# Patient Record
Sex: FEMALE | Race: BLACK OR AFRICAN AMERICAN | NOT HISPANIC OR LATINO | Employment: OTHER | URBAN - METROPOLITAN AREA
[De-identification: names, ages, dates, MRNs, and addresses within clinical notes are randomized per-mention and may not be internally consistent; named-entity substitution may affect disease eponyms.]

---

## 2017-01-24 ENCOUNTER — HOSPITAL ENCOUNTER (INPATIENT)
Facility: OTHER | Age: 77
LOS: 2 days | Discharge: HOME | DRG: 389 | End: 2017-01-26
Attending: EMERGENCY MEDICINE | Admitting: EMERGENCY MEDICINE
Payer: MEDICARE

## 2017-01-24 DIAGNOSIS — K56.609 SBO (SMALL BOWEL OBSTRUCTION): Primary | ICD-10-CM

## 2017-01-24 PROBLEM — E87.6 HYPOKALEMIA: Status: ACTIVE | Noted: 2017-01-24

## 2017-01-24 PROBLEM — N28.1 RENAL CYST: Status: ACTIVE | Noted: 2017-01-24

## 2017-01-24 PROBLEM — N39.0 URINARY TRACT INFECTION WITHOUT HEMATURIA: Status: ACTIVE | Noted: 2017-01-24

## 2017-01-24 LAB
ALBUMIN SERPL BCP-MCNC: 4 G/DL
ALP SERPL-CCNC: 102 U/L
ALT SERPL W/O P-5'-P-CCNC: 12 U/L
ANION GAP SERPL CALC-SCNC: 13 MMOL/L
AST SERPL-CCNC: 23 U/L
BACTERIA #/AREA URNS HPF: ABNORMAL /HPF
BASOPHILS # BLD AUTO: 0.01 K/UL
BASOPHILS NFR BLD: 0.1 %
BILIRUB SERPL-MCNC: 0.8 MG/DL
BILIRUB UR QL STRIP: ABNORMAL
BUN SERPL-MCNC: 12 MG/DL
CALCIUM SERPL-MCNC: 10.3 MG/DL
CHLORIDE SERPL-SCNC: 100 MMOL/L
CLARITY UR: ABNORMAL
CO2 SERPL-SCNC: 27 MMOL/L
COLOR UR: YELLOW
CREAT SERPL-MCNC: 0.9 MG/DL
DIFFERENTIAL METHOD: ABNORMAL
EOSINOPHIL # BLD AUTO: 0 K/UL
EOSINOPHIL NFR BLD: 0.1 %
ERYTHROCYTE [DISTWIDTH] IN BLOOD BY AUTOMATED COUNT: 15.5 %
EST. GFR  (AFRICAN AMERICAN): >60 ML/MIN/1.73 M^2
EST. GFR  (NON AFRICAN AMERICAN): >60 ML/MIN/1.73 M^2
GLUCOSE SERPL-MCNC: 134 MG/DL
GLUCOSE UR QL STRIP: NEGATIVE
HCT VFR BLD AUTO: 47.3 %
HGB BLD-MCNC: 15 G/DL
HGB UR QL STRIP: ABNORMAL
HYALINE CASTS #/AREA URNS LPF: 3 /LPF
KETONES UR QL STRIP: ABNORMAL
LEUKOCYTE ESTERASE UR QL STRIP: ABNORMAL
LIPASE SERPL-CCNC: 7 U/L
LYMPHOCYTES # BLD AUTO: 1.6 K/UL
LYMPHOCYTES NFR BLD: 15.9 %
MCH RBC QN AUTO: 26.1 PG
MCHC RBC AUTO-ENTMCNC: 31.7 %
MCV RBC AUTO: 82 FL
MICROSCOPIC COMMENT: ABNORMAL
MONOCYTES # BLD AUTO: 0.7 K/UL
MONOCYTES NFR BLD: 6.8 %
NEUTROPHILS # BLD AUTO: 7.6 K/UL
NEUTROPHILS NFR BLD: 76.9 %
NITRITE UR QL STRIP: NEGATIVE
PH UR STRIP: 6 [PH] (ref 5–8)
PLATELET # BLD AUTO: 208 K/UL
PMV BLD AUTO: 10.5 FL
POTASSIUM SERPL-SCNC: 3.4 MMOL/L
PROT SERPL-MCNC: 8.5 G/DL
PROT UR QL STRIP: ABNORMAL
RBC # BLD AUTO: 5.74 M/UL
RBC #/AREA URNS HPF: 4 /HPF (ref 0–4)
SODIUM SERPL-SCNC: 140 MMOL/L
SP GR UR STRIP: >=1.03 (ref 1–1.03)
URN SPEC COLLECT METH UR: ABNORMAL
UROBILINOGEN UR STRIP-ACNC: NEGATIVE EU/DL
WBC # BLD AUTO: 9.88 K/UL
WBC #/AREA URNS HPF: 35 /HPF (ref 0–5)

## 2017-01-24 PROCEDURE — 25000003 PHARM REV CODE 250: Performed by: EMERGENCY MEDICINE

## 2017-01-24 PROCEDURE — 99285 EMERGENCY DEPT VISIT HI MDM: CPT

## 2017-01-24 PROCEDURE — 25000003 PHARM REV CODE 250: Performed by: PHYSICIAN ASSISTANT

## 2017-01-24 PROCEDURE — 11000001 HC ACUTE MED/SURG PRIVATE ROOM

## 2017-01-24 PROCEDURE — 63600175 PHARM REV CODE 636 W HCPCS: Performed by: EMERGENCY MEDICINE

## 2017-01-24 PROCEDURE — 80053 COMPREHEN METABOLIC PANEL: CPT

## 2017-01-24 PROCEDURE — 81000 URINALYSIS NONAUTO W/SCOPE: CPT

## 2017-01-24 PROCEDURE — 85025 COMPLETE CBC W/AUTO DIFF WBC: CPT

## 2017-01-24 PROCEDURE — 96361 HYDRATE IV INFUSION ADD-ON: CPT

## 2017-01-24 PROCEDURE — 63600175 PHARM REV CODE 636 W HCPCS: Performed by: PHYSICIAN ASSISTANT

## 2017-01-24 PROCEDURE — 96365 THER/PROPH/DIAG IV INF INIT: CPT

## 2017-01-24 PROCEDURE — 96375 TX/PRO/DX INJ NEW DRUG ADDON: CPT

## 2017-01-24 PROCEDURE — 87086 URINE CULTURE/COLONY COUNT: CPT

## 2017-01-24 PROCEDURE — 25500020 PHARM REV CODE 255: Performed by: EMERGENCY MEDICINE

## 2017-01-24 PROCEDURE — 83690 ASSAY OF LIPASE: CPT

## 2017-01-24 RX ORDER — AMLODIPINE BESYLATE 2.5 MG/1
2.5 TABLET ORAL DAILY
Status: DISCONTINUED | OUTPATIENT
Start: 2017-01-24 | End: 2017-01-26 | Stop reason: HOSPADM

## 2017-01-24 RX ORDER — DEXTROSE MONOHYDRATE, SODIUM CHLORIDE, AND POTASSIUM CHLORIDE 50; 1.49; 4.5 G/1000ML; G/1000ML; G/1000ML
INJECTION, SOLUTION INTRAVENOUS CONTINUOUS
Status: DISCONTINUED | OUTPATIENT
Start: 2017-01-24 | End: 2017-01-24

## 2017-01-24 RX ORDER — ATENOLOL 25 MG/1
100 TABLET ORAL DAILY
Status: DISCONTINUED | OUTPATIENT
Start: 2017-01-24 | End: 2017-01-26 | Stop reason: HOSPADM

## 2017-01-24 RX ORDER — MORPHINE SULFATE 4 MG/ML
4 INJECTION, SOLUTION INTRAMUSCULAR; INTRAVENOUS
Status: COMPLETED | OUTPATIENT
Start: 2017-01-24 | End: 2017-01-24

## 2017-01-24 RX ORDER — ATENOLOL 25 MG/1
100 TABLET ORAL DAILY
Status: DISCONTINUED | OUTPATIENT
Start: 2017-01-25 | End: 2017-01-24

## 2017-01-24 RX ORDER — CHOLECALCIFEROL (VITAMIN D3) 50 MCG
1 TABLET ORAL DAILY
COMMUNITY
End: 2022-06-24

## 2017-01-24 RX ORDER — HEPARIN SODIUM 5000 [USP'U]/ML
5000 INJECTION, SOLUTION INTRAVENOUS; SUBCUTANEOUS EVERY 8 HOURS
Status: DISCONTINUED | OUTPATIENT
Start: 2017-01-24 | End: 2017-01-26 | Stop reason: HOSPADM

## 2017-01-24 RX ORDER — POLYETHYLENE GLYCOL 3350 17 G/17G
17 POWDER, FOR SOLUTION ORAL DAILY
Status: DISCONTINUED | OUTPATIENT
Start: 2017-01-24 | End: 2017-01-26 | Stop reason: HOSPADM

## 2017-01-24 RX ORDER — AMLODIPINE BESYLATE 2.5 MG/1
2.5 TABLET ORAL DAILY
Status: DISCONTINUED | OUTPATIENT
Start: 2017-01-25 | End: 2017-01-24

## 2017-01-24 RX ORDER — MORPHINE SULFATE 2 MG/ML
2 INJECTION, SOLUTION INTRAMUSCULAR; INTRAVENOUS EVERY 4 HOURS PRN
Status: DISCONTINUED | OUTPATIENT
Start: 2017-01-24 | End: 2017-01-26 | Stop reason: HOSPADM

## 2017-01-24 RX ORDER — ONDANSETRON 2 MG/ML
4 INJECTION INTRAMUSCULAR; INTRAVENOUS
Status: COMPLETED | OUTPATIENT
Start: 2017-01-24 | End: 2017-01-24

## 2017-01-24 RX ORDER — ACETAMINOPHEN 325 MG/1
650 TABLET ORAL EVERY 8 HOURS PRN
Status: DISCONTINUED | OUTPATIENT
Start: 2017-01-24 | End: 2017-01-26 | Stop reason: HOSPADM

## 2017-01-24 RX ORDER — BENAZEPRIL HYDROCHLORIDE 40 MG/1
40 TABLET ORAL DAILY
Status: DISCONTINUED | OUTPATIENT
Start: 2017-01-25 | End: 2017-01-26 | Stop reason: HOSPADM

## 2017-01-24 RX ORDER — DEXTROSE MONOHYDRATE, SODIUM CHLORIDE, AND POTASSIUM CHLORIDE 50; 1.49; 9 G/1000ML; G/1000ML; G/1000ML
INJECTION, SOLUTION INTRAVENOUS CONTINUOUS
Status: DISCONTINUED | OUTPATIENT
Start: 2017-01-24 | End: 2017-01-26 | Stop reason: HOSPADM

## 2017-01-24 RX ORDER — ONDANSETRON 2 MG/ML
4 INJECTION INTRAMUSCULAR; INTRAVENOUS EVERY 8 HOURS PRN
Status: DISCONTINUED | OUTPATIENT
Start: 2017-01-24 | End: 2017-01-26 | Stop reason: HOSPADM

## 2017-01-24 RX ORDER — SODIUM CHLORIDE 9 MG/ML
1000 INJECTION, SOLUTION INTRAVENOUS
Status: COMPLETED | OUTPATIENT
Start: 2017-01-24 | End: 2017-01-24

## 2017-01-24 RX ORDER — POTASSIUM CHLORIDE 7.45 MG/ML
10 INJECTION INTRAVENOUS ONCE
Status: COMPLETED | OUTPATIENT
Start: 2017-01-24 | End: 2017-01-24

## 2017-01-24 RX ADMIN — IOHEXOL 100 ML: 350 INJECTION, SOLUTION INTRAVENOUS at 11:01

## 2017-01-24 RX ADMIN — MORPHINE SULFATE 2 MG: 2 INJECTION, SOLUTION INTRAMUSCULAR; INTRAVENOUS at 04:01

## 2017-01-24 RX ADMIN — POTASSIUM CHLORIDE, DEXTROSE MONOHYDRATE AND SODIUM CHLORIDE: 150; 5; 900 INJECTION, SOLUTION INTRAVENOUS at 03:01

## 2017-01-24 RX ADMIN — CEFTRIAXONE 1 G: 1 INJECTION, SOLUTION INTRAVENOUS at 12:01

## 2017-01-24 RX ADMIN — MORPHINE SULFATE 4 MG: 4 INJECTION, SOLUTION INTRAMUSCULAR; INTRAVENOUS at 10:01

## 2017-01-24 RX ADMIN — HEPARIN SODIUM 5000 UNITS: 5000 INJECTION, SOLUTION INTRAVENOUS; SUBCUTANEOUS at 03:01

## 2017-01-24 RX ADMIN — HEPARIN SODIUM 5000 UNITS: 5000 INJECTION, SOLUTION INTRAVENOUS; SUBCUTANEOUS at 09:01

## 2017-01-24 RX ADMIN — AMLODIPINE BESYLATE 2.5 MG: 2.5 TABLET ORAL at 06:01

## 2017-01-24 RX ADMIN — ONDANSETRON 4 MG: 2 INJECTION, SOLUTION INTRAMUSCULAR; INTRAVENOUS at 10:01

## 2017-01-24 RX ADMIN — ATENOLOL 100 MG: 25 TABLET ORAL at 06:01

## 2017-01-24 RX ADMIN — SODIUM CHLORIDE 1000 ML: 0.9 INJECTION, SOLUTION INTRAVENOUS at 09:01

## 2017-01-24 RX ADMIN — MORPHINE SULFATE 2 MG: 2 INJECTION, SOLUTION INTRAMUSCULAR; INTRAVENOUS at 09:01

## 2017-01-24 RX ADMIN — POTASSIUM CHLORIDE 10 MEQ: 10 INJECTION, SOLUTION INTRAVENOUS at 03:01

## 2017-01-24 RX ADMIN — POLYETHYLENE GLYCOL 3350 17 G: 17 POWDER, FOR SOLUTION ORAL at 06:01

## 2017-01-24 NOTE — MEDICAL/APP STUDENT
[facility] Hospital Medicine  History & Physical    Patient Name: Natali Galeano  MRN: [id number]  Admission Date: 2017  Attending Physician: Thad Damon MD   Primary Care Provider: Ewelina Herzog MD         Patient information was obtained from patient and ER records.     Subjective:     Principal Problem: Small bowel obstruction    Chief Complaint:  Abdominal pain/ vomiting      HPI: Pt is a 75 y/o female with h/o colon CA (completely resected), HTN, HLD, ALYSE and prior SBO who presented to the ED today (17) for progressively worsening abdominal pain x2 days. Her last BM was yesterday. She reports 3 episodes of vomiting yesterday after taking pepto-bismol, which she says always makes her vomit. Pt reports diaphoresis on Monday, nausea, flatulence, and urinary frequency today. Denies blood in stool, diarrhea, vomiting, dysuria, and fever.      Pt. Reports colon ca was completely reseceted and there was no need for chemo/radiation. Surgical history significant for tumor removal, , and hernia repair.      Past Medical History   Diagnosis Date    Colon cancer     Gout, chronic     High cholesterol     Hypertension        Past Surgical History   Procedure Laterality Date     section, classic      Colon surgery      Hernia repair         Review of patient's allergies indicates:  No Known Allergies    No current facility-administered medications on file prior to encounter.      Current Outpatient Prescriptions on File Prior to Encounter   Medication Sig    allopurinol (ZYLOPRIM) 300 MG tablet Take 300 mg by mouth once daily.    amlodipine (NORVASC) 2.5 MG tablet Take 2.5 mg by mouth once daily.    aspirin (ECOTRIN) 81 MG EC tablet Take 81 mg by mouth once daily.    atenolol (TENORMIN) 100 MG tablet Take 100 mg by mouth once daily.    atorvastatin (LIPITOR) 10 MG tablet Take 10 mg by mouth once daily.    benazepril (LOTENSIN) 40 MG tablet Take 40 mg by mouth  once daily.    cetirizine (ZYRTEC) 10 MG tablet Take 10 mg by mouth daily as needed for Allergies.    fluticasone (FLONASE) 50 mcg/actuation nasal spray 1 spray by Each Nare route once daily.    [DISCONTINUED] ergocalciferol (VITAMIN D2) 50,000 unit Cap Take 50,000 Units by mouth every 7 days.    [DISCONTINUED] multivitamin (ONE DAILY MULTIVITAMIN) per tablet Take 1 tablet by mouth once daily.     Family History     None        Social History Main Topics    Smoking status: Former Smoker     Packs/day: 0.10     Quit date: 4/16/1980    Smokeless tobacco: Never Used    Alcohol use No    Drug use: No    Sexual activity: Not on file     Review of Systems   Constitutional: Negative for chills, diaphoresis, fatigue and fever.   HENT: Negative.    Respiratory: Negative for cough, chest tightness and shortness of breath.    Cardiovascular: Negative for chest pain, palpitations and leg swelling.   Gastrointestinal: Positive for abdominal pain, constipation and nausea. Negative for blood in stool and vomiting (vomiting yesterday ).   Genitourinary: Positive for frequency. Negative for difficulty urinating, dysuria, flank pain, hematuria, pelvic pain and urgency.   Musculoskeletal: Negative for arthralgias and myalgias.   Skin: Negative for color change, rash and wound.   Neurological: Negative for syncope, weakness, numbness and headaches.   Psychiatric/Behavioral: Negative.      Objective:     Vital Signs (Most Recent):  Temp: 97.7 °F (36.5 °C) (01/24/17 1423)  Pulse: 67 (01/24/17 1423)  Resp: 16 (01/24/17 1423)  BP: (!) 177/84 (01/24/17 1423)  SpO2: 98 % (01/24/17 1423) Vital Signs (24h Range):  Temp:  [97.7 °F (36.5 °C)] 97.7 °F (36.5 °C)  Pulse:  [62-74] 67  Resp:  [16-18] 16  SpO2:  [98 %-100 %] 98 %  BP: (149-220)/() 177/84     Weight: 131.5 kg (290 lb)  Body mass index is 45.42 kg/(m^2).    Physical Exam   Constitutional: She is oriented to person, place, and time. She appears well-developed. No distress.    Morbidly obese   HENT:   Head: Normocephalic and atraumatic.   Eyes: EOM are normal.   Neck: Normal range of motion. Neck supple.   Cardiovascular: Normal rate and regular rhythm.  Exam reveals no gallop and no friction rub.    Murmur heard.  Pulmonary/Chest: Effort normal and breath sounds normal. No respiratory distress. She has no wheezes. She has no rales.   Abdominal: Soft. She exhibits no distension. There is tenderness (mid epigastric). There is no rebound and no guarding.   Bowel sounds present in RUQ and RLQ   Neurological: She is alert and oriented to person, place, and time.   Skin: Skin is warm and dry. No rash noted. She is not diaphoretic. No erythema.   Psychiatric: She has a normal mood and affect. Her behavior is normal. Judgment and thought content normal.   Vitals reviewed.      Significant Labs:   CBC:   Recent Labs  Lab 01/24/17  0937   WBC 9.88   HGB 15.0   HCT 47.3        CMP:   Recent Labs  Lab 01/24/17  0937      K 3.4*      CO2 27   *   BUN 12   CREATININE 0.9   CALCIUM 10.3   PROT 8.5*   ALBUMIN 4.0   BILITOT 0.8   ALKPHOS 102   AST 23   ALT 12   ANIONGAP 13   EGFRNONAA >60     Lipase:   Recent Labs  Lab 01/24/17  0937   LIPASE 7     Urine Studies:   Recent Labs  Lab 01/24/17  1007   COLORU Yellow   APPEARANCEUA Cloudy*   PHUR 6.0   SPECGRAV >=1.030*   PROTEINUA 2+*   GLUCUA Negative   KETONESU Trace*   BILIRUBINUA 1+*   OCCULTUA 1+*   NITRITE Negative   UROBILINOGEN Negative   LEUKOCYTESUR 1+*   RBCUA 4   WBCUA 35*   BACTERIA Moderate*   HYALINECASTS 3*       Significant Imaging: CT: I have reviewed all pertinent results/findings within the past 24 hours and my personal findings are:     1.  Small bowel obstruction with left lower quadrant transition point, similar in location to prior examination.  2.  Bilateral renal cysts, some indeterminate.  Recommend nonemergent renal ultrasound for further characterization.  3.  Stable left adrenal  nodules.    Assessment/Plan:     Active Diagnoses:    Diagnosis Date Noted POA    Essential hypertension [I10]  Yes    Morbid obesity [E66.01] 06/23/2015 Yes    SBO (small bowel obstruction) [K56.69] 04/16/2015 Yes      Problems Resolved During this Admission:    Diagnosis Date Noted Date Resolved POA     VTE Risk Mitigation         Ordered     heparin (porcine) injection 5,000 Units  Every 8 hours     Route:  Subcutaneous        01/24/17 1415     Medium Risk of VTE  Once      01/24/17 1415     Place FEDE hose  Until discontinued      01/24/17 1415     Place sequential compression device  Until discontinued      01/24/17 1415        1. Small bowel obstruction  - Ct abdomen showed LLQ obstruction with transition point  - consulted surgery   - NPO, IVF, pain control   - no indication for NG tube at this point, consider if symptoms worsen.    2. Cystitis  - possible UTI  - follow culture     3. Hypokalemia   - likely due to emesis   - replaced  - follow with am labs    4. Essential HTN  - elevated, pt has not taken daily medications today  - restart home meds am      Mira Hobson  Department of Hospital Medicine   [facility]

## 2017-01-24 NOTE — ED PROVIDER NOTES
Encounter Date: 2017    SCRIBE #1 NOTE: I, Sharon Lindquist, am scribing for, and in the presence of, Dr. Campbell.       History     Chief Complaint   Patient presents with    Emesis     + N/V x two days. Pt reports unable to tolerated PO intake, +generalized abodminal pain     Review of patient's allergies indicates:  No Known Allergies  HPI Comments: Time seen by provider: 9:25 AM    This is a 76 y.o. female who presents with complaint of diffuse abdominal pain that began two days ago. The pain is worse with palpation and better without touch. She reports associated nausea, vomiting, diaphoresis, myalgias, and urinary frequency. She reports vomiting with any PO intake. Her last bowel movement was yesterday, but she has not had flatulence today.  She has had a hx of colon resection, , and hernia repair.  Denies f/c, diarrhea, dysuria, chest pain/SOB.          The history is provided by the patient and a relative (granddaughter).     Past Medical History   Diagnosis Date    Colon cancer     Gout, chronic     High cholesterol     Hypertension      No past medical history pertinent negatives.  Past Surgical History   Procedure Laterality Date     section, classic      Colon surgery      Hernia repair       History reviewed. No pertinent family history.  Social History   Substance Use Topics    Smoking status: Former Smoker     Packs/day: 0.10     Quit date: 1980    Smokeless tobacco: Never Used    Alcohol use No     Review of Systems   Constitutional: Positive for diaphoresis. Negative for fever.   HENT: Negative for facial swelling.    Respiratory: Negative for shortness of breath.    Cardiovascular: Negative for chest pain.   Gastrointestinal: Positive for abdominal pain, nausea and vomiting.   Endocrine: Negative for polyuria.   Genitourinary: Positive for frequency.   Musculoskeletal: Positive for myalgias.   Skin: Negative for rash.   Neurological: Negative for headaches.        Physical Exam   Initial Vitals   BP Pulse Resp Temp SpO2   01/24/17 0913 01/24/17 0913 01/24/17 0913 01/24/17 0913 01/24/17 0913   220/110 72 18 97.7 °F (36.5 °C) 99 %     Physical Exam    Nursing note and vitals reviewed.  Constitutional: She appears well-developed and well-nourished. She is not diaphoretic. No distress.   HENT:   Head: Normocephalic and atraumatic.   Right Ear: External ear normal.   Left Ear: External ear normal.   Mouth/Throat: Oropharynx is clear and moist.   Dry mucous membranes.    Eyes: EOM are normal. Right eye exhibits no discharge. Left eye exhibits no discharge.   Neck: Normal range of motion.   Cardiovascular: Normal rate, regular rhythm and normal heart sounds. Exam reveals no gallop and no friction rub.    No murmur heard.  Pulmonary/Chest: Breath sounds normal. No respiratory distress. She has no wheezes. She has no rhonchi. She has no rales.   Abdominal: Soft. There is tenderness. There is no rebound and no guarding.   Diffuse abdominal tenderness.    Musculoskeletal: Normal range of motion. She exhibits no edema or tenderness.   Neurological: She is alert and oriented to person, place, and time.   Skin: Skin is warm and dry. No rash noted. No pallor.   Psychiatric: She has a normal mood and affect. Thought content normal.         ED Course   Procedures  Labs Reviewed   URINALYSIS - Abnormal; Notable for the following:        Result Value    Appearance, UA Cloudy (*)     Specific Gravity, UA >=1.030 (*)     Protein, UA 2+ (*)     Ketones, UA Trace (*)     Bilirubin (UA) 1+ (*)     Occult Blood UA 1+ (*)     Leukocytes, UA 1+ (*)     All other components within normal limits   COMPREHENSIVE METABOLIC PANEL - Abnormal; Notable for the following:     Potassium 3.4 (*)     Glucose 134 (*)     Total Protein 8.5 (*)     All other components within normal limits   CBC W/ AUTO DIFFERENTIAL - Abnormal; Notable for the following:     RBC 5.74 (*)     MCH 26.1 (*)     MCHC 31.7 (*)      RDW 15.5 (*)     Gran% 76.9 (*)     Lymph% 15.9 (*)     All other components within normal limits   URINALYSIS MICROSCOPIC - Abnormal; Notable for the following:     WBC, UA 35 (*)     Bacteria, UA Moderate (*)     Hyaline Casts, UA 3 (*)     All other components within normal limits   CULTURE, URINE   LIPASE        Imaging Results         CT Abdomen Pelvis With Contrast (Final result) Result time:  01/24/17 11:25:39    Final result by Cornelio Perez MD (01/24/17 11:25:39)    Impression:      1.  Small bowel obstruction with left lower quadrant transition point, similar in location to prior examination.  2.  Bilateral renal cysts, some indeterminate.  Recommend nonemergent renal ultrasound for further characterization.  3.  Stable left adrenal nodules.      Electronically signed by: CORNELIO PEREZ MD  Date:     01/24/17  Time:    11:25     Narrative:    CT abdomen and pelvis    Technique: Helical CT scan of abdomen and pelvis performed with 100 mL Omnipaque 350 intravenous contrast.    Comparison: 04/16/15    Findings:    Lung bases are clear.  No pericardial or pleural effusion.    Liver, gallbladder, pancreas, and spleen are unremarkable.  There are stable left adrenal nodules measuring 1.9 cm and 1.7 cm.  Several bilateral renal hypodensities are noted.  While some demonstrate characteristics of simple cysts, dominant left upper pole lesion measuring 3.8 cm is indeterminate, diminished in size from prior study.  No hydroureteronephrosis.  No biliary dilatation.    Moderately dilated small bowel seen within the left hemiabdomen with small bowel feces and transition point in the left lower quadrant.  Distal small bowel is collapsed.  Gas and stool seen within the colon.  Appendix is normal caliber.    Note made of postsurgical changes in the anterior abdominal wall.    Uterus and adnexa are unremarkable.    No retroperitoneal lymphadenopathy.  No ascites.  Abdominal aorta is normal caliber.    Regional osseous  structures demonstrate no aggressive appearing lytic or blastic lesions.  Degenerative changes noted in the lumbar spine.                     Medical Decision Making:   History:   Old Medical Records: I decided to obtain old medical records.  Old Records Summarized: other records and records from previous admission(s).  Initial Assessment:   9:25AM:  Pt is a 77 y/o F who presents to ED with N/V, abdominal pain. Pt appears well, nontoxic. She does have diffuse abdominal tenderness. She does have a hx of SBO requiring admission in the past.   Will plan for labs, IVFs, CT A/P, will continue to follow and reassess.    Clinical Tests:   Lab Tests: Ordered and Reviewed  Radiological Study: Ordered and Reviewed    11:45PM:  Pt has a SBO on CT.  Her labs are otherwise stable.  I updated pt regarding results and need for admission.  Pt agreeable to plan and all questions answered.    12:13 PM: Discussed the case with Dr. Cardozo, general surgery on call, who will consult on the case.      12:20 PM: Discussed the case with Libia Bull PA-C with hospitalist on call, who will admit the patient to Dr. Damon.             Scribe Attestation:   Scribe #1: I performed the above scribed service and the documentation accurately describes the services I performed. I attest to the accuracy of the note.    Attending Attestation:           Physician Attestation for Scribe:  Physician Attestation Statement for Scribe #1: I, Dr. Campbell, reviewed documentation, as scribed by Sharon Lindquist in my presence, and it is both accurate and complete.                 ED Course     Clinical Impression:     1. SBO (small bowel obstruction)                  Nava Campbell MD  01/24/17 5405

## 2017-01-24 NOTE — IP AVS SNAPSHOT
[facility] Location ([facility])  [address] [place] [id number]  Phone: [phone]           Patient Discharge Instructions     Our goal is to set you up for success. This packet includes information on your condition, medications, and your home care. It will help you to care for yourself so you don't get sicker and need to go back to the hospital.     Please ask your nurse if you have any questions.        There are many details to remember when preparing to leave the hospital. Here is what you will need to do:    1. Take your medicine. If you are prescribed medications, review your Medication List in the following pages. You may have new medications to  at the pharmacy and others that you'll need to stop taking. Review the instructions for how and when to take your medications. Talk with your doctor or nurses if you are unsure of what to do.     2. Go to your follow-up appointments. Specific follow-up information is listed in the following pages. Your may be contacted by a transition nurse or clinical provider about future appointments. Be sure we have all of the phone numbers to reach you, if needed. Please contact your provider's office if you are unable to make an appointment.     3. Watch for warning signs. Your doctor or nurse will give you detailed warning signs to watch for and when to call for assistance. These instructions may also include educational information about your condition. If you experience any of warning signs to your health, call your doctor.               ** Verify the list of medication(s) below is accurate and up to date. Carry this with you in case of emergency. If your medications have changed, please notify your healthcare provider.             Medication List      CONTINUE taking these medications        Additional Info                      allopurinol 300 MG tablet   Commonly known as:  ZYLOPRIM   Refills:  0   Dose:  300 mg    Instructions:  Take 300 mg by  mouth once daily.     Begin Date    AM    Noon    PM    Bedtime       amlodipine 2.5 MG tablet   Commonly known as:  NORVASC   Refills:  0   Dose:  2.5 mg    Last time this was given:  2.5 mg on 1/26/2017  8:46 AM   Instructions:  Take 2.5 mg by mouth once daily.     Begin Date    AM    Noon    PM    Bedtime       aspirin 81 MG EC tablet   Commonly known as:  ECOTRIN   Refills:  0   Dose:  81 mg    Instructions:  Take 81 mg by mouth once daily.     Begin Date    AM    Noon    PM    Bedtime       atenolol 100 MG tablet   Commonly known as:  TENORMIN   Refills:  0   Dose:  100 mg    Last time this was given:  100 mg on 1/26/2017  8:47 AM   Instructions:  Take 100 mg by mouth once daily.     Begin Date    AM    Noon    PM    Bedtime       atorvastatin 10 MG tablet   Commonly known as:  LIPITOR   Refills:  0   Dose:  10 mg    Instructions:  Take 10 mg by mouth once daily.     Begin Date    AM    Noon    PM    Bedtime       benazepril 40 MG tablet   Commonly known as:  LOTENSIN   Refills:  0   Dose:  40 mg    Last time this was given:  40 mg on 1/26/2017  8:47 AM   Instructions:  Take 40 mg by mouth once daily.     Begin Date    AM    Noon    PM    Bedtime       cetirizine 10 MG tablet   Commonly known as:  ZYRTEC   Refills:  0   Dose:  10 mg    Instructions:  Take 10 mg by mouth daily as needed for Allergies.     Begin Date    AM    Noon    PM    Bedtime       fluticasone 50 mcg/actuation nasal spray   Commonly known as:  FLONASE   Refills:  0   Dose:  1 spray    Instructions:  1 spray by Each Nare route once daily.     Begin Date    AM    Noon    PM    Bedtime       THERA-D 2,000 unit Tab   Refills:  0   Dose:  1 tablet   Generic drug:  cholecalciferol (vitamin D3)    Instructions:  Take 1 tablet by mouth once daily.     Begin Date    AM    Noon    PM    Bedtime                  Please bring to all follow up appointments:    1. A copy of your discharge instructions.  2. All medicines you are currently taking in their  original bottles.  3. Identification and insurance card.    Please arrive 15 minutes ahead of scheduled appointment time.    Please call 24 hours in advance if you must reschedule your appointment and/or time.        Follow-up Information     Follow up with Ewelina Herzog MD. Schedule an appointment as soon as possible for a visit in 5 days.    Specialty:  Internal Medicine    Contact information:    [address] [facility] [facility] [address]  [phone]          Follow up with Che Tan MD. Schedule an appointment as soon as possible for a visit in 5 days.    Specialty:  Urology    Contact information:    [address]  [address]  [facility] [address]  [phone]          Discharge Instructions     Future Orders    Activity as tolerated     Call MD for:  difficulty breathing or increased cough     Call MD for:  increased confusion or weakness     Call MD for:  persistent dizziness, light-headedness, or visual disturbances     Call MD for:  persistent nausea and vomiting or diarrhea     Call MD for:  severe persistent headache     Call MD for:  severe uncontrolled pain     Call MD for:  temperature >100.4     Call MD for:  worsening rash     Diet general     Questions:    Total calories:      Fat restriction, if any:      Protein restriction, if any:      Na restriction, if any:  2gNa    Fluid restriction:      Additional restrictions:          Primary Diagnosis     Your primary diagnosis was:  Small Bowel Obstruction      Admission Information     Date & Time Provider Department [facility]    1/24/2017  9:14 AM Thad Damon MD [facility] [phone]      Care Providers     Provider Role Specialty Primary office phone    Thad Damon MD Attending Provider Hospitalist [phone]    Pedro Cardozo MD Consulting Physician  General Surgery [phone]      Your Vitals Were     BP Pulse Temp Resp Height Weight    157/70 (BP Location: Left arm, Patient Position: Lying, BP Method:  "Automatic) 53 97.8 °F (36.6 °C) (Oral) 18 5' 7" (1.702 m) 131.5 kg (290 lb)    SpO2 BMI             100% 45.42 kg/m2         Recent Lab Values     No lab values to display.      Allergies as of 1/26/2017     No Known Allergies      [facility]     [facility] Care Line - 24/7 Assistance  Unless otherwise directed by your provider, please contact [facility], our nurse care line that is available for 24/7 assistance.     Registered nurses in the [facility] provide clinical advisement, health education, appointment booking, and other advisory services.  Call for this free service at [phone].        Advance Directives     An advance directive is a document which, in the event you are no longer able to make decisions for yourself, tells your healthcare team what kind of treatment you do or do not want to receive, or who you would like to make those decisions for you.  If you do not currently have an advance directive, [facility] encourages you to create one.  For more information call:  [phone]), [phone] ([phone]),  or log on to [email or web address].        Smoking Cessation     If you would like to quit smoking:   You may be eligible for free services if you are a [place] resident and started smoking cigarettes before September 1, 1988.  Call the Smoking Cessation Trust ([facility]) toll free at (161) 573-1496 or (589) 858-1343.   Call 1-146-QUIT-NOW if you do not meet the above criteria.            Language Assistance Services     ATTENTION: Language assistance services are available, free of charge. Please call [phone].      ATENCIÓN: Si habla español, tiene a schilling disposición servicios gratuitos de asistencia lingüística. Llame al [phone].     CHÚ Ý: N?u b?n nói Ti?ng Vi?t, có các d?ch v? h? tr? ngôn ng? mi?n phí dành cho b?n. G?i s? [phone].        [facility] Sign-Up     Activating your [facility] account is as easy as 1-2-3!     1) " Visit [email or web address], select Sign Up Now, enter this activation code and your date of birth, then select Next.  [email or web address]  Expires: 3/12/2017 12:26 PM      2) Create a username and password to use when you visit [facility] in the future and select a security question in case you lose your password and select Next.    3) Enter your e-mail address and click Sign Up!    Additional Information  If you have questions, please e-mail [email or web address] or call [phone] to talk to our [email or web address] staff. Remember, [facility] is NOT to be used for urgent needs. For medical emergencies, dial 911.          [facility] complies with applicable Federal civil rights laws and does not discriminate on the basis of race, color, national origin, age, disability, or sex.

## 2017-01-24 NOTE — CONSULTS
76yoBF admitted with 2 days of N&V and abd pain similar to episodes in the past. Feels better since admit.  S/P colectomy for cancer; hernia repair  PE: In NAD. Obese  Abd flat, min tenderness. No hernias.  Doubt SBO  Observe on IV fluids.

## 2017-01-25 PROBLEM — K59.00 CONSTIPATION: Status: ACTIVE | Noted: 2017-01-25

## 2017-01-25 LAB
ANION GAP SERPL CALC-SCNC: 8 MMOL/L
BACTERIA UR CULT: NORMAL
BACTERIA UR CULT: NORMAL
BASOPHILS # BLD AUTO: 0.02 K/UL
BASOPHILS NFR BLD: 0.3 %
BUN SERPL-MCNC: 14 MG/DL
CALCIUM SERPL-MCNC: 8.9 MG/DL
CHLORIDE SERPL-SCNC: 110 MMOL/L
CO2 SERPL-SCNC: 21 MMOL/L
CREAT SERPL-MCNC: 0.8 MG/DL
DIFFERENTIAL METHOD: ABNORMAL
EOSINOPHIL # BLD AUTO: 0 K/UL
EOSINOPHIL NFR BLD: 0.4 %
ERYTHROCYTE [DISTWIDTH] IN BLOOD BY AUTOMATED COUNT: 15.8 %
EST. GFR  (AFRICAN AMERICAN): >60 ML/MIN/1.73 M^2
EST. GFR  (NON AFRICAN AMERICAN): >60 ML/MIN/1.73 M^2
GLUCOSE SERPL-MCNC: 87 MG/DL
HCT VFR BLD AUTO: 40.2 %
HGB BLD-MCNC: 12.6 G/DL
LYMPHOCYTES # BLD AUTO: 2.6 K/UL
LYMPHOCYTES NFR BLD: 38.4 %
MAGNESIUM SERPL-MCNC: 1.8 MG/DL
MCH RBC QN AUTO: 26 PG
MCHC RBC AUTO-ENTMCNC: 31.3 %
MCV RBC AUTO: 83 FL
MONOCYTES # BLD AUTO: 0.8 K/UL
MONOCYTES NFR BLD: 12.4 %
NEUTROPHILS # BLD AUTO: 3.3 K/UL
NEUTROPHILS NFR BLD: 48.5 %
PHOSPHATE SERPL-MCNC: 3.5 MG/DL
PLATELET # BLD AUTO: 155 K/UL
PMV BLD AUTO: 10.7 FL
POTASSIUM SERPL-SCNC: 4.1 MMOL/L
RBC # BLD AUTO: 4.85 M/UL
SODIUM SERPL-SCNC: 139 MMOL/L
WBC # BLD AUTO: 6.77 K/UL

## 2017-01-25 PROCEDURE — 84100 ASSAY OF PHOSPHORUS: CPT

## 2017-01-25 PROCEDURE — 63600175 PHARM REV CODE 636 W HCPCS: Performed by: EMERGENCY MEDICINE

## 2017-01-25 PROCEDURE — 11000001 HC ACUTE MED/SURG PRIVATE ROOM

## 2017-01-25 PROCEDURE — 25000003 PHARM REV CODE 250: Performed by: SPECIALIST

## 2017-01-25 PROCEDURE — 83735 ASSAY OF MAGNESIUM: CPT

## 2017-01-25 PROCEDURE — 36415 COLL VENOUS BLD VENIPUNCTURE: CPT

## 2017-01-25 PROCEDURE — 25000003 PHARM REV CODE 250: Performed by: PHYSICIAN ASSISTANT

## 2017-01-25 PROCEDURE — 85025 COMPLETE CBC W/AUTO DIFF WBC: CPT

## 2017-01-25 PROCEDURE — 63600175 PHARM REV CODE 636 W HCPCS: Performed by: PHYSICIAN ASSISTANT

## 2017-01-25 PROCEDURE — 99233 SBSQ HOSP IP/OBS HIGH 50: CPT | Mod: ,,, | Performed by: HOSPITALIST

## 2017-01-25 PROCEDURE — 25000003 PHARM REV CODE 250: Performed by: HOSPITALIST

## 2017-01-25 PROCEDURE — 80048 BASIC METABOLIC PNL TOTAL CA: CPT

## 2017-01-25 RX ORDER — BISACODYL 10 MG
10 SUPPOSITORY, RECTAL RECTAL ONCE
Status: COMPLETED | OUTPATIENT
Start: 2017-01-25 | End: 2017-01-25

## 2017-01-25 RX ADMIN — AMLODIPINE BESYLATE 2.5 MG: 2.5 TABLET ORAL at 10:01

## 2017-01-25 RX ADMIN — POLYETHYLENE GLYCOL 3350 17 G: 17 POWDER, FOR SOLUTION ORAL at 10:01

## 2017-01-25 RX ADMIN — BENAZEPRIL HYDROCHLORIDE 40 MG: 40 TABLET, COATED ORAL at 10:01

## 2017-01-25 RX ADMIN — BISACODYL 10 MG: 10 SUPPOSITORY RECTAL at 03:01

## 2017-01-25 RX ADMIN — CEFTRIAXONE 1 G: 1 INJECTION, SOLUTION INTRAVENOUS at 10:01

## 2017-01-25 RX ADMIN — HEPARIN SODIUM 5000 UNITS: 5000 INJECTION, SOLUTION INTRAVENOUS; SUBCUTANEOUS at 09:01

## 2017-01-25 RX ADMIN — POTASSIUM CHLORIDE, DEXTROSE MONOHYDRATE AND SODIUM CHLORIDE: 150; 5; 900 INJECTION, SOLUTION INTRAVENOUS at 01:01

## 2017-01-25 RX ADMIN — HEPARIN SODIUM 5000 UNITS: 5000 INJECTION, SOLUTION INTRAVENOUS; SUBCUTANEOUS at 05:01

## 2017-01-25 RX ADMIN — HEPARIN SODIUM 5000 UNITS: 5000 INJECTION, SOLUTION INTRAVENOUS; SUBCUTANEOUS at 01:01

## 2017-01-25 RX ADMIN — MORPHINE SULFATE 2 MG: 2 INJECTION, SOLUTION INTRAMUSCULAR; INTRAVENOUS at 05:01

## 2017-01-25 NOTE — SUBJECTIVE & OBJECTIVE
Past Medical History   Diagnosis Date    Colon cancer     Gout, chronic     High cholesterol     Hypertension        Past Surgical History   Procedure Laterality Date     section, classic      Colon surgery      Hernia repair         Review of patient's allergies indicates:  No Known Allergies    No current facility-administered medications on file prior to encounter.      Current Outpatient Prescriptions on File Prior to Encounter   Medication Sig    allopurinol (ZYLOPRIM) 300 MG tablet Take 300 mg by mouth once daily.    amlodipine (NORVASC) 2.5 MG tablet Take 2.5 mg by mouth once daily.    aspirin (ECOTRIN) 81 MG EC tablet Take 81 mg by mouth once daily.    atenolol (TENORMIN) 100 MG tablet Take 100 mg by mouth once daily.    atorvastatin (LIPITOR) 10 MG tablet Take 10 mg by mouth once daily.    benazepril (LOTENSIN) 40 MG tablet Take 40 mg by mouth once daily.    cetirizine (ZYRTEC) 10 MG tablet Take 10 mg by mouth daily as needed for Allergies.    fluticasone (FLONASE) 50 mcg/actuation nasal spray 1 spray by Each Nare route once daily.    [DISCONTINUED] ergocalciferol (VITAMIN D2) 50,000 unit Cap Take 50,000 Units by mouth every 7 days.    [DISCONTINUED] multivitamin (ONE DAILY MULTIVITAMIN) per tablet Take 1 tablet by mouth once daily.     Family History     None        Social History Main Topics    Smoking status: Former Smoker     Packs/day: 0.10     Quit date: 1980    Smokeless tobacco: Never Used    Alcohol use No    Drug use: No    Sexual activity: Not on file     Review of Systems   Constitutional: Negative for chills, diaphoresis, fever and unexpected weight change.   HENT: Negative for congestion, mouth sores, sore throat and trouble swallowing.    Eyes: Negative.    Respiratory: Negative for cough, chest tightness, shortness of breath and wheezing.    Cardiovascular: Negative for chest pain, palpitations and leg swelling.   Gastrointestinal: Positive for abdominal  pain, constipation, nausea and vomiting. Negative for abdominal distention and diarrhea.   Endocrine: Negative for polydipsia and polyphagia.   Genitourinary: Positive for frequency. Negative for difficulty urinating, dysuria, flank pain and urgency.   Musculoskeletal: Negative for arthralgias and myalgias.   Neurological: Negative for dizziness, light-headedness and headaches.   Hematological: Does not bruise/bleed easily.   Psychiatric/Behavioral: Negative for agitation.     Objective:     Vital Signs (Most Recent):  Temp: 97.7 °F (36.5 °C) (01/24/17 1423)  Pulse: 67 (01/24/17 1423)  Resp: 16 (01/24/17 1423)  BP: (!) 177/84 (01/24/17 1423)  SpO2: 98 % (01/24/17 1423) Vital Signs (24h Range):  Temp:  [97.7 °F (36.5 °C)] 97.7 °F (36.5 °C)  Pulse:  [62-74] 67  Resp:  [16-18] 16  SpO2:  [98 %-100 %] 98 %  BP: (149-220)/() 177/84     Weight: 131.5 kg (290 lb)  Body mass index is 45.42 kg/(m^2).    Physical Exam   Constitutional: She is oriented to person, place, and time. She appears well-developed and well-nourished.   HENT:   Head: Normocephalic.   Mouth/Throat: No oropharyngeal exudate.   Eyes: Conjunctivae are normal. Right eye exhibits no discharge. Left eye exhibits no discharge. No scleral icterus.   Neck: Normal range of motion. Neck supple. No JVD present.   Cardiovascular: Normal rate, regular rhythm and intact distal pulses.    Murmur heard.  Pulmonary/Chest: Effort normal and breath sounds normal. No stridor. No respiratory distress. She has no wheezes.   Abdominal: Soft. Bowel sounds are normal. She exhibits no distension. There is tenderness (diffuse mild TTP). There is no rebound and no guarding.   Musculoskeletal: Normal range of motion. She exhibits no edema or tenderness.   Neurological: She is alert and oriented to person, place, and time. No cranial nerve deficit.   Skin: Skin is warm and dry. No erythema.   Psychiatric: She has a normal mood and affect.   Nursing note and vitals reviewed.        Significant Labs:   CBC:   Recent Labs  Lab 01/24/17  0937   WBC 9.88   HGB 15.0   HCT 47.3        CMP:   Recent Labs  Lab 01/24/17  0937      K 3.4*      CO2 27   *   BUN 12   CREATININE 0.9   CALCIUM 10.3   PROT 8.5*   ALBUMIN 4.0   BILITOT 0.8   ALKPHOS 102   AST 23   ALT 12   ANIONGAP 13   EGFRNONAA >60     Lipase:   Recent Labs  Lab 01/24/17  0937   LIPASE 7     All pertinent labs within the past 24 hours have been reviewed.    Significant Imaging: I have reviewed all pertinent imaging results/findings within the past 24 hours.   CT A/P 1/24/2017  1.  Small bowel obstruction with left lower quadrant transition point, similar in location to prior examination.  2.  Bilateral renal cysts, some indeterminate.  Recommend nonemergent renal ultrasound for further characterization.  3.  Stable left adrenal nodules.

## 2017-01-25 NOTE — H&P
[facility] Hospital Medicine  History & Physical    Patient Name: Natali Galeano  MRN: [id number]  Admission Date: 2017  Attending Physician: Thad Damon MD   Primary Care Provider: Ewelina Herzog MD         Patient information was obtained from patient, past medical records and ER records.     Subjective:     Principal Problem:SBO (small bowel obstruction)    Chief Complaint:  N/V     HPI: 77 y/o female with Hx of HTN, HLD, Colon cancer s/p resection presents to ED with c/o diffuse abdominal pain that began two days ago, associated with nausea, vomiting, diaphoresis, and urinary frequency. She has been unable to eat. Her last bowel movement was yesterday; reports similar episodes in the past that resolved w/o any surgical intervention. Denies fever, chills, CP, SOB, flank pain, dysuria.      CT A/P in ED showed Small bowel obstruction with left lower quadrant transition point, similar in location to prior examination.     Family history non- contributory.          Past Medical History   Diagnosis Date    Colon cancer     Gout, chronic     High cholesterol     Hypertension        Past Surgical History   Procedure Laterality Date     section, classic      Colon surgery      Hernia repair         Review of patient's allergies indicates:  No Known Allergies    No current facility-administered medications on file prior to encounter.      Current Outpatient Prescriptions on File Prior to Encounter   Medication Sig    allopurinol (ZYLOPRIM) 300 MG tablet Take 300 mg by mouth once daily.    amlodipine (NORVASC) 2.5 MG tablet Take 2.5 mg by mouth once daily.    aspirin (ECOTRIN) 81 MG EC tablet Take 81 mg by mouth once daily.    atenolol (TENORMIN) 100 MG tablet Take 100 mg by mouth once daily.    atorvastatin (LIPITOR) 10 MG tablet Take 10 mg by mouth once daily.    benazepril (LOTENSIN) 40 MG tablet Take 40 mg by mouth once daily.    cetirizine (ZYRTEC) 10 MG tablet Take  10 mg by mouth daily as needed for Allergies.    fluticasone (FLONASE) 50 mcg/actuation nasal spray 1 spray by Each Nare route once daily.    [DISCONTINUED] ergocalciferol (VITAMIN D2) 50,000 unit Cap Take 50,000 Units by mouth every 7 days.    [DISCONTINUED] multivitamin (ONE DAILY MULTIVITAMIN) per tablet Take 1 tablet by mouth once daily.     Family History     None        Social History Main Topics    Smoking status: Former Smoker     Packs/day: 0.10     Quit date: 4/16/1980    Smokeless tobacco: Never Used    Alcohol use No    Drug use: No    Sexual activity: Not on file     Review of Systems   Constitutional: Negative for chills, diaphoresis, fever and unexpected weight change.   HENT: Negative for congestion, mouth sores, sore throat and trouble swallowing.    Eyes: Negative.    Respiratory: Negative for cough, chest tightness, shortness of breath and wheezing.    Cardiovascular: Negative for chest pain, palpitations and leg swelling.   Gastrointestinal: Positive for abdominal pain, constipation, nausea and vomiting. Negative for abdominal distention and diarrhea.   Endocrine: Negative for polydipsia and polyphagia.   Genitourinary: Positive for frequency. Negative for difficulty urinating, dysuria, flank pain and urgency.   Musculoskeletal: Negative for arthralgias and myalgias.   Neurological: Negative for dizziness, light-headedness and headaches.   Hematological: Does not bruise/bleed easily.   Psychiatric/Behavioral: Negative for agitation.     Objective:     Vital Signs (Most Recent):  Temp: 97.7 °F (36.5 °C) (01/24/17 1423)  Pulse: 67 (01/24/17 1423)  Resp: 16 (01/24/17 1423)  BP: (!) 177/84 (01/24/17 1423)  SpO2: 98 % (01/24/17 1423) Vital Signs (24h Range):  Temp:  [97.7 °F (36.5 °C)] 97.7 °F (36.5 °C)  Pulse:  [62-74] 67  Resp:  [16-18] 16  SpO2:  [98 %-100 %] 98 %  BP: (149-220)/() 177/84     Weight: 131.5 kg (290 lb)  Body mass index is 45.42 kg/(m^2).    Physical Exam    Constitutional: She is oriented to person, place, and time. She appears well-developed and well-nourished.   HENT:   Head: Normocephalic.   Mouth/Throat: No oropharyngeal exudate.   Eyes: Conjunctivae are normal. Right eye exhibits no discharge. Left eye exhibits no discharge. No scleral icterus.   Neck: Normal range of motion. Neck supple. No JVD present.   Cardiovascular: Normal rate, regular rhythm and intact distal pulses.    Murmur heard.  Pulmonary/Chest: Effort normal and breath sounds normal. No stridor. No respiratory distress. She has no wheezes.   Abdominal: Soft. Bowel sounds are normal. She exhibits no distension. There is tenderness (diffuse mild TTP). There is no rebound and no guarding.   Musculoskeletal: Normal range of motion. She exhibits no edema or tenderness.   Neurological: She is alert and oriented to person, place, and time. No cranial nerve deficit.   Skin: Skin is warm and dry. No erythema.   Psychiatric: She has a normal mood and affect.   Nursing note and vitals reviewed.       Significant Labs:   CBC:   Recent Labs  Lab 01/24/17  0937   WBC 9.88   HGB 15.0   HCT 47.3        CMP:   Recent Labs  Lab 01/24/17  0937      K 3.4*      CO2 27   *   BUN 12   CREATININE 0.9   CALCIUM 10.3   PROT 8.5*   ALBUMIN 4.0   BILITOT 0.8   ALKPHOS 102   AST 23   ALT 12   ANIONGAP 13   EGFRNONAA >60     Lipase:   Recent Labs  Lab 01/24/17  0937   LIPASE 7     All pertinent labs within the past 24 hours have been reviewed.    Significant Imaging: I have reviewed all pertinent imaging results/findings within the past 24 hours.   CT A/P 1/24/2017  1.  Small bowel obstruction with left lower quadrant transition point, similar in location to prior examination.  2.  Bilateral renal cysts, some indeterminate.  Recommend nonemergent renal ultrasound for further characterization.  3.  Stable left adrenal nodules.    Assessment/Plan:     * SBO (small bowel obstruction)  - abd soft, +  flatus (SBO vs ileus)  - NPO, IVF's, pain management  - surg consulted  - CT showed small bowel obstruction with left lower quadrant transition point, similar in location to prior examination           Hypercalcemia  - improving with IVF's, continue, monitor      Essential hypertension  - elevated on admission, patient had not taken her meds  - restart home meds  - monitor      Hypokalemia  - replace, check Mg++  - monitor      Renal cyst  - renal US      Urinary tract infection without hematuria  - follow culture  - Ceftriaxone for now      VTE Risk Mitigation         Ordered     heparin (porcine) injection 5,000 Units  Every 8 hours     Route:  Subcutaneous        01/24/17 1415     Medium Risk of VTE  Once      01/24/17 1415     Place FEDE hose  Until discontinued      01/24/17 1415     Place sequential compression device  Until discontinued      01/24/17 1415        Libia Bull PA-C  Department of Hospital Medicine   [facility]

## 2017-01-25 NOTE — PLAN OF CARE
Discharge Plan    Pt states she is independent, and will likely have no needs for discharge.  She has Cpap for home use, and no other DME.  CM to follow for any additional plans.     01/25/17 1220   Discharge Assessment   Assessment Type Discharge Planning Assessment   Confirmed/corrected address and phone number on facesheet? Yes   Assessment information obtained from? Patient;Caregiver;Medical Record   Expected Length of Stay (days) 3   Communicated expected length of stay with patient/caregiver yes   Type of Healthcare Directive Received (no HCPOA or POA available)   Prior to hospitilization cognitive status: Alert/Oriented   Prior to hospitalization functional status: Independent   Current cognitive status: Alert/Oriented   Current Functional Status: Independent   Lives With alone   Able to Return to Prior Arrangements yes   Is patient able to care for self after discharge? Yes   Who are your caregiver(s) and their phone number(s)? Izabel Galeano, daughter, [phone]   Patient's perception of discharge disposition home or selfcare   Readmission Within The Last 30 Days no previous admission in last 30 days   Patient currently being followed by outpatient case management? No   Patient currently receives home health services? No   Does the patient currently use HME? No   Patient currently receives private duty nursing? No   Patient currently receives any other outside agency services? No   Equipment Currently Used at Home CPAP   Do you have any problems affording any of your prescribed medications? No   Is the patient taking medications as prescribed? yes   Do you have any financial concerns preventing you from receiving the healthcare you need? No   Does the patient have transportation to healthcare appointments? Yes   Does the patient receive services at the [facility]? No   Are there any open cases? No   Discharge Plan A Home   Discharge Plan B Home   Patient/Family In Agreement With Plan yes

## 2017-01-25 NOTE — ASSESSMENT & PLAN NOTE
- abd soft, + flatus (SBO vs ileus)  - NPO, IVF's, pain management  - surg consulted  - CT showed small bowel obstruction with left lower quadrant transition point, similar in location to prior examination

## 2017-01-25 NOTE — CHAPLAIN
Referral from RN for Adv. Dir. Cultivated relationship of care and support with patient. Explored patient concerns and provided reflective listening. Discussed advanced directives with patient and provided information about same. Pt would like to discuss with daughter tomorrow and requested I return tomorrow evening for further discussion and possible completion of AD

## 2017-01-25 NOTE — PROGRESS NOTES
Progress Note  Hospital Medicine    Admit Date: 1/24/2017   LOS: 1 day     SUBJECTIVE:     Follow-up For:  SBO (small bowel obstruction)    Interval History:     Pt new to me.   Doing better.  Tolerating clears.  +Flatus.  Only a small BM 2 days ago, and had been a few days since going then.        Review of Systems:  Constitutional: No fever or chills  Respiratory: No cough or shorness of breath  Cardiovascular: No chest pain or palpitations  Gastrointestinal: No nausea or vomiting  Neurological: No confusion or weakness    OBJECTIVE:     Vital Signs Range (Last 24H):  Vitals:    01/25/17 1252   BP: 134/70   Pulse: (!) 57   Resp: 17   Temp: 97.9 °F (36.6 °C)       Temp:  [97.6 °F (36.4 °C)-98.4 °F (36.9 °C)]   Pulse:  [51-66]   Resp:  [16-17]   BP: (134-217)/(56-89)   SpO2:  [96 %-100 %]     I & O (Last 24H):    Intake/Output Summary (Last 24 hours) at 01/25/17 1524  Last data filed at 01/25/17 1456   Gross per 24 hour   Intake              770 ml   Output                0 ml   Net              770 ml       Physical Exam:  General appearance: Calm, NAD  Eyes:  Conjunctivae/corneas clear. PERRL.  Lungs: Normal respiratory effort,   clear to auscultation bilaterally   Heart: Regular rate and rhythm, S1, S2 normal,  Abdomen: Soft, non-tender non-distended; bowel sounds normal;   Extremities: No cyanosis or clubbing. No edema.    Skin: Skin color, texture, turgor normal. No rashes or lesions  Neurologic:  No focal numbness or weakness     Laboratory Data:  Reviewed and noted  where applicable- Please see chart for full lab data.    Medications:  Medication list was reviewed and changes noted under Assessment/Plan.    Diagnostic Results:  CT ABD PELVIS WITH 1/24/17:  IMPRESSION:  1.  Small bowel obstruction with left lower quadrant transition point, similar in location to prior examination.  2.  Bilateral renal cysts, some indeterminate.  Recommend nonemergent renal ultrasound for further characterization.  3.  Stable  left adrenal nodules.      RENAL US 1/25/17:  IMPRESSION:  1.  Several renal lesions which are incompletely characterized, with solid mass not excluded.  Recommend renal mass protocol CT for further evaluation given the atypical appearance of the left upper pole lesion.  2.  Elevation of resistive indices in keeping with chronic medical renal disease.      ASSESSMENT/PLAN:     Active Hospital Problems    Diagnosis  POA    *SBO (small bowel obstruction) [K56.69]  Yes    Constipation [K59.00]  Yes    Hypokalemia [E87.6]  Yes    Renal cyst [N28.1]  Not Applicable    Urinary tract infection without hematuria [N39.0]  Yes    Essential hypertension [I10]  Yes    Morbid obesity [E66.01]  Yes    Hypercalcemia [E83.52]  Yes      Resolved Hospital Problems    Diagnosis Date Resolved POA   No resolved problems to display.       * SBO (small bowel obstruction)  - abd soft, + flatus (SBO vs ileus)  - General surgery following  - Started on clear liquids  - CT showed small bowel obstruction with left lower quadrant transition point, similar in location to prior examination.    Constipation  - Bisacodyl supp       Hypercalcemia  - Resolved with IVFs.       Essential hypertension  - Continue home meds and monitor     Hypokalemia  - Monitor and replaced electrolytes PRN.       Renal cyst  - renal US inconclusive, recommending CT renal mass protocol   - Discussed with radiology and would need CT ABD PELVIS W WO.  Consider in AM.      Urinary tract infection without hematuria  - Follow culture  - Ceftriaxone day 2.          VTE Risk Mitigation           Ordered       heparin (porcine) injection 5,000 Units Every 8 hours    Route: Subcutaneous          01/24/17 1415       Medium Risk of VTE Once      01/24/17 1415       Place FEDE hose Until discontinued      01/24/17 1415       Place sequential compression device Until discontinued      01/24/17 1415

## 2017-01-25 NOTE — H&P
[facility] Medicine  History & Physical    Patient Name: Natali Galeano  MRN: [id number]  Admission Date: 2017  Attending Physician: Thad Damon MD   Primary Care Provider: Ewelina Herzog MD         Patient information was obtained from patient, relative(s), past medical records and ER records.     Subjective:     Principal Problem:SBO (small bowel obstruction)    Chief Complaint:  N/V     HPI: 75 y/o female with Hx of HTN, HLD, Colon cancer s/p resection presents to ED with c/o diffuse abdominal pain that began two days ago, associated with nausea, vomiting, diaphoresis, and urinary frequency. She has been unable to eat. Her last bowel movement was yesterday; reports similar episodes in the past that resolved w/o any surgical intervention. Denies fever, chills, CP, SOB, flank pain, dysuria.      CT A/P in ED showed Small bowel obstruction with left lower quadrant transition point, similar in location to prior examination.     Family history non- contributory.          Past Medical History   Diagnosis Date    Colon cancer     Gout, chronic     High cholesterol     Hypertension        Past Surgical History   Procedure Laterality Date     section, classic      Colon surgery      Hernia repair         Review of patient's allergies indicates:  No Known Allergies    No current facility-administered medications on file prior to encounter.      Current Outpatient Prescriptions on File Prior to Encounter   Medication Sig    allopurinol (ZYLOPRIM) 300 MG tablet Take 300 mg by mouth once daily.    amlodipine (NORVASC) 2.5 MG tablet Take 2.5 mg by mouth once daily.    aspirin (ECOTRIN) 81 MG EC tablet Take 81 mg by mouth once daily.    atenolol (TENORMIN) 100 MG tablet Take 100 mg by mouth once daily.    atorvastatin (LIPITOR) 10 MG tablet Take 10 mg by mouth once daily.    benazepril (LOTENSIN) 40 MG tablet Take 40 mg by mouth once daily.    cetirizine (ZYRTEC) 10 MG  tablet Take 10 mg by mouth daily as needed for Allergies.    fluticasone (FLONASE) 50 mcg/actuation nasal spray 1 spray by Each Nare route once daily.    [DISCONTINUED] ergocalciferol (VITAMIN D2) 50,000 unit Cap Take 50,000 Units by mouth every 7 days.    [DISCONTINUED] multivitamin (ONE DAILY MULTIVITAMIN) per tablet Take 1 tablet by mouth once daily.     Family History     None        Social History Main Topics    Smoking status: Former Smoker     Packs/day: 0.10     Quit date: 4/16/1980    Smokeless tobacco: Never Used    Alcohol use No    Drug use: No    Sexual activity: Not on file     Review of Systems   Constitutional: Negative for chills, diaphoresis, fever and unexpected weight change.   HENT: Negative for congestion, mouth sores, sore throat and trouble swallowing.    Eyes: Negative.    Respiratory: Negative for cough, chest tightness, shortness of breath and wheezing.    Cardiovascular: Negative for chest pain, palpitations and leg swelling.   Gastrointestinal: Positive for abdominal pain, constipation, nausea and vomiting. Negative for abdominal distention and diarrhea.   Endocrine: Negative for polydipsia and polyphagia.   Genitourinary: Positive for frequency. Negative for difficulty urinating, dysuria, flank pain and urgency.   Musculoskeletal: Negative for arthralgias and myalgias.   Neurological: Negative for dizziness, light-headedness and headaches.   Hematological: Does not bruise/bleed easily.   Psychiatric/Behavioral: Negative for agitation.     Objective:     Vital Signs (Most Recent):  Temp: 97.7 °F (36.5 °C) (01/24/17 1423)  Pulse: 67 (01/24/17 1423)  Resp: 16 (01/24/17 1423)  BP: (!) 177/84 (01/24/17 1423)  SpO2: 98 % (01/24/17 1423) Vital Signs (24h Range):  Temp:  [97.7 °F (36.5 °C)] 97.7 °F (36.5 °C)  Pulse:  [62-74] 67  Resp:  [16-18] 16  SpO2:  [98 %-100 %] 98 %  BP: (149-220)/() 177/84     Weight: 131.5 kg (290 lb)  Body mass index is 45.42 kg/(m^2).    Physical Exam    Constitutional: She is oriented to person, place, and time. She appears well-developed and well-nourished.   HENT:   Head: Normocephalic.   Mouth/Throat: No oropharyngeal exudate.   Eyes: Conjunctivae are normal. Right eye exhibits no discharge. Left eye exhibits no discharge. No scleral icterus.   Neck: Normal range of motion. Neck supple. No JVD present.   Cardiovascular: Normal rate, regular rhythm and intact distal pulses.    Murmur heard.  Pulmonary/Chest: Effort normal and breath sounds normal. No stridor. No respiratory distress. She has no wheezes.   Abdominal: Soft. Bowel sounds are normal. She exhibits no distension. There is tenderness (diffuse mild TTP). There is no rebound and no guarding.   Musculoskeletal: Normal range of motion. She exhibits no edema or tenderness.   Neurological: She is alert and oriented to person, place, and time. No cranial nerve deficit.   Skin: Skin is warm and dry. No erythema.   Psychiatric: She has a normal mood and affect.   Nursing note and vitals reviewed.       Significant Labs:   CBC:   Recent Labs  Lab 01/24/17  0937   WBC 9.88   HGB 15.0   HCT 47.3        CMP:   Recent Labs  Lab 01/24/17  0937      K 3.4*      CO2 27   *   BUN 12   CREATININE 0.9   CALCIUM 10.3   PROT 8.5*   ALBUMIN 4.0   BILITOT 0.8   ALKPHOS 102   AST 23   ALT 12   ANIONGAP 13   EGFRNONAA >60     Lipase:   Recent Labs  Lab 01/24/17  0937   LIPASE 7     All pertinent labs within the past 24 hours have been reviewed.    Significant Imaging: I have reviewed all pertinent imaging results/findings within the past 24 hours.   CT A/P 1/24/2017  1.  Small bowel obstruction with left lower quadrant transition point, similar in location to prior examination.  2.  Bilateral renal cysts, some indeterminate.  Recommend nonemergent renal ultrasound for further characterization.  3.  Stable left adrenal nodules.    Assessment/Plan:     * SBO (small bowel obstruction)  - abd soft, +  flatus (SBO vs ileus)  - NPO, IVF's, pain management  - surg consulted  - CT showed small bowel obstruction with left lower quadrant transition point, similar in location to prior examination           Hypercalcemia  - improving with IVF's, continue      Essential hypertension  - elevated on admission, patient had not taken her meds  - restart home meds  - monitor      Hypokalemia  - replace, check Mg++  - monitor      Renal cyst  - renal US      VTE Risk Mitigation         Ordered     heparin (porcine) injection 5,000 Units  Every 8 hours     Route:  Subcutaneous        01/24/17 1415     Medium Risk of VTE  Once      01/24/17 1415     Place FEDE hose  Until discontinued      01/24/17 1415     Place sequential compression device  Until discontinued      01/24/17 1415        Libia Bull PA-C  Department of Hospital Medicine   [facility]

## 2017-01-25 NOTE — PLAN OF CARE
Problem: Patient Care Overview  Goal: Plan of Care Review  Outcome: Ongoing (interventions implemented as appropriate)  Plan of care reviewed with patient. VSS.  IV abx given as scheduled.  IVF infusing.  Patient up to BR with assist.  Pain controlled with PRN pain medications.  Bed lowered and locked.  Call bell within reach.  No needs at this time.  Will continue to monitor.

## 2017-01-26 VITALS
HEIGHT: 67 IN | HEART RATE: 53 BPM | TEMPERATURE: 98 F | DIASTOLIC BLOOD PRESSURE: 70 MMHG | OXYGEN SATURATION: 100 % | RESPIRATION RATE: 18 BRPM | BODY MASS INDEX: 45.52 KG/M2 | WEIGHT: 290 LBS | SYSTOLIC BLOOD PRESSURE: 157 MMHG

## 2017-01-26 PROBLEM — E27.8 ADRENAL NODULE: Status: ACTIVE | Noted: 2017-01-26

## 2017-01-26 PROBLEM — K59.00 CONSTIPATION: Status: RESOLVED | Noted: 2017-01-25 | Resolved: 2017-01-26

## 2017-01-26 PROBLEM — N39.0 URINARY TRACT INFECTION WITHOUT HEMATURIA: Status: RESOLVED | Noted: 2017-01-24 | Resolved: 2017-01-26

## 2017-01-26 PROBLEM — E27.9 ADRENAL NODULE: Status: ACTIVE | Noted: 2017-01-26

## 2017-01-26 PROBLEM — E87.6 HYPOKALEMIA: Status: RESOLVED | Noted: 2017-01-24 | Resolved: 2017-01-26

## 2017-01-26 LAB
ALBUMIN SERPL BCP-MCNC: 3 G/DL
ALP SERPL-CCNC: 71 U/L
ALT SERPL W/O P-5'-P-CCNC: 10 U/L
ANION GAP SERPL CALC-SCNC: 8 MMOL/L
AST SERPL-CCNC: 16 U/L
BASOPHILS # BLD AUTO: 0.01 K/UL
BASOPHILS NFR BLD: 0.1 %
BILIRUB SERPL-MCNC: 0.6 MG/DL
BUN SERPL-MCNC: 13 MG/DL
CALCIUM SERPL-MCNC: 8.8 MG/DL
CHLORIDE SERPL-SCNC: 111 MMOL/L
CO2 SERPL-SCNC: 22 MMOL/L
CREAT SERPL-MCNC: 0.8 MG/DL
DIFFERENTIAL METHOD: ABNORMAL
EOSINOPHIL # BLD AUTO: 0.1 K/UL
EOSINOPHIL NFR BLD: 1.5 %
ERYTHROCYTE [DISTWIDTH] IN BLOOD BY AUTOMATED COUNT: 16 %
EST. GFR  (AFRICAN AMERICAN): >60 ML/MIN/1.73 M^2
EST. GFR  (NON AFRICAN AMERICAN): >60 ML/MIN/1.73 M^2
GLUCOSE SERPL-MCNC: 81 MG/DL
HCT VFR BLD AUTO: 39 %
HGB BLD-MCNC: 12 G/DL
LYMPHOCYTES # BLD AUTO: 2.8 K/UL
LYMPHOCYTES NFR BLD: 41.9 %
MAGNESIUM SERPL-MCNC: 1.9 MG/DL
MCH RBC QN AUTO: 26 PG
MCHC RBC AUTO-ENTMCNC: 30.8 %
MCV RBC AUTO: 84 FL
MONOCYTES # BLD AUTO: 0.7 K/UL
MONOCYTES NFR BLD: 9.7 %
NEUTROPHILS # BLD AUTO: 3.1 K/UL
NEUTROPHILS NFR BLD: 46.7 %
PHOSPHATE SERPL-MCNC: 3.5 MG/DL
PLATELET # BLD AUTO: 147 K/UL
PMV BLD AUTO: 10.6 FL
POTASSIUM SERPL-SCNC: 4.2 MMOL/L
PROT SERPL-MCNC: 6.2 G/DL
RBC # BLD AUTO: 4.62 M/UL
SODIUM SERPL-SCNC: 141 MMOL/L
WBC # BLD AUTO: 6.68 K/UL

## 2017-01-26 PROCEDURE — 63600175 PHARM REV CODE 636 W HCPCS: Performed by: PHYSICIAN ASSISTANT

## 2017-01-26 PROCEDURE — 36415 COLL VENOUS BLD VENIPUNCTURE: CPT

## 2017-01-26 PROCEDURE — 80053 COMPREHEN METABOLIC PANEL: CPT

## 2017-01-26 PROCEDURE — 83735 ASSAY OF MAGNESIUM: CPT

## 2017-01-26 PROCEDURE — 84100 ASSAY OF PHOSPHORUS: CPT

## 2017-01-26 PROCEDURE — 63600175 PHARM REV CODE 636 W HCPCS: Performed by: EMERGENCY MEDICINE

## 2017-01-26 PROCEDURE — 99238 HOSP IP/OBS DSCHRG MGMT 30/<: CPT | Mod: ,,, | Performed by: HOSPITALIST

## 2017-01-26 PROCEDURE — 25000003 PHARM REV CODE 250: Performed by: PHYSICIAN ASSISTANT

## 2017-01-26 PROCEDURE — 85025 COMPLETE CBC W/AUTO DIFF WBC: CPT

## 2017-01-26 RX ADMIN — ATENOLOL 100 MG: 25 TABLET ORAL at 08:01

## 2017-01-26 RX ADMIN — BENAZEPRIL HYDROCHLORIDE 40 MG: 40 TABLET, COATED ORAL at 08:01

## 2017-01-26 RX ADMIN — POLYETHYLENE GLYCOL 3350 17 G: 17 POWDER, FOR SOLUTION ORAL at 08:01

## 2017-01-26 RX ADMIN — MORPHINE SULFATE 2 MG: 2 INJECTION, SOLUTION INTRAMUSCULAR; INTRAVENOUS at 05:01

## 2017-01-26 RX ADMIN — HEPARIN SODIUM 5000 UNITS: 5000 INJECTION, SOLUTION INTRAVENOUS; SUBCUTANEOUS at 05:01

## 2017-01-26 RX ADMIN — CEFTRIAXONE 1 G: 1 INJECTION, SOLUTION INTRAVENOUS at 11:01

## 2017-01-26 RX ADMIN — AMLODIPINE BESYLATE 2.5 MG: 2.5 TABLET ORAL at 08:01

## 2017-01-26 NOTE — PLAN OF CARE
Discharge Plan    Pt discharging today, states no needs.     01/26/17 1451   Final Note   Assessment Type Final Discharge Note   Discharge Disposition Home   Discharge planning education complete? Yes   What phone number can be called within the next 1-3 days to see how you are doing after discharge? [phone]   Hospital Follow Up  Appt(s) scheduled? No  (pt prefers to schedule own apt)   Discharge plans and expectations educations in teach back method with documentation complete? Yes   Offered [facility] -- Bedside Delivery? n/a   Discharge/Hospital Encounter Summary to ([facility]) PCP Yes   Referral to Outpatient Case Management complete? n/a   Referral to / orders for Home Health Complete? n/a   30 day supply of medicines given at discharge, if documented non-compliance / non-adherence? n/a   Any social issues identified prior to discharge? No   Did you assess the readiness or willingness of the family or caregiver to support self management of care? Yes

## 2017-01-26 NOTE — PROGRESS NOTES
Discharge instructions reviewed with the pt. All questions answered. Pt verbalized understanding. AVS confirmation completed.

## 2017-01-26 NOTE — DISCHARGE SUMMARY
[facility]  Discharge Summary      Admit Date: 1/24/2017    Discharge Date and Time: 1/26/2017 11:13 AM    Attending Physician: Thad Damon MD     Reason for Admission:  SBO    Procedures Performed: * No surgery found *    Hospital Course:  75 y/o female with Hx of HTN, HLD, Colon cancer s/p resection presents to ED with c/o diffuse abdominal pain that began two days ago, associated with nausea, vomiting, diaphoresis, and urinary frequency. She has been unable to eat. Her last bowel movement was yesterday; reports similar episodes in the past that resolved w/o any surgical intervention. Denies fever, chills, CP, SOB, flank pain, dysuria.      CT A/P in ED showed Small bowel obstruction with left lower quadrant transition point, similar in location to prior examination.   ______________________________________    * SBO (small bowel obstruction)  - abd soft, + flatus (SBO vs ileus)  - General surgery following  - Tolerating full liquids  - Had BM.  - Discussed with Dr. Cardozo and okay to go home.  - CT showed small bowel obstruction with left lower quadrant transition point, similar in location to prior examination.     Constipation  - Bisacodyl supp  - +BMs noted.   - Discussed using laxative at home as needed if has constipation.        Hypercalcemia  - Resolved with IVFs.       Essential hypertension  - Continue home meds and monitor      Hypokalemia  - Monitor and replaced electrolytes PRN.        Renal cyst  - renal US inconclusive, recommending CT renal mass protocol   - Discussed with radiology and would need CT ABD PELVIS W WO.  - Will refer patient to urology for outpatient follow up.   - Discussed with patient.     Left Adrenal Nodules  - Stable on imaging.       Urinary tract infection without hematuria  - Culture multiple organisms.   - Ceftriaxone day 3, treated.       Doing better, tolerating PO, feels ready to go home.  Had BMs and suspect presentation may have been related in  part to constipation.         Consults: general surgery    Significant Diagnostic Studies:   CT ABD PELVIS WITH 1/24/17:  IMPRESSION:  1.  Small bowel obstruction with left lower quadrant transition point, similar in location to prior examination.  2.  Bilateral renal cysts, some indeterminate.  Recommend nonemergent renal ultrasound for further characterization.  3.  Stable left adrenal nodules.        RENAL US 1/25/17:  IMPRESSION:  1.  Several renal lesions which are incompletely characterized, with solid mass not excluded.  Recommend renal mass protocol CT for further evaluation given the atypical appearance of the left upper pole lesion.  2.  Elevation of resistive indices in keeping with chronic medical renal disease.       Final Diagnoses:    Principal Problem: SBO (small bowel obstruction)   Secondary Diagnoses:   Active Hospital Problems    Diagnosis  POA    Adrenal nodule [E27.9]  Yes    Renal cyst [N28.1]  Not Applicable    Essential hypertension [I10]  Yes    Morbid obesity [E66.01]  Yes      Resolved Hospital Problems    Diagnosis Date Resolved POA    *SBO (small bowel obstruction) [K56.69] 01/26/2017 Yes    Constipation [K59.00] 01/26/2017 Yes    Hypokalemia [E87.6] 01/26/2017 Yes    Urinary tract infection without hematuria [N39.0] 01/26/2017 Yes    Hypercalcemia [E83.52] 01/26/2017 Yes       Discharged Condition: good    Disposition: Home or Self Care    Follow Up/Patient Instructions:     Medications:  Reconciled Home Medications:   Current Discharge Medication List      CONTINUE these medications which have NOT CHANGED    Details   allopurinol (ZYLOPRIM) 300 MG tablet Take 300 mg by mouth once daily.      amlodipine (NORVASC) 2.5 MG tablet Take 2.5 mg by mouth once daily.      aspirin (ECOTRIN) 81 MG EC tablet Take 81 mg by mouth once daily.      atenolol (TENORMIN) 100 MG tablet Take 100 mg by mouth once daily.      atorvastatin (LIPITOR) 10 MG tablet Take 10 mg by mouth once daily.       benazepril (LOTENSIN) 40 MG tablet Take 40 mg by mouth once daily.      cetirizine (ZYRTEC) 10 MG tablet Take 10 mg by mouth daily as needed for Allergies.      cholecalciferol, vitamin D3, (THERA-D) 2,000 unit Tab Take 1 tablet by mouth once daily.      fluticasone (FLONASE) 50 mcg/actuation nasal spray 1 spray by Each Nare route once daily.             Discharge Procedure Orders  Diet general   Order Specific Question Answer Comments   Na restriction, if any: 2gNa      Activity as tolerated     Call MD for:  increased confusion or weakness     Call MD for:  persistent dizziness, light-headedness, or visual disturbances     Call MD for:  worsening rash     Call MD for:  severe persistent headache     Call MD for:  difficulty breathing or increased cough     Call MD for:  severe uncontrolled pain     Call MD for:  persistent nausea and vomiting or diarrhea     Call MD for:  temperature >100.4       Follow-up Information     Follow up with Ewelina Herzog MD. Schedule an appointment as soon as possible for a visit in 5 days.    Specialty:  Internal Medicine    Contact information:    [facility] [address] [phone]          Follow up with Che Tan MD. Schedule an appointment as soon as possible for a visit in 5 days.    Specialty:  Urology    Contact information:    [address] [place] [id number] [phone]

## 2017-01-26 NOTE — PLAN OF CARE
Problem: Patient Care Overview  Goal: Plan of Care Review  Outcome: Ongoing (interventions implemented as appropriate)  No significant events overnight. Remains free from fall, injury, and skin breakdown. Ambulates to RR independently. VSS on RA throughout the night. Pain well controlled with IV meds; denies pain. SCDs in place. Plan of care reviewed with patient and all questions answered. Bed low, locked. Call light within reach. Purposeful rounding performed. Resting comfortably in bed, no other complaints at this time.

## 2017-01-26 NOTE — CHAPLAIN
Pt daughter present in visit. Advanced directives discussed. Pt indicated she did not get glasses so was not able to read material but is still interested. I advised I would pass on to day  for follow up and pt expressed appreciation. Provided reflective listening and prayer.

## 2017-01-26 NOTE — PROGRESS NOTES
Progress Note  Hospital Medicine    Admit Date: 1/24/2017   LOS: 2 days     SUBJECTIVE:     Follow-up For:  SBO (small bowel obstruction)    Interval History:    Had BM.   Doing well.  Discussed with Dr. Cardozo.      Review of Systems:  Constitutional: No fever or chills  Respiratory: No cough or shorness of breath  Cardiovascular: No chest pain or palpitations  Gastrointestinal: No nausea or vomiting  Neurological: No confusion or weakness    OBJECTIVE:     Vital Signs Range (Last 24H):  Vitals:    01/26/17 0746   BP: (!) 157/66   Pulse: (!) 55   Resp: 18   Temp: 97.8 °F (36.6 °C)       Temp:  [97.8 °F (36.6 °C)-98.2 °F (36.8 °C)]   Pulse:  [53-63]   Resp:  [17-18]   BP: (134-176)/(59-86)   SpO2:  [97 %-100 %]     I & O (Last 24H):    Intake/Output Summary (Last 24 hours) at 01/26/17 1051  Last data filed at 01/26/17 0519   Gross per 24 hour   Intake          2589.17 ml   Output                0 ml   Net          2589.17 ml       Physical Exam:  General appearance: Calm, NAD  Eyes:  Conjunctivae/corneas clear. PERRL.  Lungs: Normal respiratory effort,   clear to auscultation bilaterally   Heart: Regular rate and rhythm, S1, S2 normal,  Abdomen: Soft, non-tender non-distended; bowel sounds normal;   Extremities: No cyanosis or clubbing. No edema.    Skin: Skin color, texture, turgor normal. No rashes or lesions  Neurologic:  No focal numbness or weakness     Laboratory Data:  Reviewed and noted  where applicable- Please see chart for full lab data.    Medications:  Medication list was reviewed and changes noted under Assessment/Plan.    Diagnostic Results:  CT ABD PELVIS WITH 1/24/17:  IMPRESSION:  1.  Small bowel obstruction with left lower quadrant transition point, similar in location to prior examination.  2.  Bilateral renal cysts, some indeterminate.  Recommend nonemergent renal ultrasound for further characterization.  3.  Stable left adrenal nodules.      RENAL US 1/25/17:  IMPRESSION:  1.  Several renal  lesions which are incompletely characterized, with solid mass not excluded.  Recommend renal mass protocol CT for further evaluation given the atypical appearance of the left upper pole lesion.  2.  Elevation of resistive indices in keeping with chronic medical renal disease.      ASSESSMENT/PLAN:     Active Hospital Problems    Diagnosis  POA    *SBO (small bowel obstruction) [K56.69]  Yes    Constipation [K59.00]  Yes    Hypokalemia [E87.6]  Yes    Renal cyst [N28.1]  Not Applicable    Urinary tract infection without hematuria [N39.0]  Yes    Essential hypertension [I10]  Yes    Morbid obesity [E66.01]  Yes    Hypercalcemia [E83.52]  Yes      Resolved Hospital Problems    Diagnosis Date Resolved POA   No resolved problems to display.       * SBO (small bowel obstruction)  - abd soft, + flatus (SBO vs ileus)  - General surgery following  - Tolerating full liquids  - Had BM.  - Discussed with Dr. Cardozo and okay to go home.  - CT showed small bowel obstruction with left lower quadrant transition point, similar in location to prior examination.    Constipation  - Bisacodyl supp  - +BMs noted.        Hypercalcemia  - Resolved with IVFs.       Essential hypertension  - Continue home meds and monitor     Hypokalemia  - Monitor and replaced electrolytes PRN.       Renal cyst  - renal US inconclusive, recommending CT renal mass protocol   - Discussed with radiology and would need CT ABD PELVIS W WO.  Consider in AM.      Urinary tract infection without hematuria  - Culture multiple organisms.   - Ceftriaxone day 3, treated.          VTE Risk Mitigation           Ordered       heparin (porcine) injection 5,000 Units Every 8 hours    Route: Subcutaneous          01/24/17 1415       Medium Risk of VTE Once      01/24/17 1415       Place FEDE hose Until discontinued      01/24/17 1415       Place sequential compression device Until discontinued      01/24/17 1415

## 2017-04-19 ENCOUNTER — OFFICE VISIT (OUTPATIENT)
Dept: UROLOGY | Facility: CLINIC | Age: 77
End: 2017-04-19
Attending: UROLOGY
Payer: MEDICARE

## 2017-04-19 VITALS
HEIGHT: 67 IN | SYSTOLIC BLOOD PRESSURE: 152 MMHG | HEART RATE: 63 BPM | BODY MASS INDEX: 45.52 KG/M2 | DIASTOLIC BLOOD PRESSURE: 79 MMHG | WEIGHT: 290 LBS

## 2017-04-19 DIAGNOSIS — N28.1 RENAL CYST: Primary | ICD-10-CM

## 2017-04-19 PROCEDURE — 3078F DIAST BP <80 MM HG: CPT | Mod: S$GLB,,, | Performed by: UROLOGY

## 2017-04-19 PROCEDURE — 1160F RVW MEDS BY RX/DR IN RCRD: CPT | Mod: S$GLB,,, | Performed by: UROLOGY

## 2017-04-19 PROCEDURE — 1126F AMNT PAIN NOTED NONE PRSNT: CPT | Mod: S$GLB,,, | Performed by: UROLOGY

## 2017-04-19 PROCEDURE — 99999 PR PBB SHADOW E&M-EST. PATIENT-LVL III: CPT | Mod: PBBFAC,,, | Performed by: UROLOGY

## 2017-04-19 PROCEDURE — 3077F SYST BP >= 140 MM HG: CPT | Mod: S$GLB,,, | Performed by: UROLOGY

## 2017-04-19 PROCEDURE — 1159F MED LIST DOCD IN RCRD: CPT | Mod: S$GLB,,, | Performed by: UROLOGY

## 2017-04-19 PROCEDURE — 99214 OFFICE O/P EST MOD 30 MIN: CPT | Mod: S$GLB,,, | Performed by: UROLOGY

## 2017-04-19 RX ORDER — BENZONATATE 100 MG/1
CAPSULE ORAL
Refills: 0 | COMMUNITY
Start: 2017-03-13 | End: 2017-04-19

## 2017-04-19 NOTE — PROGRESS NOTES
"  Subjective:       Natali Galeano is a 76 y.o. female who is a new patient who was self-referred for evaluation of kidney cyst.      She was admitted to hospital in 1/17 with SBO. At that time a CT scan with contrast only and TOI showed atypical L renal cysts - 3.3cm LP and 1.9cm. TOI states that cysts cannot be classified as simple and may have solid component.     Denies hematuria, UTIs, kidney issues. Denies  malignancy. She has had previous colon surgery and ventral hernia repair.     Cr 0.8 (1/17)    The following portions of the patient's history were reviewed and updated as appropriate: allergies, current medications, past family history, past medical history, past social history, past surgical history and problem list.    Review of Systems  Constitutional: no fever or chills  ENT: no nasal congestion or sore throat  Respiratory: no cough or shortness of breath  Cardiovascular: no chest pain or palpitations  Gastrointestinal: no nausea or vomiting, tolerating diet  Genitourinary: as per HPI  Hematologic/Lymphatic: no easy bruising or lymphadenopathy  Musculoskeletal: no arthralgias or myalgias  Skin: no rashes or lesions  Neurological: no seizures or tremors  Behavioral/Psych: no auditory or visual hallucinations       Objective:    Vitals: BP (!) 152/79 (BP Location: Right arm, Patient Position: Sitting, BP Method: Automatic)  Pulse 63  Ht 5' 7" (1.702 m)  Wt 131.5 kg (290 lb)  BMI 45.42 kg/m2    Physical Exam   General: well developed, well nourished in no acute distress  Head: normocephalic, atraumatic  Neck: supple, trachea midline, no obvious enlargement of thyroid  HEENT: EOMI, mucus membranes moist, sclera anicteric, no hearing impairment  Lungs: symmetric expansion, non-labored breathing  Cardiovascular: regular rate and rhythm, normal pulses  Abdomen: soft, non tender, non distended, no palpable masses, no hepatosplenomegaly, no hernias, no CVA tenderness  Musculoskeletal: no peripheral edema, " normal ROM in bilateral upper and lower extremities  Lymphatics: no cervical or inguinal lymphadenopathy  Skin: no rashes or lesions  Neuro: alert and oriented x 3, no gross deficits  Psych: normal judgment and insight, normal mood/affect and non-anxious  Genitourinary:   patient declined exam      Lab Review   Urine analysis today in clinic shows - no urine      Lab Results   Component Value Date    WBC 6.68 01/26/2017    HGB 12.0 01/26/2017    HCT 39.0 01/26/2017    MCV 84 01/26/2017     (L) 01/26/2017     Lab Results   Component Value Date    CREATININE 0.8 01/26/2017    BUN 13 01/26/2017         Imaging  Images and reports were personally reviewed by me and discussed with patient  CT and TOI reviewed.        Assessment/Plan:      1. Renal cyst    - Indeterminate renal cysts in L kidney   - Obtain CT renal protocol to better evaluate          Follow up in 3 weeks

## 2017-04-19 NOTE — MR AVS SNAPSHOT
[facility]  [address] [place] [address], [facility] [address]  [facility] [address]  Phone: [phone]                  Natali Galeano   2017 2:20 PM   Office Visit    Description:  Female : 1940   Provider:  Che Tan MD   Department:  [facility]           Reason for Visit     Other           Diagnoses this Visit        Comments    Renal cyst    -  Primary            To Do List           Future Appointments        Provider Department Dept Phone    2017 2:20 PM Che Tan MD [facility] [phone]      Goals (5 Years of Data)     None      Follow-Up and Disposition     Return in about 2 weeks (around 5/3/2017) for CT scan follow up.      [facility]     [facility] Nurse Care Line -  Assistance  Unless otherwise directed by your provider, please contact [facility], our nurse care line that is available for  assistance.     Registered nurses in the [facility] On Call Center provide: appointment scheduling, clinical advisement, health education, and other advisory services.  Call: [phone] (toll free)               Medications           STOP taking these medications     benzonatate (TESSALON) 100 MG capsule TK 1 C PO TID           Verify that the below list of medications is an accurate representation of the medications you are currently taking.  If none reported, the list may be blank. If incorrect, please contact your healthcare provider. Carry this list with you in case of emergency.           Current Medications     allopurinol (ZYLOPRIM) 300 MG tablet Take 300 mg by mouth once daily.    amlodipine (NORVASC) 2.5 MG tablet Take 2.5 mg by mouth once daily.    aspirin (ECOTRIN) 81 MG EC tablet Take 81 mg by mouth once daily.    atenolol (TENORMIN) 100 MG tablet Take 100 mg by mouth once daily.    atorvastatin (LIPITOR) 10 MG tablet Take 10 mg by mouth once daily.    benazepril (LOTENSIN) 40 MG tablet Take 40 mg by mouth once daily.     "fluticasone (FLONASE) 50 mcg/actuation nasal spray 1 spray by Each Nare route once daily.    cetirizine (ZYRTEC) 10 MG tablet Take 10 mg by mouth daily as needed for Allergies.    cholecalciferol, vitamin D3, (THERA-D) 2,000 unit Tab Take 1 tablet by mouth once daily.           Clinical Reference Information           Your Vitals Were     BP Pulse Height Weight BMI    152/79 (BP Location: Right arm, Patient Position: Sitting, BP Method: Automatic) 63 5' 7" (1.702 m) 131.5 kg (290 lb) 45.42 kg/m2      Blood Pressure          Most Recent Value    BP  (!)  152/79      Allergies as of 4/19/2017     No Known Allergies      Immunizations Administered on Date of Encounter - 4/19/2017     None      Orders Placed During Today's Visit     Future Labs/Procedures Expected by Expires    CT Abdomen Pelvis W Wo Contrast  4/19/2017 4/19/2018      [facility] Sign-Up     Activating your [facility] account is as easy as 1-2-3!     1) Visit [email or web address], select Sign Up Now, enter this activation code and your date of birth, then select Next.  [email or web address]  Expires: 6/3/2017  1:20 PM      2) Create a username and password to use when you visit [facility] in the future and select a security question in case you lose your password and select Next.    3) Enter your e-mail address and click Sign Up!    Additional Information  If you have questions, please e-mail [email or web address] or call [phone] to talk to our [facility] staff. Remember, [facility] is NOT to be used for urgent needs. For medical emergencies, dial 911.         Language Assistance Services     ATTENTION: Language assistance services are available, free of charge. Please call [phone].      ATENCIÓN: Si habla español, tiene a schilling disposición servicios gratuitos de asistencia lingüística. Llame al [phone].     CHÚ Ý: N?u b?n nói Ti?ng Vi?t, có các d?ch v? h? tr? ngôn ng? mi?n phí dành cho b?n. G?i s? [phone].         [facility] complies with " applicable Federal civil rights laws and does not discriminate on the basis of race, color, national origin, age, disability, or sex.

## 2017-05-04 ENCOUNTER — TELEPHONE (OUTPATIENT)
Dept: UROLOGY | Facility: CLINIC | Age: 77
End: 2017-05-04

## 2017-05-04 DIAGNOSIS — N28.1 RENAL CYST: Primary | ICD-10-CM

## 2017-05-04 NOTE — TELEPHONE ENCOUNTER
----- Message from Carie Amezcua sent at 5/4/2017 11:06 AM CDT -----  Contact: Radiology  X  1st Request  _  2nd Request  _  3rd Request    Who: Natali Galeano (mrn# [id number])    Why:  Blood draw is needed prior to CT scan for this patient / BUN and Creatinine  THANKS!

## 2017-05-08 ENCOUNTER — HOSPITAL ENCOUNTER (OUTPATIENT)
Dept: RADIOLOGY | Facility: OTHER | Age: 77
Discharge: HOME | End: 2017-05-08
Attending: UROLOGY
Payer: MEDICARE

## 2017-05-08 DIAGNOSIS — N28.1 RENAL CYST: ICD-10-CM

## 2017-05-08 PROCEDURE — 25500020 PHARM REV CODE 255: Performed by: UROLOGY

## 2017-05-08 PROCEDURE — 74170 CT ABD WO CNTRST FLWD CNTRST: CPT | Mod: 26,,, | Performed by: RADIOLOGY

## 2017-05-08 PROCEDURE — 74170 CT ABD WO CNTRST FLWD CNTRST: CPT | Mod: TC

## 2017-05-08 RX ADMIN — IOHEXOL 25 ML: 350 INJECTION, SOLUTION INTRAVENOUS at 12:05

## 2017-05-08 RX ADMIN — IOHEXOL 100 ML: 350 INJECTION, SOLUTION INTRAVENOUS at 11:05

## 2017-05-10 ENCOUNTER — TELEPHONE (OUTPATIENT)
Dept: UROLOGY | Facility: CLINIC | Age: 77
End: 2017-05-10

## 2017-05-10 ENCOUNTER — HOSPITAL ENCOUNTER (OUTPATIENT)
Dept: RADIOLOGY | Facility: OTHER | Age: 77
Discharge: HOME | End: 2017-05-10
Attending: UROLOGY
Payer: MEDICARE

## 2017-05-10 ENCOUNTER — OFFICE VISIT (OUTPATIENT)
Dept: UROLOGY | Facility: CLINIC | Age: 77
End: 2017-05-10
Attending: UROLOGY
Payer: MEDICARE

## 2017-05-10 VITALS
BODY MASS INDEX: 45.88 KG/M2 | SYSTOLIC BLOOD PRESSURE: 169 MMHG | HEIGHT: 67 IN | WEIGHT: 292.31 LBS | HEART RATE: 62 BPM | DIASTOLIC BLOOD PRESSURE: 81 MMHG

## 2017-05-10 DIAGNOSIS — N28.89 RENAL MASS: Primary | ICD-10-CM

## 2017-05-10 DIAGNOSIS — N28.1 COMPLEX RENAL CYST: ICD-10-CM

## 2017-05-10 DIAGNOSIS — N28.89 RENAL MASS: ICD-10-CM

## 2017-05-10 LAB
BILIRUB SERPL-MCNC: ABNORMAL MG/DL
BLOOD URINE, POC: ABNORMAL
COLOR, POC UA: YELLOW
GLUCOSE UR QL STRIP: ABNORMAL
KETONES UR QL STRIP: ABNORMAL
LEUKOCYTE ESTERASE URINE, POC: ABNORMAL
NITRITE, POC UA: ABNORMAL
PH, POC UA: 5
PROTEIN, POC: ABNORMAL
SPECIFIC GRAVITY, POC UA: 1.02
UROBILINOGEN, POC UA: ABNORMAL

## 2017-05-10 PROCEDURE — 81002 URINALYSIS NONAUTO W/O SCOPE: CPT | Mod: S$GLB,,, | Performed by: UROLOGY

## 2017-05-10 PROCEDURE — 1160F RVW MEDS BY RX/DR IN RCRD: CPT | Mod: S$GLB,,, | Performed by: UROLOGY

## 2017-05-10 PROCEDURE — 71020 XR CHEST PA AND LATERAL: CPT | Mod: TC

## 2017-05-10 PROCEDURE — 3077F SYST BP >= 140 MM HG: CPT | Mod: S$GLB,,, | Performed by: UROLOGY

## 2017-05-10 PROCEDURE — 71020 XR CHEST PA AND LATERAL: CPT | Mod: 26,,, | Performed by: RADIOLOGY

## 2017-05-10 PROCEDURE — 3079F DIAST BP 80-89 MM HG: CPT | Mod: S$GLB,,, | Performed by: UROLOGY

## 2017-05-10 PROCEDURE — 99999 PR PBB SHADOW E&M-EST. PATIENT-LVL IV: CPT | Mod: PBBFAC,,, | Performed by: UROLOGY

## 2017-05-10 PROCEDURE — 99214 OFFICE O/P EST MOD 30 MIN: CPT | Mod: 25,S$GLB,, | Performed by: UROLOGY

## 2017-05-10 PROCEDURE — 1159F MED LIST DOCD IN RCRD: CPT | Mod: S$GLB,,, | Performed by: UROLOGY

## 2017-05-10 PROCEDURE — 1126F AMNT PAIN NOTED NONE PRSNT: CPT | Mod: S$GLB,,, | Performed by: UROLOGY

## 2017-05-10 RX ORDER — ACETAMINOPHEN 10 MG/ML
1000 INJECTION, SOLUTION INTRAVENOUS EVERY 8 HOURS
Status: CANCELLED | OUTPATIENT
Start: 2017-05-10 | End: 2017-05-11

## 2017-05-10 RX ORDER — PROMETHAZINE HYDROCHLORIDE AND DEXTROMETHORPHAN HYDROBROMIDE 6.25; 15 MG/5ML; MG/5ML
SYRUP ORAL
Refills: 1 | COMMUNITY
Start: 2017-04-27 | End: 2017-05-26 | Stop reason: CLARIF

## 2017-05-10 NOTE — TELEPHONE ENCOUNTER
----- Message from Che Tan MD sent at 5/10/2017  3:31 PM CDT -----  Please schedule appt with Dr Coker in next 1-2 weeks to further discuss surgery and sign consents. Surgery tentatively set for 6/21/17.

## 2017-05-10 NOTE — H&P
"  Subjective:       Natali Galeano is a 76 y.o. female who is an established patient who was self-referred for evaluation of kidney cyst.      She was admitted to hospital in 1/17 with SBO. At that time a CT scan with contrast only and TOI showed atypical L renal cysts - 3.3cm LP and 1.9cm. TOI states that cysts cannot be classified as simple and may have solid component.     Denies hematuria, UTIs, kidney issues. Denies  malignancy. She has had previous colon surgery (2002 for colon ca) and ventral hernia repair (2004 in epigastric area).     Cr 0.8 (1/17)    CT urogram was done that showed Bosniak III cysts in L UP and Bosniak 2F cyst in L MP.       The following portions of the patient's history were reviewed and updated as appropriate: allergies, current medications, past family history, past medical history, past social history, past surgical history and problem list.    Review of Systems  Constitutional: no fever or chills  ENT: no nasal congestion or sore throat  Respiratory: no cough or shortness of breath  Cardiovascular: no chest pain or palpitations  Gastrointestinal: no nausea or vomiting, tolerating diet  Genitourinary: as per HPI  Hematologic/Lymphatic: no easy bruising or lymphadenopathy  Musculoskeletal: no arthralgias or myalgias  Skin: no rashes or lesions  Neurological: no seizures or tremors  Behavioral/Psych: no auditory or visual hallucinations       Objective:    Vitals: BP (!) 169/81 (BP Location: Left arm, Patient Position: Sitting, BP Method: Automatic)  Pulse 62  Ht 5' 7" (1.702 m)  Wt 132.6 kg (292 lb 5.3 oz)  BMI 45.79 kg/m2    Physical Exam   General: well developed, well nourished in no acute distress  Head: normocephalic, atraumatic  Neck: supple, trachea midline, no obvious enlargement of thyroid  HEENT: EOMI, mucus membranes moist, sclera anicteric, no hearing impairment  Lungs: symmetric expansion, non-labored breathing  Cardiovascular: regular rate and rhythm, normal " pulses  Abdomen: soft, non tender, non distended, no palpable masses, no hepatosplenomegaly, no hernias, no CVA tenderness  Musculoskeletal: no peripheral edema, normal ROM in bilateral upper and lower extremities  Lymphatics: no cervical or inguinal lymphadenopathy  Skin: no rashes or lesions  Neuro: alert and oriented x 3, no gross deficits  Psych: normal judgment and insight, normal mood/affect and non-anxious  Genitourinary:   patient declined exam      Lab Review   Urine analysis today in clinic shows - no urine      Lab Results   Component Value Date    WBC 6.68 01/26/2017    HGB 12.0 01/26/2017    HCT 39.0 01/26/2017    MCV 84 01/26/2017     (L) 01/26/2017     Lab Results   Component Value Date    CREATININE 0.8 05/08/2017    BUN 11 05/08/2017         Imaging  Images and reports were personally reviewed by me and discussed with patient  CT and TOI reviewed.        Assessment/Plan:      1. Complex renal cyst    - CT - 3.2cm Bosniak III cyst in L UP, also Bosniak IIF in L MP   - Discussed need for surgical excision of lesion(s)   - Due to PSH (colon ca s/p resection, ventral hernia repair) and body habitus, surgery will be technically challenging. Laparoscopic/robotic may not be feasible 2/2 adhesions.   - Discussed this difficult case with Dr Coker, who agrees that attempt at robotic/laparascopic partial nephrectomy should be attempted (to help with post-op recovery) with possible conversion to open if kidney cannot be accessed 2/2 adhesions. Will ask Dr Zamarripa to assist with Prem.    - Pt request surgery to be after 6/18/17. Will plan for OR on 6/21/17.   - CXR today for staging     2. Renal mass   - Per above      Follow up with Dr Coker in 1-2 weeks to discuss/sign consents

## 2017-05-10 NOTE — TELEPHONE ENCOUNTER
----- Message from Treva Yovani sent at 5/10/2017  3:39 PM CDT -----  Contact: pt  _  1st Request  _  2nd Request  _  3rd Request        Who: pt    Why:  Pt returned the nurse's phone call. Please call the pt    What Number to Call Back:[phone]    When to Expect a call back: (Before the end of the day)   -- if the call is after 12:00, the call back will be tomorrow.

## 2017-05-10 NOTE — MR AVS SNAPSHOT
[facility]  [address] [place] [address], [address]  [facility] [address]  Phone: [phone]                  Natali Galeano   5/10/2017 2:00 PM   Office Visit    Description:  Female : 1940   Provider:  Che Tan MD   Department:  [facility]           Reason for Visit     Follow-up           Diagnoses this Visit        Comments    Renal mass    -  Primary            To Do List           Goals (5 Years of Data)     None      [facility]     [facility] On Call Nurse Care Line -  Assistance  Unless otherwise directed by your provider, please contact [facility], our nurse care line that is available for  assistance.     Registered nurses in the [facility] On Call Center provide: appointment scheduling, clinical advisement, health education, and other advisory services.  Call: [phone] (toll free)               Medications                Verify that the below list of medications is an accurate representation of the medications you are currently taking.  If none reported, the list may be blank. If incorrect, please contact your healthcare provider. Carry this list with you in case of emergency.           Current Medications     allopurinol (ZYLOPRIM) 300 MG tablet Take 300 mg by mouth once daily.    amlodipine (NORVASC) 2.5 MG tablet Take 2.5 mg by mouth once daily.    aspirin (ECOTRIN) 81 MG EC tablet Take 81 mg by mouth once daily.    atenolol (TENORMIN) 100 MG tablet Take 100 mg by mouth once daily.    atorvastatin (LIPITOR) 10 MG tablet Take 10 mg by mouth once daily.    benazepril (LOTENSIN) 40 MG tablet Take 40 mg by mouth once daily.    cetirizine (ZYRTEC) 10 MG tablet Take 10 mg by mouth daily as needed for Allergies.    cholecalciferol, vitamin D3, (THERA-D) 2,000 unit Tab Take 1 tablet by mouth once daily.    fluticasone (FLONASE) 50 mcg/actuation nasal spray 1 spray by Each Nare route once daily.    promethazine-dextromethorphan (PROMETHAZINE-DM) 6.25-15 mg/5  "mL Syrp TK 5 ML PO Q 6 H PRN.           Clinical Reference Information           Your Vitals Were     BP Pulse Height Weight BMI    169/81 (BP Location: Left arm, Patient Position: Sitting, BP Method: Automatic) 62 5' 7" (1.702 m) 132.6 kg (292 lb 5.3 oz) 45.79 kg/m2      Blood Pressure          Most Recent Value    BP  (!)  169/81      Allergies as of 5/10/2017     No Known Allergies      Immunizations Administered on Date of Encounter - 5/10/2017     None      Orders Placed During Today's Visit      Normal Orders This Visit    POCT URINE DIPSTICK WITHOUT MICROSCOPE     Future Labs/Procedures Expected by Expires    X-Ray Chest PA And Lateral  5/10/2017 5/10/2018      Language Assistance Services     ATTENTION: Language assistance services are available, free of charge. Please call [phone].      ATENCIÓN: Si syd damon, tiene a schilling disposición servicios gratuitos de asistencia lingüística. Llame al [phone].     CHÚ Ý: N?u b?n nói Ti?ng Vi?t, có các d?ch v? h? tr? ngôn ng? mi?n phí dành cho b?n. G?i s? [phone].         [facility] complies with applicable Federal civil rights laws and does not discriminate on the basis of race, color, national origin, age, disability, or sex.        "

## 2017-05-10 NOTE — TELEPHONE ENCOUNTER
----- Message from Gabriela Castro LPN sent at 5/10/2017  3:52 PM CDT -----  Contact: pt      ----- Message -----     From: Treva Pina     Sent: 5/10/2017   3:39 PM       To: Dominick Bolton Staff    _  1st Request  _  2nd Request  _  3rd Request        Who: pt    Why:  Pt returned the nurse's phone call. Please call the pt    What Number to Call Back:[phone]    When to Expect a call back: (Before the end of the day)   -- if the call is after 12:00, the call back will be tomorrow.

## 2017-05-11 ENCOUNTER — TELEPHONE (OUTPATIENT)
Dept: UROLOGY | Facility: CLINIC | Age: 77
End: 2017-05-11

## 2017-05-11 NOTE — TELEPHONE ENCOUNTER
Sylwia, can pt be scheduled for pre-op same day as John Paul appt? If so, can you help pt arrange? OR request already placed.     Thanks!

## 2017-05-26 ENCOUNTER — ANESTHESIA EVENT (OUTPATIENT)
Dept: SURGERY | Facility: OTHER | Age: 77
DRG: 660 | End: 2017-05-26
Payer: MEDICARE

## 2017-05-26 ENCOUNTER — HOSPITAL ENCOUNTER (OUTPATIENT)
Dept: PREADMISSION TESTING | Facility: OTHER | Age: 77
Discharge: HOME | End: 2017-05-26
Attending: UROLOGY
Payer: MEDICARE

## 2017-05-26 ENCOUNTER — RESEARCH ENCOUNTER (OUTPATIENT)
Dept: UROLOGY | Facility: CLINIC | Age: 77
End: 2017-05-26

## 2017-05-26 ENCOUNTER — OFFICE VISIT (OUTPATIENT)
Dept: UROLOGY | Facility: CLINIC | Age: 77
End: 2017-05-26
Attending: UROLOGY
Payer: MEDICARE

## 2017-05-26 VITALS
HEIGHT: 67 IN | BODY MASS INDEX: 45.52 KG/M2 | RESPIRATION RATE: 16 BRPM | DIASTOLIC BLOOD PRESSURE: 72 MMHG | HEART RATE: 52 BPM | TEMPERATURE: 98 F | OXYGEN SATURATION: 99 % | SYSTOLIC BLOOD PRESSURE: 168 MMHG | WEIGHT: 290 LBS

## 2017-05-26 VITALS
WEIGHT: 292 LBS | HEART RATE: 58 BPM | HEIGHT: 67 IN | SYSTOLIC BLOOD PRESSURE: 174 MMHG | BODY MASS INDEX: 45.83 KG/M2 | DIASTOLIC BLOOD PRESSURE: 77 MMHG

## 2017-05-26 DIAGNOSIS — Z98.890 HISTORY OF VENTRAL HERNIA REPAIR: ICD-10-CM

## 2017-05-26 DIAGNOSIS — Z90.49 HISTORY OF PARTIAL COLECTOMY: ICD-10-CM

## 2017-05-26 DIAGNOSIS — Z87.19 HISTORY OF VENTRAL HERNIA REPAIR: ICD-10-CM

## 2017-05-26 DIAGNOSIS — N28.1 COMPLEX RENAL CYST: Primary | ICD-10-CM

## 2017-05-26 DIAGNOSIS — I10 HTN (HYPERTENSION): ICD-10-CM

## 2017-05-26 LAB
ALBUMIN SERPL BCP-MCNC: 3.9 G/DL
ALP SERPL-CCNC: 91 U/L
ALT SERPL W/O P-5'-P-CCNC: 8 U/L
ANION GAP SERPL CALC-SCNC: 11 MMOL/L
AST SERPL-CCNC: 18 U/L
BASOPHILS # BLD AUTO: 0.02 K/UL
BASOPHILS NFR BLD: 0.2 %
BILIRUB SERPL-MCNC: 0.8 MG/DL
BUN SERPL-MCNC: 13 MG/DL
CALCIUM SERPL-MCNC: 10.4 MG/DL
CHLORIDE SERPL-SCNC: 104 MMOL/L
CO2 SERPL-SCNC: 28 MMOL/L
CREAT SERPL-MCNC: 0.8 MG/DL
DIFFERENTIAL METHOD: ABNORMAL
EOSINOPHIL # BLD AUTO: 0.2 K/UL
EOSINOPHIL NFR BLD: 1.9 %
ERYTHROCYTE [DISTWIDTH] IN BLOOD BY AUTOMATED COUNT: 15.3 %
EST. GFR  (AFRICAN AMERICAN): >60 ML/MIN/1.73 M^2
EST. GFR  (NON AFRICAN AMERICAN): >60 ML/MIN/1.73 M^2
GLUCOSE SERPL-MCNC: 78 MG/DL
HCT VFR BLD AUTO: 44 %
HGB BLD-MCNC: 13.6 G/DL
LYMPHOCYTES # BLD AUTO: 2.5 K/UL
LYMPHOCYTES NFR BLD: 31.6 %
MCH RBC QN AUTO: 26.1 PG
MCHC RBC AUTO-ENTMCNC: 30.9 %
MCV RBC AUTO: 85 FL
MONOCYTES # BLD AUTO: 0.7 K/UL
MONOCYTES NFR BLD: 8.4 %
NEUTROPHILS # BLD AUTO: 4.6 K/UL
NEUTROPHILS NFR BLD: 57.8 %
PLATELET # BLD AUTO: 218 K/UL
PMV BLD AUTO: 11.2 FL
POTASSIUM SERPL-SCNC: 5.2 MMOL/L
PROT SERPL-MCNC: 8 G/DL
RBC # BLD AUTO: 5.21 M/UL
SODIUM SERPL-SCNC: 143 MMOL/L
WBC # BLD AUTO: 8.01 K/UL

## 2017-05-26 PROCEDURE — 36415 COLL VENOUS BLD VENIPUNCTURE: CPT

## 2017-05-26 PROCEDURE — 93005 ELECTROCARDIOGRAM TRACING: CPT

## 2017-05-26 PROCEDURE — 85025 COMPLETE CBC W/AUTO DIFF WBC: CPT

## 2017-05-26 PROCEDURE — 1159F MED LIST DOCD IN RCRD: CPT | Mod: S$GLB,,, | Performed by: UROLOGY

## 2017-05-26 PROCEDURE — 80053 COMPREHEN METABOLIC PANEL: CPT

## 2017-05-26 PROCEDURE — 99215 OFFICE O/P EST HI 40 MIN: CPT | Mod: S$GLB,,, | Performed by: UROLOGY

## 2017-05-26 PROCEDURE — 93010 ELECTROCARDIOGRAM REPORT: CPT | Mod: ,,, | Performed by: INTERNAL MEDICINE

## 2017-05-26 PROCEDURE — 1125F AMNT PAIN NOTED PAIN PRSNT: CPT | Mod: S$GLB,,, | Performed by: UROLOGY

## 2017-05-26 PROCEDURE — 99999 PR PBB SHADOW E&M-EST. PATIENT-LVL III: CPT | Mod: PBBFAC,,, | Performed by: UROLOGY

## 2017-05-26 RX ORDER — SODIUM CHLORIDE, SODIUM LACTATE, POTASSIUM CHLORIDE, CALCIUM CHLORIDE 600; 310; 30; 20 MG/100ML; MG/100ML; MG/100ML; MG/100ML
INJECTION, SOLUTION INTRAVENOUS CONTINUOUS
Status: CANCELLED | OUTPATIENT
Start: 2017-05-26

## 2017-05-26 NOTE — PROGRESS NOTES
"Subjective:      Natali Galeano is a 76 y.o. female who returns today regarding her     Preoperative appointment for left robotic versus open partial nephrectomy ×2 for 2 complex Bosniak III cysts    The patient is morbidly obese and had a previous right colectomy at [facility] in 2002 by Dr. Holly.  This was followed by a ventral hernia repair in 2004 also at [facility] by Dr. Holly.  These records are not available.    The following portions of the patient's history were reviewed and updated as appropriate: allergies, current medications, past family history, past medical history, past social history, past surgical history and problem list.    Review of Systems  Pertinent items are noted in HPI.  A comprehensive multipoint review of systems was negative except as otherwise stated in the HPI.     Objective:   Vitals: BP (!) 174/77 (BP Location: Left arm, Patient Position: Sitting, BP Method: Automatic)   Pulse (!) 58   Ht 5' 7" (1.702 m)   Wt 132.5 kg (292 lb)   BMI 45.73 kg/m²     Physical Exam   General: alert and oriented, no acute distress; very pleasant and conversant  Respiratory: Symmetric expansion, non-labored breathing  Cardiovascular: no peripheral edema  Abdomen: soft, non distended; significant panniculus.  There is well-healed full upper midline incision  Skin: normal coloration and turgor, no rashes, no suspicious skin lesions noted  Neuro: no gross deficits  Psych: normal judgment and insight, normal mood/affect and non-anxious    Physical Exam    Lab Review   Urinalysis demonstrates positive leukocytes 5-10 red blood cells nitrite negative    Lab Results   Component Value Date    WBC 6.68 01/26/2017    HGB 12.0 01/26/2017    HCT 39.0 01/26/2017    MCV 84 01/26/2017     (L) 01/26/2017     Lab Results   Component Value Date    CREATININE 0.8 05/08/2017    BUN 11 05/08/2017       Imaging   CT scan shows 2 complex cyst in the left kidney with nodularity in the wall the larger " cyst is in the upper pole and the smaller cyst is lateral.  There is also a third simple cyst.  There is no evidence of metastatic disease in the abdomen or pelvis    Assessment and Plan:   Complex renal cyst  -     POCT URINE DIPSTICK WITHOUT MICROSCOPE  -     Urine culture  We discussed the natural history of small renal masses, the risk of malignancy and disease progression, and the small risk of mortality.  We discussed the risks and benefits of watchful waiting, biopsy, partial and total nephrectomy.  We discussed the benefits of renal preservation when possible.  We discussed percutaneous, laparoscopic and robotic approaches.  We discussed the management of positive surgical margins.  I answered all questions.  They would like to proceed with partial nephrectomy if possible via a minimally invasive approach if possible    We discussed the increased risk of complications due to obesity and her extensive prior abdominal surgery.  I will have her visit with Dr. John Zamarripa general surgeon preoperatively to get his thoughts on the case and to possibly assist us with lysis of adhesions.  I clearly the previous surgery will increase the risk of a conversion to open surgery and/or requiring total nephrectomy    Preop clearance with Dr Herzog, PCP    History of ventral hernia repair  -     Ambulatory consult to General Surgery    History of partial colectomy  -     Ambulatory consult to General Surgery

## 2017-05-26 NOTE — ANESTHESIA PREPROCEDURE EVALUATION
05/26/2017  Natali Galeano is a 76 y.o., female.    Anesthesia Evaluation    I have reviewed the Patient Summary Reports.    I have reviewed the Nursing Notes.   I have reviewed the Medications.     Review of Systems  Anesthesia Hx:  No problems with previous Anesthesia  Denies Family Hx of Anesthesia complications.   Denies Personal Hx of Anesthesia complications.   Social:  Non-Smoker    Hematology/Oncology:  Hematology Normal   Oncology Normal     Cardiovascular:   Hypertension, well controlled    Pulmonary:   Sleep Apnea, CPAP    Renal/:   Complex renal cyst   Hepatic/GI:  Hepatic/GI Normal    Musculoskeletal:  Musculoskeletal Normal    Neurological:  Neurology Normal    Endocrine:   Thyroid mass excision       Physical Exam  General:  Morbid Obesity    Airway/Jaw/Neck:  Airway Findings: Mouth Opening: Normal Tongue: Normal  General Airway Assessment: Adult  Mallampati: II  TM Distance: Normal, at least 6 cm         Dental:  Dental Findings:Carious molars              Anesthesia Plan  Type of Anesthesia, risks & benefits discussed:  Anesthesia Type:  general  Patient's Preference:   Intra-op Monitoring Plan: arterial line  Intra-op Monitoring Plan Comments:   Post Op Pain Control Plan:   Post Op Pain Control Plan Comments:   Induction:   IV  Beta Blocker:         Informed Consent: Patient understands risks and agrees with Anesthesia plan.  Questions answered. Anesthesia consent signed with patient.  ASA Score: 3     Day of Surgery Review of History & Physical:    H&P update referred to the surgeon.     Anesthesia Plan Notes: Possible a line/central line        Ready For Surgery From Anesthesia Perspective.

## 2017-05-27 LAB — BACTERIA UR CULT: NORMAL

## 2017-05-29 NOTE — PROGRESS NOTES
Pt was approached by Dr. Coker in clinic regarding participation in [employer] program (IRB#[id number].C). Pt was agreeable.   The ICF was reviewed with pt. The discussion included:   - participation is voluntary;   - pt can change her mind about participating at any time;   - if she changes her mind about participation, she can call us at contact info in ICF and we will discard samples remaining;   - samples that have been used prior to her notification will still be included in research;   - specimens collected will include blood, urine and tissue;   - blood and urine will be collected pre-operatively if possible;   - tissue will be collected from Pathology after routine tests have been conducted;   - Dr. Coker will perform procedure per usual protocol - participation in [employer] program will not change amount of tissue removed;   - specimens will be stored with unique code that can only be linked to pt by [employer] staff;   - all medical information released to researchers will be stripped of identifiers;   - samples will not be released to outside researchers unless approved by internal committee;   - there is a small risk of loss of confidentiality, but we make every effort to ensure privacy;   - no other physical risks outside of those involved in standard of care procedure.     Pt did not have any questions. Pt willingly and independently signed ICF for [employer]. A copy of signed ICF was given to pt with instructions to call with any questions that may arise or if she should change her mind regarding participation in [employer] program.  Pt was consented by Dr. Coker.

## 2017-06-12 NOTE — PLAN OF CARE
06/12/17 1655   ED Admissions Case Approval   ED Admissions Case Approval (!) CM Approved  (OP )

## 2017-06-19 ENCOUNTER — HOSPITAL ENCOUNTER (OUTPATIENT)
Dept: PREADMISSION TESTING | Facility: OTHER | Age: 77
Discharge: HOME | DRG: 660 | End: 2017-06-19
Attending: UROLOGY
Payer: MEDICARE

## 2017-06-19 DIAGNOSIS — N28.1 COMPLEX RENAL CYST: ICD-10-CM

## 2017-06-19 LAB
ABO + RH BLD: NORMAL
BLD GP AB SCN CELLS X3 SERPL QL: NORMAL

## 2017-06-20 ENCOUNTER — TELEPHONE (OUTPATIENT)
Dept: UROLOGY | Facility: CLINIC | Age: 77
End: 2017-06-20

## 2017-06-20 PROCEDURE — 86920 COMPATIBILITY TEST SPIN: CPT

## 2017-06-21 ENCOUNTER — SURGERY (OUTPATIENT)
Age: 77
End: 2017-06-21

## 2017-06-21 ENCOUNTER — HOSPITAL ENCOUNTER (INPATIENT)
Facility: OTHER | Age: 77
LOS: 2 days | Discharge: HOME | DRG: 660 | End: 2017-06-23
Attending: UROLOGY | Admitting: UROLOGY
Payer: MEDICARE

## 2017-06-21 ENCOUNTER — ANESTHESIA (OUTPATIENT)
Dept: SURGERY | Facility: OTHER | Age: 77
DRG: 660 | End: 2017-06-21
Payer: MEDICARE

## 2017-06-21 DIAGNOSIS — N28.1 COMPLEX RENAL CYST: ICD-10-CM

## 2017-06-21 DIAGNOSIS — N28.89 RENAL MASS: ICD-10-CM

## 2017-06-21 PROBLEM — E78.5 HYPERLIPIDEMIA: Status: ACTIVE | Noted: 2017-06-21

## 2017-06-21 PROBLEM — G89.18 POSTOPERATIVE PAIN: Status: ACTIVE | Noted: 2017-06-21

## 2017-06-21 LAB
ANION GAP SERPL CALC-SCNC: 9 MMOL/L
BASOPHILS # BLD AUTO: 0.01 K/UL
BASOPHILS NFR BLD: 0.1 %
BUN SERPL-MCNC: 10 MG/DL
CALCIUM SERPL-MCNC: 9.1 MG/DL
CHLORIDE SERPL-SCNC: 105 MMOL/L
CO2 SERPL-SCNC: 25 MMOL/L
CREAT SERPL-MCNC: 0.8 MG/DL
DIFFERENTIAL METHOD: ABNORMAL
EOSINOPHIL # BLD AUTO: 0 K/UL
EOSINOPHIL NFR BLD: 0.1 %
ERYTHROCYTE [DISTWIDTH] IN BLOOD BY AUTOMATED COUNT: 16.1 %
EST. GFR  (AFRICAN AMERICAN): >60 ML/MIN/1.73 M^2
EST. GFR  (NON AFRICAN AMERICAN): >60 ML/MIN/1.73 M^2
GLUCOSE SERPL-MCNC: 146 MG/DL
HCT VFR BLD AUTO: 38.6 %
HGB BLD-MCNC: 12 G/DL
LYMPHOCYTES # BLD AUTO: 1.2 K/UL
LYMPHOCYTES NFR BLD: 12.2 %
MCH RBC QN AUTO: 25.8 PG
MCHC RBC AUTO-ENTMCNC: 31.1 %
MCV RBC AUTO: 83 FL
MONOCYTES # BLD AUTO: 0.3 K/UL
MONOCYTES NFR BLD: 3.1 %
NEUTROPHILS # BLD AUTO: 8 K/UL
NEUTROPHILS NFR BLD: 84.4 %
PLATELET # BLD AUTO: 147 K/UL
PMV BLD AUTO: 10.5 FL
POTASSIUM SERPL-SCNC: 4 MMOL/L
RBC # BLD AUTO: 4.65 M/UL
SODIUM SERPL-SCNC: 139 MMOL/L
WBC # BLD AUTO: 9.46 K/UL

## 2017-06-21 PROCEDURE — 94761 N-INVAS EAR/PLS OXIMETRY MLT: CPT

## 2017-06-21 PROCEDURE — 63600175 PHARM REV CODE 636 W HCPCS: Performed by: NURSE PRACTITIONER

## 2017-06-21 PROCEDURE — 20000000 HC ICU ROOM

## 2017-06-21 PROCEDURE — 63600175 PHARM REV CODE 636 W HCPCS: Performed by: UROLOGY

## 2017-06-21 PROCEDURE — 27000221 HC OXYGEN, UP TO 24 HOURS

## 2017-06-21 PROCEDURE — 25000003 PHARM REV CODE 250: Performed by: ANESTHESIOLOGY

## 2017-06-21 PROCEDURE — 80048 BASIC METABOLIC PNL TOTAL CA: CPT

## 2017-06-21 PROCEDURE — 88307 TISSUE EXAM BY PATHOLOGIST: CPT | Mod: 26,,, | Performed by: PATHOLOGY

## 2017-06-21 PROCEDURE — 88305 TISSUE EXAM BY PATHOLOGIST: CPT | Performed by: PATHOLOGY

## 2017-06-21 PROCEDURE — 88305 TISSUE EXAM BY PATHOLOGIST: CPT | Mod: 26,,, | Performed by: PATHOLOGY

## 2017-06-21 PROCEDURE — 99900035 HC TECH TIME PER 15 MIN (STAT)

## 2017-06-21 PROCEDURE — 36000713 HC OR TIME LEV V EA ADD 15 MIN: Performed by: UROLOGY

## 2017-06-21 PROCEDURE — 0TB10ZZ EXCISION OF LEFT KIDNEY, OPEN APPROACH: ICD-10-PCS | Performed by: UROLOGY

## 2017-06-21 PROCEDURE — 37000008 HC ANESTHESIA 1ST 15 MINUTES: Performed by: UROLOGY

## 2017-06-21 PROCEDURE — 25000003 PHARM REV CODE 250: Performed by: NURSE ANESTHETIST, CERTIFIED REGISTERED

## 2017-06-21 PROCEDURE — 25000003 PHARM REV CODE 250: Performed by: UROLOGY

## 2017-06-21 PROCEDURE — 36000712 HC OR TIME LEV V 1ST 15 MIN: Performed by: UROLOGY

## 2017-06-21 PROCEDURE — 27201423 OPTIME MED/SURG SUP & DEVICES STERILE SUPPLY: Performed by: UROLOGY

## 2017-06-21 PROCEDURE — 50240 NEPHRECTOMY PARTIAL: CPT | Mod: 80,LT,, | Performed by: UROLOGY

## 2017-06-21 PROCEDURE — 63600175 PHARM REV CODE 636 W HCPCS: Performed by: NURSE ANESTHETIST, CERTIFIED REGISTERED

## 2017-06-21 PROCEDURE — 63600175 PHARM REV CODE 636 W HCPCS: Performed by: ANESTHESIOLOGY

## 2017-06-21 PROCEDURE — 36415 COLL VENOUS BLD VENIPUNCTURE: CPT

## 2017-06-21 PROCEDURE — 50240 NEPHRECTOMY PARTIAL: CPT | Mod: LT,,, | Performed by: UROLOGY

## 2017-06-21 PROCEDURE — 37000009 HC ANESTHESIA EA ADD 15 MINS: Performed by: UROLOGY

## 2017-06-21 PROCEDURE — 85025 COMPLETE CBC W/AUTO DIFF WBC: CPT

## 2017-06-21 RX ORDER — ALLOPURINOL 300 MG/1
300 TABLET ORAL DAILY
Status: DISCONTINUED | OUTPATIENT
Start: 2017-06-21 | End: 2017-06-23 | Stop reason: HOSPADM

## 2017-06-21 RX ORDER — ONDANSETRON HYDROCHLORIDE 2 MG/ML
INJECTION, SOLUTION INTRAMUSCULAR; INTRAVENOUS
Status: DISCONTINUED | OUTPATIENT
Start: 2017-06-21 | End: 2017-06-21

## 2017-06-21 RX ORDER — VECURONIUM BROMIDE FOR INJECTION 1 MG/ML
INJECTION, POWDER, LYOPHILIZED, FOR SOLUTION INTRAVENOUS
Status: DISCONTINUED | OUTPATIENT
Start: 2017-06-21 | End: 2017-06-21

## 2017-06-21 RX ORDER — HYDROCODONE BITARTRATE AND ACETAMINOPHEN 500; 5 MG/1; MG/1
TABLET ORAL
Status: DISCONTINUED | OUTPATIENT
Start: 2017-06-21 | End: 2017-06-21

## 2017-06-21 RX ORDER — PROPOFOL 10 MG/ML
VIAL (ML) INTRAVENOUS
Status: DISCONTINUED | OUTPATIENT
Start: 2017-06-21 | End: 2017-06-21

## 2017-06-21 RX ORDER — BENAZEPRIL HYDROCHLORIDE 40 MG/1
40 TABLET ORAL DAILY
Status: DISCONTINUED | OUTPATIENT
Start: 2017-06-21 | End: 2017-06-23 | Stop reason: HOSPADM

## 2017-06-21 RX ORDER — SODIUM CHLORIDE 0.9 % (FLUSH) 0.9 %
3 SYRINGE (ML) INJECTION
Status: DISCONTINUED | OUTPATIENT
Start: 2017-06-21 | End: 2017-06-21

## 2017-06-21 RX ORDER — FENTANYL CITRATE 50 UG/ML
INJECTION, SOLUTION INTRAMUSCULAR; INTRAVENOUS
Status: DISCONTINUED | OUTPATIENT
Start: 2017-06-21 | End: 2017-06-21

## 2017-06-21 RX ORDER — ONDANSETRON 2 MG/ML
4 INJECTION INTRAMUSCULAR; INTRAVENOUS EVERY 8 HOURS PRN
Status: DISCONTINUED | OUTPATIENT
Start: 2017-06-21 | End: 2017-06-23 | Stop reason: HOSPADM

## 2017-06-21 RX ORDER — ACETAMINOPHEN 10 MG/ML
INJECTION, SOLUTION INTRAVENOUS
Status: DISCONTINUED | OUTPATIENT
Start: 2017-06-21 | End: 2017-06-21

## 2017-06-21 RX ORDER — FENTANYL CITRATE 50 UG/ML
25 INJECTION, SOLUTION INTRAMUSCULAR; INTRAVENOUS EVERY 5 MIN PRN
Status: COMPLETED | OUTPATIENT
Start: 2017-06-21 | End: 2017-06-21

## 2017-06-21 RX ORDER — AMLODIPINE BESYLATE 2.5 MG/1
2.5 TABLET ORAL DAILY
Status: DISCONTINUED | OUTPATIENT
Start: 2017-06-21 | End: 2017-06-23 | Stop reason: HOSPADM

## 2017-06-21 RX ORDER — HYDROMORPHONE HYDROCHLORIDE 2 MG/ML
0.4 INJECTION, SOLUTION INTRAMUSCULAR; INTRAVENOUS; SUBCUTANEOUS EVERY 5 MIN PRN
Status: DISCONTINUED | OUTPATIENT
Start: 2017-06-21 | End: 2017-06-21

## 2017-06-21 RX ORDER — CEFAZOLIN SODIUM 2 G/50ML
2 SOLUTION INTRAVENOUS
Status: COMPLETED | OUTPATIENT
Start: 2017-06-21 | End: 2017-06-21

## 2017-06-21 RX ORDER — SODIUM CHLORIDE 0.9 % (FLUSH) 0.9 %
3 SYRINGE (ML) INJECTION EVERY 8 HOURS
Status: DISCONTINUED | OUTPATIENT
Start: 2017-06-21 | End: 2017-06-22

## 2017-06-21 RX ORDER — ATORVASTATIN CALCIUM 10 MG/1
10 TABLET, FILM COATED ORAL DAILY
Status: DISCONTINUED | OUTPATIENT
Start: 2017-06-21 | End: 2017-06-23 | Stop reason: HOSPADM

## 2017-06-21 RX ORDER — ONDANSETRON 2 MG/ML
4 INJECTION INTRAMUSCULAR; INTRAVENOUS ONCE AS NEEDED
Status: ACTIVE | OUTPATIENT
Start: 2017-06-21 | End: 2017-06-21

## 2017-06-21 RX ORDER — HYDRALAZINE HYDROCHLORIDE 20 MG/ML
10 INJECTION INTRAMUSCULAR; INTRAVENOUS EVERY 6 HOURS PRN
Status: DISCONTINUED | OUTPATIENT
Start: 2017-06-21 | End: 2017-06-23 | Stop reason: HOSPADM

## 2017-06-21 RX ORDER — HYDRALAZINE HYDROCHLORIDE 20 MG/ML
10 INJECTION INTRAMUSCULAR; INTRAVENOUS EVERY 6 HOURS PRN
Status: DISCONTINUED | OUTPATIENT
Start: 2017-06-21 | End: 2017-06-21

## 2017-06-21 RX ORDER — MIDAZOLAM HYDROCHLORIDE 1 MG/ML
INJECTION INTRAMUSCULAR; INTRAVENOUS
Status: DISCONTINUED | OUTPATIENT
Start: 2017-06-21 | End: 2017-06-21

## 2017-06-21 RX ORDER — ATENOLOL 25 MG/1
100 TABLET ORAL DAILY
Status: DISCONTINUED | OUTPATIENT
Start: 2017-06-21 | End: 2017-06-23 | Stop reason: HOSPADM

## 2017-06-21 RX ORDER — ACETAMINOPHEN 10 MG/ML
1000 INJECTION, SOLUTION INTRAVENOUS EVERY 8 HOURS
Status: COMPLETED | OUTPATIENT
Start: 2017-06-21 | End: 2017-06-22

## 2017-06-21 RX ORDER — MEPERIDINE HYDROCHLORIDE 50 MG/ML
12.5 INJECTION INTRAMUSCULAR; INTRAVENOUS; SUBCUTANEOUS ONCE AS NEEDED
Status: ACTIVE | OUTPATIENT
Start: 2017-06-21 | End: 2017-06-21

## 2017-06-21 RX ORDER — NEOSTIGMINE METHYLSULFATE 1 MG/ML
INJECTION, SOLUTION INTRAVENOUS
Status: DISCONTINUED | OUTPATIENT
Start: 2017-06-21 | End: 2017-06-21

## 2017-06-21 RX ORDER — GLYCOPYRROLATE 0.2 MG/ML
INJECTION INTRAMUSCULAR; INTRAVENOUS
Status: DISCONTINUED | OUTPATIENT
Start: 2017-06-21 | End: 2017-06-21

## 2017-06-21 RX ORDER — ROCURONIUM BROMIDE 10 MG/ML
INJECTION, SOLUTION INTRAVENOUS
Status: DISCONTINUED | OUTPATIENT
Start: 2017-06-21 | End: 2017-06-21

## 2017-06-21 RX ORDER — LIDOCAINE HCL/PF 100 MG/5ML
SYRINGE (ML) INTRAVENOUS
Status: DISCONTINUED | OUTPATIENT
Start: 2017-06-21 | End: 2017-06-21

## 2017-06-21 RX ORDER — SODIUM CHLORIDE, SODIUM LACTATE, POTASSIUM CHLORIDE, CALCIUM CHLORIDE 600; 310; 30; 20 MG/100ML; MG/100ML; MG/100ML; MG/100ML
INJECTION, SOLUTION INTRAVENOUS CONTINUOUS
Status: DISCONTINUED | OUTPATIENT
Start: 2017-06-21 | End: 2017-06-21

## 2017-06-21 RX ORDER — BISACODYL 10 MG
10 SUPPOSITORY, RECTAL RECTAL 2 TIMES DAILY
Status: COMPLETED | OUTPATIENT
Start: 2017-06-21 | End: 2017-06-21

## 2017-06-21 RX ORDER — ACETAMINOPHEN 10 MG/ML
1000 INJECTION, SOLUTION INTRAVENOUS EVERY 8 HOURS
Status: DISCONTINUED | OUTPATIENT
Start: 2017-06-21 | End: 2017-06-21 | Stop reason: HOSPADM

## 2017-06-21 RX ORDER — FENTANYL CITRATE 50 UG/ML
50 INJECTION, SOLUTION INTRAMUSCULAR; INTRAVENOUS
Status: DISCONTINUED | OUTPATIENT
Start: 2017-06-21 | End: 2017-06-22

## 2017-06-21 RX ORDER — DIPHENHYDRAMINE HYDROCHLORIDE 50 MG/ML
12.5 INJECTION INTRAMUSCULAR; INTRAVENOUS EVERY 4 HOURS PRN
Status: DISCONTINUED | OUTPATIENT
Start: 2017-06-21 | End: 2017-06-23 | Stop reason: HOSPADM

## 2017-06-21 RX ORDER — OXYCODONE HYDROCHLORIDE 5 MG/1
5 TABLET ORAL
Status: DISCONTINUED | OUTPATIENT
Start: 2017-06-21 | End: 2017-06-22

## 2017-06-21 RX ORDER — DEXAMETHASONE SODIUM PHOSPHATE 4 MG/ML
INJECTION, SOLUTION INTRA-ARTICULAR; INTRALESIONAL; INTRAMUSCULAR; INTRAVENOUS; SOFT TISSUE
Status: DISCONTINUED | OUTPATIENT
Start: 2017-06-21 | End: 2017-06-21

## 2017-06-21 RX ORDER — SODIUM CHLORIDE 450 MG/100ML
INJECTION, SOLUTION INTRAVENOUS CONTINUOUS
Status: DISCONTINUED | OUTPATIENT
Start: 2017-06-21 | End: 2017-06-23

## 2017-06-21 RX ORDER — CEFAZOLIN SODIUM 2 G/50ML
2 SOLUTION INTRAVENOUS
Status: COMPLETED | OUTPATIENT
Start: 2017-06-21 | End: 2017-06-22

## 2017-06-21 RX ORDER — DOCUSATE SODIUM 100 MG/1
100 CAPSULE, LIQUID FILLED ORAL 2 TIMES DAILY
Status: DISCONTINUED | OUTPATIENT
Start: 2017-06-21 | End: 2017-06-23 | Stop reason: HOSPADM

## 2017-06-21 RX ORDER — MANNITOL 250 MG/ML
INJECTION, SOLUTION INTRAVENOUS
Status: DISCONTINUED | OUTPATIENT
Start: 2017-06-21 | End: 2017-06-21

## 2017-06-21 RX ADMIN — FENTANYL CITRATE 50 MCG: 50 INJECTION INTRAMUSCULAR; INTRAVENOUS at 08:06

## 2017-06-21 RX ADMIN — ATENOLOL 100 MG: 25 TABLET ORAL at 01:06

## 2017-06-21 RX ADMIN — FENTANYL CITRATE 25 MCG: 50 INJECTION INTRAMUSCULAR; INTRAVENOUS at 12:06

## 2017-06-21 RX ADMIN — HYDRALAZINE HYDROCHLORIDE 10 MG: 20 INJECTION INTRAMUSCULAR; INTRAVENOUS at 04:06

## 2017-06-21 RX ADMIN — EPHEDRINE SULFATE 10 MG: 50 INJECTION INTRAMUSCULAR; INTRAVENOUS; SUBCUTANEOUS at 09:06

## 2017-06-21 RX ADMIN — ROCURONIUM BROMIDE 50 MG: 10 INJECTION, SOLUTION INTRAVENOUS at 07:06

## 2017-06-21 RX ADMIN — SODIUM CHLORIDE, SODIUM LACTATE, POTASSIUM CHLORIDE, AND CALCIUM CHLORIDE: 600; 310; 30; 20 INJECTION, SOLUTION INTRAVENOUS at 06:06

## 2017-06-21 RX ADMIN — MANNITOL 12.5 G: 12.5 INJECTION, SOLUTION INTRAVENOUS at 10:06

## 2017-06-21 RX ADMIN — MANNITOL 12.5 G: 12.5 INJECTION, SOLUTION INTRAVENOUS at 11:06

## 2017-06-21 RX ADMIN — EPHEDRINE SULFATE 10 MG: 50 INJECTION INTRAMUSCULAR; INTRAVENOUS; SUBCUTANEOUS at 08:06

## 2017-06-21 RX ADMIN — GLYCOPYRROLATE 0.2 MG: 0.2 INJECTION, SOLUTION INTRAMUSCULAR; INTRAVENOUS at 07:06

## 2017-06-21 RX ADMIN — FENTANYL CITRATE 25 MCG: 50 INJECTION INTRAMUSCULAR; INTRAVENOUS at 01:06

## 2017-06-21 RX ADMIN — VECURONIUM BROMIDE FOR INJECTION 3 MG: 1 INJECTION, POWDER, LYOPHILIZED, FOR SOLUTION INTRAVENOUS at 08:06

## 2017-06-21 RX ADMIN — SODIUM CHLORIDE, SODIUM LACTATE, POTASSIUM CHLORIDE, AND CALCIUM CHLORIDE: 600; 310; 30; 20 INJECTION, SOLUTION INTRAVENOUS at 11:06

## 2017-06-21 RX ADMIN — FENTANYL CITRATE 50 MCG: 50 INJECTION INTRAMUSCULAR; INTRAVENOUS at 02:06

## 2017-06-21 RX ADMIN — GLYCOPYRROLATE 0.8 MG: 0.2 INJECTION, SOLUTION INTRAMUSCULAR; INTRAVENOUS at 11:06

## 2017-06-21 RX ADMIN — MIDAZOLAM HYDROCHLORIDE 2 MG: 1 INJECTION, SOLUTION INTRAMUSCULAR; INTRAVENOUS at 06:06

## 2017-06-21 RX ADMIN — CEFAZOLIN SODIUM 2 G: 2 SOLUTION INTRAVENOUS at 11:06

## 2017-06-21 RX ADMIN — DEXAMETHASONE SODIUM PHOSPHATE 4 MG: 4 INJECTION, SOLUTION INTRAMUSCULAR; INTRAVENOUS at 07:06

## 2017-06-21 RX ADMIN — EPHEDRINE SULFATE 10 MG: 50 INJECTION INTRAMUSCULAR; INTRAVENOUS; SUBCUTANEOUS at 10:06

## 2017-06-21 RX ADMIN — SODIUM CHLORIDE, SODIUM LACTATE, POTASSIUM CHLORIDE, AND CALCIUM CHLORIDE: 600; 310; 30; 20 INJECTION, SOLUTION INTRAVENOUS at 07:06

## 2017-06-21 RX ADMIN — BISACODYL 10 MG: 10 SUPPOSITORY RECTAL at 08:06

## 2017-06-21 RX ADMIN — EPHEDRINE SULFATE 5 MG: 50 INJECTION INTRAMUSCULAR; INTRAVENOUS; SUBCUTANEOUS at 11:06

## 2017-06-21 RX ADMIN — EPHEDRINE SULFATE 5 MG: 50 INJECTION INTRAMUSCULAR; INTRAVENOUS; SUBCUTANEOUS at 10:06

## 2017-06-21 RX ADMIN — AMLODIPINE BESYLATE 2.5 MG: 2.5 TABLET ORAL at 01:06

## 2017-06-21 RX ADMIN — ACETAMINOPHEN 1000 MG: 10 INJECTION, SOLUTION INTRAVENOUS at 02:06

## 2017-06-21 RX ADMIN — SODIUM CHLORIDE, SODIUM LACTATE, POTASSIUM CHLORIDE, AND CALCIUM CHLORIDE: 600; 310; 30; 20 INJECTION, SOLUTION INTRAVENOUS at 10:06

## 2017-06-21 RX ADMIN — SODIUM CHLORIDE 75 ML: 0.45 INJECTION, SOLUTION INTRAVENOUS at 12:06

## 2017-06-21 RX ADMIN — BENAZEPRIL HYDROCHLORIDE 40 MG: 10 TABLET, FILM COATED ORAL at 01:06

## 2017-06-21 RX ADMIN — ATORVASTATIN CALCIUM 10 MG: 10 TABLET, FILM COATED ORAL at 01:06

## 2017-06-21 RX ADMIN — BUPIVACAINE 20 ML: 13.3 INJECTION, SUSPENSION, LIPOSOMAL INFILTRATION at 11:06

## 2017-06-21 RX ADMIN — CEFAZOLIN SODIUM 2 G: 2 SOLUTION INTRAVENOUS at 07:06

## 2017-06-21 RX ADMIN — NEOSTIGMINE METHYLSULFATE 5 MG: 1 INJECTION INTRAVENOUS at 11:06

## 2017-06-21 RX ADMIN — DOCUSATE SODIUM 100 MG: 100 CAPSULE, LIQUID FILLED ORAL at 01:06

## 2017-06-21 RX ADMIN — VECURONIUM BROMIDE FOR INJECTION 3 MG: 1 INJECTION, POWDER, LYOPHILIZED, FOR SOLUTION INTRAVENOUS at 09:06

## 2017-06-21 RX ADMIN — CEFAZOLIN SODIUM 2 G: 2 SOLUTION INTRAVENOUS at 05:06

## 2017-06-21 RX ADMIN — CARBOXYMETHYLCELLULOSE SODIUM 2 DROP: 2.5 SOLUTION/ DROPS OPHTHALMIC at 07:06

## 2017-06-21 RX ADMIN — ACETAMINOPHEN 1000 MG: 10 INJECTION, SOLUTION INTRAVENOUS at 10:06

## 2017-06-21 RX ADMIN — ALLOPURINOL 300 MG: 300 TABLET ORAL at 01:06

## 2017-06-21 RX ADMIN — FENTANYL CITRATE 100 MCG: 50 INJECTION, SOLUTION INTRAMUSCULAR; INTRAVENOUS at 07:06

## 2017-06-21 RX ADMIN — VECURONIUM BROMIDE FOR INJECTION 4 MG: 1 INJECTION, POWDER, LYOPHILIZED, FOR SOLUTION INTRAVENOUS at 10:06

## 2017-06-21 RX ADMIN — DOCUSATE SODIUM 100 MG: 100 CAPSULE, LIQUID FILLED ORAL at 08:06

## 2017-06-21 RX ADMIN — LIDOCAINE HYDROCHLORIDE 100 MG: 20 INJECTION, SOLUTION INTRAVENOUS at 07:06

## 2017-06-21 RX ADMIN — PROPOFOL 200 MG: 10 INJECTION, EMULSION INTRAVENOUS at 07:06

## 2017-06-21 RX ADMIN — BISACODYL 10 MG: 10 SUPPOSITORY RECTAL at 01:06

## 2017-06-21 RX ADMIN — SODIUM CHLORIDE, PRESERVATIVE FREE 3 ML: 5 INJECTION INTRAVENOUS at 01:06

## 2017-06-21 RX ADMIN — ONDANSETRON 4 MG: 2 INJECTION, SOLUTION INTRAMUSCULAR; INTRAVENOUS at 07:06

## 2017-06-21 RX ADMIN — ACETAMINOPHEN 1000 MG: 10 INJECTION, SOLUTION INTRAVENOUS at 07:06

## 2017-06-21 NOTE — ASSESSMENT & PLAN NOTE
- Monitor closely in ICU   - Home meds restarted   - Hydralazine PRN   - Appreciate hospitalist input

## 2017-06-21 NOTE — SUBJECTIVE & OBJECTIVE
Interval History: Pain moderately well controlled. Denies n/v. Warming blankets currently applied due to hypothermia. Also c/o tongue irritation with abrasion/lesion at tip.    Review of Systems   Constitutional: Negative for appetite change, chills and fever.   HENT: Positive for sore throat. Negative for congestion and trouble swallowing.    Eyes: Negative for pain and itching.   Respiratory: Negative for cough and shortness of breath.    Cardiovascular: Negative for chest pain, palpitations and leg swelling.   Gastrointestinal: Positive for abdominal pain. Negative for abdominal distention, constipation, diarrhea, nausea and vomiting.   Genitourinary: Negative for difficulty urinating, dysuria, flank pain, hematuria, nocturia and urgency.   Musculoskeletal: Negative for back pain, neck pain and neck stiffness.   Skin: Negative for rash and wound.   Neurological: Negative for dizziness and seizures.   Hematological: Negative for adenopathy. Does not bruise/bleed easily.   Psychiatric/Behavioral: Negative for confusion. The patient is not nervous/anxious.    All other systems reviewed and are negative.    Objective:     Temp:  [96.1 °F (35.6 °C)-98 °F (36.7 °C)] 96.1 °F (35.6 °C)  Pulse:  [56-62] 58  Resp:  [9-21] 11  SpO2:  [92 %-100 %] 95 %  BP: (139-169)/(59-80) 151/67     Body mass index is 45.2 kg/m².      Date 06/21/17 0700 - 06/22/17 0659   Shift 6683-7063 8704-3578 2552-8271 24 Hour Total   I  N  T  A  K  E   I.V.  (mL/kg) 3000  (22.9)   3000  (22.9)    Shift Total  (mL/kg) 3000  (22.9)   3000  (22.9)   O  U  T  P  U  T   Urine  (mL/kg/hr) 1700  (1.6) 625  2325    Blood 200   200    Shift Total  (mL/kg) 1900  (14.5) 625  (4.8)  2525  (19.3)   Weight (kg) 130.9 130.9 130.9 130.9          Drains     Drain                 Urethral Catheter 06/21/17 0715 Double-lumen 20 Fr. less than 1 day                Physical Exam   Vitals reviewed.  Constitutional: She is oriented to person, place, and time. She appears  well-developed and well-nourished. No distress.   HENT:   Head: Normocephalic and atraumatic.   Neck: Normal range of motion.   Cardiovascular: Normal rate and regular rhythm.    Pulmonary/Chest: Effort normal and breath sounds normal. No respiratory distress.   Abdominal: Soft. She exhibits no distension and no mass. There is no tenderness. There is no rebound and no guarding.   Bloody dressing at lower port site, no leakage.  Appropriately TTP.   Genitourinary:   Genitourinary Comments: Garay - clear yellow urine   Musculoskeletal: Normal range of motion.   Neurological: She is alert and oriented to person, place, and time.   Skin: Skin is warm and dry. No rash noted. She is not diaphoretic. No erythema.     Psychiatric: She has a normal mood and affect. Her behavior is normal.       Significant Labs:    BMP:    Recent Labs  Lab 06/21/17  1323      K 4.0      CO2 25   BUN 10   CREATININE 0.8   CALCIUM 9.1       CBC:     Recent Labs  Lab 06/21/17  1323   WBC 9.46   HGB 12.0   HCT 38.6   *

## 2017-06-21 NOTE — PROGRESS NOTES
[facility]  Urology  Progress Note    Patient Name: Natali Galeano  MRN: [id number]  Admission Date: 6/21/2017  Hospital Length of Stay: 0 days  Code Status: Prior   Attending Provider: Che Tan MD   Primary Care Physician: Ewelina Herzog MD    Subjective:     HPI:   Natali Galeano is a 76 y.o. female who is an established patient who was self-referred for evaluation of kidney cyst.       She was admitted to hospital in 1/17 with SBO. At that time a CT scan with contrast only and TOI showed atypical L renal cysts - 3.3cm LP and 1.9cm. TOI states that cysts cannot be classified as simple and may have solid component.      Denies hematuria, UTIs, kidney issues. Denies  malignancy. She has had previous colon surgery (2002 for colon ca) and ventral hernia repair (2004 in epigastric area).      Cr 0.8 (1/17)     CT urogram was done that showed Bosniak III cysts in L UP and Bosniak 2F cyst in L MP.     She is s/p RAL converted to open L partial nephrectomy on 6/21/17.     Interval History: Pain moderately well controlled. Denies n/v. Warming blankets currently applied due to hypothermia. Also c/o tongue irritation with abrasion/lesion at tip.    Review of Systems   Constitutional: Negative for appetite change, chills and fever.   HENT: Positive for sore throat. Negative for congestion and trouble swallowing.    Eyes: Negative for pain and itching.   Respiratory: Negative for cough and shortness of breath.    Cardiovascular: Negative for chest pain, palpitations and leg swelling.   Gastrointestinal: Positive for abdominal pain. Negative for abdominal distention, constipation, diarrhea, nausea and vomiting.   Genitourinary: Negative for difficulty urinating, dysuria, flank pain, hematuria, nocturia and urgency.   Musculoskeletal: Negative for back pain, neck pain and neck stiffness.   Skin: Negative for rash and wound.   Neurological: Negative for dizziness and seizures.   Hematological:  Negative for adenopathy. Does not bruise/bleed easily.   Psychiatric/Behavioral: Negative for confusion. The patient is not nervous/anxious.    All other systems reviewed and are negative.    Objective:     Temp:  [96.1 °F (35.6 °C)-98 °F (36.7 °C)] 96.1 °F (35.6 °C)  Pulse:  [56-62] 58  Resp:  [9-21] 11  SpO2:  [92 %-100 %] 95 %  BP: (139-169)/(59-80) 151/67     Body mass index is 45.2 kg/m².      Date 06/21/17 0700 - 06/22/17 0659   Shift 2868-4834 8797-1195 9305-4024 24 Hour Total   I  N  T  A  K  E   I.V.  (mL/kg) 3000  (22.9)   3000  (22.9)    Shift Total  (mL/kg) 3000  (22.9)   3000  (22.9)   O  U  T  P  U  T   Urine  (mL/kg/hr) 1700  (1.6) 625  2325    Blood 200   200    Shift Total  (mL/kg) 1900  (14.5) 625  (4.8)  2525  (19.3)   Weight (kg) 130.9 130.9 130.9 130.9          Drains     Drain                 Urethral Catheter 06/21/17 0715 Double-lumen 20 Fr. less than 1 day                Physical Exam   Vitals reviewed.  Constitutional: She is oriented to person, place, and time. She appears well-developed and well-nourished. No distress.   HENT:   Head: Normocephalic and atraumatic.   Neck: Normal range of motion.   Cardiovascular: Normal rate and regular rhythm.    Pulmonary/Chest: Effort normal and breath sounds normal. No respiratory distress.   Abdominal: Soft. She exhibits no distension and no mass. There is no tenderness. There is no rebound and no guarding.   Bloody dressing at lower port site, no leakage.  Appropriately TTP.   Genitourinary:   Genitourinary Comments: Garay - clear yellow urine   Musculoskeletal: Normal range of motion.   Neurological: She is alert and oriented to person, place, and time.   Skin: Skin is warm and dry. No rash noted. She is not diaphoretic. No erythema.     Psychiatric: She has a normal mood and affect. Her behavior is normal.       Significant Labs:    BMP:    Recent Labs  Lab 06/21/17  1323      K 4.0      CO2 25   BUN 10   CREATININE 0.8   CALCIUM 9.1        CBC:     Recent Labs  Lab 06/21/17  1323   WBC 9.46   HGB 12.0   HCT 38.6   *           Assessment/Plan:     Hyperlipidemia     - Home meds   - Appreciate hospitalist input        Postoperative pain     - Continued pain control. Adjust PRN.        Renal cyst     - s/p open R PNx 6/21/17   - Cysts decortication performed        HTN (hypertension)     - Monitor closely in ICU   - Home meds restarted   - Hydralazine PRN   - Appreciate hospitalist input        * Renal mass     - s/p open R PNx 6/21/17   - Monitor post-op labs - H&H and Cr currently stable   - Regular diet, start slowly   - OOB/Ambulate tomorrow   - Pain control            VTE Risk Mitigation     None          Che Tan MD  Urology  [facility], [place], operated by [facility]

## 2017-06-21 NOTE — ANESTHESIA POSTPROCEDURE EVALUATION
"Anesthesia Post Evaluation    Patient: Natali LARSEN Minor    Procedure(s) Performed: Procedure(s) (LRB):  ROBOTIC ASSISTED LAPAROSCOPIC NEPHRECTOMY PARTIAL, possible open partial nephrectomy. (Left)  NEPHRECTOMY-PARTIAL (Left)    Final Anesthesia Type: general  Patient location during evaluation: ICU  Patient participation: Yes- Able to Participate  Level of consciousness: awake and alert and awake  Post-procedure vital signs: reviewed and stable  Pain management: adequate  Airway patency: patent  PONV status at discharge: No PONV  Anesthetic complications: no      Cardiovascular status: blood pressure returned to baseline, hemodynamically stable and stable  Respiratory status: unassisted, spontaneous ventilation and nasal cannula  Hydration status: euvolemic  Follow-up not needed.        Visit Vitals  BP (!) 169/80 (BP Location: Right arm, Patient Position: Lying, BP Method: Automatic)   Pulse 60   Temp 36.7 °C (98 °F) (Oral)   Resp 16   Ht 5' 7" (1.702 m)   Wt 132.6 kg (292 lb 5.3 oz)   SpO2 98%   BMI 45.79 kg/m²       Pain/Carola Score: Pain Assessment Performed: Yes (6/21/2017  5:30 AM)  Presence of Pain: complains of pain/discomfort (6/21/2017  5:30 AM)      "

## 2017-06-21 NOTE — ASSESSMENT & PLAN NOTE
- s/p open R PNx 6/21/17   - Monitor post-op labs - H&H and Cr currently stable   - Regular diet, start slowly   - OOB/Ambulate tomorrow   - Pain control

## 2017-06-21 NOTE — PLAN OF CARE
06/21/17 1049   ED Admissions Case Approval   ED Admissions Case Approval (!) CM Approved  (IP )

## 2017-06-21 NOTE — PLAN OF CARE
Problem: Patient Care Overview  Goal: Plan of Care Review  Outcome: Ongoing (interventions implemented as appropriate)  Received patient from surgery on NC 2L sat's 100% will monitor.

## 2017-06-21 NOTE — SUBJECTIVE & OBJECTIVE
Past Medical History:   Diagnosis Date    Colon cancer     Gout, chronic     Heart murmur     High cholesterol     Hypercholesteremia     Hypertension     Thyroid disease        Past Surgical History:   Procedure Laterality Date    BTL       SECTION, CLASSIC      COLON SURGERY      goiter       HERNIA REPAIR         Review of patient's allergies indicates:  No Known Allergies    No current facility-administered medications on file prior to encounter.      Current Outpatient Prescriptions on File Prior to Encounter   Medication Sig    allopurinol (ZYLOPRIM) 300 MG tablet Take 300 mg by mouth once daily.    amlodipine (NORVASC) 2.5 MG tablet Take 2.5 mg by mouth once daily.    aspirin (ECOTRIN) 81 MG EC tablet Take 81 mg by mouth once daily.    atenolol (TENORMIN) 100 MG tablet Take 100 mg by mouth once daily.    atorvastatin (LIPITOR) 10 MG tablet Take 10 mg by mouth once daily.    benazepril (LOTENSIN) 40 MG tablet Take 40 mg by mouth once daily.    cetirizine (ZYRTEC) 10 MG tablet Take 10 mg by mouth daily as needed for Allergies.    cholecalciferol, vitamin D3, (THERA-D) 2,000 unit Tab Take 1 tablet by mouth once daily.    fluticasone (FLONASE) 50 mcg/actuation nasal spray 1 spray by Each Nare route once daily.     Family History     Problem Relation (Age of Onset)    Diabetes Mother    Heart disease Father        Social History Main Topics    Smoking status: Former Smoker     Packs/day: 0.10     Quit date: 1980    Smokeless tobacco: Never Used    Alcohol use No    Drug use: No    Sexual activity: Not on file     Review of Systems   Constitutional: Negative for activity change, appetite change and fever.   HENT: Negative for congestion, ear pain, rhinorrhea and sinus pressure.    Eyes: Negative for pain and discharge.   Respiratory: Negative for cough, chest tightness, shortness of breath and wheezing.    Cardiovascular: Negative for chest pain and leg swelling.    Gastrointestinal: Positive for abdominal pain (Left abdomen). Negative for abdominal distention, diarrhea, nausea and vomiting.   Endocrine: Negative for cold intolerance and heat intolerance.   Genitourinary: Positive for flank pain. Negative for difficulty urinating, frequency, hematuria and urgency.   Musculoskeletal: Negative for arthralgias, joint swelling and myalgias.   Allergic/Immunologic: Negative for environmental allergies and food allergies.   Neurological: Negative for dizziness, weakness, light-headedness and headaches.   Hematological: Does not bruise/bleed easily.   Psychiatric/Behavioral: Negative for agitation, behavioral problems and decreased concentration.     Objective:     Vital Signs (Most Recent):  Temp: 97.5 °F (36.4 °C) (06/21/17 1230)  Pulse: (!) 56 (06/21/17 1330)  Resp: 14 (06/21/17 1330)  BP: (!) 155/68 (06/21/17 1330)  SpO2: 97 % (06/21/17 1330) Vital Signs (24h Range):  Temp:  [97.5 °F (36.4 °C)-98 °F (36.7 °C)] 97.5 °F (36.4 °C)  Pulse:  [56-60] 56  Resp:  [14-21] 14  SpO2:  [97 %-100 %] 97 %  BP: (139-169)/(59-80) 155/68     Weight: 132.6 kg (292 lb 5.3 oz)  Body mass index is 45.79 kg/m².    Physical Exam   Constitutional: She is oriented to person, place, and time. She appears well-developed and well-nourished.   HENT:   Head: Normocephalic.   Eyes: Conjunctivae are normal. Right eye exhibits no discharge. Left eye exhibits no discharge.   Neck: Normal range of motion. Neck supple.   Cardiovascular: Normal rate, regular rhythm, normal heart sounds and intact distal pulses.    Pulmonary/Chest: Effort normal and breath sounds normal. No respiratory distress.   Abdominal: Soft. She exhibits distension. Bowel sounds are decreased. There is tenderness in the left upper quadrant.   Musculoskeletal: Normal range of motion.   Neurological: She is alert and oriented to person, place, and time.   Skin: Skin is warm and dry.   Psychiatric: She has a normal mood and affect. Her behavior  is normal. Thought content normal.       Significant Labs:   BMP:   Recent Labs  Lab 06/21/17  1323   *      K 4.0      CO2 25   BUN 10   CREATININE 0.8   CALCIUM 9.1     CBC:   Recent Labs  Lab 06/21/17  1323   WBC 9.46   HGB 12.0   HCT 38.6   *       Significant Imaging: I have reviewed all pertinent imaging results/findings within the past 24 hours.

## 2017-06-21 NOTE — PLAN OF CARE
Problem: Patient Care Overview  Goal: Plan of Care Review  Outcome: Ongoing (interventions implemented as appropriate)  Pt arrived from OR at approximately 1220 post partial nephrectomy.  Pt drowsy but AAOX4, slightly hypothermic, bradycardic, hypertensive, with complaint of incisional pain to left abdomen in vicinity of surgical incisions.  Pt administered PRN pain medication with moderate result - reported to NP.  PRN medication order changed to higher dose for better pain control.  Cardiac medications administered as ordered, one dose PRN hydralazine administered for SBP > 160 with good results.  Pt with good clear yellow urine output to gaytan.  Pt passed bedside nursing swallow test.  Pt and family up to date with plan of care.  Warming blanket applied following axilary temperature reading of 96.1 - pt asymptomatic and without complaint of being cold, skin warm to touch.

## 2017-06-21 NOTE — CONSULTS
[facility] Hospital Medicine  Consult Note    Patient Name: Natali Galeano  MRN: [id number]  Admission Date: 2017  Hospital Length of Stay: 0 days  Attending Physician: Che Tan MD   Primary Care Provider: Ewelina Herzog MD           Patient information was obtained from patient and past medical records.     Consults  Subjective:     Principal Problem: <principal problem not specified>    Chief Complaint: No chief complaint on file.       HPI: Patient with a known history of HTN, HLD, and colon cancer post resection was admitted for a planned robotic assisted laparoscopic left partial nephrectomy.   The procedure was converted to an open procedure due to adhesions.      Past Medical History:   Diagnosis Date    Colon cancer     Gout, chronic     Heart murmur     High cholesterol     Hypercholesteremia     Hypertension     Thyroid disease        Past Surgical History:   Procedure Laterality Date    BTL       SECTION, CLASSIC      COLON SURGERY      goiter       HERNIA REPAIR         Review of patient's allergies indicates:  No Known Allergies    No current facility-administered medications on file prior to encounter.      Current Outpatient Prescriptions on File Prior to Encounter   Medication Sig    allopurinol (ZYLOPRIM) 300 MG tablet Take 300 mg by mouth once daily.    amlodipine (NORVASC) 2.5 MG tablet Take 2.5 mg by mouth once daily.    aspirin (ECOTRIN) 81 MG EC tablet Take 81 mg by mouth once daily.    atenolol (TENORMIN) 100 MG tablet Take 100 mg by mouth once daily.    atorvastatin (LIPITOR) 10 MG tablet Take 10 mg by mouth once daily.    benazepril (LOTENSIN) 40 MG tablet Take 40 mg by mouth once daily.    cetirizine (ZYRTEC) 10 MG tablet Take 10 mg by mouth daily as needed for Allergies.    cholecalciferol, vitamin D3, (THERA-D) 2,000 unit Tab Take 1 tablet by mouth once daily.    fluticasone (FLONASE) 50 mcg/actuation nasal spray 1 spray  by Each Nare route once daily.     Family History     Problem Relation (Age of Onset)    Diabetes Mother    Heart disease Father        Social History Main Topics    Smoking status: Former Smoker     Packs/day: 0.10     Quit date: 4/16/1980    Smokeless tobacco: Never Used    Alcohol use No    Drug use: No    Sexual activity: Not on file     Review of Systems   Constitutional: Negative for activity change, appetite change and fever.   HENT: Negative for congestion, ear pain, rhinorrhea and sinus pressure.    Eyes: Negative for pain and discharge.   Respiratory: Negative for cough, chest tightness, shortness of breath and wheezing.    Cardiovascular: Negative for chest pain and leg swelling.   Gastrointestinal: Positive for abdominal pain (Left abdomen). Negative for abdominal distention, diarrhea, nausea and vomiting.   Endocrine: Negative for cold intolerance and heat intolerance.   Genitourinary: Positive for flank pain. Negative for difficulty urinating, frequency, hematuria and urgency.   Musculoskeletal: Negative for arthralgias, joint swelling and myalgias.   Allergic/Immunologic: Negative for environmental allergies and food allergies.   Neurological: Negative for dizziness, weakness, light-headedness and headaches.   Hematological: Does not bruise/bleed easily.   Psychiatric/Behavioral: Negative for agitation, behavioral problems and decreased concentration.     Objective:     Vital Signs (Most Recent):  Temp: 97.5 °F (36.4 °C) (06/21/17 1230)  Pulse: (!) 56 (06/21/17 1330)  Resp: 14 (06/21/17 1330)  BP: (!) 155/68 (06/21/17 1330)  SpO2: 97 % (06/21/17 1330) Vital Signs (24h Range):  Temp:  [97.5 °F (36.4 °C)-98 °F (36.7 °C)] 97.5 °F (36.4 °C)  Pulse:  [56-60] 56  Resp:  [14-21] 14  SpO2:  [97 %-100 %] 97 %  BP: (139-169)/(59-80) 155/68     Weight: 132.6 kg (292 lb 5.3 oz)  Body mass index is 45.79 kg/m².    Physical Exam   Constitutional: She is oriented to person, place, and time. She appears  well-developed and well-nourished.   HENT:   Head: Normocephalic.   Eyes: Conjunctivae are normal. Right eye exhibits no discharge. Left eye exhibits no discharge.   Neck: Normal range of motion. Neck supple.   Cardiovascular: Normal rate, regular rhythm, normal heart sounds and intact distal pulses.    Pulmonary/Chest: Effort normal and breath sounds normal. No respiratory distress.   Abdominal: Soft. She exhibits distension. Bowel sounds are decreased. There is tenderness in the left upper quadrant.   Musculoskeletal: Normal range of motion.   Neurological: She is alert and oriented to person, place, and time.   Skin: Skin is warm and dry.   Psychiatric: She has a normal mood and affect. Her behavior is normal. Thought content normal.       Significant Labs:   BMP:   Recent Labs  Lab 06/21/17  1323   *      K 4.0      CO2 25   BUN 10   CREATININE 0.8   CALCIUM 9.1     CBC:   Recent Labs  Lab 06/21/17  1323   WBC 9.46   HGB 12.0   HCT 38.6   *       Significant Imaging: I have reviewed all pertinent imaging results/findings within the past 24 hours.    Assessment/Plan:     Renal mass    Left open partial nephrectomy          HTN (hypertension)    SBP- 150's  Changed hydralazine PRN parameter to >150 for tighter control  Continue all home BP meds          Postoperative pain    -Increased the fentanyl dose to 50 mcg Q2  -            VTE Risk Mitigation     None        Thank you for your consult. I will follow-up with patient. Please contact us if you have any additional questions.    Gibson Howard NP  Department of Hospital Medicine   [facility]

## 2017-06-21 NOTE — OP NOTE
DATE OF PROCEDURE:  06/21/2017    SURGEON:  Enrique Coker M.D.    ASSISTANT:  Che Tan M.D., John Zamarripa Jr., M.D., and Marcelina Sauceda.    PROCEDURES:  Left partial nephrectomy.    PREOPERATIVE DIAGNOSES:  1.  A 3 cm left upper pole complex renal mass.  2.  Bosniak 2F left renal cyst.  3.  Morbid obesity.  4.  Previous right colectomy and ventral hernia repair.    POSTOPERATIVE DIAGNOSES:  1.  A 3 cm left upper pole complex renal mass.  2.  Bosniak 2F left renal cyst.  3.  Morbid obesity.  4.  Previous right colectomy and ventral hernia repair.    ANESTHESIA:  General.    COMPLICATIONS:  None.    HISTORY:  Ms. Galeano is a 76-year-old woman.  She has an enhancing cystic and   solid heterogeneous mass in the upper pole of the left kidney, quite concerning   for malignancy.  There are also simple cysts in the left kidney and a minimally   complex lesion in the left anterior mid portion of the kidney.  There is no   evidence of metastatic disease.  All of the options have been discussed in   detail with the patient and she wished to proceed with surgery.  She desires a   minimally invasive procedure if possible.  The patient has had extensive   previous abdominal surgery including a right hemicolectomy in 2004, followed by   a ventral midline hernia repair with mesh.  I had her see Dr. oJhn Zamarripa   preoperatively.  Dr. Zamarripa offered to assist with lysis of adhesions   and access to the abdomen.  We explained that a minimally invasive procedure may   not be possible and she may require open surgery.  We also discussed the   possibility of requiring total nephrectomy.  She expressed understanding and   wished to proceed.  Preoperative creatinine is normal.  Informed consent was   Obtained.  All risks were discussed with the patient.    PROCEDURE IN DETAIL:  The patient was brought to the Operating Room and   undergoes general endotracheal anesthesia.  TEDs and SCDs had been applied and   an oral gastric tube was  placed and a Garay tube was placed in the bladder.  The   abdomen and flank were prepped and draped in a sterile fashion.  The patient   was secured to the bed and axillary roll was placed.  She was placed in the   right lateral decubitus position with the left side up.  The site was verified   with reviewing the radiographic images.  A time-out was performed.  Primary   access was obtained in the left upper quadrant with the Veress needle technique.    The Veress needle was irrigated and aspirated, and the drop test was not as   brisk as typical.  At this point, I stopped and contacted Dr. Zamarripa.  He came to   the room and was able to insufflate the abdomen through a separate   incision just below the left costal margin.  We then placed a misty balloon trocar in the left upper quadrant with an open technique.  The abdomen was inspected with the laparoscope.  There was a small amount of air   in the retroperitoneal tissues, but there was no evidence of hematoma and no   evidence of intraabdominal injury.  There were adhesions throughout the   abdomen including in the left upper quadrant and in the midline and in the left   lower quadrant.  Two 8-mm trocars were placed under direct vision.  With Dr. Zamarripa's assistance, laparoscopic lysis of adhesions were performed so that we   were able to free the adhesions in the left lower quadrant and freed enough of   the adhesions near the midline to allow placement of the 2 assistant trocars   under direct vision.  A 5-mm AirSeal trocar was placed lateral to the rectus   muscle in the left upper quadrant and a 12-mm dilating disposable trocar was   placed under direct vision in the left lower quadrant.  The remaining 8 mm   trocars were placed under direct vision.  The Misty trocar at the primary access site was exchanged   for an 8 mm metallic trocar.  The robot was docked.  Additional adhesions in   the left upper quadrant were taken down sharply without energy.  These  were   primarily omental adhesions to the anterior abdominal wall.  The descending   colon was mobilized.  It apparently had been mobilized with her previous   procedure because it was not attached to the spleen.  The Gerota's fascia was exposed.  There appeared to be hematoma deep to Gerota's fascia adjacent to the renal vessels.  The   hematoma itself was not pulsatile, was not expanding.  It was, however, adjacent   to the renal vessels.  The upper pole mass was dissected and ultrasound was   performed.  The area of concern for hematoma was again inspected.  I decided to   make an open incision in case we encountered any vascular bleeding.  I contacted   to Dr. Zamarripa and he offered to be available for any vascular consultation was   necessary.  The lateral trocar incisions were extended to the tip of the 11th   rib.  After converting to an open procedure, the trocars were removed and the site inspected. A self-retaining retractor was placed.  Care was taken to preserve the   intercostal nerves.  The Gerota's fascia was entered and the kidney was   mobilized.  There was no hematoma within Gerota's fascia.  There was no evidence   of vascular or other injury in the retroperitoneum.  The kidney was completely   mobilized and ultrasonography was performed.  The upper pole mass was- as   anticipated -a complex cystic and solid mass.  The remaining lesions were all   cystic and had no solid components; these cysts were all unroofed and cauterized   at the base.  Mannitol was administered.  The ureter was identified and   preserved and a Vesseloop was passed around it.  The renal hilum was clamped   with a vascular DeBakey clamp.  The renal capsule was fulgurated and the upper pole mass   was bluntly dissected from the upper pole of the kidney.  In 2 locations, there   was a small entry into the capsule of the mass.  This was recognized.  In these   areas, dissection was carried more widely and the margin of resection  was   fulgurated.  I am confident that the mass was completely resected.  The mass was   inspected and was grossly intact.  The margins were grossly negative.  The   center of the defect had no entry into the collecting system.  There was some   venous bleeding, which was controlled with a running 3-0 Vicryl utilizing   Lapra-Tys placed outside of the cortex in addition to a running Stratafix suture   in a similar fashion.  Surgiflo was applied and the defect was closed with a   running horizontal Stratafix suture.  Lapratys and Wesk clips were utilized.  The vascular clamp was removed.  Clamp   time was approximately 12 minutes.  There was a small amount of oozing from the   resection base, which was controlled with direct pressure.  Pressure was   removed.  The patient had a normal systemic blood pressure.  The kidney was pink   and viable.  There was no bleeding from the resection site.  All counts were   correct.  The perrnephric adipose tissues was returned to the kidney.  The fascia and skin incisions were closed in the usual fashion by Dr. Tan and nurse Crefasi.  Sterile adhesive dressings were applied.  The   patient was awakening from anesthesia at the time of this dictation.      ASHLEY/  dd: 06/21/2017 11:56:23 (CDT)  td: 06/21/2017 14:00:12 (CDT)  Doc ID   #[id number]  Job ID #[id number]    CC:       [id number] dictated

## 2017-06-21 NOTE — ASSESSMENT & PLAN NOTE
SBP- 150's  Changed hydralazine PRN parameter to >150 for tighter control  Continue all home BP meds

## 2017-06-21 NOTE — H&P
"  Subjective:       Natali Galeano is a 76 y.o. female who is an established patient who was self-referred for evaluation of kidney cyst.      She was admitted to hospital in 1/17 with SBO. At that time a CT scan with contrast only and TOI showed atypical L renal cysts - 3.3cm LP and 1.9cm. TOI states that cysts cannot be classified as simple and may have solid component.     Denies hematuria, UTIs, kidney issues. Denies  malignancy. She has had previous colon surgery (2002 for colon ca) and ventral hernia repair (2004 in epigastric area).     Cr 0.8 (1/17)    CT urogram was done that showed Bosniak III cysts in L UP and Bosniak 2F cyst in L MP.       The following portions of the patient's history were reviewed and updated as appropriate: allergies, current medications, past family history, past medical history, past social history, past surgical history and problem list.    Review of Systems  Constitutional: no fever or chills  ENT: no nasal congestion or sore throat  Respiratory: no cough or shortness of breath  Cardiovascular: no chest pain or palpitations  Gastrointestinal: no nausea or vomiting, tolerating diet  Genitourinary: as per HPI  Hematologic/Lymphatic: no easy bruising or lymphadenopathy  Musculoskeletal: no arthralgias or myalgias  Skin: no rashes or lesions  Neurological: no seizures or tremors  Behavioral/Psych: no auditory or visual hallucinations       Objective:    Vitals: BP (!) 169/81 (BP Location: Left arm, Patient Position: Sitting, BP Method: Automatic)  Pulse 62  Ht 5' 7" (1.702 m)  Wt 132.6 kg (292 lb 5.3 oz)  BMI 45.79 kg/m2    Physical Exam   General: well developed, well nourished in no acute distress  Head: normocephalic, atraumatic  Neck: supple, trachea midline, no obvious enlargement of thyroid  HEENT: EOMI, mucus membranes moist, sclera anicteric, no hearing impairment  Lungs: symmetric expansion, non-labored breathing  Cardiovascular: regular rate and rhythm, normal " pulses  Abdomen: soft, non tender, non distended, no palpable masses, no hepatosplenomegaly, no hernias, no CVA tenderness  Musculoskeletal: no peripheral edema, normal ROM in bilateral upper and lower extremities  Lymphatics: no cervical or inguinal lymphadenopathy  Skin: no rashes or lesions  Neuro: alert and oriented x 3, no gross deficits  Psych: normal judgment and insight, normal mood/affect and non-anxious  Genitourinary:   patient declined exam      Lab Review   Urine analysis today in clinic shows - no urine      Lab Results   Component Value Date    WBC 6.68 01/26/2017    HGB 12.0 01/26/2017    HCT 39.0 01/26/2017    MCV 84 01/26/2017     (L) 01/26/2017     Lab Results   Component Value Date    CREATININE 0.8 05/08/2017    BUN 11 05/08/2017         Imaging  Images and reports were personally reviewed by me and discussed with patient  CT and TOI reviewed.        Assessment/Plan:      1. Complex renal cyst    - CT - 3.2cm Bosniak III cyst in L UP, also Bosniak IIF in L MP   - Discussed need for surgical excision of lesion(s)   - Due to PSH (colon ca s/p resection, ventral hernia repair) and body habitus, surgery will be technically challenging. Laparoscopic/robotic may not be feasible 2/2 adhesions.   - Discussed this difficult case with Dr Coker, who agrees that attempt at robotic/laparascopic partial nephrectomy should be attempted (to help with post-op recovery) with possible conversion to open if kidney cannot be accessed 2/2 adhesions. Will ask Dr Zamarripa to assist with Prem.    - Pt request surgery to be after 6/18/17. Will plan for OR on 6/21/17.   - CXR today for staging     2. Renal mass   - Per above

## 2017-06-21 NOTE — BRIEF OP NOTE
[facility]  Brief Operative Note    SUMMARY     Surgery Date: 6/21/2017     Surgeon(s) and Role:     * Che Tan MD - Primary    Assisting Surgeon: Dr Enrique Coker (Urology) and Dr John Zamarripa (Surgery)      Pre-op Diagnosis:  Complex renal cyst [N28.1]    Post-op Diagnosis:  Post-Op Diagnosis Codes:     * Complex renal cyst [N28.1]    Procedure(s) (LRB):  ROBOTIC ASSISTED LAPAROSCOPIC NEPHRECTOMY PARTIAL, CONVERSION TO OPEN PARTIAL NEPHRECTOMY  (Left), LYSIS OF ADHESIONS     Anesthesia: General    Description of Procedure: Dense adhesions related to prior colectomy and ventral hernia repair. Able to gain access to abdomen and allow robotic port placement. Difficult dissection robotically with concern for hematoma at renal hilum. Procedure converted to open, no hematoma appreciated.    Description of the findings of the procedure: Excision of L UP tumor with renorrhaphy. No injury to collecting system noted. Cyst decortication of three separate cysts. Intraoperative US used to evaluate 2F cyst - no solid components therefore decorticate performed.     Estimated Blood Loss: 200cc         Specimens:   Specimen (12h ago through future)    Start     Ordered    06/21/17 1151  Specimen to Pathology - Surgery  Once     Comments:  1. FAT OVER CYST2. LEFT RENAL TUMOR      06/21/17 1150

## 2017-06-22 PROBLEM — N28.89 RENAL MASS: Status: RESOLVED | Noted: 2017-06-21 | Resolved: 2017-06-22

## 2017-06-22 PROBLEM — N28.1 COMPLEX RENAL CYST: Status: ACTIVE | Noted: 2017-06-22

## 2017-06-22 PROBLEM — N28.89 RENAL MASS: Status: ACTIVE | Noted: 2017-06-22

## 2017-06-22 LAB
ANION GAP SERPL CALC-SCNC: 13 MMOL/L
BASOPHILS # BLD AUTO: 0.01 K/UL
BASOPHILS NFR BLD: 0.1 %
BUN SERPL-MCNC: 10 MG/DL
CALCIUM SERPL-MCNC: 8.7 MG/DL
CHLORIDE SERPL-SCNC: 105 MMOL/L
CO2 SERPL-SCNC: 16 MMOL/L
CREAT SERPL-MCNC: 0.8 MG/DL
DIFFERENTIAL METHOD: ABNORMAL
EOSINOPHIL # BLD AUTO: 0 K/UL
EOSINOPHIL NFR BLD: 0.4 %
ERYTHROCYTE [DISTWIDTH] IN BLOOD BY AUTOMATED COUNT: 16.1 %
EST. GFR  (AFRICAN AMERICAN): >60 ML/MIN/1.73 M^2
EST. GFR  (NON AFRICAN AMERICAN): >60 ML/MIN/1.73 M^2
GLUCOSE SERPL-MCNC: 90 MG/DL
HCT VFR BLD AUTO: 37.7 %
HGB BLD-MCNC: 11.8 G/DL
LYMPHOCYTES # BLD AUTO: 1.3 K/UL
LYMPHOCYTES NFR BLD: 13.1 %
MCH RBC QN AUTO: 25.9 PG
MCHC RBC AUTO-ENTMCNC: 31.3 %
MCV RBC AUTO: 83 FL
MONOCYTES # BLD AUTO: 0.8 K/UL
MONOCYTES NFR BLD: 8.5 %
NEUTROPHILS # BLD AUTO: 7.5 K/UL
NEUTROPHILS NFR BLD: 77.6 %
PLATELET # BLD AUTO: 144 K/UL
PMV BLD AUTO: 10.4 FL
POTASSIUM SERPL-SCNC: 4 MMOL/L
RBC # BLD AUTO: 4.56 M/UL
SODIUM SERPL-SCNC: 134 MMOL/L
WBC # BLD AUTO: 9.68 K/UL

## 2017-06-22 PROCEDURE — 25000003 PHARM REV CODE 250: Performed by: UROLOGY

## 2017-06-22 PROCEDURE — 27000221 HC OXYGEN, UP TO 24 HOURS

## 2017-06-22 PROCEDURE — 36415 COLL VENOUS BLD VENIPUNCTURE: CPT

## 2017-06-22 PROCEDURE — 97162 PT EVAL MOD COMPLEX 30 MIN: CPT

## 2017-06-22 PROCEDURE — 97530 THERAPEUTIC ACTIVITIES: CPT

## 2017-06-22 PROCEDURE — 63600175 PHARM REV CODE 636 W HCPCS: Performed by: UROLOGY

## 2017-06-22 PROCEDURE — 97116 GAIT TRAINING THERAPY: CPT

## 2017-06-22 PROCEDURE — 80048 BASIC METABOLIC PNL TOTAL CA: CPT

## 2017-06-22 PROCEDURE — 11000001 HC ACUTE MED/SURG PRIVATE ROOM

## 2017-06-22 PROCEDURE — 94761 N-INVAS EAR/PLS OXIMETRY MLT: CPT

## 2017-06-22 PROCEDURE — 63600175 PHARM REV CODE 636 W HCPCS: Performed by: NURSE PRACTITIONER

## 2017-06-22 PROCEDURE — 85025 COMPLETE CBC W/AUTO DIFF WBC: CPT

## 2017-06-22 PROCEDURE — 25000003 PHARM REV CODE 250: Performed by: ANESTHESIOLOGY

## 2017-06-22 RX ORDER — KETOROLAC TROMETHAMINE 30 MG/ML
15 INJECTION, SOLUTION INTRAMUSCULAR; INTRAVENOUS EVERY 6 HOURS
Status: DISCONTINUED | OUTPATIENT
Start: 2017-06-22 | End: 2017-06-23 | Stop reason: HOSPADM

## 2017-06-22 RX ORDER — OXYCODONE AND ACETAMINOPHEN 10; 325 MG/1; MG/1
1 TABLET ORAL EVERY 4 HOURS PRN
Status: DISCONTINUED | OUTPATIENT
Start: 2017-06-22 | End: 2017-06-23 | Stop reason: HOSPADM

## 2017-06-22 RX ORDER — OXYCODONE AND ACETAMINOPHEN 5; 325 MG/1; MG/1
1 TABLET ORAL EVERY 4 HOURS PRN
Status: DISCONTINUED | OUTPATIENT
Start: 2017-06-22 | End: 2017-06-23 | Stop reason: HOSPADM

## 2017-06-22 RX ADMIN — KETOROLAC TROMETHAMINE 15 MG: 30 INJECTION, SOLUTION INTRAMUSCULAR at 11:06

## 2017-06-22 RX ADMIN — HYDRALAZINE HYDROCHLORIDE 10 MG: 20 INJECTION INTRAMUSCULAR; INTRAVENOUS at 11:06

## 2017-06-22 RX ADMIN — DOCUSATE SODIUM 100 MG: 100 CAPSULE, LIQUID FILLED ORAL at 10:06

## 2017-06-22 RX ADMIN — OXYCODONE HYDROCHLORIDE AND ACETAMINOPHEN 1 TABLET: 5; 325 TABLET ORAL at 11:06

## 2017-06-22 RX ADMIN — CEFAZOLIN SODIUM 2 G: 2 SOLUTION INTRAVENOUS at 06:06

## 2017-06-22 RX ADMIN — ACETAMINOPHEN 1000 MG: 10 INJECTION, SOLUTION INTRAVENOUS at 06:06

## 2017-06-22 RX ADMIN — BENAZEPRIL HYDROCHLORIDE 40 MG: 10 TABLET, FILM COATED ORAL at 08:06

## 2017-06-22 RX ADMIN — KETOROLAC TROMETHAMINE 15 MG: 30 INJECTION, SOLUTION INTRAMUSCULAR at 05:06

## 2017-06-22 RX ADMIN — ATORVASTATIN CALCIUM 10 MG: 10 TABLET, FILM COATED ORAL at 08:06

## 2017-06-22 RX ADMIN — SODIUM CHLORIDE, PRESERVATIVE FREE 3 ML: 5 INJECTION INTRAVENOUS at 12:06

## 2017-06-22 RX ADMIN — ALLOPURINOL 300 MG: 300 TABLET ORAL at 08:06

## 2017-06-22 RX ADMIN — DOCUSATE SODIUM 100 MG: 100 CAPSULE, LIQUID FILLED ORAL at 08:06

## 2017-06-22 RX ADMIN — AMLODIPINE BESYLATE 2.5 MG: 2.5 TABLET ORAL at 08:06

## 2017-06-22 RX ADMIN — SODIUM CHLORIDE: 0.45 INJECTION, SOLUTION INTRAVENOUS at 12:06

## 2017-06-22 RX ADMIN — CEFAZOLIN SODIUM 2 G: 2 SOLUTION INTRAVENOUS at 12:06

## 2017-06-22 RX ADMIN — ATENOLOL 100 MG: 25 TABLET ORAL at 08:06

## 2017-06-22 NOTE — PLAN OF CARE
06/22/17 1351   Discharge Assessment   Assessment Type Discharge Planning Assessment   Confirmed/corrected address and phone number on facesheet? No   Assessment information obtained from? Caregiver;Medical Record   Prior to hospitilization cognitive status: Alert/Oriented   Prior to hospitalization functional status: Assistive Equipment   Current cognitive status: Coma/Sedated/Intubated   Current Functional Status: Assistive Equipment   Lives With alone   Able to Return to Prior Arrangements unable to determine at this time (comments)   Is patient able to care for self after discharge? Unable to determine at this time (comments)   Patient currently being followed by outpatient case management? Unable to determine (comments)   Equipment Currently Used at Home CPAP   Do you have any problems affording any of your prescribed medications? TBD   On Dialysis? No   Discharge Plan A Home with family   Patient/Family In Agreement With Plan unable to assess

## 2017-06-22 NOTE — PT/OT/SLP EVAL
Physical Therapy  Evaluation and Treatment     Natali Galeano   MRN: [id number]   Admitting Diagnosis: Renal mass    PT Received On: 17  PT Start Time: 1137     PT Stop Time: 1208    PT Total Time (min): 31 min       Billable Minutes:  Evaluation 8, Gait [id number] and Therapeutic Activity 13    Diagnosis: Renal mass    Past Medical History:   Diagnosis Date    Colon cancer     Gout, chronic     Heart murmur     High cholesterol     Hypercholesteremia     Hypertension     Thyroid disease       Past Surgical History:   Procedure Laterality Date    BTL       SECTION, CLASSIC      COLON SURGERY      goiter       HERNIA REPAIR       Referring physician: Dominick  Date referred to PT: 2017    General Precautions: Standard, fall  Orthopedic Precautions: N/A     Do you have any cultural, spiritual, Latter-day conflicts, given your current situation?: None specified    Patient History:  Living Environment Comment: Pt reports she lives alone in a 1st floor apartment with 0 JAIME. She has a handicap accessible apartment with a  walk in shower and grab bars. She reports being (I) PTA with all mobility and ADLs. She is active at baseline by grocery shopping, driving, and going to the gym. Her daughter is available upon discharge for assist as needed.  Equipment Currently Used at Home: none  DME owned (not currently used): none    Previous Level of Function:  Ambulation Skills: independent  Transfer Skills: independent  ADL Skills: independent  Work/Leisure Activity: independent    Subjective:  Communicated with RN prior to session.    Chief Complaint: Dizziness with mobility   Patient goals: To return to independent level of functioning     Pain/Comfort  Pain Rating 1: 1/10  Location - Side 1: Left  Location - Orientation 1: upper  Location 1: flank  Pain Addressed 1: Pre-medicate for activity, Reposition, Distraction  Pain Rating Post-Intervention 1: 0/10    Objective:   Patient found with: peripheral  "IV and oxygen x 2 L via NC    BP:  Supine prior to activity: 148/65  Seated after gait: 99/49  Seated after ~ 3 min rest and ankle pumps 117/58  SpO2:   97% supine with O2 donned  95% after gait on RA    Cognitive Exam:  Oriented to: Person, Place, Time and Situation    Follows Commands/attention: Follows one step commands  Communication: clear/fluent  Safety awareness/insight to disability: intact    Physical Exam:  Postural examination/scapula alignment: Rounded shoulder and Head forward    Skin integrity: Visible skin intact  Edema: None noted     Sensation:   Intact    Lower Extremity Range of Motion:  Right Lower Extremity: WFL  Left Lower Extremity: WFL    Lower Extremity Strength:  Right Lower Extremity: WFL  Left Lower Extremity: WFL    Gross motor coordination: WFL    Functional Mobility:  Bed Mobility:  Supine to Sit: Minimum Assistance (with HOB elevated pt required increased time needed to perform and verbal cues for safety and technique)    Transfers:  Sit <> Stand Assistance: Contact Guard Assistance (x 1 from EOB; pt required verbal cues for hand placement and safety)  Sit <> Stand Assistive Device: Rolling Walker    Gait:   Gait Distance: x 100'; pt required verbal cues for walker management and safety, pt endorsing "fogginess" and dizziness during ambulation  Assistance 1: Contact Guard Assistance  Gait Assistive Device: Rolling walker  Gait Pattern: reciprocal  Gait Deviation(s): decreased elena, decreased velocity of limb motion, decreased step length    Balance:   Static Sit: GOOD: Takes MODERATE challenges from all directions  Dynamic Sit: GOOD: Maintains balance through MODERATE excursions of active trunk movement  Static Stand: FAIR: Maintains without assist but unable to take challenges  Dynamic stand: FAIR: Needs CONTACT GUARD during gait    AM-PAC 6 CLICK MOBILITY  How much help from another person does this patient currently need?   1 = Unable, Total/Dependent Assistance  2 = A lot, " "Maximum/Moderate Assistance  3 = A little, Minimum/Contact Guard/Supervision  4 = None, Modified [place]/Independent    Turning over in bed (including adjusting bedclothes, sheets and blankets)?: 3  Sitting down on and standing up from a chair with arms (e.g., wheelchair, bedside commode, etc.): 3  Moving from lying on back to sitting on the side of the bed?: 3  Moving to and from a bed to a chair (including a wheelchair)?: 3  Need to walk in hospital room?: 3  Climbing 3-5 steps with a railing?: 3  Total Score: 18     AM-PAC Raw Score CMS G-Code Modifier Level of Impairment Assistance   6 % Total / Unable   7 - 9 CM 80 - 100% Maximal Assist   10 - 14 CL 60 - 80% Moderate Assist   15 - 19 CK 40 - 60% Moderate Assist   20 - 22 CJ 20 - 40% Minimal Assist   23 CI 1-20% SBA / CGA   24 CH 0% Independent/ Mod I     Patient left up in chair with all lines intact, call button in reach, RN notified, daughter present and chair reclined.    Assessment:   Natali Galeano is a 76 y.o. female with a medical diagnosis of Renal mass and presents with s/p partial open nephrectomy 6/21/17. Per Dr. Coker, active nursing orders placed for ambulation 4 x daily. Pt educated on protocol and agreeable to therapy and ambulation frequency. Per RN, pt received pain medication as well as blood pressure medication prior to initiation of PT eval. Pt became orthostatic during gait with symptoms of dizziness and "fogginess" with blood pressure reporting as above in red. Pt, pt daughter, and RN notified of recommendation for calling for assistance with ambulating each trial, use of RW, and chair follow for safety. Pt, RN, and pt daughter agreeable to plan. Pt would benefit from continued skilled PT to maximize independence and safety with functional mobility.    Rehab identified problem list/impairments: Rehab identified problem list/impairments: weakness, impaired endurance, pain, impaired functional mobilty, impaired cardiopulmonary " response to activity, impaired self care skills, gait instability    Rehab potential is good.    Activity tolerance: Good    Discharge recommendations: Discharge Facility/Level Of Care Needs: home with home health, home health PT (pending progress)     Barriers to discharge: Barriers to Discharge: None    Equipment recommendations: Equipment Needed After Discharge: walker, rolling (pending progress)     GOALS:    Physical Therapy Goals        Problem: Physical Therapy Goal    Goal Priority Disciplines Outcome Goal Variances Interventions   Physical Therapy Goal     PT/OT, PT Ongoing (interventions implemented as appropriate)     Description:  Goals to be met by: 7/15/17    Patient will increase functional independence with mobility by performin. Supine to sit with modified independence.   2. Sit to supine with modified independence.   3. Sit<>stand transfer with modified independence using rolling walker.   4. Sit<>stand transfer with independence without AD  5. Gait x 150 feet with modified independence using rolling walker.   6. Gait x 150 feet with independence without AD                  PLAN:    Patient to be seen 6 x/week to address the above listed problems via gait training, therapeutic activities, therapeutic exercises, neuromuscular re-education  Plan of Care expires: 17  Plan of Care reviewed with: patient, daughter    Diane Naylor, PT  2017

## 2017-06-22 NOTE — HOSPITAL COURSE
Patient stable overnight. Complaints of soreness this morning. No issues overnight.  BP more stable overnight requiring no PRN hydralazine.

## 2017-06-22 NOTE — PLAN OF CARE
Problem: Physical Therapy Goal  Goal: Physical Therapy Goal  Goals to be met by: 7/15/17    Patient will increase functional independence with mobility by performin. Supine to sit with modified independence.   2. Sit to supine with modified independence.   3. Sit<>stand transfer with modified independence using rolling walker.   4. Sit<>stand transfer with independence without AD  5. Gait x 150 feet with modified independence using rolling walker.   6. Gait x 150 feet with independence without AD  Outcome: Ongoing (interventions implemented as appropriate)  PT evaluation completed. Please see progress note for details, POC, and recommendations.

## 2017-06-22 NOTE — PROGRESS NOTES
gu pod1    Pain controlled  Has not go out of bed yet  No flatus but eager to eat breakfast  Af vss  Looks well  Pleasant and conversant  Ab soft  Dressings intact  hct stabel  Cr unchanged    Sp left partial nephrectomy    Ambulate  Dc gaytan  Add toradol  minimzie narcotics  Hep lock IVf when zane pop well  Will ask hosp med to assist with management  Tight control of BP; keep SPB less than 170 to minimize risk of bleeding  Ok to transfer to floor

## 2017-06-22 NOTE — PLAN OF CARE
Problem: Patient Care Overview  Goal: Plan of Care Review  Outcome: Ongoing (interventions implemented as appropriate)  Patient without complaints at this time. IVF infusing per order. Patient ambulated in hallway x1 and in room x1 this shift. Patient free from falls. Bed in lowest position, call light within reach. Will continue to monitor.

## 2017-06-22 NOTE — PLAN OF CARE
Problem: Patient Care Overview  Goal: Plan of Care Review  Outcome: Ongoing (interventions implemented as appropriate)  Patient in no apparent distress. Sat's  98 % on room air . Will continue to monitor.

## 2017-06-22 NOTE — PROGRESS NOTES
[facility] Hospital Medicine  Progress Note    Patient Name: Natali Galeano  MRN: [id number]  Patient Class: IP- Inpatient   Admission Date: 6/21/2017  Length of Stay: 1 days  Attending Physician: Che Tan MD  Primary Care Provider: Ewelina Herzog MD        Subjective:     Principal Problem:Renal mass    HPI:  Patient with a known history of HTN, HLD, and colon cancer post resection was admitted for a planned robotic assisted laparoscopic left partial nephrectomy.   The procedure was converted to an open procedure due to adhesions.      Hospital Course:  Patient stable overnight. Complaints of soreness this morning. No issues overnight.    Interval History: Stable overnight. Soreness to abdomen.    Review of Systems   Constitutional: Negative for activity change, appetite change and fever.   HENT: Negative for congestion, ear pain, rhinorrhea and sinus pressure.    Eyes: Negative for pain and discharge.   Respiratory: Negative for cough, chest tightness, shortness of breath and wheezing.    Cardiovascular: Negative for chest pain and leg swelling.   Gastrointestinal: Positive for abdominal pain (Left abdomen). Negative for abdominal distention, diarrhea, nausea and vomiting.   Endocrine: Negative for cold intolerance and heat intolerance.   Genitourinary: Positive for flank pain. Negative for difficulty urinating, frequency, hematuria and urgency.   Musculoskeletal: Negative for arthralgias, joint swelling and myalgias.   Allergic/Immunologic: Negative for environmental allergies and food allergies.   Neurological: Negative for dizziness, weakness, light-headedness and headaches.   Hematological: Does not bruise/bleed easily.   Psychiatric/Behavioral: Negative for agitation, behavioral problems and decreased concentration.     Objective:     Vital Signs (Most Recent):  Temp: 98 °F (36.7 °C) (06/22/17 0701)  Pulse: 70 (06/22/17 0701)  Resp: 18 (06/22/17 0701)  BP: 139/65 (06/22/17  0701)  SpO2: 99 % (06/22/17 0701) Vital Signs (24h Range):  Temp:  [96.1 °F (35.6 °C)-98 °F (36.7 °C)] 98 °F (36.7 °C)  Pulse:  [56-72] 70  Resp:  [9-21] 18  SpO2:  [92 %-100 %] 99 %  BP: (116-184)/() 139/65     Weight: 130.9 kg (288 lb 9.3 oz)  Body mass index is 45.2 kg/m².    Intake/Output Summary (Last 24 hours) at 06/22/17 0950  Last data filed at 06/22/17 0600   Gross per 24 hour   Intake          3253.75 ml   Output             2800 ml   Net           453.75 ml      Physical Exam   Constitutional: She is oriented to person, place, and time. She appears well-developed and well-nourished.   HENT:   Head: Normocephalic.   Eyes: Conjunctivae are normal. Right eye exhibits no discharge. Left eye exhibits no discharge.   Neck: Normal range of motion. Neck supple.   Cardiovascular: Normal rate, regular rhythm, normal heart sounds and intact distal pulses.    Pulmonary/Chest: Effort normal and breath sounds normal. No respiratory distress.   Abdominal: Soft. She exhibits distension. Bowel sounds are decreased. There is tenderness in the left upper quadrant.   Musculoskeletal: Normal range of motion.   Neurological: She is alert and oriented to person, place, and time.   Skin: Skin is warm and dry.   Psychiatric: She has a normal mood and affect. Her behavior is normal. Thought content normal.       Significant Labs:   BMP:   Recent Labs  Lab 06/22/17  0502   GLU 90   *   K 4.0      CO2 16*   BUN 10   CREATININE 0.8   CALCIUM 8.7     CBC:   Recent Labs  Lab 06/21/17  1323 06/22/17  0502   WBC 9.46 9.68   HGB 12.0 11.8*   HCT 38.6 37.7   * 144*       Significant Imaging: I have reviewed all pertinent imaging results/findings within the past 24 hours.    Assessment/Plan:      HTN (hypertension)    SBP- 150's  Changed hydralazine PRN parameter to >150 for tighter control  Continue all home BP meds          Postoperative pain    Percocet for postoperative pain            Hyperlipidemia    Continue  home meds  Will monitor          Renal cyst              Essential hypertension                VTE Risk Mitigation         Ordered     High Risk of VTE  Once      06/21/17 1744     Place FEDE hose  Until discontinued      06/21/17 1744     Place sequential compression device  Until discontinued      06/21/17 1744     Reason for no Mechanical VTE Prophylaxis  Once      06/21/17 1744          Gibson Howard NP  Department of Hospital Medicine   [facility]

## 2017-06-22 NOTE — PROGRESS NOTES
Patient arrived to unit in stable condition. VSS. No signs of distress. Bed in low position, call light within reach. Will continue to monitor.

## 2017-06-22 NOTE — PLAN OF CARE
Problem: Patient Care Overview  Goal: Plan of Care Review  Outcome: Ongoing (interventions implemented as appropriate)  Patient remains on NC 2 L sat's 99% no distress noted.

## 2017-06-22 NOTE — SUBJECTIVE & OBJECTIVE
Interval History: Stable overnight. Soreness to abdomen.    Review of Systems   Constitutional: Negative for activity change, appetite change and fever.   HENT: Negative for congestion, ear pain, rhinorrhea and sinus pressure.    Eyes: Negative for pain and discharge.   Respiratory: Negative for cough, chest tightness, shortness of breath and wheezing.    Cardiovascular: Negative for chest pain and leg swelling.   Gastrointestinal: Positive for abdominal pain (Left abdomen). Negative for abdominal distention, diarrhea, nausea and vomiting.   Endocrine: Negative for cold intolerance and heat intolerance.   Genitourinary: Positive for flank pain. Negative for difficulty urinating, frequency, hematuria and urgency.   Musculoskeletal: Negative for arthralgias, joint swelling and myalgias.   Allergic/Immunologic: Negative for environmental allergies and food allergies.   Neurological: Negative for dizziness, weakness, light-headedness and headaches.   Hematological: Does not bruise/bleed easily.   Psychiatric/Behavioral: Negative for agitation, behavioral problems and decreased concentration.     Objective:     Vital Signs (Most Recent):  Temp: 98 °F (36.7 °C) (06/22/17 0701)  Pulse: 70 (06/22/17 0701)  Resp: 18 (06/22/17 0701)  BP: 139/65 (06/22/17 0701)  SpO2: 99 % (06/22/17 0701) Vital Signs (24h Range):  Temp:  [96.1 °F (35.6 °C)-98 °F (36.7 °C)] 98 °F (36.7 °C)  Pulse:  [56-72] 70  Resp:  [9-21] 18  SpO2:  [92 %-100 %] 99 %  BP: (116-184)/() 139/65     Weight: 130.9 kg (288 lb 9.3 oz)  Body mass index is 45.2 kg/m².    Intake/Output Summary (Last 24 hours) at 06/22/17 0950  Last data filed at 06/22/17 0600   Gross per 24 hour   Intake          3253.75 ml   Output             2800 ml   Net           453.75 ml      Physical Exam   Constitutional: She is oriented to person, place, and time. She appears well-developed and well-nourished.   HENT:   Head: Normocephalic.   Eyes: Conjunctivae are normal. Right eye  exhibits no discharge. Left eye exhibits no discharge.   Neck: Normal range of motion. Neck supple.   Cardiovascular: Normal rate, regular rhythm, normal heart sounds and intact distal pulses.    Pulmonary/Chest: Effort normal and breath sounds normal. No respiratory distress.   Abdominal: Soft. She exhibits distension. Bowel sounds are decreased. There is tenderness in the left upper quadrant.   Musculoskeletal: Normal range of motion.   Neurological: She is alert and oriented to person, place, and time.   Skin: Skin is warm and dry.   Psychiatric: She has a normal mood and affect. Her behavior is normal. Thought content normal.       Significant Labs:   BMP:   Recent Labs  Lab 06/22/17  0502   GLU 90   *   K 4.0      CO2 16*   BUN 10   CREATININE 0.8   CALCIUM 8.7     CBC:   Recent Labs  Lab 06/21/17  1323 06/22/17  0502   WBC 9.46 9.68   HGB 12.0 11.8*   HCT 38.6 37.7   * 144*       Significant Imaging: I have reviewed all pertinent imaging results/findings within the past 24 hours.

## 2017-06-23 VITALS
SYSTOLIC BLOOD PRESSURE: 137 MMHG | HEART RATE: 69 BPM | TEMPERATURE: 98 F | OXYGEN SATURATION: 95 % | DIASTOLIC BLOOD PRESSURE: 88 MMHG | BODY MASS INDEX: 45.29 KG/M2 | RESPIRATION RATE: 18 BRPM | WEIGHT: 288.56 LBS | HEIGHT: 67 IN

## 2017-06-23 PROBLEM — N28.89 RENAL MASS: Status: RESOLVED | Noted: 2017-06-22 | Resolved: 2017-06-23

## 2017-06-23 PROBLEM — N28.1 COMPLEX RENAL CYST: Status: RESOLVED | Noted: 2017-06-22 | Resolved: 2017-06-23

## 2017-06-23 PROCEDURE — 25000003 PHARM REV CODE 250: Performed by: UROLOGY

## 2017-06-23 PROCEDURE — 97530 THERAPEUTIC ACTIVITIES: CPT

## 2017-06-23 PROCEDURE — 99233 SBSQ HOSP IP/OBS HIGH 50: CPT | Mod: ,,, | Performed by: NURSE PRACTITIONER

## 2017-06-23 PROCEDURE — 97165 OT EVAL LOW COMPLEX 30 MIN: CPT

## 2017-06-23 PROCEDURE — 63600175 PHARM REV CODE 636 W HCPCS: Performed by: UROLOGY

## 2017-06-23 PROCEDURE — 94761 N-INVAS EAR/PLS OXIMETRY MLT: CPT

## 2017-06-23 RX ORDER — DOCUSATE SODIUM 100 MG/1
100 CAPSULE, LIQUID FILLED ORAL 2 TIMES DAILY
Refills: 0 | Status: ON HOLD | COMMUNITY
Start: 2017-06-23 | End: 2019-02-27 | Stop reason: HOSPADM

## 2017-06-23 RX ORDER — OXYCODONE AND ACETAMINOPHEN 5; 325 MG/1; MG/1
1 TABLET ORAL EVERY 4 HOURS PRN
Qty: 30 TABLET | Refills: 0 | Status: SHIPPED | OUTPATIENT
Start: 2017-06-23 | End: 2017-07-11

## 2017-06-23 RX ADMIN — DOCUSATE SODIUM 100 MG: 100 CAPSULE, LIQUID FILLED ORAL at 09:06

## 2017-06-23 RX ADMIN — ATENOLOL 100 MG: 25 TABLET ORAL at 09:06

## 2017-06-23 RX ADMIN — ALLOPURINOL 300 MG: 300 TABLET ORAL at 09:06

## 2017-06-23 RX ADMIN — AMLODIPINE BESYLATE 2.5 MG: 2.5 TABLET ORAL at 09:06

## 2017-06-23 RX ADMIN — KETOROLAC TROMETHAMINE 15 MG: 30 INJECTION, SOLUTION INTRAMUSCULAR at 01:06

## 2017-06-23 RX ADMIN — KETOROLAC TROMETHAMINE 15 MG: 30 INJECTION, SOLUTION INTRAMUSCULAR at 05:06

## 2017-06-23 RX ADMIN — ATORVASTATIN CALCIUM 10 MG: 10 TABLET, FILM COATED ORAL at 09:06

## 2017-06-23 RX ADMIN — BENAZEPRIL HYDROCHLORIDE 40 MG: 10 TABLET, FILM COATED ORAL at 09:06

## 2017-06-23 NOTE — PLAN OF CARE
Problem: Patient Care Overview  Goal: Plan of Care Review  No change in patient resp. Care. Will continue to monitor

## 2017-06-23 NOTE — PT/OT/SLP EVAL
Occupational Therapy  Evaluation/Discharge    Natali Galeano   MRN: [id number]   Admitting Diagnosis: Renal mass    OT Date of Treatment: 17   OT Start Time: 1316  OT Stop Time: 1335  OT Total Time (min): 19 min    Billable Minutes:  Evaluation 19    Diagnosis: Renal mass     Past Medical History:   Diagnosis Date    Colon cancer     Gout, chronic     Heart murmur     High cholesterol     Hypercholesteremia     Hypertension     Thyroid disease       Past Surgical History:   Procedure Laterality Date    BTL       SECTION, CLASSIC      COLON SURGERY      goiter       HERNIA REPAIR         Referring physician: NATHALIE Tan  Date referred to OT: 17    General Precautions: Standard,    Orthopedic Precautions: N/A  Braces: N/A    Do you have any cultural, spiritual, Restoration conflicts, given your current situation?: None     Patient History:  Living Environment  Lives With: alone  Living Arrangements: apartment (1st floor apartment)  Home Accessibility:  (tub shower, no steps at entry to house)  Home Layout: Able to live on 1st floor  Transportation Available: family or friend will provide  Living Environment Comment: The patient's daughter is available to assist with patient care at discharge.  Equipment Currently Used at Home: none    Prior level of function:   Bed Mobility/Transfers: independent  Grooming: independent  Bathing: independent  Upper Body Dressing: independent  Lower Body Dressing: independent  Toileting: independent  Home Management Skills: independent  Driving License: Yes  Mode of Transportation: Car  Leisure and Hobbies: socializing with friends  IADL Comments: ambulatory without AD, Independent ADLs and IADLs     Dominant hand: left    Subjective:  The patient was found seated in the chair, agreeable to the OT evaluation.    Chief Complaint: None  Patient/Family stated goals: None    Pain/Comfort  Pain Rating 1: 0/10  Pain Rating Post-Intervention 1:  0/10    Objective:  Patient found with: peripheral IV    Cognitive Exam:  Oriented to: Person, Place, Time and Situation  Follows Commands/attention: Follows multistep  commands  Communication: clear/fluent  Memory:  No Deficits noted  Safety awareness/insight to disability: intact  Coping skills/emotional control: Appropriate for the situation    Visual/perceptual:  Impaired  acuity and glasses    Physical Exam:  Postural examination/scapula alignment: No postural abnormalities identified  Skin integrity: Visible skin intact in UE  Edema: NOne    Sensation:   Intact for light touch both hands    Upper Extremity Range of Motion:  Right Upper Extremity: WFL  Left Upper Extremity: WFL    Upper Extremity Strength:  Right Upper Extremity: WNL  Left Upper Extremity: WNL   Strength: WNL both hands    Fine motor coordination:   Intact for light touch both hands    Gross motor coordination: WFL    Functional Mobility:  Bed Mobility:  Scooting/Bridging: Independent  Supine to Sit: Independent  Sit to Supine: Independent    Transfers:   Independent sit><stand without AD  Independent toilet transfer without AD  Independent chair transfer without AD    Functional Ambulation: ambulated chair>bathroom>sink>chair  Independent without AD.  There was no LOB or performance difficulty with self-care eu    Activities of Daily Living:     UE Dressing Level of Assistance: Independent (doff/don shopital gown standing at chair)  LE Dressing Level of Assistance: Independent (doff/don socks seated at chair)  Grooming Position: Standing at sink  Grooming Level of Assistance: Independent (wash hands and brush teeth)  Toileting Where Assessed: Toilet  Toileting Level of Assistance: Independent (pt noted blood with self-cleaning for sonya-care (her nurse was notified))    Balance:   Static Sit: NORMAL: No deviations seen in posture held statically  Dynamic Sit: NORMAL: No deviations seen in posture held dynamically  Static Stand: NORMAL: No  "deviations seen in posture held statically  Dynamic stand: NORMAL: No deviations seen in posture held dynamically    Therapeutic Activities and Exercises:  Home recommendations for improving endurance with home activity    AM-PAC 6 CLICK ADL  How much help from another person does this patient currently need?  1 = Unable, Total/Dependent Assistance  2 = A lot, Maximum/Moderate Assistance  3 = A little, Minimum/Contact Guard/Supervision  4 = None, Modified [place]/Independent    Putting on and taking off regular lower body clothing? : 4  Bathing (including washing, rinsing, drying)?: 4  Toileting, which includes using toilet, bedpan, or urinal? : 4  Putting on and taking off regular upper body clothing?: 4  Taking care of personal grooming such as brushing teeth?: 4  Eating meals?: 4  Total Score: 24    AM-PAC Raw Score CMS "G-Code Modifier Level of Impairment Assistance   6 % Total / Unable   7 - 9 CM 80 - 100% Maximal Assist   10-14 CL 60 - 80% Moderate Assist   15 - 19 CK 40 - 60% Moderate Assist   20 - 22 CJ 20 - 40% Minimal Assist   23 CI 1-20% SBA / CGA   24 CH 0% Independent/ Mod I       Patient left up in chair with all lines intact, call button in reach and nurse notified    Assessment:  Natali Galeano is a 76 y.o. female with a medical diagnosis of Renal mass and presents with the patient functioning WFL for mobility and basic self-care.  She was tired at the end of the assessment with recommendations for acing activity for fatigue discussed.  D/C OT: no skilled OT treatment needs identified..    Rehab identified problem list/impairments: Rehab identified problem list/impairments: impaired endurance    Rehab potential is N/A    Activity tolerance: Good    Discharge recommendations: Discharge Facility/Level Of Care Needs: home     Barriers to discharge: Barriers to Discharge: None    Equipment recommendations: none     GOALS:    Occupational Therapy Goals     Not on file          Multidisciplinary " Problems (Resolved)        Problem: Occupational Therapy Goal    Goal Priority Disciplines Outcome Interventions   Occupational Therapy Goal   (Resolved)     OT, PT/OT Outcome(s) achieved    Description:  No goals                    PLAN:  No OT treatment scheduled.    Plan of Care reviewed with: patient         Estrellita ISABELLA Henley  06/23/2017

## 2017-06-23 NOTE — PT/OT/SLP PROGRESS
Physical Therapy  Treatment    Natali Galeano   MRN: [id number]   Admitting Diagnosis: Renal mass    PT Received On: 06/23/17  PT Start Time: 1342     PT Stop Time: 1352    PT Total Time (min): 10 min       Billable Minutes:  Therapeutic Activity 10    Treatment Type: Treatment  PT/PTA: PT           General Precautions: Standard, fall  Orthopedic Precautions: N/A     Do you have any cultural, spiritual, Tenriism conflicts, given your current situation?: None specified    Subjective:  Communicated with RN prior to session.    Pain/Comfort  Pain Rating 1: 0/10  Pain Rating Post-Intervention 1: 0/10    Objective:   Patient found with: peripheral IV     BP: seated: 117/59    Functional Mobility:  Transfers:  Sit <> Stand Assistance: Stand By Assistance  Sit <> Stand Assistive Device: No Assistive Device    Gait:   Gait Distance: x 200'  Assistance 1: Stand by Assistance  Gait Assistive Device: No device  Gait Pattern: reciprocal  Gait Deviation(s): decreased elena    Balance:   Static Sit: NORMAL: No deviations seen in posture held statically  Dynamic Sit: NORMAL: No deviations seen in posture held dynamically  Static Stand: GOOD: Takes MODERATE challenges from all directions  Dynamic stand: GOOD: Needs SUPERVISION only during gait and able to self right with moderate      AM-PAC 6 CLICK MOBILITY  How much help from another person does this patient currently need?   1 = Unable, Total/Dependent Assistance  2 = A lot, Maximum/Moderate Assistance  3 = A little, Minimum/Contact Guard/Supervision  4 = None, Modified [place]/Independent    Turning over in bed (including adjusting bedclothes, sheets and blankets)?: 3  Sitting down on and standing up from a chair with arms (e.g., wheelchair, bedside commode, etc.): 3  Moving from lying on back to sitting on the side of the bed?: 3  Moving to and from a bed to a chair (including a wheelchair)?: 3  Need to walk in hospital room?: 3  Climbing 3-5 steps with a railing?:  3  Total Score: 18    AM-PAC Raw Score CMS G-Code Modifier Level of Impairment Assistance   6 % Total / Unable   7 - 9 CM 80 - 100% Maximal Assist   10 - 14 CL 60 - 80% Moderate Assist   15 - 19 CK 40 - 60% Moderate Assist   20 - 22 CJ 20 - 40% Minimal Assist   23 CI 1-20% SBA / CGA   24 CH 0% Independent/ Mod I     Patient left up in chair with all lines intact, call button in reach, RN notified and RN present.    Assessment:  Pt tolerated treatment session well and denies symptoms of dizziness or lightheadedness throughout session. Pt educated to perform ambulation 4 x daily upon discharge as well. Pt would benefit from continued skilled PT to maximize independence and safety with functional mobility.    Rehab identified problem list/impairments: Rehab identified problem list/impairments: impaired endurance    Rehab potential is good.    Activity tolerance: Good    Discharge recommendations: Discharge Facility/Level Of Care Needs: home     Barriers to discharge: Barriers to Discharge: None    Equipment recommendations: Equipment Needed After Discharge: none     GOALS:    Physical Therapy Goals        Problem: Physical Therapy Goal    Goal Priority Disciplines Outcome Goal Variances Interventions   Physical Therapy Goal     PT/OT, PT Ongoing (interventions implemented as appropriate)     Description:  Goals to be met by: 7/15/17    Patient will increase functional independence with mobility by performin. Supine to sit with modified independence.   2. Sit to supine with modified independence.   3. Sit<>stand transfer with modified independence using rolling walker.   4. Sit<>stand transfer with independence without AD  5. Gait x 150 feet with modified independence using rolling walker.   6. Gait x 150 feet with independence without AD                  PLAN:    Patient to be seen 6 x/week  to address the above listed problems via gait training, therapeutic activities, therapeutic exercises,  neuromuscular re-education  Plan of Care expires: 07/22/17  Plan of Care reviewed with: patient    Diane M Dayday, PT  06/23/2017

## 2017-06-23 NOTE — PROGRESS NOTES
gu pod2    Feels great  zane reg diet  +BM    Af vss  Looks well  Eating breakfast  Full exam not performed    Hep lock ivf  Ambulate  If pt continues to do well. Dc home this afternoon

## 2017-06-23 NOTE — PROGRESS NOTES
[facility] Hospital Medicine  Progress Note    Patient Name: Natali Galeano  MRN: [id number]  Patient Class: IP- Inpatient   Admission Date: 6/21/2017  Length of Stay: 2 days  Attending Physician: Che Tan MD  Primary Care Provider: Ewelina Herzog MD        Subjective:     Principal Problem:Renal mass    HPI:  Patient with a known history of HTN, HLD, and colon cancer post resection was admitted for a planned robotic assisted laparoscopic left partial nephrectomy.   The procedure was converted to an open procedure due to adhesions.      Hospital Course:  Patient stable overnight. Complaints of soreness this morning. No issues overnight.  BP more stable overnight requiring no PRN hydralazine.     Interval History: BP stable overnight. Requiring no PRN BP support    Review of Systems   Constitutional: Negative for activity change, appetite change and fever.   HENT: Negative for congestion, ear pain, rhinorrhea and sinus pressure.    Eyes: Negative for pain and discharge.   Respiratory: Negative for cough, chest tightness, shortness of breath and wheezing.    Cardiovascular: Negative for chest pain and leg swelling.   Gastrointestinal: Positive for abdominal pain (Left abdomen incisional pain with movement). Negative for abdominal distention, diarrhea, nausea and vomiting.   Endocrine: Negative for cold intolerance and heat intolerance.   Genitourinary: Negative for difficulty urinating, flank pain, frequency, hematuria and urgency.   Musculoskeletal: Negative for arthralgias, joint swelling and myalgias.   Allergic/Immunologic: Negative for environmental allergies and food allergies.   Neurological: Negative for dizziness, weakness, light-headedness and headaches.   Hematological: Does not bruise/bleed easily.   Psychiatric/Behavioral: Negative for agitation, behavioral problems and decreased concentration.     Objective:     Vital Signs (Most Recent):  Temp: 97.8 °F (36.6 °C)  (06/23/17 0400)  Pulse: 71 (06/23/17 0758)  Resp: 18 (06/23/17 0758)  BP: 135/60 (06/23/17 0400)  SpO2: 95 % (06/23/17 0758) Vital Signs (24h Range):  Temp:  [97.6 °F (36.4 °C)-98.7 °F (37.1 °C)] 97.8 °F (36.6 °C)  Pulse:  [55-72] 71  Resp:  [17-25] 18  SpO2:  [94 %-100 %] 95 %  BP: (111-182)/(56-77) 135/60     Weight: 130.9 kg (288 lb 9.3 oz)  Body mass index is 45.2 kg/m².    Intake/Output Summary (Last 24 hours) at 06/23/17 0818  Last data filed at 06/23/17 0600   Gross per 24 hour   Intake             2540 ml   Output              850 ml   Net             1690 ml      Physical Exam   Constitutional: She is oriented to person, place, and time. She appears well-developed and well-nourished.   HENT:   Head: Normocephalic.   Eyes: Conjunctivae are normal. Right eye exhibits no discharge. Left eye exhibits no discharge.   Neck: Normal range of motion. Neck supple.   Cardiovascular: Normal rate, regular rhythm, normal heart sounds and intact distal pulses.    Pulmonary/Chest: Effort normal and breath sounds normal. No respiratory distress.   Abdominal: Soft. She exhibits no distension. Bowel sounds are decreased. There is no tenderness.   Musculoskeletal: Normal range of motion.   Neurological: She is alert and oriented to person, place, and time.   Skin: Skin is warm and dry.   Psychiatric: She has a normal mood and affect. Her behavior is normal. Thought content normal.       Significant Labs:   BMP:   Recent Labs  Lab 06/22/17  0502   GLU 90   *   K 4.0      CO2 16*   BUN 10   CREATININE 0.8   CALCIUM 8.7     CBC:   Recent Labs  Lab 06/21/17  1323 06/22/17  0502   WBC 9.46 9.68   HGB 12.0 11.8*   HCT 38.6 37.7   * 144*       Significant Imaging: I have reviewed all pertinent imaging results/findings within the past 24 hours.    Assessment/Plan:      HTN (hypertension)    SBP- 150's  Changed hydralazine PRN parameter to >150 for tighter control  Continue all home BP meds          Postoperative  pain    Toradol/Percocet for postoperative pain            Hyperlipidemia    Continue home meds  Will monitor          Renal cyst              Essential hypertension              Morbid obesity due to excess calories    Discussed post discharge diet            VTE Risk Mitigation         Ordered     High Risk of VTE  Once      06/21/17 1744     Place FEDE hose  Until discontinued      06/21/17 1744     Place sequential compression device  Until discontinued      06/21/17 1744     Reason for no Mechanical VTE Prophylaxis  Once      06/21/17 1744          Gibson Howard NP  Department of Hospital Medicine   [facility]

## 2017-06-23 NOTE — SUBJECTIVE & OBJECTIVE
Interval History: BP stable overnight. Requiring no PRN BP support    Review of Systems   Constitutional: Negative for activity change, appetite change and fever.   HENT: Negative for congestion, ear pain, rhinorrhea and sinus pressure.    Eyes: Negative for pain and discharge.   Respiratory: Negative for cough, chest tightness, shortness of breath and wheezing.    Cardiovascular: Negative for chest pain and leg swelling.   Gastrointestinal: Positive for abdominal pain (Left abdomen incisional pain with movement). Negative for abdominal distention, diarrhea, nausea and vomiting.   Endocrine: Negative for cold intolerance and heat intolerance.   Genitourinary: Negative for difficulty urinating, flank pain, frequency, hematuria and urgency.   Musculoskeletal: Negative for arthralgias, joint swelling and myalgias.   Allergic/Immunologic: Negative for environmental allergies and food allergies.   Neurological: Negative for dizziness, weakness, light-headedness and headaches.   Hematological: Does not bruise/bleed easily.   Psychiatric/Behavioral: Negative for agitation, behavioral problems and decreased concentration.     Objective:     Vital Signs (Most Recent):  Temp: 97.8 °F (36.6 °C) (06/23/17 0400)  Pulse: 71 (06/23/17 0758)  Resp: 18 (06/23/17 0758)  BP: 135/60 (06/23/17 0400)  SpO2: 95 % (06/23/17 0758) Vital Signs (24h Range):  Temp:  [97.6 °F (36.4 °C)-98.7 °F (37.1 °C)] 97.8 °F (36.6 °C)  Pulse:  [55-72] 71  Resp:  [17-25] 18  SpO2:  [94 %-100 %] 95 %  BP: (111-182)/(56-77) 135/60     Weight: 130.9 kg (288 lb 9.3 oz)  Body mass index is 45.2 kg/m².    Intake/Output Summary (Last 24 hours) at 06/23/17 0818  Last data filed at 06/23/17 0600   Gross per 24 hour   Intake             2540 ml   Output              850 ml   Net             1690 ml      Physical Exam   Constitutional: She is oriented to person, place, and time. She appears well-developed and well-nourished.   HENT:   Head: Normocephalic.   Eyes:  Conjunctivae are normal. Right eye exhibits no discharge. Left eye exhibits no discharge.   Neck: Normal range of motion. Neck supple.   Cardiovascular: Normal rate, regular rhythm, normal heart sounds and intact distal pulses.    Pulmonary/Chest: Effort normal and breath sounds normal. No respiratory distress.   Abdominal: Soft. She exhibits no distension. Bowel sounds are decreased. There is no tenderness.   Musculoskeletal: Normal range of motion.   Neurological: She is alert and oriented to person, place, and time.   Skin: Skin is warm and dry.   Psychiatric: She has a normal mood and affect. Her behavior is normal. Thought content normal.       Significant Labs:   BMP:   Recent Labs  Lab 06/22/17  0502   GLU 90   *   K 4.0      CO2 16*   BUN 10   CREATININE 0.8   CALCIUM 8.7     CBC:   Recent Labs  Lab 06/21/17  1323 06/22/17  0502   WBC 9.46 9.68   HGB 12.0 11.8*   HCT 38.6 37.7   * 144*       Significant Imaging: I have reviewed all pertinent imaging results/findings within the past 24 hours.

## 2017-06-23 NOTE — PLAN OF CARE
Problem: Physical Therapy Goal  Goal: Physical Therapy Goal  Goals to be met by: 7/15/17    Patient will increase functional independence with mobility by performin. Supine to sit with modified independence.   2. Sit to supine with modified independence.   3. Sit<>stand transfer with modified independence using rolling walker.   4. Sit<>stand transfer with independence without AD  5. Gait x 150 feet with modified independence using rolling walker.   6. Gait x 150 feet with independence without AD   Outcome: Ongoing (interventions implemented as appropriate)  Pt tolerated treatment session well. Adequate for discharge home without DME or therapy needs once MD deems appropriate. Please see progress note for details, POC, and recommendations.

## 2017-06-23 NOTE — PLAN OF CARE
Problem: Occupational Therapy Goal  Goal: Occupational Therapy Goal  No goals  Outcome: Outcome(s) achieved Date Met: 06/23/17  OT evaluation completed with no skilled OT treatment needs identified.  D/C OT.  There are no discharge recommendations of DME needs. At present.  ISABELLA Cronin 6/23/2017

## 2017-06-23 NOTE — DISCHARGE SUMMARY
[facility]  Urology  Discharge Summary      Patient Name: Natali Galeano  MRN: [id number]  Admission Date: 6/21/2017  Hospital Length of Stay: 2 days  Discharge Date and Time:  06/23/2017 5:44 PM  Attending Physician: Che Tan MD   Discharging Provider: Enrique Moya MD  Primary Care Physician: Ewelina Herzog MD    HPI:    Natali Galeano is a 76 y.o. female who is an established patient who was self-referred for evaluation of kidney cyst.       She was admitted to hospital in 1/17 with SBO. At that time a CT scan with contrast only and TOI showed atypical L renal cysts - 3.3cm LP and 1.9cm. TOI states that cysts cannot be classified as simple and may have solid component.      Denies hematuria, UTIs, kidney issues. Denies  malignancy. She has had previous colon surgery (2002 for colon ca) and ventral hernia repair (2004 in epigastric area).      Cr 0.8 (1/17)     CT urogram was done that showed Bosniak III cysts in L UP and Bosniak 2F cyst in L MP.     She is s/p RAL converted to open L partial nephrectomy on 6/21/17.     Procedure(s) (LRB):  ROBOTIC ASSISTED LAPAROSCOPIC NEPHRECTOMY PARTIAL, possible open partial nephrectomy. (Left)  NEPHRECTOMY-PARTIAL (Left)     Indwelling Lines/Drains at time of discharge:   Lines/Drains/Airways          No matching active lines, drains, or airways          Hospital Course (synopsis of major diagnoses, care, treatment, and services provided during the course of the hospital stay):   pod2  zane reg diet  +BM and flatus  Pain well controlled  Af vss  Ab benign  Wounds clean, dry , intact  Eager to go home    Consults:   Consults         Status Ordering Provider     Inpatient consult to Anesthesiology-[facility]  Once     Provider:  (Not yet assigned)    Acknowledged ENRIQUE MOYA     Inpatient consult to Hospitalist  Once     Provider:  (Not yet assigned)    Acknowledged ENRIQUE MOYA          Significant Diagnostic Studies: hct and cr  stable    Pending Diagnostic Studies:     None          Final Active Diagnoses:    Diagnosis Date Noted POA    Postoperative pain [G89.18] 06/21/2017 No    Hyperlipidemia [E78.5] 06/21/2017 Yes    HTN (hypertension) [I10] 06/23/2015 Yes    Morbid obesity due to excess calories [E66.01] 06/23/2015 Yes      Problems Resolved During this Admission:    Diagnosis Date Noted Date Resolved POA    PRINCIPAL PROBLEM:  Renal mass [N28.89] 06/21/2017 06/22/2017 Yes    Complex renal cyst [N28.1] 06/22/2017 06/23/2017 Not Applicable    Renal mass [N28.89] 06/22/2017 06/23/2017 Yes         Discharged Condition: good    Disposition:     Follow Up:  Follow-up Information     Ewelina Herzog MD.    Specialty:  Internal Medicine  Contact information:  [facility] [facility] [address] [phone]             Enrique Coker MD In 2 weeks.    Specialty:  Urology  Contact information:  [address] [place] [address] [phone]                 Patient Instructions:   No discharge procedures on file.  Medications:  Reconciled Home Medications:   Current Discharge Medication List      START taking these medications    Details   docusate sodium (COLACE) 100 MG capsule Take 1 capsule (100 mg total) by mouth 2 (two) times daily.  Refills: 0      oxycodone-acetaminophen (PERCOCET) 5-325 mg per tablet Take 1 tablet by mouth every 4 (four) hours as needed.  Qty: 30 tablet, Refills: 0         CONTINUE these medications which have NOT CHANGED    Details   allopurinol (ZYLOPRIM) 300 MG tablet Take 300 mg by mouth once daily.      amlodipine (NORVASC) 2.5 MG tablet Take 2.5 mg by mouth once daily.      aspirin (ECOTRIN) 81 MG EC tablet Take 81 mg by mouth once daily.      atenolol (TENORMIN) 100 MG tablet Take 100 mg by mouth once daily.      atorvastatin (LIPITOR) 10 MG tablet Take 10 mg by mouth once daily.      benazepril (LOTENSIN) 40 MG tablet Take 40 mg by mouth once daily.      cetirizine (ZYRTEC) 10  MG tablet Take 10 mg by mouth daily as needed for Allergies.      cholecalciferol, vitamin D3, (THERA-D) 2,000 unit Tab Take 1 tablet by mouth once daily.      fluticasone (FLONASE) 50 mcg/actuation nasal spray 1 spray by Each Nare route once daily.             Time spent on the discharge of patient: 45 minutes    Enrique Coker MD  Urology  [facility]

## 2017-06-23 NOTE — PLAN OF CARE
Problem: Fall Risk (Adult)  Goal: Identify Related Risk Factors and Signs and Symptoms  Related risk factors and signs and symptoms are identified upon initiation of Human Response Clinical Practice Guideline (CPG)   Outcome: Ongoing (interventions implemented as appropriate)  Pt ambulates with walker,calls for assistance.

## 2017-06-24 LAB
BLD PROD TYP BPU: NORMAL
BLD PROD TYP BPU: NORMAL
BLOOD UNIT EXPIRATION DATE: NORMAL
BLOOD UNIT EXPIRATION DATE: NORMAL
BLOOD UNIT TYPE CODE: 5100
BLOOD UNIT TYPE CODE: 5100
BLOOD UNIT TYPE: NORMAL
BLOOD UNIT TYPE: NORMAL
CODING SYSTEM: NORMAL
CODING SYSTEM: NORMAL
DISPENSE STATUS: NORMAL
DISPENSE STATUS: NORMAL
TRANS ERYTHROCYTES VOL PATIENT: NORMAL ML
TRANS ERYTHROCYTES VOL PATIENT: NORMAL ML

## 2017-06-24 NOTE — PROGRESS NOTES
Physical Therapy Discharge Summary    Natali Galeano  MRN: [id number]   Renal mass   Patient Discharged from acute Physical Therapy on 17.  Please refer to prior PT noted date on 17 for functional status.     Assessment:   Patient has not met goals.  GOALS:    Physical Therapy Goals        Problem: Physical Therapy Goal    Goal Priority Disciplines Outcome Goal Variances Interventions   Physical Therapy Goal     PT/OT, PT Ongoing (interventions implemented as appropriate)     Description:  Goals to be met by: 7/15/17    Patient will increase functional independence with mobility by performin. Supine to sit with modified independence.   2. Sit to supine with modified independence.   3. Sit<>stand transfer with modified independence using rolling walker.   4. Sit<>stand transfer with independence without AD  5. Gait x 150 feet with modified independence using rolling walker.   6. Gait x 150 feet with independence without AD                  Reasons for Discontinuation of Therapy Services  Transfer to alternate level of care.      Plan:  Patient Discharged to: Home no PT services needed.

## 2017-06-26 ENCOUNTER — PATIENT OUTREACH (OUTPATIENT)
Dept: ADMINISTRATIVE | Facility: CLINIC | Age: 77
End: 2017-06-26

## 2017-06-26 NOTE — PATIENT INSTRUCTIONS
Discharge Instructions for Nephrectomy  You had a procedure to remove a kidney. This is called a nephrectomy. This procedure was done because one of your kidneys was not working properly or because there was a tumor. You can live a normal, healthy life with one kidney.  Home care  · Shower as necessary.  · Dont swim or use a bathtub or hot tub until after your follow-up appointment.  · Keep your incision clean and dry. Wash your incision gently with mild soap and warm water and pat it dry.  · Eat a healthy, well-balanced diet.  · Drink 6 to 8 glasses of water a day unless your doctor tells you to limit your fluids.  · Avoid constipation.  ¨ Use laxatives, stool softeners, or enemas as directed by your doctor.  ¨ Eat more high-fiber foods.  · Dont drive until you are off your pain medication and pain-free. This may take 2 to 4 weeks.  · Dont worry if you feel more tired than usual. Fatigue and weakness are common for a few weeks after this surgery.  · Limit your activity to short walks. Gradually increase your pace and distance as you feel able.  · Avoid strenuous activities, such as mowing the lawn, vacuuming, or playing sports.  · Dont lift anything heavier than 10 pounds for 4 weeks.  · Listen to your body. If an activity causes pain, stop.  · If you were asked to stop any medicines before the surgery, be sure to ask the doctor when you may restart taking them. This is especially important in the case of blood thinners (anticoagulants or antiplatelet agents).  Follow-up care  Make a follow-up appointment as directed by our staff.  When to seek medical care  Call your healthcare provider right away if you have any of the following:  · Fever of 100.4°F (38°C) or higher, or as directed by your healthcare provider  · Redness, swelling, warmth, or pain at your incision site  · Drainage or pus from your incision  · Nausea or vomiting  · Increased pain  · Noticeable decrease in urine output  · Blood in your urine    Date Last Reviewed: 1/6/2016  © 4078-1414 [employer]. [address] [place] [address], [place] [address]. All rights reserved. This information is not intended as a substitute for professional medical care. Always follow your healthcare professional's instructions.

## 2017-06-27 ENCOUNTER — TELEPHONE (OUTPATIENT)
Dept: UROLOGY | Facility: HOSPITAL | Age: 77
End: 2017-06-27

## 2017-06-28 ENCOUNTER — TELEPHONE (OUTPATIENT)
Dept: UROLOGY | Facility: CLINIC | Age: 77
End: 2017-06-28

## 2017-06-28 NOTE — TELEPHONE ENCOUNTER
Spoke with patient, She states she has an area towards bottom of stomach that looks like may 2 staples have fallen out.  She noticed some blood on her gown and it seems to bleed when she stand up or sits down.  No fever nausea, vomiting, or puss looking discharge.  Patient advised per Dr Tan to apply gauze and small amount of pressure to area when bleeding.  If site gets larger or continues to bleed patient will call office

## 2017-06-28 NOTE — TELEPHONE ENCOUNTER
----- Message from Jim Morgan sent at 6/28/2017  1:13 PM CDT -----  Contact: Pt   Pt would like a call back from nurse    Pt states her procedure site started bleeding and she would like to consult ASAP    Pt can be reached at [phone]

## 2017-06-30 ENCOUNTER — TELEPHONE (OUTPATIENT)
Dept: UROLOGY | Facility: CLINIC | Age: 77
End: 2017-06-30

## 2017-07-11 ENCOUNTER — OFFICE VISIT (OUTPATIENT)
Dept: UROLOGY | Facility: CLINIC | Age: 77
End: 2017-07-11
Attending: UROLOGY
Payer: MEDICARE

## 2017-07-11 VITALS
WEIGHT: 288 LBS | HEIGHT: 67 IN | DIASTOLIC BLOOD PRESSURE: 76 MMHG | BODY MASS INDEX: 45.2 KG/M2 | SYSTOLIC BLOOD PRESSURE: 144 MMHG

## 2017-07-11 DIAGNOSIS — G89.29 CHRONIC LEFT SHOULDER PAIN: ICD-10-CM

## 2017-07-11 DIAGNOSIS — N28.1 COMPLEX RENAL CYST: Primary | ICD-10-CM

## 2017-07-11 DIAGNOSIS — M25.512 CHRONIC LEFT SHOULDER PAIN: ICD-10-CM

## 2017-07-11 LAB
BILIRUB SERPL-MCNC: NORMAL MG/DL
BLOOD URINE, POC: NORMAL
COLOR, POC UA: YELLOW
GLUCOSE UR QL STRIP: NORMAL
KETONES UR QL STRIP: NORMAL
LEUKOCYTE ESTERASE URINE, POC: NORMAL
NITRITE, POC UA: NORMAL
PH, POC UA: 7
PROTEIN, POC: NORMAL
SPECIFIC GRAVITY, POC UA: 1
UROBILINOGEN, POC UA: NORMAL

## 2017-07-11 PROCEDURE — 99999 PR PBB SHADOW E&M-EST. PATIENT-LVL III: CPT | Mod: PBBFAC,,, | Performed by: UROLOGY

## 2017-07-11 PROCEDURE — 99024 POSTOP FOLLOW-UP VISIT: CPT | Mod: S$GLB,,, | Performed by: UROLOGY

## 2017-07-11 PROCEDURE — 81002 URINALYSIS NONAUTO W/O SCOPE: CPT | Mod: S$GLB,,, | Performed by: UROLOGY

## 2017-07-11 RX ORDER — CELECOXIB 200 MG/1
CAPSULE ORAL
Qty: 180 CAPSULE | Refills: 0 | Status: SHIPPED | OUTPATIENT
Start: 2017-07-11 | End: 2017-09-22

## 2017-07-11 RX ORDER — CELECOXIB 200 MG/1
200 CAPSULE ORAL 2 TIMES DAILY
Qty: 28 CAPSULE | Refills: 0 | Status: SHIPPED | OUTPATIENT
Start: 2017-07-11 | End: 2017-07-11 | Stop reason: SDUPTHER

## 2017-07-11 NOTE — PROGRESS NOTES
"Subjective:      Natali Galeano is a 76 y.o. female who returns today regarding her     2 weeks status post left partial nephrectomy      She has some left shoulder pain which was present before her surgery but this is worsened since then.  It hurts when she raises her elbow above the level of her shoulder.  However she has no numbness or tingling or weakness in the hand or forearm    She is eating well.  No nausea or vomiting.  Minimal incisional discomfort.  Overall doing quite well.  No hematuria.    The following portions of the patient's history were reviewed and updated as appropriate: allergies, current medications, past family history, past medical history, past social history, past surgical history and problem list.    Review of Systems  Pertinent items are noted in HPI.  A comprehensive multipoint review of systems was negative except as otherwise stated in the HPI.     Objective:   Vitals: BP (!) 144/76 (BP Location: Left arm, Patient Position: Sitting, BP Method: Automatic)   Ht 5' 7" (1.702 m)   Wt 130.6 kg (288 lb)   BMI 45.11 kg/m²     Physical Exam   General: alert and oriented, no acute distress  Respiratory: Symmetric expansion, non-labored breathing  Cardiovascular: no peripheral edema  Abdomen: soft, non distended  Skin: normal coloration and turgor, no rashes, no suspicious skin lesions noted  Neuro: no gross deficits  Psych: normal judgment and insight, normal mood/affect and non-anxious   Both hands are neurovascularly intact.  She does have some pain with abducting her shoulder.  Staples are removed from the trocar sites these are clean dry and intact and Steri-Strips are placed.  The flank incision is in a large skin fold.  It is somewhat moist and malodorous but there is no cellulitis and no pus.    Physical Exam    Lab Review   Urinalysis demonstrates     BENIGN FIBROTIC CYST WITH CHRONIC INFLAMMATION AND DYSTROPHIC CALCIFICATIONS;  SURROUNDING RENAL TISSUE WITH " GLOMERULOSCLEROSIS.  [facility]  Diagnosed by: Brandon Rasheed M.D.  (Electronically Signed: 2017-06-27 08:06:07)    Lab Results   Component Value Date    WBC 9.68 06/22/2017    HGB 11.8 (L) 06/22/2017    HCT 37.7 06/22/2017    MCV 83 06/22/2017     (L) 06/22/2017     Lab Results   Component Value Date    CREATININE 0.8 06/22/2017    BUN 10 06/22/2017       Imaging  -    Assessment and Plan:   Complex renal cyst  -     US Kidney Only; Future; Expected date: 07/15/2017  -     Basic metabolic panel; Future; Expected date: 07/11/2017    Chronic left shoulder pain    Other orders  -     celecoxib (CELEBREX) 200 MG capsule; Take 1 capsule (200 mg total) by mouth 2 (two) times daily.  Dispense: 28 capsule; Refill: 0      We will leave the staples in the flank incision for now.  She will wash 3 times a day with antibacterial soap and keep gauze in the skin fold.  She will return to see me Friday for a wound check.  If she is doing well we will remove every other staple.  We will monitor her shoulder pain.  If it is not improving we will refer her to orthopedics

## 2017-07-14 ENCOUNTER — HOSPITAL ENCOUNTER (OUTPATIENT)
Dept: RADIOLOGY | Facility: OTHER | Age: 77
Discharge: HOME | End: 2017-07-14
Attending: UROLOGY
Payer: MEDICARE

## 2017-07-14 ENCOUNTER — OFFICE VISIT (OUTPATIENT)
Dept: UROLOGY | Facility: CLINIC | Age: 77
End: 2017-07-14
Attending: UROLOGY
Payer: MEDICARE

## 2017-07-14 VITALS
HEIGHT: 67 IN | HEART RATE: 59 BPM | BODY MASS INDEX: 45.2 KG/M2 | WEIGHT: 288 LBS | SYSTOLIC BLOOD PRESSURE: 187 MMHG | DIASTOLIC BLOOD PRESSURE: 86 MMHG

## 2017-07-14 DIAGNOSIS — N28.1 COMPLEX RENAL CYST: Primary | ICD-10-CM

## 2017-07-14 DIAGNOSIS — N28.1 COMPLEX RENAL CYST: ICD-10-CM

## 2017-07-14 PROCEDURE — 99024 POSTOP FOLLOW-UP VISIT: CPT | Mod: S$GLB,,, | Performed by: UROLOGY

## 2017-07-14 PROCEDURE — 76770 US EXAM ABDO BACK WALL COMP: CPT | Mod: 26,,, | Performed by: RADIOLOGY

## 2017-07-14 PROCEDURE — 99999 PR PBB SHADOW E&M-EST. PATIENT-LVL III: CPT | Mod: PBBFAC,,, | Performed by: UROLOGY

## 2017-07-14 NOTE — PROGRESS NOTES
Wound healing well  Still moist in skin fold  Removed every other staple  rtc tues to remove remainder of staples  Cont bathing with antibacterial soap and keeping area free of sweat etc.

## 2017-07-18 ENCOUNTER — OFFICE VISIT (OUTPATIENT)
Dept: UROLOGY | Facility: CLINIC | Age: 77
End: 2017-07-18
Attending: UROLOGY
Payer: MEDICARE

## 2017-07-18 VITALS
BODY MASS INDEX: 45.19 KG/M2 | HEART RATE: 52 BPM | HEIGHT: 67 IN | WEIGHT: 287.94 LBS | SYSTOLIC BLOOD PRESSURE: 144 MMHG | DIASTOLIC BLOOD PRESSURE: 73 MMHG

## 2017-07-18 DIAGNOSIS — N28.1 COMPLEX RENAL CYST: Primary | ICD-10-CM

## 2017-07-18 LAB
BILIRUB SERPL-MCNC: NORMAL MG/DL
BLOOD URINE, POC: NORMAL
COLOR, POC UA: NORMAL
GLUCOSE UR QL STRIP: NORMAL
KETONES UR QL STRIP: NORMAL
LEUKOCYTE ESTERASE URINE, POC: NORMAL
NITRITE, POC UA: NORMAL
PH, POC UA: NORMAL
PROTEIN, POC: NORMAL
SPECIFIC GRAVITY, POC UA: NORMAL
UROBILINOGEN, POC UA: NORMAL

## 2017-07-18 PROCEDURE — 99999 PR PBB SHADOW E&M-EST. PATIENT-LVL III: CPT | Mod: PBBFAC,,, | Performed by: UROLOGY

## 2017-07-18 PROCEDURE — 81002 URINALYSIS NONAUTO W/O SCOPE: CPT | Mod: S$GLB,,, | Performed by: UROLOGY

## 2017-07-18 PROCEDURE — 99024 POSTOP FOLLOW-UP VISIT: CPT | Mod: S$GLB,,, | Performed by: UROLOGY

## 2017-07-18 NOTE — PROGRESS NOTES
Feels great here for staple removal.  Flank incision is healing very well.  There is a small trocar site in the left lower quadrant where the skin edges open but there is no evidence of infection in this area this is very superficial and is already healing well.  She had blood-tinged urine a few days ago but this is completely resolved.  No pain no further further hematuria    The stool softener.  No heavy lifting or no strenuous activity for 6 weeks.  Return to clinic 3 months or sooner if any issues no testing necessary at this time

## 2017-08-10 ENCOUNTER — TELEPHONE (OUTPATIENT)
Dept: UROLOGY | Facility: CLINIC | Age: 77
End: 2017-08-10

## 2017-08-10 NOTE — TELEPHONE ENCOUNTER
----- Message from Alice Rivera sent at 8/10/2017  3:28 PM CDT -----  Contact: KIEL MAO [[id number]]  x_  1st Request  _  2nd Request  _  3rd Request        Who: KIEL MAO [[id number]]    Why: Patient states she had surgery in June and is still spotting blood. Please call     What Number to Call Back: [phone]    When to Expect a call back: (With in 24 hours)

## 2017-08-18 ENCOUNTER — OFFICE VISIT (OUTPATIENT)
Dept: OBSTETRICS AND GYNECOLOGY | Facility: CLINIC | Age: 77
End: 2017-08-18
Payer: MEDICARE

## 2017-08-18 ENCOUNTER — HOSPITAL ENCOUNTER (OUTPATIENT)
Dept: RADIOLOGY | Facility: OTHER | Age: 77
Discharge: HOME | End: 2017-08-18
Attending: ORTHOPAEDIC SURGERY
Payer: MEDICARE

## 2017-08-18 VITALS
HEIGHT: 67 IN | WEIGHT: 283.81 LBS | SYSTOLIC BLOOD PRESSURE: 132 MMHG | DIASTOLIC BLOOD PRESSURE: 84 MMHG | BODY MASS INDEX: 44.54 KG/M2

## 2017-08-18 DIAGNOSIS — M25.512 LEFT SHOULDER PAIN, UNSPECIFIED CHRONICITY: ICD-10-CM

## 2017-08-18 DIAGNOSIS — N95.0 POST-MENOPAUSAL BLEEDING: ICD-10-CM

## 2017-08-18 DIAGNOSIS — R31.9 BLOOD IN URINE: Primary | ICD-10-CM

## 2017-08-18 DIAGNOSIS — M25.512 LEFT SHOULDER PAIN, UNSPECIFIED CHRONICITY: Primary | ICD-10-CM

## 2017-08-18 LAB
BILIRUB UR QL STRIP: NEGATIVE
CANDIDA RRNA VAG QL PROBE: NEGATIVE
CLARITY UR REFRACT.AUTO: ABNORMAL
COLOR UR AUTO: YELLOW
G VAGINALIS RRNA GENITAL QL PROBE: POSITIVE
GLUCOSE UR QL STRIP: NEGATIVE
HGB UR QL STRIP: ABNORMAL
KETONES UR QL STRIP: NEGATIVE
LEUKOCYTE ESTERASE UR QL STRIP: ABNORMAL
MICROSCOPIC COMMENT: ABNORMAL
NITRITE UR QL STRIP: NEGATIVE
PH UR STRIP: 7 [PH] (ref 5–8)
PROT UR QL STRIP: NEGATIVE
RBC #/AREA URNS AUTO: 3 /HPF (ref 0–4)
SP GR UR STRIP: 1.01 (ref 1–1.03)
SQUAMOUS #/AREA URNS AUTO: 2 /HPF
T VAGINALIS RRNA GENITAL QL PROBE: POSITIVE
URN SPEC COLLECT METH UR: ABNORMAL
UROBILINOGEN UR STRIP-ACNC: NEGATIVE EU/DL
WBC #/AREA URNS AUTO: 29 /HPF (ref 0–5)

## 2017-08-18 PROCEDURE — 3075F SYST BP GE 130 - 139MM HG: CPT | Mod: S$GLB,,, | Performed by: OBSTETRICS & GYNECOLOGY

## 2017-08-18 PROCEDURE — 99999 PR PBB SHADOW E&M-EST. PATIENT-LVL III: CPT | Mod: PBBFAC,,, | Performed by: OBSTETRICS & GYNECOLOGY

## 2017-08-18 PROCEDURE — 88141 CYTOPATH C/V INTERPRET: CPT | Mod: ,,, | Performed by: PATHOLOGY

## 2017-08-18 PROCEDURE — 88175 CYTOPATH C/V AUTO FLUID REDO: CPT | Performed by: PATHOLOGY

## 2017-08-18 PROCEDURE — 99203 OFFICE O/P NEW LOW 30 MIN: CPT | Mod: S$GLB,,, | Performed by: OBSTETRICS & GYNECOLOGY

## 2017-08-18 PROCEDURE — 3079F DIAST BP 80-89 MM HG: CPT | Mod: S$GLB,,, | Performed by: OBSTETRICS & GYNECOLOGY

## 2017-08-18 PROCEDURE — 1126F AMNT PAIN NOTED NONE PRSNT: CPT | Mod: S$GLB,,, | Performed by: OBSTETRICS & GYNECOLOGY

## 2017-08-18 PROCEDURE — 3008F BODY MASS INDEX DOCD: CPT | Mod: S$GLB,,, | Performed by: OBSTETRICS & GYNECOLOGY

## 2017-08-18 PROCEDURE — 1159F MED LIST DOCD IN RCRD: CPT | Mod: S$GLB,,, | Performed by: OBSTETRICS & GYNECOLOGY

## 2017-08-18 PROCEDURE — 73030 X-RAY EXAM OF SHOULDER: CPT | Mod: TC,LT

## 2017-08-18 PROCEDURE — 73030 X-RAY EXAM OF SHOULDER: CPT | Mod: 26,LT,, | Performed by: RADIOLOGY

## 2017-08-18 NOTE — PROGRESS NOTES
Chief Complaint: Post Menopausal Bleeding     HPI:     Natali Galeano is a 76 y.o. complaining of postmenopausal bleeding. Menopause occurred several years ago.  The patient had an episode of bleeding which began about 3 weeks after kidney surgery in 2017. She had been started on Celebrex at that time and she thought this might be what was causing the symptoms; however, it has persisted. She has not had prior episodes. She has not noticed vaginal odor. She The patient is not on HRT. Has not been sexually active for 30 years.    Past Medical History:   Diagnosis Date    Colon cancer     Gout, chronic     Heart murmur     High cholesterol     Hypercholesteremia     Hypertension     Thyroid disease      Past Surgical History:   Procedure Laterality Date    BTL       SECTION, CLASSIC      COLON SURGERY      goiter       HERNIA REPAIR       Family History   Problem Relation Age of Onset    Heart disease Father     Diabetes Mother        Current Outpatient Prescriptions:     amlodipine (NORVASC) 2.5 MG tablet, Take 2.5 mg by mouth once daily., Disp: , Rfl:     aspirin (ECOTRIN) 81 MG EC tablet, Take 81 mg by mouth once daily., Disp: , Rfl:     atenolol (TENORMIN) 100 MG tablet, Take 100 mg by mouth once daily., Disp: , Rfl:     atorvastatin (LIPITOR) 10 MG tablet, Take 10 mg by mouth once daily., Disp: , Rfl:     benazepril (LOTENSIN) 40 MG tablet, Take 40 mg by mouth once daily., Disp: , Rfl:     cetirizine (ZYRTEC) 10 MG tablet, Take 10 mg by mouth daily as needed for Allergies., Disp: , Rfl:     docusate sodium (COLACE) 100 MG capsule, Take 1 capsule (100 mg total) by mouth 2 (two) times daily., Disp: , Rfl: 0    fluticasone (FLONASE) 50 mcg/actuation nasal spray, 1 spray by Each Nare route once daily., Disp: , Rfl:     allopurinol (ZYLOPRIM) 300 MG tablet, Take 300 mg by mouth once daily., Disp: , Rfl:     celecoxib (CELEBREX) 200 MG capsule, TAKE 1 CAPSULE(200 MG) BY MOUTH TWICE  "DAILY, Disp: 180 capsule, Rfl: 0    cholecalciferol, vitamin D3, (THERA-D) 2,000 unit Tab, Take 1 tablet by mouth once daily., Disp: , Rfl:     metronidazole (FLAGYL) 500 MG tablet, Take 4 tablets (2,000 mg total) by mouth once. Do not drink alcohol for 48 hours after completing this medication., Disp: 4 tablet, Rfl: 0      ROS:     GENERAL: Denies weight gain or weight loss. Feeling well overall.  SKIN: Denies rash or lesions.   ABDOMEN: Denies abdominal pain, constipation, diarrhea, nausea, vomiting, change in appetite or rectal bleeding.   URINARY: Denies frequency, dysuria, hematuria.  GYN: See HPI.    Physical Exam:      PHYSICAL EXAM:   Vitals:    17 1016   BP: 132/84   Weight: 128.8 kg (283 lb 13.5 oz)   Height: 5' 7" (1.702 m)   PainSc: 0-No pain     Body mass index is 44.46 kg/m².    General: No acute distress, alert and engaged  Abdomen: Soft, non-tender, non-distended, suprapubic tenderness absent  Pelvis: External genitalia and urethra within normal limits. Vagina without lesions, with moderate yellow-green discharge, without erythema, without ulcers.  Cervix without cervical motion tenderness, friable. Uterus non-tender. Unable to assess size secondary to body habitus.  Adnexa:  Non-tender. Unable to assess size secondary to body habitus.       Assessment/Plan:     Blood in urine  -     Urine culture  -     Urinalysis    Post-menopausal bleeding  -     Vaginosis Screen by DNA Probe  -     US Pelvis Comp with Transvag NON-OB (xpd; Future; Expected date: 2017  -     Liquid-based pap smear, screening    Other orders  -     Urinalysis Microscopic        Will call patient with vaginal swab results.    Counselin. Potential causes of post-menopausal bleeding, including, but not limited to uterine hyperplasia, neoplsia, other cancers, exogenous hormone use, and tissue atrophy were explained to the patient.  2. The rationale for evaluation of the endometrial stripe was discussed, and Ms. " Froylan has agreed to undergo pelvic ultrasound.  3. The patient was informed that if the endometrial stripe thickness is >4mm endometrial sampling through biopsy or curettage is recommended.  4. Ms. Galeano voices understanding and agrees with the current treatment plan.      Use of the [facility] Patient Portal discussed and encouraged during today's visit.

## 2017-08-20 LAB — BACTERIA UR CULT: NO GROWTH

## 2017-08-21 ENCOUNTER — TELEPHONE (OUTPATIENT)
Dept: OBSTETRICS AND GYNECOLOGY | Facility: CLINIC | Age: 77
End: 2017-08-21

## 2017-08-21 RX ORDER — METRONIDAZOLE 500 MG/1
2000 TABLET ORAL ONCE
Qty: 4 TABLET | Refills: 0 | Status: SHIPPED | OUTPATIENT
Start: 2017-08-21 | End: 2017-08-21

## 2017-08-21 NOTE — TELEPHONE ENCOUNTER
Called patient and discussed results. She is surprised about the trichomonas infection as she has not had intercourse since 1985. We discussed treatment course and the need to avoid alcohol while taking flagyl. All questions answered and patient agrees to treatment plan.   She was advised that I still recommend U/S to evaluate EMS, and she reports that she will be keeping that appointment.

## 2017-09-01 ENCOUNTER — OFFICE VISIT (OUTPATIENT)
Dept: OBSTETRICS AND GYNECOLOGY | Facility: CLINIC | Age: 77
End: 2017-09-01
Payer: MEDICARE

## 2017-09-01 VITALS
DIASTOLIC BLOOD PRESSURE: 84 MMHG | BODY MASS INDEX: 44.7 KG/M2 | SYSTOLIC BLOOD PRESSURE: 136 MMHG | WEIGHT: 284.81 LBS | HEIGHT: 67 IN

## 2017-09-01 DIAGNOSIS — N95.0 POST-MENOPAUSAL BLEEDING: Primary | ICD-10-CM

## 2017-09-01 DIAGNOSIS — R87.619 ATYPICAL ENDOCERVICAL CELLS ON PAP SMEAR: ICD-10-CM

## 2017-09-01 PROCEDURE — 99999 PR PBB SHADOW E&M-EST. PATIENT-LVL III: CPT | Mod: PBBFAC,,, | Performed by: OBSTETRICS & GYNECOLOGY

## 2017-09-01 PROCEDURE — 3079F DIAST BP 80-89 MM HG: CPT | Mod: S$GLB,,, | Performed by: OBSTETRICS & GYNECOLOGY

## 2017-09-01 PROCEDURE — 3075F SYST BP GE 130 - 139MM HG: CPT | Mod: S$GLB,,, | Performed by: OBSTETRICS & GYNECOLOGY

## 2017-09-01 PROCEDURE — 3008F BODY MASS INDEX DOCD: CPT | Mod: S$GLB,,, | Performed by: OBSTETRICS & GYNECOLOGY

## 2017-09-01 PROCEDURE — 1126F AMNT PAIN NOTED NONE PRSNT: CPT | Mod: S$GLB,,, | Performed by: OBSTETRICS & GYNECOLOGY

## 2017-09-01 PROCEDURE — 99212 OFFICE O/P EST SF 10 MIN: CPT | Mod: S$GLB,,, | Performed by: OBSTETRICS & GYNECOLOGY

## 2017-09-01 PROCEDURE — 1159F MED LIST DOCD IN RCRD: CPT | Mod: S$GLB,,, | Performed by: OBSTETRICS & GYNECOLOGY

## 2017-09-01 NOTE — PROGRESS NOTES
"  Chief Complaint: Follow up of PMB     HPI:      Natali Galeano is a 76 y.o. who presents for follow up of PMB which has been going on for several months. Patient was diagnosed with BV and trichomonas on swabs performed at last visit and treated with Flagyl. Pap smear also shows atypical cells favoring neoplasm. Today patient reports bleeding has decreased and she has only noticed it once since completing antibiotics.      ROS:     GENERAL: Denies recent fevers or chills. Feeling well overall.     Physical Exam:      /84   Ht 5' 7" (1.702 m)   Wt 129.2 kg (284 lb 13.4 oz)   BMI 44.61 kg/m²   Body mass index is 44.61 kg/m².    APPEARANCE: Well nourished, well developed, in no acute distress.  PSYCH: Appropriate mood and affect, judgement intact.    Results     Office Visit on 08/18/2017   Component Date Value Ref Range Status    Urine Culture, Routine 08/20/2017 No growth   Final    Specimen UA 08/18/2017 Urine, Clean Catch   Final    Color, UA 08/18/2017 Yellow  Yellow, Straw, Yuliana Final    Appearance, UA 08/18/2017 Hazy* Clear Final    pH, UA 08/18/2017 7.0  5.0 - 8.0 Final    Specific Gravity, UA 08/18/2017 1.015  1.005 - 1.030 Final    Protein, UA 08/18/2017 Negative  Negative Final    Comment: Recommend a 24 hour urine protein or a urine   protein/creatinine ratio if globulin induced proteinuria is  clinically suspected.      Glucose, UA 08/18/2017 Negative  Negative Final    Ketones, UA 08/18/2017 Negative  Negative Final    Bilirubin (UA) 08/18/2017 Negative  Negative Final    Occult Blood UA 08/18/2017 2+* Negative Final    Nitrite, UA 08/18/2017 Negative  Negative Final    Urobilinogen, UA 08/18/2017 Negative  <2.0 EU/dL Final    Leukocytes, UA 08/18/2017 3+* Negative Final    Trichomonas vaginalis 08/18/2017 Positive* Negative Final    Gardnerella vaginalis 08/18/2017 Positive* Negative Final    Candida sp 08/18/2017 Negative  Negative Final    RBC, UA 08/18/2017 3  0 - 4 /hpf " Final    WBC, UA 08/18/2017 29* 0 - 5 /hpf Final    Squam Epithel, UA 08/18/2017 2  /hpf Final    Microscopic Comment 08/18/2017 SEE COMMENT   Final    Comment: Other formed elements not mentioned in the report are not   present in the microscopic examination.        8/18  Urine Culture, Routine  No growth     Pap (8/18/17):   Atypical endocervical cells, favor a neoplastic process.  Comment: This case is also reviewed by a second pathologist, who concurs.  Marked inflammation.  Fresh blood present.    Pelvic U/S performed today:   There is a 9 mm endometrial stripe, which is abnormal for postmenopausal patient. Endometrial sampling and continue followup as indicated.    Neither ovary is visualized on today's exam.    Assessment/Plan:     Post-menopausal bleeding  - EMB risks, benefits, alternatives discussed today.     Atypical endocervical cells on Pap smear  - Colpo risks, benefits, alternatives discussed today.    Patient to RTC in 1 week for colpo and EMB.

## 2017-09-21 ENCOUNTER — TELEPHONE (OUTPATIENT)
Dept: OBSTETRICS AND GYNECOLOGY | Facility: CLINIC | Age: 77
End: 2017-09-21

## 2017-09-21 ENCOUNTER — OFFICE VISIT (OUTPATIENT)
Dept: ORTHOPEDICS | Facility: CLINIC | Age: 77
End: 2017-09-21
Payer: MEDICARE

## 2017-09-21 VITALS
SYSTOLIC BLOOD PRESSURE: 181 MMHG | BODY MASS INDEX: 44.7 KG/M2 | HEART RATE: 54 BPM | DIASTOLIC BLOOD PRESSURE: 83 MMHG | WEIGHT: 284.81 LBS | HEIGHT: 67 IN

## 2017-09-21 DIAGNOSIS — M25.512 LEFT SHOULDER PAIN, UNSPECIFIED CHRONICITY: ICD-10-CM

## 2017-09-21 PROCEDURE — 20610 DRAIN/INJ JOINT/BURSA W/O US: CPT | Mod: LT,S$GLB,, | Performed by: ORTHOPAEDIC SURGERY

## 2017-09-21 PROCEDURE — 1159F MED LIST DOCD IN RCRD: CPT | Mod: S$GLB,,, | Performed by: ORTHOPAEDIC SURGERY

## 2017-09-21 PROCEDURE — 3077F SYST BP >= 140 MM HG: CPT | Mod: S$GLB,,, | Performed by: ORTHOPAEDIC SURGERY

## 2017-09-21 PROCEDURE — 99999 PR PBB SHADOW E&M-EST. PATIENT-LVL IV: CPT | Mod: PBBFAC,,, | Performed by: ORTHOPAEDIC SURGERY

## 2017-09-21 PROCEDURE — 3079F DIAST BP 80-89 MM HG: CPT | Mod: S$GLB,,, | Performed by: ORTHOPAEDIC SURGERY

## 2017-09-21 PROCEDURE — 99204 OFFICE O/P NEW MOD 45 MIN: CPT | Mod: 25,S$GLB,, | Performed by: ORTHOPAEDIC SURGERY

## 2017-09-21 PROCEDURE — 1125F AMNT PAIN NOTED PAIN PRSNT: CPT | Mod: S$GLB,,, | Performed by: ORTHOPAEDIC SURGERY

## 2017-09-21 PROCEDURE — 3008F BODY MASS INDEX DOCD: CPT | Mod: S$GLB,,, | Performed by: ORTHOPAEDIC SURGERY

## 2017-09-21 RX ADMIN — TRIAMCINOLONE ACETONIDE 80 MG: 40 INJECTION, SUSPENSION INTRA-ARTICULAR; INTRAMUSCULAR at 11:09

## 2017-09-21 NOTE — PROGRESS NOTES
I have personally taken the history and examined this patient. I agree with the resident's note as stated above. Pt has pain with FF of shoulder. TTP over biceps. Limited Er and IR shoulder.  Plan for injection and PT  If no pain relief will order MRI

## 2017-09-21 NOTE — PROGRESS NOTES
H&P  Orthopaedics    SUBJECTIVE:     History of Present Illness:  Patient is a 76 y.o. female LHD with left shoulder pain for 3 mos after a 4hr surgery to remove a kidney cyst.  Her pain is constant, ranging from 7-10/10.  It is worse with activity, reaching overhead, or laying directly on it.  Pain does wake her up at night.  She is unable to fasten a bra with her left hand or comb her hair, pain is interfering with ADLs.  She has been taking Aleve with some relief.  She has noticed drastically decreased motion of her left shoulder.  Denies numbness/paresthesias or trauma/injury.    Scheduled Meds:  Continuous Infusions:  PRN Meds:    Review of patient's allergies indicates:  No Known Allergies    Past Medical History:   Diagnosis Date    Colon cancer     Gout, chronic     Heart murmur     High cholesterol     Hypercholesteremia     Hypertension     Thyroid disease      Past Surgical History:   Procedure Laterality Date    BTL       SECTION, CLASSIC      COLON SURGERY      goiter       HERNIA REPAIR       Family History   Problem Relation Age of Onset    Heart disease Father     Diabetes Mother      Social History   Substance Use Topics    Smoking status: Former Smoker     Packs/day: 0.10     Quit date: 1980    Smokeless tobacco: Never Used    Alcohol use No        Review of Systems:  Patient denies constitutional symptoms, cardiac symptoms, respiratory symptoms, GI symptoms.  The remainder of the musculoskeletal ROS is included in the HPI.      OBJECTIVE:     Vital Signs (Most Recent)  Pulse: (!) 54 (17 1030)  BP: (!) 181/83 (17 1030)    Physical Exam:  Gen:  No acute distress  CV:  Peripherally well-perfused.  Pulses 2+ bilaterally.  Lungs:  Normal respiratory effort.  Abdomen:  Soft, non-tender, non-distended  Head/Neck:  Normocephalic.  Atraumatic. No TTP, AROM and PROM intact without pain  Neuro:  CN intact without deficit, SILT throughout B/L Upper & Lower  Extremities      MSK:  LUE:  - no gross deformity  - ttp to biceps groove and anterolateral shoulder  - AROM: ff 90, abd 90, ER 5, IR glutes  - PROM: ff 120, abd 150, ER 30, IR glutes  - positive Wanda Stone Hawkins  - NVI    Laboratory:  No results found for this or any previous visit (from the past 72 hour(s)).    Diagnostic Results:  xrays with slight superior migration of humerus relative to glenoid, mild OA    ASSESSMENT/PLAN:     A/P: Natali Galeano is a 76 y.o. with left RC tendonitis.            Plan:  - will give cortisone injection today  - PT  - RTC 6 weeks.  If no improvement will consider MRI      Elkin Seth M.D. PGY3  Orthopedic Surgery Resident

## 2017-09-21 NOTE — PROCEDURES
Large Joint Aspiration/Injection  Date/Time: 9/21/2017 11:04 AM  Performed by: MAGDALENA LACY  Authorized by: MAGDALENA LACY     Consent Done?:  Yes (Verbal)  Indications:  Pain  Timeout: Prior to procedure the correct patient, procedure, and site was verified      Location:  Shoulder  Site:  L subacromial bursa  Ultrasonic Guidance for needle placement: No  Needle size:  22 G  Medications:  80 mg triamcinolone acetonide 40 mg/mL

## 2017-09-22 ENCOUNTER — PROCEDURE VISIT (OUTPATIENT)
Dept: OBSTETRICS AND GYNECOLOGY | Facility: CLINIC | Age: 77
End: 2017-09-22
Payer: MEDICARE

## 2017-09-22 VITALS — HEIGHT: 67 IN | BODY MASS INDEX: 45.15 KG/M2 | WEIGHT: 287.69 LBS

## 2017-09-22 DIAGNOSIS — R87.619 ATYPICAL ENDOCERVICAL CELLS ON PAP SMEAR: ICD-10-CM

## 2017-09-22 DIAGNOSIS — N95.0 POST-MENOPAUSAL BLEEDING: Primary | ICD-10-CM

## 2017-09-22 PROCEDURE — 99213 OFFICE O/P EST LOW 20 MIN: CPT | Mod: S$GLB,,, | Performed by: OBSTETRICS & GYNECOLOGY

## 2017-09-22 RX ORDER — TRIAMCINOLONE ACETONIDE 40 MG/ML
80 INJECTION, SUSPENSION INTRA-ARTICULAR; INTRAMUSCULAR
Status: DISCONTINUED | OUTPATIENT
Start: 2017-09-21 | End: 2017-09-22 | Stop reason: HOSPADM

## 2017-09-22 NOTE — PROCEDURES
"Procedures       Chief Complaint: F/u PMB and atypical cells on pap smear     HPI:      Natali Galeano is a 76 y.o. female who presents for follow up of atypical endocervical cells and post menopausal bleeding. Ms. Galeano was scheduled for colpo and EMB today; however, on today's exam, there is a polyp-like structure protruding from the endocervix with precludes completion of planned procedures. She is not currently sexually active.Patient does not have regular monthly menses. No LMP recorded. Patient is postmenopausal.    Past Medical History:   Diagnosis Date    Colon cancer     Gout, chronic     Heart murmur     High cholesterol     Hypercholesteremia     Hypertension     Thyroid disease      Past Surgical History:   Procedure Laterality Date    BTL       SECTION, CLASSIC      COLON SURGERY      goiter       HERNIA REPAIR       Social History     Social History    Marital status: Single     Social History Main Topics    Smoking status: Former Smoker     Packs/day: 0.10     Quit date: 1980    Smokeless tobacco: Never Used    Alcohol use No    Drug use: No    Sexual activity: Not on file     ROS:     GENERAL: Denies fevers or chills. Feeling well overall.   ABDOMEN: Denies abdominal pain, constipation, diarrhea, nausea, vomiting, change in appetite.   URINARY: Denies frequency, dysuria, hematuria.  GYNECOLOGIC: See HPI.  NEUROLOGIC: Denies syncope or weakness.     Physical Exam:      PHYSICAL EXAM:  Ht 5' 7" (1.702 m)   Wt 130.5 kg (287 lb 11.2 oz)   BMI 45.06 kg/m²   Body mass index is 45.06 kg/m².     APPEARANCE: Well nourished, well developed, in no acute distress.  HEENT: NC, AT  CV: Normal cap refill.   RESP: Normal work of breathing.  ABDOMEN: Soft.  No tenderness or masses.    PELVIC: Normal external genitalia without lesions.  Normal hair distribution.  Adequate perineal body, normal urethral meatus.  Vagina moist and smooth without lesions or discharge.  Cervix pink with " round, regular, friable structure about 1 cm in diameter protruding from the center of the os.  No significant cystocele or rectocele.    EXTREMITIES: No edema.   DERM: No rashes, lesions.  NEURO: Grossly intact.   PSYCH: Appropriate mood and affect.     Results:      Pap (8/18): Atypical endocervical cells, favor a neoplastic process.    U/S (9/1):  The uterus is slightly enlarged measuring 9.6 x 2.7 x 5.0 cm and the endometrial stripe is uniform in thickness measuring 9 mm.    Neither ovary is visualized on today's exam\  There is no free pelvic fluid.    Assessment/ Plan:     1. Post-menopausal bleeding     2. Atypical endocervical cells on Pap smear         1. To OR for Hysteroscopy D&C   2. Consent's signed  3. Letter sent to PCP for pre-operative optimization    Counseling     With the patient I had a full discussion of the risks, benefits, and alternatives of all procedures to be performed, including but not limited to injury to other organs, failure to resolve problem, recurrence of disease state, and infection. We discussed the risks, benefits, and alternatives to blood transfusion as well.   Ms. Galeano was counseled that because of her underlying obesity and h/o cancer certain risks of the procedure such as difficulties with anesthesia and risk of pathology showing malignancy may be increased. All of 's questions were answered, and she voiced understanding. She agrees with the plan of care; therefore, we will proceed with the surgery as scheduled.

## 2017-09-22 NOTE — LETTER
September 22, 2017    Natali Galeano  [address]  [facility] [address]         [facility]  [address]  [facility] [address]  Phone: [phone]  Fax: [phone] September 22, 2017     Patient: Natali Galeano   YOB: 1940   Date of Visit: 9/22/2017       To Whom It May Concern:    It is my medical opinion that Natali Galeano will need to undergo a hysteroscopy and endometrial polypectomy within the next month. Please let me know any changes to her current medications or other forms of optimization you would recommend prior to undergoing this minor procedure which will likely be performed under general anesthesia. The procedure duration should be no longer than 45 minutes.    If you have any questions or concerns, please don't hesitate to call.    Sincerely,      Linda Shelley MD

## 2017-09-25 PROBLEM — G89.18 POSTOPERATIVE PAIN: Status: RESOLVED | Noted: 2017-06-21 | Resolved: 2017-09-25

## 2017-10-02 ENCOUNTER — CLINICAL SUPPORT (OUTPATIENT)
Dept: REHABILITATION | Facility: HOSPITAL | Age: 77
End: 2017-10-02
Attending: ORTHOPAEDIC SURGERY
Payer: MEDICARE

## 2017-10-02 DIAGNOSIS — G89.29 CHRONIC LEFT SHOULDER PAIN: ICD-10-CM

## 2017-10-02 DIAGNOSIS — M25.512 CHRONIC LEFT SHOULDER PAIN: ICD-10-CM

## 2017-10-02 PROCEDURE — G8985 CARRY GOAL STATUS: HCPCS | Mod: CJ,PO

## 2017-10-02 PROCEDURE — 97110 THERAPEUTIC EXERCISES: CPT | Mod: PO

## 2017-10-02 PROCEDURE — G8984 CARRY CURRENT STATUS: HCPCS | Mod: CK,PO

## 2017-10-02 PROCEDURE — 97161 PT EVAL LOW COMPLEX 20 MIN: CPT | Mod: PO

## 2017-10-02 NOTE — PATIENT INSTRUCTIONS
Flexibility: Upper Trapezius Stretch    Sit up tall and place left hand under thigh and the right on top of your head.  Gently draw your head towards the right. Do not over-stretch.  Hold 30 seconds.   Repeat 1 times left side per set. Do 1 sets per session. Do 2-3 sessions per day.        Scapular Retraction (Standing)    With arms at sides or hands clasped in front of you, squeeze shoulder blades together. Do not shrug and do not hold your breath. Hold 5 seconds.  Repeat 10 times per session. Do 1 sessions per day.         ROM: Pendulum (Circular)    Let left arm hang and shift body weight to move arm in a [place] clockwise, then counterclockwise, up/down, or left/right.  Do 1 minutes per session. Do 1 sessions per day.    Copyright © [employer]. All rights reserved.

## 2017-10-02 NOTE — PROGRESS NOTES
SEE EVALUATION IN PLAN OF CARE TAB      OUTPATIENT PHYSICAL THERAPY  PHYSICAL THERAPY EVALUATION    Name: Natali Galeano  Clinic Number: [id number]    Evaluation Date: 10/02/2017  Visit #: 1 / 12  Authorization period Expiration: 12/02/2018  Plan of Care Expiration: 12/2/2017  Precautions: standard    Time In: 10:08 AM  Time Out: 10:55 AM  Total 1:1 Treatment Time: 45 min    Discussed Plan of Care with patient: Yes    Natali received 8 minutes of therapeutic exercise including:   HEP: scap retraction (in mirror with PT assist), L UT stretch, pendulum  (NEXT VISIT: isometric shoulder ext into table, ER/IR, rolling supine-->side)      Natali received 5 minutes of manual therapy including:   L AP and inferior G-H glides grade 2-3  Long-axis distraction, low grade      Written Home Exercises Provided: See Patient Instructions.  Exercises were reviewed and Natali was able to demonstrate them prior to the end of the session. Pt received a written copy of exercises to perform at home. Natali demonstrated good  understanding of the education provided.       Kirti Costa, PT

## 2017-10-02 NOTE — PLAN OF CARE
OUTPATIENT PHYSICAL THERAPY  PHYSICAL THERAPY EVALUATION    Name: Natali Galeano  Clinic Number: [id number]    Diagnosis:   Encounter Diagnosis   Name Primary?    Chronic left shoulder pain      Physician: Preethi Castellanos, *  Treatment Orders: PT Eval and Treat  Past Medical History:   Diagnosis Date    Colon cancer     Gout, chronic     Heart murmur     High cholesterol     Hypercholesteremia     Hypertension     Thyroid disease      Current Outpatient Prescriptions   Medication Sig    allopurinol (ZYLOPRIM) 300 MG tablet Take 300 mg by mouth once daily.    amlodipine (NORVASC) 2.5 MG tablet Take 2.5 mg by mouth once daily.    aspirin (ECOTRIN) 81 MG EC tablet Take 81 mg by mouth once daily.    atenolol (TENORMIN) 100 MG tablet Take 100 mg by mouth once daily.    atorvastatin (LIPITOR) 10 MG tablet Take 10 mg by mouth once daily.    benazepril (LOTENSIN) 40 MG tablet Take 40 mg by mouth once daily.    cetirizine (ZYRTEC) 10 MG tablet Take 10 mg by mouth daily as needed for Allergies.    cholecalciferol, vitamin D3, (THERA-D) 2,000 unit Tab Take 1 tablet by mouth once daily.    docusate sodium (COLACE) 100 MG capsule Take 1 capsule (100 mg total) by mouth 2 (two) times daily.    fluticasone (FLONASE) 50 mcg/actuation nasal spray 1 spray by Each Nare route once daily.     No current facility-administered medications for this visit.      Review of patient's allergies indicates:  No Known Allergies    History   Prior Therapy: none  Social History: lives alone.  Previous functional status: not limited, was going to gym for bicycle or treadmill. Slept on L side.  Current functional status: has not returned to gym since surgery but has no more activity restrictions. Has to sleep on back due to shoulder pain and post-op limitations.  Work: none    Subjective   History of Present Illness: L shoulder pain on and off since January then had cystectomy on kidney in June and and has had constant L shoulder  pain. Had steroid injection September 22 and it's improving. Pt is LHD and having difficulty with ADL's. PMHx of L wist pain and had injection which has resolved it.  DOI: January 2017  Imaging, x-ray: No fracture.  No lytic or blastic lesion. Joints: No evidence for glenohumeral dislocation.  Mild acromioclavicular arthrosis noted. Soft tissues: Unremarkable.  Pain: current 0/10, worst 6/10, best 0/10, Tight, intermittent (was constant prior to injection)  Radicular symptoms: sometimes to L elbow  Aggravating factors: lifting, making a bed, donning a bra, combing hair, R and L side lying  Easing factors: none  Pts goals: The patients goal is to return to PLOF without pain or risk of re-injury.    Objective   Mental status: alert, oriented x3  Posture/ Alignment: Fair, Protracted Scapula     Selective Functional Movement Assessment (SFMA):  FN: functional, non-painful          FP: functional, painful          DP: dysfunctional, painful          DN: dysfunctional, non-painful  SCMD = Stabilization/Motor Control Dysfunction          FEDE = Tissue Extensibility Dysfunction          JMD = Joint Mobility Dysfunction    Active Cervical Flexion: FN  Active Cervical Extension: FN  Cervical Rotation:   Right: DP   Left: DN  Upper extremity pattern 1 (MRE):   Right: FN   Left: DP  Upper extremity pattern 2 (LRF):   Right: FN   Left: DP  Multi-Segmental Flexion (MSF): DP  Multi-Segmental Extension (MSE): DP  Multi-Segmental Rotation:    Right: DN   Left: DN    FUNCTION:   - Scap Retraction: unable to perform, compensates with shrugging and thoracic ext  - Scaption: scap dyskinesia  - Bed Mobility: has difficulty with supine-->sit    Special Tests:  - Neers: right positive  - Fischer-Wei: right negative  - Lift-off: right positive  - ER Lag: right negative  - Drop Arm: right negative  - Full/Empty Can: right positive  - Scapular Assistance Test: right negative (no change with/without assist)  - Scapular Retraction Test:  "right negative (retraction increases pain)    Palpation:  TTP L UT and pec tightness    Joint Play:  Anteriorly displaced humeral head, with posterior and inferior hypomobility    Pt/family was provided educational information, including: role of PT, goals for PT, scheduling - pt verbalized understanding. Discussed insurance plan with pt.     Assessment   Natali is a 76 y.o. 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 Demonstrates impairments including limitations as described in the problem list. Pt prognosis is Fair. Positive prognostic factors include motivation to return to PLOF. Negative prognostic factors include obesity, SX on dominant side, and chronic pain. Pt will benefit from skilled outpatient physical therapy to address the above stated deficits, provide pt/family education, and to maximize pt's level of independence.     History  Co-morbidities and personal factors that may impact the plan of care Examination  Body Structures and Functions, activity limitations and participation restrictions that may impact the plan of care    Clinical Presentation   Co-morbidities:   high BMI        Personal Factors:   attitudes  "afraid to move" Body Regions:   neck  upper extremities    Body Systems:   gross symmetry  strength  transfers        Participation Restrictions:   Combing and washing hair, sleeping,    Activity limitations:   Mobility  lifting and carrying objects    Self care  washing oneself (bathing, drying, washing hands)  caring for body parts (brushing teeth, shaving, grooming)  dressing  looking after one's health    Domestic Life  doing house work (cleaning house, washing dishes, laundry)    Community and Social Life  community life         stable and uncomplicated                      low   moderate  moderate Decision Making/ Complexity " Score:  low     Pt's spiritual, cultural and educational needs considered and pt agreeable to plan of care and goals as stated below:     Anticipated Barriers for physical therapy: chronic pain    PT reviewed FOTO scores for Natali Galeano on 10/2/2017.   FOTO scores were entered into [employer] - see media section.    CMS Impairment/Limitation/Restriction for FOTO Shoulder Survey    Status  Limit   G-Code CMS Severity Modifier  Intake   48%  52%  Current Status CK - At least 40 percent but less than 60 percent  Predicted  64%  36%  Goal Status+ CJ - At least 20 percent but less than 40 percent  CATEGORY: Carry    Short Term GOALS:  In 4 weeks, pt. will:  - comb hair with LUE for 10-15 seconds without pain or compensatory movement patterns  - roll supine-->side lying R or L without pain to improve bed mobility with less exertion  - return to gym to use treadmill or bicycle 15-20 min >3 days a week without increase in pain    Long Term GOALS:  In 8 weeks, pt. will:  - be independent with HEP and SX management   - comb hair with LUE for >60 seconds without pain or compensatory movement patterns  - roll supine-->side lying R or L without pain in usual time to improve bed mobility with no exertion  - return to gym 4 days a week without pain, including cardio and resistance training to decrease re-injury    Plan   Outpatient physical therapy 1- 2 times weekly to include: pt ed, HEP, therapeutic exercises, therapeutic activities, neuromuscular re-education/ balance exercises, manual therapy, and modalities prn. Cont PT for 2 months. Pt may be seen by PTA as part of the rehabilitation team.     I certify the need for these services furnished under this plan of treatment and while under my care.    Kirti Costa, PT

## 2017-10-03 ENCOUNTER — TELEPHONE (OUTPATIENT)
Dept: OBSTETRICS AND GYNECOLOGY | Facility: CLINIC | Age: 77
End: 2017-10-03

## 2017-10-03 NOTE — TELEPHONE ENCOUNTER
----- Message from Linda Shelley MD sent at 10/3/2017  9:30 AM CDT -----  Rubén,   I asked Ms. Hurst to get us a letter clearing her for surgery from her primary care doctor. Can you give her a call and ask her if she's been able to do this yet?   Thank you!

## 2017-10-04 ENCOUNTER — TELEPHONE (OUTPATIENT)
Dept: OBSTETRICS AND GYNECOLOGY | Facility: CLINIC | Age: 77
End: 2017-10-04

## 2017-10-04 NOTE — TELEPHONE ENCOUNTER
Naya from 's office called about the clearance letter. The pt called and informed them that we would be sending a letter for  to sign. Naya said they did not receive the letter but wanted to inform us that the pt has not been seen since July and might have to come in before  would sign clearance for the procedure. She can be reached at [phone] ext.[phone]

## 2017-10-13 ENCOUNTER — CLINICAL SUPPORT (OUTPATIENT)
Dept: REHABILITATION | Facility: HOSPITAL | Age: 77
End: 2017-10-13
Attending: ORTHOPAEDIC SURGERY
Payer: MEDICARE

## 2017-10-13 DIAGNOSIS — G89.29 CHRONIC LEFT SHOULDER PAIN: ICD-10-CM

## 2017-10-13 DIAGNOSIS — M25.512 CHRONIC LEFT SHOULDER PAIN: ICD-10-CM

## 2017-10-13 PROCEDURE — 97140 MANUAL THERAPY 1/> REGIONS: CPT | Mod: PO

## 2017-10-13 PROCEDURE — 97110 THERAPEUTIC EXERCISES: CPT | Mod: PO

## 2017-10-13 NOTE — PROGRESS NOTES
Physical Therapy Daily Note   Name: Natali Galeano  Clinic Number: [id number]    Evaluation Date: 10/02/2017  Visit #: 2 / 12  Authorization period Expiration: 12/02/2018  Plan of Care Expiration: 12/2/2017  Precautions: standard    Time In: 10:00 AM  Time Out: 10:40 AM  Total 1:1 Treatment Time: 30 min    Diagnosis:   Encounter Diagnosis   Name Primary?    Chronic left shoulder pain      Physician: Preethi Castellanos, *  Treatment Orders: PT Eval and Treat    Subjective   Pt reports: Pt. reports 100% compliance with HEP since last visit.    Pain Scale:  0/10      Objective     Discussed Plan of Care with patient: Yes    Natali received 25 minutes of therapeutic exercise including:   Pulleys x 4 minutes  Pendulum x 1 min  scap retraction (in mirror with PT assist) x 10  L UT stretch 2 x 30 sec  isometric shoulder ext into table 10 x 5 sec, cue to trunk lean  ER/IR with yellow T-band x 10 each, each UE  rolling supine-->side with BUE lift pattern 3 x 5 to each side      Natali received 10 minutes of manual therapy including:   L AP and inferior G-H glides grade 2-3  L Long-axis distraction, low grade    Written Home Exercises Provided: Patient educated to continue with previously issued HEP.  Exercises were reviewed and Natali was able to demonstrate them prior to the end of the session. Pt received a written copy of exercises to perform at home. Natali demonstrated fair  understanding of the education provided.     Assessment   Tolerated today's visit well without discomfort or increased Sx. Motor control impairments with parascapular AROM but improving with cueing.  Natali is progressing well towards her goals. Will benefit from con't skilled care.    Anticipated barriers to physical therapy: none  Pt's spiritual, cultural and educational needs considered and pt agreeable to plan of care and goals.    Plan   Continue with established Plan of Care towards PT  goals.      Kirti Costa, PT

## 2017-10-16 ENCOUNTER — CLINICAL SUPPORT (OUTPATIENT)
Dept: REHABILITATION | Facility: HOSPITAL | Age: 77
End: 2017-10-16
Attending: ORTHOPAEDIC SURGERY
Payer: MEDICARE

## 2017-10-16 DIAGNOSIS — M25.512 CHRONIC LEFT SHOULDER PAIN: ICD-10-CM

## 2017-10-16 DIAGNOSIS — G89.29 CHRONIC LEFT SHOULDER PAIN: ICD-10-CM

## 2017-10-16 PROCEDURE — 97110 THERAPEUTIC EXERCISES: CPT | Mod: PO

## 2017-10-16 PROCEDURE — 97140 MANUAL THERAPY 1/> REGIONS: CPT | Mod: PO

## 2017-10-16 NOTE — PROGRESS NOTES
Physical Therapy Daily Note   Name: Natali Galeano  Clinic Number: [id number]    Evaluation Date: 10/02/2017  Visit #: 3 / 12  Authorization period Expiration: 12/02/2018  Plan of Care Expiration: 12/2/2017  Precautions: standard    Time In: 10:00 AM  Time Out: 10:40 AM  Total 1:1 Treatment Time: 40 min    Diagnosis:   Encounter Diagnosis   Name Primary?    Chronic left shoulder pain      Physician: Preethi Castellanos, *  Treatment Orders: PT Eval and Treat    Subjective   Pt reports: Pt. reports 100% compliance with HEP since last visit. Shoulder is cracking today.    Pain Scale:  0/10      Objective     Discussed Plan of Care with patient: Yes    Natali received 30 minutes of therapeutic exercise including:   Pulleys x 4 minutes  Pendulum x 1 min  scap retraction (in mirror with PT assist) x 10  L UT stretch 2 x 30 sec  isometric shoulder ext into table 10 x 5 sec, cue to trunk lean  ER/IR with yellow T-band x 10 each, each UE  BUE shoulder ext with yellow T-band x 10  BUE row with orange T-band x 10  Serratus BUE wall walks, cue ER as tolerated, x 5  Wall push-up on orange ball, cue elbow flex/ext x 8  Wall ball alphabet with UE  PROM flexion, ABD, ER      Natali received 10 minutes of manual therapy including:   L AP and inferior G-H glides grade 2-3  L Long-axis distraction, low grade    Written Home Exercises Provided: See Patient Instructions from 10/16/2017.  Exercises were reviewed and Natali was able to demonstrate them prior to the end of the session. Pt received a written copy of exercises to perform at home. Natali demonstrated good  understanding of the education provided.     Assessment   Tolerated today's visit well without discomfort or increased Sx. Did have pain with PROM ER but did not persist. Difficulty but no pain with new therex indicating weakness and impaired motor control.  Natali is progressing well towards her goals. Will benefit from con't  skilled care.    Anticipated barriers to physical therapy: none  Pt's spiritual, cultural and educational needs considered and pt agreeable to plan of care and goals.    Plan   Continue with established Plan of Care towards PT goals.      Kirti Costa, PT

## 2017-10-16 NOTE — PATIENT INSTRUCTIONS
Gymball on Wall: Alphabet    Stand arms length from wall with left hand on a tennis ball on the wall. Lean into ball and move ball in to write out the alphabet.  Write A-Z one time through. Do 1 sets per session.    https://plyo.[employer].us/180     Copyright © [employer]. All rights reserved.

## 2017-10-18 ENCOUNTER — TELEPHONE (OUTPATIENT)
Dept: OBSTETRICS AND GYNECOLOGY | Facility: CLINIC | Age: 77
End: 2017-10-18

## 2017-10-18 DIAGNOSIS — R89.6 ABNORMAL CYTOLOGY: Primary | ICD-10-CM

## 2017-10-18 DIAGNOSIS — N84.0 ENDOMETRIAL POLYP: ICD-10-CM

## 2017-10-18 NOTE — TELEPHONE ENCOUNTER
Spoke to pt letting her know I spoke to Dr. Ken who said that the pt should stop taking her baby aspirin. Pt voiced understanding.

## 2017-10-18 NOTE — TELEPHONE ENCOUNTER
Spoke to pt letting her know that we did receive her clearance letter and that we did send a message for our surgery scheduler to give her a call. Pt voiced understanding.

## 2017-10-20 ENCOUNTER — CLINICAL SUPPORT (OUTPATIENT)
Dept: REHABILITATION | Facility: HOSPITAL | Age: 77
End: 2017-10-20
Attending: ORTHOPAEDIC SURGERY
Payer: MEDICARE

## 2017-10-20 DIAGNOSIS — M25.512 CHRONIC LEFT SHOULDER PAIN: ICD-10-CM

## 2017-10-20 DIAGNOSIS — G89.29 CHRONIC LEFT SHOULDER PAIN: ICD-10-CM

## 2017-10-20 PROCEDURE — 97110 THERAPEUTIC EXERCISES: CPT | Mod: PO

## 2017-10-20 PROCEDURE — 97140 MANUAL THERAPY 1/> REGIONS: CPT | Mod: PO

## 2017-10-20 NOTE — PROGRESS NOTES
Physical Therapy Daily Note   Name: Natali Galeano  Clinic Number: [id number]    Evaluation Date: 10/02/2017  Visit #: 4 / 12  Authorization period Expiration: 12/02/2018  Plan of Care Expiration: 12/2/2017  Precautions: standard    Time In: 11:00 AM  Time Out: 11:45 AM  Total 1:1 Treatment Time: 45 min    Diagnosis:   Encounter Diagnosis   Name Primary?    Chronic left shoulder pain      Physician: Preethi Castellanos, *  Treatment Orders: PT Eval and Treat    Subjective   Pt reports: Pt. reports 100% compliance with HEP since last visit. Having more pain and joint cracking today.    Pain Scale:  5/10      Objective     Discussed Plan of Care with patient: Yes    Natali received 35 minutes of therapeutic exercise including:   Pulleys x 4 minutes  Standing wand AAROM flex and ABD x 10 each  Pendulum x 1 min  scap retraction (in mirror with PT assist) x 10  L UT stretch 2 x 30 sec  isometric shoulder ext into table 10 x 5 sec, cue to trunk lean  ER/IR with yellow T-band x 10 each, each UE  BUE shoulder ext with yellow T-band x 10  BUE row with orange T-band x 10  BUE seated latt pull down x 10, green T-band  Serratus BUE wall walks on forearms, cue ER as tolerated, x 5  Wall push-up on orange ball, cue elbow flex/ext x 10  Wall ball alphabet with UE on racquet ball 1 x  PROM flexion, ABD, ER  (NEXT VISIT: [place])      Natali received 10 minutes of manual therapy including:   L AP and inferior G-H glides grade 2-3  L Long-axis distraction, low grade    Written Home Exercises Provided: See Patient Instructions from 10/20/2017.  Exercises were reviewed and Natali was able to demonstrate them prior to the end of the session. Pt received a written copy of exercises to perform at home. Natali demonstrated good  understanding of the education provided.     Assessment   Tolerated today's visit well without discomfort or increased Sx but with fatigue by end of visit. Ongoing  impaired body awareness during wall and T-band exercises, requiring supervision to perform correctly.  Natali is progressing well towards her goals. Will benefit from con't skilled care.    Anticipated barriers to physical therapy: none  Pt's spiritual, cultural and educational needs considered and pt agreeable to plan of care and goals.    Plan   Continue with established Plan of Care towards PT goals. Hold PT until cleared after procedure 10/24/17. Re-eval next visit.      Kirti Costa, PT

## 2017-10-23 ENCOUNTER — ANESTHESIA EVENT (OUTPATIENT)
Dept: SURGERY | Facility: OTHER | Age: 77
End: 2017-10-23
Payer: MEDICARE

## 2017-10-23 ENCOUNTER — HOSPITAL ENCOUNTER (OUTPATIENT)
Dept: PREADMISSION TESTING | Facility: OTHER | Age: 77
Discharge: HOME | End: 2017-10-23
Attending: OBSTETRICS & GYNECOLOGY
Payer: MEDICARE

## 2017-10-23 VITALS
BODY MASS INDEX: 44.26 KG/M2 | RESPIRATION RATE: 16 BRPM | SYSTOLIC BLOOD PRESSURE: 130 MMHG | OXYGEN SATURATION: 98 % | DIASTOLIC BLOOD PRESSURE: 78 MMHG | WEIGHT: 282 LBS | HEIGHT: 67 IN | TEMPERATURE: 98 F | HEART RATE: 49 BPM

## 2017-10-23 RX ORDER — SODIUM CHLORIDE, SODIUM LACTATE, POTASSIUM CHLORIDE, CALCIUM CHLORIDE 600; 310; 30; 20 MG/100ML; MG/100ML; MG/100ML; MG/100ML
INJECTION, SOLUTION INTRAVENOUS CONTINUOUS
Status: CANCELLED | OUTPATIENT
Start: 2017-10-23

## 2017-10-23 NOTE — ANESTHESIA PREPROCEDURE EVALUATION
10/23/2017  Natali Galeano is a 76 y.o., female.    Pre-op Assessment    I have reviewed the Patient Summary Reports.     I have reviewed the Nursing Notes.   I have reviewed the Medications.     Review of Systems  Anesthesia Hx:  No problems with previous Anesthesia  History of prior surgery of interest to airway management or planning: Previous anesthesia: General Robotic partial nephrectomy recently at [facility] with general anesthesia.  Procedure performed at an [facility]. Denies Family Hx of Anesthesia complications.   Denies Personal Hx of Anesthesia complications.   Social:  Non-Smoker    Hematology/Oncology:  Hematology Normal   Oncology Normal     Cardiovascular:   Hypertension, well controlled    Pulmonary:   Sleep Apnea, CPAP    Renal/:   Complex renal cyst,had robotic partial nephrectomy [facility]   Hepatic/GI:  Hepatic/GI Normal    Musculoskeletal:  Musculoskeletal Normal    Neurological:  Neurology Normal    Endocrine:   Thyroid mass excision       Physical Exam  General:  Morbid Obesity    Airway/Jaw/Neck:  Airway Findings: Mouth Opening: Normal Tongue: Normal  General Airway Assessment: Adult  Mallampati: II  TM Distance: Normal, at least 6 cm         Dental:  Dental Findings:Carious molars              Anesthesia Plan  Type of Anesthesia, risks & benefits discussed:  Anesthesia Type:  general  Patient's Preference:   Intra-op Monitoring Plan: arterial line  Intra-op Monitoring Plan Comments:   Post Op Pain Control Plan:   Post Op Pain Control Plan Comments:   Induction:   IV  Beta Blocker:         Informed Consent: Patient understands risks and agrees with Anesthesia plan.  Questions answered. Anesthesia consent signed with patient.  ASA Score: 3     Day of Surgery Review of History & Physical:    H&P update referred to the surgeon.     Anesthesia Plan Notes: Had robotic hysterectomy in  May of 2017. No change in health since.        Ready For Surgery From Anesthesia Perspective.

## 2017-10-23 NOTE — DISCHARGE INSTRUCTIONS
PRE-ADMIT TESTING -  [phone]    [address]  [place] [facility]          Your surgery has been scheduled at [facility]. We are pleased to have the opportunity to serve you. For Further Information please call [phone].    On the day of surgery please report to the Information Desk on the 1st floor.    · CONTACT YOUR PHYSICIAN'S OFFICE THE DAY PRIOR TO YOUR SURGERY TO OBTAIN YOUR ARRIVAL TIME.     · The evening before surgery do not eat anything after 9 p.m. ( this includes hard candy, chewing gum and mints).  You may only have GATORADE, POWERADE AND WATER  from 9 p.m. until you leave your home.   DO NOT DRINK ANY LIQUIDS ON THE WAY TO THE HOSPITAL.      SPECIAL MEDICATION INSTRUCTIONS: TAKE medications checked off by the Anesthesiologist on your Medication List.    Angiogram Patients: Take medications as instructed by your physician, including aspirin.     Surgery Patients:    If you take ASPIRIN - Your PHYSICIAN/SURGEON will need to inform you IF/OR when you need to stop taking aspirin prior to your surgery.     Do Not take any medications containing IBUPROFEN.  Do Not Wear any make-up or dark nail polish   (especially eye make-up) to surgery. If you come to surgery with makeup on you will be required to remove the makeup or nail polish.    Do not shave your surgical area at least 5 days prior to your surgery. The surgical prep will be performed at the hospital according to Infection Control regulations.    Leave all valuables at home.   Do Not wear any jewelry or watches, including any metal in body piercings.  Contact Lens must be removed before surgery. Either do not wear the contact lens or bring a case and solution for storage.  Please bring a container for eyeglasses or dentures as required.  Bring any paperwork your physician has provided, such as consent forms,  history and physicals, doctor's orders, etc.   Bring comfortable clothes that are loose fitting to wear upon  discharge. Take into consideration the type of surgery being performed.  Maintain your diet as advised per your physician the day prior to surgery.      Adequate rest the night before surgery is advised.   Park in the Parking lot behind the hospital or in the [facility] across the street from the parking lot. Parking is complimentary.  If you will be discharged the same day as your procedure, please arrange for a responsible adult to drive you home or to accompany you if traveling by taxi.   YOU WILL NOT BE PERMITTED TO DRIVE OR TO LEAVE THE HOSPITAL ALONE AFTER SURGERY.   It is strongly recommended that you arrange for someone to remain with you for the first 24 hrs following your surgery.       Thank you for your cooperation.  The Staff of [facility].        Bathing Instructions                                                                 Please shower the evening before and morning of your procedure with    ANTIBACTERIAL SOAP. ( DIAL, etc )  Concentrate on the surgical area   for at least 3 minutes and rinse completely. Dry off as usual.   Do not use any deodorant, powder, body lotions, perfume, after shave or    cologne.

## 2017-10-23 NOTE — H&P
"[facility]  History & Physical  Gynecology    SUBJECTIVE:     Chief Complaint/Reason for Admission: PMB, ASC-H pap    History of Present Illness:  Patient is a 76 y.o. AAF who presents for hysteroscopic polypectomy and colpo. Patient has an endocervical mass present on exam which may be the cause of both AUB and abnormal pap.    Saw patient on  and planned for expedited surgery secondary to concerns for possible neoplasia; however, patient had difficulties getting in with PCP for pre-procedure optimization.    Review of patient's allergies indicates:  No Known Allergies    Past Medical History:   Diagnosis Date    Colon cancer     Gout, chronic     Heart murmur     High cholesterol     Hypercholesteremia     Hypertension     Sleep apnea     Thyroid disease      Past Surgical History:   Procedure Laterality Date    BTL       SECTION, CLASSIC      COLON SURGERY      goiter       HERNIA REPAIR      KIDNEY SURGERY      cyst removal     Family History   Problem Relation Age of Onset    Heart disease Father     Diabetes Mother      Social History   Substance Use Topics    Smoking status: Former Smoker     Packs/day: 0.10     Quit date: 1980    Smokeless tobacco: Never Used    Alcohol use No       Review of Systems:  Complete ROS performed at last visit negative.     OBJECTIVE:     Vital Signs (Most Recent):    Ht 5' 7" (1.702 m)   Wt 127.9 kg (282 lb)   BMI 44.17 kg/m²     From 17:     APPEARANCE: Well nourished, well developed, in no acute distress.  HEENT: NC, AT  CV: Normal cap refill.   RESP: Normal work of breathing.  ABDOMEN: Soft.  No tenderness or masses.    PELVIC: Normal external genitalia without lesions.  Normal hair distribution.  Adequate perineal body, normal urethral meatus.  Vagina moist and smooth without lesions or discharge.  Cervix pink with round, regular, friable structure about 1 cm in diameter protruding from the center of the os.  No " significant cystocele or rectocele.    EXTREMITIES: No edema.   DERM: No rashes, lesions.  NEURO: Grossly intact.   PSYCH: Appropriate mood and affect.        Results:      Pap (8/18): Atypical endocervical cells, favor a neoplastic process.     U/S (9/1):  The uterus is slightly enlarged measuring 9.6 x 2.7 x 5.0 cm and the endometrial stripe is uniform in thickness measuring 9 mm.    Neither ovary is visualized on today's exam\  There is no free pelvic fluid.      Assessment/ Plan:      1. Post-menopausal bleeding      2. Atypical endocervical cells on Pap smear            1. To OR for Hysteroscopy D&C   2. Consent's signed on 9/22.  3. Letter sent to PCP for pre-operative optimization     Counseling      With the patient I had a full discussion of the risks, benefits, and alternatives of all procedures to be performed, including but not limited to injury to other organs, failure to resolve problem, recurrence of disease state, and infection. We discussed the risks, benefits, and alternatives to blood transfusion as well.   Ms. Galeano was counseled that because of her underlying obesity and h/o cancer certain risks of the procedure such as difficulties with anesthesia and risk of pathology showing malignancy may be increased. All of 's questions were answered, and she voiced understanding. She agrees with the plan of care; therefore, we will proceed with the surgery as scheduled.

## 2017-10-24 ENCOUNTER — ANESTHESIA (OUTPATIENT)
Dept: SURGERY | Facility: OTHER | Age: 77
End: 2017-10-24
Payer: MEDICARE

## 2017-10-24 ENCOUNTER — SURGERY (OUTPATIENT)
Age: 77
End: 2017-10-24

## 2017-10-24 ENCOUNTER — HOSPITAL ENCOUNTER (OUTPATIENT)
Facility: OTHER | Age: 77
Discharge: HOME | End: 2017-10-24
Attending: OBSTETRICS & GYNECOLOGY | Admitting: OBSTETRICS & GYNECOLOGY
Payer: MEDICARE

## 2017-10-24 VITALS
BODY MASS INDEX: 44.26 KG/M2 | WEIGHT: 282 LBS | HEART RATE: 60 BPM | OXYGEN SATURATION: 98 % | HEIGHT: 67 IN | RESPIRATION RATE: 16 BRPM | TEMPERATURE: 98 F | SYSTOLIC BLOOD PRESSURE: 136 MMHG | DIASTOLIC BLOOD PRESSURE: 68 MMHG

## 2017-10-24 DIAGNOSIS — N95.0 PMB (POSTMENOPAUSAL BLEEDING): ICD-10-CM

## 2017-10-24 DIAGNOSIS — N95.0 POST-MENOPAUSAL BLEEDING: Primary | ICD-10-CM

## 2017-10-24 LAB
ABO + RH BLD: NORMAL
BLD GP AB SCN CELLS X3 SERPL QL: NORMAL
POCT GLUCOSE: 83 MG/DL (ref 70–110)

## 2017-10-24 PROCEDURE — 58558 HYSTEROSCOPY BIOPSY: CPT | Mod: ,,, | Performed by: OBSTETRICS & GYNECOLOGY

## 2017-10-24 PROCEDURE — 37000008 HC ANESTHESIA 1ST 15 MINUTES: Performed by: OBSTETRICS & GYNECOLOGY

## 2017-10-24 PROCEDURE — 25000003 PHARM REV CODE 250: Performed by: OBSTETRICS & GYNECOLOGY

## 2017-10-24 PROCEDURE — 88305 TISSUE EXAM BY PATHOLOGIST: CPT | Performed by: PATHOLOGY

## 2017-10-24 PROCEDURE — 86900 BLOOD TYPING SEROLOGIC ABO: CPT

## 2017-10-24 PROCEDURE — 36000707: Performed by: OBSTETRICS & GYNECOLOGY

## 2017-10-24 PROCEDURE — 36000706: Performed by: OBSTETRICS & GYNECOLOGY

## 2017-10-24 PROCEDURE — C1782 MORCELLATOR: HCPCS | Performed by: OBSTETRICS & GYNECOLOGY

## 2017-10-24 PROCEDURE — 88305 TISSUE EXAM BY PATHOLOGIST: CPT | Mod: 26,,, | Performed by: PATHOLOGY

## 2017-10-24 PROCEDURE — 71000016 HC POSTOP RECOV ADDL HR: Performed by: OBSTETRICS & GYNECOLOGY

## 2017-10-24 PROCEDURE — 63600175 PHARM REV CODE 636 W HCPCS: Performed by: NURSE ANESTHETIST, CERTIFIED REGISTERED

## 2017-10-24 PROCEDURE — 37000009 HC ANESTHESIA EA ADD 15 MINS: Performed by: OBSTETRICS & GYNECOLOGY

## 2017-10-24 PROCEDURE — 25000003 PHARM REV CODE 250: Performed by: NURSE ANESTHETIST, CERTIFIED REGISTERED

## 2017-10-24 PROCEDURE — 86850 RBC ANTIBODY SCREEN: CPT

## 2017-10-24 PROCEDURE — 27201423 OPTIME MED/SURG SUP & DEVICES STERILE SUPPLY: Performed by: OBSTETRICS & GYNECOLOGY

## 2017-10-24 PROCEDURE — 71000033 HC RECOVERY, INTIAL HOUR: Performed by: OBSTETRICS & GYNECOLOGY

## 2017-10-24 PROCEDURE — 71000015 HC POSTOP RECOV 1ST HR: Performed by: OBSTETRICS & GYNECOLOGY

## 2017-10-24 RX ORDER — SODIUM CHLORIDE, SODIUM LACTATE, POTASSIUM CHLORIDE, CALCIUM CHLORIDE 600; 310; 30; 20 MG/100ML; MG/100ML; MG/100ML; MG/100ML
INJECTION, SOLUTION INTRAVENOUS CONTINUOUS
Status: DISCONTINUED | OUTPATIENT
Start: 2017-10-24 | End: 2017-10-24 | Stop reason: HOSPADM

## 2017-10-24 RX ORDER — ONDANSETRON 2 MG/ML
4 INJECTION INTRAMUSCULAR; INTRAVENOUS DAILY PRN
Status: DISCONTINUED | OUTPATIENT
Start: 2017-10-24 | End: 2017-10-24 | Stop reason: HOSPADM

## 2017-10-24 RX ORDER — DIPHENHYDRAMINE HYDROCHLORIDE 50 MG/ML
25 INJECTION INTRAMUSCULAR; INTRAVENOUS EVERY 4 HOURS PRN
Status: DISCONTINUED | OUTPATIENT
Start: 2017-10-24 | End: 2017-10-24 | Stop reason: HOSPADM

## 2017-10-24 RX ORDER — FENTANYL CITRATE 50 UG/ML
INJECTION, SOLUTION INTRAMUSCULAR; INTRAVENOUS
Status: DISCONTINUED | OUTPATIENT
Start: 2017-10-24 | End: 2017-10-24

## 2017-10-24 RX ORDER — KETOROLAC TROMETHAMINE 30 MG/ML
INJECTION, SOLUTION INTRAMUSCULAR; INTRAVENOUS
Status: DISCONTINUED | OUTPATIENT
Start: 2017-10-24 | End: 2017-10-24

## 2017-10-24 RX ORDER — MIDAZOLAM HYDROCHLORIDE 1 MG/ML
INJECTION INTRAMUSCULAR; INTRAVENOUS
Status: DISCONTINUED | OUTPATIENT
Start: 2017-10-24 | End: 2017-10-24

## 2017-10-24 RX ORDER — OXYCODONE AND ACETAMINOPHEN 5; 325 MG/1; MG/1
1 TABLET ORAL EVERY 4 HOURS PRN
Qty: 14 TABLET | Refills: 0 | Status: SHIPPED | OUTPATIENT
Start: 2017-10-24 | End: 2017-11-10

## 2017-10-24 RX ORDER — FENTANYL CITRATE 50 UG/ML
25 INJECTION, SOLUTION INTRAMUSCULAR; INTRAVENOUS EVERY 5 MIN PRN
Status: DISCONTINUED | OUTPATIENT
Start: 2017-10-24 | End: 2017-10-24 | Stop reason: HOSPADM

## 2017-10-24 RX ORDER — OXYCODONE HYDROCHLORIDE 5 MG/1
5 TABLET ORAL
Status: DISCONTINUED | OUTPATIENT
Start: 2017-10-24 | End: 2017-10-24 | Stop reason: HOSPADM

## 2017-10-24 RX ORDER — SUCCINYLCHOLINE CHLORIDE 20 MG/ML
INJECTION INTRAMUSCULAR; INTRAVENOUS
Status: DISCONTINUED | OUTPATIENT
Start: 2017-10-24 | End: 2017-10-24

## 2017-10-24 RX ORDER — HYDROCODONE BITARTRATE AND ACETAMINOPHEN 5; 325 MG/1; MG/1
1 TABLET ORAL EVERY 4 HOURS PRN
Status: DISCONTINUED | OUTPATIENT
Start: 2017-10-24 | End: 2017-10-24 | Stop reason: HOSPADM

## 2017-10-24 RX ORDER — MEPERIDINE HYDROCHLORIDE 50 MG/ML
12.5 INJECTION INTRAMUSCULAR; INTRAVENOUS; SUBCUTANEOUS ONCE AS NEEDED
Status: DISCONTINUED | OUTPATIENT
Start: 2017-10-24 | End: 2017-10-24 | Stop reason: HOSPADM

## 2017-10-24 RX ORDER — DIPHENHYDRAMINE HCL 25 MG
25 CAPSULE ORAL EVERY 4 HOURS PRN
Status: DISCONTINUED | OUTPATIENT
Start: 2017-10-24 | End: 2017-10-24 | Stop reason: HOSPADM

## 2017-10-24 RX ORDER — PROPOFOL 10 MG/ML
VIAL (ML) INTRAVENOUS
Status: DISCONTINUED | OUTPATIENT
Start: 2017-10-24 | End: 2017-10-24

## 2017-10-24 RX ORDER — SODIUM CHLORIDE 0.9 % (FLUSH) 0.9 %
3 SYRINGE (ML) INJECTION
Status: DISCONTINUED | OUTPATIENT
Start: 2017-10-24 | End: 2017-10-24 | Stop reason: HOSPADM

## 2017-10-24 RX ORDER — IBUPROFEN 800 MG/1
800 TABLET ORAL EVERY 8 HOURS PRN
Qty: 30 TABLET | Refills: 0 | Status: ON HOLD | OUTPATIENT
Start: 2017-10-24 | End: 2019-02-25 | Stop reason: CLARIF

## 2017-10-24 RX ORDER — ONDANSETRON 8 MG/1
8 TABLET, ORALLY DISINTEGRATING ORAL EVERY 8 HOURS PRN
Status: DISCONTINUED | OUTPATIENT
Start: 2017-10-24 | End: 2017-10-24 | Stop reason: HOSPADM

## 2017-10-24 RX ORDER — HYDROMORPHONE HYDROCHLORIDE 2 MG/ML
0.4 INJECTION, SOLUTION INTRAMUSCULAR; INTRAVENOUS; SUBCUTANEOUS EVERY 5 MIN PRN
Status: DISCONTINUED | OUTPATIENT
Start: 2017-10-24 | End: 2017-10-24 | Stop reason: HOSPADM

## 2017-10-24 RX ORDER — ONDANSETRON 2 MG/ML
INJECTION INTRAMUSCULAR; INTRAVENOUS
Status: DISCONTINUED | OUTPATIENT
Start: 2017-10-24 | End: 2017-10-24

## 2017-10-24 RX ORDER — ROCURONIUM BROMIDE 10 MG/ML
INJECTION, SOLUTION INTRAVENOUS
Status: DISCONTINUED | OUTPATIENT
Start: 2017-10-24 | End: 2017-10-24

## 2017-10-24 RX ORDER — LIDOCAINE HCL/PF 100 MG/5ML
SYRINGE (ML) INTRAVENOUS
Status: DISCONTINUED | OUTPATIENT
Start: 2017-10-24 | End: 2017-10-24

## 2017-10-24 RX ORDER — OXYCODONE AND ACETAMINOPHEN 10; 325 MG/1; MG/1
1 TABLET ORAL EVERY 4 HOURS PRN
Status: DISCONTINUED | OUTPATIENT
Start: 2017-10-24 | End: 2017-10-24 | Stop reason: HOSPADM

## 2017-10-24 RX ORDER — ACETAMINOPHEN 10 MG/ML
INJECTION, SOLUTION INTRAVENOUS
Status: DISCONTINUED | OUTPATIENT
Start: 2017-10-24 | End: 2017-10-24

## 2017-10-24 RX ORDER — DEXAMETHASONE SODIUM PHOSPHATE 4 MG/ML
INJECTION, SOLUTION INTRA-ARTICULAR; INTRALESIONAL; INTRAMUSCULAR; INTRAVENOUS; SOFT TISSUE
Status: DISCONTINUED | OUTPATIENT
Start: 2017-10-24 | End: 2017-10-24

## 2017-10-24 RX ORDER — ACETIC ACID 3 %
LIQUID (ML) MISCELLANEOUS
Status: DISCONTINUED | OUTPATIENT
Start: 2017-10-24 | End: 2017-10-24 | Stop reason: HOSPADM

## 2017-10-24 RX ORDER — GLYCOPYRROLATE 0.2 MG/ML
INJECTION INTRAMUSCULAR; INTRAVENOUS
Status: DISCONTINUED | OUTPATIENT
Start: 2017-10-24 | End: 2017-10-24

## 2017-10-24 RX ADMIN — SODIUM CHLORIDE, SODIUM LACTATE, POTASSIUM CHLORIDE, AND CALCIUM CHLORIDE: 600; 310; 30; 20 INJECTION, SOLUTION INTRAVENOUS at 01:10

## 2017-10-24 RX ADMIN — IODINE SOLUTION STRONG 5% (LUGOL'S) 1 ML: 5 SOLUTION at 02:10

## 2017-10-24 RX ADMIN — GLYCOPYRROLATE 0.2 MG: 0.2 INJECTION, SOLUTION INTRAMUSCULAR; INTRAVENOUS at 02:10

## 2017-10-24 RX ADMIN — CARBOXYMETHYLCELLULOSE SODIUM 2 DROP: 2.5 SOLUTION/ DROPS OPHTHALMIC at 02:10

## 2017-10-24 RX ADMIN — PROPOFOL 150 MG: 10 INJECTION, EMULSION INTRAVENOUS at 02:10

## 2017-10-24 RX ADMIN — KETOROLAC TROMETHAMINE 30 MG: 30 INJECTION, SOLUTION INTRAMUSCULAR; INTRAVENOUS at 02:10

## 2017-10-24 RX ADMIN — ROCURONIUM BROMIDE 5 MG: 10 INJECTION, SOLUTION INTRAVENOUS at 02:10

## 2017-10-24 RX ADMIN — ONDANSETRON 4 MG: 2 INJECTION INTRAMUSCULAR; INTRAVENOUS at 02:10

## 2017-10-24 RX ADMIN — MIDAZOLAM HYDROCHLORIDE 2 MG: 1 INJECTION, SOLUTION INTRAMUSCULAR; INTRAVENOUS at 01:10

## 2017-10-24 RX ADMIN — FENTANYL CITRATE 50 MCG: 50 INJECTION, SOLUTION INTRAMUSCULAR; INTRAVENOUS at 03:10

## 2017-10-24 RX ADMIN — Medication 2 ML: at 02:10

## 2017-10-24 RX ADMIN — SUCCINYLCHOLINE CHLORIDE 120 MG: 20 INJECTION, SOLUTION INTRAMUSCULAR; INTRAVENOUS at 02:10

## 2017-10-24 RX ADMIN — LIDOCAINE HYDROCHLORIDE 100 MG: 20 INJECTION, SOLUTION INTRAVENOUS at 02:10

## 2017-10-24 RX ADMIN — ACETAMINOPHEN 1000 MG: 10 INJECTION, SOLUTION INTRAVENOUS at 02:10

## 2017-10-24 RX ADMIN — DEXAMETHASONE SODIUM PHOSPHATE 4 MG: 4 INJECTION, SOLUTION INTRAMUSCULAR; INTRAVENOUS at 02:10

## 2017-10-24 RX ADMIN — FENTANYL CITRATE 50 MCG: 50 INJECTION, SOLUTION INTRAMUSCULAR; INTRAVENOUS at 02:10

## 2017-10-24 NOTE — ANESTHESIA POSTPROCEDURE EVALUATION
"Anesthesia Post Evaluation    Patient: Natali LARSEN Minor    Procedure(s) Performed: Procedure(s) (LRB):  POLYPECTOMY-HYSTEROSCOPIC  (N/A)  COLPOSCOPY (N/A)    Final Anesthesia Type: general  Patient location during evaluation: PACU  Patient participation: Yes- Able to Participate  Level of consciousness: awake and alert  Post-procedure vital signs: reviewed and stable  Pain management: adequate  Airway patency: patent  PONV status at discharge: No PONV  Anesthetic complications: no      Cardiovascular status: blood pressure returned to baseline  Respiratory status: unassisted, spontaneous ventilation and room air  Hydration status: euvolemic  Follow-up not needed.        Visit Vitals  BP (!) 155/65   Pulse 62   Temp 36.5 °C (97.7 °F)   Resp 16   Ht 5' 7" (1.702 m)   Wt 127.9 kg (282 lb)   SpO2 99%   Breastfeeding? No   BMI 44.17 kg/m²       Pain/Carola Score: Pain Assessment Performed: Yes (10/24/2017  3:40 PM)  Presence of Pain: denies (10/24/2017  3:53 PM)  Carola Score: 10 (10/24/2017  3:53 PM)      "

## 2017-10-24 NOTE — OP NOTE
[facility]  Operative Note    SUMMARY     Date of Procedure: 10/24/2017     Procedure: Procedure(s) (LRB):  POLYPECTOMY-HYSTEROSCOPIC  (N/A)  COLPOSCOPY (N/A)       Surgeon: Linda Shelley M.D.    Assisting Surgeon: None    Pre-Operative Diagnosis: Endometrial polyp [N84.0]  Abnormal cytology [R89.6]    Post-Operative Diagnosis: Post-Op Diagnosis Codes:     * Endometrial polyp [N84.0]     * Abnormal cytology [R89.6]    Anesthesia: Choice    Technical Procedures Used: Hysteroscopic Polypectomy, Colposcopy    Description of the Findings of the Procedure: Patient was taken to the operating room where general anesthesia was performed and found to be adequate. She was prepped and draped in the normal sterile fashion in the dorsal lithotomy position in nasra stirrups. Bladder was drained with straight catheter. [place]ing speculum was placed and cervix visualized. Polyp protruding from cervical os. External portion twisted off with ring forceps. Acetic acid applied to cervix and then lugols applied. Decreased lugols uptake at 12 o'clock and 6 o'clock, with an area on the anterior lip of the cervix, non-adjacent to os as well. Three areas of concern biopsied. [place] then removed. Right angles then placed in anterior and posterior vagina. The anterior lip of the cervix was grasped with the single tooth tenaculum. The uterus was then sounded and measured 9 cm in length. The dilators were used to dilate the cervix enough to allow introduction of the hysteroscope. The hysteroscope was introduced and a dominant polyp arising from the anterior uterine wall was visualized. Endometrium appeared abnormally proliferative for a post-menopausal female.. TruClear Incisor was then used to remove all polypoid tissue from the uterine cavity. The uterine cavity was noted to be free of polyps or other anomalies. Sharp curettage was then performed. All instruments were then removed from the vagina. The patient  tolerated procedure well. Sponge lap and needle counts were correct x 2.    Complications: No    Estimated Blood Loss (EBL): * No values recorded between 10/24/2017  2:25 PM and 10/24/2017  3:08 PM *           Specimens:   Specimen (12h ago through future)    Start     Ordered    10/24/17 1448  Specimen to Pathology - Surgery  Once     Comments:  1-Cervical biopsy 6 o'clock  2-Cervical biopsy 12 o'clock  3-Cervical biopsy anterior lip 4-Endocervical polyp  5-Endometrial curettings      10/24/17 1504                  Condition: Good    Disposition: PACU - hemodynamically stable.    Attestation: There was no qualified resident available for this procedure.

## 2017-10-24 NOTE — DISCHARGE SUMMARY
[facility]  Discharge Summary  OBGYN    Admission date: 10/24/2017  Discharge date: 10/24/2017    Admit Dx:      Patient Active Problem List   Diagnosis    HTN (hypertension)    Morbid obesity due to excess calories    Volume depletion    Essential hypertension    Renal cyst    Adrenal nodule    Hyperlipidemia    Chronic left shoulder pain    PMB (postmenopausal bleeding)     Discharge Dx:    Patient Active Problem List   Diagnosis    HTN (hypertension)    Morbid obesity due to excess calories    Volume depletion    Essential hypertension    Renal cyst    Adrenal nodule    Hyperlipidemia    Chronic left shoulder pain    PMB (postmenopausal bleeding)       Attending Physician: Linda Shelley   Discharge Provider: Linda Shelley    Procedures Performed: Procedure(s) (LRB):  POLYPECTOMY-HYSTEROSCOPIC  (N/A)  COLPOSCOPY (N/A)    Hospital Course: Patient was admitted on 10/24/2017 to undergo hysteroscopic polypectomy and colposcopy secondary to PMB, abnormal pap smear, and endocervical polyp.  Procedure was uncomplicated, for full details see operative report. Barring any unforseen incidents, patient will be discharged home when she is able to ambulate, void, and tolerate PO intake.    Consults: none    Discharged Condition: stable    Disposition: Home    Follow Up:   Follow-up Information     Linda Shelley MD In 2 weeks.    Specialty:  Obstetrics and Gynecology  Why:  Post Operative Follow Up  Contact information:  [address] [place], [place] [id number] [phone]                   Medications:  Current Discharge Medication List      START taking these medications    Details   ibuprofen (ADVIL,MOTRIN) 800 MG tablet Take 1 tablet (800 mg total) by mouth every 8 (eight) hours as needed for Pain.  Qty: 30 tablet, Refills: 0      oxyCODONE-acetaminophen (PERCOCET) 5-325 mg per tablet Take 1 tablet by mouth every 4 (four) hours as needed for  Pain.  Qty: 14 tablet, Refills: 0         CONTINUE these medications which have NOT CHANGED    Details   allopurinol (ZYLOPRIM) 300 MG tablet Take 300 mg by mouth once daily.      amlodipine (NORVASC) 2.5 MG tablet Take 2.5 mg by mouth once daily.      atenolol (TENORMIN) 100 MG tablet Take 100 mg by mouth once daily.      atorvastatin (LIPITOR) 10 MG tablet Take 10 mg by mouth once daily.      benazepril (LOTENSIN) 40 MG tablet Take 40 mg by mouth once daily.      cetirizine (ZYRTEC) 10 MG tablet Take 10 mg by mouth daily as needed for Allergies.      cholecalciferol, vitamin D3, (THERA-D) 2,000 unit Tab Take 1 tablet by mouth once daily.      docusate sodium (COLACE) 100 MG capsule Take 1 capsule (100 mg total) by mouth 2 (two) times daily.  Refills: 0      fluticasone (FLONASE) 50 mcg/actuation nasal spray 1 spray by Each Nare route once daily.      aspirin (ECOTRIN) 81 MG EC tablet Take 81 mg by mouth once daily.               Discharge Procedure Orders  Diet general     Other restrictions (specify):   Scheduling Instructions: Pelvic rest, nothing in the vagina for 6 weeks     Call MD for:  temperature >100.4     Call MD for:  persistent nausea and vomiting     Call MD for:  severe uncontrolled pain     Call MD for:   Scheduling Instructions: Vaginal bleeding >2pads/hour for >2 hours

## 2017-10-24 NOTE — DISCHARGE INSTRUCTIONS
Hysteroscopy  Hysteroscopy is a procedure that is done to see inside your uterus. It can help find the cause of problems in the uterus. This helps your health care provider decide on the best treatment. In some cases, it can be used to perform treatment. Hysteroscopy may be done in your health care provider's office or in the hospital.    What are the risks and complications of hysteroscopy?  Problems with the procedure are rare. But all procedures have risks. Risks of hysteroscopy include:  · Infection  · Bleeding  · Tearing of the uterine wall  · Damage to internal organs  · Scarring of the uterus  · Fluid overload  · Problems with anesthesia (the medication that prevents pain during the procedure)    What happens during a hysteroscopy?  · Youll lie on an exam table with your feet in stirrups.  · You may be given general anesthesia or medicines to help you relax or sleep. In some cases, an IV line will be put into a vein in your arm or hand. This line is then used to give fluids and medicines.  · A tool called a speculum is inserted into the vagina to hold it open. A tool called a dilator may be used to widen the cervix.  · Numbing medicine may be applied to the cervix.  · The hysteroscope (a long, thin lighted tube) is inserted through the vagina and into the uterus. It is used to see inside the uterus. Images of the uterus are viewed on a monitor.  · A gas or fluid may be injected into the uterus to expand it.  · Other tools may be put through the hysteroscope. These are used to take tissue samples, remove growths, or place implants for the purpose of sterilization.    What happens after hysteroscopy?  · You may have cramps and bleeding for 24 hours after the procedure. This is normal. Use pads instead of tampons.  · Do not douche or use tampons until your health care provider says its OK.  · Do not use any vaginal medicines until you are told its OK.  · Ask your health care provider when its OK to have  sex again.    When should I call my health care provider?  Call your health care provider if you have:    · Heavy bleeding (more than 1 pad an hour for 2 or more hours)  · A fever over 100.4°F (38.0°C)  · Increasing abdominal pain or tenderness  · Foul-smelling discharge     Follow-up care  Schedule a follow-up visit with your health care provider. Based on the results of your test, you may need more treatment. Be sure to follow instructions and keep your appointments.        Discharge Instructions: After Your Surgery  Youve just had surgery. During surgery, you were given medicine called anesthesia to keep you relaxed and free of pain. After surgery, you may have some pain or nausea. This is common. Here are some tips for feeling better and getting well after surgery.     Stay on schedule with your medicine.     Going home  Your healthcare provider will show you how to take care of yourself when you go home. He or she will also answer your questions. Have an adult family member or friend drive you home. For the first 24 hours after your surgery:    · Do not drive or use heavy equipment.  · Do not make important decisions or sign legal papers.  · Do not drink alcohol.  · Have someone stay with you, if needed. He or she can watch for problems and help keep you safe.    Be sure to go to all follow-up visits with your healthcare provider. And rest after your surgery for as long as your healthcare provider tells you to.    Coping with pain  If you have pain after surgery, pain medicine will help you feel better. Take it as told, before pain becomes severe. Also, ask your healthcare provider or pharmacist about other ways to control pain. This might be with heat, ice, or relaxation. And follow any other instructions your surgeon or nurse gives you.    Tips for taking pain medicine  To get the best relief possible, remember these points:    · Pain medicines can upset your stomach. Taking them with a little food may  help.  · Most pain relievers taken by mouth need at least 20 to 30 minutes to start to work.  · Taking medicine on a schedule can help you remember to take it. Try to time your medicine so that you can take it before starting an activity. This might be before you get dressed, go for a walk, or sit down for dinner.  · Constipation is a common side effect of pain medicines. Call your healthcare provider before taking any medicines such as laxatives or stool softeners to help ease constipation. Also ask if you should skip any foods. Drinking lots of fluids and eating foods such as fruits and vegetables that are high in fiber can also help. Remember, do not take laxatives unless your surgeon has prescribed them.  · Drinking alcohol and taking pain medicine can cause dizziness and slow your breathing. It can even be deadly. Do not drink alcohol while taking pain medicine.  · Pain medicine can make you react more slowly to things. Do not drive or run machinery while taking pain medicine.    Your healthcare provider may tell you to take acetaminophen to help ease your pain. Ask him or her how much you are supposed to take each day. Acetaminophen or other pain relievers may interact with your prescription medicines or other over-the-counter (OTC) medicines. Some prescription medicines have acetaminophen and other ingredients. Using both prescription and OTC acetaminophen for pain can cause you to overdose. Read the labels on your OTC medicines with care. This will help you to clearly know the list of ingredients, how much to take, and any warnings. It may also help you not take too much acetaminophen. If you have questions or do not understand the information, ask your pharmacist or healthcare provider to explain it to you before you take the OTC medicine.    Managing nausea  Some people have an upset stomach after surgery. This is often because of anesthesia, pain, or pain medicine, or the stress of surgery. These tips  will help you handle nausea and eat healthy foods as you get better. If you were on a special food plan before surgery, ask your healthcare provider if you should follow it while you get better. These tips may help:    · Do not push yourself to eat. Your body will tell you when to eat and how much.  · Start off with clear liquids and soup. They are easier to digest.  · Next try semi-solid foods, such as mashed potatoes, applesauce, and gelatin, as you feel ready.  · Slowly move to solid foods. Dont eat fatty, rich, or spicy foods at first.  · Do not force yourself to have 3 large meals a day. Instead eat smaller amounts more often.  · Take pain medicines with a small amount of solid food, such as crackers or toast, to avoid nausea.     Call your surgeon if  · You still have pain an hour after taking medicine. The medicine may not be strong enough.  · You feel too sleepy, dizzy, or groggy. The medicine may be too strong.  · You have side effects like nausea, vomiting, or skin changes, such as rash, itching, or hives.       If you have obstructive sleep apnea  You were given anesthesia medicine during surgery to keep you comfortable and free of pain. After surgery, you may have more apnea spells because of this medicine and other medicines you were given. The spells may last longer than usual.   At home:    · Keep using the continuous positive airway pressure (CPAP) device when you sleep. Unless your healthcare provider tells you not to, use it when you sleep, day or night. CPAP is a common device used to treat obstructive sleep apnea.  · Talk with your provider before taking any pain medicine, muscle relaxants, or sedatives. Your provider will tell you about the possible dangers of taking these medicines.    © 5758-7222 [employer]. [address] [place] [address], [place] [address]. All rights reserved. This information is not intended as a substitute for professional medical care. Always follow your  healthcare professional's instructions.    PLEASE FOLLOW ANY OTHER INSTRUCTIONS PROVIDED TO YOU BY DR. ALVA!

## 2017-10-24 NOTE — PLAN OF CARE
Natali Galeano has met all discharge criteria from Phase II. Vital Signs are stable, ambulating  without difficulty. Discharge instructions given, patient verbalized understanding. Discharged from facility via wheelchair in stable condition.

## 2017-10-24 NOTE — OR NURSING
Reviewed 's hysteroscopy discharge instructions, with verbalized understanding per patient and family.

## 2017-10-24 NOTE — TRANSFER OF CARE
"Anesthesia Transfer of Care Note    Patient: Natali LARSEN Minor    Procedure(s) Performed: Procedure(s) (LRB):  POLYPECTOMY-HYSTEROSCOPIC  (N/A)  COLPOSCOPY (N/A)    Patient location: PACU    Anesthesia Type: general    Transport from OR: Transported from OR on room air with adequate spontaneous ventilation    Post pain: adequate analgesia    Post assessment: no apparent anesthetic complications    Post vital signs: stable    Level of consciousness: responds to stimulation    Nausea/Vomiting: no nausea/vomiting    Complications: none    Transfer of care protocol was followed      Last vitals:   Visit Vitals  BP (!) 111/55   Pulse 65   Temp 36.5 °C (97.7 °F)   Resp 16   Ht 5' 7" (1.702 m)   Wt 127.9 kg (282 lb)   SpO2 98%   Breastfeeding? No   BMI 44.17 kg/m²     "

## 2017-10-24 NOTE — PLAN OF CARE
Problem: Patient Care Overview  Goal: Individualization & Mutuality  Patient prefers to have Tanasha present for discharge. [phone]

## 2017-11-02 DIAGNOSIS — N28.89 RENAL MASS: Primary | ICD-10-CM

## 2017-11-03 ENCOUNTER — CLINICAL SUPPORT (OUTPATIENT)
Dept: REHABILITATION | Facility: HOSPITAL | Age: 77
End: 2017-11-03
Attending: ORTHOPAEDIC SURGERY
Payer: MEDICARE

## 2017-11-03 DIAGNOSIS — G89.29 CHRONIC LEFT SHOULDER PAIN: ICD-10-CM

## 2017-11-03 DIAGNOSIS — M25.512 CHRONIC LEFT SHOULDER PAIN: ICD-10-CM

## 2017-11-03 PROCEDURE — 97140 MANUAL THERAPY 1/> REGIONS: CPT | Mod: PO

## 2017-11-03 PROCEDURE — G8985 CARRY GOAL STATUS: HCPCS | Mod: CJ,PO

## 2017-11-03 PROCEDURE — 97110 THERAPEUTIC EXERCISES: CPT | Mod: PO

## 2017-11-03 PROCEDURE — G8984 CARRY CURRENT STATUS: HCPCS | Mod: CK,PO

## 2017-11-03 NOTE — PLAN OF CARE
OUTPATIENT PHYSICAL THERAPY  PHYSICAL THERAPY RE-EVALUATION    Name: Natali Galeano  Clinic Number: [id number]    Diagnosis:   Encounter Diagnosis   Name Primary?   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 Had steroid injection September 22 and it's improving. Pt is LHD and having difficulty with ADL's. PMHx of L wist pain and had injection which has resolved it. Since SOC, improved with provoking postures. Pt. reports 100% compliance with HEP since last visit.  DOI: January 2017  Imaging, x-ray: No fracture. No lytic or blastic lesion. Joints: No evidence for glenohumeral dislocation. Mild acromioclavicular arthrosis noted. Soft tissues: Unremarkable.  Pain: current 0/10, worst 5/10, best 0/10, Tight, intermittent (was constant prior to injection)  Radicular symptoms: sometimes to L elbow  Aggravating factors: lifting, making a bed (improving), donning a bra (improving), combing hair, L side lying  Easing factors: none  Pts goals: The patients goal is to return to PLOF without pain or risk of re-injury.    Objective   Mental status: alert, oriented x3  Posture/ Alignment: Fair, Protracted Scapula     Selective Functional Movement Assessment (SFMA):  FN: functional, non-painful          FP: functional, painful          DP: dysfunctional, painful          DN: dysfunctional, non-painful  SCMD = Stabilization/Motor Control Dysfunction          FEDE = Tissue Extensibility Dysfunction          JMD = Joint Mobility Dysfunction    Active Cervical Flexion: FN  Active Cervical Extension: FN  Cervical Rotation:   Right: FN   Left: DN  Upper extremity pattern 1 (MRE):   Right: FN   Left: DP  Upper extremity pattern 2 (LRF):   Right: FN   Left: DP  Multi-Segmental Flexion (MSF): DP  Multi-Segmental  Extension (MSE): DP  Multi-Segmental Rotation:    Right: DN   Left: DN    FUNCTION:   - Scap Retraction: unable to perform, compensates with shrugging and thoracic ext  - Scaption: scap dyskinesia  - Bed Mobility: has difficulty with supine-->sit    Special Tests:  - Neers: right positive  - Fischer-Wei: right negative  - Lift-off: right negative  - ER Lag: right negative  - Drop Arm: right negative  - Full/Empty Can: right negative (increased pain with full can)  - Scapular Assistance Test: right positive  - Scapular Retraction Test: right negative    GIRTH:   Right Left   10 cm superior to joint line: 42.5 cm 43.8 cm   At elbow joint line: 27.5 cm 27.6 cm     Palpation:  TTP L UT and pec tightness    Joint Play:  Anteriorly displaced humeral head, with posterior and inferior hypomobility    Pt/family was provided educational information, including: role of PT, goals for PT, scheduling - pt verbalized understanding. Discussed insurance plan with pt.     Assessment   Natali is a 76 y.o. 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 Demonstrates impairments including limitations as described in the problem list. Since SOC, has improved with ADL's and function although cont to have limited A/PROM, joint hypomobility, and impaired parascapular motor control affecting shoulder pain. Natali has met some STG and is progressing well toward LTG. Pt will continue to benefit from skilled PT intervention. Medical Necessity is demonstrated by:  Pain limits function of effected part for some activities. Pt prognosis is Fair. Positive prognostic factors include motivation to return to PLOF. Negative prognostic factors include obesity, SX on dominant side, and chronic pain. Pt will benefit from skilled outpatient physical therapy to address the above stated deficits,  provide pt/family education, and to maximize pt's level of independence.     Pt's spiritual, cultural and educational needs considered and pt agreeable to plan of care and goals as stated below:     Anticipated Barriers for physical therapy: chronic pain    PT reviewed FOTO scores for Natali Galeano on 11/3/2017.   FOTO scores were entered into [employer] - see media section.    CMS Impairment/Limitation/Restriction for FOTO Shoulder Survey    Status  Limit   G-Code CMS Severity Modifier  Intake   48%  52%  Current Status CK - At least 40 percent but less than 60 percent  Predicted  64%  36%  Goal Status+ CJ - At least 20 percent but less than 40 percent  11/3/2017  55%  45%  Current Status CK - At least 40 percent but less than 60 percent  CATEGORY: Carry    Short Term GOALS:  In 4 weeks, pt. will:  - comb hair with LUE for 10-15 seconds without pain or compensatory movement patterns - NOT MET  - roll supine-->side lying R or L without pain to improve bed mobility with less exertion - MET 11/3/17  - return to gym to use treadmill or bicycle 15-20 min >3 days a week without increase in pain - NOT MET    Long Term GOALS:  In 8 weeks, pt. will:  - be independent with HEP and SX management   - comb hair with LUE for >60 seconds without pain or compensatory movement patterns  - roll supine-->side lying R or L without pain in usual time to improve bed mobility with no exertion  - return to gym 4 days a week without pain, including cardio and resistance training to decrease re-injury    Plan   Outpatient physical therapy 1- 2 times weekly to include: pt ed, HEP, therapeutic exercises, therapeutic activities, neuromuscular re-education/ balance exercises, manual therapy, and modalities prn. Cont PT for 1-2 more months. Pt may be seen by PTA as part of the rehabilitation team.     I certify the need for these services furnished under this plan of treatment and while under my care.    Kirti Costa, PT

## 2017-11-03 NOTE — PATIENT INSTRUCTIONS
Scapular Retraction (Standing)    With arms at sides, squeeze shoulder blades together. Do not shrug and do not hold your breath. Hold 5 seconds.  Repeat 10 times per session. Do 1-2 sessions per day.         ROM: Flexion - Wand    In standing, hold wand with left hand palm up and in front of you, push wand forward then overhead, leading with other hand closer to waist level, until stretch is felt. Hold 5 seconds.  Repeat 10 times left side per set. Do 1 sets per session. Do 1 sessions per day.        ROM: Abduction - Wand    In standing, hold wand with left hand palm up, push wand directly out to side, leading with other hand palm down, until stretch is felt. Hold 5 seconds.  Repeat 10 times left side per set. Do 1 sets per session. Do 1 sessions per day.        Isometric Shoulder Extension     front of counter with arms at sides, thumbs forward. Squeeze shoulder blades and move both arms back. Hold 5 seconds.  Repeat 10 times per session. Do 1 sessions per day.        Wall Push-ups    Facing wall with both hands on wall at shoulder height and shoulder-width apart. Keeping body straight, lean forward allowing elbows to bend then push out again.  Repeat 10 times per set. Do 1 sets per session. Do 1 sessions per day.    Copyright © [employer]. All rights reserved.

## 2017-11-03 NOTE — PROGRESS NOTES
Physical Therapy Daily Note   Name: Natali Galeano  Clinic Number: [id number]    Evaluation Date: 10/02/2017  Visit #: 5 / 12  Authorization period Expiration: 12/02/2018  Plan of Care Expiration: 12/2/2017  Precautions: standard    Time In: 10:00 AM  Time Out: 10:45 AM  Total 1:1 Treatment Time: 45 min    Diagnosis:   Encounter Diagnosis   Name Primary?    Chronic left shoulder pain      Physician: Preethi Castellanos, *  Treatment Orders: PT Eval and Treat    Subjective   Pt reports: SEE RE-EVAL    Pain Scale:  5/10      Objective     Discussed Plan of Care with patient: Yes    Natali received 35 minutes of therapeutic exercise including:   Pulleys x 4 minutes  Standing wand AAROM flex and ABD x 10 each  Pendulum x 1 min  scap retraction (in mirror with PT assist) x 10  L UT stretch 2 x 30 sec  isometric shoulder ext into table 10 x 5 sec, cue to trunk lean  ER/IR with yellow T-band x 10 each, each UE  BUE shoulder ext with yellow T-band x 10  BUE row with orange T-band x 10  BUE seated latt pull down x 10, green T-band  Serratus BUE wall walks on forearms, cue ER as tolerated, x 5  Wall push-up on orange ball, cue elbow flex/ext x 10  Wall ball alphabet with UE with racquet ball 1 x  PROM flexion, ABD, ER  (NEXT VISIT: [place])      Natali received 10 minutes of manual therapy including:   L AP and inferior G-H glides grade 3  L Long-axis distraction, low grade    Written Home Exercises Provided: See Patient Instructions from 11/3/2017.  Exercises were reviewed and Natali was able to demonstrate them prior to the end of the session. Pt received a written copy of exercises to perform at home. Natali demonstrated good  understanding of the education provided.     Assessment   SEE RE-EVAL  Natali is progressing well towards her goals. Will benefit from con't skilled care.    Anticipated barriers to physical therapy: none  Pt's spiritual, cultural and educational needs  considered and pt agreeable to plan of care and goals.    Plan   SEE RE-EVAL    Kirti Costa, PT

## 2017-11-06 ENCOUNTER — TELEPHONE (OUTPATIENT)
Dept: UROLOGY | Facility: CLINIC | Age: 77
End: 2017-11-06

## 2017-11-06 NOTE — TELEPHONE ENCOUNTER
----- Message from Enrique Coker MD sent at 11/2/2017  3:45 PM CDT -----  Bmp   Renal us    I ordered these    Thanks  s  c  ----- Message -----  From: Sylwia Thomas RN  Sent: 11/2/2017   3:16 PM  To: Enrique Coker MD    Pt is coming in in a few weeks for follow up, do you want any testing?

## 2017-11-07 ENCOUNTER — OFFICE VISIT (OUTPATIENT)
Dept: OBSTETRICS AND GYNECOLOGY | Facility: CLINIC | Age: 77
End: 2017-11-07
Payer: MEDICARE

## 2017-11-07 ENCOUNTER — HOSPITAL ENCOUNTER (OUTPATIENT)
Dept: RADIOLOGY | Facility: OTHER | Age: 77
Discharge: HOME | End: 2017-11-07
Attending: UROLOGY
Payer: MEDICARE

## 2017-11-07 VITALS
DIASTOLIC BLOOD PRESSURE: 84 MMHG | WEIGHT: 278.75 LBS | HEIGHT: 67 IN | BODY MASS INDEX: 43.75 KG/M2 | SYSTOLIC BLOOD PRESSURE: 130 MMHG

## 2017-11-07 DIAGNOSIS — Z98.890 POST-OPERATIVE STATE: Primary | ICD-10-CM

## 2017-11-07 DIAGNOSIS — N28.89 RENAL MASS: ICD-10-CM

## 2017-11-07 PROCEDURE — 76770 US EXAM ABDO BACK WALL COMP: CPT | Mod: 26,,, | Performed by: RADIOLOGY

## 2017-11-07 PROCEDURE — 99024 POSTOP FOLLOW-UP VISIT: CPT | Mod: S$GLB,,, | Performed by: OBSTETRICS & GYNECOLOGY

## 2017-11-07 PROCEDURE — 99999 PR PBB SHADOW E&M-EST. PATIENT-LVL II: CPT | Mod: PBBFAC,,, | Performed by: OBSTETRICS & GYNECOLOGY

## 2017-11-07 PROCEDURE — 76770 US EXAM ABDO BACK WALL COMP: CPT | Mod: TC

## 2017-11-07 NOTE — PROGRESS NOTES
Subjective:       Natali Galeano is a 76 y.o. who presents to the clinic 2 weeks status post operative hysteroscopy and colposcopy for PMB and abnormal cervical cytology favors neoplasia. Eating a regular diet without difficulty. Bowel movements are normal. The patient is not having any pain. Not having any vaginal bleeding but is having a small amount of thin, watery yellow discharge.    The patient's allergies, and past medical and surgical histories were reviewed.    Review of Systems  Pertinent items are noted in HPI.      Objective:      There were no vitals taken for this visit.  General:  alert, cooperative and no distress   Abdomen: soft, non-tender      Pathology:  1. CERVIX: EXOCERVIX SQUAMOUS MUCOSA SHOWING NO SIGNIFICANT HISTOLOGICAL  ABNORMALITY; NO EVIDENCE OF DYSPLASIA; TRANSFORMATION ZONE AND ENDOCERVIX NOT  REPRESENTED.  2. CERVIX: EXOCERVIX SQUAMOUS MUCOSA WITH CHRONIC CERVICITIS; NO EVIDENCE OF DYSPLASIA;  TRANSFORMATION ZONE AND ENDOCERVIX NOT REPRESENTED.  3. CERVIX: EXOCERVIX SQUAMOUS MUCOSA SHOWING NO SIGNIFICANT HISTOLOGICAL  ABNORMALITY; NO EVIDENCE OF DYSPLASIA; TRANSFORMATION ZONE AND ENDOCERVIX NOT  REPRESENTED.  4. ENDOCERVIX: INFLAMED ENDOCERVICAL POLYP WITH CYSTIC ENDOCERVICAL GLANDS.  5. ENDOMETRIUM: BENIGN INACTIVE ENDOMETRIUM WITH CYSTIC ATROPHY; FRAGMENTS OF  ENDOCERVICAL POLYP.       Assessment:      Doing well postoperatively.  Operative findings again reviewed. Pathology report discussed.      Plan:      1. Continue any current medications.  2. Wound care discussed.  3. Activity restrictions: none  4. Anticipated return to work: not applicable.  5. Follow up:6 months repeat pap smear

## 2017-11-10 ENCOUNTER — OFFICE VISIT (OUTPATIENT)
Dept: UROLOGY | Facility: CLINIC | Age: 77
End: 2017-11-10
Attending: UROLOGY
Payer: MEDICARE

## 2017-11-10 VITALS
HEIGHT: 67 IN | DIASTOLIC BLOOD PRESSURE: 81 MMHG | WEIGHT: 278 LBS | BODY MASS INDEX: 43.63 KG/M2 | SYSTOLIC BLOOD PRESSURE: 196 MMHG | HEART RATE: 56 BPM

## 2017-11-10 DIAGNOSIS — N28.1 COMPLEX RENAL CYST: Primary | ICD-10-CM

## 2017-11-10 LAB
BILIRUB SERPL-MCNC: ABNORMAL MG/DL
BLOOD URINE, POC: ABNORMAL
COLOR, POC UA: ABNORMAL
GLUCOSE UR QL STRIP: ABNORMAL
KETONES UR QL STRIP: ABNORMAL
LEUKOCYTE ESTERASE URINE, POC: ABNORMAL
NITRITE, POC UA: ABNORMAL
PH, POC UA: 6
PROTEIN, POC: ABNORMAL
SPECIFIC GRAVITY, POC UA: 1
UROBILINOGEN, POC UA: ABNORMAL

## 2017-11-10 PROCEDURE — 99213 OFFICE O/P EST LOW 20 MIN: CPT | Mod: 25,S$GLB,, | Performed by: UROLOGY

## 2017-11-10 PROCEDURE — 81002 URINALYSIS NONAUTO W/O SCOPE: CPT | Mod: S$GLB,,, | Performed by: UROLOGY

## 2017-11-10 NOTE — PROGRESS NOTES
"Subjective:      Natali Galeano is a 76 y.o. female who returns today regarding her     She is recovered from a partial nephrectomy well.  No complaints..    The following portions of the patient's history were reviewed and updated as appropriate: allergies, current medications, past family history, past medical history, past social history, past surgical history and problem list.    Review of Systems  Pertinent items are noted in HPI.  A comprehensive multipoint review of systems was negative except as otherwise stated in the HPI.     Objective:   Vitals: BP (!) 196/81 (BP Location: Left arm, Patient Position: Sitting, BP Method: X-Large (Automatic))   Pulse (!) 56   Ht 5' 7" (1.702 m)   Wt 126.1 kg (278 lb)   BMI 43.54 kg/m²     Physical Exam   General: alert and oriented, no acute distress  Respiratory: Symmetric expansion, non-labored breathing  Cardiovascular: no peripheral edema  Abdomen: soft, non distended  Skin: normal coloration and turgor, no rashes, no suspicious skin lesions noted  Neuro: no gross deficits  Psych: normal judgment and insight, normal mood/affect and non-anxious  Sitting clean dry and intact  Physical Exam    Lab Review   Urinalysis demonstrates microscopic urinalysis negative      Lab Results   Component Value Date    WBC 9.68 06/22/2017    HGB 11.8 (L) 06/22/2017    HCT 37.7 06/22/2017    MCV 83 06/22/2017     (L) 06/22/2017     Lab Results   Component Value Date    CREATININE 0.9 11/07/2017    BUN 13 11/07/2017       Imaging    Renal ultrasound shows no evidence of cyst recurrence.  There is medical parenchymal disease no hydronephrosis    Assessment and Plan:   Complex renal cyst; resolved sp partial nephrectomy  -     POCT URINE DIPSTICK WITHOUT MICROSCOPE  -     US Kidney Only; Future; Expected date: 11/11/2018    See PCP re HTN management and and renal parenchymal disease  rtc 1 yr with us or sooner if any  issues      "

## 2017-11-14 ENCOUNTER — OFFICE VISIT (OUTPATIENT)
Dept: ORTHOPEDICS | Facility: CLINIC | Age: 77
End: 2017-11-14
Payer: MEDICARE

## 2017-11-14 VITALS
RESPIRATION RATE: 16 BRPM | SYSTOLIC BLOOD PRESSURE: 168 MMHG | DIASTOLIC BLOOD PRESSURE: 81 MMHG | WEIGHT: 278 LBS | HEIGHT: 67 IN | BODY MASS INDEX: 43.63 KG/M2 | HEART RATE: 47 BPM

## 2017-11-14 PROCEDURE — 99213 OFFICE O/P EST LOW 20 MIN: CPT | Mod: S$GLB,,, | Performed by: ORTHOPAEDIC SURGERY

## 2017-11-14 PROCEDURE — 99999 PR PBB SHADOW E&M-EST. PATIENT-LVL III: CPT | Mod: PBBFAC,,, | Performed by: ORTHOPAEDIC SURGERY

## 2017-11-15 ENCOUNTER — CLINICAL SUPPORT (OUTPATIENT)
Dept: REHABILITATION | Facility: HOSPITAL | Age: 77
End: 2017-11-15
Attending: ORTHOPAEDIC SURGERY
Payer: MEDICARE

## 2017-11-15 DIAGNOSIS — M25.512 CHRONIC LEFT SHOULDER PAIN: ICD-10-CM

## 2017-11-15 DIAGNOSIS — G89.29 CHRONIC LEFT SHOULDER PAIN: ICD-10-CM

## 2017-11-15 PROCEDURE — 97140 MANUAL THERAPY 1/> REGIONS: CPT | Mod: PO

## 2017-11-15 PROCEDURE — 97110 THERAPEUTIC EXERCISES: CPT | Mod: PO

## 2017-11-15 NOTE — PROGRESS NOTES
Physical Therapy Daily Note   Name: Naatli Galeano  Clinic Number: [id number]    Evaluation Date: 10/02/2017  Visit #: 6 / 12  Authorization period Expiration: 12/02/2018  Plan of Care Expiration: 12/2/2017  Precautions: standard    Time In: 11:00 AM  Time Out: 11:45 AM  Total 1:1 Treatment Time: 30 min    Diagnosis:   Encounter Diagnosis   Name Primary?    Chronic left shoulder pain      Physician: Preethi Castellanos, *  Treatment Orders: PT Eval and Treat    Subjective   Pt reports: Return from MD and progressing well Pt; instructed to cont. If SX do not improve, return to MD in 3 months for cortisone injection.    Pain Scale:  5/10      Objective     Discussed Plan of Care with patient: Yes    Natali received 35 minutes of therapeutic exercise including:   Pulleys x 4 minutes  Standing wand AAROM flex and ABD x 10 each  Pendulum x 1 min  scap retraction (in mirror with PT assist) x 10  L UT stretch 2 x 30 sec  isometric shoulder ext into table 10 x 5 sec, cue to trunk lean  ER/IR with yellow T-band x 10 each, each UE  BUE shoulder ext with yellow T-band x 10  BUE row with orange T-band x 10  BUE seated latt pull down x 10, green T-band  Serratus BUE wall walks on forearms, cue ER as tolerated, x 5  Wall push-up on orange ball, cue elbow flex/ext x 10  Wall ball alphabet with UE with racquet ball 1 x  PROM flexion, ABD, ER  (NEXT VISIT: Pallof)      Natali received 10 minutes of manual therapy including:   L AP and inferior G-H glides grade 3  L Long-axis distraction, low grade    Written Home Exercises Provided: See Patient Instructions from 11/15/2017.  Exercises were reviewed and Natali was able to demonstrate them prior to the end of the session. Pt received a written copy of exercises to perform at home. Natali demonstrated good  understanding of the education provided.     Assessment   Tolerated today's visit well with improving shoulder joint play and PROM  although cont to have shoulder and parascapular weakness and impaired motor control that will benefit from ongoing care.  Natali is progressing well towards her goals. Will benefit from con't skilled care.    Anticipated barriers to physical therapy: none  Pt's spiritual, cultural and educational needs considered and pt agreeable to plan of care and goals.    Plan   Cont per original POC.    Kirti Costa, PT

## 2017-11-21 NOTE — PROGRESS NOTES
CHIEF COMPLAINT:  Six week followup left shoulder injection, 09/21/2017    HISTORY OF PRESENT ILLNESS:  Ms. Galeano is an established patient.  She is here   presenting with left shoulder pain.  She does state that the PT has   significantly helped with motion.  Pain is better, but not completely gone.    Mostly pain in the morning or with moving it, but otherwise, she is doing much   better.  She is happy with her result.    PHYSICAL EXAMINATION:  She can forward flex to 165 degrees, external rotation 45   degrees, internally rotate to T10.  She is tender to palpation over the biceps   tendon.    PLAN:  She was given a new physical therapy orders, this is what she would like   to continue with.  It is too early to do another injection.  She agrees with   this.  She will continue therapy.  She is not interested in surgical treatment.      LES/BRYAN  dd: 11/20/2017 12:59:12 (CST)  td: 11/21/2017 03:21:48 (CST)  Doc ID   #[id number]  Job ID #[id number]    CC:

## 2017-12-20 ENCOUNTER — CLINICAL SUPPORT (OUTPATIENT)
Dept: REHABILITATION | Facility: HOSPITAL | Age: 77
End: 2017-12-20
Attending: ORTHOPAEDIC SURGERY
Payer: MEDICARE

## 2017-12-20 DIAGNOSIS — G89.29 CHRONIC LEFT SHOULDER PAIN: ICD-10-CM

## 2017-12-20 DIAGNOSIS — M25.512 CHRONIC LEFT SHOULDER PAIN: ICD-10-CM

## 2017-12-20 PROCEDURE — G8985 CARRY GOAL STATUS: HCPCS | Mod: CJ,PO

## 2017-12-20 PROCEDURE — G8984 CARRY CURRENT STATUS: HCPCS | Mod: CK,PO

## 2017-12-20 PROCEDURE — 97140 MANUAL THERAPY 1/> REGIONS: CPT | Mod: PO

## 2017-12-20 PROCEDURE — 97110 THERAPEUTIC EXERCISES: CPT | Mod: PO

## 2017-12-20 NOTE — PATIENT INSTRUCTIONS
"Flexibility: Upper Trapezius Stretch    Sit up tall and place left hand under thigh and the right on top of your head.  Gently draw your head towards the right. Do not over-stretch.  Hold 30 seconds.   Repeat 1 times left side per set. Do 1 sets per session. Do 2-3 sessions per day.        ROM: Pendulum (Circular)    Let left arm hang and shift body weight to move arm in a [place] clockwise, then counterclockwise, up/down, or left/right.  Do 1 minutes per session. Do 1 sessions per day.        Scapular Retraction (Standing)    With arms at sides, squeeze shoulder blades together. Do not shrug and do not hold your breath. Hold 5 seconds.  Repeat 10 times per session. Do 1-2 sessions per day.         ROM: Flexion - Wand    In standing, hold wand with left hand palm up and in front of you, push wand forward then overhead, leading with other hand closer to waist level, until stretch is felt. Hold 5 seconds.  Repeat 10 times left side per set. Do 1 sets per session. Do 1 sessions per day.        ROM: Abduction - Wand    In standing, hold wand with left hand palm up, push wand directly out to side, leading with other hand palm down, until stretch is felt. Hold 5 seconds.  Repeat 10 times left side per set. Do 1 sets per session. Do 1 sessions per day.        Isometric Shoulder Extension     front of counter with arms at sides, thumbs forward. Squeeze shoulder blades and move both arms back. Hold 5 seconds.  Repeat 10 times per session. Do 1 sessions per day.        SHOULDER: Stabilization, Traps    Lie on your stomach with one arm off the edge. Raise the arm up like a "Y" with your thumb up, keeping elbows straight. Lower again then raise arm straight out to side like a "t" with your thumb up, keeping elbow straight. Lower again then raise your arm diagonally back with your thumb up. Lower and repeat.  Repeat 5 times each side per set. Do 1 sets per session. Do 1 sessions per day.    Copyright © [employer]. All rights " reserved.

## 2017-12-20 NOTE — PLAN OF CARE
OUTPATIENT PHYSICAL THERAPY  PHYSICAL THERAPY RE-EVALUATION    Name: Natali Galeano  Clinic Number: [id number]    Diagnosis:   Encounter Diagnosis   Name Primary?   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 Had steroid injection September 22 and it's improving. Pt is LHD and having difficulty with ADL's. PMHx of L wist pain and had injection which has resolved it. Since last re-eval, has had family issues and been unable to come to PT regularly. Has not performed HEP daily and notes no change in SX since last visit.  DOI: January 2017  Imaging, x-ray: No fracture. No lytic or blastic lesion. Joints: No evidence for glenohumeral dislocation. Mild acromioclavicular arthrosis noted. Soft tissues: Unremarkable.  Pain: current 0/10, worst 5/10, best 0/10, Tight, intermittent  Radicular symptoms: sometimes to L elbow  Aggravating factors: lifting, making a bed (improving), donning a bra (improving), combing hair, L side lying  Easing factors: none  Pts goals: The patients goal is to return to PLOF without pain or risk of re-injury.    Objective   Mental status: alert, oriented x3  Posture/ Alignment: Fair, Protracted Scapula     Selective Functional Movement Assessment (SFMA):  FN: functional, non-painful          FP: functional, painful          DP: dysfunctional, painful          DN: dysfunctional, non-painful  SCMD = Stabilization/Motor Control Dysfunction          FEDE = Tissue Extensibility Dysfunction          JMD = Joint Mobility Dysfunction    Active Cervical Flexion: FN  Active Cervical Extension: DN  Cervical Rotation:   Right: DP   Left: DN  Upper extremity pattern 1 (MRE):   Right: FN   Left: DP  Upper extremity pattern 2 (LRF):   Right: FN   Left: DP  Multi-Segmental Flexion (MSF):  DN  Multi-Segmental Extension (MSE): DP  Multi-Segmental Rotation:    Right: DN   Left: DN    FUNCTION:   - Scap Retraction: unable to perform, compensates with shoulder adduction and thoracic ext  - Scaption: min scap dyskinesia  - Bed Mobility: has difficulty with supine-->sit    Special Tests:  - Neers: left positive  - Fischer-Wei: left negative  - Lift-off: left negative  - ER Lag: left negative  - Drop Arm: left negative  - Full/Empty Can: left negative (increased pain with full can)  - Scapular Assistance Test: left positive  - Scapular Retraction Test: left positive   - Crank Test: left negative    Strength:   Right Left Comment   Lower Trap: 3-/5 3+/5    Middle Trap: 3/5 3/5    Upper Trap: 4-/5 3+/5      GIRTH:   Right Left   10 cm superior to elbow joint line: 39.0 cm 42.0 cm     Palpation:  TTP L UT and pec tightness    Joint Play:  Anteriorly displaced humeral head, with posterior and inferior hypomobility    Pt/family was provided educational information, including: role of PT, goals for PT, scheduling - pt verbalized understanding. Discussed insurance plan with pt.     Assessment   Natali is a 77 y.o. 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 Demonstrates impairments including limitations as described in the problem list. Since SOC, has improved with ADL's and function although cont to have limited A/PROM, joint hypomobility, and impaired parascapular motor control affecting shoulder pain. Natali has met some STG and is progressing poorly toward LTG due to recent absence from PT and non-compliance with HEP. Pt will continue to benefit from skilled PT intervention with regular PT attendance and good compliance with HEP. Medical Necessity is demonstrated by:  Pain limits function of effected part for some activities. Pt prognosis is Fair.  Positive prognostic factors include motivation to return to PLOF. Negative prognostic factors include obesity, SX on dominant side, and chronic pain. Pt will benefit from skilled outpatient physical therapy to address the above stated deficits, provide pt/family education, and to maximize pt's level of independence.     Pt's spiritual, cultural and educational needs considered and pt agreeable to plan of care and goals as stated below:     Anticipated Barriers for physical therapy: chronic pain    PT reviewed FOTO scores for Natali Galeano on 12/20/2017.   FOTO scores were entered into [employer] - see media section.    CMS Impairment/Limitation/Restriction for FOTO Shoulder Survey    Status  Limit   G-Code CMS Severity Modifier  Intake   48%  52%  Current Status CK - At least 40 percent but less than 60 percent  Predicted  64%  36%  Goal Status+ CJ - At least 20 percent but less than 40 percent  11/3/2017  55%  45%  Current Status CK - At least 40 percent but less than 60 percent  12/20/2017  50%  50%  Current Status CK - At least 40 percent but less than 60 percent  CATEGORY: Carry    Short Term GOALS:  In 4 weeks, pt. will:  - comb hair with LUE for 10-15 seconds without pain or compensatory movement patterns - NOT MET  - roll supine-->side lying R or L without pain to improve bed mobility with less exertion - MET 11/3/17  - return to gym to use treadmill or bicycle 15-20 min >3 days a week without increase in pain - NOT MET    Long Term GOALS:  In 8 weeks, pt. will:  - be independent with HEP and SX management - NOT MET  - comb hair with LUE for >60 seconds without pain or compensatory movement patterns - NOT MET  - roll supine-->side lying R or L without pain in usual time to improve bed mobility with no exertion - NOT MET  - return to gym 4 days a week without pain, including cardio and resistance training to decrease re-injury NOT MET    Plan   Outpatient physical therapy 1- 2 times weekly to include: pt ed, HEP,  therapeutic exercises, therapeutic activities, neuromuscular re-education/ balance exercises, manual therapy, and modalities prn. Cont PT for 1-2 more months. Pt may be seen by PTA as part of the rehabilitation team.     I certify the need for these services furnished under this plan of treatment and while under my care.    Kirti Costa, PT

## 2017-12-20 NOTE — PROGRESS NOTES
Physical Therapy Daily Note   Name: Natali Galeano  Clinic Number: [id number]    Evaluation Date: 10/02/2017  Visit #: 7 / 12  Authorization period Expiration: 12/02/2018  Plan of Care Expiration: 2/20/2018  Precautions: standard    Time In: 11:00 AM  Time Out: 11:50 AM  Total 1:1 Treatment Time: 50 min    Diagnosis:   Encounter Diagnosis   Name Primary?    Chronic left shoulder pain      Physician: Preethi Castellanos, *  Treatment Orders: PT Eval and Treat    Subjective   Pt reports: SEE RE-EVAL     Pain Scale:  5/10      Objective     Discussed Plan of Care with patient: Yes    Natali received 35 minutes of therapeutic exercise including:   Pulleys x 4 minutes  Standing wand AAROM flex and ABD x 10 each  Pendulum x 1 min  scap retraction (in mirror with PT assist) x 10  Prone Y/T/I's x 5 each LE  L UT stretch 2 x 30 sec  isometric shoulder ext into table 10 x 5 sec, cue to trunk lean  PROM flexion, ABD, ER    DID NOT PERFORM: ER/IR with yellow T-band x 10 each, each UE  BUE shoulder ext with yellow T-band x 10  BUE row with orange T-band x 10  BUE seated latt pull down x 10, green T-band  Serratus BUE wall walks on forearms, cue ER as tolerated, x 5  Wall push-up on orange ball, cue elbow flex/ext x 10  Wall ball alphabet with UE with racquet ball 1 x  (NEXT VISIT: Pallof)      Natali received 10 minutes of manual therapy including:   L AP and inferior G-H glides grade 3  L Long-axis distraction, low grade    Written Home Exercises Provided: See Patient Instructions from 12/20/2017.  Exercises were reviewed and Natali was able to demonstrate them prior to the end of the session. Pt received a written copy of exercises to perform at home. Natali demonstrated good  understanding of the education provided.     Assessment   SEE RE-EVAL  Natali is progressing well towards her goals. Will benefit from con't skilled care.    Anticipated barriers to physical therapy:  none  Pt's spiritual, cultural and educational needs considered and pt agreeable to plan of care and goals.    Plan   SEE RE-EVAL    Kirti Costa, PT

## 2018-01-03 ENCOUNTER — TELEPHONE (OUTPATIENT)
Dept: REHABILITATION | Facility: HOSPITAL | Age: 78
End: 2018-01-03

## 2018-01-05 ENCOUNTER — CLINICAL SUPPORT (OUTPATIENT)
Dept: REHABILITATION | Facility: HOSPITAL | Age: 78
End: 2018-01-05
Attending: ORTHOPAEDIC SURGERY
Payer: MEDICARE

## 2018-01-05 DIAGNOSIS — M25.512 CHRONIC LEFT SHOULDER PAIN: ICD-10-CM

## 2018-01-05 DIAGNOSIS — G89.29 CHRONIC LEFT SHOULDER PAIN: ICD-10-CM

## 2018-01-05 PROCEDURE — 97140 MANUAL THERAPY 1/> REGIONS: CPT | Mod: PO

## 2018-01-05 PROCEDURE — 97110 THERAPEUTIC EXERCISES: CPT | Mod: PO

## 2018-01-05 NOTE — PROGRESS NOTES
Physical Therapy Daily Note   Name: Natali Galeano  Clinic Number: [id number]    Evaluation Date: 10/02/2017  Visit #: 8 / 12  Authorization period Expiration: 12/02/2018  Plan of Care Expiration: 2/20/2018  Precautions: standard    Time In: 9:55 AM  Time Out: 10:48 AM  Total 1:1 Treatment Time: 53 min    Diagnosis:   Encounter Diagnosis   Name Primary?    Chronic left shoulder pain      Physician: Preethi Castellanos, *  Treatment Orders: PT Eval and Treat    Subjective   Pt reports: Shoulder feeling better today.    Pain Scale:  5/10      Objective     Discussed Plan of Care with patient: Yes    Natali received 45 minutes of therapeutic exercise including:   Pulleys x 4 minutes  Standing wand AAROM flex and ABD x 10 each  Pendulum x 1 min  scap retraction (in mirror with PT assist) x 10  Prone Y/T/I's x 5 each LE  L UT stretch 2 x 30 sec  isometric shoulder ext into table 10 x 5 sec, cue to trunk lean  PROM flexion, ABD, ER (guarding with ER)  ER/IR with yellow T-band x 10 each, each UE  BUE shoulder ext with yellow T-band x 10  BUE row with orange T-band x 10  BUE seated latt pull down x 10, green T-band  Pallof press x 10 each way, orange T-band  Serratus BUE wall walks on forearms, cue ER as tolerated, x 5  Wall push-up on orange ball, cue elbow flex/ext x 10  Wall ball alphabet with UE with racquet ball 1 x      Natali received 8 minutes of manual therapy including:   L AP and inferior G-H glides grade 3  L Long-axis distraction, low grade    Written Home Exercises Provided: See Patient Instructions from 1/5/2018.  Exercises were reviewed and Natali was able to demonstrate them prior to the end of the session. Pt received a written copy of exercises to perform at home. Natali demonstrated good  understanding of the education provided.     Assessment   Cont to have guarding and limited PROM with ER at neutral and in ABD. Limited ER AROM is affecting her ability to  perform wall slides and T-band PRE's.  Natali is progressing well towards her goals. Will benefit from con't skilled care.    Anticipated barriers to physical therapy: none  Pt's spiritual, cultural and educational needs considered and pt agreeable to plan of care and goals.    Plan   Cont per original POC.    Kirti Costa, PT

## 2018-01-10 ENCOUNTER — CLINICAL SUPPORT (OUTPATIENT)
Dept: REHABILITATION | Facility: HOSPITAL | Age: 78
End: 2018-01-10
Attending: ORTHOPAEDIC SURGERY
Payer: MEDICARE

## 2018-01-10 DIAGNOSIS — M25.512 CHRONIC LEFT SHOULDER PAIN: ICD-10-CM

## 2018-01-10 DIAGNOSIS — G89.29 CHRONIC LEFT SHOULDER PAIN: ICD-10-CM

## 2018-01-10 PROCEDURE — 97110 THERAPEUTIC EXERCISES: CPT | Mod: PO

## 2018-01-10 NOTE — PROGRESS NOTES
Physical Therapy Daily Note   Name: Natali Galeano  Clinic Number: [id number]    Evaluation Date: 10/02/2017  Visit #: 9 / 12  Authorization period Expiration: 12/02/2018  Plan of Care Expiration: 2/20/2018  Precautions: standard    Time In: 10:50 AM  Time Out: 11:40 AM  Total 1:1 Treatment Time: 50 min    Diagnosis:   Encounter Diagnosis   Name Primary?    Chronic left shoulder pain      Physician: Preethi Castellanos, *  Treatment Orders: PT Eval and Treat    Subjective   Pt reports: Shoulder feeling better today.    Pain Scale:  5/10      Objective     Discussed Plan of Care with patient: Yes    Natali received 42 minutes of therapeutic exercise including:   Pulleys x 4 minutes  Standing wand AAROM flex and ABD x 10 each  Pendulum x 1 min  scap retraction (in mirror with PT assist) x 10  L UT stretch 2 x 30 sec  isometric shoulder ext into table 10 x 5 sec, cue to trunk lean  Prone Y/T/I's x 5 each LE  PROM flexion, ABD, ER (guarding with ER)  ER/IR with yellow T-band x 10 each, each UE  BUE shoulder ext with yellow T-band x 10  BUE row with orange T-band 2 x 10  BUE seated latt pull down x 10, green T-band  Pallof press x 10 each way, orange T-band  Serratus BUE wall walks on forearms, cue ER as tolerated, x 5  Wall push-up on orange ball, cue elbow flex/ext x 10  Wall ball alphabet with UE with racquet ball 1 x      Natali received 8 minutes of manual therapy including:   L AP and inferior G-H glides grade 3  L Long-axis distraction, low grade    Written Home Exercises Provided: See Patient Instructions from 1/10/2018.  Exercises were reviewed and Natali was able to demonstrate them prior to the end of the session. Pt received a written copy of exercises to perform at home. Natali demonstrated good  understanding of the education provided.     Assessment   Cont to have guarding and limited PROM with ER at neutral and in ABD but improved with manual anterior  blocking of humeral head. Limited ER AROM and impaired endurance are affecting her ability to perform wall slides and T-band PRE's. Pt was adequately fatigued with current treatment so therex was unable to be progressed.  Natali is progressing well towards her goals. Will benefit from con't skilled care.    Anticipated barriers to physical therapy: none  Pt's spiritual, cultural and educational needs considered and pt agreeable to plan of care and goals.    Plan   Cont per original POC.    Kirti Costa, PT     PT/PTA met face to face to discuss patient's treatment plan and progress towards established goals.  Treatment will be continued as described in initial report/eval and progress notes.  Patient will be seen by physical therapist every sixth visit and minimally once per month.    Additional information: Treating PT will transition to Treva Mccoy.

## 2018-01-22 ENCOUNTER — CLINICAL SUPPORT (OUTPATIENT)
Dept: REHABILITATION | Facility: HOSPITAL | Age: 78
End: 2018-01-22
Attending: ORTHOPAEDIC SURGERY
Payer: MEDICARE

## 2018-01-22 DIAGNOSIS — M25.512 CHRONIC LEFT SHOULDER PAIN: Primary | ICD-10-CM

## 2018-01-22 DIAGNOSIS — G89.29 CHRONIC LEFT SHOULDER PAIN: Primary | ICD-10-CM

## 2018-01-22 PROCEDURE — 97110 THERAPEUTIC EXERCISES: CPT | Mod: PO

## 2018-01-22 NOTE — PROGRESS NOTES
"                                                    Physical Therapy Daily Note   Name: Natali Galeano  Clinic Number: [id number]    Evaluation Date: 10/02/2017  Visit #: 10 / 12  Authorization period Expiration: 12/02/2018  Plan of Care Expiration: 2/20/2018  Precautions: standard    Time In: 08:00 AM  Time Out: 09:00 AM  Total 1:1 Treatment Time: 50 min    Diagnosis:   Encounter Diagnosis   Name Primary?    Chronic left shoulder pain Yes     Physician: Preethi Castellanos, *  Treatment Orders: PT Eval and Treat    Subjective   Pt reports: "It seems to be getting better"    Pain Scale:  3/10      Objective     Discussed Plan of Care with patient: Yes    Natali received 42 minutes of therapeutic exercise including:     Pulleys x 4 minutes  Standing wand AAROM flex and ABD x 10 each  Pendulum x 1 min  scap retraction (in mirror with PT assist) x 10  L UT stretch 2 x 30 sec  isometric shoulder ext into table 10 x 5 sec, cue to trunk lean  Prone Y/T/I's x 5 each LE  PROM flexion, ABD, ER (guarding wi th ER)  ER/IR with yellow T-band x 10 each, each UE  BUE shoulder ext with yellow T-band x 10  BUE row with orange T-band 2 x 10  BUE seated latt pull down x 10, green T-band  Pallof press x 10 each way, orange T-band  Serratus BUE wall walks on forearms, cue ER as tolerated, x 5  Wall push-up on orange ball, cue elbow flex/ext x 10  Wall ball alphabet with UE with racquet ball 1 x      Natali received 8 minutes of manual therapy including:   L AP and inferior G-H glides grade 3  L Long-axis distraction, low grade    Written Home Exercises Provided: See Patient Instructions from 1/22/2018.  Exercises were reviewed and Natali was able to demonstrate them prior to the end of the session. Pt received a written copy of exercises to perform at home. Natali demonstrated good  understanding of the education provided.     Assessment   Patient tolerated therapy session fairly well. Patient with max fatigue post treatment session. Cues " with therex to prevent upper trap compensation.    Natali is progressing well towards her goals. Will benefit from con't skilled care.    Anticipated barriers to physical therapy: none  Pt's spiritual, cultural and educational needs considered and pt agreeable to plan of care and goals.    Plan   Cont per original POC.    Marylou Lamb, PTA

## 2018-01-24 ENCOUNTER — CLINICAL SUPPORT (OUTPATIENT)
Dept: REHABILITATION | Facility: HOSPITAL | Age: 78
End: 2018-01-24
Attending: ORTHOPAEDIC SURGERY
Payer: MEDICARE

## 2018-01-24 DIAGNOSIS — G89.29 CHRONIC LEFT SHOULDER PAIN: Primary | ICD-10-CM

## 2018-01-24 DIAGNOSIS — M25.512 CHRONIC LEFT SHOULDER PAIN: Primary | ICD-10-CM

## 2018-01-24 PROCEDURE — 97110 THERAPEUTIC EXERCISES: CPT | Mod: PO

## 2018-01-24 NOTE — PROGRESS NOTES
"                                                    Physical Therapy Daily Note   Name: Natali Galeano  Clinic Number: [id number]    Evaluation Date: 10/02/2017  Visit #: 11 / 12  Authorization period Expiration: 12/02/2018  Plan of Care Expiration: 2/20/2018  Precautions: standard    Time In: 09:00 AM  Time Out: 10:00 AM  Total 1:1 Treatment Time: 60 min    Diagnosis:   Encounter Diagnosis   Name Primary?    Chronic left shoulder pain Yes     Physician: Preethi Castellanos, *  Treatment Orders: PT Eval and Treat    Subjective   Pt reports: "It seems to be getting better"    Pain Scale:  3/10      Objective     Discussed Plan of Care with patient: Yes    Natali received 42 minutes of therapeutic exercise including:     Pulleys x 4 minutes  Standing wand AAROM flex and ABD x 10 each  Pendulum x 1 min  scap retraction (in mirror with PT assist) x 10  L UT stretch 2 x 30 sec  isometric shoulder ext into table 10 x 5 sec, cue to trunk lean  Prone Y/T/I's x 5 each LE  PROM flexion, ABD, ER (guarding wi th ER)  ER/IR with yellow T-band x 10 each, each UE  BUE shoulder ext with yellow T-band x 10  BUE row with orange T-band 2 x 10  BUE seated latt pull down x 10, green T-band  Pallof press x 10 each way, orange T-band  Serratus BUE wall walks on forearms, cue ER as tolerated, x 5  Wall push-up on orange ball, cue elbow flex/ext x 10  Wall ball alphabet with UE with racquet ball 1 x      Natali received 8 minutes of manual therapy including:   L AP and inferior G-H glides grade 3  L Long-axis distraction, low grade    Written Home Exercises Provided: See Patient Instructions from 1/24/2018.  Exercises were reviewed and Natali was able to demonstrate them prior to the end of the session. Pt received a written copy of exercises to perform at home. Natali demonstrated good  understanding of the education provided.     Assessment   Patient tolerated therapy session fairly well. Patient with max fatigue post treatment session. Cues " with therex to prevent upper trap compensation.    Natali is progressing well towards her goals. Will benefit from con't skilled care.    Anticipated barriers to physical therapy: none  Pt's spiritual, cultural and educational needs considered and pt agreeable to plan of care and goals.    Plan   Cont per original POC.    Marylou Lamb, PTA

## 2018-02-14 ENCOUNTER — DOCUMENTATION ONLY (OUTPATIENT)
Dept: REHABILITATION | Facility: HOSPITAL | Age: 78
End: 2018-02-14

## 2018-02-14 PROBLEM — M25.512 CHRONIC LEFT SHOULDER PAIN: Status: RESOLVED | Noted: 2017-10-02 | Resolved: 2018-02-14

## 2018-02-14 PROBLEM — G89.29 CHRONIC LEFT SHOULDER PAIN: Status: RESOLVED | Noted: 2017-10-02 | Resolved: 2018-02-14

## 2018-02-14 NOTE — PROGRESS NOTES
Patient was seen for 11 outpatient PT visits from 10/2/2017 to 1/24/2018. Pt missed/no showed 5 scheduled sessions. Treatment included: evaluation, HEP, pt education, manual therapy, ther ex.   Pt. did not return for follow up sessions. PT unable to fully assess goal achievement, and patient is discharged from outpatient PT Services.      Short Term GOALS:  In 4 weeks, pt. will:  - comb hair with LUE for 10-15 seconds without pain or compensatory movement patterns - NOT MET  - roll supine-->side lying R or L without pain to improve bed mobility with less exertion - MET 11/3/17  - return to gym to use treadmill or bicycle 15-20 min >3 days a week without increase in pain - NOT MET     Long Term GOALS:  In 8 weeks, pt. will:  - be independent with HEP and SX management - NOT MET  - comb hair with LUE for >60 seconds without pain or compensatory movement patterns - NOT MET  - roll supine-->side lying R or L without pain in usual time to improve bed mobility with no exertion - NOT MET  - return to gym 4 days a week without pain, including cardio and resistance training to decrease re-injury NOT MET

## 2018-05-08 ENCOUNTER — OFFICE VISIT (OUTPATIENT)
Dept: OBSTETRICS AND GYNECOLOGY | Facility: CLINIC | Age: 78
End: 2018-05-08
Payer: MEDICARE

## 2018-05-08 VITALS
WEIGHT: 281.44 LBS | SYSTOLIC BLOOD PRESSURE: 124 MMHG | DIASTOLIC BLOOD PRESSURE: 82 MMHG | HEIGHT: 67 IN | BODY MASS INDEX: 44.17 KG/M2

## 2018-05-08 DIAGNOSIS — Z87.42 H/O ABNORMAL CERVICAL PAPANICOLAOU SMEAR: Primary | ICD-10-CM

## 2018-05-08 DIAGNOSIS — N89.8 VAGINAL DISCHARGE: ICD-10-CM

## 2018-05-08 LAB
CANDIDA RRNA VAG QL PROBE: NEGATIVE
G VAGINALIS RRNA GENITAL QL PROBE: NEGATIVE
T VAGINALIS RRNA GENITAL QL PROBE: NEGATIVE

## 2018-05-08 PROCEDURE — 88175 CYTOPATH C/V AUTO FLUID REDO: CPT

## 2018-05-08 PROCEDURE — 87624 HPV HI-RISK TYP POOLED RSLT: CPT

## 2018-05-08 PROCEDURE — 87510 GARDNER VAG DNA DIR PROBE: CPT

## 2018-05-08 PROCEDURE — 99213 OFFICE O/P EST LOW 20 MIN: CPT | Mod: S$GLB,,, | Performed by: OBSTETRICS & GYNECOLOGY

## 2018-05-08 PROCEDURE — 3079F DIAST BP 80-89 MM HG: CPT | Mod: CPTII,S$GLB,, | Performed by: OBSTETRICS & GYNECOLOGY

## 2018-05-08 PROCEDURE — 87480 CANDIDA DNA DIR PROBE: CPT

## 2018-05-08 PROCEDURE — 3074F SYST BP LT 130 MM HG: CPT | Mod: CPTII,S$GLB,, | Performed by: OBSTETRICS & GYNECOLOGY

## 2018-05-08 PROCEDURE — 88305 TISSUE EXAM BY PATHOLOGIST: CPT | Mod: 26,,, | Performed by: PATHOLOGY

## 2018-05-08 PROCEDURE — 99999 PR PBB SHADOW E&M-EST. PATIENT-LVL III: CPT | Mod: PBBFAC,,, | Performed by: OBSTETRICS & GYNECOLOGY

## 2018-05-08 PROCEDURE — 88305 TISSUE EXAM BY PATHOLOGIST: CPT | Performed by: PATHOLOGY

## 2018-05-08 NOTE — PROGRESS NOTES
Patient, Natali Galeano (MRN #[id number]), presented with a recorded BMI of 44.08 kg/m^2 consistent with the definition of morbid obesity (ICD-10 E66.01). The patient's morbid obesity was monitored. This addendum to the medical record is made on 05/08/2018.

## 2018-05-11 ENCOUNTER — TELEPHONE (OUTPATIENT)
Dept: OBSTETRICS AND GYNECOLOGY | Facility: CLINIC | Age: 78
End: 2018-05-11

## 2018-05-11 LAB
HPV16 AG SPEC QL: NEGATIVE
HPV16+18+H RISK 12 DNA CVX-IMP: NEGATIVE
HPV18 DNA SPEC QL NAA+PROBE: NEGATIVE

## 2018-05-11 NOTE — TELEPHONE ENCOUNTER
----- Message from Linda Shelley MD sent at 5/11/2018  1:13 PM CDT -----  Please call patient and let her know that her pap smear result was normal as was her biopsy. Let her know that we recommend a repeat annual exam in August and then she will be back to routine screening.

## 2018-05-11 NOTE — TELEPHONE ENCOUNTER
Patient notified of normal pap and biopsy results per Dr. Shelley, verbalized understanding. Will call back to schedule annual examination for August 2018.

## 2018-06-26 NOTE — PLAN OF CARE
Problem: Patient Care Overview  Goal: Plan of Care Review  Outcome: Ongoing (interventions implemented as appropriate)  Patient free of falls or injury since arrival to floor.  Positions self and ambulates independently.  Pain well controlled with prn medication.  Remains NPO as ordered.  IVF maintained.  Bed low and locked, call bell in reach, side rails x 2.  Pt resting comfortably in bed, no other complaints at this time.  Will continue to monitor.       oral

## 2018-07-25 NOTE — PROGRESS NOTES
"Subjective:       Natali Galeano is a 77 y.o. AAF who comes in today for a repeat pap smear. Her most recent Pap smear was on 8/24/2017 and showed ASCUS favor neoplasia. Patient does not have a history of abnormal pap smears. She has had a colposcopy before in the OR on 10/24 which showed no dysplasia, though transformation zone was not seen in any specimen. Benign endocervical polyp was present and benign endometrium noted. No LMP recorded. Patient is postmenopausal. She has not had any further bleeding or discharge since her surgery.    Social History     Social History    Marital status: Single     Spouse name: N/A    Number of children: N/A    Years of education: N/A     Occupational History    Not on file.     Social History Main Topics    Smoking status: Former Smoker     Packs/day: 0.10     Quit date: 4/16/1980    Smokeless tobacco: Never Used    Alcohol use No    Drug use: No    Sexual activity: Not on file     Other Topics Concern    Not on file     Social History Narrative    No narrative on file       Review of Systems    GENERAL: Denies weight gain or weight loss. Feeling well overall.   SKIN: Denies rash or lesions.   ABDOMEN: Denies abdominal pain, constipation, diarrhea, nausea, vomiting, change in appetite or rectal bleeding.   URINARY: Denies frequency, dysuria, hematuria.  GYN: See HPI.      Objective:     Vitals:    05/08/18 0845   BP: 124/82   Weight: 127.7 kg (281 lb 6.7 oz)   Height: 5' 7" (1.702 m)   PainSc: 0-No pain     Body mass index is 44.08 kg/m².    General: No acute distress, alert and engaged  Abdomen: Soft, non-tender, non-distended, suprapubic tenderness absent  Pelvis: External genitalia and urethra within normal limits. Vagina without lesions, with frothy discharge, without erythema, without ulcers.  Cervix non-friable.     Assessment:     H/O abnormal cervical Papanicolaou smear  -     Liquid-based pap smear, screening  -     HPV High Risk Genotypes, PCR  -     Tissue " Specimen To Pathology, Obstetrics/Gynecology    Vaginal discharge  -     Vaginosis Screen by DNA Probe             Handoff

## 2018-08-24 ENCOUNTER — HOSPITAL ENCOUNTER (OUTPATIENT)
Dept: RADIOLOGY | Facility: OTHER | Age: 78
Discharge: HOME | End: 2018-08-24
Attending: OBSTETRICS & GYNECOLOGY
Payer: MEDICARE

## 2018-08-24 ENCOUNTER — OFFICE VISIT (OUTPATIENT)
Dept: OBSTETRICS AND GYNECOLOGY | Facility: CLINIC | Age: 78
End: 2018-08-24
Payer: MEDICARE

## 2018-08-24 VITALS
SYSTOLIC BLOOD PRESSURE: 124 MMHG | BODY MASS INDEX: 45.24 KG/M2 | HEIGHT: 67 IN | DIASTOLIC BLOOD PRESSURE: 80 MMHG | WEIGHT: 288.25 LBS

## 2018-08-24 DIAGNOSIS — Z13.820 ENCOUNTER FOR SCREENING FOR OSTEOPOROSIS: ICD-10-CM

## 2018-08-24 DIAGNOSIS — Z78.0 ASYMPTOMATIC MENOPAUSAL STATE: ICD-10-CM

## 2018-08-24 DIAGNOSIS — Z12.11 COLON CANCER SCREENING: ICD-10-CM

## 2018-08-24 DIAGNOSIS — Z12.4 ENCOUNTER FOR SCREENING FOR CERVICAL CANCER: ICD-10-CM

## 2018-08-24 DIAGNOSIS — Z12.39 BREAST CANCER SCREENING: ICD-10-CM

## 2018-08-24 DIAGNOSIS — Z87.42 H/O ABNORMAL CERVICAL PAPANICOLAOU SMEAR: Primary | ICD-10-CM

## 2018-08-24 PROCEDURE — G0101 CA SCREEN;PELVIC/BREAST EXAM: HCPCS | Mod: S$GLB,,, | Performed by: OBSTETRICS & GYNECOLOGY

## 2018-08-24 PROCEDURE — 99999 PR PBB SHADOW E&M-EST. PATIENT-LVL IV: CPT | Mod: PBBFAC,,, | Performed by: OBSTETRICS & GYNECOLOGY

## 2018-08-24 PROCEDURE — 88175 CYTOPATH C/V AUTO FLUID REDO: CPT

## 2018-08-24 PROCEDURE — 77080 DXA BONE DENSITY AXIAL: CPT | Mod: 26,,, | Performed by: INTERNAL MEDICINE

## 2018-08-24 PROCEDURE — 87624 HPV HI-RISK TYP POOLED RSLT: CPT

## 2018-08-24 PROCEDURE — 77080 DXA BONE DENSITY AXIAL: CPT | Mod: TC

## 2018-08-24 RX ORDER — ALLOPURINOL 300 MG/1
1 TABLET ORAL
COMMUNITY
Start: 2018-05-10 | End: 2018-11-10

## 2018-08-24 RX ORDER — ATENOLOL 100 MG/1
TABLET ORAL
COMMUNITY
Start: 2018-07-23 | End: 2020-01-10 | Stop reason: ALTCHOICE

## 2018-08-24 NOTE — PROGRESS NOTES
"Chief Complaint: Well Woman Exam     HPI:      Natali Galeano is a 77 y.o. AAF who presents today for well woman exam.  LMP: No LMP recorded. Patient is postmenopausal.  No issues, problems, or complaints. Specifically, patient denies abnormal vaginal bleeding, discharge, pelvic pain, urinary problems, or changes in appetite. Ms. Galeano is not currently sexually active.     Previous Pap:  NILM, HPV negative (5/11/2018), Atypical cells favoring neoplasia (8/2017) (D&C and colpo with negative path)  Previous Mammogram: Normal per patient in fall 2017, has them done at [facility]  Most Recent Dexa: Has never had one  Colonoscopy: Normal per patient, due for next one in 2019.    Ms. Galeano confirms that she wears her seatbelt when riding in the car and does not text while driving.     OB History     No data available        ROS:     GENERAL: Denies unintentional weight gain or weight loss. Feeling well overall.   SKIN: Denies rash or lesions.   HEENT: Denies headaches, or vision changes.   CARDIOVASCULAR: Denies palpitations or chest pain.   RESPIRATORY: Denies shortness of breath or dyspnea on exertion.  BREASTS: Denies pain, lumps, or nipple discharge.   ABDOMEN: Denies abdominal pain, constipation, diarrhea, nausea, vomiting, change in appetite.  URINARY: Denies frequency, dysuria, hematuria.  NEUROLOGIC: Denies syncope or weakness.   PSYCHIATRIC: Denies depression, anxiety or mood swings.    Physical Exam:      PHYSICAL EXAM:  /80   Ht 5' 7" (1.702 m)   Wt 130.7 kg (288 lb 4 oz)   BMI 45.15 kg/m²   Body mass index is 45.15 kg/m².     APPEARANCE: Well nourished, well developed, in no acute distress.  PSYCH: Appropriate mood and affect.  SKIN: No acne or hirsutism  NECK: Neck symmetric without masses or thyromegaly  NODES: No inguinal, axillary, or supraclavicular lymph node enlargement  ABDOMEN: Soft.  No tenderness or masses.    CARDIOVASCULAR: No edema of peripheral extremities  BREASTS: Symmetrical, no skin changes " or visible lesions.  No palpable masses or nipple discharge bilaterally.  PELVIC: Normal external genitalia without lesions.  Normal hair distribution.  Adequate perineal body, normal urethral meatus.  Vagina moist and smooth without lesions or discharge.  Cervix pink, without lesions, discharge or tenderness.  No significant cystocele or rectocele.  Bimanual exam limited due to body habitus, but without any palpable masses or tenderness.        Assessment/Plan:     H/O abnormal cervical Papanicolaou smear  -     HPV High Risk Genotypes, PCR  -     Liquid-based pap smear, screening    Encounter for screening for cervical cancer     Breast cancer screening  -     Mammo Digital Screening Bilat with Tomosynthesis CAD; Future; Expected date: 08/24/2018    Encounter for screening for osteoporosis  -     DXA Bone Density Spine And Hip; Future; Expected date: 08/24/2018    Colon cancer screening  -     Ambulatory consult to Gastroenterology    Asymptomatic menopausal state   -     DXA Bone Density Spine And Hip; Future; Expected date: 08/24/2018      Counseling:     Patient was counseled today on current ASCCP pap guidelines, the recommendation for yearly pelvic exams, healthy diet and exercise routines, breast self awareness and annual mammograms.She is to see her PCP for other health maintenance.     Use of the [facility] Patient Portal discussed and encouraged during today's visit.

## 2018-08-30 LAB
HPV HR 12 DNA CVX QL NAA+PROBE: NEGATIVE
HPV16 AG SPEC QL: NEGATIVE
HPV18 DNA SPEC QL NAA+PROBE: NEGATIVE

## 2018-09-11 ENCOUNTER — TELEPHONE (OUTPATIENT)
Dept: OBSTETRICS AND GYNECOLOGY | Facility: CLINIC | Age: 78
End: 2018-09-11

## 2018-09-11 NOTE — TELEPHONE ENCOUNTER
----- Message from Linda Shelley MD sent at 8/31/2018  9:53 AM CDT -----  Please call patient and let her know that her pap smear result was normal. Let her know that we recommend a repeat annual exam in 1 year.

## 2018-11-06 ENCOUNTER — HOSPITAL ENCOUNTER (OUTPATIENT)
Dept: RADIOLOGY | Facility: OTHER | Age: 78
Discharge: HOME | End: 2018-11-06
Attending: UROLOGY
Payer: MEDICARE

## 2018-11-06 ENCOUNTER — OFFICE VISIT (OUTPATIENT)
Dept: UROLOGY | Facility: CLINIC | Age: 78
End: 2018-11-06
Payer: MEDICARE

## 2018-11-06 VITALS
SYSTOLIC BLOOD PRESSURE: 158 MMHG | DIASTOLIC BLOOD PRESSURE: 75 MMHG | HEART RATE: 64 BPM | BODY MASS INDEX: 44.93 KG/M2 | WEIGHT: 286.25 LBS | HEIGHT: 67 IN

## 2018-11-06 DIAGNOSIS — N28.1 COMPLEX RENAL CYST: Primary | ICD-10-CM

## 2018-11-06 DIAGNOSIS — N28.1 COMPLEX RENAL CYST: ICD-10-CM

## 2018-11-06 LAB
BILIRUB SERPL-MCNC: NORMAL MG/DL
BLOOD URINE, POC: NORMAL
COLOR, POC UA: NORMAL
GLUCOSE UR QL STRIP: NORMAL
KETONES UR QL STRIP: NORMAL
LEUKOCYTE ESTERASE URINE, POC: NORMAL
NITRITE, POC UA: NORMAL
PH, POC UA: 5
PROTEIN, POC: NORMAL
SPECIFIC GRAVITY, POC UA: 1.01
UROBILINOGEN, POC UA: NORMAL

## 2018-11-06 PROCEDURE — 1101F PT FALLS ASSESS-DOCD LE1/YR: CPT | Mod: CPTII,S$GLB,, | Performed by: UROLOGY

## 2018-11-06 PROCEDURE — 76770 US EXAM ABDO BACK WALL COMP: CPT | Mod: TC

## 2018-11-06 PROCEDURE — 3077F SYST BP >= 140 MM HG: CPT | Mod: CPTII,S$GLB,, | Performed by: UROLOGY

## 2018-11-06 PROCEDURE — 3078F DIAST BP <80 MM HG: CPT | Mod: CPTII,S$GLB,, | Performed by: UROLOGY

## 2018-11-06 PROCEDURE — 76770 US EXAM ABDO BACK WALL COMP: CPT | Mod: 26,,, | Performed by: RADIOLOGY

## 2018-11-06 PROCEDURE — 81002 URINALYSIS NONAUTO W/O SCOPE: CPT | Mod: S$GLB,,, | Performed by: UROLOGY

## 2018-11-06 PROCEDURE — 99213 OFFICE O/P EST LOW 20 MIN: CPT | Mod: 25,S$GLB,, | Performed by: UROLOGY

## 2018-11-06 NOTE — PROGRESS NOTES
"Subjective:      Natali Galeano is a 77 y.o. female who returns today regarding her     One year status post partial nephrectomy for complex renal cyst.    No  complaints    The following portions of the patient's history were reviewed and updated as appropriate: allergies, current medications, past family history, past medical history, past social history, past surgical history and problem list.    Review of Systems  Pertinent items are noted in HPI.  A comprehensive multipoint review of systems was negative except as otherwise stated in the HPI.     Objective:   Vitals: BP (!) 158/75 (BP Location: Right arm, Patient Position: Sitting, BP Method: Large (Automatic))   Pulse 64   Ht 5' 7" (1.702 m)   Wt 129.9 kg (286 lb 4.3 oz)   BMI 44.84 kg/m²     Physical Exam   General: alert and oriented, no acute distress  Respiratory: Symmetric expansion, non-labored breathing  Cardiovascular: normal to inspection  Abdomen: non distended   Skin: normal coloration and turgor, no rashes, no suspicious skin lesions noted  Neuro: no gross deficits  Psych: normal judgment and insight, normal mood/affect and non-anxious    Physical Exam    Lab Review   Urinalysis demonstrates negative for all components  Lab Results   Component Value Date    WBC 9.68 06/22/2017    HGB 11.8 (L) 06/22/2017    HCT 37.7 06/22/2017    MCV 83 06/22/2017     (L) 06/22/2017     Lab Results   Component Value Date    CREATININE 0.9 11/07/2017    BUN 13 11/07/2017     SPECIMEN  1) Fat over cyst  2) Left renal tumor  FINAL PATHOLOGIC DIAGNOSIS  1. ADIPOSE TISSUE: NO EVIDENCE OF MALIGNANCY.  2. KIDNEY: BENIGN FIBROTIC CYST WITH CHRONIC INFLAMMATION AND DYSTROPHIC CALCIFICATIONS;  SURROUNDING RENAL TISSUE WITH GLOMERULOSCLEROSIS.  [facility] [place]  Diagnosed by: Brandon Rasheed M.D.  (Electronically Signed: 2017-06-27 08:06:07)  Imaging  FINDINGS:  Right kidney: The right kidney measures 10.2 cm. No cortical thinning. No loss of corticomedullary distinction. "  Decreased perfusion.  Resistive index measures 0.86.  No mass. No renal stone. No hydronephrosis.    Left kidney: Partial left upper pole nephrectomy.  No mass is visualized.  The left kidney measures 8.8 cm. No cortical thinning. No loss of corticomedullary distinction.  Decreased perfusion.  Resistive index measures 0.80. No renal stone. No hydronephrosis.      Impression       Findings consistent with medical renal disease.      Electronically signed by: Lg Cook MD  Date: 11/06/2018  Time: 09:59       Assessment and Plan:   Complex renal cyst resolved status post partial nephrectomy    Other orders  -     POCT URINE DIPSTICK WITHOUT MICROSCOPE    Follow-up with primary care physician for hypertension and chronic kidney disease.  Return to Urology Clinic only if necessary

## 2018-12-07 NOTE — DISCHARGE INSTRUCTIONS
PRE-ADMIT TESTING -  [phone]    [address]  [facility]        OUTPATIENT SURGERY UNIT - [phone]    Your surgery has been scheduled at [facility]. We are pleased to have the opportunity to serve you. For Further Information please call [phone].    On the day of surgery please report to the Information Desk on the 1st floor.    · CONTACT YOUR PHYSICIAN'S OFFICE THE DAY PRIOR TO YOUR SURGERY TO OBTAIN YOUR ARRIVAL TIME.     · The evening before surgery do not eat anything after 9 p.m. ( this includes hard candy, chewing gum and mints).  You may only have GATORADE, POWERADE AND WATER  from 9 p.m. until you leave your home.   DO NOT DRINK ANY LIQUIDS ON THE WAY TO THE HOSPITAL.      SPECIAL MEDICATION INSTRUCTIONS: TAKE medications checked off by the Anesthesiologist on your Medication List.    Angiogram Patients: Take medications as instructed by your physician, including aspirin.     Surgery Patients:    If you take ASPIRIN - Your PHYSICIAN/SURGEON will need to inform you IF/OR when you need to stop taking aspirin prior to your surgery.     Do Not take any medications containing IBUPROFEN.  Do Not Wear any make-up or dark nail polish   (especially eye make-up) to surgery. If you come to surgery with makeup on you will be required to remove the makeup or nail polish.    Do not shave your surgical area at least 5 days prior to your surgery. The surgical prep will be performed at the hospital according to Infection Control regulations.    Leave all valuables at home.   Do Not wear any jewelry or watches, including any metal in body piercings.  Contact Lens must be removed before surgery. Either do not wear the contact lens or bring a case and solution for storage.  Please bring a container for eyeglasses or dentures as required.  Bring any paperwork your physician has provided, such as consent forms,  history and physicals, doctor's orders, etc.   Bring comfortable clothes  Telephone Encounter by Britt Quijano [employer] at 01/13/17 09:26 AM     Author:  Britt Quijano [employer] Service:  (none) Author Type:  Certified Medical Assistant     Filed:  01/13/17 09:26 AM Encounter Date:  1/11/2017 Status:  Signed     :   Britt Quijano [employer] (Certified Medical Assistant)            Medication filled per message below.[RW1.1M]            Revision History        User Key Date/Time User Provider Type Action    > RW1.1 01/13/17 09:26 AM Britt Quijano [employer] Certified Medical Assistant Sign    M - Manual that are loose fitting to wear upon discharge. Take into consideration the type of surgery being performed.  Maintain your diet as advised per your physician the day prior to surgery.      Adequate rest the night before surgery is advised.   Park in the Parking lot behind the hospital or in the [facility] across the street from the parking lot. Parking is complimentary.  If you will be discharged the same day as your procedure, please arrange for a responsible adult to drive you home or to accompany you if traveling by taxi.   YOU WILL NOT BE PERMITTED TO DRIVE OR TO LEAVE THE HOSPITAL ALONE AFTER SURGERY.   It is strongly recommended that you arrange for someone to remain with you for the first 24 hrs following your surgery.       Thank you for your cooperation.  The Staff of [facility].        Bathing Instructions                                                                 Please shower the evening before and morning of your procedure with    ANTIBACTERIAL SOAP. ( DIAL, etc )  Concentrate on the surgical area   for at least 3 minutes and rinse completely. Dry off as usual.   Do not use any deodorant, powder, body lotions, perfume, after shave or    cologne.

## 2019-02-23 ENCOUNTER — HOSPITAL ENCOUNTER (INPATIENT)
Facility: OTHER | Age: 79
LOS: 4 days | Discharge: HOME | DRG: 389 | End: 2019-02-27
Attending: EMERGENCY MEDICINE | Admitting: HOSPITALIST
Payer: MEDICARE

## 2019-02-23 DIAGNOSIS — R10.9 ABDOMINAL PAIN: ICD-10-CM

## 2019-02-23 DIAGNOSIS — E66.01 MORBID OBESITY DUE TO EXCESS CALORIES: ICD-10-CM

## 2019-02-23 DIAGNOSIS — K56.600 PARTIAL SMALL BOWEL OBSTRUCTION: Primary | ICD-10-CM

## 2019-02-23 DIAGNOSIS — K56.609 SBO (SMALL BOWEL OBSTRUCTION): ICD-10-CM

## 2019-02-23 PROBLEM — M25.561 RIGHT KNEE PAIN: Status: ACTIVE | Noted: 2019-02-23

## 2019-02-23 PROBLEM — G47.33 OSA (OBSTRUCTIVE SLEEP APNEA): Status: ACTIVE | Noted: 2019-02-23

## 2019-02-23 PROBLEM — E86.0 DEHYDRATION: Status: ACTIVE | Noted: 2019-02-23

## 2019-02-23 LAB
ALBUMIN SERPL BCP-MCNC: 4 G/DL
ALP SERPL-CCNC: 103 U/L
ALT SERPL W/O P-5'-P-CCNC: 11 U/L
ANION GAP SERPL CALC-SCNC: 14 MMOL/L
AST SERPL-CCNC: 22 U/L
BACTERIA #/AREA URNS HPF: ABNORMAL /HPF
BASOPHILS # BLD AUTO: 0.01 K/UL
BASOPHILS NFR BLD: 0.1 %
BILIRUB SERPL-MCNC: 0.8 MG/DL
BILIRUB UR QL STRIP: NEGATIVE
BUN SERPL-MCNC: 10 MG/DL
CALCIUM SERPL-MCNC: 10.5 MG/DL
CHLORIDE SERPL-SCNC: 99 MMOL/L
CLARITY UR: CLEAR
CO2 SERPL-SCNC: 21 MMOL/L
COLOR UR: YELLOW
CREAT SERPL-MCNC: 0.8 MG/DL
DIFFERENTIAL METHOD: ABNORMAL
EOSINOPHIL # BLD AUTO: 0 K/UL
EOSINOPHIL NFR BLD: 0.1 %
ERYTHROCYTE [DISTWIDTH] IN BLOOD BY AUTOMATED COUNT: 14.9 %
EST. GFR  (AFRICAN AMERICAN): >60 ML/MIN/1.73 M^2
EST. GFR  (NON AFRICAN AMERICAN): >60 ML/MIN/1.73 M^2
ESTIMATED AVG GLUCOSE: 123 MG/DL
GLUCOSE SERPL-MCNC: 121 MG/DL
GLUCOSE UR QL STRIP: NEGATIVE
HBA1C MFR BLD HPLC: 5.9 %
HCT VFR BLD AUTO: 45.3 %
HGB BLD-MCNC: 14.4 G/DL
HGB UR QL STRIP: ABNORMAL
HYALINE CASTS #/AREA URNS LPF: 0 /LPF
KETONES UR QL STRIP: NEGATIVE
LACTATE SERPL-SCNC: 1 MMOL/L
LEUKOCYTE ESTERASE UR QL STRIP: NEGATIVE
LIPASE SERPL-CCNC: 14 U/L
LYMPHOCYTES # BLD AUTO: 1.2 K/UL
LYMPHOCYTES NFR BLD: 12.3 %
MCH RBC QN AUTO: 25.8 PG
MCHC RBC AUTO-ENTMCNC: 31.8 G/DL
MCV RBC AUTO: 81 FL
MICROSCOPIC COMMENT: ABNORMAL
MONOCYTES # BLD AUTO: 0.4 K/UL
MONOCYTES NFR BLD: 4.5 %
NEUTROPHILS # BLD AUTO: 7.8 K/UL
NEUTROPHILS NFR BLD: 82.7 %
NITRITE UR QL STRIP: NEGATIVE
PH UR STRIP: 6 [PH] (ref 5–8)
PLATELET # BLD AUTO: 207 K/UL
PMV BLD AUTO: 10.9 FL
POTASSIUM SERPL-SCNC: 4.2 MMOL/L
PROT SERPL-MCNC: 8.6 G/DL
PROT UR QL STRIP: ABNORMAL
RBC # BLD AUTO: 5.59 M/UL
RBC #/AREA URNS HPF: 4 /HPF (ref 0–4)
SODIUM SERPL-SCNC: 134 MMOL/L
SP GR UR STRIP: 1.02 (ref 1–1.03)
SQUAMOUS #/AREA URNS HPF: 5 /HPF
URATE SERPL-MCNC: 4.5 MG/DL
URN SPEC COLLECT METH UR: ABNORMAL
UROBILINOGEN UR STRIP-ACNC: NEGATIVE EU/DL
WBC # BLD AUTO: 9.37 K/UL
WBC #/AREA URNS HPF: 3 /HPF (ref 0–5)

## 2019-02-23 PROCEDURE — 94660 CPAP INITIATION&MGMT: CPT

## 2019-02-23 PROCEDURE — 11000001 HC ACUTE MED/SURG PRIVATE ROOM

## 2019-02-23 PROCEDURE — 25500020 PHARM REV CODE 255: Performed by: EMERGENCY MEDICINE

## 2019-02-23 PROCEDURE — 99223 PR INITIAL HOSPITAL CARE,LEVL III: ICD-10-PCS | Mod: AI,,, | Performed by: HOSPITALIST

## 2019-02-23 PROCEDURE — 99223 1ST HOSP IP/OBS HIGH 75: CPT | Mod: AI,,, | Performed by: HOSPITALIST

## 2019-02-23 PROCEDURE — 96374 THER/PROPH/DIAG INJ IV PUSH: CPT

## 2019-02-23 PROCEDURE — 85025 COMPLETE CBC W/AUTO DIFF WBC: CPT

## 2019-02-23 PROCEDURE — 25000003 PHARM REV CODE 250: Performed by: HOSPITALIST

## 2019-02-23 PROCEDURE — 63600175 PHARM REV CODE 636 W HCPCS: Performed by: EMERGENCY MEDICINE

## 2019-02-23 PROCEDURE — 93010 ELECTROCARDIOGRAM REPORT: CPT | Mod: ,,, | Performed by: INTERNAL MEDICINE

## 2019-02-23 PROCEDURE — 83690 ASSAY OF LIPASE: CPT

## 2019-02-23 PROCEDURE — 80053 COMPREHEN METABOLIC PANEL: CPT

## 2019-02-23 PROCEDURE — 83605 ASSAY OF LACTIC ACID: CPT

## 2019-02-23 PROCEDURE — 81000 URINALYSIS NONAUTO W/SCOPE: CPT

## 2019-02-23 PROCEDURE — 99900035 HC TECH TIME PER 15 MIN (STAT)

## 2019-02-23 PROCEDURE — 83036 HEMOGLOBIN GLYCOSYLATED A1C: CPT

## 2019-02-23 PROCEDURE — 84550 ASSAY OF BLOOD/URIC ACID: CPT

## 2019-02-23 PROCEDURE — 63600175 PHARM REV CODE 636 W HCPCS: Performed by: HOSPITALIST

## 2019-02-23 PROCEDURE — 99285 EMERGENCY DEPT VISIT HI MDM: CPT | Mod: 25

## 2019-02-23 PROCEDURE — 93010 EKG 12-LEAD: ICD-10-PCS | Mod: ,,, | Performed by: INTERNAL MEDICINE

## 2019-02-23 PROCEDURE — 27000190 HC CPAP FULL FACE MASK W/VALVE

## 2019-02-23 PROCEDURE — 93005 ELECTROCARDIOGRAM TRACING: CPT

## 2019-02-23 RX ORDER — SODIUM CHLORIDE 9 MG/ML
INJECTION, SOLUTION INTRAVENOUS CONTINUOUS
Status: DISCONTINUED | OUTPATIENT
Start: 2019-02-23 | End: 2019-02-25

## 2019-02-23 RX ORDER — MORPHINE SULFATE 2 MG/ML
2 INJECTION, SOLUTION INTRAMUSCULAR; INTRAVENOUS EVERY 4 HOURS PRN
Status: DISCONTINUED | OUTPATIENT
Start: 2019-02-23 | End: 2019-02-27 | Stop reason: HOSPADM

## 2019-02-23 RX ORDER — DOCUSATE SODIUM 100 MG/1
100 CAPSULE, LIQUID FILLED ORAL 2 TIMES DAILY
Status: DISCONTINUED | OUTPATIENT
Start: 2019-02-23 | End: 2019-02-26

## 2019-02-23 RX ORDER — ONDANSETRON 2 MG/ML
4 INJECTION INTRAMUSCULAR; INTRAVENOUS EVERY 8 HOURS PRN
Status: DISCONTINUED | OUTPATIENT
Start: 2019-02-23 | End: 2019-02-23

## 2019-02-23 RX ORDER — ONDANSETRON 2 MG/ML
4 INJECTION INTRAMUSCULAR; INTRAVENOUS
Status: COMPLETED | OUTPATIENT
Start: 2019-02-23 | End: 2019-02-23

## 2019-02-23 RX ORDER — ENOXAPARIN SODIUM 100 MG/ML
40 INJECTION SUBCUTANEOUS EVERY 24 HOURS
Status: DISCONTINUED | OUTPATIENT
Start: 2019-02-23 | End: 2019-02-26

## 2019-02-23 RX ORDER — ONDANSETRON 2 MG/ML
4 INJECTION INTRAMUSCULAR; INTRAVENOUS EVERY 8 HOURS PRN
Status: DISCONTINUED | OUTPATIENT
Start: 2019-02-23 | End: 2019-02-27 | Stop reason: HOSPADM

## 2019-02-23 RX ORDER — SODIUM CHLORIDE 9 MG/ML
INJECTION, SOLUTION INTRAVENOUS CONTINUOUS
Status: DISCONTINUED | OUTPATIENT
Start: 2019-02-23 | End: 2019-02-23

## 2019-02-23 RX ORDER — MORPHINE SULFATE 2 MG/ML
2 INJECTION, SOLUTION INTRAMUSCULAR; INTRAVENOUS EVERY 4 HOURS PRN
Status: DISCONTINUED | OUTPATIENT
Start: 2019-02-23 | End: 2019-02-23

## 2019-02-23 RX ORDER — IBUPROFEN 200 MG
16 TABLET ORAL
Status: DISCONTINUED | OUTPATIENT
Start: 2019-02-23 | End: 2019-02-27 | Stop reason: HOSPADM

## 2019-02-23 RX ORDER — HYDRALAZINE HYDROCHLORIDE 20 MG/ML
15 INJECTION INTRAMUSCULAR; INTRAVENOUS EVERY 4 HOURS PRN
Status: DISCONTINUED | OUTPATIENT
Start: 2019-02-23 | End: 2019-02-27 | Stop reason: HOSPADM

## 2019-02-23 RX ORDER — MORPHINE SULFATE 4 MG/ML
4 INJECTION, SOLUTION INTRAMUSCULAR; INTRAVENOUS EVERY 4 HOURS PRN
Status: DISCONTINUED | OUTPATIENT
Start: 2019-02-23 | End: 2019-02-27 | Stop reason: HOSPADM

## 2019-02-23 RX ORDER — GLUCAGON 1 MG
1 KIT INJECTION
Status: DISCONTINUED | OUTPATIENT
Start: 2019-02-23 | End: 2019-02-27 | Stop reason: HOSPADM

## 2019-02-23 RX ORDER — SODIUM CHLORIDE 0.9 % (FLUSH) 0.9 %
5 SYRINGE (ML) INJECTION
Status: DISCONTINUED | OUTPATIENT
Start: 2019-02-23 | End: 2019-02-23

## 2019-02-23 RX ORDER — IBUPROFEN 200 MG
24 TABLET ORAL
Status: DISCONTINUED | OUTPATIENT
Start: 2019-02-23 | End: 2019-02-27 | Stop reason: HOSPADM

## 2019-02-23 RX ORDER — ACETAMINOPHEN 325 MG/1
650 TABLET ORAL EVERY 4 HOURS PRN
Status: DISCONTINUED | OUTPATIENT
Start: 2019-02-23 | End: 2019-02-27 | Stop reason: HOSPADM

## 2019-02-23 RX ORDER — SODIUM CHLORIDE 0.9 % (FLUSH) 0.9 %
5 SYRINGE (ML) INJECTION
Status: DISCONTINUED | OUTPATIENT
Start: 2019-02-23 | End: 2019-02-27 | Stop reason: HOSPADM

## 2019-02-23 RX ADMIN — MORPHINE SULFATE 4 MG: 4 INJECTION INTRAVENOUS at 09:02

## 2019-02-23 RX ADMIN — ENOXAPARIN SODIUM 40 MG: 100 INJECTION SUBCUTANEOUS at 06:02

## 2019-02-23 RX ADMIN — IOHEXOL 100 ML: 350 INJECTION, SOLUTION INTRAVENOUS at 03:02

## 2019-02-23 RX ADMIN — SODIUM CHLORIDE: 0.9 INJECTION, SOLUTION INTRAVENOUS at 06:02

## 2019-02-23 RX ADMIN — MORPHINE SULFATE 2 MG: 2 INJECTION, SOLUTION INTRAMUSCULAR; INTRAVENOUS at 06:02

## 2019-02-23 RX ADMIN — ONDANSETRON 4 MG: 2 INJECTION INTRAMUSCULAR; INTRAVENOUS at 03:02

## 2019-02-23 NOTE — ED TRIAGE NOTES
Pt presents to ER w/ reports of + generalized abdominal pains x two days. Denies N/V/D. No fever or chills.

## 2019-02-23 NOTE — HPI
Mrs. Galeano is a 78 year old woman with a history of colon cancer status post resection of mass in 2002, abdominal hernia repair in 2004, two prior episodes of small bowel obstruction who came in for evaluation of abdominal pain.  She states this started abruptly at 6:30 PM on 2/22, yesterday evening.  The pain was described as sharp, non-radiating and located all around her umbilicus.  She states the pain was constant until coming to the ER this evening and receiving pain medications.  The pain was associated with nausea and was followed by emesis of consumed food once last night and several times today.  She notes her last bowel movement was yesterday afternoon and was solid and normal for her.  She has not passed flatus since onset of pain but has been belching.  She states this feels exactly like her prior two episodes of bowel obstruction.  She denies fever, chills, diarrhea, headache, chest pain, light headedness or leg swelling.  She endorses minor nasal congestion as well as several days of mild right knee and foot pain.

## 2019-02-23 NOTE — SUBJECTIVE & OBJECTIVE
Past Medical History:   Diagnosis Date    Colon cancer     Gout, chronic     Heart murmur     High cholesterol     Hypercholesteremia     Hypertension     Sleep apnea     Thyroid disease        Past Surgical History:   Procedure Laterality Date    BTL       SECTION, CLASSIC      COLON SURGERY      COLPOSCOPY N/A 10/24/2017    [facility]formed by [facility]    goiter       HERNIA REPAIR      KIDNEY SURGERY      cyst removal    NEPHRECTOMY-PARTIAL Left 2017    Performed by Che Tan MD at [facility] OR    POLYPECTOMY-HYSTEROSCOPIC  N/A 10/24/2017    Per[facility] by [facility] at [facility] OR    ROBOTIC ASSISTED LAPAROSCOPIC NEPHRECTOMY PARTIAL, possible open partial nephrectomy. Left 2017    Performed by Che Tan MD at [facility] OR       Review of patient's allergies indicates:  No Known Allergies    No current facility-administered medications on file prior to encounter.      Current Outpatient Medications on File Prior to Encounter   Medication Sig    amlodipine (NORVASC) 2.5 MG tablet Take 2.5 mg by mouth once daily.    aspirin (ECOTRIN) 81 MG EC tablet Take 81 mg by mouth once daily.    atenolol (TENORMIN) 100 MG tablet TAKE 1 TABLET BY ORAL ROUTE EVERY DAY    atorvastatin (LIPITOR) 10 MG tablet Take 10 mg by mouth once daily.    benazepril (LOTENSIN) 40 MG tablet Take 40 mg by mouth once daily.    cetirizine (ZYRTEC) 10 MG tablet Take 10 mg by mouth daily as needed for Allergies.    cholecalciferol, vitamin D3, (THERA-D) 2,000 unit Tab Take 1 tablet by mouth once daily.    fluticasone (FLONASE) 50 mcg/actuation nasal spray 1 spray by Each Nare route once daily.    FLUZONE QUAD 6285-2140 60 mcg (15 mcg x 4)/0.5 mL Susp     vitamin E 100 UNIT capsule Take 100 Units by mouth once daily.    docusate sodium (COLACE) 100 MG capsule Take 1 capsule (100 mg total) by mouth 2 (two) times daily.    ibuprofen (ADVIL,MOTRIN) 800 MG  tablet Take 1 tablet (800 mg total) by mouth every 8 (eight) hours as needed for Pain.     Family History     Problem Relation (Age of Onset)    Diabetes Mother    Heart disease Father        Tobacco Use    Smoking status: Former Smoker     Packs/day: 0.10     Last attempt to quit: 1980     Years since quittin.8    Smokeless tobacco: Never Used   Substance and Sexual Activity    Alcohol use: No    Drug use: No    Sexual activity: No     Review of Systems   Constitutional: Negative for activity change, chills, diaphoresis and fatigue.   HENT: Negative for drooling and hearing loss.    Eyes: Negative for discharge.   Respiratory: Negative for apnea, chest tightness, shortness of breath and wheezing.    Cardiovascular: Negative for palpitations and leg swelling.   Gastrointestinal: Positive for abdominal pain, nausea and vomiting. Negative for abdominal distention, constipation and diarrhea.   Musculoskeletal: Positive for arthralgias. Negative for gait problem.   Skin: Negative for rash.   Neurological: Negative for seizures, light-headedness and numbness.   Hematological: Negative for adenopathy.   Psychiatric/Behavioral: Negative for agitation and behavioral problems.     Objective:     Vital Signs (Most Recent):  Temp: 98 °F (36.7 °C) (19 1347)  Pulse: 62 (19 1643)  Resp: (!) 27 (19 1643)  BP: 122/60 (19 1643)  SpO2: 97 % (19 1643) Vital Signs (24h Range):  Temp:  [98 °F (36.7 °C)] 98 °F (36.7 °C)  Pulse:  [58-62] 62  Resp:  [14-27] 27  SpO2:  [97 %-99 %] 97 %  BP: (122-204)/(60-86) 122/60     Weight: 128.4 kg (283 lb)  Body mass index is 44.32 kg/m².    Physical Exam   Constitutional: She is oriented to person, place, and time. She appears well-developed and well-nourished.   Obese in no acute distress   HENT:   Head: Normocephalic and atraumatic.   Dry mucus membranes   Eyes: EOM are normal. Pupils are equal, round, and reactive to light.   Neck: Normal range of  motion. Neck supple.   Cardiovascular: Normal rate and regular rhythm.   Murmur heard.  Pulmonary/Chest: Effort normal and breath sounds normal. No respiratory distress.   Abdominal: Soft. She exhibits no distension. There is tenderness. There is no rebound and no guarding.   Obese, soft, non-distended, mild diffuse sonya-umbilical ttp w/o rebound or guarding, I cannot hear bowel sounds, noted to have well healed midline surgical scar and a palpable golf ball sized nodule in RLQ that is non-tender.   Musculoskeletal: Normal range of motion. She exhibits no edema.   Neurological: She is oriented to person, place, and time. No cranial nerve deficit. Coordination normal.   Skin: Skin is warm and dry.   Psychiatric: She has a normal mood and affect. Her behavior is normal.   Vitals reviewed.        CRANIAL NERVES     CN III, IV, VI   Pupils are equal, round, and reactive to light.  Extraocular motions are normal.        Significant Labs: All pertinent labs within the past 24 hours have been reviewed.    Significant Imaging: I have reviewed and interpreted all pertinent imaging results/findings within the past 24 hours.

## 2019-02-23 NOTE — ED NOTES
Patient moved to ED room 10 via triage nurse, patient assisted onto stretcher and changed into a gown. Patient placed on cardiac monitor, continuous pulse oximetry and automatic blood pressure cuff. Bed placed in low locked position, side rails up x 2, call light is within reach of patient or family, orientation to room and explanation of wait provided to family and patient, alarms set and turned on for monitor and pulse ox, awaiting MD evaluation and orders, will continue to monitor.

## 2019-02-23 NOTE — ASSESSMENT & PLAN NOTE
-Will hold home atenolol and benazepril until she can take PO  -Will provide PRN hydralazine for SBP > 170

## 2019-02-23 NOTE — ASSESSMENT & PLAN NOTE
-Mrs. Galeano will be admitted to inpatient status  -She had severe pain that has improved with IV narcotics as well as nausea and vomiting with inability to tolerate oral intake.  She also has no bowel sounds.  -Very concerning for advancing partial small bowel obstruction  -Will place NGT for bowel decompression  -Will keep npo and perform serial abdominal exams  -Will provide morphine IV for analgesia  -Will provide zofran IV for nausea  -Will consult Dr. Cardozo for surgical evaluation in case her condition can not be treated conservatively.

## 2019-02-23 NOTE — H&P
[facility] Hospital Medicine  History & Physical    Patient Name: Natali Galeano  MRN: [id number]  Admission Date: 2019  Attending Physician: John Almaraz MD  Primary Care Provider: Ewelina Herzog MD         Patient information was obtained from patient, relative(s), past medical records and ER records.     Subjective:     Principal Problem:Partial small bowel obstruction    Chief Complaint:   Chief Complaint   Patient presents with    Abdominal Pain     lower abd pain and vomiting starting last night. Denies being around sick contacts.         HPI: Mrs. Galeano is a 78 year old woman with a history of colon cancer status post resection of mass in , abdominal hernia repair in , two prior episodes of small bowel obstruction who came in for evaluation of abdominal pain.  She states this started abruptly at 6:30 PM on , yesterday evening.  The pain was described as sharp, non-radiating and located all around her umbilicus.  She states the pain was constant until coming to the ER this evening and receiving pain medications.  The pain was associated with nausea and was followed by emesis of consumed food once last night and several times today.  She notes her last bowel movement was yesterday afternoon and was solid and normal for her.  She has not passed flatus since onset of pain but has been belching.  She states this feels exactly like her prior two episodes of bowel obstruction.  She denies fever, chills, diarrhea, headache, chest pain, light headedness or leg swelling.  She endorses minor nasal congestion as well as several days of mild right knee and foot pain.    Past Medical History:   Diagnosis Date    Colon cancer     Gout, chronic     Heart murmur     High cholesterol     Hypercholesteremia     Hypertension     Sleep apnea     Thyroid disease        Past Surgical History:   Procedure Laterality Date    BTL       SECTION, CLASSIC      COLON SURGERY       COLPOSCOPY N/A 10/24/2017    [facility]    goiter       HERNIA REPAIR      KIDNEY SURGERY      cyst removal    NEPHRECTOMY-PARTIAL Left 6/21/2017    Performed by Che [facility] at [facility]    POLYPECTOMY-HYSTEROSCOPIC  N/A 10/24/2017    Performed by [facility] at [facility] [place]    ROBOTIC ASSISTED LAPAROSCOPIC NEPHRECTOMY PARTIAL, possible open partial nephrectomy. Left 6/21/2017    Performed by Che Tan [facility] at [facility] [place]       Review of patient's allergies indicates:  No Known Allergies    No current facility-administered medications on file prior to encounter.      Current Outpatient Medications on File Prior to Encounter   Medication Sig    amlodipine (NORVASC) 2.5 MG tablet Take 2.5 mg by mouth once daily.    aspirin (ECOTRIN) 81 MG EC tablet Take 81 mg by mouth once daily.    atenolol (TENORMIN) 100 MG tablet TAKE 1 TABLET BY ORAL ROUTE EVERY DAY    atorvastatin (LIPITOR) 10 MG tablet Take 10 mg by mouth once daily.    benazepril (LOTENSIN) 40 MG tablet Take 40 mg by mouth once daily.    cetirizine (ZYRTEC) 10 MG tablet Take 10 mg by mouth daily as needed for Allergies.    cholecalciferol, vitamin D3, (THERA-D) 2,000 unit Tab Take 1 tablet by mouth once daily.    fluticasone (FLONASE) 50 mcg/actuation nasal spray 1 spray by Each Nare route once daily.    FLUZONE QUAD 0475-2622 60 mcg (15 mcg x 4)/0.5 mL Susp     vitamin E 100 UNIT capsule Take 100 Units by mouth once daily.    docusate sodium (COLACE) 100 MG capsule Take 1 capsule (100 mg total) by mouth 2 (two) times daily.    ibuprofen (ADVIL,MOTRIN) 800 MG tablet Take 1 tablet (800 mg total) by mouth every 8 (eight) hours as needed for Pain.     Family History     Problem Relation (Age of Onset)    Diabetes Mother    Heart disease Father        Tobacco Use    Smoking status: Former Smoker     Packs/day: 0.10     Last attempt to quit: 4/16/1980     Years since quitting:  38.8    Smokeless tobacco: Never Used   Substance and Sexual Activity    Alcohol use: No    Drug use: No    Sexual activity: No     Review of Systems   Constitutional: Negative for activity change, chills, diaphoresis and fatigue.   HENT: Negative for drooling and hearing loss.    Eyes: Negative for discharge.   Respiratory: Negative for apnea, chest tightness, shortness of breath and wheezing.    Cardiovascular: Negative for palpitations and leg swelling.   Gastrointestinal: Positive for abdominal pain, nausea and vomiting. Negative for abdominal distention, constipation and diarrhea.   Musculoskeletal: Positive for arthralgias. Negative for gait problem.   Skin: Negative for rash.   Neurological: Negative for seizures, light-headedness and numbness.   Hematological: Negative for adenopathy.   Psychiatric/Behavioral: Negative for agitation and behavioral problems.     Objective:     Vital Signs (Most Recent):  Temp: 98 °F (36.7 °C) (02/23/19 1347)  Pulse: 62 (02/23/19 1643)  Resp: (!) 27 (02/23/19 1643)  BP: 122/60 (02/23/19 1643)  SpO2: 97 % (02/23/19 1643) Vital Signs (24h Range):  Temp:  [98 °F (36.7 °C)] 98 °F (36.7 °C)  Pulse:  [58-62] 62  Resp:  [14-27] 27  SpO2:  [97 %-99 %] 97 %  BP: (122-204)/(60-86) 122/60     Weight: 128.4 kg (283 lb)  Body mass index is 44.32 kg/m².    Physical Exam   Constitutional: She is oriented to person, place, and time. She appears well-developed and well-nourished.   Obese in no acute distress   HENT:   Head: Normocephalic and atraumatic.   Dry mucus membranes   Eyes: EOM are normal. Pupils are equal, round, and reactive to light.   Neck: Normal range of motion. Neck supple.   Cardiovascular: Normal rate and regular rhythm.   Murmur heard.  Pulmonary/Chest: Effort normal and breath sounds normal. No respiratory distress.   Abdominal: Soft. She exhibits no distension. There is tenderness. There is no rebound and no guarding.   Obese, soft, non-distended, mild diffuse  sonya-umbilical ttp w/o rebound or guarding, I cannot hear bowel sounds, noted to have well healed midline surgical scar and a palpable golf ball sized nodule in RLQ that is non-tender.   Musculoskeletal: Normal range of motion. She exhibits no edema.   Neurological: She is oriented to person, place, and time. No cranial nerve deficit. Coordination normal.   Skin: Skin is warm and dry.   Psychiatric: She has a normal mood and affect. Her behavior is normal.   Vitals reviewed.        CRANIAL NERVES     CN III, IV, VI   Pupils are equal, round, and reactive to light.  Extraocular motions are normal.        Significant Labs: All pertinent labs within the past 24 hours have been reviewed.    Significant Imaging: I have reviewed and interpreted all pertinent imaging results/findings within the past 24 hours.    Assessment/Plan:     * Partial small bowel obstruction    -Mrs. Galeano will be admitted to inpatient status  -She had severe pain that has improved with IV narcotics as well as nausea and vomiting with inability to tolerate oral intake.  She also has no bowel sounds.  -Very concerning for advancing partial small bowel obstruction  -Will place NGT for bowel decompression  -Will keep npo and perform serial abdominal exams  -Will provide morphine IV for analgesia  -Will provide zofran IV for nausea  -Will consult Dr. Cardozo for surgical evaluation in case her condition can not be treated conservatively.       Right knee pain    -No recent trauma and no swelling noted  -has history of gout so will check uric acid.       Dehydration    -Provide IV fluids  -Monitor electrolytes closely.       ALYSE (obstructive sleep apnea)    -Will order nocturnal cpap with pressure setting of 12 as she uses at home.       Hyperlipidemia    -Hold home statin until taking PO       Morbid obesity due to excess calories    -Noted       HTN (hypertension)    -Will hold home atenolol and benazepril until she can take PO  -Will provide PRN  hydralazine for SBP > 170         VTE Risk Mitigation (From admission, onward)    None             John Almaraz MD  Department of Hospital Medicine   [facility]

## 2019-02-23 NOTE — ED NOTES
Patient identifiers verified and correct for Natali Galeano.    LOC: The patient is awake, alert and aware of environment with an appropriate affect, the patient is oriented x 3 and speaking appropriately.  APPEARANCE: Patient resting comfortably and in no acute distress, patient is clean and well groomed, patient's clothing is properly fastened.  SKIN: The skin is warm and dry, color consistent with ethnicity, patient has normal skin turgor and moist mucus membranes, skin intact, no breakdown or bruising noted.  MUSCULOSKELETAL: Patient moving all extremities spontaneously, no obvious swelling or deformities noted.  RESPIRATORY: Airway is open and patent, respirations are spontaneous, patient has a normal effort and rate, no accessory muscle use noted, bilateral breath sounds clear in all lobes.  CARDIAC: Patient has a normal rate and regular rhythm, no periphreal edema noted, capillary refill < 3 seconds. Denies active chest pains, SOB, dizziness, vision changes or weakness reported.   ABDOMEN: Soft, + tender to palpation, no distention noted. +generalized abdominal pains reported. +normoactive bowel sounds in all quads upon auscultation.   NEUROLOGIC: PERRL, 3 mm bilaterally, eyes open spontaneously, behavior appropriate to situation, follows commands, facial expression symmetrical, bilateral hand grasp equal and even, purposeful motor response noted, normal sensation in all extremities when touched with a finger.  FALL RISK BAND APPLIED TO PATIENT.   FAMILY REMAINS AT BEDSIDE.

## 2019-02-23 NOTE — ED PROVIDER NOTES
Encounter Date: 2019    SCRIBE #1 NOTE: IGill, doni scribing for, and in the presence of, Dr. Cota.       History     Chief Complaint   Patient presents with    Abdominal Pain     lower abd pain and vomiting starting last night. Denies being around sick contacts.      Time seen by provider: 2:33 PM    This is a 78 y.o. female who presents with complaint of abdominal pain since yesterday evening. Pt took tylenol without relief. There is associated N/V. She states that she is belching. She reports last BM was yesterday and that it was normal. She has hx of two bowel obstructions, most recently two years ago. Pain was similar at that time but pt states that she was not belching then. Pt also reports excision of mass from the colon in  and hernia repair surgery. She denies any fever, chills, constipation, diarrhea, blood in stool, urinary sx, lightheadedness, weakness, and back pain.      The history is provided by the patient.     Review of patient's allergies indicates:  No Known Allergies  Past Medical History:   Diagnosis Date    Colon cancer     Gout, chronic     Heart murmur     High cholesterol     Hypercholesteremia     Hypertension     Sleep apnea     Thyroid disease      Past Surgical History:   Procedure Laterality Date    BTL       SECTION, CLASSIC      COLON SURGERY      COLPOSCOPY N/A 10/24/2017    Performed [facility]    goiter       HERNIA REPAIR      KIDNEY SURGERY      cyst removal    NEPHRECTOMY-PARTIAL Left 2017    Performed [facility] at [facility]    POLYPECTOMY-HYSTEROSCOPIC  N/A 10/24/2017    Performed by [facility] at [facility]    ROBOTIC ASSISTED LAPAROSCOPIC NEPHRECTOMY PARTIAL, possible open partial nephrectomy. Left 2017    Performed by [facility] at [facility]     Family History   Problem Relation Age of Onset    Heart disease Father     Diabetes Mother       Social History     Tobacco Use    Smoking status: Former Smoker     Packs/day: 0.10     Last attempt to quit: 1980     Years since quittin.8    Smokeless tobacco: Never Used   Substance Use Topics    Alcohol use: No    Drug use: No     Review of Systems   Constitutional: Negative for chills and fever.   HENT: Negative for congestion, rhinorrhea and sore throat.    Respiratory: Negative for cough and shortness of breath.    Cardiovascular: Negative for chest pain.   Gastrointestinal: Positive for abdominal pain, nausea and vomiting. Negative for blood in stool, constipation and diarrhea.   Endocrine: Negative for polyuria.   Genitourinary: Negative for decreased urine volume, difficulty urinating, dysuria, frequency, hematuria and urgency.   Musculoskeletal: Negative for back pain.   Skin: Negative for rash.   Allergic/Immunologic: Negative for immunocompromised state.   Neurological: Negative for dizziness, weakness, light-headedness and headaches.   Hematological: Does not bruise/bleed easily.   Psychiatric/Behavioral: Negative for confusion.       Physical Exam     Initial Vitals [19 1347]   BP Pulse Resp Temp SpO2   (!) 204/86 (!) 59 16 98 °F (36.7 °C) 99 %      MAP       --         Physical Exam    Nursing note and vitals reviewed.  Constitutional: She appears well-developed and well-nourished. She is cooperative.  Non-toxic appearance. No distress.   Morbidly obese.    HENT:   Head: Normocephalic and atraumatic.   Mouth/Throat: Oropharynx is clear and moist.   Eyes: Conjunctivae and EOM are normal. Pupils are equal, round, and reactive to light.   Neck: Normal range of motion and full passive range of motion without pain. Neck supple. No thyromegaly present.   Cardiovascular: Normal rate, regular rhythm, normal heart sounds and normal pulses.   Pulmonary/Chest: Effort normal and breath sounds normal. No respiratory distress.   Pendulous breasts.    Abdominal: Soft. Normal appearance. She  exhibits no distension. There is no tenderness. There is no rebound and no guarding.   Obese abdomen. Well healed ventral abdominal surgical scar. Large irreducible mass R aspect of abdomen. Increased bowel sounds. No RLQ tenderness.    Musculoskeletal: Normal range of motion.   Neurological: She is alert and oriented to person, place, and time. She has normal strength. No cranial nerve deficit or sensory deficit.   Skin: Skin is warm, dry and intact. No rash noted.   Psychiatric: She has a normal mood and affect. Her speech is normal and behavior is normal. Judgment and thought content normal.         ED Course   Procedures  Labs Reviewed   URINALYSIS, REFLEX TO URINE CULTURE - Abnormal; Notable for the following components:       Result Value    Protein, UA 2+ (*)     Occult Blood UA 1+ (*)     All other components within normal limits    Narrative:     Preferred Collection Type->Urine, Clean Catch   CBC W/ AUTO DIFFERENTIAL - Abnormal; Notable for the following components:    RBC 5.59 (*)     MCV 81 (*)     MCH 25.8 (*)     MCHC 31.8 (*)     RDW 14.9 (*)     Gran # (ANC) 7.8 (*)     Gran% 82.7 (*)     Lymph% 12.3 (*)     All other components within normal limits   COMPREHENSIVE METABOLIC PANEL - Abnormal; Notable for the following components:    Sodium 134 (*)     CO2 21 (*)     Glucose 121 (*)     Total Protein 8.6 (*)     All other components within normal limits   URINALYSIS MICROSCOPIC - Abnormal; Notable for the following components:    Bacteria, UA Many (*)     All other components within normal limits    Narrative:     Preferred Collection Type->Urine, Clean Catch   LIPASE   URIC ACID   LACTIC ACID, PLASMA     EKG Readings: (Independently Interpreted)   Sinus bradycardia at a rate of 57. LAD. No STEMI. Unchanged from 2017.        Imaging Results          CT Abdomen Pelvis With Contrast (Final result)  Result time 02/23/19 16:23:13    Final result by Miguel Angel Sanches MD (02/23/19 16:23:13)                  Impression:      Surgical clips at the expected location of the appendix.  Tethering of small bowel loops to the anterior abdomen as can be seen with adhesions.  Small bowel feces sign is observed with mildly dilated loops of bowel in the lower abdomen suggesting early developing partial small-bowel obstruction.  No free air, bowel wall thickening, ascites or pneumatosis intestinalis.      Electronically signed by: Miguel Angel Sanches MD  Date:    02/23/2019  Time:    16:23             Narrative:    EXAMINATION:  CT ABDOMEN PELVIS WITH CONTRAST    CLINICAL HISTORY:  RLQ pain, appendicitis suspected;Bowel obstruction, high-grade;    TECHNIQUE:  Low dose axial images, sagittal and coronal reformations were obtained from the lung bases to the pubic symphysis following the IV administration of 100 mL of Omnipaque 350 and the oral administration of 30 mL of Omnipaque 350.    COMPARISON:  CT of the abdomen from 05/08/2017.    FINDINGS:  Detail obscured by habitus.  Imaging through the inferior thorax demonstrates no significant abnormality.  Review of bone windows demonstrates degenerative changes of the visualized spine.  Osseous structures are intact.    Streak artifact is present from the patient's arms.  Hepatic steatosis is present.  No radiopaque gallstones.  The spleen and pancreas and adrenals are within normal limits.  Tiny bilateral renal hypo densities are seen which are too small to accurately characterize but statistically likely relate to cysts.  A previously described cyst in the upper aspect of the left kidney is no longer visualized.    Aortic atherosclerosis is noted.  IVC is normal.  Stomach is normal.  Terminal ileum appears normal.  Appendix is not identified in there are sutures at the expected location of the appendix.  Sigmoid diverticulosis is present without evidence to suggest diverticulitis.  There is laxity of the anterior abdominal wall with bulging of adjacent bowel loops.  Air and stool is  present within lower central small bowel loops.  In addition, some of these loops are slightly dilated measuring up to 3.2 cm.  No clear transition point is identified.  No wall thickening, pneumatosis, or portal venous gas.  Urinary bladder is unremarkable.  Uterus is present.  No pelvic masses are seen.    No ascites or pathologically enlarged lymph nodes are seen.                                   Medical Decision Making:   Initial Assessment:   Urgent evaluation of 79 yo F with htn, hld,  partial nephrectomy for complex renal cyst, colectomy for colon ca in past and prior SBO in 2017 here with abdominal pain, vomiting and concern for possible recurrent SBO.  Last bowel movement was yesterday, now increased belching. Here pt appears comfortable, intact bowel sounds w firm swelling to R mid abdomen. Ddx SBO, ileus, malignancy, hernia, constipation.  Independently Interpreted Test(s):   I have ordered and independently interpreted EKG Reading(s) - see prior notes  Clinical Tests:   Lab Tests: Ordered and Reviewed  Radiological Study: Ordered  Medical Tests: Ordered and Reviewed  ED Management:  No leukocytosis, normal lipase, imaging with adhesions, and small bowel feces sign with mildly dilated loops of bowel in lower abdomen suggesting developing partial small-bowel obstruction.  4:29 PM - Paged Dr. Almaraz.  4:32 PM - Case discussed with Dr. Almaraz  4:38 PM - Case discussed with .   4:41 PM - Spoke again with Dr. Almaraz. Will admit to his service for SBO.            Scribe Attestation:   Scribe #1: I performed the above scribed service and the documentation accurately describes the services I performed. I attest to the accuracy of the note.    Attending Attestation:           Physician Attestation for Scribe:  Physician Attestation Statement for Scribe #1: I, Dr. Cota, reviewed documentation, as scribed by Gill Sosa in my presence, and it is both accurate and complete.                     Clinical Impression:     1. SBO (small bowel obstruction)    2. Abdominal pain    3. Morbid obesity due to excess calories            Disposition:   Disposition: Admitted  Condition: Valeria Cota MD  02/23/19 8203

## 2019-02-24 LAB
ANION GAP SERPL CALC-SCNC: 13 MMOL/L
BASOPHILS # BLD AUTO: 0 K/UL
BASOPHILS NFR BLD: 0 %
BUN SERPL-MCNC: 12 MG/DL
CALCIUM SERPL-MCNC: 10.3 MG/DL
CHLORIDE SERPL-SCNC: 102 MMOL/L
CO2 SERPL-SCNC: 23 MMOL/L
CREAT SERPL-MCNC: 0.9 MG/DL
DIFFERENTIAL METHOD: ABNORMAL
EOSINOPHIL # BLD AUTO: 0 K/UL
EOSINOPHIL NFR BLD: 0 %
ERYTHROCYTE [DISTWIDTH] IN BLOOD BY AUTOMATED COUNT: 15.1 %
EST. GFR  (AFRICAN AMERICAN): >60 ML/MIN/1.73 M^2
EST. GFR  (NON AFRICAN AMERICAN): >60 ML/MIN/1.73 M^2
GLUCOSE SERPL-MCNC: 130 MG/DL
HCT VFR BLD AUTO: 44.8 %
HGB BLD-MCNC: 14.1 G/DL
LYMPHOCYTES # BLD AUTO: 1.2 K/UL
LYMPHOCYTES NFR BLD: 16.3 %
MAGNESIUM SERPL-MCNC: 2 MG/DL
MCH RBC QN AUTO: 25.7 PG
MCHC RBC AUTO-ENTMCNC: 31.5 G/DL
MCV RBC AUTO: 82 FL
MONOCYTES # BLD AUTO: 0.7 K/UL
MONOCYTES NFR BLD: 9.1 %
NEUTROPHILS # BLD AUTO: 5.3 K/UL
NEUTROPHILS NFR BLD: 74.5 %
PLATELET # BLD AUTO: 187 K/UL
PMV BLD AUTO: 11.2 FL
POTASSIUM SERPL-SCNC: 4.6 MMOL/L
RBC # BLD AUTO: 5.49 M/UL
SODIUM SERPL-SCNC: 138 MMOL/L
WBC # BLD AUTO: 7.16 K/UL

## 2019-02-24 PROCEDURE — 80048 BASIC METABOLIC PNL TOTAL CA: CPT

## 2019-02-24 PROCEDURE — 63600175 PHARM REV CODE 636 W HCPCS: Performed by: EMERGENCY MEDICINE

## 2019-02-24 PROCEDURE — 25000003 PHARM REV CODE 250: Performed by: HOSPITALIST

## 2019-02-24 PROCEDURE — 99233 PR SUBSEQUENT HOSPITAL CARE,LEVL III: ICD-10-PCS | Mod: ,,, | Performed by: HOSPITALIST

## 2019-02-24 PROCEDURE — 83735 ASSAY OF MAGNESIUM: CPT

## 2019-02-24 PROCEDURE — 36415 COLL VENOUS BLD VENIPUNCTURE: CPT

## 2019-02-24 PROCEDURE — 99900035 HC TECH TIME PER 15 MIN (STAT)

## 2019-02-24 PROCEDURE — 94761 N-INVAS EAR/PLS OXIMETRY MLT: CPT

## 2019-02-24 PROCEDURE — 99233 SBSQ HOSP IP/OBS HIGH 50: CPT | Mod: ,,, | Performed by: HOSPITALIST

## 2019-02-24 PROCEDURE — 85025 COMPLETE CBC W/AUTO DIFF WBC: CPT

## 2019-02-24 PROCEDURE — 63600175 PHARM REV CODE 636 W HCPCS: Performed by: HOSPITALIST

## 2019-02-24 PROCEDURE — 11000001 HC ACUTE MED/SURG PRIVATE ROOM

## 2019-02-24 RX ADMIN — ENOXAPARIN SODIUM 40 MG: 100 INJECTION SUBCUTANEOUS at 04:02

## 2019-02-24 RX ADMIN — MORPHINE SULFATE 2 MG: 2 INJECTION, SOLUTION INTRAMUSCULAR; INTRAVENOUS at 03:02

## 2019-02-24 RX ADMIN — SODIUM CHLORIDE: 0.9 INJECTION, SOLUTION INTRAVENOUS at 11:02

## 2019-02-24 RX ADMIN — MORPHINE SULFATE 4 MG: 4 INJECTION INTRAVENOUS at 05:02

## 2019-02-24 RX ADMIN — HYDRALAZINE HYDROCHLORIDE 15 MG: 20 INJECTION INTRAMUSCULAR; INTRAVENOUS at 01:02

## 2019-02-24 RX ADMIN — MORPHINE SULFATE 2 MG: 2 INJECTION, SOLUTION INTRAMUSCULAR; INTRAVENOUS at 09:02

## 2019-02-24 RX ADMIN — ONDANSETRON 4 MG: 2 INJECTION INTRAMUSCULAR; INTRAVENOUS at 09:02

## 2019-02-24 RX ADMIN — SODIUM CHLORIDE: 0.9 INJECTION, SOLUTION INTRAVENOUS at 09:02

## 2019-02-24 NOTE — PROGRESS NOTES
[facility] Hospital Medicine  Progress Note    Patient Name: Natali Galeano  MRN: [id number]  Patient Class: IP- Inpatient   Admission Date: 2/23/2019  Length of Stay: 1 days  Attending Physician: John Almaraz MD  Primary Care Provider: Ewelina Herzog MD        Subjective:     Principal Problem:Partial small bowel obstruction    HPI:  Mrs. Galeano is a 78 year old woman with a history of colon cancer status post resection of mass in 2002, abdominal hernia repair in 2004, two prior episodes of small bowel obstruction who came in for evaluation of abdominal pain.  She states this started abruptly at 6:30 PM on 2/22, yesterday evening.  The pain was described as sharp, non-radiating and located all around her umbilicus.  She states the pain was constant until coming to the ER this evening and receiving pain medications.  The pain was associated with nausea and was followed by emesis of consumed food once last night and several times today.  She notes her last bowel movement was yesterday afternoon and was solid and normal for her.  She has not passed flatus since onset of pain but has been belching.  She states this feels exactly like her prior two episodes of bowel obstruction.  She denies fever, chills, diarrhea, headache, chest pain, light headedness or leg swelling.  She endorses minor nasal congestion as well as several days of mild right knee and foot pain.    Hospital Course:  No notes on file    Interval History: NGT placed, no acute events overnight.  Pain much improved but still no bowel sounds and no flatus.    Review of Systems   Constitutional: Negative for activity change, chills, diaphoresis and fatigue.   HENT: Negative for congestion, drooling and hearing loss.    Eyes: Negative for discharge.   Respiratory: Negative for apnea, chest tightness, shortness of breath and wheezing.    Cardiovascular: Negative for palpitations and leg swelling.   Gastrointestinal: Positive for  abdominal pain, nausea and vomiting. Negative for abdominal distention, constipation and diarrhea.   Musculoskeletal: Negative for arthralgias and gait problem.   Skin: Negative for rash.   Neurological: Negative for seizures, light-headedness and numbness.   Hematological: Negative for adenopathy.   Psychiatric/Behavioral: Negative for agitation and behavioral problems.     Objective:     Vital Signs (Most Recent):  Temp: 97.6 °F (36.4 °C) (02/24/19 0709)  Pulse: 73 (02/24/19 0709)  Resp: 18 (02/24/19 0709)  BP: (!) 140/65 (02/24/19 0709)  SpO2: 98 % (02/24/19 0709) Vital Signs (24h Range):  Temp:  [97.6 °F (36.4 °C)-98 °F (36.7 °C)] 97.6 °F (36.4 °C)  Pulse:  [58-75] 73  Resp:  [14-27] 18  SpO2:  [95 %-99 %] 98 %  BP: (122-204)/(60-86) 140/65     Weight: 128.4 kg (283 lb)  Body mass index is 44.32 kg/m².    Intake/Output Summary (Last 24 hours) at 2/24/2019 1030  Last data filed at 2/24/2019 0600  Gross per 24 hour   Intake --   Output 300 ml   Net -300 ml      Physical Exam   Constitutional: She is oriented to person, place, and time. She appears well-developed and well-nourished.   HENT:   Head: Normocephalic and atraumatic.   ngt in place, obese, not in distress   Eyes: EOM are normal. Pupils are equal, round, and reactive to light.   Neck: Normal range of motion. Neck supple.   Cardiovascular: Normal rate, regular rhythm and normal heart sounds.   Pulmonary/Chest: Effort normal and breath sounds normal. No respiratory distress.   Abdominal: Soft. Bowel sounds are normal. She exhibits no distension. There is tenderness.   Obese, soft, non-distended, mild sonya-umbilical ttp, no bowel sounds   Musculoskeletal: Normal range of motion. She exhibits no edema.   Neurological: She is oriented to person, place, and time. No cranial nerve deficit. Coordination normal.   Skin: Skin is warm and dry.   Psychiatric: She has a normal mood and affect. Her behavior is normal.   Vitals reviewed.      Significant Labs: All  pertinent labs within the past 24 hours have been reviewed.    Significant Imaging: I have reviewed and interpreted all pertinent imaging results/findings within the past 24 hours.    Assessment/Plan:      * Partial small bowel obstruction    -Mrs. Galeano was be admitted to inpatient status  -She had severe pain that has improved with IV narcotics as well as nausea and vomiting with inability to tolerate oral intake.  She also has no bowel sounds.  -Very concerning for advancing partial small bowel obstruction  -NGT placed for bowel decompression and pain is much improved.  However still not passing flatus and she has no bowel sounds which is concerning.  -Will clamp tube x 4 hours.  Continue serial abdominal exams.  If does ok will leave tube clamped and advance to clears.  If doesn't tolerate will resume bowel decompression.  If does tolerate will remove NGT in AM.  -Continue morphine IV for analgesia and zofran IV for nausea  -Dr. Cardozo on board and input appreciated.     Right knee pain    -No recent trauma and no swelling noted  -has history of gout but uric acid is normal     Dehydration    -Provide IV fluids  -Monitor electrolytes closely.       ALYSE (obstructive sleep apnea)    -Will order nocturnal cpap with pressure setting of 12 as she uses at home.       Hyperlipidemia    -Hold home statin until taking PO       Morbid obesity due to excess calories    -Noted       HTN (hypertension)    -Will hold home atenolol and benazepril until she can take PO  -Will provide PRN hydralazine for SBP > 170         VTE Risk Mitigation (From admission, onward)        Ordered     enoxaparin injection 40 mg  Daily      02/23/19 1812     Place FEDE hose  Until discontinued      02/23/19 1812     IP VTE HIGH RISK PATIENT  Once      02/23/19 1812              John Almaraz MD  Department of Hospital Medicine   [facility]

## 2019-02-24 NOTE — PLAN OF CARE
Problem: Adult Inpatient Plan of Care  Goal: Plan of Care Review  Outcome: Ongoing (interventions implemented as appropriate)  BP and pain controlled with prn med. NG tube in place, to low intermittent wall suction. Pt repositions self frequently and independently. Pt remained free of falls. Bed locked in lowest position, call light and personal items within reach. Will continue to monitor.

## 2019-02-24 NOTE — ASSESSMENT & PLAN NOTE
-Mrs. Galeano was be admitted to inpatient status  -She had severe pain that has improved with IV narcotics as well as nausea and vomiting with inability to tolerate oral intake.  She also has no bowel sounds.  -Very concerning for advancing partial small bowel obstruction  -NGT placed for bowel decompression and pain is much improved.  However still not passing flatus and she has no bowel sounds which is concerning.  -Will clamp tube x 4 hours.  Continue serial abdominal exams.  If does ok will leave tube clamped and advance to clears.  If doesn't tolerate will resume bowel decompression.  If does tolerate will remove NGT in AM.  -Continue morphine IV for analgesia and zofran IV for nausea  -Dr. Cardozo on board and input appreciated.

## 2019-02-24 NOTE — SUBJECTIVE & OBJECTIVE
Interval History: NGT placed, no acute events overnight.  Pain much improved but still no bowel sounds and no flatus.    Review of Systems   Constitutional: Negative for activity change, chills, diaphoresis and fatigue.   HENT: Negative for congestion, drooling and hearing loss.    Eyes: Negative for discharge.   Respiratory: Negative for apnea, chest tightness, shortness of breath and wheezing.    Cardiovascular: Negative for palpitations and leg swelling.   Gastrointestinal: Positive for abdominal pain, nausea and vomiting. Negative for abdominal distention, constipation and diarrhea.   Musculoskeletal: Negative for arthralgias and gait problem.   Skin: Negative for rash.   Neurological: Negative for seizures, light-headedness and numbness.   Hematological: Negative for adenopathy.   Psychiatric/Behavioral: Negative for agitation and behavioral problems.     Objective:     Vital Signs (Most Recent):  Temp: 97.6 °F (36.4 °C) (02/24/19 0709)  Pulse: 73 (02/24/19 0709)  Resp: 18 (02/24/19 0709)  BP: (!) 140/65 (02/24/19 0709)  SpO2: 98 % (02/24/19 0709) Vital Signs (24h Range):  Temp:  [97.6 °F (36.4 °C)-98 °F (36.7 °C)] 97.6 °F (36.4 °C)  Pulse:  [58-75] 73  Resp:  [14-27] 18  SpO2:  [95 %-99 %] 98 %  BP: (122-204)/(60-86) 140/65     Weight: 128.4 kg (283 lb)  Body mass index is 44.32 kg/m².    Intake/Output Summary (Last 24 hours) at 2/24/2019 1030  Last data filed at 2/24/2019 0600  Gross per 24 hour   Intake --   Output 300 ml   Net -300 ml      Physical Exam   Constitutional: She is oriented to person, place, and time. She appears well-developed and well-nourished.   HENT:   Head: Normocephalic and atraumatic.   ngt in place, obese, not in distress   Eyes: EOM are normal. Pupils are equal, round, and reactive to light.   Neck: Normal range of motion. Neck supple.   Cardiovascular: Normal rate, regular rhythm and normal heart sounds.   Pulmonary/Chest: Effort normal and breath sounds normal. No respiratory  distress.   Abdominal: Soft. Bowel sounds are normal. She exhibits no distension. There is tenderness.   Obese, soft, non-distended, mild sonya-umbilical ttp, no bowel sounds   Musculoskeletal: Normal range of motion. She exhibits no edema.   Neurological: She is oriented to person, place, and time. No cranial nerve deficit. Coordination normal.   Skin: Skin is warm and dry.   Psychiatric: She has a normal mood and affect. Her behavior is normal.   Vitals reviewed.      Significant Labs: All pertinent labs within the past 24 hours have been reviewed.    Significant Imaging: I have reviewed and interpreted all pertinent imaging results/findings within the past 24 hours.

## 2019-02-24 NOTE — PLAN OF CARE
PCP: Ewelina Herzog     Pharmacy: [facility] [id number] [facility] [address] AT [facility]    Transportation: Needs assistance with transportation home.    DME: denies    Coumadin: denies    Dialysis: N/A    CM to follow up for transportation need.     02/23/19 1807   Discharge Assessment   Assessment Type Discharge Planning Assessment   Confirmed/corrected address and phone number on facesheet? Yes   Assessment information obtained from? Patient   Expected Length of Stay (days) 2   Communicated expected length of stay with patient/caregiver yes   Prior to hospitilization cognitive status: Alert/Oriented   Prior to hospitalization functional status: Independent   Current cognitive status: Alert/Oriented   Current Functional Status: Independent   Lives With alone   Able to Return to Prior Arrangements yes   Is patient able to care for self after discharge? Yes   Patient's perception of discharge disposition home or selfcare;admitted as an inpatient   Readmission Within the Last 30 Days no previous admission in last 30 days   Patient currently being followed by outpatient case management? No   Patient currently receives any other outside agency services? No   Equipment Currently Used at Home none   Do you have any problems affording any of your prescribed medications? No   Is the patient taking medications as prescribed? yes   Does the patient have transportation home? No   Does the patient receive services at the [facility]? No   Discharge Plan A Home   Discharge Plan B Home   DME Needed Upon Discharge  none   Patient/Family in Agreement with Plan yes

## 2019-02-24 NOTE — NURSING
NG tube clamped per Dr. Cardozo. Ordered to remain claimed for 4 hours and then hook back to high suction. If pt puts out less than 100 cc then ordered to remove NG tube. Will continue to monitor.

## 2019-02-24 NOTE — NURSING
New orders given per hospitals, Dr. Almaraz. Ordered to keep NG tube intact to make sure pt is tolerating clear liquid diet. If pt is tolerating diet, ordered to removed NG tube in the morning, 2/25.

## 2019-02-24 NOTE — PLAN OF CARE
Problem: Obstructive Sleep Apnea Risk or Actual  Goal: Unobstructed Breathing During Sleep  Outcome: Ongoing (interventions implemented as appropriate)  Familiar with CPAP from home use, toelrating well at this time.

## 2019-02-24 NOTE — NURSING
Pt  Connect back to high continuous suction, 50 cc output of brownish drainage. Clamped NG tube per  Markers orders and ordered clear liquid diet tray. Will continue to monitor.

## 2019-02-25 LAB
ANION GAP SERPL CALC-SCNC: 10 MMOL/L
BASOPHILS # BLD AUTO: 0.01 K/UL
BASOPHILS NFR BLD: 0.2 %
BUN SERPL-MCNC: 11 MG/DL
CALCIUM SERPL-MCNC: 10 MG/DL
CHLORIDE SERPL-SCNC: 104 MMOL/L
CO2 SERPL-SCNC: 26 MMOL/L
CREAT SERPL-MCNC: 0.8 MG/DL
DIFFERENTIAL METHOD: ABNORMAL
EOSINOPHIL # BLD AUTO: 0 K/UL
EOSINOPHIL NFR BLD: 0.2 %
ERYTHROCYTE [DISTWIDTH] IN BLOOD BY AUTOMATED COUNT: 15.4 %
EST. GFR  (AFRICAN AMERICAN): >60 ML/MIN/1.73 M^2
EST. GFR  (NON AFRICAN AMERICAN): >60 ML/MIN/1.73 M^2
GLUCOSE SERPL-MCNC: 113 MG/DL
HCT VFR BLD AUTO: 45.4 %
HGB BLD-MCNC: 14 G/DL
LYMPHOCYTES # BLD AUTO: 1.8 K/UL
LYMPHOCYTES NFR BLD: 38.2 %
MAGNESIUM SERPL-MCNC: 2 MG/DL
MCH RBC QN AUTO: 25.4 PG
MCHC RBC AUTO-ENTMCNC: 30.8 G/DL
MCV RBC AUTO: 82 FL
MONOCYTES # BLD AUTO: 0.7 K/UL
MONOCYTES NFR BLD: 15.4 %
NEUTROPHILS # BLD AUTO: 2.2 K/UL
NEUTROPHILS NFR BLD: 46 %
PLATELET # BLD AUTO: 189 K/UL
PMV BLD AUTO: 10.9 FL
POTASSIUM SERPL-SCNC: 4.3 MMOL/L
RBC # BLD AUTO: 5.51 M/UL
SODIUM SERPL-SCNC: 140 MMOL/L
WBC # BLD AUTO: 4.74 K/UL

## 2019-02-25 PROCEDURE — 94660 CPAP INITIATION&MGMT: CPT

## 2019-02-25 PROCEDURE — 99233 PR SUBSEQUENT HOSPITAL CARE,LEVL III: ICD-10-PCS | Mod: ,,, | Performed by: HOSPITALIST

## 2019-02-25 PROCEDURE — 63600175 PHARM REV CODE 636 W HCPCS: Performed by: HOSPITALIST

## 2019-02-25 PROCEDURE — 11000001 HC ACUTE MED/SURG PRIVATE ROOM

## 2019-02-25 PROCEDURE — 80048 BASIC METABOLIC PNL TOTAL CA: CPT

## 2019-02-25 PROCEDURE — 94761 N-INVAS EAR/PLS OXIMETRY MLT: CPT

## 2019-02-25 PROCEDURE — 99233 SBSQ HOSP IP/OBS HIGH 50: CPT | Mod: ,,, | Performed by: HOSPITALIST

## 2019-02-25 PROCEDURE — 85025 COMPLETE CBC W/AUTO DIFF WBC: CPT

## 2019-02-25 PROCEDURE — 25000003 PHARM REV CODE 250: Performed by: HOSPITALIST

## 2019-02-25 PROCEDURE — 83735 ASSAY OF MAGNESIUM: CPT

## 2019-02-25 PROCEDURE — 36415 COLL VENOUS BLD VENIPUNCTURE: CPT

## 2019-02-25 PROCEDURE — 99900035 HC TECH TIME PER 15 MIN (STAT)

## 2019-02-25 RX ORDER — ATENOLOL 25 MG/1
100 TABLET ORAL DAILY
Status: DISCONTINUED | OUTPATIENT
Start: 2019-02-25 | End: 2019-02-27 | Stop reason: HOSPADM

## 2019-02-25 RX ORDER — LANOLIN ALCOHOL/MO/W.PET/CERES
500 CREAM (GRAM) TOPICAL DAILY
COMMUNITY
End: 2022-01-06

## 2019-02-25 RX ORDER — ALLOPURINOL 300 MG/1
300 TABLET ORAL DAILY
COMMUNITY
End: 2023-07-25 | Stop reason: SDUPTHER

## 2019-02-25 RX ADMIN — DOCUSATE SODIUM 100 MG: 100 CAPSULE, LIQUID FILLED ORAL at 09:02

## 2019-02-25 RX ADMIN — DOCUSATE SODIUM 100 MG: 100 CAPSULE, LIQUID FILLED ORAL at 08:02

## 2019-02-25 RX ADMIN — ATENOLOL 100 MG: 25 TABLET ORAL at 12:02

## 2019-02-25 RX ADMIN — ENOXAPARIN SODIUM 40 MG: 100 INJECTION SUBCUTANEOUS at 05:02

## 2019-02-25 NOTE — ASSESSMENT & PLAN NOTE
-Resume home atenolol today  -Continue holding home norvasc and benazepril and possibly resume tomorrow.  -Will provide PRN hydralazine for SBP > 170

## 2019-02-25 NOTE — PLAN OF CARE
Problem: Fall Injury Risk  Goal: Absence of Fall and Fall-Related Injury  Outcome: Ongoing (interventions implemented as appropriate)  Pt remained free of falls. Bed locked in lowest position, call light and personal items within reach.     Problem: Adult Inpatient Plan of Care  Goal: Plan of Care Review  Outcome: Ongoing (interventions implemented as appropriate)  Vital signs stable, on room air. Complaints of pain and nausea treated with prn med. Pt ambulates unassisted and repositions frequently. Will continue to monitor.     Problem: Obstructive Sleep Apnea Risk or Actual  Goal: Unobstructed Breathing During Sleep  Outcome: Ongoing (interventions implemented as appropriate)  Pt refused CPAP due to NG tube.

## 2019-02-25 NOTE — CONSULTS
DATE OF CONSULTATION:  02/24/2019.    HISTORY OF PRESENT ILLNESS:  This 78-year-old black female presented to the   Emergency Room with complaints of abdominal pain, nausea, or vomiting.  She has   a history of bowel obstructions in the past, most recently two years ago.  CAT   scan revealed a possible partial small-bowel obstruction.  She is admitted for   treatment.    PAST SURGICAL HISTORY:  Significant for colon surgery for colon cancer, hernia   surgery, partial nephrectomy.    REVIEW OF SYSTEMS:  She denies chest pain, shortness of breath, cough, or sputum   production.    PHYSICAL EXAMINATION:  GENERAL:  Morbidly obese female in no acute distress.  HEAD, EARS, EYES, NOSE AND THROAT:  Within normal limits.  NECK:  Supple.  CHEST:  Clear.  HEART:  Rate and rhythm are regular.  ABDOMEN:  Obese.  Healed midline scar is present.  There is a palpable mass in   the upper midline.  There is no tenderness or guarding.  EXTREMITIES:  No edema.    IMPRESSION:  Possible small bowel obstruction.    PLAN:  Observation.  No surgical indication at this time.      EBS/HN  dd: 02/24/2019 11:21:37 (CST)  td: 02/24/2019 21:50:24 (CST)  Doc ID   #[id number]  Job ID #[id number]    CC:

## 2019-02-25 NOTE — PLAN OF CARE
Problem: Obstructive Sleep Apnea Risk or Actual  Goal: Unobstructed Breathing During Sleep  Outcome: Ongoing (interventions implemented as appropriate)  NG tube is interfering with mask fit and patient will not be wearing CPAP tonight.

## 2019-02-25 NOTE — PLAN OF CARE
Problem: Adult Inpatient Plan of Care  Goal: Plan of Care Review  Outcome: Ongoing (interventions implemented as appropriate)  Pt remained free of falls and injuries. IV flushed and infusing. Ambulates without assistance. NG tube clamped and intact. VSS on room air. Managed pain with PRN medications. Advanced to clear liquid diet, tolerating well. Bed low and locked, call light within reach, side rails up X2. Will continue to monitor.

## 2019-02-25 NOTE — PROGRESS NOTES
[facility] Hospital Medicine  Progress Note    Patient Name: Natali Galeano  MRN: [id number]  Patient Class: IP- Inpatient   Admission Date: 2/23/2019  Length of Stay: 2 days  Attending Physician: John Almaraz MD  Primary Care Provider: Ewelina Herzog MD        Subjective:     Principal Problem:Partial small bowel obstruction    HPI:  Mrs. Galeano is a 78 year old woman with a history of colon cancer status post resection of mass in 2002, abdominal hernia repair in 2004, two prior episodes of small bowel obstruction who came in for evaluation of abdominal pain.  She states this started abruptly at 6:30 PM on 2/22, yesterday evening.  The pain was described as sharp, non-radiating and located all around her umbilicus.  She states the pain was constant until coming to the ER this evening and receiving pain medications.  The pain was associated with nausea and was followed by emesis of consumed food once last night and several times today.  She notes her last bowel movement was yesterday afternoon and was solid and normal for her.  She has not passed flatus since onset of pain but has been belching.  She states this feels exactly like her prior two episodes of bowel obstruction.  She denies fever, chills, diarrhea, headache, chest pain, light headedness or leg swelling.  She endorses minor nasal congestion as well as several days of mild right knee and foot pain.    Hospital Course:  No notes on file    Interval History: No acute events overnight.  Passing flatus this morning.  Seems to have tolerated clear liquids, but did have pain yesterday evening.  Was able to remove NGT today and will advance to full liquids.      Review of Systems   Constitutional: Negative for activity change, chills, diaphoresis and fatigue.   HENT: Negative for congestion, drooling and hearing loss.    Eyes: Negative for discharge.   Respiratory: Negative for apnea, chest tightness, shortness of breath and wheezing.     Cardiovascular: Negative for palpitations and leg swelling.   Gastrointestinal: Positive for abdominal pain, nausea and vomiting. Negative for abdominal distention, constipation and diarrhea.   Musculoskeletal: Negative for arthralgias and gait problem.   Skin: Negative for rash.   Neurological: Negative for seizures, light-headedness and numbness.   Hematological: Negative for adenopathy.   Psychiatric/Behavioral: Negative for agitation and behavioral problems.     Objective:     Vital Signs (Most Recent):  Temp: 98.8 °F (37.1 °C) (02/25/19 1210)  Pulse: 72 (02/25/19 1210)  Resp: 18 (02/25/19 1210)  BP: (!) 147/68 (02/25/19 1210)  SpO2: 95 % (02/25/19 1210) Vital Signs (24h Range):  Temp:  [97.1 °F (36.2 °C)-98.8 °F (37.1 °C)] 98.8 °F (37.1 °C)  Pulse:  [58-84] 72  Resp:  [16-20] 18  SpO2:  [95 %-98 %] 95 %  BP: (137-173)/(60-72) 147/68     Weight: 128.4 kg (283 lb)  Body mass index is 44.32 kg/m².    Intake/Output Summary (Last 24 hours) at 2/25/2019 1213  Last data filed at 2/24/2019 1600  Gross per 24 hour   Intake 120 ml   Output 50 ml   Net 70 ml      Physical Exam   Constitutional: She is oriented to person, place, and time. She appears well-developed and well-nourished.   HENT:   Head: Normocephalic and atraumatic.   obese, not in distress   Eyes: EOM are normal. Pupils are equal, round, and reactive to light.   Neck: Normal range of motion. Neck supple.   Cardiovascular: Normal rate, regular rhythm and normal heart sounds.   Pulmonary/Chest: Effort normal and breath sounds normal. No respiratory distress.   Abdominal: Soft. Bowel sounds are normal. She exhibits no distension. There is tenderness.   Obese, soft, non-distended, mild sonya-umbilical ttp, minimal bowel sounds   Musculoskeletal: Normal range of motion. She exhibits no edema.   Neurological: She is oriented to person, place, and time. No cranial nerve deficit. Coordination normal.   Skin: Skin is warm and dry.   Psychiatric: She has a normal  mood and affect. Her behavior is normal.   Vitals reviewed.      Significant Labs: All pertinent labs within the past 24 hours have been reviewed.    Significant Imaging: I have reviewed and interpreted all pertinent imaging results/findings within the past 24 hours.    Assessment/Plan:      * Partial small bowel obstruction    -Mrs. Galeano was be admitted to inpatient status  -She had severe pain that has improved with IV narcotics as well as nausea and vomiting with inability to tolerate oral intake.  She also had no bowel sounds at the time of admission which was very concerning for advancing partial small bowel obstruction  -NGT placed for bowel decompression and pain improved.  NGT clamped overnight and she has tolerated clear liquids so have removed NGT today.  -She is passing flatus, but still with almost no bowel sounds so I believe still at high risk for decompensation.  -Will advance to full liquids today.  If tolerates, then hopefully able to discharge home tomorrow.  -Continue serial abdominal exams.    -Continue morphine IV for analgesia and zofran IV for nausea  -Dr. Cardozo on board and input appreciated.     Right knee pain    -No recent trauma and no swelling noted  -has history of gout but uric acid is normal     Dehydration    -Provide IV fluids  -Monitor electrolytes closely.       ALYSE (obstructive sleep apnea)    -Will order nocturnal cpap with pressure setting of 12 as she uses at home.       Hyperlipidemia    -Hold home statin until taking PO       Morbid obesity due to excess calories    -Noted       HTN (hypertension)    -Resume home atenolol today  -Continue holding home norvasc and benazepril and possibly resume tomorrow.  -Will provide PRN hydralazine for SBP > 170         VTE Risk Mitigation (From admission, onward)        Ordered     enoxaparin injection 40 mg  Daily      02/23/19 1812     Place FEDE hose  Until discontinued      02/23/19 1812     IP VTE HIGH RISK PATIENT  Once       02/23/19 1812              John Almaraz MD  Department of Hospital Medicine   [facility]

## 2019-02-25 NOTE — PLAN OF CARE
Problem: Obstructive Sleep Apnea Risk or Actual  Goal: Unobstructed Breathing During Sleep  Tolerates CPAP well during hours of sleep.

## 2019-02-25 NOTE — SUBJECTIVE & OBJECTIVE
Interval History: No acute events overnight.  Passing flatus this morning.  Seems to have tolerated clear liquids, but did have pain yesterday evening.  Was able to remove NGT today and will advance to full liquids.      Review of Systems   Constitutional: Negative for activity change, chills, diaphoresis and fatigue.   HENT: Negative for congestion, drooling and hearing loss.    Eyes: Negative for discharge.   Respiratory: Negative for apnea, chest tightness, shortness of breath and wheezing.    Cardiovascular: Negative for palpitations and leg swelling.   Gastrointestinal: Positive for abdominal pain, nausea and vomiting. Negative for abdominal distention, constipation and diarrhea.   Musculoskeletal: Negative for arthralgias and gait problem.   Skin: Negative for rash.   Neurological: Negative for seizures, light-headedness and numbness.   Hematological: Negative for adenopathy.   Psychiatric/Behavioral: Negative for agitation and behavioral problems.     Objective:     Vital Signs (Most Recent):  Temp: 98.8 °F (37.1 °C) (02/25/19 1210)  Pulse: 72 (02/25/19 1210)  Resp: 18 (02/25/19 1210)  BP: (!) 147/68 (02/25/19 1210)  SpO2: 95 % (02/25/19 1210) Vital Signs (24h Range):  Temp:  [97.1 °F (36.2 °C)-98.8 °F (37.1 °C)] 98.8 °F (37.1 °C)  Pulse:  [58-84] 72  Resp:  [16-20] 18  SpO2:  [95 %-98 %] 95 %  BP: (137-173)/(60-72) 147/68     Weight: 128.4 kg (283 lb)  Body mass index is 44.32 kg/m².    Intake/Output Summary (Last 24 hours) at 2/25/2019 1213  Last data filed at 2/24/2019 1600  Gross per 24 hour   Intake 120 ml   Output 50 ml   Net 70 ml      Physical Exam   Constitutional: She is oriented to person, place, and time. She appears well-developed and well-nourished.   HENT:   Head: Normocephalic and atraumatic.   obese, not in distress   Eyes: EOM are normal. Pupils are equal, round, and reactive to light.   Neck: Normal range of motion. Neck supple.   Cardiovascular: Normal rate, regular rhythm and normal heart  sounds.   Pulmonary/Chest: Effort normal and breath sounds normal. No respiratory distress.   Abdominal: Soft. Bowel sounds are normal. She exhibits no distension. There is tenderness.   Obese, soft, non-distended, mild sonya-umbilical ttp, minimal bowel sounds   Musculoskeletal: Normal range of motion. She exhibits no edema.   Neurological: She is oriented to person, place, and time. No cranial nerve deficit. Coordination normal.   Skin: Skin is warm and dry.   Psychiatric: She has a normal mood and affect. Her behavior is normal.   Vitals reviewed.      Significant Labs: All pertinent labs within the past 24 hours have been reviewed.    Significant Imaging: I have reviewed and interpreted all pertinent imaging results/findings within the past 24 hours.

## 2019-02-26 PROBLEM — E86.0 DEHYDRATION: Status: RESOLVED | Noted: 2019-02-23 | Resolved: 2019-02-26

## 2019-02-26 LAB
ANION GAP SERPL CALC-SCNC: 9 MMOL/L
BASOPHILS # BLD AUTO: 0.01 K/UL
BASOPHILS NFR BLD: 0.1 %
BUN SERPL-MCNC: 19 MG/DL
CALCIUM SERPL-MCNC: 9.2 MG/DL
CHLORIDE SERPL-SCNC: 105 MMOL/L
CO2 SERPL-SCNC: 25 MMOL/L
CREAT SERPL-MCNC: 1.1 MG/DL
DIFFERENTIAL METHOD: ABNORMAL
EOSINOPHIL # BLD AUTO: 0.1 K/UL
EOSINOPHIL NFR BLD: 1.9 %
ERYTHROCYTE [DISTWIDTH] IN BLOOD BY AUTOMATED COUNT: 15.5 %
EST. GFR  (AFRICAN AMERICAN): 56 ML/MIN/1.73 M^2
EST. GFR  (NON AFRICAN AMERICAN): 49 ML/MIN/1.73 M^2
GLUCOSE SERPL-MCNC: 87 MG/DL
HCT VFR BLD AUTO: 38.7 %
HGB BLD-MCNC: 11.9 G/DL
LYMPHOCYTES # BLD AUTO: 3.3 K/UL
LYMPHOCYTES NFR BLD: 50.1 %
MAGNESIUM SERPL-MCNC: 1.9 MG/DL
MCH RBC QN AUTO: 25.4 PG
MCHC RBC AUTO-ENTMCNC: 30.7 G/DL
MCV RBC AUTO: 83 FL
MONOCYTES # BLD AUTO: 0.9 K/UL
MONOCYTES NFR BLD: 12.7 %
NEUTROPHILS # BLD AUTO: 2.3 K/UL
NEUTROPHILS NFR BLD: 35.1 %
PLATELET # BLD AUTO: 168 K/UL
PMV BLD AUTO: 11 FL
POTASSIUM SERPL-SCNC: 4 MMOL/L
RBC # BLD AUTO: 4.69 M/UL
SODIUM SERPL-SCNC: 139 MMOL/L
WBC # BLD AUTO: 6.67 K/UL

## 2019-02-26 PROCEDURE — 25000003 PHARM REV CODE 250: Performed by: HOSPITALIST

## 2019-02-26 PROCEDURE — 11000001 HC ACUTE MED/SURG PRIVATE ROOM

## 2019-02-26 PROCEDURE — 25000003 PHARM REV CODE 250: Performed by: SPECIALIST

## 2019-02-26 PROCEDURE — 94761 N-INVAS EAR/PLS OXIMETRY MLT: CPT

## 2019-02-26 PROCEDURE — 99232 SBSQ HOSP IP/OBS MODERATE 35: CPT | Mod: ,,, | Performed by: HOSPITALIST

## 2019-02-26 PROCEDURE — 83735 ASSAY OF MAGNESIUM: CPT

## 2019-02-26 PROCEDURE — 80048 BASIC METABOLIC PNL TOTAL CA: CPT

## 2019-02-26 PROCEDURE — 99232 PR SUBSEQUENT HOSPITAL CARE,LEVL II: ICD-10-PCS | Mod: ,,, | Performed by: HOSPITALIST

## 2019-02-26 PROCEDURE — 85025 COMPLETE CBC W/AUTO DIFF WBC: CPT

## 2019-02-26 PROCEDURE — 63600175 PHARM REV CODE 636 W HCPCS: Performed by: HOSPITALIST

## 2019-02-26 PROCEDURE — 36415 COLL VENOUS BLD VENIPUNCTURE: CPT

## 2019-02-26 RX ORDER — BENAZEPRIL HYDROCHLORIDE 10 MG/1
20 TABLET ORAL DAILY
Status: DISCONTINUED | OUTPATIENT
Start: 2019-02-26 | End: 2019-02-27 | Stop reason: HOSPADM

## 2019-02-26 RX ORDER — ENOXAPARIN SODIUM 100 MG/ML
40 INJECTION SUBCUTANEOUS EVERY 12 HOURS
Status: DISCONTINUED | OUTPATIENT
Start: 2019-02-27 | End: 2019-02-27 | Stop reason: HOSPADM

## 2019-02-26 RX ORDER — AMOXICILLIN 250 MG
1 CAPSULE ORAL DAILY
Status: DISCONTINUED | OUTPATIENT
Start: 2019-02-26 | End: 2019-02-27 | Stop reason: HOSPADM

## 2019-02-26 RX ORDER — ADHESIVE BANDAGE
30 BANDAGE TOPICAL DAILY PRN
Status: COMPLETED | OUTPATIENT
Start: 2019-02-26 | End: 2019-02-26

## 2019-02-26 RX ADMIN — MAGNESIUM HYDROXIDE 2400 MG: 400 SUSPENSION ORAL at 04:02

## 2019-02-26 RX ADMIN — STANDARDIZED SENNA CONCENTRATE AND DOCUSATE SODIUM 1 TABLET: 8.6; 5 TABLET, FILM COATED ORAL at 09:02

## 2019-02-26 RX ADMIN — BENAZEPRIL HYDROCHLORIDE 20 MG: 10 TABLET, FILM COATED ORAL at 07:02

## 2019-02-26 RX ADMIN — DOCUSATE SODIUM 100 MG: 100 CAPSULE, LIQUID FILLED ORAL at 09:02

## 2019-02-26 RX ADMIN — ATENOLOL 100 MG: 25 TABLET ORAL at 09:02

## 2019-02-26 RX ADMIN — ENOXAPARIN SODIUM 40 MG: 100 INJECTION SUBCUTANEOUS at 04:02

## 2019-02-26 NOTE — PLAN OF CARE
Problem: Adult Inpatient Plan of Care  Goal: Plan of Care Review  Outcome: Ongoing (interventions implemented as appropriate)  Pt in no distress on room air, pt refused CPAP, will continue to monitor.

## 2019-02-26 NOTE — HOSPITAL COURSE
Ms. Galeano is a 78-year old woman with a history of colon cancer status post resection of mass in 2002, abdominal hernia repair in 2004, two prior episodes of small bowel obstruction who came in for evaluation of abdominal pain.  She was diagnosed with a partial small bowel obstruction and admitted to inpatient status.  General surgery consulted to assist with management of her partial bowel obstruction.  Nasogastric tube was placed for bowel decompression.  Patient was treated with intravenous fluids to treat hypovolemia.  Subsequently she improved with less pain and she started to pass flatus.  Bowel sounds however remain extremely muted at first.  An oral diet however was slowly reintroduced and subsequently advanced to solid food.  Patient also initially difficulty having a bowel movement.  Patient start a bowel regimen and subsequently given a Dulcolax suppository which resulted in a bowel movement.  Patient not tolerating oral intake without nausea or vomiting or any abdominal pain. Patient stable to be discharged home and advised to follow up in clinic.

## 2019-02-26 NOTE — PLAN OF CARE
Problem: Fall Injury Risk  Goal: Absence of Fall and Fall-Related Injury  Outcome: Ongoing (interventions implemented as appropriate)  Pt remained free of falls. Bed locked in lowest position, call light and personal items within reach.     Problem: Adult Inpatient Plan of Care  Goal: Plan of Care Review  Outcome: Ongoing (interventions implemented as appropriate)  Vital signs stable, on room air. No complaints of pain or nausea. Tolerated full liquid diet. Will continue to monitor.

## 2019-02-27 VITALS
HEART RATE: 56 BPM | OXYGEN SATURATION: 96 % | RESPIRATION RATE: 16 BRPM | DIASTOLIC BLOOD PRESSURE: 54 MMHG | WEIGHT: 283 LBS | BODY MASS INDEX: 44.32 KG/M2 | SYSTOLIC BLOOD PRESSURE: 119 MMHG | TEMPERATURE: 97 F

## 2019-02-27 LAB
ANION GAP SERPL CALC-SCNC: 9 MMOL/L
ANISOCYTOSIS BLD QL SMEAR: SLIGHT
BASOPHILS NFR BLD: 3 %
BUN SERPL-MCNC: 18 MG/DL
CALCIUM SERPL-MCNC: 9.6 MG/DL
CHLORIDE SERPL-SCNC: 104 MMOL/L
CO2 SERPL-SCNC: 27 MMOL/L
CREAT SERPL-MCNC: 0.9 MG/DL
DIFFERENTIAL METHOD: ABNORMAL
EOSINOPHIL NFR BLD: 2 %
ERYTHROCYTE [DISTWIDTH] IN BLOOD BY AUTOMATED COUNT: 15.5 %
EST. GFR  (AFRICAN AMERICAN): >60 ML/MIN/1.73 M^2
EST. GFR  (NON AFRICAN AMERICAN): >60 ML/MIN/1.73 M^2
GLUCOSE SERPL-MCNC: 95 MG/DL
HCT VFR BLD AUTO: 39.4 %
HGB BLD-MCNC: 12.2 G/DL
HYPOCHROMIA BLD QL SMEAR: ABNORMAL
LYMPHOCYTES NFR BLD: 47 %
MAGNESIUM SERPL-MCNC: 2.1 MG/DL
MCH RBC QN AUTO: 25.6 PG
MCHC RBC AUTO-ENTMCNC: 31 G/DL
MCV RBC AUTO: 83 FL
MONOCYTES NFR BLD: 7 %
NEUTROPHILS NFR BLD: 41 %
OVALOCYTES BLD QL SMEAR: ABNORMAL
PLATELET # BLD AUTO: 184 K/UL
PLATELET BLD QL SMEAR: ABNORMAL
PMV BLD AUTO: 11.2 FL
POIKILOCYTOSIS BLD QL SMEAR: SLIGHT
POLYCHROMASIA BLD QL SMEAR: ABNORMAL
POTASSIUM SERPL-SCNC: 4 MMOL/L
RBC # BLD AUTO: 4.76 M/UL
SODIUM SERPL-SCNC: 140 MMOL/L
WBC # BLD AUTO: 5.88 K/UL

## 2019-02-27 PROCEDURE — 25000003 PHARM REV CODE 250: Performed by: NURSE PRACTITIONER

## 2019-02-27 PROCEDURE — 83735 ASSAY OF MAGNESIUM: CPT

## 2019-02-27 PROCEDURE — 25000003 PHARM REV CODE 250: Performed by: HOSPITALIST

## 2019-02-27 PROCEDURE — 94761 N-INVAS EAR/PLS OXIMETRY MLT: CPT

## 2019-02-27 PROCEDURE — 99239 PR HOSPITAL DISCHARGE DAY,>30 MIN: ICD-10-PCS | Mod: ,,, | Performed by: HOSPITALIST

## 2019-02-27 PROCEDURE — 85007 BL SMEAR W/DIFF WBC COUNT: CPT

## 2019-02-27 PROCEDURE — 85027 COMPLETE CBC AUTOMATED: CPT

## 2019-02-27 PROCEDURE — 63600175 PHARM REV CODE 636 W HCPCS: Performed by: HOSPITALIST

## 2019-02-27 PROCEDURE — 99239 HOSP IP/OBS DSCHRG MGMT >30: CPT | Mod: ,,, | Performed by: HOSPITALIST

## 2019-02-27 PROCEDURE — 36415 COLL VENOUS BLD VENIPUNCTURE: CPT

## 2019-02-27 PROCEDURE — 80048 BASIC METABOLIC PNL TOTAL CA: CPT

## 2019-02-27 RX ORDER — ADHESIVE BANDAGE
30 BANDAGE TOPICAL DAILY PRN
Status: COMPLETED | OUTPATIENT
Start: 2019-02-27 | End: 2019-02-27

## 2019-02-27 RX ORDER — AMOXICILLIN 250 MG
1 CAPSULE ORAL DAILY
COMMUNITY
Start: 2019-02-28 | End: 2022-02-18

## 2019-02-27 RX ORDER — BISACODYL 10 MG
10 SUPPOSITORY, RECTAL RECTAL ONCE
Status: COMPLETED | OUTPATIENT
Start: 2019-02-27 | End: 2019-02-27

## 2019-02-27 RX ADMIN — BISACODYL 10 MG: 10 SUPPOSITORY RECTAL at 10:02

## 2019-02-27 RX ADMIN — ENOXAPARIN SODIUM 40 MG: 100 INJECTION SUBCUTANEOUS at 08:02

## 2019-02-27 RX ADMIN — STANDARDIZED SENNA CONCENTRATE AND DOCUSATE SODIUM 1 TABLET: 8.6; 5 TABLET, FILM COATED ORAL at 08:02

## 2019-02-27 RX ADMIN — MAGNESIUM HYDROXIDE 2400 MG: 400 SUSPENSION ORAL at 05:02

## 2019-02-27 RX ADMIN — ATENOLOL 100 MG: 25 TABLET ORAL at 08:02

## 2019-02-27 RX ADMIN — BENAZEPRIL HYDROCHLORIDE 20 MG: 10 TABLET, FILM COATED ORAL at 08:02

## 2019-02-27 NOTE — PROGRESS NOTES
[facility] Hospital Medicine  Progress Note    Patient Name: Natali Galeano  MRN: [id number]  Patient Class: IP- Inpatient   Admission Date: 2/23/2019  Length of Stay: 3 days  Attending Physician: Michael Ball MD  Primary Care Provider: Ewelina Herzog MD        Subjective:     Principal Problem:Partial small bowel obstruction    HPI:  Mrs. Galeano is a 78 year old woman with a history of colon cancer status post resection of mass in 2002, abdominal hernia repair in 2004, two prior episodes of small bowel obstruction who came in for evaluation of abdominal pain.  She states this started abruptly at 6:30 PM on 2/22, yesterday evening.  The pain was described as sharp, non-radiating and located all around her umbilicus.  She states the pain was constant until coming to the ER this evening and receiving pain medications.  The pain was associated with nausea and was followed by emesis of consumed food once last night and several times today.  She notes her last bowel movement was yesterday afternoon and was solid and normal for her.  She has not passed flatus since onset of pain but has been belching.  She states this feels exactly like her prior two episodes of bowel obstruction.  She denies fever, chills, diarrhea, headache, chest pain, light headedness or leg swelling.  She endorses minor nasal congestion as well as several days of mild right knee and foot pain.    Hospital Course:  Mrs. Galeano is a 78 year old woman with a history of colon cancer status post resection of mass in 2002, abdominal hernia repair in 2004, two prior episodes of small bowel obstruction who came in for evaluation of abdominal pain.  She was diagnosed with a partial small bowel obstruction and admitted to inpatient status.  NGT was placed and she had bowel decompression.  Subsequently she had improved with less pain and has been passing some flatus.  Bowel sounds however remain extremely muted.  She tolerated a clear  liquid diet and NGT was removed this morning.  We are cautiously advancing her diet, and presently she is on full liquids.  Her pain has been treated with IV morphine and nausea with iv zofran.  She was seen in consultation by Dr. Cardozo, but so far has not required any surgical intervention.  She was dehydrated on admit and was given IV fluids which we stopped today.  She has stable sleep apnea and cpap at her home settings was continued.  Home blood pressure medication restart as needed to treat hypertension.  Diet further advanced as per surgery.    Interval History:  No bowel movement but passing gas and tolerating low residual diet.    Review of Systems   Constitutional: Negative for chills and fever.   Respiratory: Negative for shortness of breath and wheezing.    Cardiovascular: Negative for chest pain.   Gastrointestinal: Negative for abdominal distention, abdominal pain, constipation, diarrhea, nausea and vomiting.   Genitourinary: Negative for dysuria and frequency.   Musculoskeletal: Negative for arthralgias and myalgias.   Neurological: Negative for light-headedness.   Psychiatric/Behavioral: Negative for agitation and confusion.     Objective:     Vital Signs (Most Recent):  Temp: 98.1 °F (36.7 °C) (02/26/19 1921)  Pulse: 62 (02/26/19 1921)  Resp: 18 (02/26/19 1921)  BP: (!) 148/65 (02/26/19 1921)  SpO2: 100 % (02/26/19 1921) Vital Signs (24h Range):  Temp:  [97.6 °F (36.4 °C)-99.4 °F (37.4 °C)] 98.1 °F (36.7 °C)  Pulse:  [57-64] 62  Resp:  [16-18] 18  SpO2:  [96 %-100 %] 100 %  BP: (135-149)/(61-70) 148/65     Weight: 128.4 kg (283 lb)  Body mass index is 44.32 kg/m².  No intake or output data in the 24 hours ending 02/26/19 2212   Physical Exam   Constitutional: She is oriented to person, place, and time. She appears well-developed and well-nourished. No distress.   HENT:   Head: Atraumatic.   Eyes: Conjunctivae are normal.   Neck: Neck supple.   Cardiovascular: Normal rate, regular rhythm and  normal heart sounds.   No murmur heard.  Pulmonary/Chest: Effort normal and breath sounds normal. She has no wheezes.   Abdominal: Soft. Bowel sounds are normal. She exhibits no distension. There is no tenderness.   Obese.   Musculoskeletal: Normal range of motion. She exhibits no edema or deformity.   Neurological: She is alert and oriented to person, place, and time.       Significant Labs: All pertinent labs within the past 24 hours have been reviewed.    Significant Imaging: I have reviewed all pertinent imaging results/findings within the past 24 hours.    Assessment/Plan:      * Partial small bowel obstruction    Clinically improving.  Advance diet further as per General surgery.  Patient given milk of magnesia.  Will also give her senna and docusate.     Right knee pain    -No recent trauma and no swelling noted  -has history of gout but uric acid is normal     ALYSE (obstructive sleep apnea)    -Will order nocturnal cpap with pressure setting of 12 as she uses at home.       Hyperlipidemia    -Hold home statin until taking PO       Essential hypertension    In addition to atenolol will restart amlodipine.       Morbid obesity due to excess calories    -Noted         VTE Risk Mitigation (From admission, onward)        Ordered     enoxaparin injection 40 mg  Every 12 hours      02/26/19 1803     Place FEDE hose  Until discontinued      02/23/19 1812     IP VTE HIGH RISK PATIENT  Once      02/23/19 1812              Michael Ball MD  Department of Hospital Medicine   [facility]

## 2019-02-27 NOTE — PROGRESS NOTES
Still no BM.  No N&V.  Wants to go home.  T 99.4  VSS  Abd soft.  WBC 5.9  Hct 39.4  Possible DC later today.

## 2019-02-27 NOTE — PLAN OF CARE
Problem: Fall Injury Risk  Goal: Absence of Fall and Fall-Related Injury  Outcome: Ongoing (interventions implemented as appropriate)  Pt remained free of falls. Bed locked in lowest position, call light and personal items within reach.     Problem: Adult Inpatient Plan of Care  Goal: Plan of Care Review  Outcome: Ongoing (interventions implemented as appropriate)  Vital signs stable, on room air. No complaints of pain or nausea. No bm. Pt repositions frequently and independently. Will continue to monitor.

## 2019-02-27 NOTE — ASSESSMENT & PLAN NOTE
Clinically improving.  Advance diet further as per General surgery.  Patient given milk of magnesia.  Will also give her senna and docusate.

## 2019-02-27 NOTE — PLAN OF CARE
LMSW noted that the patient may need transportion home. LMSW spoke to the patient and she stated that her granddaughter will transport her home today.     No additional CM needs discharge.        02/27/19 1429   Final Note   Assessment Type Final Discharge Note   Anticipated Discharge Disposition Home   What phone number can be called within the next 1-3 days to see how you are doing after discharge? [phone]   Right Care Referral Info   Post Acute Recommendation No Care

## 2019-02-27 NOTE — SUBJECTIVE & OBJECTIVE
Interval History:  No bowel movement but passing gas and tolerating low residual diet.    Review of Systems   Constitutional: Negative for chills and fever.   Respiratory: Negative for shortness of breath and wheezing.    Cardiovascular: Negative for chest pain.   Gastrointestinal: Negative for abdominal distention, abdominal pain, constipation, diarrhea, nausea and vomiting.   Genitourinary: Negative for dysuria and frequency.   Musculoskeletal: Negative for arthralgias and myalgias.   Neurological: Negative for light-headedness.   Psychiatric/Behavioral: Negative for agitation and confusion.     Objective:     Vital Signs (Most Recent):  Temp: 98.1 °F (36.7 °C) (02/26/19 1921)  Pulse: 62 (02/26/19 1921)  Resp: 18 (02/26/19 1921)  BP: (!) 148/65 (02/26/19 1921)  SpO2: 100 % (02/26/19 1921) Vital Signs (24h Range):  Temp:  [97.6 °F (36.4 °C)-99.4 °F (37.4 °C)] 98.1 °F (36.7 °C)  Pulse:  [57-64] 62  Resp:  [16-18] 18  SpO2:  [96 %-100 %] 100 %  BP: (135-149)/(61-70) 148/65     Weight: 128.4 kg (283 lb)  Body mass index is 44.32 kg/m².  No intake or output data in the 24 hours ending 02/26/19 2212   Physical Exam   Constitutional: She is oriented to person, place, and time. She appears well-developed and well-nourished. No distress.   HENT:   Head: Atraumatic.   Eyes: Conjunctivae are normal.   Neck: Neck supple.   Cardiovascular: Normal rate, regular rhythm and normal heart sounds.   No murmur heard.  Pulmonary/Chest: Effort normal and breath sounds normal. She has no wheezes.   Abdominal: Soft. Bowel sounds are normal. She exhibits no distension. There is no tenderness.   Obese.   Musculoskeletal: Normal range of motion. She exhibits no edema or deformity.   Neurological: She is alert and oriented to person, place, and time.       Significant Labs: All pertinent labs within the past 24 hours have been reviewed.    Significant Imaging: I have reviewed all pertinent imaging results/findings within the past 24 hours.

## 2019-02-28 NOTE — DISCHARGE SUMMARY
[facility] Hospital Medicine  Discharge Summary      Patient Name: Natali Galeano  MRN: [id number]  Admission Date: 2/23/2019  Hospital Length of Stay: 4 days  Discharge Date:  02/27/2019  Attending Physician: Michael Ball MD  Discharging Provider: Michael Ball MD  Primary Care Provider: Ewelina Herzog MD      HPI:   Mrs. Galeano is a 78 year old woman with a history of colon cancer status post resection of mass in 2002, abdominal hernia repair in 2004, two prior episodes of small bowel obstruction who came in for evaluation of abdominal pain.  She states this started abruptly at 6:30 PM on 2/22, yesterday evening.  The pain was described as sharp, non-radiating and located all around her umbilicus.  She states the pain was constant until coming to the ER this evening and receiving pain medications.  The pain was associated with nausea and was followed by emesis of consumed food once last night and several times today.  She notes her last bowel movement was yesterday afternoon and was solid and normal for her.  She has not passed flatus since onset of pain but has been belching.  She states this feels exactly like her prior two episodes of bowel obstruction.  She denies fever, chills, diarrhea, headache, chest pain, light headedness or leg swelling.  She endorses minor nasal congestion as well as several days of mild right knee and foot pain.    Hospital Course:   Ms. Galeano is a 78-year old woman with a history of colon cancer status post resection of mass in 2002, abdominal hernia repair in 2004, two prior episodes of small bowel obstruction who came in for evaluation of abdominal pain.  She was diagnosed with a partial small bowel obstruction and admitted to inpatient status.  General surgery consulted to assist with management of her partial bowel obstruction.  Nasogastric tube was placed for bowel decompression.  Patient was treated with intravenous fluids to treat hypovolemia.   Subsequently she improved with less pain and she started to pass flatus.  Bowel sounds however remain extremely muted at first.  An oral diet however was slowly reintroduced and subsequently advanced to solid food.  Patient also initially difficulty having a bowel movement.  Patient start a bowel regimen and subsequently given a Dulcolax suppository which resulted in a bowel movement.  Patient not tolerating oral intake without nausea or vomiting or any abdominal pain. Patient stable to be discharged home and advised to follow up in clinic.     Consults:   Consults (From admission, onward)        Status Ordering Provider     Inpatient consult to General Surgery  Once     Provider:  Pedro Cardozo MD    Completed PAYAL SHIRLEY          Final Active Diagnoses:    Diagnosis Date Noted POA    PRINCIPAL PROBLEM:  Partial small bowel obstruction [K56.600] 02/23/2019 Yes    ALYSE (obstructive sleep apnea) [G47.33] 02/23/2019 Yes    Right knee pain [M25.561] 02/23/2019 Yes    Hyperlipidemia [E78.5] 06/21/2017 Yes    Essential hypertension [I10]  Yes    Morbid obesity due to excess calories [E66.01] 06/23/2015 Yes      Problems Resolved During this Admission:    Diagnosis Date Noted Date Resolved POA    Dehydration [E86.0] 02/23/2019 02/26/2019 Yes       Discharged Condition: Stable    Disposition: Home or Self Care    Follow Up:  Follow-up Information     Schedule an appointment as soon as possible for a visit with Ewelina Herzog MD.    Specialty:  Internal Medicine  Why:  Management of morbidibities  Contact information:  [address] [place] [id number]  [phone]                 Patient Instructions:      Diet Adult Regular     Notify your health care provider if you experience any of the following:  temperature >100.4     Notify your health care provider if you experience any of the following:  persistent nausea and vomiting or diarrhea     Notify your health care provider if you  experience any of the following:  severe uncontrolled pain     Notify your health care provider if you experience any of the following:  difficulty breathing or increased cough     Notify your health care provider if you experience any of the following:  severe persistent headache     Notify your health care provider if you experience any of the following:  worsening rash     Notify your health care provider if you experience any of the following:  persistent dizziness, light-headedness, or visual disturbances     Notify your health care provider if you experience any of the following:  increased confusion or weakness     Activity as tolerated        Medications:  Reconciled Home Medications:      Medication List      START taking these medications    senna-docusate 8.6-50 mg 8.6-50 mg per tablet  Commonly known as:  PERICOLACE  Take 1 tablet by mouth once daily.        CONTINUE taking these medications    allopurinol 300 MG tablet  Commonly known as:  ZYLOPRIM  Take 300 mg by mouth once daily.     amLODIPine 2.5 MG tablet  Commonly known as:  NORVASC  Take 2.5 mg by mouth once daily.     aspirin 81 MG EC tablet  Commonly known as:  ECOTRIN  Take 81 mg by mouth once daily.     atenolol 100 MG tablet  Commonly known as:  TENORMIN  TAKE 1 TABLET BY ORAL ROUTE EVERY DAY     atorvastatin 10 MG tablet  Commonly known as:  LIPITOR  Take 10 mg by mouth once daily.     benazepril 40 MG tablet  Commonly known as:  LOTENSIN  Take 40 mg by mouth once daily.     fluticasone 50 mcg/actuation nasal spray  Commonly known as:  FLONASE  1 spray by Each Nare route once daily.     niacin 500 mg Tbsr  Take 500 mg by mouth once daily.     THERA-D 2,000 unit Tab  Generic drug:  cholecalciferol (vitamin D3)  Take 1 tablet by mouth once daily.        STOP taking these medications    cetirizine 10 MG tablet  Commonly known as:  ZYRTEC     docusate sodium 100 MG capsule  Commonly known as:  COLACE     FLUZONE QUAD [id number] 60 mcg (15 mcg x  4)/0.5 mL vaccine  Generic drug:  influenza            Indwelling Lines/Drains at time of discharge:   Lines/Drains/Airways          None          Time spent on the discharge of patient: 35 minutes  Patient was seen and examined on the date of discharge and determined to be suitable for discharge.         Michael Ball MD  Department of Hospital Medicine  [facility]

## 2019-02-28 NOTE — PHYSICIAN QUERY
PT Name: Natali Galeano  MR #: [id number]     Physician Query Form - Bowel Obstruction Clarification     CDS/: Alyssa Chandler               Contact information: [email or web address]  This form is a permanent document in the medical record.     Query Date: February 28, 2019    By submitting this query, we are merely seeking further clarification of documentation to reflect the severity of illness of your patient. Please utilize your independent clinical judgment when addressing the question(s) below.    The Medical record reflects the following:     Indicators   Supporting Clinical Findings Location in Medical Record   x Bowel obstruction, intestinal obstruction, LBO or SBO documented Diagnosed with a partial small bowel obstruction      Discharge Summary   x Radiology findings Tethering of small bowel loops to the anterior abdomen as can be seen with adhesions.  Small bowel feces sign is observed with mildly dilated loops of bowel in the lower abdomen suggesting early developing partial small-bowel obstruction.   CT Abdomen and Pelvis 02/23    Treatment/Medication      Procedure/Surgery     x Other History of colon cancer status post resection of mass in 2002, abdominal hernia repair in 2004, two prior episodes of small bowel obstruction    Well healed midline surgical scar and a palpable golf ball sized nodule in RLQ that is non-tender.      Discharge Summary        H&P     Provider, please further specify the bowel obstruction diagnosis:  [   ]   Partial or incomplete intestinal obstruction, due to adhesions   [   ]   Partial or incomplete intestinal obstruction, due to other (please specify): ____________   [  X ]   Partial or incomplete intestinal obstruction, unknown or unspecified etiology, but possibly due to adhesions.   [   ]   Other intestinal condition (please specify): _____________________   [   ]   Clinically Undetermined     Please document in your progress notes daily for the  duration of treatment until resolved, and include in your discharge summary.

## 2019-08-29 ENCOUNTER — HOSPITAL ENCOUNTER (OUTPATIENT)
Dept: RADIOLOGY | Facility: OTHER | Age: 79
Discharge: HOME | End: 2019-08-29
Attending: INTERNAL MEDICINE
Payer: MEDICARE

## 2019-08-29 DIAGNOSIS — M17.0 PRIMARY OSTEOARTHRITIS OF BOTH KNEES: ICD-10-CM

## 2019-08-29 DIAGNOSIS — M17.0 PRIMARY OSTEOARTHRITIS OF BOTH KNEES: Primary | ICD-10-CM

## 2019-08-29 PROCEDURE — 73562 XR KNEE 3 VIEW BILATERAL: ICD-10-PCS | Mod: 26,50,, | Performed by: RADIOLOGY

## 2019-08-29 PROCEDURE — 73562 X-RAY EXAM OF KNEE 3: CPT | Mod: 26,50,, | Performed by: RADIOLOGY

## 2019-08-29 PROCEDURE — 73562 X-RAY EXAM OF KNEE 3: CPT | Mod: 50,TC,FY

## 2019-11-17 ENCOUNTER — OFFICE VISIT (OUTPATIENT)
Dept: URGENT CARE | Facility: CLINIC | Age: 79
End: 2019-11-17
Payer: MEDICARE

## 2019-11-17 VITALS
HEART RATE: 83 BPM | RESPIRATION RATE: 16 BRPM | OXYGEN SATURATION: 99 % | TEMPERATURE: 98 F | BODY MASS INDEX: 44.42 KG/M2 | DIASTOLIC BLOOD PRESSURE: 84 MMHG | SYSTOLIC BLOOD PRESSURE: 162 MMHG | WEIGHT: 283 LBS | HEIGHT: 67 IN

## 2019-11-17 DIAGNOSIS — B96.89 ACUTE BACTERIAL SINUSITIS: Primary | ICD-10-CM

## 2019-11-17 DIAGNOSIS — J01.90 ACUTE BACTERIAL SINUSITIS: Primary | ICD-10-CM

## 2019-11-17 PROCEDURE — 3079F PR MOST RECENT DIASTOLIC BLOOD PRESSURE 80-89 MM HG: ICD-10-PCS | Mod: CPTII,S$GLB,, | Performed by: NURSE PRACTITIONER

## 2019-11-17 PROCEDURE — 3077F SYST BP >= 140 MM HG: CPT | Mod: CPTII,S$GLB,, | Performed by: NURSE PRACTITIONER

## 2019-11-17 PROCEDURE — 99203 PR OFFICE/OUTPT VISIT, NEW, LEVL III, 30-44 MIN: ICD-10-PCS | Mod: S$GLB,,, | Performed by: NURSE PRACTITIONER

## 2019-11-17 PROCEDURE — 99203 OFFICE O/P NEW LOW 30 MIN: CPT | Mod: S$GLB,,, | Performed by: NURSE PRACTITIONER

## 2019-11-17 PROCEDURE — 3079F DIAST BP 80-89 MM HG: CPT | Mod: CPTII,S$GLB,, | Performed by: NURSE PRACTITIONER

## 2019-11-17 PROCEDURE — 3077F PR MOST RECENT SYSTOLIC BLOOD PRESSURE >= 140 MM HG: ICD-10-PCS | Mod: CPTII,S$GLB,, | Performed by: NURSE PRACTITIONER

## 2019-11-17 RX ORDER — AMOXICILLIN AND CLAVULANATE POTASSIUM 875; 125 MG/1; MG/1
1 TABLET, FILM COATED ORAL 2 TIMES DAILY
Qty: 20 TABLET | Refills: 0 | Status: SHIPPED | OUTPATIENT
Start: 2019-11-17 | End: 2019-11-27

## 2019-11-17 RX ORDER — HYDROCHLOROTHIAZIDE 25 MG/1
12.5 TABLET ORAL
COMMUNITY
Start: 2019-11-17 | End: 2023-08-17 | Stop reason: SDUPTHER

## 2019-11-17 NOTE — PROGRESS NOTES
"Subjective:       Patient ID: Natali Galeano is a 78 y.o. female.    Vitals:  height is 5' 7" (1.702 m) and weight is 128.4 kg (283 lb). Her temperature is 97.5 °F (36.4 °C). Her blood pressure is 162/84 (abnormal) and her pulse is 83. Her respiration is 16 and oxygen saturation is 99%.     Chief Complaint: Cough    Pt states sinus pressure and congestion with cough x 1 week.    Cough   This is a new problem. The current episode started in the past 7 days. The problem has been unchanged. The cough is productive of sputum. Associated symptoms include ear congestion, headaches, hemoptysis, nasal congestion and postnasal drip. Pertinent negatives include no chest pain, chills, fever, myalgias, rash, sore throat or shortness of breath. Treatments tried: zyrtec. The treatment provided mild relief.       Constitution: Negative for chills, fatigue and fever.   HENT: Positive for congestion, postnasal drip, sinus pain and sinus pressure. Negative for sore throat.    Neck: Negative for painful lymph nodes.   Cardiovascular: Negative for chest pain and leg swelling.   Eyes: Negative for double vision and blurred vision.   Respiratory: Positive for cough and bloody sputum. Negative for shortness of breath.    Gastrointestinal: Negative for nausea, vomiting and diarrhea.   Genitourinary: Negative for dysuria, frequency, urgency and history of kidney stones.   Musculoskeletal: Negative for joint pain, joint swelling, muscle cramps and muscle ache.   Skin: Negative for color change, pale, rash and bruising.   Allergic/Immunologic: Negative for seasonal allergies.   Neurological: Positive for headaches. Negative for dizziness, history of vertigo, light-headedness and passing out.   Hematologic/Lymphatic: Negative for swollen lymph nodes.   Psychiatric/Behavioral: Negative for nervous/anxious, sleep disturbance and depression. The patient is not nervous/anxious.        Objective:      Physical Exam   Constitutional: She is oriented " to person, place, and time. She appears well-developed and well-nourished. She is cooperative.  Non-toxic appearance. She does not have a sickly appearance. She does not appear ill. No distress.   HENT:   Head: Normocephalic and atraumatic.   Right Ear: Hearing, tympanic membrane, external ear and ear canal normal.   Left Ear: Hearing, tympanic membrane, external ear and ear canal normal.   Nose: Mucosal edema, rhinorrhea, purulent discharge and sinus tenderness present. No nasal deformity. No epistaxis. Right sinus exhibits no maxillary sinus tenderness and no frontal sinus tenderness. Left sinus exhibits no maxillary sinus tenderness and no frontal sinus tenderness.   Mouth/Throat: Uvula is midline and mucous membranes are normal. No trismus in the jaw. Normal dentition. No uvula swelling. Posterior oropharyngeal erythema and cobblestoning present. No oropharyngeal exudate or posterior oropharyngeal edema.   Eyes: Conjunctivae and lids are normal. No scleral icterus.   Neck: Trachea normal, full passive range of motion without pain and phonation normal. Neck supple. No neck rigidity. No edema and no erythema present.   Cardiovascular: Normal rate, regular rhythm, normal heart sounds, intact distal pulses and normal pulses.   Pulmonary/Chest: Effort normal and breath sounds normal. No respiratory distress. She has no decreased breath sounds. She has no wheezes. She has no rhonchi.   Abdominal: Normal appearance.   Musculoskeletal: Normal range of motion. She exhibits no edema or deformity.   Lymphadenopathy:     She has no cervical adenopathy.   Neurological: She is alert and oriented to person, place, and time. She exhibits normal muscle tone. Coordination normal.   Skin: Skin is warm, dry, intact, not diaphoretic and not pale.   Psychiatric: She has a normal mood and affect. Her speech is normal and behavior is normal. Judgment and thought content normal. Cognition and memory are normal.   Nursing note and vitals  reviewed.        Assessment:       1. Acute bacterial sinusitis        Plan:         Acute bacterial sinusitis  -     amoxicillin-clavulanate 875-125mg (AUGMENTIN) 875-125 mg per tablet; Take 1 tablet by mouth 2 (two) times daily. for 10 days  Dispense: 20 tablet; Refill: 0      Patient Instructions   Please drink plenty of fluids.  Please get plenty of rest.  Please return here or go to the Emergency Department for any concerns or worsening of condition.  If you were prescribed antibiotics, please take them to completion.  If you do not have Hypertension or any history of palpitations, it is ok to take over the counter Sudafed or Mucinex D or Allegra-D or Claritin-D or Zyrtec-D.  If you do take one of the above, it is ok to combine that with plain over the counter Mucinex or Allegra or Claritin or Zyrtec.  If for example you are taking Zyrtec -D, you can combine that with Mucinex, but not Mucinex-D.  If you are taking Mucinex-D, you can combine that with plain Allegra or Claritin or Zyrtec.   If you do have Hypertension or palpitations, it is safe to take Coricidin HBP for relief of sinus symptoms.  We recommend you take over the counter Flonase (Fluticasone) or another nasally inhaled steroid unless you are already taking one.  Nasal irrigation with a saline spray or Netti Pot like device per their directions is also recommended.  If not allergic, please take over the counter Tylenol (Acetaminophen) and/or Motrin (Ibuprofen) as directed for control of pain and/or fever.  Please follow up with your primary care doctor or specialist as needed.    If you  smoke, please stop smoking.  Sinusitis (Antibiotic Treatment)    The sinuses are air-filled spaces within the bones of the face. They connect to the inside of the nose. Sinusitis is an inflammation of the tissue lining the sinus cavity. Sinus inflammation can occur during a cold. It can also be due to allergies to pollens and other particles in the air. Sinusitis  can cause symptoms of sinus congestion and fullness. A sinus infection causes fever, headache and facial pain. There is often green or yellow drainage from the nose or into the back of the throat (post-nasal drip). You have been given antibiotics to treat this condition.  Home care:  · Take the full course of antibiotics as instructed. Do not stop taking them, even if you feel better.  · Drink plenty of water, hot tea, and other liquids. This may help thin mucus. It also may promote sinus drainage.  · Heat may help soothe painful areas of the face. Use a towel soaked in hot water. Or,  the shower and direct the hot spray onto your face. Using a vaporizer along with a menthol rub at night may also help.   · An expectorant containing guaifenesin may help thin the mucus and promote drainage from the sinuses.  · Over-the-counter decongestants may be used unless a similar medicine was prescribed. Nasal sprays work the fastest. Use one that contains phenylephrine or oxymetazoline. First blow the nose gently. Then use the spray. Do not use these medicines more often than directed on the label or symptoms may get worse. You may also use tablets containing pseudoephedrine. Avoid products that combine ingredients, because side effects may be increased. Read labels. You can also ask the pharmacist for help. (NOTE: Persons with high blood pressure should not use decongestants. They can raise blood pressure.)  · Over-the-counter antihistamines may help if allergies contributed to your sinusitis.    · Do not use nasal rinses or irrigation during an acute sinus infection, unless told to by your health care provider. Rinsing may spread the infection to other sinuses.  · Use acetaminophen or ibuprofen to control pain, unless another pain medicine was prescribed. (If you have chronic liver or kidney disease or ever had a stomach ulcer, talk with your doctor before using these medicines. Aspirin should never be used in anyone  under 18 years of age who is ill with a fever. It may cause severe liver damage.)  · Don't smoke. This can worsen symptoms.  Follow-up care  Follow up with your healthcare provider or our staff if you are not improving within the next week.  When to seek medical advice  Call your healthcare provider if any of these occur:  · Facial pain or headache becoming more severe  · Stiff neck  · Unusual drowsiness or confusion  · Swelling of the forehead or eyelids  · Vision problems, including blurred or double vision  · Fever of 100.4ºF (38ºC) or higher, or as directed by your healthcare provider  · Seizure  · Breathing problems  · Symptoms not resolving within 10 days  Date Last Reviewed: 4/13/2015  © 3391-5928 [employer]. [address] [place] [address], [place] [address]. All rights reserved. This information is not intended as a substitute for professional medical care. Always follow your healthcare professional's instructions.

## 2019-11-17 NOTE — PATIENT INSTRUCTIONS
Please drink plenty of fluids.  Please get plenty of rest.  Please return here or go to the Emergency Department for any concerns or worsening of condition.  If you were prescribed antibiotics, please take them to completion.  If you do not have Hypertension or any history of palpitations, it is ok to take over the counter Sudafed or Mucinex D or Allegra-D or Claritin-D or Zyrtec-D.  If you do take one of the above, it is ok to combine that with plain over the counter Mucinex or Allegra or Claritin or Zyrtec.  If for example you are taking Zyrtec -D, you can combine that with Mucinex, but not Mucinex-D.  If you are taking Mucinex-D, you can combine that with plain Allegra or Claritin or Zyrtec.   If you do have Hypertension or palpitations, it is safe to take Coricidin HBP for relief of sinus symptoms.  We recommend you take over the counter Flonase (Fluticasone) or another nasally inhaled steroid unless you are already taking one.  Nasal irrigation with a saline spray or Netti Pot like device per their directions is also recommended.  If not allergic, please take over the counter Tylenol (Acetaminophen) and/or Motrin (Ibuprofen) as directed for control of pain and/or fever.  Please follow up with your primary care doctor or specialist as needed.    If you  smoke, please stop smoking.  Sinusitis (Antibiotic Treatment)    The sinuses are air-filled spaces within the bones of the face. They connect to the inside of the nose. Sinusitis is an inflammation of the tissue lining the sinus cavity. Sinus inflammation can occur during a cold. It can also be due to allergies to pollens and other particles in the air. Sinusitis can cause symptoms of sinus congestion and fullness. A sinus infection causes fever, headache and facial pain. There is often green or yellow drainage from the nose or into the back of the throat (post-nasal drip). You have been given antibiotics to treat this condition.  Home care:  · Take the full  course of antibiotics as instructed. Do not stop taking them, even if you feel better.  · Drink plenty of water, hot tea, and other liquids. This may help thin mucus. It also may promote sinus drainage.  · Heat may help soothe painful areas of the face. Use a towel soaked in hot water. Or,  the shower and direct the hot spray onto your face. Using a vaporizer along with a menthol rub at night may also help.   · An expectorant containing guaifenesin may help thin the mucus and promote drainage from the sinuses.  · Over-the-counter decongestants may be used unless a similar medicine was prescribed. Nasal sprays work the fastest. Use one that contains phenylephrine or oxymetazoline. First blow the nose gently. Then use the spray. Do not use these medicines more often than directed on the label or symptoms may get worse. You may also use tablets containing pseudoephedrine. Avoid products that combine ingredients, because side effects may be increased. Read labels. You can also ask the pharmacist for help. (NOTE: Persons with high blood pressure should not use decongestants. They can raise blood pressure.)  · Over-the-counter antihistamines may help if allergies contributed to your sinusitis.    · Do not use nasal rinses or irrigation during an acute sinus infection, unless told to by your health care provider. Rinsing may spread the infection to other sinuses.  · Use acetaminophen or ibuprofen to control pain, unless another pain medicine was prescribed. (If you have chronic liver or kidney disease or ever had a stomach ulcer, talk with your doctor before using these medicines. Aspirin should never be used in anyone under 18 years of age who is ill with a fever. It may cause severe liver damage.)  · Don't smoke. This can worsen symptoms.  Follow-up care  Follow up with your healthcare provider or our staff if you are not improving within the next week.  When to seek medical advice  Call your healthcare provider  if any of these occur:  · Facial pain or headache becoming more severe  · Stiff neck  · Unusual drowsiness or confusion  · Swelling of the forehead or eyelids  · Vision problems, including blurred or double vision  · Fever of 100.4ºF (38ºC) or higher, or as directed by your healthcare provider  · Seizure  · Breathing problems  · Symptoms not resolving within 10 days  Date Last Reviewed: 4/13/2015  © 0260-4269 [employer]. [address] [place] [address], [place] [id number]. All rights reserved. This information is not intended as a substitute for professional medical care. Always follow your healthcare professional's instructions.

## 2020-01-10 ENCOUNTER — HOSPITAL ENCOUNTER (INPATIENT)
Facility: OTHER | Age: 80
LOS: 3 days | Discharge: HOME | DRG: 390 | End: 2020-01-13
Attending: EMERGENCY MEDICINE | Admitting: EMERGENCY MEDICINE
Payer: MEDICARE

## 2020-01-10 DIAGNOSIS — E66.01 MORBID OBESITY DUE TO EXCESS CALORIES: ICD-10-CM

## 2020-01-10 DIAGNOSIS — I10 ESSENTIAL HYPERTENSION: ICD-10-CM

## 2020-01-10 DIAGNOSIS — K56.600 PARTIAL INTESTINAL OBSTRUCTION, UNSPECIFIED CAUSE: Primary | ICD-10-CM

## 2020-01-10 DIAGNOSIS — K56.600 PARTIAL SMALL BOWEL OBSTRUCTION: ICD-10-CM

## 2020-01-10 DIAGNOSIS — E87.6 HYPOKALEMIA: ICD-10-CM

## 2020-01-10 DIAGNOSIS — E78.5 HYPERLIPIDEMIA, UNSPECIFIED HYPERLIPIDEMIA TYPE: ICD-10-CM

## 2020-01-10 DIAGNOSIS — G47.33 OSA (OBSTRUCTIVE SLEEP APNEA): ICD-10-CM

## 2020-01-10 PROBLEM — E07.9 THYROID DISEASE: Status: ACTIVE | Noted: 2020-01-10

## 2020-01-10 LAB
ALBUMIN SERPL BCP-MCNC: 3.7 G/DL (ref 3.5–5.2)
ALP SERPL-CCNC: 106 U/L (ref 55–135)
ALT SERPL W/O P-5'-P-CCNC: 12 U/L (ref 10–44)
ANION GAP SERPL CALC-SCNC: 10 MMOL/L (ref 8–16)
AST SERPL-CCNC: 19 U/L (ref 10–40)
BASOPHILS # BLD AUTO: 0.03 K/UL (ref 0–0.2)
BASOPHILS NFR BLD: 0.3 % (ref 0–1.9)
BILIRUB SERPL-MCNC: 0.6 MG/DL (ref 0.1–1)
BILIRUB UR QL STRIP: NEGATIVE
BUN SERPL-MCNC: 11 MG/DL (ref 8–23)
CALCIUM SERPL-MCNC: 10.1 MG/DL (ref 8.7–10.5)
CHLORIDE SERPL-SCNC: 102 MMOL/L (ref 95–110)
CLARITY UR: CLEAR
CO2 SERPL-SCNC: 26 MMOL/L (ref 23–29)
COLOR UR: YELLOW
CREAT SERPL-MCNC: 0.8 MG/DL (ref 0.5–1.4)
DIFFERENTIAL METHOD: ABNORMAL
EOSINOPHIL # BLD AUTO: 0 K/UL (ref 0–0.5)
EOSINOPHIL NFR BLD: 0.1 % (ref 0–8)
ERYTHROCYTE [DISTWIDTH] IN BLOOD BY AUTOMATED COUNT: 14.8 % (ref 11.5–14.5)
EST. GFR  (AFRICAN AMERICAN): >60 ML/MIN/1.73 M^2
EST. GFR  (NON AFRICAN AMERICAN): >60 ML/MIN/1.73 M^2
GLUCOSE SERPL-MCNC: 118 MG/DL (ref 70–110)
GLUCOSE UR QL STRIP: NEGATIVE
HCT VFR BLD AUTO: 43.8 % (ref 37–48.5)
HGB BLD-MCNC: 13.5 G/DL (ref 12–16)
HGB UR QL STRIP: ABNORMAL
IMM GRANULOCYTES # BLD AUTO: 0.03 K/UL (ref 0–0.04)
IMM GRANULOCYTES NFR BLD AUTO: 0.3 % (ref 0–0.5)
KETONES UR QL STRIP: NEGATIVE
LEUKOCYTE ESTERASE UR QL STRIP: NEGATIVE
LIPASE SERPL-CCNC: 43 U/L (ref 4–60)
LYMPHOCYTES # BLD AUTO: 1.7 K/UL (ref 1–4.8)
LYMPHOCYTES NFR BLD: 15.8 % (ref 18–48)
MCH RBC QN AUTO: 25.7 PG (ref 27–31)
MCHC RBC AUTO-ENTMCNC: 30.8 G/DL (ref 32–36)
MCV RBC AUTO: 83 FL (ref 82–98)
MONOCYTES # BLD AUTO: 0.6 K/UL (ref 0.3–1)
MONOCYTES NFR BLD: 5.6 % (ref 4–15)
NEUTROPHILS # BLD AUTO: 8.2 K/UL (ref 1.8–7.7)
NEUTROPHILS NFR BLD: 77.9 % (ref 38–73)
NITRITE UR QL STRIP: NEGATIVE
NRBC BLD-RTO: 0 /100 WBC
PH UR STRIP: 7 [PH] (ref 5–8)
PLATELET # BLD AUTO: 220 K/UL (ref 150–350)
PMV BLD AUTO: 10.5 FL (ref 9.2–12.9)
POTASSIUM SERPL-SCNC: 3.8 MMOL/L (ref 3.5–5.1)
PROT SERPL-MCNC: 7.6 G/DL (ref 6–8.4)
PROT UR QL STRIP: NEGATIVE
RBC # BLD AUTO: 5.25 M/UL (ref 4–5.4)
SODIUM SERPL-SCNC: 138 MMOL/L (ref 136–145)
SP GR UR STRIP: 1.02 (ref 1–1.03)
URN SPEC COLLECT METH UR: ABNORMAL
UROBILINOGEN UR STRIP-ACNC: 1 EU/DL
WBC # BLD AUTO: 10.5 K/UL (ref 3.9–12.7)

## 2020-01-10 PROCEDURE — 80053 COMPREHEN METABOLIC PANEL: CPT

## 2020-01-10 PROCEDURE — 96374 THER/PROPH/DIAG INJ IV PUSH: CPT

## 2020-01-10 PROCEDURE — 25000003 PHARM REV CODE 250: Performed by: EMERGENCY MEDICINE

## 2020-01-10 PROCEDURE — 96375 TX/PRO/DX INJ NEW DRUG ADDON: CPT

## 2020-01-10 PROCEDURE — 83690 ASSAY OF LIPASE: CPT

## 2020-01-10 PROCEDURE — 96361 HYDRATE IV INFUSION ADD-ON: CPT

## 2020-01-10 PROCEDURE — 99285 EMERGENCY DEPT VISIT HI MDM: CPT | Mod: 25

## 2020-01-10 PROCEDURE — 25000003 PHARM REV CODE 250: Performed by: PHYSICIAN ASSISTANT

## 2020-01-10 PROCEDURE — 63600175 PHARM REV CODE 636 W HCPCS: Performed by: PHYSICIAN ASSISTANT

## 2020-01-10 PROCEDURE — 99223 1ST HOSP IP/OBS HIGH 75: CPT | Mod: ,,, | Performed by: PHYSICIAN ASSISTANT

## 2020-01-10 PROCEDURE — 81003 URINALYSIS AUTO W/O SCOPE: CPT

## 2020-01-10 PROCEDURE — 94761 N-INVAS EAR/PLS OXIMETRY MLT: CPT

## 2020-01-10 PROCEDURE — 99900035 HC TECH TIME PER 15 MIN (STAT)

## 2020-01-10 PROCEDURE — 85025 COMPLETE CBC W/AUTO DIFF WBC: CPT

## 2020-01-10 PROCEDURE — 11000001 HC ACUTE MED/SURG PRIVATE ROOM

## 2020-01-10 PROCEDURE — 25500020 PHARM REV CODE 255: Performed by: EMERGENCY MEDICINE

## 2020-01-10 PROCEDURE — 99223 PR INITIAL HOSPITAL CARE,LEVL III: ICD-10-PCS | Mod: ,,, | Performed by: PHYSICIAN ASSISTANT

## 2020-01-10 RX ORDER — HYDROCHLOROTHIAZIDE 12.5 MG/1
12.5 TABLET ORAL
Status: COMPLETED | OUTPATIENT
Start: 2020-01-10 | End: 2020-01-10

## 2020-01-10 RX ORDER — AMLODIPINE BESYLATE 2.5 MG/1
2.5 TABLET ORAL DAILY
Status: DISCONTINUED | OUTPATIENT
Start: 2020-01-11 | End: 2020-01-12

## 2020-01-10 RX ORDER — ATORVASTATIN CALCIUM 10 MG/1
10 TABLET, FILM COATED ORAL DAILY
Status: DISCONTINUED | OUTPATIENT
Start: 2020-01-11 | End: 2020-01-13 | Stop reason: HOSPADM

## 2020-01-10 RX ORDER — KETOROLAC TROMETHAMINE 30 MG/ML
10 INJECTION, SOLUTION INTRAMUSCULAR; INTRAVENOUS
Status: COMPLETED | OUTPATIENT
Start: 2020-01-10 | End: 2020-01-10

## 2020-01-10 RX ORDER — HYDROMORPHONE HYDROCHLORIDE 1 MG/ML
0.5 INJECTION, SOLUTION INTRAMUSCULAR; INTRAVENOUS; SUBCUTANEOUS EVERY 6 HOURS PRN
Status: DISCONTINUED | OUTPATIENT
Start: 2020-01-10 | End: 2020-01-13 | Stop reason: HOSPADM

## 2020-01-10 RX ORDER — ACETAMINOPHEN 325 MG/1
650 TABLET ORAL EVERY 4 HOURS PRN
Status: DISCONTINUED | OUTPATIENT
Start: 2020-01-10 | End: 2020-01-13 | Stop reason: HOSPADM

## 2020-01-10 RX ORDER — ONDANSETRON 2 MG/ML
4 INJECTION INTRAMUSCULAR; INTRAVENOUS
Status: COMPLETED | OUTPATIENT
Start: 2020-01-10 | End: 2020-01-10

## 2020-01-10 RX ORDER — DOCUSATE SODIUM 100 MG/1
100 CAPSULE, LIQUID FILLED ORAL DAILY
COMMUNITY

## 2020-01-10 RX ORDER — SODIUM CHLORIDE 9 MG/ML
INJECTION, SOLUTION INTRAVENOUS CONTINUOUS
Status: DISCONTINUED | OUTPATIENT
Start: 2020-01-10 | End: 2020-01-13 | Stop reason: HOSPADM

## 2020-01-10 RX ORDER — ASPIRIN 81 MG/1
81 TABLET ORAL DAILY
Status: DISCONTINUED | OUTPATIENT
Start: 2020-01-11 | End: 2020-01-13 | Stop reason: HOSPADM

## 2020-01-10 RX ORDER — ONDANSETRON 2 MG/ML
4 INJECTION INTRAMUSCULAR; INTRAVENOUS EVERY 8 HOURS PRN
Status: DISCONTINUED | OUTPATIENT
Start: 2020-01-10 | End: 2020-01-13 | Stop reason: HOSPADM

## 2020-01-10 RX ORDER — AMLODIPINE BESYLATE 2.5 MG/1
2.5 TABLET ORAL
Status: COMPLETED | OUTPATIENT
Start: 2020-01-10 | End: 2020-01-10

## 2020-01-10 RX ORDER — DICYCLOMINE HYDROCHLORIDE 10 MG/1
20 CAPSULE ORAL
Status: DISCONTINUED | OUTPATIENT
Start: 2020-01-10 | End: 2020-01-10

## 2020-01-10 RX ORDER — SODIUM CHLORIDE 0.9 % (FLUSH) 0.9 %
10 SYRINGE (ML) INJECTION
Status: DISCONTINUED | OUTPATIENT
Start: 2020-01-10 | End: 2020-01-13 | Stop reason: HOSPADM

## 2020-01-10 RX ORDER — BENAZEPRIL HYDROCHLORIDE 40 MG/1
40 TABLET ORAL DAILY
Status: DISCONTINUED | OUTPATIENT
Start: 2020-01-11 | End: 2020-01-13 | Stop reason: HOSPADM

## 2020-01-10 RX ORDER — HYDRALAZINE HYDROCHLORIDE 20 MG/ML
10 INJECTION INTRAMUSCULAR; INTRAVENOUS EVERY 8 HOURS PRN
Status: DISCONTINUED | OUTPATIENT
Start: 2020-01-10 | End: 2020-01-13 | Stop reason: HOSPADM

## 2020-01-10 RX ORDER — BENAZEPRIL HYDROCHLORIDE 10 MG/1
40 TABLET ORAL DAILY
Status: DISCONTINUED | OUTPATIENT
Start: 2020-01-11 | End: 2020-01-10

## 2020-01-10 RX ORDER — ENOXAPARIN SODIUM 100 MG/ML
40 INJECTION SUBCUTANEOUS EVERY 24 HOURS
Status: DISCONTINUED | OUTPATIENT
Start: 2020-01-10 | End: 2020-01-13 | Stop reason: HOSPADM

## 2020-01-10 RX ORDER — BENAZEPRIL HYDROCHLORIDE 10 MG/1
40 TABLET ORAL
Status: COMPLETED | OUTPATIENT
Start: 2020-01-10 | End: 2020-01-10

## 2020-01-10 RX ADMIN — IOHEXOL 100 ML: 350 INJECTION, SOLUTION INTRAVENOUS at 04:01

## 2020-01-10 RX ADMIN — HYDROCHLOROTHIAZIDE 12.5 MG: 12.5 TABLET ORAL at 03:01

## 2020-01-10 RX ADMIN — SODIUM CHLORIDE 1000 ML: 0.9 INJECTION, SOLUTION INTRAVENOUS at 03:01

## 2020-01-10 RX ADMIN — BENAZEPRIL HYDROCHLORIDE 40 MG: 10 TABLET ORAL at 04:01

## 2020-01-10 RX ADMIN — SODIUM CHLORIDE: 0.9 INJECTION, SOLUTION INTRAVENOUS at 09:01

## 2020-01-10 RX ADMIN — AMLODIPINE BESYLATE 2.5 MG: 2.5 TABLET ORAL at 04:01

## 2020-01-10 RX ADMIN — KETOROLAC TROMETHAMINE 10 MG: 30 INJECTION, SOLUTION INTRAMUSCULAR; INTRAVENOUS at 03:01

## 2020-01-10 RX ADMIN — ONDANSETRON 4 MG: 2 INJECTION INTRAMUSCULAR; INTRAVENOUS at 03:01

## 2020-01-10 RX ADMIN — KETOROLAC TROMETHAMINE 10 MG: 30 INJECTION, SOLUTION INTRAMUSCULAR; INTRAVENOUS at 06:01

## 2020-01-10 RX ADMIN — ENOXAPARIN SODIUM 40 MG: 100 INJECTION SUBCUTANEOUS at 09:01

## 2020-01-10 NOTE — ED PROVIDER NOTES
Encounter Date: 1/10/2020       History     Chief Complaint   Patient presents with    Abdominal Pain     pt with allover abdominal pain x one day. pt with c/o vomitied x one today.      Patient is a 79-year-old female with a past medical history of hypertension, presenting to the emergency department for evaluation of abdominal pain.  The patient states her symptoms started last eating dinner.  She states that at 1st she thought it was gas pain. She admits she ate dinner but denies any changes in her symptoms. She states that she had a soft bowel movement but denies any diarrhea.  She admits that today she had 1 episode of nausea and vomiting while on her way to the emergency room.  She states her pain is in her lower abdomen.  She denies any fever chills. She denies any known sick contacts.  No prior episode.  No dysuria or hematuria.  Patient states she has history of colon cancer in .  She did have a colon resection. This is the extent of the patient's complaints at this time.       The history is provided by the patient.     Review of patient's allergies indicates:  No Known Allergies  Past Medical History:   Diagnosis Date    Colon cancer     Gout, chronic     Heart murmur     High cholesterol     Hypercholesteremia     Hypertension     Sleep apnea     Thyroid disease      Past Surgical History:   Procedure Laterality Date    BTL       SECTION, CLASSIC      COLON SURGERY      goiter       HERNIA REPAIR      KIDNEY SURGERY      cyst removal     Family History   Problem Relation Age of Onset    Heart disease Father     Diabetes Mother      Social History     Tobacco Use    Smoking status: Former Smoker     Packs/day: 0.10     Last attempt to quit: 1980     Years since quittin.7    Smokeless tobacco: Never Used   Substance Use Topics    Alcohol use: No    Drug use: No     Review of Systems   Constitutional: Negative for activity change, appetite change, chills, fatigue  and fever.   HENT: Negative for congestion, rhinorrhea and sore throat.    Eyes: Negative for photophobia and visual disturbance.   Respiratory: Negative for cough, shortness of breath and wheezing.    Cardiovascular: Negative for chest pain.   Gastrointestinal: Positive for abdominal pain, nausea and vomiting. Negative for diarrhea.   Genitourinary: Negative for dysuria, hematuria and urgency.   Musculoskeletal: Negative for back pain, myalgias and neck pain.   Skin: Negative for color change and wound.   Neurological: Negative for weakness and headaches.   Psychiatric/Behavioral: Negative for agitation and confusion.       Physical Exam     Initial Vitals [01/10/20 1424]   BP Pulse Resp Temp SpO2   (!) 217/101 93 18 97.8 °F (36.6 °C) 97 %      MAP       --         Physical Exam    Nursing note and vitals reviewed.  Constitutional: She appears well-developed and well-nourished. She is not diaphoretic. She is Obese . She is cooperative.  Non-toxic appearance. She does not have a sickly appearance. No distress.   Well-appearing,  female accompanied by her niece in the emergency room.  Speaking clearly full sentences.  No acute distress.   HENT:   Head: Normocephalic and atraumatic.   Right Ear: External ear normal.   Left Ear: External ear normal.   Nose: Nose normal.   Mouth/Throat: Oropharynx is clear and moist.   Eyes: Conjunctivae and EOM are normal.   Neck: Normal range of motion. Neck supple.   Cardiovascular: Normal rate, regular rhythm and normal heart sounds.   Pulmonary/Chest: Breath sounds normal. No respiratory distress. She has no wheezes.   Abdominal: Soft. Bowel sounds are normal. She exhibits no distension. There is generalized tenderness. There is no rebound and no guarding.   Musculoskeletal: Normal range of motion.   Neurological: She is alert and oriented to person, place, and time. GCS eye subscore is 4. GCS verbal subscore is 5. GCS motor subscore is 6.   Skin: Skin is warm.    Psychiatric: She has a normal mood and affect. Her behavior is normal. Judgment and thought content normal.         ED Course   Procedures  Labs Reviewed   CBC W/ AUTO DIFFERENTIAL - Abnormal; Notable for the following components:       Result Value    Mean Corpuscular Hemoglobin 25.7 (*)     Mean Corpuscular Hemoglobin Conc 30.8 (*)     RDW 14.8 (*)     Gran # (ANC) 8.2 (*)     Gran% 77.9 (*)     Lymph% 15.8 (*)     All other components within normal limits   COMPREHENSIVE METABOLIC PANEL - Abnormal; Notable for the following components:    Glucose 118 (*)     All other components within normal limits   URINALYSIS, REFLEX TO URINE CULTURE - Abnormal; Notable for the following components:    Occult Blood UA Trace (*)     All other components within normal limits    Narrative:     Preferred Collection Type->Urine, Clean Catch   LIPASE          Imaging Results          CT Abdomen Pelvis With Contrast (Final result)  Result time 01/10/20 17:23:27    Final result by Grecia Johnosn MD (01/10/20 17:23:27)                 Impression:      Mildly dilated fluid-filled small bowel loops in the pelvis with a change in caliber at the level of the ventral abdominal mesh.  Decompressed large bowel.  Trace pelvic fluid.  Findings are concerning for a mild partial small bowel obstruction.  Question adhesions.    Probable tiny right renal cysts.    Minimal increase in size of a left adrenal indeterminate mass lesion.  If warranted, consider nonemergent MRI of the adrenal glands with in phase and out of phase sequencing.      Electronically signed by: Grecia Johnson  Date:    01/10/2020  Time:    17:23             Narrative:    EXAMINATION:  CT OF ABDOMEN PELVIS WITH    CLINICAL HISTORY:  All over abdominal pain x1 day.  Patient complaining of vomiting x1 time today.    TECHNIQUE:  5 mm enhanced axial images were obtained from the lung bases through the greater trochanters.  One hundred mL of Omnipaque 350 was  injected.    COMPARISON:  02/23/2019    FINDINGS:  There is fatty infiltration of the liver.    The spleen, pancreas, kidneys, and right adrenal gland are unremarkable. The gallbladder contains no calcified gallstones.    There are too small to characterize low attenuation lesions arising from the right kidney.  There is mild cortical scarring at the left upper pole kidney.    There is a indeterminate left adrenal lesion measuring 2.1 x 1.6 cm, which measures slightly greater than on the previous examination.  (53 Hounsfield units).    Ventral abdominal mesh is present.  There are multiple small bowel loops, which are closely adherent or abutting the ventral abdominal mesh.  There are several mildly dilated fluid filled loops of small bowel in the pelvis.  There is a changing caliber within the pelvis.  The appendix is surgically absent.  There is trace free pelvic fluid.    There is no definite evidence for abdominal adenopathy or ascites.  Moderate vascular calcifications are present.    Colonic diverticulosis is present.  The large bowel is decompressed.  There are no pelvic masses or adenopathy.    There is mild right basilar atelectasis.                                 Medical Decision Making:   Initial Assessment:     Urgent evaluation of a 79-year-old female presenting to the emergency department for evaluation of abdominal pain.  Patient is afebrile, nontoxic appearing, hemodynamically stable. Physical exam reveals generalized tenderness to palpation the abdomen with no rebound or guarding. Will plan for labs, CT scan, analgesics and reassess.    Clinical Tests:   Lab Tests: Ordered and Reviewed  Radiological Study: Ordered and Reviewed  ED Management:    UA is unremarkable. CBC shows no leukocytosis, normal H&H.  CMP and lipase are within normal limits.  CT scan shows evidence of up partial small-bowel obstruction.  There is also concern for possible left adrenal indeterminate mass. Given SBO, will plan to  admit the patient.    Spoke with case management, patient meets inpatient criteria.    Spoke with Libia Bull PA-C, who admit the patient to Dr. Hinton. The treatment plan was discussed with the patient who demonstrated understanding and comfort with plan. The patient's history, physical exam, and plan of care was discussed with and agreed upon with my supervising physician.       This note was created using [employer] Fluency Direct. There may be typographical errors secondary to dictation.                                    Clinical Impression:     1. Partial intestinal obstruction, unspecified cause           Disposition:   Disposition: Admitted  Condition: Stable                     Rowena Hatch PA-C  01/10/20 1278

## 2020-01-10 NOTE — ED NOTES
"ROUNDING:  Lying on the stretcher with HOB at semi-fowlers. AAOx4. States abdominal pain 8/10 "it's a little better." Denies any other discomfort at this time. Skin is warm and dry. Resp:18 even and unlabored. Comfort and BR needs addressed. Plan of care updated. NADN. Denies n/v, dizziness, lightheadedness or any other discomfort at this time. Bed locked in low position, side rails up x2 and call light within reach. Will continue to monitor.  "

## 2020-01-10 NOTE — ED TRIAGE NOTES
Pt c/o generalized abdominal pain which started yesterday evening. Pt also c/o nausea with 1 episode of vomiting en route to the ED. Denies diarrhea, dysuria & urinary pressure. Pt also c/o frontal H/A which started this morning, denies acute vision changes. Pt denies CP & SOB. Pt has PMH of bowel obstruction. Pt reports 3 BMs Wednesday with small irregular BM yesterday.

## 2020-01-10 NOTE — ED NOTES
Patient Identifiers for Natali Galeano checked and correct  LOC: The patient is awake, alert and aware of environment with an appropriate affect, the patient is oriented x 3 and speaking appropriate.  APPEARANCE: Obese. Elderly. Patient resting comfortably and in no acute distress. The patient is clean and well groomed. The patient's clothing is properly fastened.  SKIN: The skin is warm and dry. The patient has delayed skin turgor and moist mucus membranes. No rashes or lesions upon observation. Skin Intact , no breakdown noted.  Musculoskeletal :  Normal range of motion noted. Moves all extremities well  RESPIRATORY: Airway is open and patent, respirations are spontaneous, patient has a normal effort and rate. ABDOMEN: Soft and tender to palpation in the mid upper abdomen, no distention observed. Bowels Sounds are WNL all quads.  PULSES: 2+ radial pulses, symmetrical.  Will continue to monitor

## 2020-01-11 LAB
ANION GAP SERPL CALC-SCNC: 11 MMOL/L (ref 8–16)
BASOPHILS # BLD AUTO: 0.03 K/UL (ref 0–0.2)
BASOPHILS NFR BLD: 0.3 % (ref 0–1.9)
BUN SERPL-MCNC: 11 MG/DL (ref 8–23)
CALCIUM SERPL-MCNC: 9 MG/DL (ref 8.7–10.5)
CHLORIDE SERPL-SCNC: 108 MMOL/L (ref 95–110)
CO2 SERPL-SCNC: 24 MMOL/L (ref 23–29)
CREAT SERPL-MCNC: 0.8 MG/DL (ref 0.5–1.4)
DIFFERENTIAL METHOD: ABNORMAL
EOSINOPHIL # BLD AUTO: 0.1 K/UL (ref 0–0.5)
EOSINOPHIL NFR BLD: 0.9 % (ref 0–8)
ERYTHROCYTE [DISTWIDTH] IN BLOOD BY AUTOMATED COUNT: 15.1 % (ref 11.5–14.5)
EST. GFR  (AFRICAN AMERICAN): >60 ML/MIN/1.73 M^2
EST. GFR  (NON AFRICAN AMERICAN): >60 ML/MIN/1.73 M^2
GLUCOSE SERPL-MCNC: 91 MG/DL (ref 70–110)
HCT VFR BLD AUTO: 37.9 % (ref 37–48.5)
HGB BLD-MCNC: 11.5 G/DL (ref 12–16)
IMM GRANULOCYTES # BLD AUTO: 0.01 K/UL (ref 0–0.04)
IMM GRANULOCYTES NFR BLD AUTO: 0.1 % (ref 0–0.5)
LYMPHOCYTES # BLD AUTO: 3.2 K/UL (ref 1–4.8)
LYMPHOCYTES NFR BLD: 34.8 % (ref 18–48)
MAGNESIUM SERPL-MCNC: 2 MG/DL (ref 1.6–2.6)
MCH RBC QN AUTO: 25.4 PG (ref 27–31)
MCHC RBC AUTO-ENTMCNC: 30.3 G/DL (ref 32–36)
MCV RBC AUTO: 84 FL (ref 82–98)
MONOCYTES # BLD AUTO: 0.7 K/UL (ref 0.3–1)
MONOCYTES NFR BLD: 7.3 % (ref 4–15)
NEUTROPHILS # BLD AUTO: 5.1 K/UL (ref 1.8–7.7)
NEUTROPHILS NFR BLD: 56.6 % (ref 38–73)
NRBC BLD-RTO: 0 /100 WBC
PHOSPHATE SERPL-MCNC: 3.4 MG/DL (ref 2.7–4.5)
PLATELET # BLD AUTO: 206 K/UL (ref 150–350)
PMV BLD AUTO: 10.8 FL (ref 9.2–12.9)
POTASSIUM SERPL-SCNC: 3.3 MMOL/L (ref 3.5–5.1)
RBC # BLD AUTO: 4.52 M/UL (ref 4–5.4)
SODIUM SERPL-SCNC: 143 MMOL/L (ref 136–145)
WBC # BLD AUTO: 9.04 K/UL (ref 3.9–12.7)

## 2020-01-11 PROCEDURE — 25000003 PHARM REV CODE 250: Performed by: PHYSICIAN ASSISTANT

## 2020-01-11 PROCEDURE — 36415 COLL VENOUS BLD VENIPUNCTURE: CPT

## 2020-01-11 PROCEDURE — 83735 ASSAY OF MAGNESIUM: CPT

## 2020-01-11 PROCEDURE — 99233 PR SUBSEQUENT HOSPITAL CARE,LEVL III: ICD-10-PCS | Mod: ,,, | Performed by: INTERNAL MEDICINE

## 2020-01-11 PROCEDURE — 94761 N-INVAS EAR/PLS OXIMETRY MLT: CPT

## 2020-01-11 PROCEDURE — 63600175 PHARM REV CODE 636 W HCPCS: Performed by: PHYSICIAN ASSISTANT

## 2020-01-11 PROCEDURE — 85025 COMPLETE CBC W/AUTO DIFF WBC: CPT

## 2020-01-11 PROCEDURE — 11000001 HC ACUTE MED/SURG PRIVATE ROOM

## 2020-01-11 PROCEDURE — 80048 BASIC METABOLIC PNL TOTAL CA: CPT

## 2020-01-11 PROCEDURE — 99233 SBSQ HOSP IP/OBS HIGH 50: CPT | Mod: ,,, | Performed by: INTERNAL MEDICINE

## 2020-01-11 PROCEDURE — 84100 ASSAY OF PHOSPHORUS: CPT

## 2020-01-11 RX ADMIN — ATORVASTATIN CALCIUM 10 MG: 10 TABLET, FILM COATED ORAL at 08:01

## 2020-01-11 RX ADMIN — AMLODIPINE BESYLATE 2.5 MG: 2.5 TABLET ORAL at 08:01

## 2020-01-11 RX ADMIN — SODIUM CHLORIDE: 0.9 INJECTION, SOLUTION INTRAVENOUS at 07:01

## 2020-01-11 RX ADMIN — ENOXAPARIN SODIUM 40 MG: 100 INJECTION SUBCUTANEOUS at 05:01

## 2020-01-11 RX ADMIN — BENAZEPRIL HYDROCHLORIDE 40 MG: 40 TABLET, COATED ORAL at 08:01

## 2020-01-11 RX ADMIN — ASPIRIN 81 MG: 81 TABLET ORAL at 08:01

## 2020-01-11 NOTE — HOSPITAL COURSE
Has Flatus the morning of 1/11/2020 and again 1/12/2020, but no BM.  Exam was soft and non tender.  Good bowel signs.  Will start clear liquid diet and advance as tolerated.  Ready for discharge home  
no

## 2020-01-11 NOTE — ED NOTES
NPO:   NPO status maintained and reinforced. Pt verbalized  understanding. Will continue to monitor.

## 2020-01-11 NOTE — PLAN OF CARE
Initial Discharge Planning Assessment:  Patient admitted on 1/10/2020    Chart reviewed, Care plan discussed with treatment team,  attending Dr. Hinton    PCP updated in Epic: Dr. Torres  Pharmacy, updated in Epic: [facility]    DME at home: CPAP at night    Current dispo: home   Transportation: Family to drive home    Power of  or Living Will: none    Case management  to follow.  No anticipated DC needs from CM perspective.     01/11/20 1401   Discharge Assessment   Assessment Type Discharge Planning Assessment   Confirmed/corrected address and phone number on facesheet? Yes   Assessment information obtained from? Patient   Communicated expected length of stay with patient/caregiver yes   Prior to hospitilization cognitive status: Alert/Oriented   Prior to hospitalization functional status: Independent   Current cognitive status: Alert/Oriented   Current Functional Status: Independent   Lives With alone   Is patient able to care for self after discharge? Yes   Who are your caregiver(s) and their phone number(s)? Daughter: Anabell Galeano [phone]   Patient's perception of discharge disposition home or selfcare   Readmission Within the Last 30 Days no previous admission in last 30 days   Patient currently being followed by outpatient case management? No   Patient currently receives any other outside agency services? No   Equipment Currently Used at Home none   Do you have any problems affording any of your prescribed medications? No   Is the patient taking medications as prescribed? yes   Does the patient have transportation home? Yes   Transportation Anticipated family or friend will provide   Does the patient receive services at the [facility]? No   Discharge Plan A Home   DME Needed Upon Discharge  none   Patient/Family in Agreement with Plan yes

## 2020-01-11 NOTE — PROGRESS NOTES
[facility] Hospital Medicine  Progress Note    Patient Name: Natali Galeano  MRN: [id number]  Patient Class: IP- Inpatient   Admission Date: 1/10/2020  Length of Stay: 1 days  Attending Physician: Zachary Hinton MD  Primary Care Provider: Ewelina Herzog MD        Subjective:     Principal Problem:Partial intestinal obstruction        HPI:  78 y/o female with a PMHx HTN, colon cancer status post resection of mass in 2002, abdominal hernia repair in 2004, three prior episodes of small bowel obstruction presented to ED with c/o abdominal pain associated with N/V that started yesterday afternoon. Reports one episode of vomiting yesterday and one on her way to ED. Reports she ate dinner after the pain started and had a small bowel movement, but pain remained constant. Describes the pain as sharp across her lower abdomen. She states this feels exactly like her prior episodes of bowel obstruction.  She denies fever, chills, diarrhea, headache, chest pain, light headedness or leg swelling. ED evaluation afebrile, labs unremarkable, CT A/P showed Mildly dilated fluid-filled small bowel loops in the pelvis with a change in caliber at the level of the ventral abdominal mesh. Decompressed large bowel. Admitted.       Overview/Hospital Course:  Patient this morning is feeling a little better.  Has mild upper abdominal pain.  Has Flatus this morning, but no BM.  Exam was soft and non tender.  Still NPO.    Interval History: Patient improved overnight.  Has Flatus this morning.  Mild abdominal pain.    Review of Systems   Constitutional: Positive for appetite change. Negative for chills and fever.   HENT: Negative for congestion and sore throat.    Eyes: Negative.    Respiratory: Negative for cough and shortness of breath.    Cardiovascular: Negative for chest pain and palpitations.   Gastrointestinal: Positive for abdominal pain. Negative for abdominal distention, constipation, diarrhea, nausea and  vomiting.   Endocrine: Negative.    Genitourinary: Negative for difficulty urinating and frequency.   Musculoskeletal: Negative for back pain and myalgias.   Skin: Negative.    Neurological: Negative for dizziness, weakness and headaches.   Psychiatric/Behavioral: Negative for confusion and decreased concentration.     Objective:     Vital Signs (Most Recent):  Temp: 98.2 °F (36.8 °C) (01/11/20 0844)  Pulse: 62 (01/11/20 0844)  Resp: 16 (01/11/20 0844)  BP: 125/60 (01/11/20 0844)  SpO2: 97 % (01/11/20 0844) Vital Signs (24h Range):  Temp:  [97.6 °F (36.4 °C)-98.5 °F (36.9 °C)] 98.2 °F (36.8 °C)  Pulse:  [62-93] 62  Resp:  [16-22] 16  SpO2:  [96 %-100 %] 97 %  BP: (112-217)/() 125/60     Weight: 128.8 kg (283 lb 15.6 oz)  Body mass index is 44.48 kg/m².    Intake/Output Summary (Last 24 hours) at 1/11/2020 1110  Last data filed at 1/11/2020 0650  Gross per 24 hour   Intake 986.5 ml   Output 300 ml   Net 686.5 ml      Physical Exam   Constitutional: She is oriented to person, place, and time. She appears well-developed and well-nourished. No distress.   HENT:   Head: Normocephalic and atraumatic.   Mouth/Throat: Oropharynx is clear and moist.   Eyes: Pupils are equal, round, and reactive to light. Conjunctivae are normal. No scleral icterus.   Neck: Normal range of motion. Neck supple.   Cardiovascular: Normal rate, regular rhythm, normal heart sounds and intact distal pulses.   Pulmonary/Chest: Effort normal and breath sounds normal. No respiratory distress. She has no wheezes. She has no rales.   Abdominal: There is no tenderness. There is no rebound and no guarding.   Obese  Soft, non-distended.  + BS   Musculoskeletal: Normal range of motion. She exhibits no edema.   Neurological: She is alert and oriented to person, place, and time.   Skin: Skin is warm and dry. Capillary refill takes less than 2 seconds. She is not diaphoretic.   Psychiatric: She has a normal mood and affect.   Nursing note and vitals  reviewed.      Significant Labs: All pertinent labs within the past 24 hours have been reviewed.    Significant Imaging: I have reviewed all pertinent imaging results/findings within the past 24 hours.      Assessment/Plan:      * Partial intestinal obstruction  - recurrent SBO  - pain now improved with analgesics and antiemetics IV for nausea/ vomiting  - CT A/P Mildly dilated fluid-filled small bowel loops in the pelvis with a change in caliber at the level of the ventral abdominal mesh.  Decompressed large bowel.  Trace pelvic fluid.  Findings are concerning for a mild partial small bowel obstruction.  - keep npo and perform serial abdominal exams  - IVF's  - consider gen surg consult if no improvement    - Has Flatus on 1/11/2020 with good BS this morning.  No BMs yet.  Continue NPO and IVFs and consider starting po diet this afternoon if continues to improve.      ALYSE (obstructive sleep apnea)  - CPAP with sleep      Hyperlipidemia  -Continue Atorvastatin      Hypokalemia  Potassium 3.3  Given KCl.  Follow.      Essential hypertension  - elevated on admission  - she is now off atenolol  - hold HCTZ for now and continue amlodipine 2.5 mg and lisinopril 40 mg daily  - PRN hydralazine  - monitor - improved on 1/11/2020        VTE Risk Mitigation (From admission, onward)         Ordered     enoxaparin injection 40 mg  Daily      01/10/20 1951     Place sequential compression device  Until discontinued      01/10/20 1951     IP VTE HIGH RISK PATIENT  Once      01/10/20 1951                      Zachary Hinton MD  Department of Hospital Medicine   [facility]

## 2020-01-11 NOTE — H&P
[facility] Hospital Medicine  History & Physical    Patient Name: Natali Galeano  MRN: [id number]  Admission Date: 1/10/2020  Attending Physician: Zachary Hinton MD   Primary Care Provider: Ewelina Herzog MD         Patient information was obtained from patient, past medical records and ER records.     Subjective:     Principal Problem:Partial intestinal obstruction    Chief Complaint:   Chief Complaint   Patient presents with    Abdominal Pain     pt with allover abdominal pain x one day. pt with c/o vomitied x one today.         HPI: 78 y/o female with a PMHx HTN, colon cancer status post resection of mass in , abdominal hernia repair in , three prior episodes of small bowel obstruction presented to ED with c/o abdominal pain associated with N/V that started yesterday afternoon. Reports one episode of vomiting yesterday and one on her way to ED. Reports she ate dinner after the pain started and had a small bowel movement, but pain remained constant. Describes the pain as sharp across her lower abdomen. She states this feels exactly like her prior episodes of bowel obstruction.  She denies fever, chills, diarrhea, headache, chest pain, light headedness or leg swelling. ED evaluation afebrile, labs unremarkable, CT A/P showed Mildly dilated fluid-filled small bowel loops in the pelvis with a change in caliber at the level of the ventral abdominal mesh. Decompressed large bowel. Admitted.       Past Medical History:   Diagnosis Date    Colon cancer     Gout, chronic     Heart murmur     High cholesterol     Hypercholesteremia     Hypertension     Sleep apnea     Thyroid disease        Past Surgical History:   Procedure Laterality Date    BTL       SECTION, CLASSIC      COLON SURGERY      goiter       HERNIA REPAIR      KIDNEY SURGERY      cyst removal       Review of patient's allergies indicates:  No Known Allergies    No current facility-administered  medications on file prior to encounter.      Current Outpatient Medications on File Prior to Encounter   Medication Sig    docusate sodium (COLACE) 100 MG capsule Take 100 mg by mouth once daily.    allopurinol (ZYLOPRIM) 300 MG tablet Take 300 mg by mouth once daily.    amlodipine (NORVASC) 2.5 MG tablet Take 2.5 mg by mouth once daily.    aspirin (ECOTRIN) 81 MG EC tablet Take 81 mg by mouth once daily.    atorvastatin (LIPITOR) 10 MG tablet Take 10 mg by mouth every evening.     benazepril (LOTENSIN) 40 MG tablet Take 40 mg by mouth once daily.    cholecalciferol, vitamin D3, (THERA-D) 2,000 unit Tab Take 1 tablet by mouth once daily.    fluticasone (FLONASE) 50 mcg/actuation nasal spray 1 spray by Each Nare route once daily.    hydroCHLOROthiazide (HYDRODIURIL) 25 MG tablet Take 12.5 mg by mouth. Take 1/2 of the 25 mg tablet daily    niacin 500 mg TbSR Take 500 mg by mouth once daily.    senna-docusate 8.6-50 mg (PERICOLACE) 8.6-50 mg per tablet Take 1 tablet by mouth once daily. (Patient not taking: Reported on 2019)    [DISCONTINUED] atenolol (TENORMIN) 100 MG tablet TAKE 1 TABLET BY ORAL ROUTE EVERY DAY     Family History     Problem Relation (Age of Onset)    Diabetes Mother    Heart disease Father        Tobacco Use    Smoking status: Former Smoker     Packs/day: 0.10     Last attempt to quit: 1980     Years since quittin.7    Smokeless tobacco: Never Used   Substance and Sexual Activity    Alcohol use: No    Drug use: No    Sexual activity: Never     Review of Systems   Constitutional: Positive for appetite change. Negative for chills and fever.   HENT: Negative for congestion and sore throat.    Eyes: Negative.    Respiratory: Negative for cough and shortness of breath.    Cardiovascular: Negative for chest pain and palpitations.   Gastrointestinal: Positive for abdominal pain, nausea and vomiting. Negative for abdominal distention, constipation and diarrhea.    Genitourinary: Negative for difficulty urinating and frequency.   Musculoskeletal: Negative for back pain and myalgias.   Skin: Negative.    Neurological: Negative for dizziness, weakness and headaches.   Psychiatric/Behavioral: Negative for confusion and decreased concentration.     Objective:     Vital Signs (Most Recent):  Temp: 98 °F (36.7 °C) (01/10/20 1955)  Pulse: 76 (01/10/20 1955)  Resp: (!) 22 (01/10/20 1955)  BP: (!) 179/79 (01/10/20 1955)  SpO2: 99 % (01/10/20 1955) Vital Signs (24h Range):  Temp:  [97.8 °F (36.6 °C)-98.5 °F (36.9 °C)] 98 °F (36.7 °C)  Pulse:  [74-93] 76  Resp:  [18-22] 22  SpO2:  [97 %-100 %] 99 %  BP: (125-217)/() 179/79     Weight: 128.8 kg (283 lb 15.6 oz)  Body mass index is 44.48 kg/m².    Physical Exam   Constitutional: She is oriented to person, place, and time. She appears well-developed and well-nourished. No distress.   HENT:   Head: Normocephalic and atraumatic.   Mouth/Throat: Oropharynx is clear and moist.   Eyes: Pupils are equal, round, and reactive to light. Conjunctivae are normal. No scleral icterus.   Neck: Normal range of motion. Neck supple.   Cardiovascular: Normal rate, regular rhythm, normal heart sounds and intact distal pulses.   Pulmonary/Chest: Effort normal and breath sounds normal. No respiratory distress. She has no wheezes. She has no rales.   Abdominal: There is tenderness (mild TTP lower quadrant). There is no rebound and no guarding.   Obese  Soft, non-distended, diminished bowel sounds   Musculoskeletal: Normal range of motion. She exhibits no edema.   Neurological: She is alert and oriented to person, place, and time.   Skin: Skin is warm and dry. Capillary refill takes less than 2 seconds. She is not diaphoretic.   Psychiatric: She has a normal mood and affect.   Nursing note and vitals reviewed.        CRANIAL NERVES     CN III, IV, VI   Pupils are equal, round, and reactive to light.       Significant Labs:   CBC:   Recent Labs   Lab  01/10/20  1516   WBC 10.50   HGB 13.5   HCT 43.8        CMP:   Recent Labs   Lab 01/10/20  1516      K 3.8      CO2 26   *   BUN 11   CREATININE 0.8   CALCIUM 10.1   PROT 7.6   ALBUMIN 3.7   BILITOT 0.6   ALKPHOS 106   AST 19   ALT 12   ANIONGAP 10   EGFRNONAA >60     All pertinent labs within the past 24 hours have been reviewed.    Significant Imaging: I have reviewed all pertinent imaging results/findings within the past 24 hours.   Imaging Results          CT Abdomen Pelvis With Contrast (Final result)  Result time 01/10/20 17:23:27    Final result by Grecia Johnson MD (01/10/20 17:23:27)                 Impression:      Mildly dilated fluid-filled small bowel loops in the pelvis with a change in caliber at the level of the ventral abdominal mesh.  Decompressed large bowel.  Trace pelvic fluid.  Findings are concerning for a mild partial small bowel obstruction.  Question adhesions.    Probable tiny right renal cysts.    Minimal increase in size of a left adrenal indeterminate mass lesion.  If warranted, consider nonemergent MRI of the adrenal glands with in phase and out of phase sequencing.      Electronically signed by: Grecia Johnson  Date:    01/10/2020  Time:    17:23             Narrative:    EXAMINATION:  CT OF ABDOMEN PELVIS WITH    CLINICAL HISTORY:  All over abdominal pain x1 day.  Patient complaining of vomiting x1 time today.    TECHNIQUE:  5 mm enhanced axial images were obtained from the lung bases through the greater trochanters.  One hundred mL of Omnipaque 350 was injected.    COMPARISON:  02/23/2019    FINDINGS:  There is fatty infiltration of the liver.    The spleen, pancreas, kidneys, and right adrenal gland are unremarkable. The gallbladder contains no calcified gallstones.    There are too small to characterize low attenuation lesions arising from the right kidney.  There is mild cortical scarring at the left upper pole kidney.    There is a indeterminate  left adrenal lesion measuring 2.1 x 1.6 cm, which measures slightly greater than on the previous examination.  (53 Hounsfield units).    Ventral abdominal mesh is present.  There are multiple small bowel loops, which are closely adherent or abutting the ventral abdominal mesh.  There are several mildly dilated fluid filled loops of small bowel in the pelvis.  There is a changing caliber within the pelvis.  The appendix is surgically absent.  There is trace free pelvic fluid.    There is no definite evidence for abdominal adenopathy or ascites.  Moderate vascular calcifications are present.    Colonic diverticulosis is present.  The large bowel is decompressed.  There are no pelvic masses or adenopathy.    There is mild right basilar atelectasis.                                  Assessment/Plan:     * Partial intestinal obstruction  - recurrent SBO  - pain now improved with analgesics   - CT A/P Mildly dilated fluid-filled small bowel loops in the pelvis with a change in caliber at the level of the ventral abdominal mesh.  Decompressed large bowel.  Trace pelvic fluid.  Findings are concerning for a mild partial small bowel obstruction.  - keep npo and perform serial abdominal exams  - IVF's  - provide IV pain medications for analgesia  - antiemetics IV for nausea/ vomiting  - consider gen surg consult if no improvement      ALYSE (obstructive sleep apnea)  - CPAP with sleep      Essential hypertension  - elevated on admission  - she is now off atenolol  - hold HCTZ, cont amlodipine 2.5 mg and lisinopril 40 mg daily  - PRN hydralazine  - monitor      VTE Risk Mitigation (From admission, onward)         Ordered     enoxaparin injection 40 mg  Daily      01/10/20 1951     Place sequential compression device  Until discontinued      01/10/20 1951     IP VTE HIGH RISK PATIENT  Once      01/10/20 1951                   Libia Bull PA-C  Department of Hospital Medicine   [facility]

## 2020-01-11 NOTE — SUBJECTIVE & OBJECTIVE
Past Medical History:   Diagnosis Date    Colon cancer     Gout, chronic     Heart murmur     High cholesterol     Hypercholesteremia     Hypertension     Sleep apnea     Thyroid disease        Past Surgical History:   Procedure Laterality Date    BTL       SECTION, CLASSIC      COLON SURGERY      goiter       HERNIA REPAIR      KIDNEY SURGERY      cyst removal       Review of patient's allergies indicates:  No Known Allergies    No current facility-administered medications on file prior to encounter.      Current Outpatient Medications on File Prior to Encounter   Medication Sig    docusate sodium (COLACE) 100 MG capsule Take 100 mg by mouth once daily.    allopurinol (ZYLOPRIM) 300 MG tablet Take 300 mg by mouth once daily.    amlodipine (NORVASC) 2.5 MG tablet Take 2.5 mg by mouth once daily.    aspirin (ECOTRIN) 81 MG EC tablet Take 81 mg by mouth once daily.    atorvastatin (LIPITOR) 10 MG tablet Take 10 mg by mouth every evening.     benazepril (LOTENSIN) 40 MG tablet Take 40 mg by mouth once daily.    cholecalciferol, vitamin D3, (THERA-D) 2,000 unit Tab Take 1 tablet by mouth once daily.    fluticasone (FLONASE) 50 mcg/actuation nasal spray 1 spray by Each Nare route once daily.    hydroCHLOROthiazide (HYDRODIURIL) 25 MG tablet Take 12.5 mg by mouth. Take 1/2 of the 25 mg tablet daily    niacin 500 mg TbSR Take 500 mg by mouth once daily.    senna-docusate 8.6-50 mg (PERICOLACE) 8.6-50 mg per tablet Take 1 tablet by mouth once daily. (Patient not taking: Reported on 2019)    [DISCONTINUED] atenolol (TENORMIN) 100 MG tablet TAKE 1 TABLET BY ORAL ROUTE EVERY DAY     Family History     Problem Relation (Age of Onset)    Diabetes Mother    Heart disease Father        Tobacco Use    Smoking status: Former Smoker     Packs/day: 0.10     Last attempt to quit: 1980     Years since quittin.7    Smokeless tobacco: Never Used   Substance and Sexual Activity     Alcohol use: No    Drug use: No    Sexual activity: Never     Review of Systems   Constitutional: Positive for appetite change. Negative for chills and fever.   HENT: Negative for congestion and sore throat.    Eyes: Negative.    Respiratory: Negative for cough and shortness of breath.    Cardiovascular: Negative for chest pain and palpitations.   Gastrointestinal: Positive for abdominal pain, nausea and vomiting. Negative for abdominal distention, constipation and diarrhea.   Genitourinary: Negative for difficulty urinating and frequency.   Musculoskeletal: Negative for back pain and myalgias.   Skin: Negative.    Neurological: Negative for dizziness, weakness and headaches.   Psychiatric/Behavioral: Negative for confusion and decreased concentration.     Objective:     Vital Signs (Most Recent):  Temp: 98 °F (36.7 °C) (01/10/20 1955)  Pulse: 76 (01/10/20 1955)  Resp: (!) 22 (01/10/20 1955)  BP: (!) 179/79 (01/10/20 1955)  SpO2: 99 % (01/10/20 1955) Vital Signs (24h Range):  Temp:  [97.8 °F (36.6 °C)-98.5 °F (36.9 °C)] 98 °F (36.7 °C)  Pulse:  [74-93] 76  Resp:  [18-22] 22  SpO2:  [97 %-100 %] 99 %  BP: (125-217)/() 179/79     Weight: 128.8 kg (283 lb 15.6 oz)  Body mass index is 44.48 kg/m².    Physical Exam   Constitutional: She is oriented to person, place, and time. She appears well-developed and well-nourished. No distress.   HENT:   Head: Normocephalic and atraumatic.   Mouth/Throat: Oropharynx is clear and moist.   Eyes: Pupils are equal, round, and reactive to light. Conjunctivae are normal. No scleral icterus.   Neck: Normal range of motion. Neck supple.   Cardiovascular: Normal rate, regular rhythm, normal heart sounds and intact distal pulses.   Pulmonary/Chest: Effort normal and breath sounds normal. No respiratory distress. She has no wheezes. She has no rales.   Abdominal: There is tenderness (mild TTP lower quadrant). There is no rebound and no guarding.   Obese  Soft, non-distended,  diminished bowel sounds   Musculoskeletal: Normal range of motion. She exhibits no edema.   Neurological: She is alert and oriented to person, place, and time.   Skin: Skin is warm and dry. Capillary refill takes less than 2 seconds. She is not diaphoretic.   Psychiatric: She has a normal mood and affect.   Nursing note and vitals reviewed.        CRANIAL NERVES     CN III, IV, VI   Pupils are equal, round, and reactive to light.       Significant Labs:   CBC:   Recent Labs   Lab 01/10/20  1516   WBC 10.50   HGB 13.5   HCT 43.8        CMP:   Recent Labs   Lab 01/10/20  1516      K 3.8      CO2 26   *   BUN 11   CREATININE 0.8   CALCIUM 10.1   PROT 7.6   ALBUMIN 3.7   BILITOT 0.6   ALKPHOS 106   AST 19   ALT 12   ANIONGAP 10   EGFRNONAA >60     All pertinent labs within the past 24 hours have been reviewed.    Significant Imaging: I have reviewed all pertinent imaging results/findings within the past 24 hours.   Imaging Results          CT Abdomen Pelvis With Contrast (Final result)  Result time 01/10/20 17:23:27    Final result by Grecia Johnson MD (01/10/20 17:23:27)                 Impression:      Mildly dilated fluid-filled small bowel loops in the pelvis with a change in caliber at the level of the ventral abdominal mesh.  Decompressed large bowel.  Trace pelvic fluid.  Findings are concerning for a mild partial small bowel obstruction.  Question adhesions.    Probable tiny right renal cysts.    Minimal increase in size of a left adrenal indeterminate mass lesion.  If warranted, consider nonemergent MRI of the adrenal glands with in phase and out of phase sequencing.      Electronically signed by: Grecia Johnson  Date:    01/10/2020  Time:    17:23             Narrative:    EXAMINATION:  CT OF ABDOMEN PELVIS WITH    CLINICAL HISTORY:  All over abdominal pain x1 day.  Patient complaining of vomiting x1 time today.    TECHNIQUE:  5 mm enhanced axial images were obtained from the  lung bases through the greater trochanters.  One hundred mL of Omnipaque 350 was injected.    COMPARISON:  02/23/2019    FINDINGS:  There is fatty infiltration of the liver.    The spleen, pancreas, kidneys, and right adrenal gland are unremarkable. The gallbladder contains no calcified gallstones.    There are too small to characterize low attenuation lesions arising from the right kidney.  There is mild cortical scarring at the left upper pole kidney.    There is a indeterminate left adrenal lesion measuring 2.1 x 1.6 cm, which measures slightly greater than on the previous examination.  (53 Hounsfield units).    Ventral abdominal mesh is present.  There are multiple small bowel loops, which are closely adherent or abutting the ventral abdominal mesh.  There are several mildly dilated fluid filled loops of small bowel in the pelvis.  There is a changing caliber within the pelvis.  The appendix is surgically absent.  There is trace free pelvic fluid.    There is no definite evidence for abdominal adenopathy or ascites.  Moderate vascular calcifications are present.    Colonic diverticulosis is present.  The large bowel is decompressed.  There are no pelvic masses or adenopathy.    There is mild right basilar atelectasis.

## 2020-01-11 NOTE — ED NOTES
" AAOx4. States abdominal pain 4/10 "it's better." Denies n/v, dizziness, lightheadedness or any other discomfort at this time. Skin is warm and dry.  Resp:18 even and unlabored. NPO maintained and reinforced. Valuables and belongings with patient. Plan of care updated. Report called to receiving nurse on unit by NORRIS Timmons. Pt transported via w/c to room: 320 by ER tech. Pt accompanied by family member.      "

## 2020-01-11 NOTE — SUBJECTIVE & OBJECTIVE
Interval History: Patient improved overnight.  Has Flatus this morning.  Mild abdominal pain.    Review of Systems   Constitutional: Positive for appetite change. Negative for chills and fever.   HENT: Negative for congestion and sore throat.    Eyes: Negative.    Respiratory: Negative for cough and shortness of breath.    Cardiovascular: Negative for chest pain and palpitations.   Gastrointestinal: Positive for abdominal pain. Negative for abdominal distention, constipation, diarrhea, nausea and vomiting.   Endocrine: Negative.    Genitourinary: Negative for difficulty urinating and frequency.   Musculoskeletal: Negative for back pain and myalgias.   Skin: Negative.    Neurological: Negative for dizziness, weakness and headaches.   Psychiatric/Behavioral: Negative for confusion and decreased concentration.     Objective:     Vital Signs (Most Recent):  Temp: 98.2 °F (36.8 °C) (01/11/20 0844)  Pulse: 62 (01/11/20 0844)  Resp: 16 (01/11/20 0844)  BP: 125/60 (01/11/20 0844)  SpO2: 97 % (01/11/20 0844) Vital Signs (24h Range):  Temp:  [97.6 °F (36.4 °C)-98.5 °F (36.9 °C)] 98.2 °F (36.8 °C)  Pulse:  [62-93] 62  Resp:  [16-22] 16  SpO2:  [96 %-100 %] 97 %  BP: (112-217)/() 125/60     Weight: 128.8 kg (283 lb 15.6 oz)  Body mass index is 44.48 kg/m².    Intake/Output Summary (Last 24 hours) at 1/11/2020 1110  Last data filed at 1/11/2020 0650  Gross per 24 hour   Intake 986.5 ml   Output 300 ml   Net 686.5 ml      Physical Exam   Constitutional: She is oriented to person, place, and time. She appears well-developed and well-nourished. No distress.   HENT:   Head: Normocephalic and atraumatic.   Mouth/Throat: Oropharynx is clear and moist.   Eyes: Pupils are equal, round, and reactive to light. Conjunctivae are normal. No scleral icterus.   Neck: Normal range of motion. Neck supple.   Cardiovascular: Normal rate, regular rhythm, normal heart sounds and intact distal pulses.   Pulmonary/Chest: Effort normal and breath  sounds normal. No respiratory distress. She has no wheezes. She has no rales.   Abdominal: There is no tenderness. There is no rebound and no guarding.   Obese  Soft, non-distended.  + BS   Musculoskeletal: Normal range of motion. She exhibits no edema.   Neurological: She is alert and oriented to person, place, and time.   Skin: Skin is warm and dry. Capillary refill takes less than 2 seconds. She is not diaphoretic.   Psychiatric: She has a normal mood and affect.   Nursing note and vitals reviewed.      Significant Labs: All pertinent labs within the past 24 hours have been reviewed.    Significant Imaging: I have reviewed all pertinent imaging results/findings within the past 24 hours.

## 2020-01-11 NOTE — ASSESSMENT & PLAN NOTE
- recurrent SBO  - pain now improved with analgesics and antiemetics IV for nausea/ vomiting  - CT A/P Mildly dilated fluid-filled small bowel loops in the pelvis with a change in caliber at the level of the ventral abdominal mesh.  Decompressed large bowel.  Trace pelvic fluid.  Findings are concerning for a mild partial small bowel obstruction.  - keep npo and perform serial abdominal exams  - IVF's  - consider gen surg consult if no improvement    - Has Flatus on 1/11/2020 with good BS this morning.  No BMs yet.  Continue NPO and IVFs and consider starting po diet this afternoon if continues to improve.

## 2020-01-11 NOTE — ED NOTES
Rounding on the patient has been done. she has been updated on the plan of care and her current status. Pain was assessed and is currently a 5/10. Comfort positioning and restroom needs were addressed. Necessary items were placed with in her reach and she was advised when a reassessment would take place. The call bell remains at the bedside for any additional patient needs. The patient is resting comfortably on the stretcher, respirations are even and unlabored, skin warm and dry. Family at BS. Fall precautions maintained and reinforced. Will continue to monitor.

## 2020-01-11 NOTE — ASSESSMENT & PLAN NOTE
- elevated on admission  - she is now off atenolol  - hold HCTZ, cont amlodipine 2.5 mg and lisinopril 40 mg daily  - PRN hydralazine  - monitor

## 2020-01-11 NOTE — HPI
80 y/o female with a PMHx HTN, colon cancer status post resection of mass in 2002, abdominal hernia repair in 2004, three prior episodes of small bowel obstruction presented to ED with c/o abdominal pain associated with N/V that started yesterday afternoon. Reports one episode of vomiting yesterday and one on her way to ED. Reports she ate dinner after the pain started and had a small bowel movement, but pain remained constant. Describes the pain as sharp across her lower abdomen. She states this feels exactly like her prior episodes of bowel obstruction.  She denies fever, chills, diarrhea, headache, chest pain, light headedness or leg swelling. ED evaluation afebrile, labs unremarkable, CT A/P showed Mildly dilated fluid-filled small bowel loops in the pelvis with a change in caliber at the level of the ventral abdominal mesh. Decompressed large bowel. Admitted.

## 2020-01-11 NOTE — ASSESSMENT & PLAN NOTE
- elevated on admission  - she is now off atenolol  - hold HCTZ for now and continue amlodipine 2.5 mg and lisinopril 40 mg daily  - PRN hydralazine  - monitor - improved on 1/11/2020

## 2020-01-11 NOTE — ASSESSMENT & PLAN NOTE
- recurrent SBO  - pain now improved with analgesics   - CT A/P Mildly dilated fluid-filled small bowel loops in the pelvis with a change in caliber at the level of the ventral abdominal mesh.  Decompressed large bowel.  Trace pelvic fluid.  Findings are concerning for a mild partial small bowel obstruction.  - keep npo and perform serial abdominal exams  - IVF's  - provide IV pain medications for analgesia  - antiemetics IV for nausea/ vomiting  - consider gen surg consult if no improvement

## 2020-01-12 PROBLEM — D64.9 NORMOCYTIC ANEMIA: Status: ACTIVE | Noted: 2020-01-12

## 2020-01-12 PROBLEM — K56.600 PARTIAL INTESTINAL OBSTRUCTION: Status: RESOLVED | Noted: 2020-01-10 | Resolved: 2020-01-12

## 2020-01-12 LAB
ANION GAP SERPL CALC-SCNC: 9 MMOL/L (ref 8–16)
BASOPHILS # BLD AUTO: 0.04 K/UL (ref 0–0.2)
BASOPHILS NFR BLD: 0.6 % (ref 0–1.9)
BUN SERPL-MCNC: 10 MG/DL (ref 8–23)
CALCIUM SERPL-MCNC: 8.9 MG/DL (ref 8.7–10.5)
CHLORIDE SERPL-SCNC: 109 MMOL/L (ref 95–110)
CO2 SERPL-SCNC: 24 MMOL/L (ref 23–29)
CREAT SERPL-MCNC: 0.7 MG/DL (ref 0.5–1.4)
DIFFERENTIAL METHOD: ABNORMAL
EOSINOPHIL # BLD AUTO: 0.1 K/UL (ref 0–0.5)
EOSINOPHIL NFR BLD: 1.7 % (ref 0–8)
ERYTHROCYTE [DISTWIDTH] IN BLOOD BY AUTOMATED COUNT: 14.8 % (ref 11.5–14.5)
EST. GFR  (AFRICAN AMERICAN): >60 ML/MIN/1.73 M^2
EST. GFR  (NON AFRICAN AMERICAN): >60 ML/MIN/1.73 M^2
FERRITIN SERPL-MCNC: 91 NG/ML (ref 20–300)
FOLATE SERPL-MCNC: 12 NG/ML (ref 4–24)
GLUCOSE SERPL-MCNC: 78 MG/DL (ref 70–110)
HCT VFR BLD AUTO: 37 % (ref 37–48.5)
HGB BLD-MCNC: 10.9 G/DL (ref 12–16)
IMM GRANULOCYTES # BLD AUTO: 0.01 K/UL (ref 0–0.04)
IMM GRANULOCYTES NFR BLD AUTO: 0.2 % (ref 0–0.5)
IRON SERPL-MCNC: 49 UG/DL (ref 30–160)
LYMPHOCYTES # BLD AUTO: 2.2 K/UL (ref 1–4.8)
LYMPHOCYTES NFR BLD: 33.6 % (ref 18–48)
MCH RBC QN AUTO: 25.1 PG (ref 27–31)
MCHC RBC AUTO-ENTMCNC: 29.5 G/DL (ref 32–36)
MCV RBC AUTO: 85 FL (ref 82–98)
MONOCYTES # BLD AUTO: 0.5 K/UL (ref 0.3–1)
MONOCYTES NFR BLD: 8.2 % (ref 4–15)
NEUTROPHILS # BLD AUTO: 3.6 K/UL (ref 1.8–7.7)
NEUTROPHILS NFR BLD: 55.7 % (ref 38–73)
NRBC BLD-RTO: 0 /100 WBC
PLATELET # BLD AUTO: 200 K/UL (ref 150–350)
PMV BLD AUTO: 11.1 FL (ref 9.2–12.9)
POTASSIUM SERPL-SCNC: 3.2 MMOL/L (ref 3.5–5.1)
RBC # BLD AUTO: 4.35 M/UL (ref 4–5.4)
SATURATED IRON: 15 % (ref 20–50)
SODIUM SERPL-SCNC: 142 MMOL/L (ref 136–145)
TOTAL IRON BINDING CAPACITY: 318 UG/DL (ref 250–450)
TRANSFERRIN SERPL-MCNC: 215 MG/DL (ref 200–375)
VIT B12 SERPL-MCNC: 540 PG/ML (ref 210–950)
WBC # BLD AUTO: 6.43 K/UL (ref 3.9–12.7)

## 2020-01-12 PROCEDURE — 25000003 PHARM REV CODE 250: Performed by: PHYSICIAN ASSISTANT

## 2020-01-12 PROCEDURE — 83540 ASSAY OF IRON: CPT

## 2020-01-12 PROCEDURE — 94761 N-INVAS EAR/PLS OXIMETRY MLT: CPT

## 2020-01-12 PROCEDURE — 36415 COLL VENOUS BLD VENIPUNCTURE: CPT

## 2020-01-12 PROCEDURE — 82746 ASSAY OF FOLIC ACID SERUM: CPT

## 2020-01-12 PROCEDURE — 82728 ASSAY OF FERRITIN: CPT

## 2020-01-12 PROCEDURE — 25000003 PHARM REV CODE 250: Performed by: INTERNAL MEDICINE

## 2020-01-12 PROCEDURE — 85025 COMPLETE CBC W/AUTO DIFF WBC: CPT

## 2020-01-12 PROCEDURE — 82607 VITAMIN B-12: CPT

## 2020-01-12 PROCEDURE — 99900035 HC TECH TIME PER 15 MIN (STAT)

## 2020-01-12 PROCEDURE — 99233 SBSQ HOSP IP/OBS HIGH 50: CPT | Mod: ,,, | Performed by: INTERNAL MEDICINE

## 2020-01-12 PROCEDURE — 80048 BASIC METABOLIC PNL TOTAL CA: CPT

## 2020-01-12 PROCEDURE — 63600175 PHARM REV CODE 636 W HCPCS: Performed by: PHYSICIAN ASSISTANT

## 2020-01-12 PROCEDURE — 99233 PR SUBSEQUENT HOSPITAL CARE,LEVL III: ICD-10-PCS | Mod: ,,, | Performed by: INTERNAL MEDICINE

## 2020-01-12 PROCEDURE — 11000001 HC ACUTE MED/SURG PRIVATE ROOM

## 2020-01-12 RX ORDER — DOCUSATE SODIUM 100 MG/1
100 CAPSULE, LIQUID FILLED ORAL 2 TIMES DAILY
Status: DISCONTINUED | OUTPATIENT
Start: 2020-01-12 | End: 2020-01-13 | Stop reason: HOSPADM

## 2020-01-12 RX ORDER — POTASSIUM CHLORIDE 20 MEQ/1
20 TABLET, EXTENDED RELEASE ORAL 2 TIMES DAILY
Status: DISCONTINUED | OUTPATIENT
Start: 2020-01-12 | End: 2020-01-12

## 2020-01-12 RX ORDER — AMLODIPINE BESYLATE 5 MG/1
5 TABLET ORAL DAILY
Status: DISCONTINUED | OUTPATIENT
Start: 2020-01-13 | End: 2020-01-13 | Stop reason: HOSPADM

## 2020-01-12 RX ORDER — AMLODIPINE BESYLATE 2.5 MG/1
2.5 TABLET ORAL ONCE
Status: COMPLETED | OUTPATIENT
Start: 2020-01-12 | End: 2020-01-12

## 2020-01-12 RX ORDER — POTASSIUM CHLORIDE 20 MEQ/15ML
20 SOLUTION ORAL 2 TIMES DAILY
Status: DISCONTINUED | OUTPATIENT
Start: 2020-01-12 | End: 2020-01-13 | Stop reason: HOSPADM

## 2020-01-12 RX ADMIN — ATORVASTATIN CALCIUM 10 MG: 10 TABLET, FILM COATED ORAL at 08:01

## 2020-01-12 RX ADMIN — AMLODIPINE BESYLATE 2.5 MG: 2.5 TABLET ORAL at 08:01

## 2020-01-12 RX ADMIN — SODIUM CHLORIDE: 0.9 INJECTION, SOLUTION INTRAVENOUS at 03:01

## 2020-01-12 RX ADMIN — AMLODIPINE BESYLATE 2.5 MG: 2.5 TABLET ORAL at 09:01

## 2020-01-12 RX ADMIN — BENAZEPRIL HYDROCHLORIDE 40 MG: 40 TABLET, COATED ORAL at 08:01

## 2020-01-12 RX ADMIN — ENOXAPARIN SODIUM 40 MG: 100 INJECTION SUBCUTANEOUS at 05:01

## 2020-01-12 RX ADMIN — SODIUM CHLORIDE: 0.9 INJECTION, SOLUTION INTRAVENOUS at 11:01

## 2020-01-12 RX ADMIN — DOCUSATE SODIUM 100 MG: 100 CAPSULE, LIQUID FILLED ORAL at 08:01

## 2020-01-12 RX ADMIN — POTASSIUM CHLORIDE 20 MEQ: 40 SOLUTION ORAL at 08:01

## 2020-01-12 RX ADMIN — HYDRALAZINE HYDROCHLORIDE 10 MG: 20 INJECTION INTRAMUSCULAR; INTRAVENOUS at 09:01

## 2020-01-12 RX ADMIN — ACETAMINOPHEN 650 MG: 325 TABLET ORAL at 03:01

## 2020-01-12 RX ADMIN — ASPIRIN 81 MG: 81 TABLET ORAL at 08:01

## 2020-01-12 RX ADMIN — POTASSIUM CHLORIDE 20 MEQ: 40 SOLUTION ORAL at 09:01

## 2020-01-12 NOTE — PROGRESS NOTES
[facility] Hospital Medicine  Progress Note    Patient Name: Natali Galeano  MRN: [id number]  Patient Class: IP- Inpatient   Admission Date: 1/10/2020  Length of Stay: 2 days  Attending Physician: Zachary Hinton MD  Primary Care Provider: Ewelina Herzog MD        Subjective:     Principal Problem:Partial intestinal obstruction        HPI:  80 y/o female with a PMHx HTN, colon cancer status post resection of mass in 2002, abdominal hernia repair in 2004, three prior episodes of small bowel obstruction presented to ED with c/o abdominal pain associated with N/V that started yesterday afternoon. Reports one episode of vomiting yesterday and one on her way to ED. Reports she ate dinner after the pain started and had a small bowel movement, but pain remained constant. Describes the pain as sharp across her lower abdomen. She states this feels exactly like her prior episodes of bowel obstruction.  She denies fever, chills, diarrhea, headache, chest pain, light headedness or leg swelling. ED evaluation afebrile, labs unremarkable, CT A/P showed Mildly dilated fluid-filled small bowel loops in the pelvis with a change in caliber at the level of the ventral abdominal mesh. Decompressed large bowel. Admitted.       Overview/Hospital Course:  Has Flatus the morning of 1/11/2020 and again 1/12/2020, but no BM.  Exam was soft and non tender.  Good bowel signs.  Will start clear liquid diet and advance as tolerated.    Interval History: Patient feeling well this morning.  No abdominal pain.  Still with Flatus.  No BM.  Has appetite.  No N/V.    Review of Systems   Constitutional: Negative for appetite change, chills and fever.   HENT: Negative for congestion and sore throat.    Eyes: Negative.    Respiratory: Negative for cough and shortness of breath.    Cardiovascular: Negative for chest pain and palpitations.   Gastrointestinal: Negative for abdominal distention, abdominal pain, constipation, diarrhea,  nausea and vomiting.   Endocrine: Negative.    Genitourinary: Negative for difficulty urinating and frequency.   Musculoskeletal: Negative for back pain and myalgias.   Skin: Negative.    Neurological: Negative for dizziness, weakness and headaches.   Psychiatric/Behavioral: Negative for confusion and decreased concentration.     Objective:     Vital Signs (Most Recent):  Temp: 98.4 °F (36.9 °C) (01/12/20 0508)  Pulse: 66 (01/12/20 0633)  Resp: 16 (01/11/20 1919)  BP: (!) 146/65 (01/12/20 0633)  SpO2: 99 % (01/12/20 0508) Vital Signs (24h Range):  Temp:  [97.9 °F (36.6 °C)-98.4 °F (36.9 °C)] 98.4 °F (36.9 °C)  Pulse:  [66-82] 66  Resp:  [16-18] 16  SpO2:  [96 %-99 %] 99 %  BP: (146-188)/(65-76) 146/65     Weight: 128.8 kg (283 lb 15.6 oz)  Body mass index is 44.48 kg/m².    Intake/Output Summary (Last 24 hours) at 1/12/2020 0855  Last data filed at 1/12/2020 0636  Gross per 24 hour   Intake 2299 ml   Output --   Net 2299 ml      Physical Exam   Constitutional: She is oriented to person, place, and time. She appears well-developed and well-nourished. No distress.   HENT:   Head: Normocephalic and atraumatic.   Mouth/Throat: Oropharynx is clear and moist.   Eyes: Pupils are equal, round, and reactive to light. Conjunctivae are normal. No scleral icterus.   Neck: Normal range of motion. Neck supple.   Cardiovascular: Normal rate, regular rhythm, normal heart sounds and intact distal pulses.   Pulmonary/Chest: Effort normal and breath sounds normal. No respiratory distress. She has no wheezes. She has no rales.   Abdominal: There is no tenderness. There is no rebound and no guarding.   Obese  Soft, non-distended.  + BS   Musculoskeletal: Normal range of motion. She exhibits no edema.   Neurological: She is alert and oriented to person, place, and time.   Skin: Skin is warm and dry. Capillary refill takes less than 2 seconds. She is not diaphoretic.   Psychiatric: She has a normal mood and affect.   Nursing note and  vitals reviewed.      Significant Labs: All pertinent labs within the past 24 hours have been reviewed.    Significant Imaging: I have reviewed all pertinent imaging results/findings within the past 24 hours.      Assessment/Plan:      * Partial intestinal obstruction  - recurrent SBO  - pain now improved with analgesics and antiemetics IV for nausea/ vomiting  - CT A/P Mildly dilated fluid-filled small bowel loops in the pelvis with a change in caliber at the level of the ventral abdominal mesh.  Decompressed large bowel.  Trace pelvic fluid.  Findings are concerning for a mild partial small bowel obstruction.  - IVF's  - consider gen surg consult if no improvement    -Has Flatus the morning of 1/11/2020 and again 1/12/2020, but no BM.  Exam was soft and non tender.  Good bowel signs.  Will start clear liquid diet and advance as tolerated.      Normocytic anemia  Hgb on admission was 13.5 and dropped to 10.9 on 1/12/2020.  Most likely due to IV fluids.  Hgb 14.4 in 2/2019.  Will check Fe studies, B12/Folate, and FOBT,    ALYSE (obstructive sleep apnea)  - CPAP with sleep      Hyperlipidemia  -Continue Atorvastatin      Hypokalemia  Potassium 3.3 and 3.2.  Magnesium normal.  Given KCl.  Follow.      Essential hypertension  - elevated on admission  - she is now off atenolol  - hold HCTZ for now and continue amlodipine 2.5 mg and lisinopril 40 mg daily  - PRN hydralazine  - has been a little high.  Will increase Amlodipine to 5mg daily.        VTE Risk Mitigation (From admission, onward)         Ordered     enoxaparin injection 40 mg  Daily      01/10/20 1951     Place sequential compression device  Until discontinued      01/10/20 1951     IP VTE HIGH RISK PATIENT  Once      01/10/20 1951                      Zachary Hinton MD  Department of Hospital Medicine   [facility]

## 2020-01-12 NOTE — ASSESSMENT & PLAN NOTE
Hgb on admission was 13.5 and dropped to 10.9 on 1/12/2020.  Most likely due to IV fluids.  Hgb 14.4 in 2/2019.  Will check Fe studies, B12/Folate, and FOBT,

## 2020-01-12 NOTE — ASSESSMENT & PLAN NOTE
- elevated on admission  - she is now off atenolol  - hold HCTZ for now and continue Amlodipine and Lisinopril  - PRN hydralazine  - has been a little high.  Will increase Amlodipine to 5mg daily.  D/c home on home Benazepril and HTCZ

## 2020-01-12 NOTE — DISCHARGE SUMMARY
[facility] Hospital Medicine  Discharge Summary      Patient Name: Natali Galeano  MRN: [id number]  Admission Date: 1/10/2020  Hospital Length of Stay: 3 days  Discharge Date and Time:  1/13/2020  Attending Physician: Zachary Hinton MD   Discharging Provider: Zachary Hinton MD  Primary Care Provider: Ewelina Herzog MD      HPI:   80 y/o female with a PMHx HTN, colon cancer status post resection of mass in 2002, abdominal hernia repair in 2004, three prior episodes of small bowel obstruction presented to ED with c/o abdominal pain associated with N/V that started yesterday afternoon. Reports one episode of vomiting yesterday and one on her way to ED. Reports she ate dinner after the pain started and had a small bowel movement, but pain remained constant. Describes the pain as sharp across her lower abdomen. She states this feels exactly like her prior episodes of bowel obstruction.  She denies fever, chills, diarrhea, headache, chest pain, light headedness or leg swelling. ED evaluation afebrile, labs unremarkable, CT A/P showed Mildly dilated fluid-filled small bowel loops in the pelvis with a change in caliber at the level of the ventral abdominal mesh. Decompressed large bowel. Admitted.     Hospital Course:   Has Flatus the morning of 1/11/2020 and again 1/12/2020, but no BM.  Exam was soft and non tender.  Good bowel signs.  Will start clear liquid diet and advance as tolerated.  Ready for discharge home     Consults:     Normocytic anemia  Hgb on admission was 13.5 and dropped to 10.9 on 1/12/2020.  Most likely due to IV fluids.  Hgb 14.4 in 2/2019.  Will check Fe studies, B12/Folate, and FOBT,    Hypokalemia  Potassium 3.3 and 3.2.  Magnesium normal.  Given KCl.  Follow.      Essential hypertension  - elevated on admission  - she is now off atenolol  - hold HCTZ for now and continue Amlodipine and Lisinopril  - PRN hydralazine  - has been a little high.  Will increase Amlodipine to  5mg daily.  D/c home on home Benazepril and HTCZ      Final Active Diagnoses:    Diagnosis Date Noted POA    Normocytic anemia [D64.9] 01/12/2020 No    ALYSE (obstructive sleep apnea) [G47.33] 02/23/2019 Yes    Hyperlipidemia [E78.5] 06/21/2017 Yes    Hypokalemia [E87.6] 01/24/2017 Yes    Essential hypertension [I10]  Yes      Problems Resolved During this Admission:    Diagnosis Date Noted Date Resolved POA    PRINCIPAL PROBLEM:  Partial intestinal obstruction [K56.600] 01/10/2020 01/12/2020 Yes       Discharged Condition: good    Disposition: Stable    Follow Up:  Follow-up Information     Ewelina Herzog MD In 1 week.    Specialty:  Internal Medicine  Contact information:  [address] [place], [place] [id number] [phone]                 Patient Instructions: Please have your PCP refer you back to your Surgeon to evaluate your recurrent partial small bowel obstruction.  Please complete your potassium pills and repeat your potassium level in 1 week.  You may need additional potassium if it is still low.    Significant Diagnostic Studies: Labs:   CMP   Recent Labs   Lab 01/12/20  0514 01/13/20  0426    142   K 3.2* 3.3*    111*   CO2 24 23   GLU 78 96   BUN 10 9   CREATININE 0.7 0.7   CALCIUM 8.9 9.2   ANIONGAP 9 8   ESTGFRAFRICA >60 >60   EGFRNONAA >60 >60       Pending Diagnostic Studies:     None         Medications:  Reconciled Home Medications:      Medication List      START taking these medications    potassium chloride SA 20 MEQ tablet  Commonly known as:  K-DUR,KLOR-CON  Take 1 tablet (20 mEq total) by mouth once daily. for 3 days        CHANGE how you take these medications    amLODIPine 5 MG tablet  Commonly known as:  NORVASC  Take 1 tablet (5 mg total) by mouth once daily.  What changed:    · medication strength  · how much to take        CONTINUE taking these medications    allopurinol 300 MG tablet  Commonly known as:  ZYLOPRIM  Take 300 mg by mouth once daily.     aspirin  81 MG EC tablet  Commonly known as:  ECOTRIN  Take 81 mg by mouth once daily.     atorvastatin 10 MG tablet  Commonly known as:  LIPITOR  Take 10 mg by mouth every evening.     benazepril 40 MG tablet  Commonly known as:  LOTENSIN  Take 40 mg by mouth once daily.     docusate sodium 100 MG capsule  Commonly known as:  COLACE  Take 100 mg by mouth once daily.     fluticasone propionate 50 mcg/actuation nasal spray  Commonly known as:  FLONASE  1 spray by Each Nare route once daily.     hydroCHLOROthiazide 25 MG tablet  Commonly known as:  HYDRODIURIL  Take 12.5 mg by mouth. Take 1/2 of the 25 mg tablet daily     niacin 500 mg Tbsr  Take 500 mg by mouth once daily.     senna-docusate 8.6-50 mg 8.6-50 mg per tablet  Commonly known as:  PERICOLACE  Take 1 tablet by mouth once daily.     Thera-D 2,000 unit Tab  Generic drug:  cholecalciferol (vitamin D3)  Take 1 tablet by mouth once daily.          Time spent on the discharge of patient: 35 minutes  Patient was seen and examined on the date of discharge and determined to be suitable for discharge.         Zachary Hinton MD  Department of Hospital Medicine  [facility]

## 2020-01-12 NOTE — ASSESSMENT & PLAN NOTE
- recurrent SBO  - pain now improved with analgesics and antiemetics IV for nausea/ vomiting  - CT A/P Mildly dilated fluid-filled small bowel loops in the pelvis with a change in caliber at the level of the ventral abdominal mesh.  Decompressed large bowel.  Trace pelvic fluid.  Findings are concerning for a mild partial small bowel obstruction.  - IVF's  - consider gen surg consult if no improvement    -Has Flatus the morning of 1/11/2020 and again 1/12/2020, but no BM.  Exam was soft and non tender.  Good bowel signs.  Will start clear liquid diet and advance as tolerated.

## 2020-01-12 NOTE — SUBJECTIVE & OBJECTIVE
Interval History: Patient feeling well this morning.  No abdominal pain.  Still with Flatus.  No BM.  Has appetite.  No N/V.    Review of Systems   Constitutional: Negative for appetite change, chills and fever.   HENT: Negative for congestion and sore throat.    Eyes: Negative.    Respiratory: Negative for cough and shortness of breath.    Cardiovascular: Negative for chest pain and palpitations.   Gastrointestinal: Negative for abdominal distention, abdominal pain, constipation, diarrhea, nausea and vomiting.   Endocrine: Negative.    Genitourinary: Negative for difficulty urinating and frequency.   Musculoskeletal: Negative for back pain and myalgias.   Skin: Negative.    Neurological: Negative for dizziness, weakness and headaches.   Psychiatric/Behavioral: Negative for confusion and decreased concentration.     Objective:     Vital Signs (Most Recent):  Temp: 98.4 °F (36.9 °C) (01/12/20 0508)  Pulse: 66 (01/12/20 0633)  Resp: 16 (01/11/20 1919)  BP: (!) 146/65 (01/12/20 0633)  SpO2: 99 % (01/12/20 0508) Vital Signs (24h Range):  Temp:  [97.9 °F (36.6 °C)-98.4 °F (36.9 °C)] 98.4 °F (36.9 °C)  Pulse:  [66-82] 66  Resp:  [16-18] 16  SpO2:  [96 %-99 %] 99 %  BP: (146-188)/(65-76) 146/65     Weight: 128.8 kg (283 lb 15.6 oz)  Body mass index is 44.48 kg/m².    Intake/Output Summary (Last 24 hours) at 1/12/2020 0855  Last data filed at 1/12/2020 0636  Gross per 24 hour   Intake 2299 ml   Output --   Net 2299 ml      Physical Exam   Constitutional: She is oriented to person, place, and time. She appears well-developed and well-nourished. No distress.   HENT:   Head: Normocephalic and atraumatic.   Mouth/Throat: Oropharynx is clear and moist.   Eyes: Pupils are equal, round, and reactive to light. Conjunctivae are normal. No scleral icterus.   Neck: Normal range of motion. Neck supple.   Cardiovascular: Normal rate, regular rhythm, normal heart sounds and intact distal pulses.   Pulmonary/Chest: Effort normal and  breath sounds normal. No respiratory distress. She has no wheezes. She has no rales.   Abdominal: There is no tenderness. There is no rebound and no guarding.   Obese  Soft, non-distended.  + BS   Musculoskeletal: Normal range of motion. She exhibits no edema.   Neurological: She is alert and oriented to person, place, and time.   Skin: Skin is warm and dry. Capillary refill takes less than 2 seconds. She is not diaphoretic.   Psychiatric: She has a normal mood and affect.   Nursing note and vitals reviewed.      Significant Labs: All pertinent labs within the past 24 hours have been reviewed.    Significant Imaging: I have reviewed all pertinent imaging results/findings within the past 24 hours.

## 2020-01-12 NOTE — ASSESSMENT & PLAN NOTE
- elevated on admission  - she is now off atenolol  - hold HCTZ for now and continue amlodipine 2.5 mg and lisinopril 40 mg daily  - PRN hydralazine  - has been a little high.  Will increase Amlodipine to 5mg daily.

## 2020-01-12 NOTE — PLAN OF CARE
Problem: Adult Inpatient Plan of Care  Goal: Plan of Care Review  Outcome: Ongoing, Progressing  Flowsheets (Taken 1/12/2020 0627)  Plan of Care Reviewed With: patient   Denies N/V abdominal pain. PRN Tylenol given once for headache with full relief. Vitals stable. Will continue to monitor.       Problem: Fall Injury Risk  Goal: Absence of Fall and Fall-Related Injury  Outcome: Ongoing, Progressing  Intervention: Promote Injury-Free Environment  Flowsheets (Taken 1/12/2020 0627)  Environmental Safety Modification: assistive device/personal items within reach; room organization consistent; lighting adjusted

## 2020-01-13 VITALS
SYSTOLIC BLOOD PRESSURE: 180 MMHG | OXYGEN SATURATION: 97 % | RESPIRATION RATE: 18 BRPM | HEIGHT: 67 IN | TEMPERATURE: 99 F | WEIGHT: 284 LBS | DIASTOLIC BLOOD PRESSURE: 76 MMHG | BODY MASS INDEX: 44.57 KG/M2 | HEART RATE: 71 BPM

## 2020-01-13 LAB
ANION GAP SERPL CALC-SCNC: 8 MMOL/L (ref 8–16)
BASOPHILS # BLD AUTO: 0.03 K/UL (ref 0–0.2)
BASOPHILS NFR BLD: 0.4 % (ref 0–1.9)
BUN SERPL-MCNC: 9 MG/DL (ref 8–23)
CALCIUM SERPL-MCNC: 9.2 MG/DL (ref 8.7–10.5)
CHLORIDE SERPL-SCNC: 111 MMOL/L (ref 95–110)
CO2 SERPL-SCNC: 23 MMOL/L (ref 23–29)
CREAT SERPL-MCNC: 0.7 MG/DL (ref 0.5–1.4)
DIFFERENTIAL METHOD: ABNORMAL
EOSINOPHIL # BLD AUTO: 0.1 K/UL (ref 0–0.5)
EOSINOPHIL NFR BLD: 1.6 % (ref 0–8)
ERYTHROCYTE [DISTWIDTH] IN BLOOD BY AUTOMATED COUNT: 14.9 % (ref 11.5–14.5)
EST. GFR  (AFRICAN AMERICAN): >60 ML/MIN/1.73 M^2
EST. GFR  (NON AFRICAN AMERICAN): >60 ML/MIN/1.73 M^2
GLUCOSE SERPL-MCNC: 96 MG/DL (ref 70–110)
HCT VFR BLD AUTO: 36.3 % (ref 37–48.5)
HGB BLD-MCNC: 11.1 G/DL (ref 12–16)
IMM GRANULOCYTES # BLD AUTO: 0.02 K/UL (ref 0–0.04)
IMM GRANULOCYTES NFR BLD AUTO: 0.3 % (ref 0–0.5)
LYMPHOCYTES # BLD AUTO: 2.3 K/UL (ref 1–4.8)
LYMPHOCYTES NFR BLD: 33.4 % (ref 18–48)
MCH RBC QN AUTO: 25.7 PG (ref 27–31)
MCHC RBC AUTO-ENTMCNC: 30.6 G/DL (ref 32–36)
MCV RBC AUTO: 84 FL (ref 82–98)
MONOCYTES # BLD AUTO: 0.6 K/UL (ref 0.3–1)
MONOCYTES NFR BLD: 9 % (ref 4–15)
NEUTROPHILS # BLD AUTO: 3.8 K/UL (ref 1.8–7.7)
NEUTROPHILS NFR BLD: 55.3 % (ref 38–73)
NRBC BLD-RTO: 0 /100 WBC
PLATELET # BLD AUTO: 197 K/UL (ref 150–350)
PMV BLD AUTO: 10.8 FL (ref 9.2–12.9)
POTASSIUM SERPL-SCNC: 3.3 MMOL/L (ref 3.5–5.1)
RBC # BLD AUTO: 4.32 M/UL (ref 4–5.4)
SODIUM SERPL-SCNC: 142 MMOL/L (ref 136–145)
WBC # BLD AUTO: 6.86 K/UL (ref 3.9–12.7)

## 2020-01-13 PROCEDURE — 99239 HOSP IP/OBS DSCHRG MGMT >30: CPT | Mod: ,,, | Performed by: INTERNAL MEDICINE

## 2020-01-13 PROCEDURE — 36415 COLL VENOUS BLD VENIPUNCTURE: CPT

## 2020-01-13 PROCEDURE — 25000003 PHARM REV CODE 250: Performed by: PHYSICIAN ASSISTANT

## 2020-01-13 PROCEDURE — 94761 N-INVAS EAR/PLS OXIMETRY MLT: CPT

## 2020-01-13 PROCEDURE — 80048 BASIC METABOLIC PNL TOTAL CA: CPT

## 2020-01-13 PROCEDURE — 25000003 PHARM REV CODE 250: Performed by: INTERNAL MEDICINE

## 2020-01-13 PROCEDURE — 85025 COMPLETE CBC W/AUTO DIFF WBC: CPT

## 2020-01-13 PROCEDURE — 99239 PR HOSPITAL DISCHARGE DAY,>30 MIN: ICD-10-PCS | Mod: ,,, | Performed by: INTERNAL MEDICINE

## 2020-01-13 RX ORDER — POLYETHYLENE GLYCOL 3350 17 G/17G
17 POWDER, FOR SOLUTION ORAL DAILY
Status: DISCONTINUED | OUTPATIENT
Start: 2020-01-13 | End: 2020-01-13 | Stop reason: HOSPADM

## 2020-01-13 RX ORDER — POTASSIUM CHLORIDE 20 MEQ/1
20 TABLET, EXTENDED RELEASE ORAL DAILY
Qty: 3 TABLET | Refills: 0 | Status: SHIPPED | OUTPATIENT
Start: 2020-01-13 | End: 2020-01-16

## 2020-01-13 RX ORDER — AMLODIPINE BESYLATE 5 MG/1
5 TABLET ORAL DAILY
Qty: 30 TABLET | Refills: 11 | Status: SHIPPED | OUTPATIENT
Start: 2020-01-13 | End: 2022-02-18

## 2020-01-13 RX ORDER — POTASSIUM CHLORIDE 20 MEQ/1
TABLET, EXTENDED RELEASE ORAL
Qty: 90 TABLET | OUTPATIENT
Start: 2020-01-13

## 2020-01-13 RX ADMIN — DOCUSATE SODIUM 100 MG: 100 CAPSULE, LIQUID FILLED ORAL at 08:01

## 2020-01-13 RX ADMIN — POLYETHYLENE GLYCOL 3350 17 G: 17 POWDER, FOR SOLUTION ORAL at 09:01

## 2020-01-13 RX ADMIN — ACETAMINOPHEN 650 MG: 325 TABLET ORAL at 05:01

## 2020-01-13 RX ADMIN — AMLODIPINE BESYLATE 5 MG: 5 TABLET ORAL at 08:01

## 2020-01-13 RX ADMIN — ASPIRIN 81 MG: 81 TABLET ORAL at 08:01

## 2020-01-13 RX ADMIN — POTASSIUM CHLORIDE 20 MEQ: 40 SOLUTION ORAL at 08:01

## 2020-01-13 RX ADMIN — ATORVASTATIN CALCIUM 10 MG: 10 TABLET, FILM COATED ORAL at 08:01

## 2020-01-13 RX ADMIN — BENAZEPRIL HYDROCHLORIDE 40 MG: 40 TABLET, COATED ORAL at 08:01

## 2020-01-13 NOTE — DISCHARGE INSTRUCTIONS
PLEASE REVIEW DIET INFORMATIONS AND SAMPLES OF LOW FIBER DIET PROVIDED.         Thank you for choosing [facility] as your Health Care Provider. [facility] strives to provide the best healthcare available to you. In the next few days you may receive a Survey, either by mail or email,  asking you to rate our care that was provided to you during your stay.  Please return the survey to us, as your feedback is important. We aim to meet your expectations of safe, quality health care.    From your [facility] Care Team.    Please have your PCP refer you back to your Surgeon to evaluate your recurrent partial small bowel obstruction.  Please complete your potassium pills and repeat your potassium level in 1 week.  You may need additional potassium if it is still low.

## 2020-01-13 NOTE — PLAN OF CARE
POC reviewed with pt who verbalized understanding. AAOx4. IVF infusing throughout shift. Elevated BP treated with PRN meds per MAR, VSS otherwise. Remains free of falls and injury. No complaints of pain or nausea. Tele monitored throughout shift running SR. Up independently to bathroom; no BM this shift. No acute events. No distress noted. Resting between care. Will continue to monitor.

## 2020-01-13 NOTE — NURSING
Pt given dc instructions - verbs understanding of material.  IV removed.  Belongings taken by patient.  Transported via wc off unit.

## 2020-01-13 NOTE — PLAN OF CARE
Family to transport pt home.    No DC needs from CM perspective.       01/13/20 1038   Final Note   Assessment Type Final Discharge Note   Anticipated Discharge Disposition Home   What phone number can be called within the next 1-3 days to see how you are doing after discharge? [phone]   Hospital Follow Up  Appt(s) scheduled? Yes   Discharge plans and expectations educations in teach back method with documentation complete? Yes   Right Care Referral Info   Post Acute Recommendation No Care

## 2020-01-13 NOTE — NURSING
DISCUSSED ALL DISCHARGE INFORMATION, MEDICATION, INSTRUCTIONS WITH PATIENT AND DAUGHTER ALEXA. VERBALIZED UNDERSTANDING, IS GOING HOME WITH DAUGHTER. PROVIDED INFORMATION ON LOW FIBER DIET FROM THE [facility], PT. STATES SHE IS READY FOR D/C. IV AND TELEMETRY HAD ALREADY BEEN REMOVED. HAS 24 HR [facility] ON CALL NUMBER AND IS CURRENT REGULAR [employer] USER. VERBALIZED GREAT STATE AND COMMENTED HER NURSE VIOLETA WAS VERY ATTENTIVE, CHECKED OFTEN TAUGHT HER AND SHE FELT VERY SAFE.

## 2020-01-15 ENCOUNTER — PATIENT OUTREACH (OUTPATIENT)
Dept: ADMINISTRATIVE | Facility: CLINIC | Age: 80
End: 2020-01-15

## 2020-01-15 NOTE — PATIENT INSTRUCTIONS
Understanding Intestinal Obstruction    The small and large intestines are long tubes in the belly that play a big part in digesting food. An intestinal obstruction is when the small or large intestine is blocked at one or more spots. This prevents air, food, and fluid from passing normally through the digestive system.  What causes intestinal obstruction?  Intestinal obstruction can be caused by:  · Scar tissue inside the belly. Scar tissue can form after surgery. It can press on the intestine.  · A hole or split in the belly wall (hernia). If you have a hernia, part of your intestine may slip through the belly wall and get pinched. Then food and fluid cant flow through as usual.  · Tumors. These can grow inside or outside the intestines. Tumors can block the intestines from the inside. Or they can press on them from the outside.  · Twisting of the intestine (volvulus). The intestine can sometimes twist around on itself. This can cause a blockage.  Symptoms of intestinal obstruction  Symptoms of intestinal obstruction can vary. It depends on where the blockage is and if the intestine is partly or fully blocked. Symptoms may include:  · Belly pain or cramping. This may be constant or come and go.  · A feeling of fullness or bloating  · Upset stomach (nausea) or vomiting  · Diarrhea  · Constipation  · Loss of fluid (dehydration)  · Inability to pass gas or stool  · Fever or sweating  · Discomfort and bloating after meals  Treatment for intestinal obstruction  Treatment for intestinal obstruction will vary. It depends on where the blockage is and if the intestine is partly or fully blocked. In most cases of small intestinal obstruction, the blockage will resolve itself after a few days. Treatments may include:  · Removing the contents of the digestive tract above the blockage. This is done using a tube inserted through your nose.  · Giving fluids through an IV (intravenous) line. This replaces fluids lost with  vomiting or diarrhea.  · Surgery. For a serious intestinal obstruction, you often will need surgery to remove the blockage and fix any damage.  Possible complications of intestinal obstruction  Intestinal obstructions can have serious complications. These include:  · Blood flow to the intestine may be blocked. This can cause intestinal tissue to die.  · Leakage of intestine contents into the belly   · Irritation or infection in the belly cavity  · Death  When to call your healthcare provider  Call your healthcare provider right away if you have any of these:  · A new lump or bulge in the skin on your belly or groin  · Vomiting that wont stop  · Inability to pass gas or stool  · Swollen belly or increasing belly pain  · Weakness, dizziness, or fainting  · Abnormal drowsiness or confusion  · Reduced urine output or extreme thirst  · Fever of 100.4°F (38°C) or higher, or as directed  · Pain that gets worse  · Symptoms that dont get better with treatment, or symptoms that get worse  · New symptoms   Date Last Reviewed: 5/1/2016  © 6419-6180 [employer]. [address] [place] [address], [place] [address]. All rights reserved. This information is not intended as a substitute for professional medical care. Always follow your healthcare professional's instructions.

## 2020-01-15 NOTE — PROGRESS NOTES
Please forward this important TCC information to your provider in order to maximize the post discharge care delivery of this patient.    C3 nurse spoke with Natali LARSEN Froylan  for a TCC post hospital discharge follow up call. The patient does not have a scheduled HOSFU appointment with Ewelina Herzog MD  within 7-14 days post hospital discharge date 01/13/2020. C3 nurse was unable to schedule HOSFU appointment in [facility].  Please contact pcp  and schedule follow up appointment using HOSFU visit type on or before 01/27/2020.    Respectfully,  Jadyn Holly LPN    [facility]    [email or web address]       Please do not reply to this message, as this inbox is not routinely monitored.

## 2020-01-16 NOTE — PHYSICIAN QUERY
PT Name: Natali Galeano  MR #: [id number]     Physician Query Form - Etiology of Condition Clarification      CDS/: Miriam Bull RN, CDS               Contact information: [email or web address]                                                                                                                        [phone]  This form is a permanent document in the medical record.     Query Date: January 16, 2020    By submitting this query, we are merely seeking further clarification of documentation.  Please utilize your independent clinical judgment when addressing the question(s) below.     The medical record contains the following:    Findings Supporting Clinical Information Location in Medical Record   Partial intestinal obstruction 80 y/o female with a PMHx HTN, colon cancer status post resection of mass in 2002, abdominal hernia repair in 2004, three prior episodes of small bowel obstruction presented to ED with c/o abdominal pain associated with N/V that started yesterday afternoon. Reports one episode of vomiting yesterday and one on her way to ED    CT A/P showed Mildly dilated fluid-filled small bowel loops in the pelvis with a change in caliber at the level of the ventral abdominal mesh.  Decompressed large bowel    Assessment/Plan:  Partial intestinal obstruction  recurrent SBO    keep npo and perform serial abdominal exams   H&P 1/10                H&P 1/10          H&P 1/10        H&P 1/10     Please document your best medical opinion regarding the etiology of __Partial intestinal obstruction__ for which treatment is/was directed.     Provider use only     [   ] Partial intestinal obstruction due to adhesions      [   ] Partial intestinal obstruction due to hernia mesh, please further specify             [   ] Obstruction due to mesh is a complication             [   ] Obstruction due to mesh is not a complication             [   ] Other, please specify _________            [   ] Clinically  undetermined complication status      [   ] Other etiology, please specify ___________       [  ] Clinically Undetermined     Please document in your progress notes daily for the duration of treatment, until resolved, and include in your discharge summary.

## 2020-01-20 NOTE — PHYSICIAN QUERY
PT Name: Natali Galeano  MR #: [id number]     Physician Query Form - Etiology of Condition Clarification      CDS/: Miriam Bull RN, CDS               Contact information: [email or web address]                                                                                                                        [phone]  This form is a permanent document in the medical record.     Query Date: January 20, 2020    By submitting this query, we are merely seeking further clarification of documentation.  Please utilize your independent clinical judgment when addressing the question(s) below.     The medical record contains the following:    Findings Supporting Clinical Information Location in Medical Record   Partial intestinal obstruction 80 y/o female with a PMHx HTN, colon cancer status post resection of mass in 2002, abdominal hernia repair in 2004, three prior episodes of small bowel obstruction presented to ED with c/o abdominal pain associated with N/V that started yesterday afternoon. Reports one episode of vomiting yesterday and one on her way to ED    CT A/P showed Mildly dilated fluid-filled small bowel loops in the pelvis with a change in caliber at the level of the ventral abdominal mesh.  Decompressed large bowel    Assessment/Plan:  Partial intestinal obstruction  recurrent SBO    keep npo and perform serial abdominal exams   H&P 1/10                H&P 1/10          H&P 1/10        H&P 1/10     Please document your best medical opinion regarding the etiology of __Partial intestinal obstruction__ for which treatment is/was directed.     Provider use only     [   ] Partial intestinal obstruction due to adhesions      [   ] Partial intestinal obstruction due to hernia mesh, please further specify             [   ] Obstruction due to mesh is a complication             [   ] Obstruction due to mesh is not a complication             [   ] Other, please specify _________            [   x] Clinically  undetermined complication status      [   ] Other etiology, please specify ___________       [   ]  Clinically Undetermined     Please document in your progress notes daily for the duration of treatment, until resolved, and include in your discharge summary.

## 2020-01-28 ENCOUNTER — OFFICE VISIT (OUTPATIENT)
Dept: OBSTETRICS AND GYNECOLOGY | Facility: CLINIC | Age: 80
End: 2020-01-28
Payer: MEDICARE

## 2020-01-28 VITALS
BODY MASS INDEX: 43.97 KG/M2 | WEIGHT: 280.13 LBS | SYSTOLIC BLOOD PRESSURE: 150 MMHG | HEIGHT: 67 IN | DIASTOLIC BLOOD PRESSURE: 70 MMHG

## 2020-01-28 DIAGNOSIS — Z01.419 ENCOUNTER FOR ANNUAL ROUTINE GYNECOLOGICAL EXAMINATION: Primary | ICD-10-CM

## 2020-01-28 PROCEDURE — 3077F SYST BP >= 140 MM HG: CPT | Mod: CPTII,S$GLB,, | Performed by: OBSTETRICS & GYNECOLOGY

## 2020-01-28 PROCEDURE — 99999 PR PBB SHADOW E&M-EST. PATIENT-LVL III: CPT | Mod: PBBFAC,,, | Performed by: OBSTETRICS & GYNECOLOGY

## 2020-01-28 PROCEDURE — 3078F DIAST BP <80 MM HG: CPT | Mod: CPTII,S$GLB,, | Performed by: OBSTETRICS & GYNECOLOGY

## 2020-01-28 PROCEDURE — 3077F PR MOST RECENT SYSTOLIC BLOOD PRESSURE >= 140 MM HG: ICD-10-PCS | Mod: CPTII,S$GLB,, | Performed by: OBSTETRICS & GYNECOLOGY

## 2020-01-28 PROCEDURE — 3078F PR MOST RECENT DIASTOLIC BLOOD PRESSURE < 80 MM HG: ICD-10-PCS | Mod: CPTII,S$GLB,, | Performed by: OBSTETRICS & GYNECOLOGY

## 2020-01-28 PROCEDURE — 99999 PR PBB SHADOW E&M-EST. PATIENT-LVL III: ICD-10-PCS | Mod: PBBFAC,,, | Performed by: OBSTETRICS & GYNECOLOGY

## 2020-01-28 PROCEDURE — G0101 PR CA SCREEN;PELVIC/BREAST EXAM: ICD-10-PCS | Mod: S$GLB,,, | Performed by: OBSTETRICS & GYNECOLOGY

## 2020-01-28 PROCEDURE — G0101 CA SCREEN;PELVIC/BREAST EXAM: HCPCS | Mod: S$GLB,,, | Performed by: OBSTETRICS & GYNECOLOGY

## 2020-01-28 NOTE — PROGRESS NOTES
"Chief Complaint: Well Woman Exam     HPI:      Natali Galeano is a 79 y.o.  who presents today for well woman exam.  LMP: No LMP recorded. Patient is postmenopausal.  No issues, problems, or complaints. Specifically, patient denies abnormal vaginal bleeding, discharge, pelvic pain, urinary problems, or changes in appetite. Ms. Galeano is not currently sexually active.     Previous Pap:  NILM, HPV neg (2018)  Previous Mammogram: Normal per patient in 2019  (@ DIS) - ordered by PCP  Most Recent Dexa: WNL (2018)  Colonoscopy: 2019 - advised to return in 3 years.    OB History    None         ROS:     GENERAL: Denies unintentional weight gain or weight loss. Feeling well overall.   SKIN: Denies rash or lesions.   HEENT: Denies headaches, or vision changes.   CARDIOVASCULAR: Denies palpitations or chest pain.   RESPIRATORY: Denies shortness of breath or dyspnea on exertion.  BREASTS: Denies pain, lumps, or nipple discharge.   ABDOMEN: Denies abdominal pain, constipation, diarrhea, nausea, vomiting, change in appetite.  URINARY: Denies frequency, dysuria, hematuria.  NEUROLOGIC: Denies syncope or weakness.   PSYCHIATRIC: Denies depression, anxiety or mood swings.    Physical Exam:      PHYSICAL EXAM:  BP (!) 150/70   Ht 5' 7" (1.702 m)   Wt 127 kg (280 lb 1.5 oz)   BMI 43.87 kg/m²   Body mass index is 43.87 kg/m².     APPEARANCE: Well nourished, well developed, in no acute distress.  PSYCH: Appropriate mood and affect.  SKIN: No acne or hirsutism  NECK: Neck symmetric without masses or thyromegaly  NODES: No inguinal, axillary, or supraclavicular lymph node enlargement  ABDOMEN: Soft.  No tenderness or masses.    CARDIOVASCULAR: No edema of peripheral extremities  BREASTS: Symmetrical, no skin changes or visible lesions.  No palpable masses or nipple discharge bilaterally.  PELVIC: Normal external genitalia without lesions.  Normal hair distribution.  Adequate perineal body, normal urethral meatus.  Vagina " moist and well rugated without lesions or discharge.  Cervix pink, without lesions, discharge or tenderness.  Grade 1 cystocele and rectocele.  Bimanual exam limited by body habitus.     Assessment/Plan:     Encounter for annual routine gynecological examination        Counseling:     Patient was counseled today on current ASCCP pap guidelines, the recommendation for yearly pelvic exams, healthy diet and exercise routines, breast self awareness and annual mammograms.She is to see her PCP for other health maintenance.     Use of the [facility] Patient Portal discussed and encouraged during today's visit.

## 2020-10-27 ENCOUNTER — HOSPITAL ENCOUNTER (INPATIENT)
Facility: OTHER | Age: 80
LOS: 9 days | Discharge: HOME | DRG: 389 | End: 2020-11-05
Attending: EMERGENCY MEDICINE | Admitting: EMERGENCY MEDICINE
Payer: MEDICARE

## 2020-10-27 DIAGNOSIS — K56.600 PARTIAL INTESTINAL OBSTRUCTION, UNSPECIFIED CAUSE: Primary | ICD-10-CM

## 2020-10-27 DIAGNOSIS — I10 ESSENTIAL HYPERTENSION: Chronic | ICD-10-CM

## 2020-10-27 DIAGNOSIS — R10.9 ABDOMINAL PAIN: ICD-10-CM

## 2020-10-27 PROBLEM — D64.9 NORMOCYTIC ANEMIA: Chronic | Status: ACTIVE | Noted: 2020-01-12

## 2020-10-27 PROBLEM — M10.9 GOUT: Chronic | Status: ACTIVE | Noted: 2020-10-27

## 2020-10-27 PROBLEM — E87.6 HYPOKALEMIA: Status: RESOLVED | Noted: 2017-01-24 | Resolved: 2020-10-27

## 2020-10-27 PROBLEM — N28.1 RENAL CYST: Status: RESOLVED | Noted: 2017-01-24 | Resolved: 2020-10-27

## 2020-10-27 PROBLEM — G47.33 OSA (OBSTRUCTIVE SLEEP APNEA): Chronic | Status: ACTIVE | Noted: 2019-02-23

## 2020-10-27 PROBLEM — E78.5 HYPERLIPIDEMIA: Chronic | Status: ACTIVE | Noted: 2017-06-21

## 2020-10-27 LAB
ALBUMIN SERPL BCP-MCNC: 3.9 G/DL (ref 3.5–5.2)
ALP SERPL-CCNC: 99 U/L (ref 55–135)
ALT SERPL W/O P-5'-P-CCNC: 12 U/L (ref 10–44)
ANION GAP SERPL CALC-SCNC: 14 MMOL/L (ref 8–16)
AST SERPL-CCNC: 21 U/L (ref 10–40)
BACTERIA #/AREA URNS HPF: ABNORMAL /HPF
BASOPHILS # BLD AUTO: 0.01 K/UL (ref 0–0.2)
BASOPHILS NFR BLD: 0.1 % (ref 0–1.9)
BILIRUB SERPL-MCNC: 0.4 MG/DL (ref 0.1–1)
BILIRUB UR QL STRIP: NEGATIVE
BUN SERPL-MCNC: 12 MG/DL (ref 8–23)
CALCIUM SERPL-MCNC: 10.3 MG/DL (ref 8.7–10.5)
CHLORIDE SERPL-SCNC: 99 MMOL/L (ref 95–110)
CLARITY UR: CLEAR
CO2 SERPL-SCNC: 26 MMOL/L (ref 23–29)
COLOR UR: YELLOW
CREAT SERPL-MCNC: 0.8 MG/DL (ref 0.5–1.4)
CTP QC/QA: YES
DIFFERENTIAL METHOD: ABNORMAL
EOSINOPHIL # BLD AUTO: 0 K/UL (ref 0–0.5)
EOSINOPHIL NFR BLD: 0 % (ref 0–8)
ERYTHROCYTE [DISTWIDTH] IN BLOOD BY AUTOMATED COUNT: 15.3 % (ref 11.5–14.5)
EST. GFR  (AFRICAN AMERICAN): >60 ML/MIN/1.73 M^2
EST. GFR  (NON AFRICAN AMERICAN): >60 ML/MIN/1.73 M^2
GLUCOSE SERPL-MCNC: 142 MG/DL (ref 70–110)
GLUCOSE UR QL STRIP: NEGATIVE
HCT VFR BLD AUTO: 46.6 % (ref 37–48.5)
HGB BLD-MCNC: 14.7 G/DL (ref 12–16)
HGB UR QL STRIP: ABNORMAL
HYALINE CASTS #/AREA URNS LPF: 4 /LPF
IMM GRANULOCYTES # BLD AUTO: 0.06 K/UL (ref 0–0.04)
IMM GRANULOCYTES NFR BLD AUTO: 0.5 % (ref 0–0.5)
KETONES UR QL STRIP: NEGATIVE
LEUKOCYTE ESTERASE UR QL STRIP: NEGATIVE
LIPASE SERPL-CCNC: 31 U/L (ref 4–60)
LYMPHOCYTES # BLD AUTO: 1.1 K/UL (ref 1–4.8)
LYMPHOCYTES NFR BLD: 9.7 % (ref 18–48)
MCH RBC QN AUTO: 26.3 PG (ref 27–31)
MCHC RBC AUTO-ENTMCNC: 31.5 G/DL (ref 32–36)
MCV RBC AUTO: 83 FL (ref 82–98)
MICROSCOPIC COMMENT: ABNORMAL
MONOCYTES # BLD AUTO: 0.4 K/UL (ref 0.3–1)
MONOCYTES NFR BLD: 3.9 % (ref 4–15)
NEUTROPHILS # BLD AUTO: 9.8 K/UL (ref 1.8–7.7)
NEUTROPHILS NFR BLD: 85.8 % (ref 38–73)
NITRITE UR QL STRIP: NEGATIVE
NRBC BLD-RTO: 0 /100 WBC
PH UR STRIP: 6 [PH] (ref 5–8)
PLATELET # BLD AUTO: 227 K/UL (ref 150–350)
PMV BLD AUTO: 10.9 FL (ref 9.2–12.9)
POTASSIUM SERPL-SCNC: 4.1 MMOL/L (ref 3.5–5.1)
PROT SERPL-MCNC: 8.1 G/DL (ref 6–8.4)
PROT UR QL STRIP: ABNORMAL
RBC # BLD AUTO: 5.6 M/UL (ref 4–5.4)
RBC #/AREA URNS HPF: 1 /HPF (ref 0–4)
SARS-COV-2 RDRP RESP QL NAA+PROBE: NEGATIVE
SODIUM SERPL-SCNC: 139 MMOL/L (ref 136–145)
SP GR UR STRIP: 1.02 (ref 1–1.03)
SQUAMOUS #/AREA URNS HPF: 2 /HPF
URN SPEC COLLECT METH UR: ABNORMAL
UROBILINOGEN UR STRIP-ACNC: NEGATIVE EU/DL
WBC # BLD AUTO: 11.39 K/UL (ref 3.9–12.7)
WBC #/AREA URNS HPF: 1 /HPF (ref 0–5)

## 2020-10-27 PROCEDURE — 85025 COMPLETE CBC W/AUTO DIFF WBC: CPT

## 2020-10-27 PROCEDURE — 25500020 PHARM REV CODE 255: Performed by: EMERGENCY MEDICINE

## 2020-10-27 PROCEDURE — 99285 EMERGENCY DEPT VISIT HI MDM: CPT | Mod: 25

## 2020-10-27 PROCEDURE — 25000003 PHARM REV CODE 250: Performed by: EMERGENCY MEDICINE

## 2020-10-27 PROCEDURE — 11000001 HC ACUTE MED/SURG PRIVATE ROOM

## 2020-10-27 PROCEDURE — 93005 ELECTROCARDIOGRAM TRACING: CPT

## 2020-10-27 PROCEDURE — 63600175 PHARM REV CODE 636 W HCPCS: Performed by: EMERGENCY MEDICINE

## 2020-10-27 PROCEDURE — 80053 COMPREHEN METABOLIC PANEL: CPT

## 2020-10-27 PROCEDURE — 96361 HYDRATE IV INFUSION ADD-ON: CPT | Mod: 59

## 2020-10-27 PROCEDURE — U0002 COVID-19 LAB TEST NON-CDC: HCPCS | Performed by: EMERGENCY MEDICINE

## 2020-10-27 PROCEDURE — 93010 ELECTROCARDIOGRAM REPORT: CPT | Mod: ,,, | Performed by: INTERNAL MEDICINE

## 2020-10-27 PROCEDURE — 63600175 PHARM REV CODE 636 W HCPCS: Performed by: INTERNAL MEDICINE

## 2020-10-27 PROCEDURE — 96374 THER/PROPH/DIAG INJ IV PUSH: CPT

## 2020-10-27 PROCEDURE — 27000190 HC CPAP FULL FACE MASK W/VALVE

## 2020-10-27 PROCEDURE — 81000 URINALYSIS NONAUTO W/SCOPE: CPT

## 2020-10-27 PROCEDURE — 83690 ASSAY OF LIPASE: CPT

## 2020-10-27 PROCEDURE — 99900035 HC TECH TIME PER 15 MIN (STAT)

## 2020-10-27 PROCEDURE — 25000003 PHARM REV CODE 250: Performed by: INTERNAL MEDICINE

## 2020-10-27 PROCEDURE — 94660 CPAP INITIATION&MGMT: CPT

## 2020-10-27 PROCEDURE — 99223 1ST HOSP IP/OBS HIGH 75: CPT | Mod: ,,, | Performed by: INTERNAL MEDICINE

## 2020-10-27 PROCEDURE — 93010 EKG 12-LEAD: ICD-10-PCS | Mod: ,,, | Performed by: INTERNAL MEDICINE

## 2020-10-27 PROCEDURE — 99223 PR INITIAL HOSPITAL CARE,LEVL III: ICD-10-PCS | Mod: ,,, | Performed by: INTERNAL MEDICINE

## 2020-10-27 RX ORDER — SODIUM CHLORIDE 0.9 % (FLUSH) 0.9 %
10 SYRINGE (ML) INJECTION
Status: CANCELLED | OUTPATIENT
Start: 2020-10-27

## 2020-10-27 RX ORDER — SODIUM CHLORIDE 0.9 % (FLUSH) 0.9 %
5 SYRINGE (ML) INJECTION
Status: DISCONTINUED | OUTPATIENT
Start: 2020-10-27 | End: 2020-11-05 | Stop reason: HOSPADM

## 2020-10-27 RX ORDER — FLUTICASONE PROPIONATE 50 MCG
1 SPRAY, SUSPENSION (ML) NASAL DAILY
Status: DISCONTINUED | OUTPATIENT
Start: 2020-10-28 | End: 2020-11-05 | Stop reason: HOSPADM

## 2020-10-27 RX ORDER — ATORVASTATIN CALCIUM 10 MG/1
10 TABLET, FILM COATED ORAL NIGHTLY
Status: DISCONTINUED | OUTPATIENT
Start: 2020-10-27 | End: 2020-11-05 | Stop reason: HOSPADM

## 2020-10-27 RX ORDER — AMLODIPINE BESYLATE 5 MG/1
5 TABLET ORAL DAILY
Status: DISCONTINUED | OUTPATIENT
Start: 2020-10-27 | End: 2020-10-29

## 2020-10-27 RX ORDER — ALLOPURINOL 300 MG/1
300 TABLET ORAL DAILY
Status: DISCONTINUED | OUTPATIENT
Start: 2020-10-28 | End: 2020-11-05 | Stop reason: HOSPADM

## 2020-10-27 RX ORDER — IBUPROFEN 100 MG/5ML
1000 SUSPENSION, ORAL (FINAL DOSE FORM) ORAL DAILY
COMMUNITY

## 2020-10-27 RX ORDER — ONDANSETRON 2 MG/ML
4 INJECTION INTRAMUSCULAR; INTRAVENOUS
Status: COMPLETED | OUTPATIENT
Start: 2020-10-27 | End: 2020-10-27

## 2020-10-27 RX ORDER — SODIUM CHLORIDE, SODIUM LACTATE, POTASSIUM CHLORIDE, CALCIUM CHLORIDE 600; 310; 30; 20 MG/100ML; MG/100ML; MG/100ML; MG/100ML
INJECTION, SOLUTION INTRAVENOUS CONTINUOUS
Status: DISCONTINUED | OUTPATIENT
Start: 2020-10-27 | End: 2020-10-30

## 2020-10-27 RX ORDER — CETIRIZINE HYDROCHLORIDE 10 MG/1
10 TABLET ORAL DAILY
Status: DISCONTINUED | OUTPATIENT
Start: 2020-10-28 | End: 2020-11-05 | Stop reason: HOSPADM

## 2020-10-27 RX ORDER — DEXTROSE MONOHYDRATE, SODIUM CHLORIDE, AND POTASSIUM CHLORIDE 50; 1.49; 4.5 G/1000ML; G/1000ML; G/1000ML
INJECTION, SOLUTION INTRAVENOUS CONTINUOUS
Status: CANCELLED | OUTPATIENT
Start: 2020-10-27

## 2020-10-27 RX ORDER — CETIRIZINE HYDROCHLORIDE 10 MG/1
10 TABLET ORAL DAILY
COMMUNITY
End: 2022-01-06

## 2020-10-27 RX ORDER — BENAZEPRIL HYDROCHLORIDE 40 MG/1
40 TABLET ORAL DAILY
Status: DISCONTINUED | OUTPATIENT
Start: 2020-10-27 | End: 2020-10-29

## 2020-10-27 RX ORDER — KETOROLAC TROMETHAMINE 30 MG/ML
15 INJECTION, SOLUTION INTRAMUSCULAR; INTRAVENOUS ONCE
Status: DISCONTINUED | OUTPATIENT
Start: 2020-10-27 | End: 2020-10-27

## 2020-10-27 RX ORDER — ENOXAPARIN SODIUM 100 MG/ML
40 INJECTION SUBCUTANEOUS EVERY 12 HOURS
Status: DISCONTINUED | OUTPATIENT
Start: 2020-10-27 | End: 2020-11-05 | Stop reason: HOSPADM

## 2020-10-27 RX ORDER — ONDANSETRON 2 MG/ML
4 INJECTION INTRAMUSCULAR; INTRAVENOUS EVERY 8 HOURS PRN
Status: DISCONTINUED | OUTPATIENT
Start: 2020-10-27 | End: 2020-10-29

## 2020-10-27 RX ORDER — ASPIRIN 81 MG/1
81 TABLET ORAL DAILY
Status: DISCONTINUED | OUTPATIENT
Start: 2020-10-28 | End: 2020-11-05 | Stop reason: HOSPADM

## 2020-10-27 RX ORDER — ACETAMINOPHEN 325 MG/1
650 TABLET ORAL ONCE
Status: DISCONTINUED | OUTPATIENT
Start: 2020-10-27 | End: 2020-10-30

## 2020-10-27 RX ORDER — MORPHINE SULFATE 4 MG/ML
3 INJECTION, SOLUTION INTRAMUSCULAR; INTRAVENOUS EVERY 6 HOURS PRN
Status: DISCONTINUED | OUTPATIENT
Start: 2020-10-27 | End: 2020-10-28

## 2020-10-27 RX ORDER — MORPHINE SULFATE 4 MG/ML
4 INJECTION, SOLUTION INTRAMUSCULAR; INTRAVENOUS
Status: COMPLETED | OUTPATIENT
Start: 2020-10-27 | End: 2020-10-27

## 2020-10-27 RX ADMIN — ENOXAPARIN SODIUM 40 MG: 40 INJECTION SUBCUTANEOUS at 09:10

## 2020-10-27 RX ADMIN — ONDANSETRON 4 MG: 2 INJECTION INTRAMUSCULAR; INTRAVENOUS at 05:10

## 2020-10-27 RX ADMIN — MORPHINE SULFATE 3 MG: 4 INJECTION, SOLUTION INTRAMUSCULAR; INTRAVENOUS at 11:10

## 2020-10-27 RX ADMIN — BENAZEPRIL HYDROCHLORIDE 40 MG: 40 TABLET, COATED ORAL at 03:10

## 2020-10-27 RX ADMIN — AMLODIPINE BESYLATE 5 MG: 5 TABLET ORAL at 03:10

## 2020-10-27 RX ADMIN — SODIUM CHLORIDE, SODIUM LACTATE, POTASSIUM CHLORIDE, AND CALCIUM CHLORIDE: 600; 310; 30; 20 INJECTION, SOLUTION INTRAVENOUS at 03:10

## 2020-10-27 RX ADMIN — SODIUM CHLORIDE 1000 ML: 0.9 INJECTION, SOLUTION INTRAVENOUS at 10:10

## 2020-10-27 RX ADMIN — ATORVASTATIN CALCIUM 10 MG: 10 TABLET, FILM COATED ORAL at 09:10

## 2020-10-27 RX ADMIN — IOHEXOL 100 ML: 350 INJECTION, SOLUTION INTRAVENOUS at 11:10

## 2020-10-27 RX ADMIN — MORPHINE SULFATE 4 MG: 4 INJECTION, SOLUTION INTRAMUSCULAR; INTRAVENOUS at 11:10

## 2020-10-27 RX ADMIN — ONDANSETRON 4 MG: 2 INJECTION INTRAMUSCULAR; INTRAVENOUS at 11:10

## 2020-10-27 RX ADMIN — MORPHINE SULFATE 3 MG: 4 INJECTION, SOLUTION INTRAMUSCULAR; INTRAVENOUS at 05:10

## 2020-10-27 NOTE — H&P
[facility] Medicine  History & Physical    Patient Name: Natali Galeano  MRN: [id number]  Admission Date: 10/27/2020  Attending Physician: PIERRE Schmitt MD  Primary Care Provider: Ewelina Herzog MD    Patient information was obtained from patient, past medical records and ER records.     Subjective:     Principal Problem:Partial small bowel obstruction    Chief Complaint:   Chief Complaint   Patient presents with    Abdominal Pain     +Lower abdominal pain since last PM with new onset N/V this AM. PMH of SBO.         HPI: Ms. Galeano is a 79/F with PMH HTN, colon cancer s/p resection , abdominal hernia repair , h/o SBOs (most recent 2020) who presented to ED 10/27 with a one day history of upper abdominal pain. She reported she was in her usual state of health when she had sudden onset of sharp abdominal discomfort the night prior to presentation, associated with nausea and multiple episodes of vomiting. She stated that the pain is 9/10 at its worst and was not relieved with acetaminophen at home. Last BM was 10/26 but scant amount, with more significant BM the previous day. ED workup was notable for CT demonstrating partial SBO with jejunal dilatation in LLQ near area of prior ventral hernia repair comparable to studies during prior SBO admissions, without evidence of fluid collection or perforation. Hospital medicine was contacted for admission.      Past Medical History:   Diagnosis Date    Colon cancer     Gout, chronic     Heart murmur     High cholesterol     Hypercholesteremia     Hypertension     Sleep apnea     Thyroid disease      Past Surgical History:   Procedure Laterality Date    BTL       SECTION, CLASSIC      COLON SURGERY      goiter       HERNIA REPAIR      KIDNEY SURGERY      cyst removal       Review of patient's allergies indicates:  No Known Allergies    No current facility-administered medications on file prior to encounter.       Current Outpatient Medications on File Prior to Encounter   Medication Sig    allopurinol (ZYLOPRIM) 300 MG tablet Take 300 mg by mouth once daily.    amLODIPine (NORVASC) 5 MG tablet Take 1 tablet (5 mg total) by mouth once daily.    ascorbic acid, vitamin C, (VITAMIN C) 1000 MG tablet Take 1,000 mg by mouth once daily.    aspirin (ECOTRIN) 81 MG EC tablet Take 81 mg by mouth once daily.    atorvastatin (LIPITOR) 10 MG tablet Take 10 mg by mouth every evening.     benazepril (LOTENSIN) 40 MG tablet Take 40 mg by mouth once daily.    cetirizine (ZYRTEC) 10 MG tablet Take 10 mg by mouth once daily.    cholecalciferol, vitamin D3, (THERA-D) 2,000 unit Tab Take 1 tablet by mouth once daily.    docusate sodium (COLACE) 100 MG capsule Take 100 mg by mouth once daily.    fluticasone (FLONASE) 50 mcg/actuation nasal spray 1 spray by Each Nare route once daily.    hydroCHLOROthiazide (HYDRODIURIL) 25 MG tablet Take 12.5 mg by mouth. Take 1/2 of the 25 mg tablet daily    niacin 500 mg TbSR Take 500 mg by mouth once daily.    senna-docusate 8.6-50 mg (PERICOLACE) 8.6-50 mg per tablet Take 1 tablet by mouth once daily.     Family History     Problem Relation (Age of Onset)    Diabetes Mother    Heart disease Father        Tobacco Use    Smoking status: Former Smoker     Packs/day: 0.10     Quit date: 1980     Years since quittin.5    Smokeless tobacco: Never Used   Substance and Sexual Activity    Alcohol use: No    Drug use: No    Sexual activity: Never     Review of Systems   Constitutional: Negative for chills and fever.   HENT: Negative for sore throat and trouble swallowing.    Eyes: Negative for pain and visual disturbance.   Respiratory: Negative for cough and shortness of breath.    Cardiovascular: Negative for chest pain and palpitations.   Gastrointestinal: Positive for abdominal pain, nausea and vomiting.   Genitourinary: Negative for difficulty urinating and dysuria.    Musculoskeletal: Negative for arthralgias and myalgias.   Skin: Negative for rash and wound.   Neurological: Negative for weakness and numbness.     Objective:     Vital Signs (Most Recent):  Temp: 97.9 °F (36.6 °C) (10/27/20 0837)  Pulse: 78 (10/27/20 1424)  Resp: 16 (10/27/20 1100)  BP: (!) 150/70 (10/27/20 1424)  SpO2: 99 % (10/27/20 1424) Vital Signs (24h Range):  Temp:  [97.9 °F (36.6 °C)] 97.9 °F (36.6 °C)  Pulse:  [74-87] 78  Resp:  [16-18] 16  SpO2:  [99 %-100 %] 99 %  BP: (146-205)/(62-98) 150/70     Weight: 129.3 kg (285 lb)  Body mass index is 44.64 kg/m².    Physical Exam  Vitals signs and nursing note reviewed.   Constitutional:       General: She is not in acute distress.     Appearance: She is well-developed. She is morbidly obese.   HENT:      Head: Normocephalic and atraumatic.   Eyes:      General:         Right eye: No discharge.         Left eye: No discharge.      Conjunctiva/sclera: Conjunctivae normal.   Cardiovascular:      Rate and Rhythm: Normal rate.      Pulses: Normal pulses.      Heart sounds: Murmur present.   Pulmonary:      Effort: Pulmonary effort is normal. No respiratory distress.   Abdominal:      Palpations: Abdomen is soft.      Tenderness: There is abdominal tenderness (LLQ, epigastric).   Musculoskeletal: Normal range of motion.      Right lower leg: No edema.      Left lower leg: No edema.   Skin:     General: Skin is warm and dry.   Neurological:      Mental Status: She is alert and oriented to person, place, and time.       Significant Labs:   CBC:  Recent Labs   Lab 10/27/20  1014   WBC 11.39   HGB 14.7   HCT 46.6      GRAN 85.8*  9.8*   LYMPH 9.7*  1.1   MONO 3.9*  0.4   EOS 0.0   BASO 0.01      CMP:  Recent Labs   Lab 10/27/20  1014      K 4.1   CL 99   CO2 26   BUN 12   CREATININE 0.8   *   CALCIUM 10.3   ALKPHOS 99   AST 21   ALT 12   BILITOT 0.4   PROT 8.1   ALBUMIN 3.9   ANIONGAP 14      Significant Imaging:   CT Abd/Pelvis W Contrast  10/27:  Findings most consistent with low-grade/partial small bowel obstruction, noting mild dilatation of the jejunum and fecalization of small bowel contents in the left lower quadrant.  This is adjacent to the area of the prior ventral hernia repair with mesh, and similar findings have been seen on prior CT exams.  No drainable fluid collection or evidence of perforation. Remaining incidental findings as in full report.  Left adrenal nodules appear relatively stable across multiple priors.    Assessment/Plan:     * Partial small bowel obstruction  - Partial SBO in setting of h/o multiple prior abdominal surgeries with prior SBOs in addition.  - NPO, start IVFs with LR, advance diet as tolerated.  - General surgery consulted in ED; pursue conservative approach for now.  - Start morphine 3mg IV q6hr PRN, ondansetron 4mg IV q8hr PRN for pain / nausea control   respectively.    Gout  - Continue allopurinol 300mg PO daily.    Normocytic anemia  - Hgb WNL; likely hemoconcentrated on admit.  - No signs of acute losses.  - Monitor.    ALYSE (obstructive sleep apnea)  - Continue CPAP qHS.    Hyperlipidemia  - Continue atorvastatin 10mg PO qHS.    Essential hypertension  - Continue amlodipine 5mg PO daily, benazepril 40mg PO daily; hold HCTZ for time being.    Morbid obesity due to excess calories  - Dietary counseling.    VTE Risk Mitigation (From admission, onward)         Ordered     enoxaparin injection 40 mg  Every 12 hours      10/27/20 1530     IP VTE HIGH RISK PATIENT  Once      10/27/20 1530     Place sequential compression device  Until discontinued      10/27/20 1530              PIERRE Schmitt MD  Department of Hospital Medicine   [facility]

## 2020-10-27 NOTE — ED PROVIDER NOTES
Encounter Date: 10/27/2020    SCRIBE #1 NOTE: I, Christal Sherwood am scribing for, and in the presence of, Nava Campbell MD.       History     Chief Complaint   Patient presents with    Abdominal Pain     +Lower abdominal pain since last PM with new onset N/V this AM. PMH of SBO.      Time seen by provider: 9:02 AM    This is a 79 y.o. female who presents with complaint of upper abdominal pain. Onset began yesterday and has worsened since. Associated symptoms include vomiting and increased urination. Patient denies fever, chills, diarrhea, leg swelling, dysuria, or any other symptoms at this time.  Denies chest pain/SOB.  Patient has abdominal surgical history of colon resection in ,  and hernia repair.     The history is provided by the patient. No  was used.     Review of patient's allergies indicates:  No Known Allergies  Past Medical History:   Diagnosis Date    Colon cancer     Gout, chronic     Heart murmur     High cholesterol     Hypercholesteremia     Hypertension     Sleep apnea     Thyroid disease      Past Surgical History:   Procedure Laterality Date    BTL       SECTION, CLASSIC      COLON SURGERY      goiter       HERNIA REPAIR      KIDNEY SURGERY      cyst removal     Family History   Problem Relation Age of Onset    Heart disease Father     Diabetes Mother      Social History     Tobacco Use    Smoking status: Former Smoker     Packs/day: 0.10     Quit date: 1980     Years since quittin.5    Smokeless tobacco: Never Used   Substance Use Topics    Alcohol use: No    Drug use: No     Review of Systems   Constitutional: Negative for chills and fever.   HENT: Negative for congestion, rhinorrhea and sore throat.    Eyes: Negative for redness.   Respiratory: Negative for cough and shortness of breath.    Cardiovascular: Negative for chest pain and palpitations.   Gastrointestinal: Positive for abdominal pain, nausea and vomiting. Negative  for diarrhea.   Genitourinary: Negative for dysuria and hematuria.   Musculoskeletal: Negative for back pain.   Skin: Negative for rash.   Neurological: Negative for dizziness, weakness, numbness and headaches.       Physical Exam     Initial Vitals [10/27/20 0837]   BP Pulse Resp Temp SpO2   (S) (!) 205/98 87 18 97.9 °F (36.6 °C) 100 %      MAP       --         Physical Exam    Nursing note and vitals reviewed.  Constitutional: She appears well-developed and well-nourished. She is not diaphoretic. No distress.   HENT:   Head: Normocephalic and atraumatic.   Eyes: Conjunctivae and EOM are normal.   Neck: Normal range of motion. Neck supple.   Cardiovascular: Normal rate, regular rhythm and normal heart sounds.   Pulmonary/Chest: Breath sounds normal. No respiratory distress. She has no wheezes. She has no rales.   Abdominal: Soft. Bowel sounds are normal. She exhibits no distension. There is abdominal tenderness.   Diffuse upper abdominal tenderness.   Musculoskeletal: Normal range of motion. No tenderness or edema.   Neurological: She is alert and oriented to person, place, and time. She has normal strength.   Skin: Skin is warm and dry. Capillary refill takes less than 2 seconds.         ED Course   Procedures  Labs Reviewed   CBC W/ AUTO DIFFERENTIAL - Abnormal; Notable for the following components:       Result Value    RBC 5.60 (*)     MCH 26.3 (*)     MCHC 31.5 (*)     RDW 15.3 (*)     Gran # (ANC) 9.8 (*)     Immature Grans (Abs) 0.06 (*)     Gran % 85.8 (*)     Lymph % 9.7 (*)     Mono % 3.9 (*)     All other components within normal limits   COMPREHENSIVE METABOLIC PANEL - Abnormal; Notable for the following components:    Glucose 142 (*)     All other components within normal limits   URINALYSIS, REFLEX TO URINE CULTURE - Abnormal; Notable for the following components:    Protein, UA 1+ (*)     Occult Blood UA Trace (*)     All other components within normal limits    Narrative:     Specimen Source->Urine    URINALYSIS MICROSCOPIC - Abnormal; Notable for the following components:    Bacteria Moderate (*)     Hyaline Casts, UA 4 (*)     All other components within normal limits    Narrative:     Specimen Source->Urine   LIPASE   SARS-COV-2 RDRP GENE     EKG Readings: (Independently Interpreted)   Initial Reading: No STEMI.   Time 1159. Normal sinus rhythm with rate of 80 bpm. Left axis deviation. Normal intervals. No ST or ischemic changes.        Imaging Results          CT Abdomen Pelvis With Contrast (Final result)  Result time 10/27/20 12:05:39    Final result by Nitish Bermeo MD (10/27/20 12:05:39)                 Impression:      Findings most consistent with low-grade/partial small bowel obstruction, noting mild dilatation of the jejunum and fecalization of small bowel contents in the left lower quadrant.  This is adjacent to the area of the prior ventral hernia repair with mesh, and similar findings have been seen on prior CT exams.  No drainable fluid collection or evidence of perforation.    Remaining incidental findings as above.  Left adrenal nodules appear relatively stable across multiple priors.      Electronically signed by: Nitish Bermeo MD  Date:    10/27/2020  Time:    12:05             Narrative:    EXAMINATION:  CT ABDOMEN PELVIS WITH CONTRAST    CLINICAL HISTORY:  Nausea/vomiting;Abdominal pain, acute, nonlocalized;    TECHNIQUE:  Low dose axial images, sagittal and coronal reformations were obtained from the lung bases to the pubic symphysis following the IV administration of 100 mL of Omnipaque 350 .  Oral contrast was not given.    COMPARISON:  Multiple prior abdominal CTs.    FINDINGS:  Limited evaluation of the lung bases show minor atelectasis.  Limited evaluation of the heart is unremarkable.    The liver is normal in size.  No solid mass or biliary ductal dilatation.  The gallbladder is unremarkable.    The spleen, right adrenal gland, and pancreas show no focal abnormality.  Left  adrenal nodules are stable dating back to 2015.    There are bilateral renal cysts.  No solid mass, nephrolithiasis, or hydronephrosis.  Urinary bladder shows no focal wall thickening.  Uterus is grossly unremarkable.    There is mild focal dilatation of the jejunum in the left lower quadrant with fecalization of small bowel contents.  The distal ileum is decompressed.  The colon demonstrates diverticulosis without evidence of acute diverticulitis.  No free gas.  No drainable fluid collection.  There has been prior ventral hernia repair with mesh.  No concerning lymphadenopathy.    Vascular structures show atherosclerosis without aneurysm.  No acute fracture or destructive osseous lesion.                               X-Ray Abdomen Flat And Erect (Final result)  Result time 10/27/20 09:35:49    Final result by Giselle Parr MD (10/27/20 09:35:49)                 Impression:      Nonobstructive bowel gas pattern.  A few air-fluid levels in the left abdomen, many of which appear to reside within the colon.  Findings can be seen with gastroenteritis in the appropriate setting.  Consider repeat imaging should symptoms persist or worsen.      Electronically signed by: Giselle Parr  Date:    10/27/2020  Time:    09:35             Narrative:    EXAMINATION:  XR ABDOMEN FLAT AND ERECT    CLINICAL HISTORY:  Unspecified abdominal pain    TECHNIQUE:  Flat and erect AP views of the abdomen were performed.    COMPARISON:  None    FINDINGS:  Nonspecific, nonobstructive bowel gas pattern.  A few scattered air-fluid levels in the left abdomen, many of which appear to reside in the colon.  No free intraperitoneal air on upright views.  No suspicious calcifications.    Lung bases are clear.                              X-Rays:   Independently Interpreted Readings:   Abdomen: Normal bowel gas pattern. No obstruction.      Medical Decision Making:   History:   Old Medical Records: I decided to obtain old medical records.  Old  Records Summarized: other records and records from clinic visits.  Initial Assessment:   9:02AM:  Pt is a 78 yo/ F who presents to ED with upper abdominal pain. Pt appears uncomfortable, though nontoxic. She does have diffuse upper abdominal tenderness with a history of small-bowel obstructions and a complicated surgical history. Will plan for labs, imaging, will continue to follow and reassess.    Independently Interpreted Test(s):   I have ordered and independently interpreted X-rays - see prior notes.  I have ordered and independently interpreted EKG Reading(s) - see prior notes  Clinical Tests:   Lab Tests: Ordered and Reviewed  Radiological Study: Ordered and Reviewed  Medical Tests: Ordered and Reviewed  Other:   I have discussed this case with another health care provider.    12:43 PM:  Patient's CT is concerning for a partial small-bowel obstruction.  I updated the patient regarding the results.  Will plan to admit for further observation and management.    1:25 PM: I discussed with Dr. Cardozo, who requests admission to hospitalist.    1:29 PM:  I discussed with Dr. Schmitt, who is agreeable to plan for admission and all questions answered.            Scribe Attestation:   Scribe #1: I performed the above scribed service and the documentation accurately describes the services I performed. I attest to the accuracy of the note.    Attending Attestation:           Physician Attestation for Scribe:  Physician Attestation Statement for Scribe #1: I, Nava Campbell MD, reviewed documentation, as scribed by Christal Sherwood in my presence, and it is both accurate and complete.                           Clinical Impression:       ICD-10-CM ICD-9-CM   1. Partial intestinal obstruction, unspecified cause  K56.600 560.9   2. Abdominal pain  R10.9 789.00                          ED Disposition Condition    Admit                             Nava Campbell MD  10/27/20 1985

## 2020-10-27 NOTE — SUBJECTIVE & OBJECTIVE
Past Medical History:   Diagnosis Date    Colon cancer     Gout, chronic     Heart murmur     High cholesterol     Hypercholesteremia     Hypertension     Sleep apnea     Thyroid disease      Past Surgical History:   Procedure Laterality Date    BTL       SECTION, CLASSIC      COLON SURGERY      goiter       HERNIA REPAIR      KIDNEY SURGERY      cyst removal       Review of patient's allergies indicates:  No Known Allergies    No current facility-administered medications on file prior to encounter.      Current Outpatient Medications on File Prior to Encounter   Medication Sig    allopurinol (ZYLOPRIM) 300 MG tablet Take 300 mg by mouth once daily.    amLODIPine (NORVASC) 5 MG tablet Take 1 tablet (5 mg total) by mouth once daily.    ascorbic acid, vitamin C, (VITAMIN C) 1000 MG tablet Take 1,000 mg by mouth once daily.    aspirin (ECOTRIN) 81 MG EC tablet Take 81 mg by mouth once daily.    atorvastatin (LIPITOR) 10 MG tablet Take 10 mg by mouth every evening.     benazepril (LOTENSIN) 40 MG tablet Take 40 mg by mouth once daily.    cetirizine (ZYRTEC) 10 MG tablet Take 10 mg by mouth once daily.    cholecalciferol, vitamin D3, (THERA-D) 2,000 unit Tab Take 1 tablet by mouth once daily.    docusate sodium (COLACE) 100 MG capsule Take 100 mg by mouth once daily.    fluticasone (FLONASE) 50 mcg/actuation nasal spray 1 spray by Each Nare route once daily.    hydroCHLOROthiazide (HYDRODIURIL) 25 MG tablet Take 12.5 mg by mouth. Take 1/2 of the 25 mg tablet daily    niacin 500 mg TbSR Take 500 mg by mouth once daily.    senna-docusate 8.6-50 mg (PERICOLACE) 8.6-50 mg per tablet Take 1 tablet by mouth once daily.     Family History     Problem Relation (Age of Onset)    Diabetes Mother    Heart disease Father        Tobacco Use    Smoking status: Former Smoker     Packs/day: 0.10     Quit date: 1980     Years since quittin.5    Smokeless tobacco: Never Used   Substance and  Sexual Activity    Alcohol use: No    Drug use: No    Sexual activity: Never     Review of Systems   Constitutional: Negative for chills and fever.   HENT: Negative for sore throat and trouble swallowing.    Eyes: Negative for pain and visual disturbance.   Respiratory: Negative for cough and shortness of breath.    Cardiovascular: Negative for chest pain and palpitations.   Gastrointestinal: Positive for abdominal pain, nausea and vomiting.   Genitourinary: Negative for difficulty urinating and dysuria.   Musculoskeletal: Negative for arthralgias and myalgias.   Skin: Negative for rash and wound.   Neurological: Negative for weakness and numbness.     Objective:     Vital Signs (Most Recent):  Temp: 97.9 °F (36.6 °C) (10/27/20 0837)  Pulse: 78 (10/27/20 1424)  Resp: 16 (10/27/20 1100)  BP: (!) 150/70 (10/27/20 1424)  SpO2: 99 % (10/27/20 1424) Vital Signs (24h Range):  Temp:  [97.9 °F (36.6 °C)] 97.9 °F (36.6 °C)  Pulse:  [74-87] 78  Resp:  [16-18] 16  SpO2:  [99 %-100 %] 99 %  BP: (146-205)/(62-98) 150/70     Weight: 129.3 kg (285 lb)  Body mass index is 44.64 kg/m².    Physical Exam  Vitals signs and nursing note reviewed.   Constitutional:       General: She is not in acute distress.     Appearance: She is well-developed. She is morbidly obese.   HENT:      Head: Normocephalic and atraumatic.   Eyes:      General:         Right eye: No discharge.         Left eye: No discharge.      Conjunctiva/sclera: Conjunctivae normal.   Cardiovascular:      Rate and Rhythm: Normal rate.      Pulses: Normal pulses.      Heart sounds: Murmur present.   Pulmonary:      Effort: Pulmonary effort is normal. No respiratory distress.   Abdominal:      Palpations: Abdomen is soft.      Tenderness: There is abdominal tenderness (LLQ, epigastric).   Musculoskeletal: Normal range of motion.      Right lower leg: No edema.      Left lower leg: No edema.   Skin:     General: Skin is warm and dry.   Neurological:      Mental Status:  She is alert and oriented to person, place, and time.       Significant Labs:   CBC:  Recent Labs   Lab 10/27/20  1014   WBC 11.39   HGB 14.7   HCT 46.6      GRAN 85.8*  9.8*   LYMPH 9.7*  1.1   MONO 3.9*  0.4   EOS 0.0   BASO 0.01      CMP:  Recent Labs   Lab 10/27/20  1014      K 4.1   CL 99   CO2 26   BUN 12   CREATININE 0.8   *   CALCIUM 10.3   ALKPHOS 99   AST 21   ALT 12   BILITOT 0.4   PROT 8.1   ALBUMIN 3.9   ANIONGAP 14      Significant Imaging:   CT Abd/Pelvis W Contrast 10/27:  Findings most consistent with low-grade/partial small bowel obstruction, noting mild dilatation of the jejunum and fecalization of small bowel contents in the left lower quadrant.  This is adjacent to the area of the prior ventral hernia repair with mesh, and similar findings have been seen on prior CT exams.  No drainable fluid collection or evidence of perforation. Remaining incidental findings as in full report.  Left adrenal nodules appear relatively stable across multiple priors.

## 2020-10-27 NOTE — ASSESSMENT & PLAN NOTE
- Partial SBO in setting of h/o multiple prior abdominal surgeries with prior SBOs in addition.  - NPO, start IVFs with LR, advance diet as tolerated.  - General surgery consulted in ED; pursue conservative approach for now.  - Start morphine 3mg IV q6hr PRN, ondansetron 4mg IV q8hr PRN for pain / nausea control   respectively.

## 2020-10-27 NOTE — ED TRIAGE NOTES
Pt AAOx4, presented to the ED with abdominal pain that happened last night. Pt stated she wasn't doing anything and the pain just became really sharp along with N/V. Pt stated she lost count on home many times she has vomited. Pt stated her last real BM was 10/25 and yesterday it was a small amount.  Pt reports pain 9/10 and she admits to taking tylenol with no relief. Pt denies SOB, chest pain, diarrhea and/or fever/chills.

## 2020-10-27 NOTE — HPI
Ms. Galeano is a 79/F with PMH HTN, colon cancer s/p resection 2002, abdominal hernia repair 2004, h/o SBOs (most recent 01/2020) who presented to ED 10/27 with a one day history of upper abdominal pain. She reported she was in her usual state of health when she had sudden onset of sharp abdominal discomfort the night prior to presentation, associated with nausea and multiple episodes of vomiting. She stated that the pain is 9/10 at its worst and was not relieved with acetaminophen at home. Last BM was 10/26 but scant amount, with more significant BM the previous day. ED workup was notable for CT demonstrating partial SBO with jejunal dilatation in LLQ near area of prior ventral hernia repair comparable to studies during prior SBO admissions, without evidence of fluid collection or perforation. Hospital medicine was contacted for admission.

## 2020-10-28 LAB
ANION GAP SERPL CALC-SCNC: 12 MMOL/L (ref 8–16)
BASOPHILS # BLD AUTO: 0.02 K/UL (ref 0–0.2)
BASOPHILS NFR BLD: 0.2 % (ref 0–1.9)
BUN SERPL-MCNC: 10 MG/DL (ref 8–23)
CALCIUM SERPL-MCNC: 9.3 MG/DL (ref 8.7–10.5)
CHLORIDE SERPL-SCNC: 106 MMOL/L (ref 95–110)
CO2 SERPL-SCNC: 18 MMOL/L (ref 23–29)
CREAT SERPL-MCNC: 0.7 MG/DL (ref 0.5–1.4)
DIFFERENTIAL METHOD: ABNORMAL
EOSINOPHIL # BLD AUTO: 0 K/UL (ref 0–0.5)
EOSINOPHIL NFR BLD: 0.2 % (ref 0–8)
ERYTHROCYTE [DISTWIDTH] IN BLOOD BY AUTOMATED COUNT: 15.6 % (ref 11.5–14.5)
EST. GFR  (AFRICAN AMERICAN): >60 ML/MIN/1.73 M^2
EST. GFR  (NON AFRICAN AMERICAN): >60 ML/MIN/1.73 M^2
GLUCOSE SERPL-MCNC: 108 MG/DL (ref 70–110)
HCT VFR BLD AUTO: 41 % (ref 37–48.5)
HGB BLD-MCNC: 12.7 G/DL (ref 12–16)
IMM GRANULOCYTES # BLD AUTO: 0.04 K/UL (ref 0–0.04)
IMM GRANULOCYTES NFR BLD AUTO: 0.4 % (ref 0–0.5)
LYMPHOCYTES # BLD AUTO: 1.9 K/UL (ref 1–4.8)
LYMPHOCYTES NFR BLD: 19.9 % (ref 18–48)
MAGNESIUM SERPL-MCNC: 1.5 MG/DL (ref 1.6–2.6)
MCH RBC QN AUTO: 25.6 PG (ref 27–31)
MCHC RBC AUTO-ENTMCNC: 31 G/DL (ref 32–36)
MCV RBC AUTO: 83 FL (ref 82–98)
MONOCYTES # BLD AUTO: 0.8 K/UL (ref 0.3–1)
MONOCYTES NFR BLD: 7.9 % (ref 4–15)
NEUTROPHILS # BLD AUTO: 6.8 K/UL (ref 1.8–7.7)
NEUTROPHILS NFR BLD: 71.4 % (ref 38–73)
NRBC BLD-RTO: 0 /100 WBC
PHOSPHATE SERPL-MCNC: 3.4 MG/DL (ref 2.7–4.5)
PLATELET # BLD AUTO: 216 K/UL (ref 150–350)
PMV BLD AUTO: 10.4 FL (ref 9.2–12.9)
POTASSIUM SERPL-SCNC: 4.7 MMOL/L (ref 3.5–5.1)
RBC # BLD AUTO: 4.96 M/UL (ref 4–5.4)
SODIUM SERPL-SCNC: 136 MMOL/L (ref 136–145)
WBC # BLD AUTO: 9.58 K/UL (ref 3.9–12.7)

## 2020-10-28 PROCEDURE — 83735 ASSAY OF MAGNESIUM: CPT

## 2020-10-28 PROCEDURE — 99900035 HC TECH TIME PER 15 MIN (STAT)

## 2020-10-28 PROCEDURE — 11000001 HC ACUTE MED/SURG PRIVATE ROOM

## 2020-10-28 PROCEDURE — 25000003 PHARM REV CODE 250: Performed by: INTERNAL MEDICINE

## 2020-10-28 PROCEDURE — 63600175 PHARM REV CODE 636 W HCPCS: Performed by: NURSE PRACTITIONER

## 2020-10-28 PROCEDURE — 63600175 PHARM REV CODE 636 W HCPCS: Performed by: INTERNAL MEDICINE

## 2020-10-28 PROCEDURE — 94761 N-INVAS EAR/PLS OXIMETRY MLT: CPT

## 2020-10-28 PROCEDURE — 84100 ASSAY OF PHOSPHORUS: CPT

## 2020-10-28 PROCEDURE — 99233 PR SUBSEQUENT HOSPITAL CARE,LEVL III: ICD-10-PCS | Mod: ,,, | Performed by: INTERNAL MEDICINE

## 2020-10-28 PROCEDURE — 25000242 PHARM REV CODE 250 ALT 637 W/ HCPCS: Performed by: INTERNAL MEDICINE

## 2020-10-28 PROCEDURE — 80048 BASIC METABOLIC PNL TOTAL CA: CPT

## 2020-10-28 PROCEDURE — 94660 CPAP INITIATION&MGMT: CPT

## 2020-10-28 PROCEDURE — 36415 COLL VENOUS BLD VENIPUNCTURE: CPT

## 2020-10-28 PROCEDURE — 99233 SBSQ HOSP IP/OBS HIGH 50: CPT | Mod: ,,, | Performed by: INTERNAL MEDICINE

## 2020-10-28 PROCEDURE — 85025 COMPLETE CBC W/AUTO DIFF WBC: CPT

## 2020-10-28 RX ORDER — MORPHINE SULFATE 4 MG/ML
3 INJECTION, SOLUTION INTRAMUSCULAR; INTRAVENOUS EVERY 4 HOURS PRN
Status: DISCONTINUED | OUTPATIENT
Start: 2020-10-28 | End: 2020-11-05 | Stop reason: HOSPADM

## 2020-10-28 RX ORDER — HYDRALAZINE HYDROCHLORIDE 20 MG/ML
10 INJECTION INTRAMUSCULAR; INTRAVENOUS ONCE
Status: COMPLETED | OUTPATIENT
Start: 2020-10-28 | End: 2020-10-28

## 2020-10-28 RX ORDER — MAGNESIUM SULFATE HEPTAHYDRATE 40 MG/ML
2 INJECTION, SOLUTION INTRAVENOUS ONCE
Status: COMPLETED | OUTPATIENT
Start: 2020-10-28 | End: 2020-10-28

## 2020-10-28 RX ADMIN — ONDANSETRON 4 MG: 2 INJECTION INTRAMUSCULAR; INTRAVENOUS at 08:10

## 2020-10-28 RX ADMIN — MORPHINE SULFATE 3 MG: 4 INJECTION, SOLUTION INTRAMUSCULAR; INTRAVENOUS at 05:10

## 2020-10-28 RX ADMIN — ATORVASTATIN CALCIUM 10 MG: 10 TABLET, FILM COATED ORAL at 08:10

## 2020-10-28 RX ADMIN — ENOXAPARIN SODIUM 40 MG: 40 INJECTION SUBCUTANEOUS at 08:10

## 2020-10-28 RX ADMIN — AMLODIPINE BESYLATE 5 MG: 5 TABLET ORAL at 08:10

## 2020-10-28 RX ADMIN — SODIUM CHLORIDE, SODIUM LACTATE, POTASSIUM CHLORIDE, AND CALCIUM CHLORIDE: 600; 310; 30; 20 INJECTION, SOLUTION INTRAVENOUS at 01:10

## 2020-10-28 RX ADMIN — MORPHINE SULFATE 3 MG: 4 INJECTION, SOLUTION INTRAMUSCULAR; INTRAVENOUS at 12:10

## 2020-10-28 RX ADMIN — CETIRIZINE HYDROCHLORIDE 10 MG: 10 TABLET, FILM COATED ORAL at 08:10

## 2020-10-28 RX ADMIN — FLUTICASONE PROPIONATE 50 MCG: 50 SPRAY, METERED NASAL at 08:10

## 2020-10-28 RX ADMIN — BENAZEPRIL HYDROCHLORIDE 40 MG: 40 TABLET, COATED ORAL at 08:10

## 2020-10-28 RX ADMIN — HYDRALAZINE HYDROCHLORIDE 10 MG: 20 INJECTION INTRAMUSCULAR; INTRAVENOUS at 08:10

## 2020-10-28 RX ADMIN — MAGNESIUM SULFATE IN WATER 2 G: 40 INJECTION, SOLUTION INTRAVENOUS at 08:10

## 2020-10-28 RX ADMIN — ASPIRIN 81 MG: 81 TABLET, COATED ORAL at 08:10

## 2020-10-28 RX ADMIN — SODIUM CHLORIDE, SODIUM LACTATE, POTASSIUM CHLORIDE, AND CALCIUM CHLORIDE: 600; 310; 30; 20 INJECTION, SOLUTION INTRAVENOUS at 06:10

## 2020-10-28 RX ADMIN — ALLOPURINOL 300 MG: 300 TABLET ORAL at 08:10

## 2020-10-28 NOTE — SUBJECTIVE & OBJECTIVE
Interval History: No acute events overnight. Feeling slightly improved, but no appetite. No other concerns at this time.    Review of Systems   Constitutional: Negative for chills and fever.   Respiratory: Negative for cough and shortness of breath.    Cardiovascular: Negative for chest pain and palpitations.   Gastrointestinal: Positive for abdominal pain and nausea. Negative for vomiting.     Objective:     Vital Signs (Most Recent):  Temp: 97.7 °F (36.5 °C) (10/28/20 0814)  Pulse: 81 (10/28/20 0814)  Resp: 20 (10/28/20 0814)  BP: (!) 144/71 (10/28/20 0814)  SpO2: 98 % (10/28/20 0814) Vital Signs (24h Range):  Temp:  [97.3 °F (36.3 °C)-98.4 °F (36.9 °C)] 97.7 °F (36.5 °C)  Pulse:  [71-81] 81  Resp:  [16-20] 20  SpO2:  [97 %-100 %] 98 %  BP: (144-167)/(62-78) 144/71     Weight: 129 kg (284 lb 6.3 oz)  Body mass index is 44.53 kg/m².    Intake/Output Summary (Last 24 hours) at 10/28/2020 1046  Last data filed at 10/28/2020 0700  Gross per 24 hour   Intake 2420 ml   Output --   Net 2420 ml      Physical Exam  Vitals signs and nursing note reviewed.   Constitutional:       General: She is not in acute distress.     Appearance: She is well-developed. She is morbidly obese.   HENT:      Head: Normocephalic and atraumatic.   Eyes:      General:         Right eye: No discharge.         Left eye: No discharge.      Conjunctiva/sclera: Conjunctivae normal.   Cardiovascular:      Rate and Rhythm: Normal rate.      Pulses: Normal pulses.      Heart sounds: Murmur present.   Pulmonary:      Effort: Pulmonary effort is normal. No respiratory distress.   Abdominal:      Palpations: Abdomen is soft.      Tenderness: There is abdominal tenderness (epigastric, LUQ).   Musculoskeletal: Normal range of motion.      Right lower leg: No edema.      Left lower leg: No edema.   Skin:     General: Skin is warm and dry.   Neurological:      Mental Status: She is alert and oriented to person, place, and time.         Significant Labs:    CBC:  Recent Labs   Lab 10/27/20  1014 10/28/20  0449   WBC 11.39 9.58   HGB 14.7 12.7   HCT 46.6 41.0    216   GRAN 85.8*  9.8* 71.4  6.8   LYMPH 9.7*  1.1 19.9  1.9   MONO 3.9*  0.4 7.9  0.8   EOS 0.0 0.0   BASO 0.01 0.02      BMP:  Recent Labs   Lab 10/27/20  1014 10/28/20  0434    136   K 4.1 4.7   CL 99 106   CO2 26 18*   BUN 12 10   CREATININE 0.8 0.7   * 108   CALCIUM 10.3 9.3   MG  --  1.5*   PHOS  --  3.4     Significant Imaging:   No new imaging this morning.

## 2020-10-28 NOTE — PLAN OF CARE
Pt remained free of falls and injuries throughout shift. Purposeful hourly rounding performed. AAO x4. IV flushed and infusing LR at 100 mL/hr. Managing pain with PRN medications. Ambulates without assistance. Bath completed per pt. VSS on room air. Bed low and locked, call light within reach, side rails up X2. Will continue to monitor.

## 2020-10-28 NOTE — PLAN OF CARE
SW met with pt at bedside to complete discharge assessment, verified PCP and uses [facility] on [address] and would like bedside delivery.  No POA or LW.  Pt had CPAP at home.  Daughter or granddaughter will provide transportation home.  No needs identified at this time.     10/28/20 1218   Discharge Assessment   Assessment Type Discharge Planning Assessment   Confirmed/corrected address and phone number on facesheet? Yes   Assessment information obtained from? Patient   Communicated expected length of stay with patient/caregiver no   Prior to hospitilization cognitive status: Alert/Oriented   Prior to hospitalization functional status: Independent   Current cognitive status: Alert/Oriented   Current Functional Status: Independent   Lives With alone   Able to Return to Prior Arrangements yes   Is patient able to care for self after discharge? Unable to determine at this time (comments)   Readmission Within the Last 30 Days no previous admission in last 30 days   Patient currently being followed by outpatient case management? No   Patient currently receives any other outside agency services? No   Equipment Currently Used at Home CPAP   Do you have any problems affording any of your prescribed medications? No   Is the patient taking medications as prescribed? yes   Does the patient have transportation home? Yes   Transportation Anticipated family or friend will provide   Does the patient receive services at the [facility]? No   Discharge Plan A Home   DME Needed Upon Discharge  none   Patient/Family in Agreement with Plan yes

## 2020-10-28 NOTE — PROGRESS NOTES
[facility] Hospital Medicine  Progress Note    Patient Name: Natali Galeano  MRN: [id number]  Patient Class: IP- Inpatient   Admission Date: 10/27/2020  Length of Stay: 1 days  Attending Physician: SUN Schmitt MD  Primary Care Provider: Ewelina Herzog MD        Subjective:     Principal Problem:Partial small bowel obstruction        HPI:  Ms. Galeano is a 79/F with PMH HTN, colon cancer s/p resection 2002, abdominal hernia repair 2004, h/o SBOs (most recent 01/2020) who presented to ED 10/27 with a one day history of upper abdominal pain. She reported she was in her usual state of health when she had sudden onset of sharp abdominal discomfort the night prior to presentation, associated with nausea and multiple episodes of vomiting. She stated that the pain is 9/10 at its worst and was not relieved with acetaminophen at home. Last BM was 10/26 but scant amount, with more significant BM the previous day. ED workup was notable for CT demonstrating partial SBO with jejunal dilatation in LLQ near area of prior ventral hernia repair comparable to studies during prior SBO admissions, without evidence of fluid collection or perforation. Hospital medicine was contacted for admission.    Overview/Hospital Course:  No notes on file    Interval History: No acute events overnight. Feeling slightly improved, but no appetite. No other concerns at this time.    Review of Systems   Constitutional: Negative for chills and fever.   Respiratory: Negative for cough and shortness of breath.    Cardiovascular: Negative for chest pain and palpitations.   Gastrointestinal: Positive for abdominal pain and nausea. Negative for vomiting.     Objective:     Vital Signs (Most Recent):  Temp: 97.7 °F (36.5 °C) (10/28/20 0814)  Pulse: 81 (10/28/20 0814)  Resp: 20 (10/28/20 0814)  BP: (!) 144/71 (10/28/20 0814)  SpO2: 98 % (10/28/20 0814) Vital Signs (24h Range):  Temp:  [97.3 °F (36.3 °C)-98.4 °F (36.9 °C)] 97.7 °F (36.5  °C)  Pulse:  [71-81] 81  Resp:  [16-20] 20  SpO2:  [97 %-100 %] 98 %  BP: (144-167)/(62-78) 144/71     Weight: 129 kg (284 lb 6.3 oz)  Body mass index is 44.53 kg/m².    Intake/Output Summary (Last 24 hours) at 10/28/2020 1046  Last data filed at 10/28/2020 0700  Gross per 24 hour   Intake 2420 ml   Output --   Net 2420 ml      Physical Exam  Vitals signs and nursing note reviewed.   Constitutional:       General: She is not in acute distress.     Appearance: She is well-developed. She is morbidly obese.   HENT:      Head: Normocephalic and atraumatic.   Eyes:      General:         Right eye: No discharge.         Left eye: No discharge.      Conjunctiva/sclera: Conjunctivae normal.   Cardiovascular:      Rate and Rhythm: Normal rate.      Pulses: Normal pulses.      Heart sounds: Murmur present.   Pulmonary:      Effort: Pulmonary effort is normal. No respiratory distress.   Abdominal:      Palpations: Abdomen is soft.      Tenderness: There is abdominal tenderness (epigastric, LUQ).   Musculoskeletal: Normal range of motion.      Right lower leg: No edema.      Left lower leg: No edema.   Skin:     General: Skin is warm and dry.   Neurological:      Mental Status: She is alert and oriented to person, place, and time.         Significant Labs:   CBC:  Recent Labs   Lab 10/27/20  1014 10/28/20  0449   WBC 11.39 9.58   HGB 14.7 12.7   HCT 46.6 41.0    216   GRAN 85.8*  9.8* 71.4  6.8   LYMPH 9.7*  1.1 19.9  1.9   MONO 3.9*  0.4 7.9  0.8   EOS 0.0 0.0   BASO 0.01 0.02      BMP:  Recent Labs   Lab 10/27/20  1014 10/28/20  0434    136   K 4.1 4.7   CL 99 106   CO2 26 18*   BUN 12 10   CREATININE 0.8 0.7   * 108   CALCIUM 10.3 9.3   MG  --  1.5*   PHOS  --  3.4     Significant Imaging:   No new imaging this morning.      Assessment/Plan:      * Partial small bowel obstruction  - Partial SBO in setting of h/o multiple prior abdominal surgeries with prior SBOs in addition.  - Continue NPO status,  IVFs with LR, advance diet as tolerated.  - Continuing morphine 3mg IV q6hr PRN, ondansetron 4mg IV q8hr PRN for pain / nausea control respectively.  - General surgery consult.    Gout  - Continue allopurinol 300mg PO daily.    Normocytic anemia  - Hgb WNL; likely hemoconcentrated on admit.  - No signs of acute losses.  - Monitor.    ALYSE (obstructive sleep apnea)  - Continue CPAP qHS.    Hyperlipidemia  - Continue atorvastatin 10mg PO qHS.    Essential hypertension  - Continue amlodipine 5mg PO daily, benazepril 40mg PO daily; hold HCTZ for time being.    Morbid obesity due to excess calories  - Dietary counseling.    VTE Risk Mitigation (From admission, onward)         Ordered     enoxaparin injection 40 mg  Every 12 hours      10/27/20 1530     IP VTE HIGH RISK PATIENT  Once      10/27/20 1530     Place sequential compression device  Until discontinued      10/27/20 1530              Discharge Planning   YOMI:      Code Status: Full Code   Is the patient medically ready for discharge?:     Reason for patient still in hospital (select all that apply): Treatment  Discharge Plan A: Home        PIERRE Schmitt MD  Department of Hospital Medicine   [facility]

## 2020-10-28 NOTE — ASSESSMENT & PLAN NOTE
- Partial SBO in setting of h/o multiple prior abdominal surgeries with prior SBOs in addition.  - Continue NPO status, IVFs with LR, advance diet as tolerated.  - Continuing morphine 3mg IV q6hr PRN, ondansetron 4mg IV q8hr PRN for pain / nausea control respectively.  - General surgery consult.

## 2020-10-29 LAB
ANION GAP SERPL CALC-SCNC: 11 MMOL/L (ref 8–16)
BUN SERPL-MCNC: 8 MG/DL (ref 8–23)
CALCIUM SERPL-MCNC: 9.5 MG/DL (ref 8.7–10.5)
CHLORIDE SERPL-SCNC: 102 MMOL/L (ref 95–110)
CO2 SERPL-SCNC: 25 MMOL/L (ref 23–29)
CREAT SERPL-MCNC: 0.6 MG/DL (ref 0.5–1.4)
EST. GFR  (AFRICAN AMERICAN): >60 ML/MIN/1.73 M^2
EST. GFR  (NON AFRICAN AMERICAN): >60 ML/MIN/1.73 M^2
GLUCOSE SERPL-MCNC: 111 MG/DL (ref 70–110)
MAGNESIUM SERPL-MCNC: 1.8 MG/DL (ref 1.6–2.6)
PHOSPHATE SERPL-MCNC: 2.9 MG/DL (ref 2.7–4.5)
POTASSIUM SERPL-SCNC: 3.5 MMOL/L (ref 3.5–5.1)
SODIUM SERPL-SCNC: 138 MMOL/L (ref 136–145)

## 2020-10-29 PROCEDURE — 11000001 HC ACUTE MED/SURG PRIVATE ROOM

## 2020-10-29 PROCEDURE — 80048 BASIC METABOLIC PNL TOTAL CA: CPT

## 2020-10-29 PROCEDURE — 84100 ASSAY OF PHOSPHORUS: CPT

## 2020-10-29 PROCEDURE — 99900035 HC TECH TIME PER 15 MIN (STAT)

## 2020-10-29 PROCEDURE — 99233 SBSQ HOSP IP/OBS HIGH 50: CPT | Mod: ,,, | Performed by: INTERNAL MEDICINE

## 2020-10-29 PROCEDURE — 94660 CPAP INITIATION&MGMT: CPT

## 2020-10-29 PROCEDURE — 99233 PR SUBSEQUENT HOSPITAL CARE,LEVL III: ICD-10-PCS | Mod: ,,, | Performed by: INTERNAL MEDICINE

## 2020-10-29 PROCEDURE — 83735 ASSAY OF MAGNESIUM: CPT

## 2020-10-29 PROCEDURE — 63600175 PHARM REV CODE 636 W HCPCS: Performed by: INTERNAL MEDICINE

## 2020-10-29 PROCEDURE — 25000003 PHARM REV CODE 250: Performed by: INTERNAL MEDICINE

## 2020-10-29 PROCEDURE — 36415 COLL VENOUS BLD VENIPUNCTURE: CPT

## 2020-10-29 RX ORDER — ONDANSETRON 2 MG/ML
4 INJECTION INTRAMUSCULAR; INTRAVENOUS EVERY 6 HOURS PRN
Status: DISCONTINUED | OUTPATIENT
Start: 2020-10-29 | End: 2020-11-05 | Stop reason: HOSPADM

## 2020-10-29 RX ORDER — AMLODIPINE BESYLATE 5 MG/1
10 TABLET ORAL DAILY
Status: DISCONTINUED | OUTPATIENT
Start: 2020-10-29 | End: 2020-11-05 | Stop reason: HOSPADM

## 2020-10-29 RX ORDER — BENAZEPRIL HYDROCHLORIDE 40 MG/1
40 TABLET ORAL DAILY
Status: DISCONTINUED | OUTPATIENT
Start: 2020-10-29 | End: 2020-11-05 | Stop reason: HOSPADM

## 2020-10-29 RX ORDER — HYDRALAZINE HYDROCHLORIDE 20 MG/ML
10 INJECTION INTRAMUSCULAR; INTRAVENOUS EVERY 6 HOURS PRN
Status: DISCONTINUED | OUTPATIENT
Start: 2020-10-29 | End: 2020-11-05 | Stop reason: HOSPADM

## 2020-10-29 RX ADMIN — PROMETHAZINE HYDROCHLORIDE 12.5 MG: 25 INJECTION INTRAMUSCULAR; INTRAVENOUS at 11:10

## 2020-10-29 RX ADMIN — ASPIRIN 81 MG: 81 TABLET, COATED ORAL at 08:10

## 2020-10-29 RX ADMIN — SODIUM CHLORIDE, SODIUM LACTATE, POTASSIUM CHLORIDE, AND CALCIUM CHLORIDE: 600; 310; 30; 20 INJECTION, SOLUTION INTRAVENOUS at 10:10

## 2020-10-29 RX ADMIN — MORPHINE SULFATE 3 MG: 4 INJECTION, SOLUTION INTRAMUSCULAR; INTRAVENOUS at 12:10

## 2020-10-29 RX ADMIN — ENOXAPARIN SODIUM 40 MG: 40 INJECTION SUBCUTANEOUS at 08:10

## 2020-10-29 RX ADMIN — ALLOPURINOL 300 MG: 300 TABLET ORAL at 08:10

## 2020-10-29 RX ADMIN — MORPHINE SULFATE 3 MG: 4 INJECTION, SOLUTION INTRAMUSCULAR; INTRAVENOUS at 11:10

## 2020-10-29 RX ADMIN — SODIUM CHLORIDE, SODIUM LACTATE, POTASSIUM CHLORIDE, AND CALCIUM CHLORIDE: 600; 310; 30; 20 INJECTION, SOLUTION INTRAVENOUS at 09:10

## 2020-10-29 RX ADMIN — ATORVASTATIN CALCIUM 10 MG: 10 TABLET, FILM COATED ORAL at 09:10

## 2020-10-29 RX ADMIN — CETIRIZINE HYDROCHLORIDE 10 MG: 10 TABLET, FILM COATED ORAL at 08:10

## 2020-10-29 RX ADMIN — AMLODIPINE BESYLATE 10 MG: 5 TABLET ORAL at 08:10

## 2020-10-29 RX ADMIN — ENOXAPARIN SODIUM 40 MG: 40 INJECTION SUBCUTANEOUS at 09:10

## 2020-10-29 RX ADMIN — BENAZEPRIL HYDROCHLORIDE 40 MG: 40 TABLET, COATED ORAL at 08:10

## 2020-10-29 RX ADMIN — FLUTICASONE PROPIONATE 50 MCG: 50 SPRAY, METERED NASAL at 08:10

## 2020-10-29 RX ADMIN — ONDANSETRON 4 MG: 2 INJECTION INTRAMUSCULAR; INTRAVENOUS at 04:10

## 2020-10-29 RX ADMIN — MORPHINE SULFATE 3 MG: 4 INJECTION, SOLUTION INTRAMUSCULAR; INTRAVENOUS at 07:10

## 2020-10-29 NOTE — PROGRESS NOTES
[facility] Hospital Medicine  Progress Note    Patient Name: Natali Galeano  MRN: [id number]  Patient Class: IP- Inpatient   Admission Date: 10/27/2020  Length of Stay: 2 days  Attending Physician: SUN Schmitt MD  Primary Care Provider: Ewelina Herzog MD        Subjective:     Principal Problem:Partial small bowel obstruction    HPI:  Ms. Galeano is a 79/F with PMH HTN, colon cancer s/p resection 2002, abdominal hernia repair 2004, h/o SBOs (most recent 01/2020) who presented to ED 10/27 with a one day history of upper abdominal pain. She reported she was in her usual state of health when she had sudden onset of sharp abdominal discomfort the night prior to presentation, associated with nausea and multiple episodes of vomiting. She stated that the pain is 9/10 at its worst and was not relieved with acetaminophen at home. Last BM was 10/26 but scant amount, with more significant BM the previous day. ED workup was notable for CT demonstrating partial SBO with jejunal dilatation in LLQ near area of prior ventral hernia repair comparable to studies during prior SBO admissions, without evidence of fluid collection or perforation. Hospital medicine was contacted for admission.      Overview/Hospital Course:  No notes on file    Interval History: No acute events overnight. Nausea and abdominal pain persist; denies flatus. Nausea worsened with clear liquids. No other concerns at this time.    Review of Systems   Constitutional: Negative for chills and fever.   Respiratory: Negative for cough and shortness of breath.    Cardiovascular: Negative for chest pain and palpitations.   Gastrointestinal: Positive for abdominal pain and nausea. Negative for vomiting.     Objective:     Vital Signs (Most Recent):  Temp: 98.7 °F (37.1 °C) (10/29/20 1554)  Pulse: 89 (10/29/20 1554)  Resp: 16 (10/29/20 1554)  BP: (!) 156/72 (10/29/20 1554)  SpO2: 96 % (10/29/20 1554) Vital Signs (24h Range):  Temp:  [97.7 °F  (36.5 °C)-98.7 °F (37.1 °C)] 98.7 °F (37.1 °C)  Pulse:  [74-89] 89  Resp:  [12-19] 16  SpO2:  [94 %-98 %] 96 %  BP: (142-194)/(66-89) 156/72     Weight: 129 kg (284 lb 6.3 oz)  Body mass index is 44.53 kg/m².    Intake/Output Summary (Last 24 hours) at 10/29/2020 1638  Last data filed at 10/29/2020 1600  Gross per 24 hour   Intake 1141 ml   Output --   Net 1141 ml      Physical Exam  Vitals signs and nursing note reviewed.   Constitutional:       General: She is not in acute distress.     Appearance: She is well-developed. She is morbidly obese.   HENT:      Head: Normocephalic and atraumatic.   Eyes:      General:         Right eye: No discharge.         Left eye: No discharge.      Conjunctiva/sclera: Conjunctivae normal.   Cardiovascular:      Rate and Rhythm: Normal rate.      Pulses: Normal pulses.      Heart sounds: Murmur present.   Pulmonary:      Effort: Pulmonary effort is normal. No respiratory distress.   Abdominal:      Palpations: Abdomen is soft.      Tenderness: There is abdominal tenderness (epigastric, LUQ).   Musculoskeletal: Normal range of motion.      Right lower leg: No edema.      Left lower leg: No edema.   Skin:     General: Skin is warm and dry.   Neurological:      Mental Status: She is alert and oriented to person, place, and time.         Significant Labs:   CBC:  Recent Labs   Lab 10/27/20  1014 10/28/20  0449   WBC 11.39 9.58   HGB 14.7 12.7   HCT 46.6 41.0    216   GRAN 85.8*  9.8* 71.4  6.8   LYMPH 9.7*  1.1 19.9  1.9   MONO 3.9*  0.4 7.9  0.8   EOS 0.0 0.0   BASO 0.01 0.02      BMP:  Recent Labs   Lab 10/27/20  1014 10/28/20  0434 10/29/20  0450    136 138   K 4.1 4.7 3.5   CL 99 106 102   CO2 26 18* 25   BUN 12 10 8   CREATININE 0.8 0.7 0.6   * 108 111*   CALCIUM 10.3 9.3 9.5   MG  --  1.5* 1.8   PHOS  --  3.4 2.9     Significant Imaging:   No new imaging this morning.      Assessment/Plan:      * Partial small bowel obstruction  - Partial SBO in setting  of h/o multiple prior abdominal surgeries with prior SBOs in addition.  - Continue IVFs with LR; given difficulty with clears, de-escalate back to NPO sips with medications.  - Continuing morphine 3mg IV q6hr PRN, ondansetron 4mg IV q8hr PRN for pain / nausea control respectively.  - General surgery consulted, appreciate assistance.    Gout  - Continue allopurinol 300mg PO daily.    Normocytic anemia  - Hgb WNL; likely hemoconcentrated on admit.  - No signs of acute losses.  - Monitor.    ALYSE (obstructive sleep apnea)  - Continue CPAP qHS.    Hyperlipidemia  - Continue atorvastatin 10mg PO qHS.    Essential hypertension  - Continue amlodipine 5mg PO daily, benazepril 40mg PO daily; hold HCTZ for time being.    Morbid obesity due to excess calories  - Dietary counseling.    VTE Risk Mitigation (From admission, onward)         Ordered     enoxaparin injection 40 mg  Every 12 hours      10/27/20 1530     IP VTE HIGH RISK PATIENT  Once      10/27/20 1530     Place sequential compression device  Until discontinued      10/27/20 1530                Discharge Planning   YOMI:      Code Status: Full Code   Is the patient medically ready for discharge?:     Reason for patient still in hospital (select all that apply): Treatment  Discharge Plan A: Home        D Jeevan Schmitt MD  Department of Hospital Medicine   [facility]

## 2020-10-29 NOTE — SUBJECTIVE & OBJECTIVE
Interval History: No acute events overnight. Nausea and abdominal pain persist; denies flatus. Nausea worsened with clear liquids. No other concerns at this time.    Review of Systems   Constitutional: Negative for chills and fever.   Respiratory: Negative for cough and shortness of breath.    Cardiovascular: Negative for chest pain and palpitations.   Gastrointestinal: Positive for abdominal pain and nausea. Negative for vomiting.     Objective:     Vital Signs (Most Recent):  Temp: 98.7 °F (37.1 °C) (10/29/20 1554)  Pulse: 89 (10/29/20 1554)  Resp: 16 (10/29/20 1554)  BP: (!) 156/72 (10/29/20 1554)  SpO2: 96 % (10/29/20 1554) Vital Signs (24h Range):  Temp:  [97.7 °F (36.5 °C)-98.7 °F (37.1 °C)] 98.7 °F (37.1 °C)  Pulse:  [74-89] 89  Resp:  [12-19] 16  SpO2:  [94 %-98 %] 96 %  BP: (142-194)/(66-89) 156/72     Weight: 129 kg (284 lb 6.3 oz)  Body mass index is 44.53 kg/m².    Intake/Output Summary (Last 24 hours) at 10/29/2020 1638  Last data filed at 10/29/2020 1600  Gross per 24 hour   Intake 1141 ml   Output --   Net 1141 ml      Physical Exam  Vitals signs and nursing note reviewed.   Constitutional:       General: She is not in acute distress.     Appearance: She is well-developed. She is morbidly obese.   HENT:      Head: Normocephalic and atraumatic.   Eyes:      General:         Right eye: No discharge.         Left eye: No discharge.      Conjunctiva/sclera: Conjunctivae normal.   Cardiovascular:      Rate and Rhythm: Normal rate.      Pulses: Normal pulses.      Heart sounds: Murmur present.   Pulmonary:      Effort: Pulmonary effort is normal. No respiratory distress.   Abdominal:      Palpations: Abdomen is soft.      Tenderness: There is abdominal tenderness (epigastric, LUQ).   Musculoskeletal: Normal range of motion.      Right lower leg: No edema.      Left lower leg: No edema.   Skin:     General: Skin is warm and dry.   Neurological:      Mental Status: She is alert and oriented to person, place,  and time.         Significant Labs:   CBC:  Recent Labs   Lab 10/27/20  1014 10/28/20  0449   WBC 11.39 9.58   HGB 14.7 12.7   HCT 46.6 41.0    216   GRAN 85.8*  9.8* 71.4  6.8   LYMPH 9.7*  1.1 19.9  1.9   MONO 3.9*  0.4 7.9  0.8   EOS 0.0 0.0   BASO 0.01 0.02      BMP:  Recent Labs   Lab 10/27/20  1014 10/28/20  0434 10/29/20  0450    136 138   K 4.1 4.7 3.5   CL 99 106 102   CO2 26 18* 25   BUN 12 10 8   CREATININE 0.8 0.7 0.6   * 108 111*   CALCIUM 10.3 9.3 9.5   MG  --  1.5* 1.8   PHOS  --  3.4 2.9     Significant Imaging:   No new imaging this morning.

## 2020-10-29 NOTE — PHYSICIAN QUERY
PT Name: Natali Galeano  MR #: [id number]     Bowel Obstruction Clarification     CDS Libia Edgar, RN, BSN        Contact Information:    [email or web address]         This form is a permanent document in the medical record.     Query Date: October 29, 2020    By submitting this query, we are merely seeking further clarification of documentation to reflect the severity of illness of your patient. Please utilize your independent clinical judgment when addressing the question(s) below.    The medical record reflects the following:     Indicators   Supporting Clinical Findings Location in Medical Record   X   Bowel obstruction, intestinal obstruction, LBO or SBO documented Principal Problem: Partial small bowel obstruction    79/F with PMH HTN, colon cancer s/p resection 2002, abdominal hernia repair 2004, h/o SBOs (most recent 01/2020) who presented to ED 10/27 with a one day history of upper abdominal pain.    X   Radiology findings Impression:     Findings most consistent with low-grade/partial small bowel obstruction, noting mild dilatation of the jejunum and fecalization of small bowel contents in the left lower quadrant.  This is adjacent to the area of the prior ventral hernia repair with mesh, and similar findings have been seen on prior CT exams.  No drainable fluid collection or evidence of perforation. 10/27 CT   X   Treatment/Medication - Partial SBO in setting of h/o multiple prior abdominal surgeries with prior SBOs in addition.  - NPO, start IVFs with LR, advance diet as tolerated.  - General surgery consulted in ED; pursue conservative approach for now.  - Start morphine 3mg IV q6hr PRN, ondansetron 4mg IV q8hr PRN for pain / nausea control   respectively. 10/27 H&P    Procedure/Surgery      Other       Provider, please further specify the bowel obstruction diagnosis:    [   ] Partial or incomplete intestinal obstruction, due to adhesions     [   ] Partial or incomplete intestinal obstruction, due  to other (please specify): ____________     [ X ] Partial or incomplete intestinal obstruction, unknown or unspecified etiology     [   ] Other intestinal condition (please specify): _____________________     [   ]  Clinically Undetermined         Please document in your progress notes daily for the duration of treatment until resolved, and include in your discharge summary.

## 2020-10-29 NOTE — ASSESSMENT & PLAN NOTE
- Partial SBO in setting of h/o multiple prior abdominal surgeries with prior SBOs in addition.  - Continue IVFs with LR; given difficulty with clears, de-escalate back to NPO sips with medications.  - Continuing morphine 3mg IV q6hr PRN, ondansetron 4mg IV q8hr PRN for pain / nausea control respectively.  - General surgery consulted, appreciate assistance.

## 2020-10-29 NOTE — PLAN OF CARE
Pt remained free of falls and injuries throughout shift. AAO x4. Purposeful hourly rounding performed. POC reviewed with pt. IV flushed and infusing LR at 100 mL/hr. Managing pain with PRN medications. Ambulates without assistance. VSS on room air. Diet advanced from NPO to clear liquids per General Surgery. Pt did not eat anything today, nauseated and resting in bed. Bed low and locked, call light within reach, side rails up X2. Will continue to monitor.

## 2020-10-30 LAB
ANION GAP SERPL CALC-SCNC: 10 MMOL/L (ref 8–16)
BUN SERPL-MCNC: 9 MG/DL (ref 8–23)
CALCIUM SERPL-MCNC: 9.6 MG/DL (ref 8.7–10.5)
CHLORIDE SERPL-SCNC: 101 MMOL/L (ref 95–110)
CO2 SERPL-SCNC: 27 MMOL/L (ref 23–29)
CREAT SERPL-MCNC: 0.8 MG/DL (ref 0.5–1.4)
EST. GFR  (AFRICAN AMERICAN): >60 ML/MIN/1.73 M^2
EST. GFR  (NON AFRICAN AMERICAN): >60 ML/MIN/1.73 M^2
GLUCOSE SERPL-MCNC: 103 MG/DL (ref 70–110)
MAGNESIUM SERPL-MCNC: 1.6 MG/DL (ref 1.6–2.6)
PHOSPHATE SERPL-MCNC: 2.8 MG/DL (ref 2.7–4.5)
POTASSIUM SERPL-SCNC: 3.3 MMOL/L (ref 3.5–5.1)
SODIUM SERPL-SCNC: 138 MMOL/L (ref 136–145)

## 2020-10-30 PROCEDURE — 94761 N-INVAS EAR/PLS OXIMETRY MLT: CPT

## 2020-10-30 PROCEDURE — 25000242 PHARM REV CODE 250 ALT 637 W/ HCPCS: Performed by: INTERNAL MEDICINE

## 2020-10-30 PROCEDURE — 83735 ASSAY OF MAGNESIUM: CPT

## 2020-10-30 PROCEDURE — 99233 PR SUBSEQUENT HOSPITAL CARE,LEVL III: ICD-10-PCS | Mod: ,,, | Performed by: INTERNAL MEDICINE

## 2020-10-30 PROCEDURE — 99900035 HC TECH TIME PER 15 MIN (STAT)

## 2020-10-30 PROCEDURE — 25000003 PHARM REV CODE 250: Performed by: INTERNAL MEDICINE

## 2020-10-30 PROCEDURE — 11000001 HC ACUTE MED/SURG PRIVATE ROOM

## 2020-10-30 PROCEDURE — 80048 BASIC METABOLIC PNL TOTAL CA: CPT

## 2020-10-30 PROCEDURE — 63600175 PHARM REV CODE 636 W HCPCS: Performed by: INTERNAL MEDICINE

## 2020-10-30 PROCEDURE — 99233 SBSQ HOSP IP/OBS HIGH 50: CPT | Mod: ,,, | Performed by: INTERNAL MEDICINE

## 2020-10-30 PROCEDURE — 36415 COLL VENOUS BLD VENIPUNCTURE: CPT

## 2020-10-30 PROCEDURE — 84100 ASSAY OF PHOSPHORUS: CPT

## 2020-10-30 RX ORDER — POTASSIUM CHLORIDE 7.45 MG/ML
10 INJECTION INTRAVENOUS
Status: COMPLETED | OUTPATIENT
Start: 2020-10-30 | End: 2020-10-30

## 2020-10-30 RX ADMIN — POTASSIUM CHLORIDE 10 MEQ: 7.46 INJECTION, SOLUTION INTRAVENOUS at 11:10

## 2020-10-30 RX ADMIN — ATORVASTATIN CALCIUM 10 MG: 10 TABLET, FILM COATED ORAL at 08:10

## 2020-10-30 RX ADMIN — CETIRIZINE HYDROCHLORIDE 10 MG: 10 TABLET, FILM COATED ORAL at 09:10

## 2020-10-30 RX ADMIN — POTASSIUM CHLORIDE 10 MEQ: 7.46 INJECTION, SOLUTION INTRAVENOUS at 09:10

## 2020-10-30 RX ADMIN — MORPHINE SULFATE 3 MG: 4 INJECTION, SOLUTION INTRAMUSCULAR; INTRAVENOUS at 02:10

## 2020-10-30 RX ADMIN — SODIUM CHLORIDE, SODIUM LACTATE, POTASSIUM CHLORIDE, AND CALCIUM CHLORIDE: 600; 310; 30; 20 INJECTION, SOLUTION INTRAVENOUS at 06:10

## 2020-10-30 RX ADMIN — ALLOPURINOL 300 MG: 300 TABLET ORAL at 09:10

## 2020-10-30 RX ADMIN — ENOXAPARIN SODIUM 40 MG: 40 INJECTION SUBCUTANEOUS at 09:10

## 2020-10-30 RX ADMIN — ASPIRIN 81 MG: 81 TABLET, COATED ORAL at 09:10

## 2020-10-30 RX ADMIN — BENAZEPRIL HYDROCHLORIDE 40 MG: 40 TABLET, COATED ORAL at 09:10

## 2020-10-30 RX ADMIN — AMLODIPINE BESYLATE 10 MG: 5 TABLET ORAL at 09:10

## 2020-10-30 RX ADMIN — POTASSIUM CHLORIDE 10 MEQ: 7.46 INJECTION, SOLUTION INTRAVENOUS at 10:10

## 2020-10-30 RX ADMIN — FLUTICASONE PROPIONATE 50 MCG: 50 SPRAY, METERED NASAL at 09:10

## 2020-10-30 RX ADMIN — ENOXAPARIN SODIUM 40 MG: 40 INJECTION SUBCUTANEOUS at 08:10

## 2020-10-30 NOTE — PROGRESS NOTES
[facility] Hospital Medicine  Progress Note    Patient Name: Natali Galeano  MRN: [id number]  Patient Class: IP- Inpatient   Admission Date: 10/27/2020  Length of Stay: 3 days  Attending Physician: SUN Schmitt MD  Primary Care Provider: Ewelina Herzog MD        Subjective:     Principal Problem:Partial small bowel obstruction        HPI:  Ms. Galeano is a 79/F with PMH HTN, colon cancer s/p resection 2002, abdominal hernia repair 2004, h/o SBOs (most recent 01/2020) who presented to ED 10/27 with a one day history of upper abdominal pain. She reported she was in her usual state of health when she had sudden onset of sharp abdominal discomfort the night prior to presentation, associated with nausea and multiple episodes of vomiting. She stated that the pain is 9/10 at its worst and was not relieved with acetaminophen at home. Last BM was 10/26 but scant amount, with more significant BM the previous day. ED workup was notable for CT demonstrating partial SBO with jejunal dilatation in LLQ near area of prior ventral hernia repair comparable to studies during prior SBO admissions, without evidence of fluid collection or perforation. Hospital medicine was contacted for admission.      Overview/Hospital Course:  No notes on file    Interval History: No acute events overnight. Less pain and nausea than before. No new concerns at this time.    Review of Systems   Constitutional: Negative for chills and fever.   Respiratory: Negative for cough and shortness of breath.    Cardiovascular: Negative for chest pain and palpitations.   Gastrointestinal: Positive for abdominal pain and nausea. Negative for vomiting.     Objective:     Vital Signs (Most Recent):  Temp: 98.3 °F (36.8 °C) (10/30/20 1619)  Pulse: 89 (10/30/20 1619)  Resp: 16 (10/30/20 1619)  BP: (!) 141/64 (10/30/20 1619)  SpO2: 97 % (10/30/20 1619) Vital Signs (24h Range):  Temp:  [97.8 °F (36.6 °C)-98.5 °F (36.9 °C)] 98.3 °F (36.8 °C)  Pulse:   [74-93] 89  Resp:  [16-18] 16  SpO2:  [94 %-100 %] 97 %  BP: (139-174)/(64-86) 141/64     Weight: 129 kg (284 lb 6.3 oz)  Body mass index is 44.53 kg/m².    Intake/Output Summary (Last 24 hours) at 10/30/2020 1948  Last data filed at 10/30/2020 1800  Gross per 24 hour   Intake 2732 ml   Output 400 ml   Net 2332 ml      Physical Exam  Vitals signs and nursing note reviewed.   Constitutional:       General: She is not in acute distress.     Appearance: She is well-developed. She is morbidly obese.   HENT:      Head: Normocephalic and atraumatic.   Eyes:      General:         Right eye: No discharge.         Left eye: No discharge.      Conjunctiva/sclera: Conjunctivae normal.   Cardiovascular:      Rate and Rhythm: Normal rate.      Pulses: Normal pulses.      Heart sounds: Murmur present.   Pulmonary:      Effort: Pulmonary effort is normal. No respiratory distress.   Abdominal:      General: Bowel sounds are decreased.      Palpations: Abdomen is soft.      Tenderness: There is abdominal tenderness (epigastric, LUQ).   Musculoskeletal: Normal range of motion.      Right lower leg: No edema.      Left lower leg: No edema.   Skin:     General: Skin is warm and dry.   Neurological:      Mental Status: She is alert and oriented to person, place, and time.         Significant Labs:   CBC:  Recent Labs   Lab 10/28/20  0449   WBC 9.58   HGB 12.7   HCT 41.0      GRAN 71.4  6.8   LYMPH 19.9  1.9   MONO 7.9  0.8   EOS 0.0   BASO 0.02      BMP:  Recent Labs   Lab 10/28/20  0434 10/29/20  0450 10/30/20  0532    138 138   K 4.7 3.5 3.3*    102 101   CO2 18* 25 27   BUN 10 8 9   CREATININE 0.7 0.6 0.8    111* 103   CALCIUM 9.3 9.5 9.6   MG 1.5* 1.8 1.6   PHOS 3.4 2.9 2.8     Significant Imaging:   No new imaging this morning.      Assessment/Plan:      * Partial small bowel obstruction  - Partial SBO in setting of h/o multiple prior abdominal surgeries with prior SBOs in addition.  - Continue IVFs  with LR.  - Gradual improvement; advance to full liquids this evening.  - Continuing morphine 3mg IV q6hr PRN, ondansetron 4mg IV q8hr PRN for pain / nausea control respectively.  - General surgery consulted, appreciate assistance.    Gout  - Continue allopurinol 300mg PO daily.    Normocytic anemia  - Hgb WNL; likely hemoconcentrated on admit.  - No signs of acute losses.  - Monitor.    ALYSE (obstructive sleep apnea)  - Continue CPAP qHS.    Hyperlipidemia  - Continue atorvastatin 10mg PO qHS.    Essential hypertension  - Continue amlodipine 5mg PO daily, benazepril 40mg PO daily; hold HCTZ for time being.    Morbid obesity due to excess calories  - Dietary counseling.    VTE Risk Mitigation (From admission, onward)         Ordered     enoxaparin injection 40 mg  Every 12 hours      10/27/20 1530     IP VTE HIGH RISK PATIENT  Once      10/27/20 1530     Place sequential compression device  Until discontinued      10/27/20 1530                Discharge Planning   YOMI:      Code Status: Full Code   Is the patient medically ready for discharge?:     Reason for patient still in hospital (select all that apply): Treatment  Discharge Plan A: Home                  PIERRE Schmitt MD  Department of Hospital Medicine   [facility]

## 2020-10-30 NOTE — PLAN OF CARE
Patient on RA with sats as documented.  Patient on documented CPAP settings, with alarms set and functioning.    Will continue to monitor.

## 2020-10-30 NOTE — NURSING
Pt alert and oriented X 4.  VSS on room air.  Diet advanced to Full liquid and tolerating well. No complaints this shift of nausea or pain. Ambulates independently.  IV fluids D/C'd per MD order.  PIV remains to SL.  Bed in low position with call light within reach. Bed rails up X 2.  Instructed to call with any needs.  Purposeful rounding done. Will continue to monitor

## 2020-10-30 NOTE — PLAN OF CARE
POC reviewed with patient. AAOx4, VSS, on RA. Patient c/o 8 out 10 pain to abdomen. PRN Morphine given x2 with positive relief. LR infusing at 100 ml/hr to PIV. Patient had no c/o nausea, but had minimal PO intake on clear liquid diet. Patient reports PO intake increases nausea. Patient tolerated CPAP for half of the night. Patient remained free of falls and injury this shift. Purposeful rounding completed. Patient instructed to call staff with any needs.

## 2020-10-31 LAB
ANION GAP SERPL CALC-SCNC: 10 MMOL/L (ref 8–16)
BUN SERPL-MCNC: 15 MG/DL (ref 8–23)
CALCIUM SERPL-MCNC: 8.9 MG/DL (ref 8.7–10.5)
CHLORIDE SERPL-SCNC: 104 MMOL/L (ref 95–110)
CO2 SERPL-SCNC: 25 MMOL/L (ref 23–29)
CREAT SERPL-MCNC: 0.8 MG/DL (ref 0.5–1.4)
EST. GFR  (AFRICAN AMERICAN): >60 ML/MIN/1.73 M^2
EST. GFR  (NON AFRICAN AMERICAN): >60 ML/MIN/1.73 M^2
GLUCOSE SERPL-MCNC: 82 MG/DL (ref 70–110)
MAGNESIUM SERPL-MCNC: 1.7 MG/DL (ref 1.6–2.6)
PHOSPHATE SERPL-MCNC: 2.9 MG/DL (ref 2.7–4.5)
POTASSIUM SERPL-SCNC: 3.2 MMOL/L (ref 3.5–5.1)
SODIUM SERPL-SCNC: 139 MMOL/L (ref 136–145)

## 2020-10-31 PROCEDURE — 11000001 HC ACUTE MED/SURG PRIVATE ROOM

## 2020-10-31 PROCEDURE — 63600175 PHARM REV CODE 636 W HCPCS: Performed by: INTERNAL MEDICINE

## 2020-10-31 PROCEDURE — 99233 PR SUBSEQUENT HOSPITAL CARE,LEVL III: ICD-10-PCS | Mod: ,,, | Performed by: INTERNAL MEDICINE

## 2020-10-31 PROCEDURE — 25000003 PHARM REV CODE 250: Performed by: INTERNAL MEDICINE

## 2020-10-31 PROCEDURE — 99233 SBSQ HOSP IP/OBS HIGH 50: CPT | Mod: ,,, | Performed by: INTERNAL MEDICINE

## 2020-10-31 PROCEDURE — 80048 BASIC METABOLIC PNL TOTAL CA: CPT

## 2020-10-31 PROCEDURE — 84100 ASSAY OF PHOSPHORUS: CPT

## 2020-10-31 PROCEDURE — 83735 ASSAY OF MAGNESIUM: CPT

## 2020-10-31 PROCEDURE — 36415 COLL VENOUS BLD VENIPUNCTURE: CPT

## 2020-10-31 RX ORDER — POTASSIUM CHLORIDE 1.5 G/1.58G
40 POWDER, FOR SOLUTION ORAL
Status: COMPLETED | OUTPATIENT
Start: 2020-10-31 | End: 2020-10-31

## 2020-10-31 RX ADMIN — CETIRIZINE HYDROCHLORIDE 10 MG: 10 TABLET, FILM COATED ORAL at 08:10

## 2020-10-31 RX ADMIN — POTASSIUM CHLORIDE 40 MEQ: 1.5 POWDER, FOR SOLUTION ORAL at 12:10

## 2020-10-31 RX ADMIN — ENOXAPARIN SODIUM 40 MG: 40 INJECTION SUBCUTANEOUS at 08:10

## 2020-10-31 RX ADMIN — AMLODIPINE BESYLATE 10 MG: 5 TABLET ORAL at 08:10

## 2020-10-31 RX ADMIN — ALLOPURINOL 300 MG: 300 TABLET ORAL at 08:10

## 2020-10-31 RX ADMIN — ENOXAPARIN SODIUM 40 MG: 40 INJECTION SUBCUTANEOUS at 09:10

## 2020-10-31 RX ADMIN — ASPIRIN 81 MG: 81 TABLET, COATED ORAL at 08:10

## 2020-10-31 RX ADMIN — POTASSIUM CHLORIDE 40 MEQ: 1.5 POWDER, FOR SOLUTION ORAL at 08:10

## 2020-10-31 RX ADMIN — BENAZEPRIL HYDROCHLORIDE 40 MG: 40 TABLET, COATED ORAL at 08:10

## 2020-10-31 RX ADMIN — FLUTICASONE PROPIONATE 50 MCG: 50 SPRAY, METERED NASAL at 08:10

## 2020-10-31 RX ADMIN — ATORVASTATIN CALCIUM 10 MG: 10 TABLET, FILM COATED ORAL at 09:10

## 2020-10-31 NOTE — SUBJECTIVE & OBJECTIVE
Interval History: No acute events overnight. Less pain and nausea than before. No new concerns at this time.    Review of Systems   Constitutional: Negative for chills and fever.   Respiratory: Negative for cough and shortness of breath.    Cardiovascular: Negative for chest pain and palpitations.   Gastrointestinal: Positive for abdominal pain and nausea. Negative for vomiting.     Objective:     Vital Signs (Most Recent):  Temp: 98.3 °F (36.8 °C) (10/30/20 1619)  Pulse: 89 (10/30/20 1619)  Resp: 16 (10/30/20 1619)  BP: (!) 141/64 (10/30/20 1619)  SpO2: 97 % (10/30/20 1619) Vital Signs (24h Range):  Temp:  [97.8 °F (36.6 °C)-98.5 °F (36.9 °C)] 98.3 °F (36.8 °C)  Pulse:  [74-93] 89  Resp:  [16-18] 16  SpO2:  [94 %-100 %] 97 %  BP: (139-174)/(64-86) 141/64     Weight: 129 kg (284 lb 6.3 oz)  Body mass index is 44.53 kg/m².    Intake/Output Summary (Last 24 hours) at 10/30/2020 1948  Last data filed at 10/30/2020 1800  Gross per 24 hour   Intake 2732 ml   Output 400 ml   Net 2332 ml      Physical Exam  Vitals signs and nursing note reviewed.   Constitutional:       General: She is not in acute distress.     Appearance: She is well-developed. She is morbidly obese.   HENT:      Head: Normocephalic and atraumatic.   Eyes:      General:         Right eye: No discharge.         Left eye: No discharge.      Conjunctiva/sclera: Conjunctivae normal.   Cardiovascular:      Rate and Rhythm: Normal rate.      Pulses: Normal pulses.      Heart sounds: Murmur present.   Pulmonary:      Effort: Pulmonary effort is normal. No respiratory distress.   Abdominal:      General: Bowel sounds are decreased.      Palpations: Abdomen is soft.      Tenderness: There is abdominal tenderness (epigastric, LUQ).   Musculoskeletal: Normal range of motion.      Right lower leg: No edema.      Left lower leg: No edema.   Skin:     General: Skin is warm and dry.   Neurological:      Mental Status: She is alert and oriented to person, place, and  time.         Significant Labs:   CBC:  Recent Labs   Lab 10/28/20  0449   WBC 9.58   HGB 12.7   HCT 41.0      GRAN 71.4  6.8   LYMPH 19.9  1.9   MONO 7.9  0.8   EOS 0.0   BASO 0.02      BMP:  Recent Labs   Lab 10/28/20  0434 10/29/20  0450 10/30/20  0532    138 138   K 4.7 3.5 3.3*    102 101   CO2 18* 25 27   BUN 10 8 9   CREATININE 0.7 0.6 0.8    111* 103   CALCIUM 9.3 9.5 9.6   MG 1.5* 1.8 1.6   PHOS 3.4 2.9 2.8     Significant Imaging:   No new imaging this morning.

## 2020-10-31 NOTE — NURSING
Received report from NORRIS Pineda. Assumed care of pt. Assessment performed. VSS on room air. Pt sleeping between care, denies pain, denies needs at this time. Bed low and locked, bed alarm on, side rails up x2, call light in reach. Will continue to monitor.

## 2020-10-31 NOTE — SUBJECTIVE & OBJECTIVE
Interval History: No acute events overnight. Less pain and nausea than before. +flatus, no BM. No new concerns at this time.    Review of Systems   Constitutional: Negative for chills and fever.   Respiratory: Negative for cough and shortness of breath.    Cardiovascular: Negative for chest pain and palpitations.   Gastrointestinal: Positive for abdominal pain. Negative for nausea and vomiting.     Objective:     Vital Signs (Most Recent):  Temp: 98.3 °F (36.8 °C) (10/31/20 1613)  Pulse: 80 (10/31/20 1613)  Resp: 16 (10/31/20 1613)  BP: 131/61 (10/31/20 1613)  SpO2: 99 % (10/31/20 1613) Vital Signs (24h Range):  Temp:  [97.6 °F (36.4 °C)-98.3 °F (36.8 °C)] 98.3 °F (36.8 °C)  Pulse:  [73-88] 80  Resp:  [16-18] 16  SpO2:  [98 %-100 %] 99 %  BP: (128-143)/(59-74) 131/61     Weight: 129 kg (284 lb 6.3 oz)  Body mass index is 44.53 kg/m².    Intake/Output Summary (Last 24 hours) at 10/31/2020 1620  Last data filed at 10/31/2020 0700  Gross per 24 hour   Intake 1010 ml   Output --   Net 1010 ml      Physical Exam  Vitals signs and nursing note reviewed.   Constitutional:       General: She is not in acute distress.     Appearance: She is well-developed. She is morbidly obese.   HENT:      Head: Normocephalic and atraumatic.   Eyes:      General:         Right eye: No discharge.         Left eye: No discharge.      Conjunctiva/sclera: Conjunctivae normal.   Cardiovascular:      Rate and Rhythm: Normal rate.      Pulses: Normal pulses.      Heart sounds: Murmur present.   Pulmonary:      Effort: Pulmonary effort is normal. No respiratory distress.   Abdominal:      General: Bowel sounds are normal.      Palpations: Abdomen is soft.      Tenderness: There is abdominal tenderness (epigastric, LUQ).   Musculoskeletal: Normal range of motion.      Right lower leg: No edema.      Left lower leg: No edema.   Skin:     General: Skin is warm and dry.   Neurological:      Mental Status: She is alert and oriented to person, place,  and time.         Significant Labs:   BMP:  Recent Labs   Lab 10/29/20  0450 10/30/20  0532 10/31/20  0519    138 139   K 3.5 3.3* 3.2*    101 104   CO2 25 27 25   BUN 8 9 15   CREATININE 0.6 0.8 0.8   * 103 82   CALCIUM 9.5 9.6 8.9   MG 1.8 1.6 1.7   PHOS 2.9 2.8 2.9     Significant Imaging:   No new imaging this morning.

## 2020-10-31 NOTE — PROGRESS NOTES
Diagnosis-small-bowel obstruction  Taking full liquids  No bowel movement  Denies nausea, vomiting  No abdominal pain    PE  Abdomen-soft, nontender, nondistended      Impression/plan  SBO, resolving  Advance diet when bowel function returns

## 2020-10-31 NOTE — ASSESSMENT & PLAN NOTE
- Partial SBO in setting of h/o multiple prior abdominal surgeries with prior SBOs in addition.  - Continue IVFs with LR.  - Continue full liquids.  - Continuing morphine 3mg IV q6hr PRN, ondansetron 4mg IV q8hr PRN for pain / nausea control respectively.  - Appreciate surgery assistance.

## 2020-10-31 NOTE — PLAN OF CARE
Pt remained free of falls and injuries throughout shift. AAOx4. Pt calm and cooperative. Purposeful rounding performed. IV flushed and saline locked. No c/o pain. Patient ambulates independently to toilet, still no BMs this shift. Pt sleeping in bed, even rise and fall of chest. VSS on room air. Bed low and locked, bed alarm on, call light in reach. Side rails up x2. Will continue to monitor.

## 2020-10-31 NOTE — PLAN OF CARE
Pt resting comfortably.  Mild abdominal cramping reported early in shift, otherwise no pain reported.  Tolerated full liquid diet well.  Pt passing flatus; no BM during shift.  Pt up to toilet with standby assist.  Safety measures in place.  Purposeful hourly rounding completed.  Will continue to monitor.

## 2020-10-31 NOTE — PROGRESS NOTES
[facility] Hospital Medicine  Progress Note    Patient Name: Natali Galeano  MRN: [id number]  Patient Class: IP- Inpatient   Admission Date: 10/27/2020  Length of Stay: 4 days  Attending Physician: SUN Schmitt MD  Primary Care Provider: Ewelina Herzog MD        Subjective:     Principal Problem:Partial small bowel obstruction        HPI:  Ms. Galeano is a 79/F with PMH HTN, colon cancer s/p resection 2002, abdominal hernia repair 2004, h/o SBOs (most recent 01/2020) who presented to ED 10/27 with a one day history of upper abdominal pain. She reported she was in her usual state of health when she had sudden onset of sharp abdominal discomfort the night prior to presentation, associated with nausea and multiple episodes of vomiting. She stated that the pain is 9/10 at its worst and was not relieved with acetaminophen at home. Last BM was 10/26 but scant amount, with more significant BM the previous day. ED workup was notable for CT demonstrating partial SBO with jejunal dilatation in LLQ near area of prior ventral hernia repair comparable to studies during prior SBO admissions, without evidence of fluid collection or perforation. Hospital medicine was contacted for admission.      Overview/Hospital Course:  No notes on file    Interval History: No acute events overnight. Less pain and nausea than before. +flatus, no BM. No new concerns at this time.    Review of Systems   Constitutional: Negative for chills and fever.   Respiratory: Negative for cough and shortness of breath.    Cardiovascular: Negative for chest pain and palpitations.   Gastrointestinal: Positive for abdominal pain. Negative for nausea and vomiting.     Objective:     Vital Signs (Most Recent):  Temp: 98.3 °F (36.8 °C) (10/31/20 1613)  Pulse: 80 (10/31/20 1613)  Resp: 16 (10/31/20 1613)  BP: 131/61 (10/31/20 1613)  SpO2: 99 % (10/31/20 1613) Vital Signs (24h Range):  Temp:  [97.6 °F (36.4 °C)-98.3 °F (36.8 °C)] 98.3 °F (36.8  °C)  Pulse:  [73-88] 80  Resp:  [16-18] 16  SpO2:  [98 %-100 %] 99 %  BP: (128-143)/(59-74) 131/61     Weight: 129 kg (284 lb 6.3 oz)  Body mass index is 44.53 kg/m².    Intake/Output Summary (Last 24 hours) at 10/31/2020 1620  Last data filed at 10/31/2020 0700  Gross per 24 hour   Intake 1010 ml   Output --   Net 1010 ml      Physical Exam  Vitals signs and nursing note reviewed.   Constitutional:       General: She is not in acute distress.     Appearance: She is well-developed. She is morbidly obese.   HENT:      Head: Normocephalic and atraumatic.   Eyes:      General:         Right eye: No discharge.         Left eye: No discharge.      Conjunctiva/sclera: Conjunctivae normal.   Cardiovascular:      Rate and Rhythm: Normal rate.      Pulses: Normal pulses.      Heart sounds: Murmur present.   Pulmonary:      Effort: Pulmonary effort is normal. No respiratory distress.   Abdominal:      General: Bowel sounds are normal.      Palpations: Abdomen is soft.      Tenderness: There is abdominal tenderness (epigastric, LUQ).   Musculoskeletal: Normal range of motion.      Right lower leg: No edema.      Left lower leg: No edema.   Skin:     General: Skin is warm and dry.   Neurological:      Mental Status: She is alert and oriented to person, place, and time.         Significant Labs:   BMP:  Recent Labs   Lab 10/29/20  0450 10/30/20  0532 10/31/20  0519    138 139   K 3.5 3.3* 3.2*    101 104   CO2 25 27 25   BUN 8 9 15   CREATININE 0.6 0.8 0.8   * 103 82   CALCIUM 9.5 9.6 8.9   MG 1.8 1.6 1.7   PHOS 2.9 2.8 2.9     Significant Imaging:   No new imaging this morning.      Assessment/Plan:      * Partial small bowel obstruction  - Partial SBO in setting of h/o multiple prior abdominal surgeries with prior SBOs in addition.  - Continue IVFs with LR.  - Continue full liquids.  - Continuing morphine 3mg IV q6hr PRN, ondansetron 4mg IV q8hr PRN for pain / nausea control respectively.  - Appreciate  surgery assistance.    Gout  - Continue allopurinol 300mg PO daily.    Normocytic anemia  - Hgb WNL; likely hemoconcentrated on admit.  - No signs of acute losses.  - Monitor.    ALYSE (obstructive sleep apnea)  - Continue CPAP qHS.    Hyperlipidemia  - Continue atorvastatin 10mg PO qHS.    Essential hypertension  - Continue amlodipine 5mg PO daily, benazepril 40mg PO daily; hold HCTZ for time being.    Morbid obesity due to excess calories  - Dietary counseling.    VTE Risk Mitigation (From admission, onward)         Ordered     enoxaparin injection 40 mg  Every 12 hours      10/27/20 1530     IP VTE HIGH RISK PATIENT  Once      10/27/20 1530     Place sequential compression device  Until discontinued      10/27/20 1530                Discharge Planning   YOMI:      Code Status: Full Code   Is the patient medically ready for discharge?:     Reason for patient still in hospital (select all that apply): Treatment  Discharge Plan A: Home                  D Jeevan Schmitt MD  Department of Hospital Medicine   [facility]

## 2020-10-31 NOTE — ASSESSMENT & PLAN NOTE
- Partial SBO in setting of h/o multiple prior abdominal surgeries with prior SBOs in addition.  - Continue IVFs with LR.  - Gradual improvement; advance to full liquids this evening.  - Continuing morphine 3mg IV q6hr PRN, ondansetron 4mg IV q8hr PRN for pain / nausea control respectively.  - General surgery consulted, appreciate assistance.

## 2020-11-01 LAB
ANION GAP SERPL CALC-SCNC: 12 MMOL/L (ref 8–16)
BUN SERPL-MCNC: 13 MG/DL (ref 8–23)
CALCIUM SERPL-MCNC: 9.2 MG/DL (ref 8.7–10.5)
CHLORIDE SERPL-SCNC: 107 MMOL/L (ref 95–110)
CO2 SERPL-SCNC: 22 MMOL/L (ref 23–29)
CREAT SERPL-MCNC: 0.8 MG/DL (ref 0.5–1.4)
EST. GFR  (AFRICAN AMERICAN): >60 ML/MIN/1.73 M^2
EST. GFR  (NON AFRICAN AMERICAN): >60 ML/MIN/1.73 M^2
GLUCOSE SERPL-MCNC: 84 MG/DL (ref 70–110)
MAGNESIUM SERPL-MCNC: 1.7 MG/DL (ref 1.6–2.6)
PHOSPHATE SERPL-MCNC: 3.5 MG/DL (ref 2.7–4.5)
POTASSIUM SERPL-SCNC: 3.8 MMOL/L (ref 3.5–5.1)
SODIUM SERPL-SCNC: 141 MMOL/L (ref 136–145)

## 2020-11-01 PROCEDURE — 83735 ASSAY OF MAGNESIUM: CPT

## 2020-11-01 PROCEDURE — 80048 BASIC METABOLIC PNL TOTAL CA: CPT

## 2020-11-01 PROCEDURE — 63600175 PHARM REV CODE 636 W HCPCS: Performed by: INTERNAL MEDICINE

## 2020-11-01 PROCEDURE — 99900035 HC TECH TIME PER 15 MIN (STAT)

## 2020-11-01 PROCEDURE — 99233 SBSQ HOSP IP/OBS HIGH 50: CPT | Mod: ,,, | Performed by: INTERNAL MEDICINE

## 2020-11-01 PROCEDURE — 84100 ASSAY OF PHOSPHORUS: CPT

## 2020-11-01 PROCEDURE — 36415 COLL VENOUS BLD VENIPUNCTURE: CPT

## 2020-11-01 PROCEDURE — 99233 PR SUBSEQUENT HOSPITAL CARE,LEVL III: ICD-10-PCS | Mod: ,,, | Performed by: INTERNAL MEDICINE

## 2020-11-01 PROCEDURE — 11000001 HC ACUTE MED/SURG PRIVATE ROOM

## 2020-11-01 PROCEDURE — 94761 N-INVAS EAR/PLS OXIMETRY MLT: CPT

## 2020-11-01 PROCEDURE — 25000003 PHARM REV CODE 250: Performed by: INTERNAL MEDICINE

## 2020-11-01 RX ADMIN — ENOXAPARIN SODIUM 40 MG: 40 INJECTION SUBCUTANEOUS at 09:11

## 2020-11-01 RX ADMIN — FLUTICASONE PROPIONATE 50 MCG: 50 SPRAY, METERED NASAL at 09:11

## 2020-11-01 RX ADMIN — CETIRIZINE HYDROCHLORIDE 10 MG: 10 TABLET, FILM COATED ORAL at 09:11

## 2020-11-01 RX ADMIN — AMLODIPINE BESYLATE 10 MG: 5 TABLET ORAL at 09:11

## 2020-11-01 RX ADMIN — ASPIRIN 81 MG: 81 TABLET, COATED ORAL at 09:11

## 2020-11-01 RX ADMIN — ALLOPURINOL 300 MG: 300 TABLET ORAL at 09:11

## 2020-11-01 RX ADMIN — BENAZEPRIL HYDROCHLORIDE 40 MG: 40 TABLET, COATED ORAL at 09:11

## 2020-11-01 RX ADMIN — ATORVASTATIN CALCIUM 10 MG: 10 TABLET, FILM COATED ORAL at 09:11

## 2020-11-01 NOTE — SUBJECTIVE & OBJECTIVE
Interval History: No acute events overnight. No pain or nausea this AM. +flatus, no BM. No new concerns at this time.    Review of Systems   Constitutional: Negative for chills and fever.   Respiratory: Negative for cough and shortness of breath.    Cardiovascular: Negative for chest pain and palpitations.   Gastrointestinal: Negative for abdominal pain, nausea and vomiting.     Objective:     Vital Signs (Most Recent):  Temp: 98.3 °F (36.8 °C) (11/01/20 1308)  Pulse: 77 (11/01/20 1308)  Resp: 16 (11/01/20 1308)  BP: (!) 143/67 (11/01/20 1308)  SpO2: 99 % (11/01/20 1308) Vital Signs (24h Range):  Temp:  [97.9 °F (36.6 °C)-98.3 °F (36.8 °C)] 98.3 °F (36.8 °C)  Pulse:  [69-81] 77  Resp:  [16-18] 16  SpO2:  [98 %-100 %] 99 %  BP: (131-161)/(61-73) 143/67     Weight: 129 kg (284 lb 6.3 oz)  Body mass index is 44.53 kg/m².    Intake/Output Summary (Last 24 hours) at 11/1/2020 1628  Last data filed at 10/31/2020 1900  Gross per 24 hour   Intake 360 ml   Output --   Net 360 ml      Physical Exam  Vitals signs and nursing note reviewed.   Constitutional:       General: She is not in acute distress.     Appearance: She is well-developed. She is morbidly obese.   HENT:      Head: Normocephalic and atraumatic.   Eyes:      General:         Right eye: No discharge.         Left eye: No discharge.      Conjunctiva/sclera: Conjunctivae normal.   Cardiovascular:      Rate and Rhythm: Normal rate.      Pulses: Normal pulses.      Heart sounds: Murmur present.   Pulmonary:      Effort: Pulmonary effort is normal. No respiratory distress.   Abdominal:      General: Bowel sounds are normal.      Palpations: Abdomen is soft.      Tenderness: There is abdominal tenderness (minimal LUQ).   Musculoskeletal: Normal range of motion.      Right lower leg: No edema.      Left lower leg: No edema.   Skin:     General: Skin is warm and dry.   Neurological:      Mental Status: She is alert and oriented to person, place, and time.          Significant Labs:   BMP:  Recent Labs   Lab 10/30/20  0532 10/31/20  0519 11/01/20  0518    139 141   K 3.3* 3.2* 3.8    104 107   CO2 27 25 22*   BUN 9 15 13   CREATININE 0.8 0.8 0.8    82 84   CALCIUM 9.6 8.9 9.2   MG 1.6 1.7 1.7   PHOS 2.8 2.9 3.5     Significant Imaging:   No new imaging this morning.

## 2020-11-01 NOTE — PLAN OF CARE
POC reviewed w pt at the bedside. AAOX4, VSS on RA. Pt did not complain of pain during shift. No injuries or falls noted during shift. Safety precautions in place, call bell within reach, side rails up X2, will continue to monitor.

## 2020-11-01 NOTE — PROGRESS NOTES
[facility] Hospital Medicine  Progress Note    Patient Name: Natali Galeano  MRN: [id number]  Patient Class: IP- Inpatient   Admission Date: 10/27/2020  Length of Stay: 5 days  Attending Physician: SUN Schmitt MD  Primary Care Provider: Ewelina Herzog MD        Subjective:     Principal Problem:Partial small bowel obstruction        HPI:  Ms. Galeano is a 79/F with PMH HTN, colon cancer s/p resection 2002, abdominal hernia repair 2004, h/o SBOs (most recent 01/2020) who presented to ED 10/27 with a one day history of upper abdominal pain. She reported she was in her usual state of health when she had sudden onset of sharp abdominal discomfort the night prior to presentation, associated with nausea and multiple episodes of vomiting. She stated that the pain is 9/10 at its worst and was not relieved with acetaminophen at home. Last BM was 10/26 but scant amount, with more significant BM the previous day. ED workup was notable for CT demonstrating partial SBO with jejunal dilatation in LLQ near area of prior ventral hernia repair comparable to studies during prior SBO admissions, without evidence of fluid collection or perforation. Hospital medicine was contacted for admission.      Overview/Hospital Course:  No notes on file    Interval History: No acute events overnight. No pain or nausea this AM. +flatus, no BM. No new concerns at this time.    Review of Systems   Constitutional: Negative for chills and fever.   Respiratory: Negative for cough and shortness of breath.    Cardiovascular: Negative for chest pain and palpitations.   Gastrointestinal: Negative for abdominal pain, nausea and vomiting.     Objective:     Vital Signs (Most Recent):  Temp: 98.3 °F (36.8 °C) (11/01/20 1308)  Pulse: 77 (11/01/20 1308)  Resp: 16 (11/01/20 1308)  BP: (!) 143/67 (11/01/20 1308)  SpO2: 99 % (11/01/20 1308) Vital Signs (24h Range):  Temp:  [97.9 °F (36.6 °C)-98.3 °F (36.8 °C)] 98.3 °F (36.8 °C)  Pulse:   [69-81] 77  Resp:  [16-18] 16  SpO2:  [98 %-100 %] 99 %  BP: (131-161)/(61-73) 143/67     Weight: 129 kg (284 lb 6.3 oz)  Body mass index is 44.53 kg/m².    Intake/Output Summary (Last 24 hours) at 11/1/2020 1628  Last data filed at 10/31/2020 1900  Gross per 24 hour   Intake 360 ml   Output --   Net 360 ml      Physical Exam  Vitals signs and nursing note reviewed.   Constitutional:       General: She is not in acute distress.     Appearance: She is well-developed. She is morbidly obese.   HENT:      Head: Normocephalic and atraumatic.   Eyes:      General:         Right eye: No discharge.         Left eye: No discharge.      Conjunctiva/sclera: Conjunctivae normal.   Cardiovascular:      Rate and Rhythm: Normal rate.      Pulses: Normal pulses.      Heart sounds: Murmur present.   Pulmonary:      Effort: Pulmonary effort is normal. No respiratory distress.   Abdominal:      General: Bowel sounds are normal.      Palpations: Abdomen is soft.      Tenderness: There is abdominal tenderness (minimal LUQ).   Musculoskeletal: Normal range of motion.      Right lower leg: No edema.      Left lower leg: No edema.   Skin:     General: Skin is warm and dry.   Neurological:      Mental Status: She is alert and oriented to person, place, and time.         Significant Labs:   BMP:  Recent Labs   Lab 10/30/20  0532 10/31/20  0519 11/01/20  0518    139 141   K 3.3* 3.2* 3.8    104 107   CO2 27 25 22*   BUN 9 15 13   CREATININE 0.8 0.8 0.8    82 84   CALCIUM 9.6 8.9 9.2   MG 1.6 1.7 1.7   PHOS 2.8 2.9 3.5     Significant Imaging:   No new imaging this morning.      Assessment/Plan:      * Partial small bowel obstruction  - Partial SBO in setting of h/o multiple prior abdominal surgeries with prior SBOs in addition.  - Continue IVFs with LR.  - Continue full liquids.  - Continuing morphine 3mg IV q6hr PRN, ondansetron 4mg IV q8hr PRN for pain / nausea control respectively.  - Appreciate surgery  assistance.    Gout  - Continue allopurinol 300mg PO daily.    Normocytic anemia  - Hgb WNL; likely hemoconcentrated on admit.  - No signs of acute losses.  - Monitor.    ALYSE (obstructive sleep apnea)  - Continue CPAP qHS.    Hyperlipidemia  - Continue atorvastatin 10mg PO qHS.    Essential hypertension  - Continue amlodipine 5mg PO daily, benazepril 40mg PO daily; hold HCTZ for time being.    Morbid obesity due to excess calories  - Dietary counseling.    VTE Risk Mitigation (From admission, onward)         Ordered     enoxaparin injection 40 mg  Every 12 hours      10/27/20 1530     IP VTE HIGH RISK PATIENT  Once      10/27/20 1530     Place sequential compression device  Until discontinued      10/27/20 1530                Discharge Planning   YOMI:      Code Status: Full Code   Is the patient medically ready for discharge?:     Reason for patient still in hospital (select all that apply): Treatment  Discharge Plan A: Home        D Jeevan Schmitt MD  Department of Hospital Medicine   [facility]

## 2020-11-01 NOTE — PROGRESS NOTES
Afebrile  Vital signs stable  Passing flatus  No bowel movement  Denies nausea, vomiting, pain    PE  Abdomen-soft, nontender, no guarding, positive bowel sounds, no distention    Plan  Advance diet as tolerated

## 2020-11-02 LAB
ANION GAP SERPL CALC-SCNC: 14 MMOL/L (ref 8–16)
BUN SERPL-MCNC: 11 MG/DL (ref 8–23)
CALCIUM SERPL-MCNC: 9.9 MG/DL (ref 8.7–10.5)
CHLORIDE SERPL-SCNC: 105 MMOL/L (ref 95–110)
CO2 SERPL-SCNC: 24 MMOL/L (ref 23–29)
CREAT SERPL-MCNC: 0.8 MG/DL (ref 0.5–1.4)
EST. GFR  (AFRICAN AMERICAN): >60 ML/MIN/1.73 M^2
EST. GFR  (NON AFRICAN AMERICAN): >60 ML/MIN/1.73 M^2
GLUCOSE SERPL-MCNC: 95 MG/DL (ref 70–110)
MAGNESIUM SERPL-MCNC: 1.7 MG/DL (ref 1.6–2.6)
PHOSPHATE SERPL-MCNC: 3.8 MG/DL (ref 2.7–4.5)
POTASSIUM SERPL-SCNC: 3.9 MMOL/L (ref 3.5–5.1)
SODIUM SERPL-SCNC: 143 MMOL/L (ref 136–145)

## 2020-11-02 PROCEDURE — 36415 COLL VENOUS BLD VENIPUNCTURE: CPT

## 2020-11-02 PROCEDURE — 99232 SBSQ HOSP IP/OBS MODERATE 35: CPT | Mod: ,,, | Performed by: INTERNAL MEDICINE

## 2020-11-02 PROCEDURE — 99232 PR SUBSEQUENT HOSPITAL CARE,LEVL II: ICD-10-PCS | Mod: ,,, | Performed by: INTERNAL MEDICINE

## 2020-11-02 PROCEDURE — 80048 BASIC METABOLIC PNL TOTAL CA: CPT

## 2020-11-02 PROCEDURE — 83735 ASSAY OF MAGNESIUM: CPT

## 2020-11-02 PROCEDURE — 25000003 PHARM REV CODE 250: Performed by: INTERNAL MEDICINE

## 2020-11-02 PROCEDURE — 94761 N-INVAS EAR/PLS OXIMETRY MLT: CPT

## 2020-11-02 PROCEDURE — 11000001 HC ACUTE MED/SURG PRIVATE ROOM

## 2020-11-02 PROCEDURE — 84100 ASSAY OF PHOSPHORUS: CPT

## 2020-11-02 PROCEDURE — 63600175 PHARM REV CODE 636 W HCPCS: Performed by: INTERNAL MEDICINE

## 2020-11-02 RX ADMIN — CETIRIZINE HYDROCHLORIDE 10 MG: 10 TABLET, FILM COATED ORAL at 08:11

## 2020-11-02 RX ADMIN — AMLODIPINE BESYLATE 10 MG: 5 TABLET ORAL at 08:11

## 2020-11-02 RX ADMIN — ENOXAPARIN SODIUM 40 MG: 40 INJECTION SUBCUTANEOUS at 08:11

## 2020-11-02 RX ADMIN — FLUTICASONE PROPIONATE 50 MCG: 50 SPRAY, METERED NASAL at 08:11

## 2020-11-02 RX ADMIN — ATORVASTATIN CALCIUM 10 MG: 10 TABLET, FILM COATED ORAL at 08:11

## 2020-11-02 RX ADMIN — ASPIRIN 81 MG: 81 TABLET, COATED ORAL at 08:11

## 2020-11-02 RX ADMIN — ALLOPURINOL 300 MG: 300 TABLET ORAL at 08:11

## 2020-11-02 RX ADMIN — BENAZEPRIL HYDROCHLORIDE 40 MG: 40 TABLET, COATED ORAL at 08:11

## 2020-11-02 NOTE — ASSESSMENT & PLAN NOTE
- Partial SBO in setting of h/o multiple prior abdominal surgeries with prior SBOs in addition.  - Continue full liquids.  - Continuing morphine 3mg IV q6hr PRN, ondansetron 4mg IV q8hr PRN for pain / nausea control respectively.  - Appreciate surgery assistance.

## 2020-11-02 NOTE — NURSING
Plan of care reviewed with the patient during her assessment.  AAO x 4.  Ambulatory.  No bowel movement reported but she is passing flatus intermittently.  Advised to ambulate in hallway as much as possible.  Rounding maintained per protocol.

## 2020-11-02 NOTE — PROGRESS NOTES
[facility] Hospital Medicine  Progress Note    Patient Name: Natali Galeano  MRN: [id number]  Patient Class: IP- Inpatient   Admission Date: 10/27/2020  Length of Stay: 6 days  Attending Physician: SUN Schmitt MD  Primary Care Provider: Ewelina Herzog MD        Subjective:     Principal Problem:Partial small bowel obstruction    HPI:  Ms. Galeano is a 79/F with PMH HTN, colon cancer s/p resection 2002, abdominal hernia repair 2004, h/o SBOs (most recent 01/2020) who presented to ED 10/27 with a one day history of upper abdominal pain. She reported she was in her usual state of health when she had sudden onset of sharp abdominal discomfort the night prior to presentation, associated with nausea and multiple episodes of vomiting. She stated that the pain is 9/10 at its worst and was not relieved with acetaminophen at home. Last BM was 10/26 but scant amount, with more significant BM the previous day. ED workup was notable for CT demonstrating partial SBO with jejunal dilatation in LLQ near area of prior ventral hernia repair comparable to studies during prior SBO admissions, without evidence of fluid collection or perforation. Hospital medicine was contacted for admission.      Overview/Hospital Course:  Admitted and general surgery consulted. Started on IVFs with pain/nausea control. Began passing flatus and started having less pain with more active bowel sounds. Diet gradually advanced.    Interval History: No acute events overnight. No pain or nausea this AM. +flatus, no BM. No new concerns at this time.    Review of Systems   Constitutional: Negative for chills and fever.   Respiratory: Negative for cough and shortness of breath.    Cardiovascular: Negative for chest pain and palpitations.   Gastrointestinal: Negative for abdominal pain, nausea and vomiting.     Objective:     Vital Signs (Most Recent):  Temp: 98.2 °F (36.8 °C) (11/02/20 1129)  Pulse: 70 (11/02/20 1129)  Resp: 16 (11/02/20  1129)  BP: (!) 145/67 (11/02/20 1129)  SpO2: 99 % (11/02/20 1129) Vital Signs (24h Range):  Temp:  [97.7 °F (36.5 °C)-98.3 °F (36.8 °C)] 98.2 °F (36.8 °C)  Pulse:  [67-77] 70  Resp:  [16-20] 16  SpO2:  [97 %-100 %] 99 %  BP: (134-162)/(58-77) 145/67     Weight: 129 kg (284 lb 6.3 oz)  Body mass index is 44.53 kg/m².    Intake/Output Summary (Last 24 hours) at 11/2/2020 1131  Last data filed at 11/2/2020 0900  Gross per 24 hour   Intake 975 ml   Output 1000 ml   Net -25 ml      Physical Exam  Vitals signs and nursing note reviewed.   Constitutional:       General: She is not in acute distress.     Appearance: She is well-developed. She is morbidly obese.   HENT:      Head: Normocephalic and atraumatic.   Eyes:      General:         Right eye: No discharge.         Left eye: No discharge.      Conjunctiva/sclera: Conjunctivae normal.   Cardiovascular:      Rate and Rhythm: Normal rate.      Pulses: Normal pulses.      Heart sounds: Murmur present.   Pulmonary:      Effort: Pulmonary effort is normal. No respiratory distress.   Abdominal:      General: Bowel sounds are normal.      Palpations: Abdomen is soft.      Tenderness: There is no abdominal tenderness.   Musculoskeletal: Normal range of motion.      Right lower leg: No edema.      Left lower leg: No edema.   Skin:     General: Skin is warm and dry.   Neurological:      Mental Status: She is alert and oriented to person, place, and time.       Significant Labs:   BMP:  Recent Labs   Lab 10/31/20  0519 11/01/20  0518 11/02/20  0818    141 143   K 3.2* 3.8 3.9    107 105   CO2 25 22* 24   BUN 15 13 11   CREATININE 0.8 0.8 0.8   GLU 82 84 95   CALCIUM 8.9 9.2 9.9   MG 1.7 1.7 1.7   PHOS 2.9 3.5 3.8     Significant Imaging:   No new imaging this morning.      Assessment/Plan:      * Partial small bowel obstruction  - Partial SBO in setting of h/o multiple prior abdominal surgeries with prior SBOs in addition.  - Continue full liquids.  - Continuing  morphine 3mg IV q6hr PRN, ondansetron 4mg IV q8hr PRN for pain / nausea control respectively.  - Appreciate surgery assistance.    Gout  - Continue allopurinol 300mg PO daily.    Normocytic anemia  - Hgb WNL; likely hemoconcentrated on admit.  - No signs of acute losses.  - Monitor.    ALYSE (obstructive sleep apnea)  - Continue CPAP qHS.    Hyperlipidemia  - Continue atorvastatin 10mg PO qHS.    Essential hypertension  - Continue amlodipine 5mg PO daily, benazepril 40mg PO daily; hold HCTZ for time being.    Morbid obesity due to excess calories  - Dietary counseling.    VTE Risk Mitigation (From admission, onward)         Ordered     enoxaparin injection 40 mg  Every 12 hours      10/27/20 1530     IP VTE HIGH RISK PATIENT  Once      10/27/20 1530     Place sequential compression device  Until discontinued      10/27/20 1530                Discharge Planning   YOMI:      Code Status: Full Code   Is the patient medically ready for discharge?:     Reason for patient still in hospital (select all that apply): Treatment  Discharge Plan A: Home      D Jeevan Schmitt MD  Department of Hospital Medicine   [facility]

## 2020-11-02 NOTE — SUBJECTIVE & OBJECTIVE
Interval History: No acute events overnight. No pain or nausea this AM. +flatus, no BM. No new concerns at this time.    Review of Systems   Constitutional: Negative for chills and fever.   Respiratory: Negative for cough and shortness of breath.    Cardiovascular: Negative for chest pain and palpitations.   Gastrointestinal: Negative for abdominal pain, nausea and vomiting.     Objective:     Vital Signs (Most Recent):  Temp: 98.2 °F (36.8 °C) (11/02/20 1129)  Pulse: 70 (11/02/20 1129)  Resp: 16 (11/02/20 1129)  BP: (!) 145/67 (11/02/20 1129)  SpO2: 99 % (11/02/20 1129) Vital Signs (24h Range):  Temp:  [97.7 °F (36.5 °C)-98.3 °F (36.8 °C)] 98.2 °F (36.8 °C)  Pulse:  [67-77] 70  Resp:  [16-20] 16  SpO2:  [97 %-100 %] 99 %  BP: (134-162)/(58-77) 145/67     Weight: 129 kg (284 lb 6.3 oz)  Body mass index is 44.53 kg/m².    Intake/Output Summary (Last 24 hours) at 11/2/2020 1131  Last data filed at 11/2/2020 0900  Gross per 24 hour   Intake 975 ml   Output 1000 ml   Net -25 ml      Physical Exam  Vitals signs and nursing note reviewed.   Constitutional:       General: She is not in acute distress.     Appearance: She is well-developed. She is morbidly obese.   HENT:      Head: Normocephalic and atraumatic.   Eyes:      General:         Right eye: No discharge.         Left eye: No discharge.      Conjunctiva/sclera: Conjunctivae normal.   Cardiovascular:      Rate and Rhythm: Normal rate.      Pulses: Normal pulses.      Heart sounds: Murmur present.   Pulmonary:      Effort: Pulmonary effort is normal. No respiratory distress.   Abdominal:      General: Bowel sounds are normal.      Palpations: Abdomen is soft.      Tenderness: There is no abdominal tenderness.   Musculoskeletal: Normal range of motion.      Right lower leg: No edema.      Left lower leg: No edema.   Skin:     General: Skin is warm and dry.   Neurological:      Mental Status: She is alert and oriented to person, place, and time.       Significant  Labs:   BMP:  Recent Labs   Lab 10/31/20  0519 11/01/20  0518 11/02/20  0818    141 143   K 3.2* 3.8 3.9    107 105   CO2 25 22* 24   BUN 15 13 11   CREATININE 0.8 0.8 0.8   GLU 82 84 95   CALCIUM 8.9 9.2 9.9   MG 1.7 1.7 1.7   PHOS 2.9 3.5 3.8     Significant Imaging:   No new imaging this morning.

## 2020-11-02 NOTE — PLAN OF CARE
Pt resting comfortably.  Tolerating full liquid diet well.  Passing flatus; no BM during shift.  Pt walked in halls.  Niece at bedside in afternoon.  Safety measures in place.  Purposeful hourly rounding completed.  Will continue to monitor.

## 2020-11-02 NOTE — HOSPITAL COURSE
Admitted and general surgery consulted. Started on IVFs with pain/nausea control. Began passing flatus and started having less pain with more active bowel sounds. Diet gradually advanced. Abd x-ray showed resolution of distended loops of bowel. Her diet was further advanced. She had a BM after glycerine suppository was given. She is feeling well today and stable for discharge home. She was counseled on diet and OTC medications to maintain healthy bowel habits.

## 2020-11-03 LAB
ANION GAP SERPL CALC-SCNC: 10 MMOL/L (ref 8–16)
BUN SERPL-MCNC: 9 MG/DL (ref 8–23)
CALCIUM SERPL-MCNC: 9.5 MG/DL (ref 8.7–10.5)
CHLORIDE SERPL-SCNC: 109 MMOL/L (ref 95–110)
CO2 SERPL-SCNC: 23 MMOL/L (ref 23–29)
CREAT SERPL-MCNC: 0.7 MG/DL (ref 0.5–1.4)
EST. GFR  (AFRICAN AMERICAN): >60 ML/MIN/1.73 M^2
EST. GFR  (NON AFRICAN AMERICAN): >60 ML/MIN/1.73 M^2
GLUCOSE SERPL-MCNC: 96 MG/DL (ref 70–110)
MAGNESIUM SERPL-MCNC: 1.6 MG/DL (ref 1.6–2.6)
PHOSPHATE SERPL-MCNC: 4.1 MG/DL (ref 2.7–4.5)
POTASSIUM SERPL-SCNC: 4.1 MMOL/L (ref 3.5–5.1)
SODIUM SERPL-SCNC: 142 MMOL/L (ref 136–145)

## 2020-11-03 PROCEDURE — 83735 ASSAY OF MAGNESIUM: CPT

## 2020-11-03 PROCEDURE — 11000001 HC ACUTE MED/SURG PRIVATE ROOM

## 2020-11-03 PROCEDURE — 99232 SBSQ HOSP IP/OBS MODERATE 35: CPT | Mod: ,,, | Performed by: INTERNAL MEDICINE

## 2020-11-03 PROCEDURE — 36415 COLL VENOUS BLD VENIPUNCTURE: CPT

## 2020-11-03 PROCEDURE — 25000003 PHARM REV CODE 250: Performed by: INTERNAL MEDICINE

## 2020-11-03 PROCEDURE — 63600175 PHARM REV CODE 636 W HCPCS: Performed by: INTERNAL MEDICINE

## 2020-11-03 PROCEDURE — 80048 BASIC METABOLIC PNL TOTAL CA: CPT

## 2020-11-03 PROCEDURE — 99232 PR SUBSEQUENT HOSPITAL CARE,LEVL II: ICD-10-PCS | Mod: ,,, | Performed by: INTERNAL MEDICINE

## 2020-11-03 PROCEDURE — 84100 ASSAY OF PHOSPHORUS: CPT

## 2020-11-03 PROCEDURE — 94761 N-INVAS EAR/PLS OXIMETRY MLT: CPT

## 2020-11-03 RX ADMIN — CETIRIZINE HYDROCHLORIDE 10 MG: 10 TABLET, FILM COATED ORAL at 09:11

## 2020-11-03 RX ADMIN — ASPIRIN 81 MG: 81 TABLET, COATED ORAL at 09:11

## 2020-11-03 RX ADMIN — BENAZEPRIL HYDROCHLORIDE 40 MG: 40 TABLET, COATED ORAL at 09:11

## 2020-11-03 RX ADMIN — AMLODIPINE BESYLATE 10 MG: 5 TABLET ORAL at 09:11

## 2020-11-03 RX ADMIN — ENOXAPARIN SODIUM 40 MG: 40 INJECTION SUBCUTANEOUS at 09:11

## 2020-11-03 RX ADMIN — FLUTICASONE PROPIONATE 50 MCG: 50 SPRAY, METERED NASAL at 09:11

## 2020-11-03 RX ADMIN — ATORVASTATIN CALCIUM 10 MG: 10 TABLET, FILM COATED ORAL at 09:11

## 2020-11-03 RX ADMIN — ALLOPURINOL 300 MG: 300 TABLET ORAL at 09:11

## 2020-11-03 NOTE — NURSING
POC reviewed with Pt. No significant events this shift. VSS on RA. No complaints of pain. .Bed lowered and locked, side rails up x3, call light within reach. Will continue to monitor.

## 2020-11-03 NOTE — ASSESSMENT & PLAN NOTE
- Partial SBO in setting of h/o multiple prior abdominal surgeries with prior SBOs in addition.  - Tolerating full liquids. + Flatus but no BM yet  - Continuing morphine 3mg IV q6hr PRN, ondansetron 4mg IV q8hr PRN for pain / nausea control respectively.  - Appreciate surgery assistance  - Check flat and erect x-ray today

## 2020-11-03 NOTE — PROGRESS NOTES
"Plan of care reviewed with pt. Pt verbalized understanding. Hourly rounding performed. No complainants of pain during the shift. Pt walked the halls several times during the day to try and "move her bowels". Pt tolerating full liquid diet. Bed lowered and locked. Personal items and call bell within reach. Pt states she has no needs at this time. Will continue to monitor.   "

## 2020-11-03 NOTE — PROGRESS NOTES
[facility] Hospital Medicine  Progress Note    Patient Name: Natali Galeano  MRN: [id number]  Patient Class: IP- Inpatient   Admission Date: 10/27/2020  Length of Stay: 7 days  Attending Physician: Evelia Pina MD  Primary Care Provider: Ewelina Herzog MD        Subjective:     Principal Problem:Partial small bowel obstruction        HPI:  Ms. Galeano is a 79/F with PMH HTN, colon cancer s/p resection 2002, abdominal hernia repair 2004, h/o SBOs (most recent 01/2020) who presented to ED 10/27 with a one day history of upper abdominal pain. She reported she was in her usual state of health when she had sudden onset of sharp abdominal discomfort the night prior to presentation, associated with nausea and multiple episodes of vomiting. She stated that the pain is 9/10 at its worst and was not relieved with acetaminophen at home. Last BM was 10/26 but scant amount, with more significant BM the previous day. ED workup was notable for CT demonstrating partial SBO with jejunal dilatation in LLQ near area of prior ventral hernia repair comparable to studies during prior SBO admissions, without evidence of fluid collection or perforation. Hospital medicine was contacted for admission.      Overview/Hospital Course:  Admitted and general surgery consulted. Started on IVFs with pain/nausea control. Began passing flatus and started having less pain with more active bowel sounds. Diet gradually advanced.    Interval History: No acute events overnight. No pain or nausea this AM. +flatus, no BM. No new concerns at this time.    Review of Systems   Constitutional: Negative for chills and fever.   Respiratory: Negative for cough and shortness of breath.    Cardiovascular: Negative for chest pain and palpitations.   Gastrointestinal: Negative for abdominal pain, nausea and vomiting.     Objective:     Vital Signs (Most Recent):  Temp: 98.1 °F (36.7 °C) (11/03/20 1149)  Pulse: 67 (11/03/20 1149)  Resp: 18  (11/03/20 1149)  BP: (!) 168/79 (11/03/20 1149)  SpO2: 100 % (11/03/20 1149) Vital Signs (24h Range):  Temp:  [97.7 °F (36.5 °C)-98.1 °F (36.7 °C)] 98.1 °F (36.7 °C)  Pulse:  [67-79] 67  Resp:  [14-20] 18  SpO2:  [98 %-100 %] 100 %  BP: (131-168)/(60-79) 168/79     Weight: 129 kg (284 lb 6.3 oz)  Body mass index is 44.53 kg/m².    Intake/Output Summary (Last 24 hours) at 11/3/2020 1214  Last data filed at 11/2/2020 1730  Gross per 24 hour   Intake --   Output 600 ml   Net -600 ml      Physical Exam  Vitals signs and nursing note reviewed.   Constitutional:       General: She is not in acute distress.     Appearance: She is well-developed. She is morbidly obese.   Cardiovascular:      Rate and Rhythm: Normal rate.      Pulses: Normal pulses.      Heart sounds: Murmur present.   Pulmonary:      Effort: Pulmonary effort is normal. No respiratory distress.   Abdominal:      General: Bowel sounds are normal.      Palpations: Abdomen is soft.      Tenderness: There is no abdominal tenderness.   Musculoskeletal:      Right lower leg: No edema.      Left lower leg: No edema.   Skin:     General: Skin is warm and dry.   Neurological:      Mental Status: She is alert and oriented to person, place, and time.   Psychiatric:         Behavior: Behavior normal.       Significant Labs:   BMP:  Recent Labs   Lab 11/01/20  0518 11/02/20  0818 11/03/20  0455    143 142   K 3.8 3.9 4.1    105 109   CO2 22* 24 23   BUN 13 11 9   CREATININE 0.8 0.8 0.7   GLU 84 95 96   CALCIUM 9.2 9.9 9.5   MG 1.7 1.7 1.6   PHOS 3.5 3.8 4.1     Significant Imaging:   No new imaging this morning.      Assessment/Plan:      * Partial small bowel obstruction  - Partial SBO in setting of h/o multiple prior abdominal surgeries with prior SBOs in addition.  - Tolerating full liquids. + Flatus but no BM yet  - Continuing morphine 3mg IV q6hr PRN, ondansetron 4mg IV q8hr PRN for pain / nausea control respectively.  - Appreciate surgery assistance  -  Check flat and erect x-ray today    Gout  - Continue allopurinol 300mg PO daily.    Normocytic anemia  - Hgb WNL; likely hemoconcentrated on admit.  - No signs of acute losses.  - Monitor.    ALYSE (obstructive sleep apnea)  - Continue CPAP qHS.    Hyperlipidemia  - Continue atorvastatin 10mg PO qHS.    Essential hypertension  - Continue amlodipine 5mg PO daily, benazepril 40mg PO daily; hold HCTZ for time being.    Morbid obesity due to excess calories  - Dietary counseling.      VTE Risk Mitigation (From admission, onward)         Ordered     enoxaparin injection 40 mg  Every 12 hours      10/27/20 1530     IP VTE HIGH RISK PATIENT  Once      10/27/20 1530     Place sequential compression device  Until discontinued      10/27/20 1530                Discharge Planning   YOMI:      Code Status: Full Code   Is the patient medically ready for discharge?:     Reason for patient still in hospital (select all that apply): Patient trending condition  Discharge Plan A: Home                  Evelia Pina MD  Department of Hospital Medicine   [facility]

## 2020-11-03 NOTE — PLAN OF CARE
Plan of care reviewed with patient at bedside. Denies any pain, discomfort. Abdomen soft, non tender. No BM noted this shift. Ambulating in room this shift, fluids encouraged. Independent with ADL's. Vital signs stable on room air. All safety precautions in place, call bell in reach. Hourly rounding completed.

## 2020-11-03 NOTE — SUBJECTIVE & OBJECTIVE
Interval History: No acute events overnight. No pain or nausea this AM. +flatus, no BM. No new concerns at this time.    Review of Systems   Constitutional: Negative for chills and fever.   Respiratory: Negative for cough and shortness of breath.    Cardiovascular: Negative for chest pain and palpitations.   Gastrointestinal: Negative for abdominal pain, nausea and vomiting.     Objective:     Vital Signs (Most Recent):  Temp: 98.1 °F (36.7 °C) (11/03/20 1149)  Pulse: 67 (11/03/20 1149)  Resp: 18 (11/03/20 1149)  BP: (!) 168/79 (11/03/20 1149)  SpO2: 100 % (11/03/20 1149) Vital Signs (24h Range):  Temp:  [97.7 °F (36.5 °C)-98.1 °F (36.7 °C)] 98.1 °F (36.7 °C)  Pulse:  [67-79] 67  Resp:  [14-20] 18  SpO2:  [98 %-100 %] 100 %  BP: (131-168)/(60-79) 168/79     Weight: 129 kg (284 lb 6.3 oz)  Body mass index is 44.53 kg/m².    Intake/Output Summary (Last 24 hours) at 11/3/2020 1214  Last data filed at 11/2/2020 1730  Gross per 24 hour   Intake --   Output 600 ml   Net -600 ml      Physical Exam  Vitals signs and nursing note reviewed.   Constitutional:       General: She is not in acute distress.     Appearance: She is well-developed. She is morbidly obese.   Cardiovascular:      Rate and Rhythm: Normal rate.      Pulses: Normal pulses.      Heart sounds: Murmur present.   Pulmonary:      Effort: Pulmonary effort is normal. No respiratory distress.   Abdominal:      General: Bowel sounds are normal.      Palpations: Abdomen is soft.      Tenderness: There is no abdominal tenderness.   Musculoskeletal:      Right lower leg: No edema.      Left lower leg: No edema.   Skin:     General: Skin is warm and dry.   Neurological:      Mental Status: She is alert and oriented to person, place, and time.   Psychiatric:         Behavior: Behavior normal.       Significant Labs:   BMP:  Recent Labs   Lab 11/01/20  0518 11/02/20  0818 11/03/20  0455    143 142   K 3.8 3.9 4.1    105 109   CO2 22* 24 23   BUN 13 11 9    CREATININE 0.8 0.8 0.7   GLU 84 95 96   CALCIUM 9.2 9.9 9.5   MG 1.7 1.7 1.6   PHOS 3.5 3.8 4.1     Significant Imaging:   No new imaging this morning.

## 2020-11-04 PROCEDURE — 11000001 HC ACUTE MED/SURG PRIVATE ROOM

## 2020-11-04 PROCEDURE — 94761 N-INVAS EAR/PLS OXIMETRY MLT: CPT

## 2020-11-04 PROCEDURE — 25000003 PHARM REV CODE 250: Performed by: SPECIALIST

## 2020-11-04 PROCEDURE — 99232 PR SUBSEQUENT HOSPITAL CARE,LEVL II: ICD-10-PCS | Mod: ,,, | Performed by: INTERNAL MEDICINE

## 2020-11-04 PROCEDURE — 99232 SBSQ HOSP IP/OBS MODERATE 35: CPT | Mod: ,,, | Performed by: INTERNAL MEDICINE

## 2020-11-04 PROCEDURE — 25000003 PHARM REV CODE 250: Performed by: INTERNAL MEDICINE

## 2020-11-04 PROCEDURE — 99900035 HC TECH TIME PER 15 MIN (STAT)

## 2020-11-04 PROCEDURE — 63600175 PHARM REV CODE 636 W HCPCS: Performed by: INTERNAL MEDICINE

## 2020-11-04 PROCEDURE — 94660 CPAP INITIATION&MGMT: CPT

## 2020-11-04 RX ORDER — HYDROCHLOROTHIAZIDE 12.5 MG/1
12.5 TABLET ORAL DAILY
Status: DISCONTINUED | OUTPATIENT
Start: 2020-11-05 | End: 2020-11-05 | Stop reason: HOSPADM

## 2020-11-04 RX ORDER — GLYCERIN 1 G/1
1 SUPPOSITORY RECTAL 2 TIMES DAILY
Status: DISCONTINUED | OUTPATIENT
Start: 2020-11-04 | End: 2020-11-05 | Stop reason: HOSPADM

## 2020-11-04 RX ADMIN — GLYCERIN 1 SUPPOSITORY: 2 SUPPOSITORY RECTAL at 10:11

## 2020-11-04 RX ADMIN — ASPIRIN 81 MG: 81 TABLET, COATED ORAL at 08:11

## 2020-11-04 RX ADMIN — ENOXAPARIN SODIUM 40 MG: 40 INJECTION SUBCUTANEOUS at 08:11

## 2020-11-04 RX ADMIN — BENAZEPRIL HYDROCHLORIDE 40 MG: 40 TABLET, COATED ORAL at 08:11

## 2020-11-04 RX ADMIN — GLYCERIN 1 SUPPOSITORY: 2 SUPPOSITORY RECTAL at 11:11

## 2020-11-04 RX ADMIN — ALLOPURINOL 300 MG: 300 TABLET ORAL at 08:11

## 2020-11-04 RX ADMIN — ATORVASTATIN CALCIUM 10 MG: 10 TABLET, FILM COATED ORAL at 08:11

## 2020-11-04 RX ADMIN — CETIRIZINE HYDROCHLORIDE 10 MG: 10 TABLET, FILM COATED ORAL at 08:11

## 2020-11-04 RX ADMIN — AMLODIPINE BESYLATE 10 MG: 5 TABLET ORAL at 08:11

## 2020-11-04 RX ADMIN — FLUTICASONE PROPIONATE 50 MCG: 50 SPRAY, METERED NASAL at 08:11

## 2020-11-04 NOTE — SUBJECTIVE & OBJECTIVE
Interval History: No acute events overnight. No pain or nausea this AM. +flatus, no BM. No new concerns at this time.    Review of Systems   Constitutional: Negative for chills and fever.   Respiratory: Negative for cough and shortness of breath.    Cardiovascular: Negative for chest pain and palpitations.   Gastrointestinal: Negative for abdominal pain, nausea and vomiting.     Objective:     Vital Signs (Most Recent):  Temp: 97.4 °F (36.3 °C) (11/04/20 1212)  Pulse: 80 (11/04/20 1212)  Resp: 12 (11/04/20 1212)  BP: (!) 160/73 (11/04/20 1212)  SpO2: 100 % (11/04/20 1212) Vital Signs (24h Range):  Temp:  [96.8 °F (36 °C)-98 °F (36.7 °C)] 97.4 °F (36.3 °C)  Pulse:  [67-80] 80  Resp:  [12-20] 12  SpO2:  [97 %-100 %] 100 %  BP: (126-160)/(63-73) 160/73     Weight: 129 kg (284 lb 6.3 oz)  Body mass index is 44.53 kg/m².    Intake/Output Summary (Last 24 hours) at 11/4/2020 1220  Last data filed at 11/4/2020 1100  Gross per 24 hour   Intake 720 ml   Output 3300 ml   Net -2580 ml      Physical Exam  Vitals signs and nursing note reviewed.   Constitutional:       General: She is not in acute distress.     Appearance: She is well-developed. She is morbidly obese.   Cardiovascular:      Rate and Rhythm: Normal rate.      Pulses: Normal pulses.      Heart sounds: Murmur present.   Pulmonary:      Effort: Pulmonary effort is normal. No respiratory distress.   Abdominal:      General: Bowel sounds are normal.      Palpations: Abdomen is soft.      Tenderness: There is no abdominal tenderness.   Musculoskeletal:      Right lower leg: No edema.      Left lower leg: No edema.   Skin:     General: Skin is warm and dry.   Neurological:      Mental Status: She is alert and oriented to person, place, and time.   Psychiatric:         Behavior: Behavior normal.       Significant Labs:   BMP:  Recent Labs   Lab 11/02/20  0818 11/03/20  0455    142   K 3.9 4.1    109   CO2 24 23   BUN 11 9   CREATININE 0.8 0.7   GLU 95 96    CALCIUM 9.9 9.5   MG 1.7 1.6   PHOS 3.8 4.1     Significant Imaging:   Abd x-ray flat/erect - resolution of SBO

## 2020-11-04 NOTE — PROGRESS NOTES
[facility] Hospital Medicine  Progress Note    Patient Name: Natali Galeano  MRN: [id number]  Patient Class: IP- Inpatient   Admission Date: 10/27/2020  Length of Stay: 8 days  Attending Physician: Evelia Pina MD  Primary Care Provider: Ewelina Herzog MD        Subjective:     Principal Problem:Partial small bowel obstruction        HPI:  Ms. Galeano is a 79/F with PMH HTN, colon cancer s/p resection 2002, abdominal hernia repair 2004, h/o SBOs (most recent 01/2020) who presented to ED 10/27 with a one day history of upper abdominal pain. She reported she was in her usual state of health when she had sudden onset of sharp abdominal discomfort the night prior to presentation, associated with nausea and multiple episodes of vomiting. She stated that the pain is 9/10 at its worst and was not relieved with acetaminophen at home. Last BM was 10/26 but scant amount, with more significant BM the previous day. ED workup was notable for CT demonstrating partial SBO with jejunal dilatation in LLQ near area of prior ventral hernia repair comparable to studies during prior SBO admissions, without evidence of fluid collection or perforation. Hospital medicine was contacted for admission.      Overview/Hospital Course:  Admitted and general surgery consulted. Started on IVFs with pain/nausea control. Began passing flatus and started having less pain with more active bowel sounds. Diet gradually advanced. Abd x-ray showed resolution of distended loops of bowel. Her diet was further advanced.    Interval History: No acute events overnight. No pain or nausea this AM. +flatus, no BM. No new concerns at this time.    Review of Systems   Constitutional: Negative for chills and fever.   Respiratory: Negative for cough and shortness of breath.    Cardiovascular: Negative for chest pain and palpitations.   Gastrointestinal: Negative for abdominal pain, nausea and vomiting.     Objective:     Vital Signs (Most  Recent):  Temp: 97.4 °F (36.3 °C) (11/04/20 1212)  Pulse: 80 (11/04/20 1212)  Resp: 12 (11/04/20 1212)  BP: (!) 160/73 (11/04/20 1212)  SpO2: 100 % (11/04/20 1212) Vital Signs (24h Range):  Temp:  [96.8 °F (36 °C)-98 °F (36.7 °C)] 97.4 °F (36.3 °C)  Pulse:  [67-80] 80  Resp:  [12-20] 12  SpO2:  [97 %-100 %] 100 %  BP: (126-160)/(63-73) 160/73     Weight: 129 kg (284 lb 6.3 oz)  Body mass index is 44.53 kg/m².    Intake/Output Summary (Last 24 hours) at 11/4/2020 1220  Last data filed at 11/4/2020 1100  Gross per 24 hour   Intake 720 ml   Output 3300 ml   Net -2580 ml      Physical Exam  Vitals signs and nursing note reviewed.   Constitutional:       General: She is not in acute distress.     Appearance: She is well-developed. She is morbidly obese.   Cardiovascular:      Rate and Rhythm: Normal rate.      Pulses: Normal pulses.      Heart sounds: Murmur present.   Pulmonary:      Effort: Pulmonary effort is normal. No respiratory distress.   Abdominal:      General: Bowel sounds are normal.      Palpations: Abdomen is soft.      Tenderness: There is no abdominal tenderness.   Musculoskeletal:      Right lower leg: No edema.      Left lower leg: No edema.   Skin:     General: Skin is warm and dry.   Neurological:      Mental Status: She is alert and oriented to person, place, and time.   Psychiatric:         Behavior: Behavior normal.       Significant Labs:   BMP:  Recent Labs   Lab 11/02/20  0818 11/03/20  0455    142   K 3.9 4.1    109   CO2 24 23   BUN 11 9   CREATININE 0.8 0.7   GLU 95 96   CALCIUM 9.9 9.5   MG 1.7 1.6   PHOS 3.8 4.1     Significant Imaging:   Abd x-ray flat/erect - resolution of SBO      Assessment/Plan:      * Partial small bowel obstruction  - Partial SBO in setting of h/o multiple prior abdominal surgeries with prior SBOs in addition.  - Tolerating full liquids. + Flatus but no BM yet  - Continuing morphine 3mg IV q6hr PRN, ondansetron 4mg IV q8hr PRN for pain / nausea control  respectively.  - Appreciate surgery assistance  - Flat and erect x-ray with resolution of SBO  - Glycerine suppository x 1 now  - Okay to advance diet and monitor    Gout  - Continue allopurinol 300mg PO daily.    Normocytic anemia  - Hgb WNL; likely hemoconcentrated on admit.  - No signs of acute losses.  - Monitor.    ALYSE (obstructive sleep apnea)  - Continue CPAP qHS.    Hyperlipidemia  - Continue atorvastatin 10mg PO qHS.    Essential hypertension  - Continue amlodipine 5mg PO daily, benazepril 40mg PO daily and resume hctz 12.5 mg qd    Morbid obesity due to excess calories  - Dietary counseling.      VTE Risk Mitigation (From admission, onward)         Ordered     enoxaparin injection 40 mg  Every 12 hours      10/27/20 1530     IP VTE HIGH RISK PATIENT  Once      10/27/20 1530     Place sequential compression device  Until discontinued      10/27/20 1530                Discharge Planning   YOMI: 11/5/2020     Code Status: Full Code   Is the patient medically ready for discharge?:     Reason for patient still in hospital (select all that apply): Patient trending condition and Consult recommendations  Discharge Plan A: Home                  Evelia Pina MD  Department of Hospital Medicine   [facility]

## 2020-11-04 NOTE — ASSESSMENT & PLAN NOTE
- Partial SBO in setting of h/o multiple prior abdominal surgeries with prior SBOs in addition.  - Tolerating full liquids. + Flatus but no BM yet  - Continuing morphine 3mg IV q6hr PRN, ondansetron 4mg IV q8hr PRN for pain / nausea control respectively.  - Appreciate surgery assistance  - Flat and erect x-ray with resolution of SBO  - Glycerine suppository x 1 now  - Okay to advance diet and monitor

## 2020-11-04 NOTE — PLAN OF CARE
Patient ambulated to the bathroom and back to bed multiple times during the shift. She reported no bowel movement during the night shift. Left hand 20 gauge IV access intact. Call light within reach. Encouraged patient to call for any and all needs. Patient verbalized understanding of instructions. Will continue to monitor patient.

## 2020-11-05 VITALS
HEART RATE: 81 BPM | WEIGHT: 284.38 LBS | BODY MASS INDEX: 44.63 KG/M2 | OXYGEN SATURATION: 100 % | SYSTOLIC BLOOD PRESSURE: 137 MMHG | HEIGHT: 67 IN | RESPIRATION RATE: 18 BRPM | TEMPERATURE: 98 F | DIASTOLIC BLOOD PRESSURE: 66 MMHG

## 2020-11-05 PROBLEM — K56.600 PARTIAL SMALL BOWEL OBSTRUCTION: Status: RESOLVED | Noted: 2019-02-23 | Resolved: 2020-11-05

## 2020-11-05 LAB
ANION GAP SERPL CALC-SCNC: 11 MMOL/L (ref 8–16)
BUN SERPL-MCNC: 11 MG/DL (ref 8–23)
CALCIUM SERPL-MCNC: 9 MG/DL (ref 8.7–10.5)
CHLORIDE SERPL-SCNC: 109 MMOL/L (ref 95–110)
CO2 SERPL-SCNC: 22 MMOL/L (ref 23–29)
CREAT SERPL-MCNC: 0.7 MG/DL (ref 0.5–1.4)
EST. GFR  (AFRICAN AMERICAN): >60 ML/MIN/1.73 M^2
EST. GFR  (NON AFRICAN AMERICAN): >60 ML/MIN/1.73 M^2
GLUCOSE SERPL-MCNC: 95 MG/DL (ref 70–110)
MAGNESIUM SERPL-MCNC: 1.6 MG/DL (ref 1.6–2.6)
PHOSPHATE SERPL-MCNC: 3.1 MG/DL (ref 2.7–4.5)
POTASSIUM SERPL-SCNC: 3.8 MMOL/L (ref 3.5–5.1)
SODIUM SERPL-SCNC: 142 MMOL/L (ref 136–145)

## 2020-11-05 PROCEDURE — 36415 COLL VENOUS BLD VENIPUNCTURE: CPT

## 2020-11-05 PROCEDURE — 80048 BASIC METABOLIC PNL TOTAL CA: CPT

## 2020-11-05 PROCEDURE — 99900035 HC TECH TIME PER 15 MIN (STAT)

## 2020-11-05 PROCEDURE — 84100 ASSAY OF PHOSPHORUS: CPT

## 2020-11-05 PROCEDURE — 25000003 PHARM REV CODE 250: Performed by: SPECIALIST

## 2020-11-05 PROCEDURE — 99238 HOSP IP/OBS DSCHRG MGMT 30/<: CPT | Mod: ,,, | Performed by: INTERNAL MEDICINE

## 2020-11-05 PROCEDURE — 83735 ASSAY OF MAGNESIUM: CPT

## 2020-11-05 PROCEDURE — 63600175 PHARM REV CODE 636 W HCPCS: Performed by: INTERNAL MEDICINE

## 2020-11-05 PROCEDURE — 25000003 PHARM REV CODE 250: Performed by: INTERNAL MEDICINE

## 2020-11-05 PROCEDURE — 94660 CPAP INITIATION&MGMT: CPT

## 2020-11-05 PROCEDURE — 99238 PR HOSPITAL DISCHARGE DAY,<30 MIN: ICD-10-PCS | Mod: ,,, | Performed by: INTERNAL MEDICINE

## 2020-11-05 RX ADMIN — GLYCERIN 1 SUPPOSITORY: 2 SUPPOSITORY RECTAL at 08:11

## 2020-11-05 RX ADMIN — AMLODIPINE BESYLATE 10 MG: 5 TABLET ORAL at 08:11

## 2020-11-05 RX ADMIN — ASPIRIN 81 MG: 81 TABLET, COATED ORAL at 08:11

## 2020-11-05 RX ADMIN — HYDROCHLOROTHIAZIDE 12.5 MG: 12.5 TABLET ORAL at 08:11

## 2020-11-05 RX ADMIN — FLUTICASONE PROPIONATE 50 MCG: 50 SPRAY, METERED NASAL at 08:11

## 2020-11-05 RX ADMIN — BENAZEPRIL HYDROCHLORIDE 40 MG: 40 TABLET, COATED ORAL at 08:11

## 2020-11-05 RX ADMIN — CETIRIZINE HYDROCHLORIDE 10 MG: 10 TABLET, FILM COATED ORAL at 08:11

## 2020-11-05 RX ADMIN — ALLOPURINOL 300 MG: 300 TABLET ORAL at 08:11

## 2020-11-05 RX ADMIN — ENOXAPARIN SODIUM 40 MG: 40 INJECTION SUBCUTANEOUS at 08:11

## 2020-11-05 NOTE — PLAN OF CARE
Plan of care reviewed with patient at bedside. AAOx4, denies pain, discomfort. Tolerating soft/bland diet well, no N/V noted. Suppository given once this shift per order, no BM noted. Pt passing gas easily. Up to toilet independently. All safety precautions in place, call bell in reach. Hourly rounding completed.

## 2020-11-05 NOTE — PLAN OF CARE
Patient is discharged home with self care.     No CM needs for discharge.     Family will transport the patient home.     11/05/20 0940   Final Note   Assessment Type Final Discharge Note   Anticipated Discharge Disposition Home   What phone number can be called within the next 1-3 days to see how you are doing after discharge? [phone]

## 2020-11-05 NOTE — PROGRESS NOTES
Discharge instructions reviewed with patient. Verbalized understanding and denies any further questions. Patient denies any acute pain. IV removed fully intact. Pt's daughter to pick pt up for discharge home. Provided with mask. Pt transport at bedside now with wheelchair for transportation mode. All belongings taken with patient. Denies any further needs.

## 2020-11-05 NOTE — DISCHARGE SUMMARY
[facility] Hospital Medicine  Discharge Summary      Patient Name: Natali Galeano  MRN: [id number]  Admission Date: 10/27/2020  Hospital Length of Stay: 9 days  Discharge Date and Time:  11/05/2020 9:31 AM  Attending Physician: Evelia Pina MD   Discharging Provider: Evelia Pina MD  Primary Care Provider: Ewelina Herzog MD      HPI:   Ms. Galeano is a 79/F with PMH HTN, colon cancer s/p resection 2002, abdominal hernia repair 2004, h/o SBOs (most recent 01/2020) who presented to ED 10/27 with a one day history of upper abdominal pain. She reported she was in her usual state of health when she had sudden onset of sharp abdominal discomfort the night prior to presentation, associated with nausea and multiple episodes of vomiting. She stated that the pain is 9/10 at its worst and was not relieved with acetaminophen at home. Last BM was 10/26 but scant amount, with more significant BM the previous day. ED workup was notable for CT demonstrating partial SBO with jejunal dilatation in LLQ near area of prior ventral hernia repair comparable to studies during prior SBO admissions, without evidence of fluid collection or perforation. Hospital medicine was contacted for admission.      * No surgery found *      Hospital Course:   Admitted and general surgery consulted. Started on IVFs with pain/nausea control. Began passing flatus and started having less pain with more active bowel sounds. Diet gradually advanced. Abd x-ray showed resolution of distended loops of bowel. Her diet was further advanced. She had a BM after glycerine suppository was given. She is feeling well today and stable for discharge home. She was counseled on diet and OTC medications to maintain healthy bowel habits.     Consults:   Consults (From admission, onward)        Status Ordering Provider     Inpatient consult to General Surgery  Once     Provider:  Pedro Cardozo MD    Acknowledged SUN KENNEY  Assessment & Plan notes have been filed under this hospital service since the last note was generated.  Service: Hospital Medicine    Final Active Diagnoses:    Diagnosis Date Noted POA    Gout [M10.9] 10/27/2020 Yes     Chronic    Normocytic anemia [D64.9] 01/12/2020 Yes     Chronic    ALYSE (obstructive sleep apnea) [G47.33] 02/23/2019 Yes     Chronic    Hyperlipidemia [E78.5] 06/21/2017 Yes     Chronic    Essential hypertension [I10]  Yes     Chronic    Morbid obesity due to excess calories [E66.01] 06/23/2015 Yes     Chronic      Problems Resolved During this Admission:    Diagnosis Date Noted Date Resolved POA    PRINCIPAL PROBLEM:  Partial small bowel obstruction [K56.600] 02/23/2019 11/05/2020 Yes    Abdominal pain [R10.9] 10/27/2020 10/27/2020 Yes       Discharged Condition: good    Disposition: Home or Self Care    Follow Up:  Follow-up Information     Ewelina Herzog MD In 2 weeks.    Specialty: Internal Medicine  Why: hospital follow-up for small bowel obstruction  Contact information:  [address] [place], [place] [id number] [phone]                 Patient Instructions:      Diet Cardiac     Activity as tolerated       Significant Diagnostic Studies: Labs: All labs within the past 24 hours have been reviewed    Pending Diagnostic Studies:     None         Medications:  Reconciled Home Medications:      Medication List      CONTINUE taking these medications    allopurinoL 300 MG tablet  Commonly known as: ZYLOPRIM  Take 300 mg by mouth once daily.     amLODIPine 5 MG tablet  Commonly known as: NORVASC  Take 1 tablet (5 mg total) by mouth once daily.     ascorbic acid (vitamin C) 1000 MG tablet  Commonly known as: VITAMIN C  Take 1,000 mg by mouth once daily.     aspirin 81 MG EC tablet  Commonly known as: ECOTRIN  Take 81 mg by mouth once daily.     atorvastatin 10 MG tablet  Commonly known as: LIPITOR  Take 10 mg by mouth every evening.     benazepriL 40 MG tablet  Commonly known as:  LOTENSIN  Take 40 mg by mouth once daily.     cetirizine 10 MG tablet  Commonly known as: ZYRTEC  Take 10 mg by mouth once daily.     docusate sodium 100 MG capsule  Commonly known as: COLACE  Take 100 mg by mouth once daily.     fluticasone propionate 50 mcg/actuation nasal spray  Commonly known as: FLONASE  1 spray by Each Nare route once daily.     hydroCHLOROthiazide 25 MG tablet  Commonly known as: HYDRODIURIL  Take 12.5 mg by mouth. Take 1/2 of the 25 mg tablet daily     niacin 500 mg Tbsr  Take 500 mg by mouth once daily.     senna-docusate 8.6-50 mg 8.6-50 mg per tablet  Commonly known as: PERICOLACE  Take 1 tablet by mouth once daily.     THERA-D 50 mcg (2,000 unit) Tab  Generic drug: cholecalciferol (vitamin D3)  Take 1 tablet by mouth once daily.            Indwelling Lines/Drains at time of discharge:   Lines/Drains/Airways     None                 Time spent on the discharge of patient: 30 minutes  Patient was seen and examined on the date of discharge and determined to be suitable for discharge.         Evelia Pina MD  Department of Hospital Medicine  [facility]

## 2020-11-06 ENCOUNTER — PATIENT OUTREACH (OUTPATIENT)
Dept: ADMINISTRATIVE | Facility: CLINIC | Age: 80
End: 2020-11-06

## 2020-11-06 NOTE — PROGRESS NOTES
C3 nurse attempted to contact patient. No answer. The following message was left for the patient to return the call:  Good afternoon I am a nurse calling on behalf of [facility] from the [facility].  This is a Transitional Care Call for Natali. When you have a moment please contact us at [phone] or [phone] Monday through Friday, between the hours of 8 am to 4 pm. We look forward to speaking with you. On behalf of [facility] have a nice day.    The patient does not have a scheduled HOSFU appointment within 7-14 days post hospital discharge date 11/5/20. Non [facility] PCP.

## 2021-01-29 ENCOUNTER — OFFICE VISIT (OUTPATIENT)
Dept: OBSTETRICS AND GYNECOLOGY | Facility: CLINIC | Age: 81
End: 2021-01-29
Payer: MEDICARE

## 2021-01-29 VITALS
SYSTOLIC BLOOD PRESSURE: 166 MMHG | HEIGHT: 67 IN | DIASTOLIC BLOOD PRESSURE: 82 MMHG | BODY MASS INDEX: 45.9 KG/M2 | WEIGHT: 292.44 LBS

## 2021-01-29 DIAGNOSIS — Z01.419 ENCOUNTER FOR ANNUAL ROUTINE GYNECOLOGICAL EXAMINATION: Primary | ICD-10-CM

## 2021-01-29 DIAGNOSIS — I12.9 HYPERTENSIVE CHRONIC KIDNEY DISEASE WITH STAGE 1 THROUGH STAGE 4 CHRONIC KIDNEY DISEASE, OR UNSPECIFIED CHRONIC KIDNEY DISEASE: ICD-10-CM

## 2021-01-29 DIAGNOSIS — E66.9 OBESITY, UNSPECIFIED CLASSIFICATION, UNSPECIFIED OBESITY TYPE, UNSPECIFIED WHETHER SERIOUS COMORBIDITY PRESENT: ICD-10-CM

## 2021-01-29 DIAGNOSIS — Z12.31 BREAST CANCER SCREENING BY MAMMOGRAM: ICD-10-CM

## 2021-01-29 PROCEDURE — 3077F PR MOST RECENT SYSTOLIC BLOOD PRESSURE >= 140 MM HG: ICD-10-PCS | Mod: CPTII,S$GLB,, | Performed by: OBSTETRICS & GYNECOLOGY

## 2021-01-29 PROCEDURE — 3288F PR FALLS RISK ASSESSMENT DOCUMENTED: ICD-10-PCS | Mod: CPTII,S$GLB,, | Performed by: OBSTETRICS & GYNECOLOGY

## 2021-01-29 PROCEDURE — 1126F AMNT PAIN NOTED NONE PRSNT: CPT | Mod: S$GLB,,, | Performed by: OBSTETRICS & GYNECOLOGY

## 2021-01-29 PROCEDURE — 3288F FALL RISK ASSESSMENT DOCD: CPT | Mod: CPTII,S$GLB,, | Performed by: OBSTETRICS & GYNECOLOGY

## 2021-01-29 PROCEDURE — 99999 PR PBB SHADOW E&M-EST. PATIENT-LVL IV: CPT | Mod: PBBFAC,,, | Performed by: OBSTETRICS & GYNECOLOGY

## 2021-01-29 PROCEDURE — G0101 CA SCREEN;PELVIC/BREAST EXAM: HCPCS | Mod: S$GLB,,, | Performed by: OBSTETRICS & GYNECOLOGY

## 2021-01-29 PROCEDURE — 3079F DIAST BP 80-89 MM HG: CPT | Mod: CPTII,S$GLB,, | Performed by: OBSTETRICS & GYNECOLOGY

## 2021-01-29 PROCEDURE — 1126F PR PAIN SEVERITY QUANTIFIED, NO PAIN PRESENT: ICD-10-PCS | Mod: S$GLB,,, | Performed by: OBSTETRICS & GYNECOLOGY

## 2021-01-29 PROCEDURE — 3077F SYST BP >= 140 MM HG: CPT | Mod: CPTII,S$GLB,, | Performed by: OBSTETRICS & GYNECOLOGY

## 2021-01-29 PROCEDURE — 3079F PR MOST RECENT DIASTOLIC BLOOD PRESSURE 80-89 MM HG: ICD-10-PCS | Mod: CPTII,S$GLB,, | Performed by: OBSTETRICS & GYNECOLOGY

## 2021-01-29 PROCEDURE — 1101F PR PT FALLS ASSESS DOC 0-1 FALLS W/OUT INJ PAST YR: ICD-10-PCS | Mod: CPTII,S$GLB,, | Performed by: OBSTETRICS & GYNECOLOGY

## 2021-01-29 PROCEDURE — 1101F PT FALLS ASSESS-DOCD LE1/YR: CPT | Mod: CPTII,S$GLB,, | Performed by: OBSTETRICS & GYNECOLOGY

## 2021-01-29 PROCEDURE — 99999 PR PBB SHADOW E&M-EST. PATIENT-LVL IV: ICD-10-PCS | Mod: PBBFAC,,, | Performed by: OBSTETRICS & GYNECOLOGY

## 2021-01-29 PROCEDURE — G0101 PR CA SCREEN;PELVIC/BREAST EXAM: ICD-10-PCS | Mod: S$GLB,,, | Performed by: OBSTETRICS & GYNECOLOGY

## 2021-03-20 ENCOUNTER — HOSPITAL ENCOUNTER (EMERGENCY)
Facility: OTHER | Age: 81
Discharge: HOME | End: 2021-03-20
Attending: EMERGENCY MEDICINE
Payer: MEDICARE

## 2021-03-20 VITALS
RESPIRATION RATE: 16 BRPM | DIASTOLIC BLOOD PRESSURE: 79 MMHG | WEIGHT: 290 LBS | HEIGHT: 67 IN | BODY MASS INDEX: 45.52 KG/M2 | SYSTOLIC BLOOD PRESSURE: 167 MMHG | TEMPERATURE: 98 F | HEART RATE: 65 BPM | OXYGEN SATURATION: 100 %

## 2021-03-20 DIAGNOSIS — R82.998 RED-COLORED URINE: Primary | ICD-10-CM

## 2021-03-20 LAB
BILIRUB UR QL STRIP: NEGATIVE
CLARITY UR: ABNORMAL
COLOR UR: YELLOW
GLUCOSE UR QL STRIP: NEGATIVE
HGB UR QL STRIP: NEGATIVE
KETONES UR QL STRIP: NEGATIVE
LEUKOCYTE ESTERASE UR QL STRIP: NEGATIVE
NITRITE UR QL STRIP: NEGATIVE
PH UR STRIP: 5 [PH] (ref 5–8)
PROT UR QL STRIP: NEGATIVE
SP GR UR STRIP: 1.02 (ref 1–1.03)
URN SPEC COLLECT METH UR: ABNORMAL
UROBILINOGEN UR STRIP-ACNC: NEGATIVE EU/DL

## 2021-03-20 PROCEDURE — 81003 URINALYSIS AUTO W/O SCOPE: CPT | Performed by: EMERGENCY MEDICINE

## 2021-03-20 PROCEDURE — 99282 EMERGENCY DEPT VISIT SF MDM: CPT | Mod: 25

## 2021-06-09 ENCOUNTER — NUTRITION (OUTPATIENT)
Dept: NUTRITION | Facility: CLINIC | Age: 81
End: 2021-06-09
Payer: MEDICARE

## 2021-06-09 VITALS — HEIGHT: 67 IN | WEIGHT: 293 LBS | BODY MASS INDEX: 45.99 KG/M2

## 2021-06-09 DIAGNOSIS — E66.9 OBESITY, UNSPECIFIED CLASSIFICATION, UNSPECIFIED OBESITY TYPE, UNSPECIFIED WHETHER SERIOUS COMORBIDITY PRESENT: Primary | ICD-10-CM

## 2021-06-09 DIAGNOSIS — I12.9 HYPERTENSIVE CHRONIC KIDNEY DISEASE WITH STAGE 1 THROUGH STAGE 4 CHRONIC KIDNEY DISEASE, OR UNSPECIFIED CHRONIC KIDNEY DISEASE: ICD-10-CM

## 2021-06-09 DIAGNOSIS — I10 ESSENTIAL HYPERTENSION: ICD-10-CM

## 2021-06-09 DIAGNOSIS — Z71.3 DIETARY COUNSELING: ICD-10-CM

## 2021-06-09 PROCEDURE — 99999 PR PBB SHADOW E&M-EST. PATIENT-LVL III: CPT | Mod: PBBFAC,,,

## 2021-06-09 PROCEDURE — 99999 PR PBB SHADOW E&M-EST. PATIENT-LVL III: ICD-10-PCS | Mod: PBBFAC,,,

## 2021-06-09 PROCEDURE — 97802 MEDICAL NUTRITION INDIV IN: CPT | Mod: S$GLB,,, | Performed by: DIETITIAN, REGISTERED

## 2021-06-09 PROCEDURE — 97802 PR MED NUTR THER, 1ST, INDIV, EA 15 MIN: ICD-10-PCS | Mod: S$GLB,,, | Performed by: DIETITIAN, REGISTERED

## 2022-01-06 ENCOUNTER — OFFICE VISIT (OUTPATIENT)
Dept: CARDIOLOGY | Facility: CLINIC | Age: 82
End: 2022-01-06
Payer: MEDICARE

## 2022-01-06 VITALS
BODY MASS INDEX: 45.99 KG/M2 | HEART RATE: 74 BPM | OXYGEN SATURATION: 99 % | HEIGHT: 67 IN | SYSTOLIC BLOOD PRESSURE: 136 MMHG | DIASTOLIC BLOOD PRESSURE: 84 MMHG | WEIGHT: 293 LBS

## 2022-01-06 DIAGNOSIS — I00 RHEUMATIC FEVER: ICD-10-CM

## 2022-01-06 DIAGNOSIS — G47.33 OSA (OBSTRUCTIVE SLEEP APNEA): Chronic | ICD-10-CM

## 2022-01-06 DIAGNOSIS — R01.1 HEART MURMUR: ICD-10-CM

## 2022-01-06 DIAGNOSIS — I10 ESSENTIAL HYPERTENSION: Primary | Chronic | ICD-10-CM

## 2022-01-06 PROCEDURE — 3075F SYST BP GE 130 - 139MM HG: CPT | Mod: CPTII,S$GLB,, | Performed by: INTERNAL MEDICINE

## 2022-01-06 PROCEDURE — 1159F PR MEDICATION LIST DOCUMENTED IN MEDICAL RECORD: ICD-10-PCS | Mod: CPTII,S$GLB,, | Performed by: INTERNAL MEDICINE

## 2022-01-06 PROCEDURE — 1126F AMNT PAIN NOTED NONE PRSNT: CPT | Mod: CPTII,S$GLB,, | Performed by: INTERNAL MEDICINE

## 2022-01-06 PROCEDURE — 3079F DIAST BP 80-89 MM HG: CPT | Mod: CPTII,S$GLB,, | Performed by: INTERNAL MEDICINE

## 2022-01-06 PROCEDURE — 93010 EKG 12-LEAD: ICD-10-PCS | Mod: S$GLB,,, | Performed by: INTERNAL MEDICINE

## 2022-01-06 PROCEDURE — 1101F PR PT FALLS ASSESS DOC 0-1 FALLS W/OUT INJ PAST YR: ICD-10-PCS | Mod: CPTII,S$GLB,, | Performed by: INTERNAL MEDICINE

## 2022-01-06 PROCEDURE — 3079F PR MOST RECENT DIASTOLIC BLOOD PRESSURE 80-89 MM HG: ICD-10-PCS | Mod: CPTII,S$GLB,, | Performed by: INTERNAL MEDICINE

## 2022-01-06 PROCEDURE — 3288F PR FALLS RISK ASSESSMENT DOCUMENTED: ICD-10-PCS | Mod: CPTII,S$GLB,, | Performed by: INTERNAL MEDICINE

## 2022-01-06 PROCEDURE — 99204 PR OFFICE/OUTPT VISIT, NEW, LEVL IV, 45-59 MIN: ICD-10-PCS | Mod: 25,S$GLB,, | Performed by: INTERNAL MEDICINE

## 2022-01-06 PROCEDURE — 99999 PR PBB SHADOW E&M-EST. PATIENT-LVL III: ICD-10-PCS | Mod: PBBFAC,,, | Performed by: INTERNAL MEDICINE

## 2022-01-06 PROCEDURE — 3288F FALL RISK ASSESSMENT DOCD: CPT | Mod: CPTII,S$GLB,, | Performed by: INTERNAL MEDICINE

## 2022-01-06 PROCEDURE — 93010 ELECTROCARDIOGRAM REPORT: CPT | Mod: S$GLB,,, | Performed by: INTERNAL MEDICINE

## 2022-01-06 PROCEDURE — 99204 OFFICE O/P NEW MOD 45 MIN: CPT | Mod: 25,S$GLB,, | Performed by: INTERNAL MEDICINE

## 2022-01-06 PROCEDURE — 3075F PR MOST RECENT SYSTOLIC BLOOD PRESS GE 130-139MM HG: ICD-10-PCS | Mod: CPTII,S$GLB,, | Performed by: INTERNAL MEDICINE

## 2022-01-06 PROCEDURE — 1159F MED LIST DOCD IN RCRD: CPT | Mod: CPTII,S$GLB,, | Performed by: INTERNAL MEDICINE

## 2022-01-06 PROCEDURE — 1126F PR PAIN SEVERITY QUANTIFIED, NO PAIN PRESENT: ICD-10-PCS | Mod: CPTII,S$GLB,, | Performed by: INTERNAL MEDICINE

## 2022-01-06 PROCEDURE — 99999 PR PBB SHADOW E&M-EST. PATIENT-LVL III: CPT | Mod: PBBFAC,,, | Performed by: INTERNAL MEDICINE

## 2022-01-06 PROCEDURE — 1101F PT FALLS ASSESS-DOCD LE1/YR: CPT | Mod: CPTII,S$GLB,, | Performed by: INTERNAL MEDICINE

## 2022-01-06 PROCEDURE — 93005 ELECTROCARDIOGRAM TRACING: CPT

## 2022-01-06 RX ORDER — CYANOCOBALAMIN (VITAMIN B-12) 250 MCG
2500 TABLET ORAL DAILY
COMMUNITY

## 2022-01-06 RX ORDER — LEVOCETIRIZINE DIHYDROCHLORIDE 5 MG/1
5 TABLET, FILM COATED ORAL NIGHTLY
COMMUNITY
Start: 2021-11-11 | End: 2022-02-24 | Stop reason: SDUPTHER

## 2022-01-06 RX ORDER — FLUTICASONE FUROATE, UMECLIDINIUM BROMIDE AND VILANTEROL TRIFENATATE 200; 62.5; 25 UG/1; UG/1; UG/1
1 POWDER RESPIRATORY (INHALATION) DAILY
COMMUNITY
End: 2022-10-07

## 2022-01-06 RX ORDER — VALSARTAN 160 MG/1
160 TABLET ORAL DAILY
Qty: 90 TABLET | Refills: 3 | Status: SHIPPED | OUTPATIENT
Start: 2022-01-06 | End: 2022-04-21 | Stop reason: SINTOL

## 2022-01-06 NOTE — PROGRESS NOTES
Cardiology    2022  10:10 AM    Problem list  Patient Active Problem List   Diagnosis    Morbid obesity due to excess calories    Essential hypertension    Adrenal nodule    Hyperlipidemia    PMB (postmenopausal bleeding)    ALYSE (obstructive sleep apnea)    Right knee pain    Thyroid disease    Normocytic anemia    Gout    Heart murmur    Rheumatic fever    BMI 45.0-49.9, adult       CC:  cough    HPI:  Self-referred for evaluation of cough.  She has been having a cough since October.  Her cough is mostly dry.  She has seen allergist who prescribed medication which has not helped.  She denies any fever chills.  She denies any prior cardiac problems.  She denies any chest pain or shortness of breath.  Her father  of myocardial infarction at age of 65. She has a history of rheumatic fever when she was 22.  She quit smoking in . She does not drink.  She drinks 1 cup of coffee a day.      Medications  Current Outpatient Medications   Medication Sig Dispense Refill    allopurinol (ZYLOPRIM) 300 MG tablet Take 300 mg by mouth once daily.      ascorbic acid, vitamin C, (VITAMIN C) 1000 MG tablet Take 1,000 mg by mouth once daily.      aspirin (ECOTRIN) 81 MG EC tablet Take 81 mg by mouth once daily.      atorvastatin (LIPITOR) 10 MG tablet Take 10 mg by mouth every evening.       cholecalciferol, vitamin D3, (VITAMIN D3) 50 mcg (2,000 unit) Tab Take 1 tablet by mouth once daily.      cyanocobalamin (VITAMIN B-12) 250 MCG tablet Take 2,500 mcg by mouth once daily.      docusate sodium (COLACE) 100 MG capsule Take 100 mg by mouth once daily.      fluticasone (FLONASE) 50 mcg/actuation nasal spray 1 spray by Each Nare route once daily.      fluticasone-umeclidin-vilanter (TRELEGY ELLIPTA) 200-62.5-25 mcg inhaler Inhale 1 puff into the lungs once daily.      hydroCHLOROthiazide (HYDRODIURIL) 25 MG tablet Take 12.5 mg by mouth. Take 1/2 of the 25 mg tablet daily      levocetirizine  (XYZAL) 5 MG tablet Take 5 mg by mouth nightly.      amLODIPine (NORVASC) 5 MG tablet Take 1 tablet (5 mg total) by mouth once daily. 30 tablet 11    senna-docusate 8.6-50 mg (PERICOLACE) 8.6-50 mg per tablet Take 1 tablet by mouth once daily. (Patient not taking: No sig reported)      valsartan (DIOVAN) 160 MG tablet Take 1 tablet (160 mg total) by mouth once daily. 90 tablet 3     No current facility-administered medications for this visit.      Prior to Admission medications    Medication Sig Start Date End Date Taking? Authorizing Provider   allopurinol (ZYLOPRIM) 300 MG tablet Take 300 mg by mouth once daily.   Yes Historical Provider   ascorbic acid, vitamin C, (VITAMIN C) 1000 MG tablet Take 1,000 mg by mouth once daily.   Yes Historical Provider   aspirin (ECOTRIN) 81 MG EC tablet Take 81 mg by mouth once daily.   Yes Historical Provider   atorvastatin (LIPITOR) 10 MG tablet Take 10 mg by mouth every evening.    Yes Historical Provider   cholecalciferol, vitamin D3, (VITAMIN D3) 50 mcg (2,000 unit) Tab Take 1 tablet by mouth once daily.   Yes Historical Provider   cyanocobalamin (VITAMIN B-12) 250 MCG tablet Take 2,500 mcg by mouth once daily.   Yes Historical Provider   docusate sodium (COLACE) 100 MG capsule Take 100 mg by mouth once daily.   Yes Historical Provider   fluticasone (FLONASE) 50 mcg/actuation nasal spray 1 spray by Each Nare route once daily.   Yes Historical Provider   fluticasone-umeclidin-vilanter (TRELEGY ELLIPTA) 200-62.5-25 mcg inhaler Inhale 1 puff into the lungs once daily.   Yes Historical Provider   hydroCHLOROthiazide (HYDRODIURIL) 25 MG tablet Take 12.5 mg by mouth. Take 1/2 of the 25 mg tablet daily 11/17/19  Yes Historical Provider   levocetirizine (XYZAL) 5 MG tablet Take 5 mg by mouth nightly. 11/11/21  Yes Historical Provider   benazepril (LOTENSIN) 40 MG tablet Take 40 mg by mouth once daily.  1/6/22 Yes Historical Provider   amLODIPine (NORVASC) 5 MG tablet Take 1 tablet  (5 mg total) by mouth once daily. 1/13/20 3/20/21  Zachary Hinton MD   senna-docusate 8.6-50 mg (PERICOLACE) 8.6-50 mg per tablet Take 1 tablet by mouth once daily.  Patient not taking: No sig reported 19   Michael Ball MD   valsartan (DIOVAN) 160 MG tablet Take 1 tablet (160 mg total) by mouth once daily. 22  Cleve Barfield MD   cetirizine (ZYRTEC) 10 MG tablet Take 10 mg by mouth once daily.  22  Historical Provider   niacin 500 mg TbSR Take 500 mg by mouth once daily.  22  Historical Provider         History  Past Medical History:   Diagnosis Date    Colon cancer     Gout, chronic     Heart murmur     High cholesterol     Hypercholesteremia     Hypertension     Sleep apnea     Thyroid disease      Past Surgical History:   Procedure Laterality Date    BTL       SECTION, CLASSIC      COLON SURGERY      goiter       HERNIA REPAIR      KIDNEY SURGERY      cyst removal    THYROID SURGERY       Social History     Socioeconomic History    Marital status: Single   Tobacco Use    Smoking status: Former Smoker     Packs/day: 0.10     Quit date: 1980     Years since quittin.7    Smokeless tobacco: Never Used   Substance and Sexual Activity    Alcohol use: No    Drug use: No    Sexual activity: Never         Allergies  Review of patient's allergies indicates:  No Known Allergies      Review of Systems   Review of Systems   Constitutional: Negative for decreased appetite, fever and weight loss.   HENT: Negative for congestion and nosebleeds.    Eyes: Negative for double vision, vision loss in left eye, vision loss in right eye and visual disturbance.   Cardiovascular: Negative for chest pain, claudication, cyanosis, dyspnea on exertion, irregular heartbeat, leg swelling, near-syncope, orthopnea, palpitations, paroxysmal nocturnal dyspnea and syncope.   Respiratory: Positive for cough. Negative for hemoptysis, shortness of breath, sleep disturbances due  to breathing, snoring, sputum production and wheezing.    Endocrine: Negative for cold intolerance and heat intolerance.   Skin: Negative for nail changes and rash.   Musculoskeletal: Negative for joint pain, muscle cramps, muscle weakness and myalgias.   Gastrointestinal: Negative for change in bowel habit, heartburn, hematemesis, hematochezia, hemorrhoids and melena.   Neurological: Negative for dizziness, focal weakness and headaches.         Physical Exam  Wt Readings from Last 1 Encounters:   01/06/22 133.3 kg (293 lb 12.8 oz)     BP Readings from Last 3 Encounters:   01/06/22 136/84   03/20/21 (!) 167/79   01/29/21 (!) 166/82     Pulse Readings from Last 1 Encounters:   01/06/22 74     Body mass index is 46.02 kg/m².    Physical Exam  Vitals reviewed.   Constitutional:       Appearance: She is well-developed and well-nourished. She is obese.   HENT:      Head: Atraumatic.   Eyes:      General: No scleral icterus.     Extraocular Movements: EOM normal.   Neck:      Vascular: Normal carotid pulses. No carotid bruit, hepatojugular reflux or JVD.   Cardiovascular:      Rate and Rhythm: Normal rate and regular rhythm.      Chest Wall: PMI is not displaced.      Pulses: Intact distal pulses.           Carotid pulses are 2+ on the right side and 2+ on the left side.       Radial pulses are 2+ on the right side and 2+ on the left side.        Dorsalis pedis pulses are 2+ on the right side and 2+ on the left side.      Heart sounds: Normal heart sounds, S1 normal and S2 normal. No murmur heard.  No friction rub.   Pulmonary:      Effort: Pulmonary effort is normal. No respiratory distress.      Breath sounds: Normal breath sounds. No stridor. No wheezing or rales.   Chest:      Chest wall: No tenderness.   Abdominal:      General: Bowel sounds are normal. Aorta is normal.      Palpations: Abdomen is soft.   Musculoskeletal:         General: No edema.      Cervical back: Neck supple. No edema.   Skin:     General: Skin  is warm and dry.      Nails: There is no clubbing or cyanosis.   Neurological:      Mental Status: She is alert and oriented to person, place, and time.   Psychiatric:         Mood and Affect: Mood and affect normal.         Behavior: Behavior normal.         Thought Content: Thought content normal.                   Assessment  1. Essential hypertension  Well controlled  - EKG 12-lead  - Hypertension Digital Medicine ([facility]) Enrollment Order  - Hypertension Digital Medicine ([facility]): Assign Onboarding Questionnaires  - Echo; Future    2. ALYSE (obstructive sleep apnea)  Unchanged    3. Heart murmur  Back with    4. Rheumatic fever  History of rheumatic fever at 22 years old she        Plan and Discussion  Discussed that her dry cough is likely due to side effect of benazepril.  Therefore will stop benazepril and start valsartan 160 mg daily.  Will enroll in hypertension digital medicine program.  Will get labs.  Will get echocardiogram to evaluate her murmur since she has a history of rheumatic fever as a child.    Follow Up  Two months      Cleve Barfield MD, F.A.C.C, F.S.C.A.I.

## 2022-01-20 ENCOUNTER — PATIENT MESSAGE (OUTPATIENT)
Dept: ADMINISTRATIVE | Facility: OTHER | Age: 82
End: 2022-01-20
Payer: MEDICARE

## 2022-02-01 ENCOUNTER — HOSPITAL ENCOUNTER (OUTPATIENT)
Dept: CARDIOLOGY | Facility: HOSPITAL | Age: 82
Discharge: HOME | End: 2022-02-01
Attending: INTERNAL MEDICINE
Payer: MEDICARE

## 2022-02-01 ENCOUNTER — LAB VISIT (OUTPATIENT)
Dept: PRIMARY CARE CLINIC | Facility: CLINIC | Age: 82
End: 2022-02-01
Payer: MEDICARE

## 2022-02-01 VITALS
DIASTOLIC BLOOD PRESSURE: 84 MMHG | WEIGHT: 293 LBS | SYSTOLIC BLOOD PRESSURE: 136 MMHG | BODY MASS INDEX: 45.99 KG/M2 | HEIGHT: 67 IN

## 2022-02-01 DIAGNOSIS — I10 ESSENTIAL HYPERTENSION: ICD-10-CM

## 2022-02-01 DIAGNOSIS — I10 ESSENTIAL HYPERTENSION: Chronic | ICD-10-CM

## 2022-02-01 LAB
ALBUMIN SERPL BCP-MCNC: 3.7 G/DL (ref 3.5–5.2)
ALP SERPL-CCNC: 89 U/L (ref 55–135)
ALT SERPL W/O P-5'-P-CCNC: 13 U/L (ref 10–44)
ANION GAP SERPL CALC-SCNC: 9 MMOL/L (ref 8–16)
ASCENDING AORTA: 2.42 CM
AST SERPL-CCNC: 22 U/L (ref 10–40)
AV INDEX (PROSTH): 0.88
AV MEAN GRADIENT: 5 MMHG
AV PEAK GRADIENT: 8 MMHG
AV VALVE AREA: 3.03 CM2
AV VELOCITY RATIO: 0.85
BILIRUB SERPL-MCNC: 0.7 MG/DL (ref 0.1–1)
BSA FOR ECHO PROCEDURE: 2.51 M2
BUN SERPL-MCNC: 19 MG/DL (ref 8–23)
CALCIUM SERPL-MCNC: 10.4 MG/DL (ref 8.7–10.5)
CHLORIDE SERPL-SCNC: 104 MMOL/L (ref 95–110)
CHOLEST SERPL-MCNC: 136 MG/DL (ref 120–199)
CHOLEST/HDLC SERPL: 2.7 {RATIO} (ref 2–5)
CO2 SERPL-SCNC: 28 MMOL/L (ref 23–29)
CREAT SERPL-MCNC: 0.8 MG/DL (ref 0.5–1.4)
CV ECHO LV RWT: 0.54 CM
DOP CALC AO PEAK VEL: 1.44 M/S
DOP CALC AO VTI: 31.39 CM
DOP CALC LVOT AREA: 3.5 CM2
DOP CALC LVOT DIAMETER: 2.1 CM
DOP CALC LVOT PEAK VEL: 1.22 M/S
DOP CALC LVOT STROKE VOLUME: 95.1 CM3
DOP CALCLVOT PEAK VEL VTI: 27.47 CM
E WAVE DECELERATION TIME: 685.27 MSEC
E/A RATIO: 0.44
E/E' RATIO: 9.08 M/S
ECHO LV POSTERIOR WALL: 1.04 CM (ref 0.6–1.1)
EJECTION FRACTION: 65 %
EST. GFR  (AFRICAN AMERICAN): >60 ML/MIN/1.73 M^2
EST. GFR  (NON AFRICAN AMERICAN): >60 ML/MIN/1.73 M^2
FRACTIONAL SHORTENING: 38 % (ref 28–44)
GLUCOSE SERPL-MCNC: 102 MG/DL (ref 70–110)
HDLC SERPL-MCNC: 51 MG/DL (ref 40–75)
HDLC SERPL: 37.5 % (ref 20–50)
INTERVENTRICULAR SEPTUM: 0.97 CM (ref 0.6–1.1)
IVRT: 134.16 MSEC
LA MAJOR: 4.68 CM
LA MINOR: 5.66 CM
LA WIDTH: 3.77 CM
LDLC SERPL CALC-MCNC: 72.2 MG/DL (ref 63–159)
LEFT ATRIUM SIZE: 2.76 CM
LEFT ATRIUM VOLUME INDEX MOD: 15.1 ML/M2
LEFT ATRIUM VOLUME INDEX: 19 ML/M2
LEFT ATRIUM VOLUME MOD: 36 CM3
LEFT ATRIUM VOLUME: 45.31 CM3
LEFT INTERNAL DIMENSION IN SYSTOLE: 2.4 CM (ref 2.1–4)
LEFT VENTRICLE DIASTOLIC VOLUME INDEX: 26.69 ML/M2
LEFT VENTRICLE DIASTOLIC VOLUME: 63.53 ML
LEFT VENTRICLE MASS INDEX: 50 G/M2
LEFT VENTRICLE SYSTOLIC VOLUME INDEX: 8.5 ML/M2
LEFT VENTRICLE SYSTOLIC VOLUME: 20.26 ML
LEFT VENTRICULAR INTERNAL DIMENSION IN DIASTOLE: 3.84 CM (ref 3.5–6)
LEFT VENTRICULAR MASS: 120.06 G
LV LATERAL E/E' RATIO: 8.43 M/S
LV SEPTAL E/E' RATIO: 9.83 M/S
MV PEAK A VEL: 1.34 M/S
MV PEAK E VEL: 0.59 M/S
MV STENOSIS PRESSURE HALF TIME: 198.73 MS
MV VALVE AREA P 1/2 METHOD: 1.11 CM2
NONHDLC SERPL-MCNC: 85 MG/DL
PISA TR MAX VEL: 2.59 M/S
POTASSIUM SERPL-SCNC: 4.8 MMOL/L (ref 3.5–5.1)
PROT SERPL-MCNC: 7.2 G/DL (ref 6–8.4)
RA MAJOR: 4.8 CM
RA PRESSURE: 3 MMHG
RA WIDTH: 3.06 CM
RIGHT VENTRICULAR END-DIASTOLIC DIMENSION: 3.58 CM
SINUS: 3.07 CM
SODIUM SERPL-SCNC: 141 MMOL/L (ref 136–145)
STJ: 2.25 CM
TDI LATERAL: 0.07 M/S
TDI SEPTAL: 0.06 M/S
TDI: 0.07 M/S
TR MAX PG: 27 MMHG
TRICUSPID ANNULAR PLANE SYSTOLIC EXCURSION: 2.17 CM
TRIGL SERPL-MCNC: 64 MG/DL (ref 30–150)
TV REST PULMONARY ARTERY PRESSURE: 30 MMHG

## 2022-02-01 PROCEDURE — 93306 TTE W/DOPPLER COMPLETE: CPT | Mod: PN

## 2022-02-01 PROCEDURE — 93306 ECHO (CUPID ONLY): ICD-10-PCS | Mod: 26,,, | Performed by: INTERNAL MEDICINE

## 2022-02-01 PROCEDURE — 36415 COLL VENOUS BLD VENIPUNCTURE: CPT | Performed by: INTERNAL MEDICINE

## 2022-02-01 PROCEDURE — 80053 COMPREHEN METABOLIC PANEL: CPT | Performed by: INTERNAL MEDICINE

## 2022-02-01 PROCEDURE — 36415 COLL VENOUS BLD VENIPUNCTURE: CPT | Mod: S$GLB,,, | Performed by: INTERNAL MEDICINE

## 2022-02-01 PROCEDURE — 36415 PR COLLECTION VENOUS BLOOD,VENIPUNCTURE: ICD-10-PCS | Mod: S$GLB,,, | Performed by: INTERNAL MEDICINE

## 2022-02-01 PROCEDURE — 80061 LIPID PANEL: CPT | Performed by: INTERNAL MEDICINE

## 2022-02-01 PROCEDURE — 93306 TTE W/DOPPLER COMPLETE: CPT | Mod: 26,,, | Performed by: INTERNAL MEDICINE

## 2022-02-09 ENCOUNTER — OFFICE VISIT (OUTPATIENT)
Dept: CARDIOLOGY | Facility: CLINIC | Age: 82
End: 2022-02-09
Payer: MEDICARE

## 2022-02-09 VITALS
SYSTOLIC BLOOD PRESSURE: 102 MMHG | WEIGHT: 289.13 LBS | HEART RATE: 65 BPM | HEIGHT: 67 IN | BODY MASS INDEX: 45.38 KG/M2 | DIASTOLIC BLOOD PRESSURE: 60 MMHG | OXYGEN SATURATION: 98 %

## 2022-02-09 DIAGNOSIS — E78.5 HYPERLIPIDEMIA, UNSPECIFIED HYPERLIPIDEMIA TYPE: Chronic | ICD-10-CM

## 2022-02-09 DIAGNOSIS — I10 ESSENTIAL HYPERTENSION: Primary | Chronic | ICD-10-CM

## 2022-02-09 DIAGNOSIS — R01.1 HEART MURMUR: ICD-10-CM

## 2022-02-09 PROCEDURE — 3078F PR MOST RECENT DIASTOLIC BLOOD PRESSURE < 80 MM HG: ICD-10-PCS | Mod: CPTII,S$GLB,, | Performed by: INTERNAL MEDICINE

## 2022-02-09 PROCEDURE — 3288F PR FALLS RISK ASSESSMENT DOCUMENTED: ICD-10-PCS | Mod: CPTII,S$GLB,, | Performed by: INTERNAL MEDICINE

## 2022-02-09 PROCEDURE — 1159F MED LIST DOCD IN RCRD: CPT | Mod: CPTII,S$GLB,, | Performed by: INTERNAL MEDICINE

## 2022-02-09 PROCEDURE — 3288F FALL RISK ASSESSMENT DOCD: CPT | Mod: CPTII,S$GLB,, | Performed by: INTERNAL MEDICINE

## 2022-02-09 PROCEDURE — 1159F PR MEDICATION LIST DOCUMENTED IN MEDICAL RECORD: ICD-10-PCS | Mod: CPTII,S$GLB,, | Performed by: INTERNAL MEDICINE

## 2022-02-09 PROCEDURE — 3078F DIAST BP <80 MM HG: CPT | Mod: CPTII,S$GLB,, | Performed by: INTERNAL MEDICINE

## 2022-02-09 PROCEDURE — 3074F SYST BP LT 130 MM HG: CPT | Mod: CPTII,S$GLB,, | Performed by: INTERNAL MEDICINE

## 2022-02-09 PROCEDURE — 1126F AMNT PAIN NOTED NONE PRSNT: CPT | Mod: CPTII,S$GLB,, | Performed by: INTERNAL MEDICINE

## 2022-02-09 PROCEDURE — 1101F PR PT FALLS ASSESS DOC 0-1 FALLS W/OUT INJ PAST YR: ICD-10-PCS | Mod: CPTII,S$GLB,, | Performed by: INTERNAL MEDICINE

## 2022-02-09 PROCEDURE — 99214 PR OFFICE/OUTPT VISIT, EST, LEVL IV, 30-39 MIN: ICD-10-PCS | Mod: S$GLB,,, | Performed by: INTERNAL MEDICINE

## 2022-02-09 PROCEDURE — 1101F PT FALLS ASSESS-DOCD LE1/YR: CPT | Mod: CPTII,S$GLB,, | Performed by: INTERNAL MEDICINE

## 2022-02-09 PROCEDURE — 3074F PR MOST RECENT SYSTOLIC BLOOD PRESSURE < 130 MM HG: ICD-10-PCS | Mod: CPTII,S$GLB,, | Performed by: INTERNAL MEDICINE

## 2022-02-09 PROCEDURE — 1126F PR PAIN SEVERITY QUANTIFIED, NO PAIN PRESENT: ICD-10-PCS | Mod: CPTII,S$GLB,, | Performed by: INTERNAL MEDICINE

## 2022-02-09 PROCEDURE — 99999 PR PBB SHADOW E&M-EST. PATIENT-LVL III: CPT | Mod: PBBFAC,,, | Performed by: INTERNAL MEDICINE

## 2022-02-09 PROCEDURE — 99999 PR PBB SHADOW E&M-EST. PATIENT-LVL III: ICD-10-PCS | Mod: PBBFAC,,, | Performed by: INTERNAL MEDICINE

## 2022-02-09 PROCEDURE — 99214 OFFICE O/P EST MOD 30 MIN: CPT | Mod: S$GLB,,, | Performed by: INTERNAL MEDICINE

## 2022-02-09 NOTE — PROGRESS NOTES
Cardiology    2/9/2022  9:13 AM    Problem list  Patient Active Problem List   Diagnosis    Morbid obesity due to excess calories    Essential hypertension    Adrenal nodule    Hyperlipidemia    PMB (postmenopausal bleeding)    ALYSE (obstructive sleep apnea)    Right knee pain    Thyroid disease    Normocytic anemia    Gout    Heart murmur    Rheumatic fever    BMI 45.0-49.9, adult       CC:  No new complaints    HPI:  She is here for one-month follow-up.  At the last visit benazepril was stopped due to cough.  Valsartan was started.  Her coughing is almost gone.  We discussed her echocardiogram and lab results which were excellent.  She cannot complete onboarding for hypertension [employer] program due to his sync issue    Medications  Current Outpatient Medications   Medication Sig Dispense Refill    allopurinol (ZYLOPRIM) 300 MG tablet Take 300 mg by mouth once daily.      ascorbic acid, vitamin C, (VITAMIN C) 1000 MG tablet Take 1,000 mg by mouth once daily.      aspirin (ECOTRIN) 81 MG EC tablet Take 81 mg by mouth once daily.      atorvastatin (LIPITOR) 10 MG tablet Take 10 mg by mouth every evening.       cholecalciferol, vitamin D3, (VITAMIN D3) 50 mcg (2,000 unit) Tab Take 1 tablet by mouth once daily.      cyanocobalamin (VITAMIN B-12) 250 MCG tablet Take 2,500 mcg by mouth once daily.      docusate sodium (COLACE) 100 MG capsule Take 100 mg by mouth once daily.      fluticasone (FLONASE) 50 mcg/actuation nasal spray 1 spray by Each Nare route once daily.      hydroCHLOROthiazide (HYDRODIURIL) 25 MG tablet Take 12.5 mg by mouth. Take 1/2 of the 25 mg tablet daily      levocetirizine (XYZAL) 5 MG tablet Take 5 mg by mouth nightly.      senna-docusate 8.6-50 mg (PERICOLACE) 8.6-50 mg per tablet Take 1 tablet by mouth once daily.      valsartan (DIOVAN) 160 MG tablet Take 1 tablet (160 mg total) by mouth once daily. 90 tablet 3    amLODIPine (NORVASC) 5 MG tablet Take 1  tablet (5 mg total) by mouth once daily. 30 tablet 11    fluticasone-umeclidin-vilanter (TRELEGY ELLIPTA) 200-62.5-25 mcg inhaler Inhale 1 puff into the lungs once daily.       No current facility-administered medications for this visit.      Prior to Admission medications    Medication Sig Start Date End Date Taking? Authorizing Provider   allopurinol (ZYLOPRIM) 300 MG tablet Take 300 mg by mouth once daily.   Yes Historical Provider   ascorbic acid, vitamin C, (VITAMIN C) 1000 MG tablet Take 1,000 mg by mouth once daily.   Yes Historical Provider   aspirin (ECOTRIN) 81 MG EC tablet Take 81 mg by mouth once daily.   Yes Historical Provider   atorvastatin (LIPITOR) 10 MG tablet Take 10 mg by mouth every evening.    Yes Historical Provider   cholecalciferol, vitamin D3, (VITAMIN D3) 50 mcg (2,000 unit) Tab Take 1 tablet by mouth once daily.   Yes Historical Provider   cyanocobalamin (VITAMIN B-12) 250 MCG tablet Take 2,500 mcg by mouth once daily.   Yes Historical Provider   docusate sodium (COLACE) 100 MG capsule Take 100 mg by mouth once daily.   Yes Historical Provider   fluticasone (FLONASE) 50 mcg/actuation nasal spray 1 spray by Each Nare route once daily.   Yes Historical Provider   hydroCHLOROthiazide (HYDRODIURIL) 25 MG tablet Take 12.5 mg by mouth. Take 1/2 of the 25 mg tablet daily 11/17/19  Yes Historical Provider   levocetirizine (XYZAL) 5 MG tablet Take 5 mg by mouth nightly. 11/11/21  Yes Historical Provider   senna-docusate 8.6-50 mg (PERICOLACE) 8.6-50 mg per tablet Take 1 tablet by mouth once daily. 2/28/19  Yes Michael Ball MD   valsartan (DIOVAN) 160 MG tablet Take 1 tablet (160 mg total) by mouth once daily. 1/6/22 1/6/23 Yes Cleve Barfield MD   amLODIPine (NORVASC) 5 MG tablet Take 1 tablet (5 mg total) by mouth once daily. 1/13/20 3/20/21  Zachary Hinton MD   fluticasone-umeclidin-vilanter (TRELEGY ELLIPTA) 200-62.5-25 mcg inhaler Inhale 1 puff into the lungs once daily.     Historical Provider         History  Past Medical History:   Diagnosis Date    Colon cancer     Gout, chronic     Heart murmur     High cholesterol     Hypercholesteremia     Hypertension     Sleep apnea     Thyroid disease      Past Surgical History:   Procedure Laterality Date    BTL       SECTION, CLASSIC      COLON SURGERY      goiter       HERNIA REPAIR      KIDNEY SURGERY      cyst removal    THYROID SURGERY       Social History     Socioeconomic History    Marital status: Single   Tobacco Use    Smoking status: Former Smoker     Packs/day: 0.10     Quit date: 1980     Years since quittin.8    Smokeless tobacco: Never Used   Substance and Sexual Activity    Alcohol use: No    Drug use: No    Sexual activity: Never         Allergies  Review of patient's allergies indicates:   Allergen Reactions    Ace inhibitors Other (See Comments)     cough         Review of Systems   Review of Systems   Constitutional: Negative for decreased appetite, fever and weight loss.   HENT: Negative for congestion and nosebleeds.    Eyes: Negative for double vision, vision loss in left eye, vision loss in right eye and visual disturbance.   Cardiovascular: Negative for chest pain, claudication, cyanosis, dyspnea on exertion, irregular heartbeat, leg swelling, near-syncope, orthopnea, palpitations, paroxysmal nocturnal dyspnea and syncope.   Respiratory: Negative for cough, hemoptysis, shortness of breath, sleep disturbances due to breathing, snoring, sputum production and wheezing.    Endocrine: Negative for cold intolerance and heat intolerance.   Skin: Negative for nail changes and rash.   Musculoskeletal: Negative for joint pain, muscle cramps, muscle weakness and myalgias.   Gastrointestinal: Negative for change in bowel habit, heartburn, hematemesis, hematochezia, hemorrhoids and melena.   Neurological: Negative for dizziness, focal weakness and headaches.         Physical Exam  Wt  Readings from Last 1 Encounters:   02/09/22 131.1 kg (289 lb 2.1 oz)     BP Readings from Last 3 Encounters:   02/09/22 102/60   02/01/22 136/84   01/06/22 136/84     Pulse Readings from Last 1 Encounters:   02/09/22 65     Body mass index is 45.28 kg/m².    Physical Exam  Vitals reviewed.   Constitutional:       Appearance: She is well-developed. She is obese.   HENT:      Head: Atraumatic.   Eyes:      General: No scleral icterus.  Neck:      Vascular: Normal carotid pulses. No carotid bruit, hepatojugular reflux or JVD.   Cardiovascular:      Rate and Rhythm: Normal rate and regular rhythm.      Chest Wall: PMI is not displaced.      Pulses: Intact distal pulses.           Carotid pulses are 2+ on the right side and 2+ on the left side.       Radial pulses are 2+ on the right side and 2+ on the left side.        Dorsalis pedis pulses are 2+ on the right side and 2+ on the left side.      Heart sounds: Normal heart sounds, S1 normal and S2 normal. No murmur heard.  No friction rub.   Pulmonary:      Effort: Pulmonary effort is normal. No respiratory distress.      Breath sounds: Normal breath sounds. No stridor. No wheezing or rales.   Chest:      Chest wall: No tenderness.   Abdominal:      General: Bowel sounds are normal.      Palpations: Abdomen is soft.   Musculoskeletal:      Cervical back: Neck supple. No edema.   Skin:     General: Skin is warm and dry.      Nails: There is no clubbing.   Neurological:      Mental Status: She is alert and oriented to person, place, and time.   Psychiatric:         Behavior: Behavior normal.         Thought Content: Thought content normal.                   Assessment  1. Essential hypertension  Well controlled    2. Heart murmur  stable    3. Hyperlipidemia, unspecified hyperlipidemia type  Well controlled on statin        Plan and Discussion  Continue current meds.      Follow Up  6 months      Cleve Barfield MD, F.A.C.C, F.S.C.A.I.

## 2022-02-23 DIAGNOSIS — D84.9 IMMUNOSUPPRESSED STATUS: ICD-10-CM

## 2022-02-24 ENCOUNTER — PATIENT MESSAGE (OUTPATIENT)
Dept: ADMINISTRATIVE | Facility: OTHER | Age: 82
End: 2022-02-24
Payer: MEDICARE

## 2022-02-24 ENCOUNTER — OFFICE VISIT (OUTPATIENT)
Dept: PULMONOLOGY | Facility: CLINIC | Age: 82
End: 2022-02-24
Payer: MEDICARE

## 2022-02-24 VITALS
OXYGEN SATURATION: 99 % | SYSTOLIC BLOOD PRESSURE: 130 MMHG | HEIGHT: 67 IN | WEIGHT: 293 LBS | HEART RATE: 75 BPM | BODY MASS INDEX: 45.99 KG/M2 | DIASTOLIC BLOOD PRESSURE: 70 MMHG

## 2022-02-24 DIAGNOSIS — R05.9 COUGH: ICD-10-CM

## 2022-02-24 DIAGNOSIS — R09.81 NASAL CONGESTION: Primary | ICD-10-CM

## 2022-02-24 PROCEDURE — 3288F FALL RISK ASSESSMENT DOCD: CPT | Mod: CPTII,S$GLB,, | Performed by: NURSE PRACTITIONER

## 2022-02-24 PROCEDURE — 3075F PR MOST RECENT SYSTOLIC BLOOD PRESS GE 130-139MM HG: ICD-10-PCS | Mod: CPTII,S$GLB,, | Performed by: NURSE PRACTITIONER

## 2022-02-24 PROCEDURE — 3075F SYST BP GE 130 - 139MM HG: CPT | Mod: CPTII,S$GLB,, | Performed by: NURSE PRACTITIONER

## 2022-02-24 PROCEDURE — 99999 PR PBB SHADOW E&M-EST. PATIENT-LVL V: ICD-10-PCS | Mod: PBBFAC,,, | Performed by: NURSE PRACTITIONER

## 2022-02-24 PROCEDURE — 3288F PR FALLS RISK ASSESSMENT DOCUMENTED: ICD-10-PCS | Mod: CPTII,S$GLB,, | Performed by: NURSE PRACTITIONER

## 2022-02-24 PROCEDURE — 1159F PR MEDICATION LIST DOCUMENTED IN MEDICAL RECORD: ICD-10-PCS | Mod: CPTII,S$GLB,, | Performed by: NURSE PRACTITIONER

## 2022-02-24 PROCEDURE — 1101F PT FALLS ASSESS-DOCD LE1/YR: CPT | Mod: CPTII,S$GLB,, | Performed by: NURSE PRACTITIONER

## 2022-02-24 PROCEDURE — 99203 PR OFFICE/OUTPT VISIT, NEW, LEVL III, 30-44 MIN: ICD-10-PCS | Mod: S$GLB,,, | Performed by: NURSE PRACTITIONER

## 2022-02-24 PROCEDURE — 1126F PR PAIN SEVERITY QUANTIFIED, NO PAIN PRESENT: ICD-10-PCS | Mod: CPTII,S$GLB,, | Performed by: NURSE PRACTITIONER

## 2022-02-24 PROCEDURE — 1159F MED LIST DOCD IN RCRD: CPT | Mod: CPTII,S$GLB,, | Performed by: NURSE PRACTITIONER

## 2022-02-24 PROCEDURE — 3078F PR MOST RECENT DIASTOLIC BLOOD PRESSURE < 80 MM HG: ICD-10-PCS | Mod: CPTII,S$GLB,, | Performed by: NURSE PRACTITIONER

## 2022-02-24 PROCEDURE — 99203 OFFICE O/P NEW LOW 30 MIN: CPT | Mod: S$GLB,,, | Performed by: NURSE PRACTITIONER

## 2022-02-24 PROCEDURE — 1126F AMNT PAIN NOTED NONE PRSNT: CPT | Mod: CPTII,S$GLB,, | Performed by: NURSE PRACTITIONER

## 2022-02-24 PROCEDURE — 1101F PR PT FALLS ASSESS DOC 0-1 FALLS W/OUT INJ PAST YR: ICD-10-PCS | Mod: CPTII,S$GLB,, | Performed by: NURSE PRACTITIONER

## 2022-02-24 PROCEDURE — 99999 PR PBB SHADOW E&M-EST. PATIENT-LVL V: CPT | Mod: PBBFAC,,, | Performed by: NURSE PRACTITIONER

## 2022-02-24 PROCEDURE — 3078F DIAST BP <80 MM HG: CPT | Mod: CPTII,S$GLB,, | Performed by: NURSE PRACTITIONER

## 2022-02-24 PROCEDURE — 1160F PR REVIEW ALL MEDS BY PRESCRIBER/CLIN PHARMACIST DOCUMENTED: ICD-10-PCS | Mod: CPTII,S$GLB,, | Performed by: NURSE PRACTITIONER

## 2022-02-24 PROCEDURE — 1160F RVW MEDS BY RX/DR IN RCRD: CPT | Mod: CPTII,S$GLB,, | Performed by: NURSE PRACTITIONER

## 2022-02-24 RX ORDER — LEVOCETIRIZINE DIHYDROCHLORIDE 5 MG/1
TABLET, FILM COATED ORAL
COMMUNITY
End: 2022-10-07

## 2022-02-24 RX ORDER — AZELASTINE HCL 205.5 UG/1
2 SPRAY NASAL 2 TIMES DAILY
COMMUNITY
Start: 2021-11-11 | End: 2022-10-07

## 2022-02-24 RX ORDER — IPRATROPIUM BROMIDE 42 UG/1
2 SPRAY, METERED NASAL 2 TIMES DAILY
COMMUNITY
Start: 2021-11-11 | End: 2022-07-18

## 2022-02-24 RX ORDER — FLUTICASONE PROPIONATE 50 MCG
2 SPRAY, SUSPENSION (ML) NASAL DAILY
Qty: 15.8 ML | Refills: 2 | Status: SHIPPED | OUTPATIENT
Start: 2022-02-24 | End: 2022-07-18 | Stop reason: SDUPTHER

## 2022-02-24 RX ORDER — ALBUTEROL SULFATE 90 UG/1
2 AEROSOL, METERED RESPIRATORY (INHALATION) EVERY 4 HOURS PRN
COMMUNITY
Start: 2021-12-09 | End: 2022-10-07

## 2022-02-24 RX ORDER — IPRATROPIUM BROMIDE AND ALBUTEROL SULFATE 2.5; .5 MG/3ML; MG/3ML
SOLUTION RESPIRATORY (INHALATION)
COMMUNITY
End: 2022-06-24

## 2022-02-24 RX ORDER — GLUCOSAMINE/CHONDR SU A SOD 750-600 MG
TABLET ORAL
COMMUNITY
End: 2022-07-18

## 2022-02-24 NOTE — PROGRESS NOTES
Subjective:       Patient ID: Natali Galeano is a 81 y.o. female.    Chief Complaint: Cough    Ms. Galeano is a 82 y/o F presenting to pulmonary clinic for cough.   She is a self referral.   About 1 month ago, she reports as taken off lisinopril and switched to Valsartan as per Cardiology.  Finds her cough is almost completely resolved.     Tells was prescribed albuterol inhaler- but does not find needing to use.   Additionally, she reports given sample of Trelegy- but has not used.     She denies fever, chills, hemoptysis, night sweats or weight loss.    Cough  This is a chronic problem. The current episode started more than 1 month ago. The problem has been waxing and waning. The cough is non-productive. Associated symptoms include ear congestion, nasal congestion and postnasal drip. Pertinent negatives include no chest pain, headaches, heartburn, hemoptysis, rash, rhinorrhea, sore throat, shortness of breath, sweats, weight loss or wheezing. The symptoms are aggravated by lying down. Treatments tried: Taken off lisinopril and prescribed the Xyzal. The treatment provided moderate relief. Her past medical history is significant for environmental allergies. There is no history of asthma, bronchiectasis, COPD, emphysema or pneumonia.        Social History     Tobacco Use   Smoking Status Former Smoker    Packs/day: 0.10    Quit date: 1980    Years since quittin.8   Smokeless Tobacco Never Used     Reviewed allergies and medications.  Reviewed medical and surgical history.  Reviewed social and family history.    Review of Systems   Constitutional: Negative for weight loss.   HENT: Positive for postnasal drip. Negative for rhinorrhea and sore throat.    Respiratory: Positive for cough. Negative for hemoptysis, shortness of breath and wheezing.    Cardiovascular: Negative for chest pain.   Skin: Negative for rash.   Gastrointestinal: Negative for heartburn.   Neurological: Negative for headaches.      "    Objective:      Vitals:    02/24/22 1000   BP: 130/70   BP Location: Left arm   Patient Position: Sitting   Pulse: 75   SpO2: 99%   Weight: 133 kg (293 lb 3.4 oz)   Height: 5' 7" (1.702 m)      Physical Exam   Constitutional: She is oriented to person, place, and time. She appears well-developed and well-nourished. No distress. She is obese.   HENT:   Head: Normocephalic.   Cardiovascular: Normal rate and regular rhythm.   Murmur heard.  Pulmonary/Chest: Normal expansion, effort normal and breath sounds normal. No respiratory distress. She has no wheezes. She has no rhonchi.   Abdominal: Soft. Bowel sounds are normal. There is no abdominal tenderness.   Musculoskeletal:         General: No edema. Normal range of motion.      Cervical back: Normal range of motion and neck supple.   Lymphadenopathy: No supraclavicular adenopathy is present.     She has no cervical adenopathy.   Neurological: She is alert and oriented to person, place, and time. Gait normal.   Skin: Skin is warm and dry. Nails show no clubbing.   Psychiatric: She has a normal mood and affect. Her behavior is normal.   Vitals reviewed.    Personal Diagnostic Review    Echo  · The left ventricle is normal in size with concentric remodeling and   normal systolic function.  · Normal central venous pressure (3 mmHg).  · The estimated PA systolic pressure is 30 mmHg.  · The estimated ejection fraction is 65%.  · Grade I left ventricular diastolic dysfunction.  · Normal right ventricular size with normal right ventricular systolic   function.            Assessment:       1. Nasal congestion    2. Cough        Outpatient Encounter Medications as of 2/24/2022   Medication Sig Dispense Refill    albuterol (PROVENTIL/VENTOLIN HFA) 90 mcg/actuation inhaler Inhale 2 puffs into the lungs every 4 (four) hours as needed.      albuterol-ipratropium (DUO-NEB) 2.5 mg-0.5 mg/3 mL nebulizer solution 3 ml as needed      allopurinol (ZYLOPRIM) 300 MG tablet Take 300 " mg by mouth once daily.      amLODIPine (NORVASC) 5 MG tablet Take 1 tablet by mouth once daily.      ascorbic acid, vitamin C, (VITAMIN C) 1000 MG tablet Take 1,000 mg by mouth once daily.      aspirin (ECOTRIN) 81 MG EC tablet Take 81 mg by mouth once daily.      atorvastatin (LIPITOR) 10 MG tablet Take 10 mg by mouth every evening.       azelastine 205.5 mcg (0.15 %) Spry 2 sprays by Each Nostril route 2 (two) times daily.      cholecalciferol, vitamin D3, (VITAMIN D3) 50 mcg (2,000 unit) Tab Take 1 tablet by mouth once daily.      cyanocobalamin (VITAMIN B-12) 250 MCG tablet Take 2,500 mcg by mouth once daily.      docusate sodium (COLACE) 100 MG capsule Take 100 mg by mouth once daily.      fluticasone propionate (FLONASE) 50 mcg/actuation nasal spray 2 sprays (100 mcg total) by Each Nostril route once daily. 15.8 mL 2    fluticasone-umeclidin-vilanter (TRELEGY ELLIPTA) 200-62.5-25 mcg inhaler Inhale 1 puff into the lungs once daily.      hydroCHLOROthiazide (HYDRODIURIL) 25 MG tablet Take 12.5 mg by mouth. Take 1/2 of the 25 mg tablet daily      ipratropium (ATROVENT) 42 mcg (0.06 %) nasal spray 2 sprays 2 (two) times daily.      levocetirizine (XYZAL) 5 MG tablet 1 tablet in the evening      valsartan (DIOVAN) 160 MG tablet Take 1 tablet (160 mg total) by mouth once daily. 90 tablet 3    vit B complex 100 combo no.2 (B-100 COMPLEX) 100 mg TbSR       [DISCONTINUED] fluticasone (FLONASE) 50 mcg/actuation nasal spray 1 spray by Each Nare route once daily.      [DISCONTINUED] levocetirizine (XYZAL) 5 MG tablet Take 5 mg by mouth nightly.       No facility-administered encounter medications on file as of 2/24/2022.     No orders of the defined types were placed in this encounter.      Plan:       Patient was seen for cough.  Looks as though she was taken off of lisinopril for Ace cough by her cardiologist-had improvement in her symptom.  Now describes having a mild cough likely from postnasal  drip/ nasal congestion.  She is not on sinus regimen at this time.  Denies history of asthma.  Denies COPD.  Denies significant smoking history.    Long discussion held with patient to trial sinus regimen with topical nasal steroid, antihistamine and nasal saline solution for approximately 2 weeks.  Patient had specific instructions provided her an after visit summary on sinus/allergy regimen   Continue to remain off of Ace inhibitors.  If not having improvement-can pursue pulmonary function tests and imagery such as chest x-ray.  Patient verbalizes agreement with treatment plan as she does feel her cough has improved.  Problem List Items Addressed This Visit    None     Visit Diagnoses     Nasal congestion    -  Primary    Relevant Medications    fluticasone propionate (FLONASE) 50 mcg/actuation nasal spray    Cough        Relevant Medications    fluticasone propionate (FLONASE) 50 mcg/actuation nasal spray           Patient is to notify me in 2 weeks on her condition.    This note is dictated on M*Modal word recognition program.  There are word recognition mistakes that are occasionally missed on review.

## 2022-02-24 NOTE — PATIENT INSTRUCTIONS
Nasal Saline (ocean saline rinse):       A. Tilt head back and squirt into nostril 2-3 times until you taste saline in back of throat. Spit, and blow nose. Do this 4 times, alternating right and left nostril. Do this routine 2-3 times per day- at least once in the shower.    Gargle with warm salt water 2-3 times per day. About 1 cup, fairly warm, fairly salty    Continue levocetirizine at bedtime    Flonase 2 sprays each nostril daily at bedtime and keep head tilted down when using.     Continue all of these things for 2-3 weeks    Call or email me in 2 weeks and let me know how you are doing.

## 2022-02-24 NOTE — ASSESSMENT & PLAN NOTE
Body mass index is 45.92 kg/m².    Discussed with pt at length about the need to work on diet and weight loss.  Have offered suggestions on approaches for this problem.  Also discussed the need to start a regular exercise program.  We discussed at length the importance of regular exercise and working at getting to a healthier weight.  All questions answered.  Pt voices understanding of these principles and hopefully will take action.

## 2022-03-15 ENCOUNTER — PATIENT MESSAGE (OUTPATIENT)
Dept: PULMONOLOGY | Facility: CLINIC | Age: 82
End: 2022-03-15
Payer: MEDICARE

## 2022-04-24 ENCOUNTER — PATIENT MESSAGE (OUTPATIENT)
Dept: ADMINISTRATIVE | Facility: OTHER | Age: 82
End: 2022-04-24
Payer: MEDICARE

## 2022-05-03 ENCOUNTER — OFFICE VISIT (OUTPATIENT)
Dept: PRIMARY CARE CLINIC | Facility: CLINIC | Age: 82
End: 2022-05-03
Payer: MEDICARE

## 2022-05-03 VITALS
WEIGHT: 283.63 LBS | HEART RATE: 85 BPM | BODY MASS INDEX: 44.52 KG/M2 | HEIGHT: 67 IN | SYSTOLIC BLOOD PRESSURE: 146 MMHG | OXYGEN SATURATION: 97 % | DIASTOLIC BLOOD PRESSURE: 67 MMHG | TEMPERATURE: 98 F

## 2022-05-03 DIAGNOSIS — E78.5 HYPERLIPIDEMIA, UNSPECIFIED HYPERLIPIDEMIA TYPE: ICD-10-CM

## 2022-05-03 DIAGNOSIS — G47.33 OSA (OBSTRUCTIVE SLEEP APNEA): ICD-10-CM

## 2022-05-03 DIAGNOSIS — M10.9 ACUTE GOUT OF RIGHT ANKLE, UNSPECIFIED CAUSE: Primary | ICD-10-CM

## 2022-05-03 DIAGNOSIS — I10 ESSENTIAL HYPERTENSION: ICD-10-CM

## 2022-05-03 PROCEDURE — 3078F DIAST BP <80 MM HG: CPT | Mod: CPTII,S$GLB,, | Performed by: FAMILY MEDICINE

## 2022-05-03 PROCEDURE — 99999 PR PBB SHADOW E&M-EST. PATIENT-LVL V: ICD-10-PCS | Mod: PBBFAC,,, | Performed by: FAMILY MEDICINE

## 2022-05-03 PROCEDURE — 3078F PR MOST RECENT DIASTOLIC BLOOD PRESSURE < 80 MM HG: ICD-10-PCS | Mod: CPTII,S$GLB,, | Performed by: FAMILY MEDICINE

## 2022-05-03 PROCEDURE — 1101F PT FALLS ASSESS-DOCD LE1/YR: CPT | Mod: CPTII,S$GLB,, | Performed by: FAMILY MEDICINE

## 2022-05-03 PROCEDURE — 99214 PR OFFICE/OUTPT VISIT, EST, LEVL IV, 30-39 MIN: ICD-10-PCS | Mod: S$GLB,,, | Performed by: FAMILY MEDICINE

## 2022-05-03 PROCEDURE — 3288F FALL RISK ASSESSMENT DOCD: CPT | Mod: CPTII,S$GLB,, | Performed by: FAMILY MEDICINE

## 2022-05-03 PROCEDURE — 3288F PR FALLS RISK ASSESSMENT DOCUMENTED: ICD-10-PCS | Mod: CPTII,S$GLB,, | Performed by: FAMILY MEDICINE

## 2022-05-03 PROCEDURE — 1101F PR PT FALLS ASSESS DOC 0-1 FALLS W/OUT INJ PAST YR: ICD-10-PCS | Mod: CPTII,S$GLB,, | Performed by: FAMILY MEDICINE

## 2022-05-03 PROCEDURE — 99999 PR PBB SHADOW E&M-EST. PATIENT-LVL V: CPT | Mod: PBBFAC,,, | Performed by: FAMILY MEDICINE

## 2022-05-03 PROCEDURE — 1125F AMNT PAIN NOTED PAIN PRSNT: CPT | Mod: CPTII,S$GLB,, | Performed by: FAMILY MEDICINE

## 2022-05-03 PROCEDURE — 1125F PR PAIN SEVERITY QUANTIFIED, PAIN PRESENT: ICD-10-PCS | Mod: CPTII,S$GLB,, | Performed by: FAMILY MEDICINE

## 2022-05-03 PROCEDURE — 3077F PR MOST RECENT SYSTOLIC BLOOD PRESSURE >= 140 MM HG: ICD-10-PCS | Mod: CPTII,S$GLB,, | Performed by: FAMILY MEDICINE

## 2022-05-03 PROCEDURE — 1159F MED LIST DOCD IN RCRD: CPT | Mod: CPTII,S$GLB,, | Performed by: FAMILY MEDICINE

## 2022-05-03 PROCEDURE — 1159F PR MEDICATION LIST DOCUMENTED IN MEDICAL RECORD: ICD-10-PCS | Mod: CPTII,S$GLB,, | Performed by: FAMILY MEDICINE

## 2022-05-03 PROCEDURE — 3077F SYST BP >= 140 MM HG: CPT | Mod: CPTII,S$GLB,, | Performed by: FAMILY MEDICINE

## 2022-05-03 PROCEDURE — 99214 OFFICE O/P EST MOD 30 MIN: CPT | Mod: S$GLB,,, | Performed by: FAMILY MEDICINE

## 2022-05-03 RX ORDER — PREDNISONE 50 MG/1
50 TABLET ORAL DAILY
Qty: 5 TABLET | Refills: 0 | Status: SHIPPED | OUTPATIENT
Start: 2022-05-03 | End: 2022-05-08

## 2022-05-03 NOTE — PATIENT INSTRUCTIONS
"What foods make gout worse?  The top 10 foods and drinks that trigger gout are:    Sugary drinks and sweets. Standard table sugar is half fructose, which breaks down into uric acid. Any food or drink with higher sugar content can trigger gout.  High fructose corn syrup. This is a concentrated form of fructose. If you start looking at labels, youll find high fructose corn syrup in all kinds of packaged food products that you wouldnt necessarily expect.  Alcohol. Even though not all alcoholic drinks are high in purines, alcohol prevents your kidneys from eliminating uric acid, pulling it back into your body, where it continues to accumulate.  Organ meats. These include liver, tripe sweetbreads, brains and kidneys.  Game meats. Specialties such as goose, veal and venison are among the reasons why gout was known in the Middle Ages as the rich mans disease."  Certain seafood, including herring, scallops, mussels, codfish, tuna, trout and lidia.  Red meats, including beef, lamb pork and escobar.  Turkey. This leaner meat is nonetheless high in purines. Especially avoid processed deli turkey.  Gravy and meat sauces.  Yeast and yeast extract.  What are the best foods to eat when you have gout?  While eating particular foods wont be enough to make gout go away, studies suggest that certain foods and drinks may help reduce uric acid in your body. For example:    Milk. Some early research suggests that drinking skim milk may help reduce uric acid and gout flare-ups. It speeds up the excretion of uric acid in your urine and also reduces your bodys inflammatory response to uric acid crystals in your joints.  Cherries. Scientists are currently researching the benefits of cherries and cherry juice for managing gout symptoms, and early results are promising. Cherries have known anti-inflammatory properties, and they may also help reduce uric acid in your body.  Coffee. You may have heard that coffee is acidic, but the type of " acid in coffee is very different from uric acid. In fact, drinking coffee daily can reduce your uric acid levels by several means. It slows the breakdown of purine into uric acid and speeds the rate of excretion.  Water. People who drink five to eight glasses of water a day are less likely to experience gout symptoms. This makes sense since your kidneys use water to excrete uric acid in your urine. Water is also good for kidney health. Impaired kidney function is one factor that can contribute to gout.  However, many healthcare providers prefer to focus on general dietary guidelines rather than particular foods. They suggest that you:    Vary your protein sources. Certain meats and seafood are higher in uric acid, but if you eat a wide range and stay away from the worst offenders listed above, youll do all right.  Enjoy fruits and vegetables. Most are low in purines, but even the ones that are higher have not been shown to affect gout symptoms. And the benefits are worthwhile.  Enjoy grains (except oats). Rice, pasta, bread and cereals are all gout-friendly (except oats). Beware of added high fructose corn syrup in packaged products, and choose whole grains (at least half of the time) to help control blood sugar

## 2022-05-03 NOTE — PROGRESS NOTES
Subjective:       Patient ID: Natali Galeano is a 81 y.o. female.    Chief Complaint: [facility]    82 yo F pt, new to me, with PMH significant for Colon Cancer 2002 s/p partial colectomy,  HTN, HLD, ALYSE, Obesity, Rheumatic Fever with subsequent murmur, Normocytic anemia, and Gout. She was previously followed by Dr. Ewelina Herzog. She presents to [facility].  She is acutely c/o pain to right ankle x 1.5 weeks. Sudden onset. No h/o accident, injury. Has associated redness and warmth. Took tylenol; no improvement. No fevers/chills. Last had gout flare 7-8 years ago.    HTN   /67 on triage. Her home blood pressures are typically 130s/80s; elevated to 140s-160s/70s-80s over past 2 weeks. Amlodipine increased 5-->10 mg daily 1 week ago boy digital HTN. Also adherent with HCTZ 25 mg daily.        Past Medical History:   Diagnosis Date    Colon cancer     Gout, chronic     Heart murmur     High cholesterol     Hypercholesteremia     Hypertension     Sleep apnea     Thyroid disease        Patient Active Problem List   Diagnosis    Morbid obesity due to excess calories    Essential hypertension    Adrenal nodule    Hyperlipidemia    PMB (postmenopausal bleeding)    ALYSE (obstructive sleep apnea)    Right knee pain    Thyroid disease    Normocytic anemia    Gout    Heart murmur    Rheumatic fever    BMI 45.0-49.9, adult    Cough    Nasal congestion       Past Surgical History:   Procedure Laterality Date    BTL       SECTION, CLASSIC      COLON SURGERY      goiter       HERNIA REPAIR      KIDNEY SURGERY      cyst removal    THYROID SURGERY         Family History   Problem Relation Age of Onset    Heart disease Father     Diabetes Mother        Social History     Tobacco Use   Smoking Status Former Smoker    Packs/day: 0.10    Quit date: 1980    Years since quittin.0   Smokeless Tobacco Never Used       Medications have been reviewed and reconciled.     Review  "of patient's allergies indicates:   Allergen Reactions    Ace inhibitors Other (See Comments)     cough    Arb-angiotensin receptor antagonist Other (See Comments)     Cough w/ valsartan        Review of Systems   Constitutional: Negative for fatigue.   Respiratory: Negative for shortness of breath and wheezing.    Cardiovascular: Negative for chest pain, palpitations and leg swelling.   Musculoskeletal: Positive for arthralgias and joint swelling.   Neurological: Negative for dizziness, syncope, weakness and headaches.         Objective:        BP (!) 146/67 (BP Location: Left arm, Patient Position: Sitting, BP Method: Large (Automatic))   Pulse 85   Temp 97.8 °F (36.6 °C) (Oral)   Ht 5' 7" (1.702 m)   Wt 128.7 kg (283 lb 10 oz)   LMP  (LMP Unknown)   SpO2 97%   BMI 44.42 kg/m²      Physical Exam  Constitutional:       General: She is not in acute distress.     Appearance: She is well-developed. She is obese.   HENT:      Head: Normocephalic and atraumatic.      Right Ear: External ear normal.      Left Ear: External ear normal.   Eyes:      General: No scleral icterus.     Extraocular Movements: Extraocular movements intact.      Conjunctiva/sclera: Conjunctivae normal.   Cardiovascular:      Rate and Rhythm: Normal rate and regular rhythm.      Heart sounds: Normal heart sounds. No murmur heard.    No friction rub. No gallop.   Pulmonary:      Effort: Pulmonary effort is normal.      Breath sounds: Normal breath sounds. No wheezing or rales.   Musculoskeletal:         General: Normal range of motion.      Cervical back: Normal range of motion and neck supple.        Legs:    Lymphadenopathy:      Cervical: No cervical adenopathy.   Skin:     General: Skin is warm and dry.      Findings: No erythema or rash.   Neurological:      Mental Status: She is alert and oriented to person, place, and time.      Cranial Nerves: No cranial nerve deficit.   Psychiatric:         Mood and Affect: Mood normal.         " Behavior: Behavior normal.         Assessment:       1. Acute gout of right ankle, unspecified cause    2. Essential hypertension    3. Hyperlipidemia, unspecified hyperlipidemia type    4. ALYSE (obstructive sleep apnea)        Plan:       Natali was seen today for establish care.    Diagnoses and all orders for this visit:    Acute gout of right ankle, unspecified cause  Comments:  Will treat with prednisone 50 mg daily x 5 days. Can taper if necessary. RICE. F/U in about 7-10 days. May need to consider alternate to HCTZ in future.  Orders:  -     predniSONE (DELTASONE) 50 MG Tab; Take 1 tablet (50 mg total) by mouth once daily. for 5 days    Essential hypertension  Comments:  BP just above target today. Continue amlodipine 10 mg daily + HCTZ 25 mg daily. BP check within 2 weeks. Continue monitoring with digital HTN.    Hyperlipidemia, unspecified hyperlipidemia type  Comments:  , TG 64, HDL 57, LDL 72.2 2/1/22. Levels are at goal.  Statin not rx'd. Will continue to monitor    ALYSE (obstructive sleep apnea)  Comments:  ?using CPAP/APAP. ? following with sleep medicine. Will discuss in greater detail on f/u      HM   Discuss shingrix, tetanus, PCV-20, 2nd COVID-19 booster on f/u   DEXA and Hep C screens on f/u     I spent a total of 30 minutes on the day of the visit.This includes face to face time and non-face to face time preparing to see the patient (eg, review of tests), obtaining and/or reviewing separately obtained history, documenting clinical information in the electronic or other health record, independently interpreting results and communicating results to the patient/family/caregiver, or care coordinator.    Follow up in about 13 days (around 5/16/2022) for Follow-up HTN and Gout [8:30 am].

## 2022-05-14 ENCOUNTER — PATIENT MESSAGE (OUTPATIENT)
Dept: ADMINISTRATIVE | Facility: OTHER | Age: 82
End: 2022-05-14
Payer: MEDICARE

## 2022-05-16 ENCOUNTER — OFFICE VISIT (OUTPATIENT)
Dept: PRIMARY CARE CLINIC | Facility: CLINIC | Age: 82
End: 2022-05-16
Payer: MEDICARE

## 2022-05-16 ENCOUNTER — PATIENT MESSAGE (OUTPATIENT)
Dept: ADMINISTRATIVE | Facility: OTHER | Age: 82
End: 2022-05-16
Payer: MEDICARE

## 2022-05-16 VITALS
DIASTOLIC BLOOD PRESSURE: 72 MMHG | SYSTOLIC BLOOD PRESSURE: 153 MMHG | WEIGHT: 276 LBS | OXYGEN SATURATION: 99 % | HEART RATE: 91 BPM | BODY MASS INDEX: 43.23 KG/M2

## 2022-05-16 DIAGNOSIS — I10 ESSENTIAL HYPERTENSION: Chronic | ICD-10-CM

## 2022-05-16 DIAGNOSIS — M10.9 ACUTE GOUT OF RIGHT ANKLE, UNSPECIFIED CAUSE: Primary | Chronic | ICD-10-CM

## 2022-05-16 DIAGNOSIS — E27.8 ADRENAL NODULE: ICD-10-CM

## 2022-05-16 DIAGNOSIS — Z78.0 ASYMPTOMATIC MENOPAUSE: Chronic | ICD-10-CM

## 2022-05-16 DIAGNOSIS — G47.33 OSA (OBSTRUCTIVE SLEEP APNEA): Chronic | ICD-10-CM

## 2022-05-16 DIAGNOSIS — E78.5 HYPERLIPIDEMIA, UNSPECIFIED HYPERLIPIDEMIA TYPE: Chronic | ICD-10-CM

## 2022-05-16 DIAGNOSIS — E66.01 CLASS 3 SEVERE OBESITY WITH SERIOUS COMORBIDITY AND BODY MASS INDEX (BMI) OF 40.0 TO 44.9 IN ADULT, UNSPECIFIED OBESITY TYPE: ICD-10-CM

## 2022-05-16 PROCEDURE — 99214 OFFICE O/P EST MOD 30 MIN: CPT | Mod: S$GLB,,, | Performed by: FAMILY MEDICINE

## 2022-05-16 PROCEDURE — 1159F MED LIST DOCD IN RCRD: CPT | Mod: CPTII,S$GLB,, | Performed by: FAMILY MEDICINE

## 2022-05-16 PROCEDURE — 1159F PR MEDICATION LIST DOCUMENTED IN MEDICAL RECORD: ICD-10-PCS | Mod: CPTII,S$GLB,, | Performed by: FAMILY MEDICINE

## 2022-05-16 PROCEDURE — 1101F PT FALLS ASSESS-DOCD LE1/YR: CPT | Mod: CPTII,S$GLB,, | Performed by: FAMILY MEDICINE

## 2022-05-16 PROCEDURE — 3077F PR MOST RECENT SYSTOLIC BLOOD PRESSURE >= 140 MM HG: ICD-10-PCS | Mod: CPTII,S$GLB,, | Performed by: FAMILY MEDICINE

## 2022-05-16 PROCEDURE — 3077F SYST BP >= 140 MM HG: CPT | Mod: CPTII,S$GLB,, | Performed by: FAMILY MEDICINE

## 2022-05-16 PROCEDURE — 99499 RISK ADDL DX/OHS AUDIT: ICD-10-PCS | Mod: S$GLB,,, | Performed by: FAMILY MEDICINE

## 2022-05-16 PROCEDURE — 99214 PR OFFICE/OUTPT VISIT, EST, LEVL IV, 30-39 MIN: ICD-10-PCS | Mod: S$GLB,,, | Performed by: FAMILY MEDICINE

## 2022-05-16 PROCEDURE — 99999 PR PBB SHADOW E&M-EST. PATIENT-LVL III: ICD-10-PCS | Mod: PBBFAC,,, | Performed by: FAMILY MEDICINE

## 2022-05-16 PROCEDURE — 3078F PR MOST RECENT DIASTOLIC BLOOD PRESSURE < 80 MM HG: ICD-10-PCS | Mod: CPTII,S$GLB,, | Performed by: FAMILY MEDICINE

## 2022-05-16 PROCEDURE — 1101F PR PT FALLS ASSESS DOC 0-1 FALLS W/OUT INJ PAST YR: ICD-10-PCS | Mod: CPTII,S$GLB,, | Performed by: FAMILY MEDICINE

## 2022-05-16 PROCEDURE — 3288F PR FALLS RISK ASSESSMENT DOCUMENTED: ICD-10-PCS | Mod: CPTII,S$GLB,, | Performed by: FAMILY MEDICINE

## 2022-05-16 PROCEDURE — 3078F DIAST BP <80 MM HG: CPT | Mod: CPTII,S$GLB,, | Performed by: FAMILY MEDICINE

## 2022-05-16 PROCEDURE — 99499 UNLISTED E&M SERVICE: CPT | Mod: S$GLB,,, | Performed by: FAMILY MEDICINE

## 2022-05-16 PROCEDURE — 3288F FALL RISK ASSESSMENT DOCD: CPT | Mod: CPTII,S$GLB,, | Performed by: FAMILY MEDICINE

## 2022-05-16 PROCEDURE — 99999 PR PBB SHADOW E&M-EST. PATIENT-LVL III: CPT | Mod: PBBFAC,,, | Performed by: FAMILY MEDICINE

## 2022-05-16 RX ORDER — AMLODIPINE BESYLATE 10 MG/1
10 TABLET ORAL DAILY
Qty: 90 TABLET | Refills: 0 | Status: SHIPPED | OUTPATIENT
Start: 2022-05-16 | End: 2022-09-08

## 2022-05-16 RX ORDER — PREDNISONE 20 MG/1
TABLET ORAL
Qty: 11 TABLET | Refills: 0 | Status: SHIPPED | OUTPATIENT
Start: 2022-05-16 | End: 2022-05-25

## 2022-05-16 NOTE — PROGRESS NOTES
Subjective:       Patient ID: Natali Galeano is a 81 y.o. female.    Chief Complaint: Follow-up    80 yo F, recently established with me (last visit 05/03/2022) significant for  with PMH significant for Colon Cancer 2002 s/p partial colectomy,  HTN, HLD, ALYSE, Obesity, Rheumatic Fever with subsequent murmur, Normocytic anemia, and Gout. She present to f/u Gout and HTN. Details from previous HPI reviewed and outlined below:    ======  She is acutely c/o pain to right ankle x 1.5 weeks. Sudden onset. No h/o accident, injury. Has associated redness and warmth. Took tylenol; no improvement. No fevers/chills. Last had gout flare 7-8 years ago.    HTN   /67 on triage. Her home blood pressures are typically 130s/80s; elevated to 140s-160s/70s-80s over past 2 weeks. Amlodipine increased 5-->10 mg daily 1 week ago boy digital HTN. Also adherent with HCTZ 25 mg daily.    =======  Her gout flare was treated with pred 50 mg x 5 days (5/3-5/7/22) She has had significant improvement in pain and swelling, however, she continues to have mild discomfort and swelling to ankle.     Her BP is 153/72 in the office today. On 5/10/22, digital medicine team d/c'd amlodipine 10 mg daily and started Nifedipine XL 30 mg daily instead in attempt to improve BP. Pt has been experiencing anxiety and upper extremity tremors on intention since starting medication. Also with intermittent cough. Her average BP with dig med is 145/79.       Past Medical History:   Diagnosis Date    Colon cancer     Gout, chronic     Heart murmur     High cholesterol     Hypercholesteremia     Hypertension     Sleep apnea     Thyroid disease        Patient Active Problem List   Diagnosis    Class 3 severe obesity with serious comorbidity and body mass index (BMI) of 40.0 to 44.9 in adult    Essential hypertension    Adrenal nodule    Hyperlipidemia    PMB (postmenopausal bleeding)    ALYSE (obstructive sleep apnea)    Right knee pain    Thyroid  disease    Normocytic anemia    Gout    Heart murmur    Rheumatic fever    BMI 45.0-49.9, adult    Cough    Nasal congestion       Past Surgical History:   Procedure Laterality Date    BTL       SECTION, CLASSIC      COLON SURGERY      goiter       HERNIA REPAIR      KIDNEY SURGERY      cyst removal    THYROID SURGERY         Family History   Problem Relation Age of Onset    Heart disease Father     Diabetes Mother        Social History     Tobacco Use   Smoking Status Former Smoker    Packs/day: 0.10    Quit date: 1980    Years since quittin.1   Smokeless Tobacco Never Used       Medications have been reviewed and reconciled.     Review of patient's allergies indicates:   Allergen Reactions    Ace inhibitors Other (See Comments)     cough    Arb-angiotensin receptor antagonist Other (See Comments)     Cough w/ valsartan        Review of Systems   Constitutional: Negative for fatigue.   Respiratory: Negative for shortness of breath and wheezing.    Cardiovascular: Negative for chest pain, palpitations and leg swelling.   Musculoskeletal: Positive for arthralgias and joint swelling.   Neurological: Negative for dizziness, syncope, weakness and headaches.         Objective:        BP (!) 153/72 (BP Location: Right arm, Patient Position: Sitting, BP Method: Thigh Cuff (Automatic))   Pulse 91   Wt 125.2 kg (276 lb 0.3 oz)   LMP  (LMP Unknown)   SpO2 99%   BMI 43.23 kg/m²      Physical Exam  Constitutional:       General: She is not in acute distress.     Appearance: She is well-developed. She is obese.   HENT:      Head: Normocephalic and atraumatic.      Right Ear: External ear normal.      Left Ear: External ear normal.   Eyes:      General: No scleral icterus.     Extraocular Movements: Extraocular movements intact.      Conjunctiva/sclera: Conjunctivae normal.   Cardiovascular:      Rate and Rhythm: Normal rate and regular rhythm.      Heart sounds: Normal heart sounds. No  murmur heard.    No friction rub. No gallop.   Pulmonary:      Effort: Pulmonary effort is normal.      Breath sounds: Normal breath sounds. No wheezing or rales.   Musculoskeletal:         General: Normal range of motion.      Cervical back: Normal range of motion and neck supple.      Right lower leg: Edema (1+ edema at right ankle) present.      Left lower leg: Edema (trace edema at left ankle) present.        Legs:    Skin:     General: Skin is warm and dry.      Findings: No erythema or rash.   Neurological:      Mental Status: She is alert and oriented to person, place, and time.      Cranial Nerves: No cranial nerve deficit.   Psychiatric:         Mood and Affect: Mood normal.         Behavior: Behavior normal.           Assessment:       1. Acute gout of right ankle, unspecified cause    2. Essential hypertension    3. ALYSE (obstructive sleep apnea)    4. Hyperlipidemia, unspecified hyperlipidemia type    5. Asymptomatic menopause    6. Adrenal nodule    7. Class 3 severe obesity with serious comorbidity and body mass index (BMI) of 40.0 to 44.9 in adult, unspecified obesity type        Plan:       Natali was seen today for follow-up.    Diagnoses and all orders for this visit:    Acute gout of right ankle, unspecified cause  Comments:  Improved w/ pred 50 mg x 5d; has mild ongoing symptoms. pred taper with 40 mg qd x 3d, 20 mg qd x 3d, 10 mg qd x 3d. RICE. Consider alternate to HCTZ.   Orders:  -     predniSONE (DELTASONE) 20 MG tablet; Take 2 tablets (40 mg total) by mouth once daily for 3 days, THEN 1 tablet (20 mg total) once daily for 3 days, THEN 0.5 tablets (10 mg total) once daily for 3 days.    Essential hypertension  Comments:  Hold Nifedipine due to reflex tachycardia. Restart amlodipine 10 mg daily. Continue HTCZ 25 mg daily for now, but will consider spironolactone.  Orders:  -     amLODIPine (NORVASC) 10 MG tablet; Take 1 tablet (10 mg total) by mouth once daily.    ALYSE (obstructive sleep  apnea)  Comments:  Last CPAP use 1 year ago. Last sleep study in 2010. Will refer for home sleep study and to sleep medicine.  Orders:  -     Home Sleep Studies; Future  -     Ambulatory referral/consult to Sleep Disorders; Future    Hyperlipidemia, unspecified hyperlipidemia type  Comments:  , TG 64, HDL 57, LDL 72.2 2/1/22. Levels are at goal.  Statin not rx'd. Will continue to monitor    Asymptomatic menopause  Comments:  Normal Bone Density 08/24/2018. Will order repeat today.  Orders:  -     DXA Bone Density Spine And Hip; Future    Adrenal nodule  Comments:  CT abd/pelvis 5/2017: There is stable nodularity of the lateral limb of the left adrenal gland.  The adrenal glands are otherwise normal.    Class 3 severe obesity with serious comorbidity and body mass index (BMI) of 40.0 to 44.9 in adult, unspecified obesity type  Comments:  Discuss diet/lifestyle modifications over longitudinal visits      HM   Discuss PCV-13 on f/u visit. PPSV-23 done 04/30/2021 (age 72 yo)  Shingrix, tetanus, 2nd COVID-19 booster due at pharmacy   Hep C screen due     I spent a total of 30 minutes on the day of the visit.This includes face to face time and non-face to face time preparing to see the patient (eg, review of tests), obtaining and/or reviewing separately obtained history, documenting clinical information in the electronic or other health record, independently interpreting results and communicating results to the patient/family/caregiver, or care coordinator.    Follow up in about 3 weeks (around 6/6/2022) for HTN.

## 2022-05-16 NOTE — PROGRESS NOTES
Clinic Note  5/16/2022      Subjective:       Patient ID:  Natali is a 81 y.o. female being seen for an established visit.    Chief Complaint: Follow-up    82 yo F pt with PMH significant for Colon Cancer 2002 s/p partial colectomy,  HTN, HLD, ALYSE, Obesity, Rheumatic Fever with subsequent murmur, Normocytic anemia, and Gout. She was previously followed by Dr. Ewelina Herzog. She presents today to f/u for hypertension and chronic gout. Stated on Nifedipine 30 mg bid by digital medicine for better hypertension control. Reports since starting on Nifedipine last week has developed tremors, dry cough, headaches and generalized body aches. Patient continues to take Nifedipine despite of symptoms. She notes improvement of pain,warmth and redness to her right ankle since starting on Prednisone. Has been able to ambulate/ bear weight without difficulty. However, right ankle swelling and tenderness is still present. She notes feeling fatigue and not getting proper rest/ sleep. Has not been using C-PAP since last year. Denies worsening of ALYSE symptoms since last visit. Last sleep study done in 2010. Denies fever, chills, n/v/d.      HTN  /74 on triage. Her home blood pressure are typically 140's/80's and continues to monitor BP on a daily basis. She is adherent to her medication regimen.       Review of Systems   Constitutional: Positive for malaise/fatigue. Negative for chills and fever.   HENT: Negative for congestion, sinus pain and sore throat.    Respiratory: Positive for cough (intermitten dry cough ). Negative for shortness of breath and wheezing.    Cardiovascular: Negative for chest pain and palpitations.   Gastrointestinal: Negative for abdominal pain, constipation, diarrhea, nausea and vomiting.   Genitourinary: Negative for dysuria.   Musculoskeletal: Negative for myalgias.   Neurological: Positive for headaches. Negative for dizziness.       Medication List with Changes/Refills   New Medications     AMLODIPINE (NORVASC) 10 MG TABLET    Take 1 tablet (10 mg total) by mouth once daily.    PREDNISONE (DELTASONE) 20 MG TABLET    Take 2 tablets (40 mg total) by mouth once daily for 3 days, THEN 1 tablet (20 mg total) once daily for 3 days, THEN 0.5 tablets (10 mg total) once daily for 3 days.   Current Medications    ALBUTEROL (PROVENTIL/VENTOLIN HFA) 90 MCG/ACTUATION INHALER    Inhale 2 puffs into the lungs every 4 (four) hours as needed.    ALBUTEROL-IPRATROPIUM (DUO-NEB) 2.5 MG-0.5 MG/3 ML NEBULIZER SOLUTION    3 ml as needed    ALLOPURINOL (ZYLOPRIM) 300 MG TABLET    Take 300 mg by mouth once daily.    ASCORBIC ACID, VITAMIN C, (VITAMIN C) 1000 MG TABLET    Take 1,000 mg by mouth once daily.    ASPIRIN (ECOTRIN) 81 MG EC TABLET    Take 81 mg by mouth once daily.    ATORVASTATIN (LIPITOR) 10 MG TABLET    Take 10 mg by mouth every evening.     AZELASTINE 205.5 MCG (0.15 %) SPRY    2 sprays by Each Nostril route 2 (two) times daily.    CHOLECALCIFEROL, VITAMIN D3, (VITAMIN D3) 50 MCG (2,000 UNIT) TAB    Take 1 tablet by mouth once daily.    CYANOCOBALAMIN (VITAMIN B-12) 250 MCG TABLET    Take 2,500 mcg by mouth once daily.    DOCUSATE SODIUM (COLACE) 100 MG CAPSULE    Take 100 mg by mouth once daily.    FLUTICASONE PROPIONATE (FLONASE) 50 MCG/ACTUATION NASAL SPRAY    2 sprays (100 mcg total) by Each Nostril route once daily.    FLUTICASONE-UMECLIDIN-VILANTER (TRELEGY ELLIPTA) 200-62.5-25 MCG INHALER    Inhale 1 puff into the lungs once daily.    HYDROCHLOROTHIAZIDE (HYDRODIURIL) 25 MG TABLET    Take 12.5 mg by mouth. Take 1/2 of the 25 mg tablet daily    IPRATROPIUM (ATROVENT) 42 MCG (0.06 %) NASAL SPRAY    2 sprays 2 (two) times daily.    LEVOCETIRIZINE (XYZAL) 5 MG TABLET    1 tablet in the evening    VIT B COMPLEX 100 COMBO NO.2 (B-100 COMPLEX) 100 MG TBSR       Discontinued Medications    NIFEDIPINE (PROCARDIA-XL) 30 MG (OSM) 24 HR TABLET    TAKE 1 TABLET(30 MG) BY MOUTH TWICE DAILY       Patient Active Problem  "List   Diagnosis    Morbid obesity due to excess calories    Essential hypertension    Adrenal nodule    Hyperlipidemia    PMB (postmenopausal bleeding)    ALYSE (obstructive sleep apnea)    Right knee pain    Thyroid disease    Normocytic anemia    Gout    Heart murmur    Rheumatic fever    BMI 45.0-49.9, adult    Cough    Nasal congestion           Objective:      BP (!) 163/74 (BP Location: Right arm, Patient Position: Sitting, BP Method: Thigh Cuff (Automatic))   Pulse 91   Wt 125.2 kg (276 lb 0.3 oz)   LMP  (LMP Unknown)   SpO2 99%   BMI 43.23 kg/m²   Estimated body mass index is 43.23 kg/m² as calculated from the following:    Height as of 5/3/22: 5' 7" (1.702 m).    Weight as of this encounter: 125.2 kg (276 lb 0.3 oz).  Physical Exam  Constitutional:       General: She is not in acute distress.     Appearance: Normal appearance. She is obese.   HENT:      Head: Normocephalic and atraumatic.      Right Ear: External ear normal.      Left Ear: External ear normal.   Eyes:      General: No scleral icterus.     Extraocular Movements: Extraocular movements intact.      Conjunctiva/sclera: Conjunctivae normal.   Cardiovascular:      Rate and Rhythm: Normal rate and regular rhythm.      Pulses: Normal pulses.      Heart sounds: No murmur heard.  Pulmonary:      Effort: Pulmonary effort is normal.      Breath sounds: Normal breath sounds. No wheezing or rales.   Musculoskeletal:         General: Normal range of motion.      Cervical back: Normal range of motion.   Lymphadenopathy:      Cervical: No cervical adenopathy.   Skin:     General: Skin is warm and dry.      Findings: No erythema.          Neurological:      General: No focal deficit present.      Mental Status: She is alert and oriented to person, place, and time.   Psychiatric:         Mood and Affect: Mood normal.         Behavior: Behavior normal.         Thought Content: Thought content normal.         Judgment: Judgment normal.        "    Assessment and Plan:   Natali was seen today for follow-up visit.     Diagnoses and all orders for this visit:     Acute gout of right ankle, unspecified cause  Comments:  -Will continue to treat with prednisone, but will taper for 9 days    Orders:  - predniSONE (DELTASONE) 20 MG tablet; Take 2 tablets (40 mg total) by mouth once daily for 3 days, THEN 1 tablet (20 mg total) once daily for 3 days, THEN 0.5 tablets (10 mg total) once daily for 3 days.  Dispense: 11 tablet; Refill:     Essential hypertension  Comments:    BP just above target today (153/72). Discontinue Nifedipine 30 mg due to new onset of symptoms. Restart amlodipine 10 mg daily + HCTZ 25 mg daily. Follow- up visit within 3-4 weeks. Continue monitoring with digital HTN with target goals of 140's/80's     Orders:  - amLODIPine (NORVASC) 10 MG tablet; Take 1 tablet (10 mg total) by mouth once daily.  Dispense: 90 tablet; Refill: 0     ALYSE (obstructive sleep apnea)  Orders:  - Home Sleep Studies; Future  - Ambulatory referral/consult to Sleep Disorders; Future      Asymptomatic menopause  Orders:  - DXA Bone Density Spine And Hip; Future        I spent a total of 30 minutes on the day of the visit.This includes face to face time and non-face to face time preparing to see the patient (eg, review of tests), obtaining and/or reviewing separately obtained history, documenting clinical information in the electronic or other health record, independently interpreting results and communicating results to the patient/family/caregiver, or care coordinator.       Follow Up:   Follow up in about 3 weeks (around 6/6/2022) for HTN.    Other Orders Placed This Visit:  Orders Placed This Encounter   Procedures    DXA Bone Density Spine And Hip    Ambulatory referral/consult to Sleep Disorders    Home Sleep Studies           This note is dictated on M*Modal word recognition program.  There are word recognition mistakes that are occasionally missed on review.

## 2022-05-19 ENCOUNTER — TELEPHONE (OUTPATIENT)
Dept: SLEEP MEDICINE | Facility: OTHER | Age: 82
End: 2022-05-19
Payer: MEDICARE

## 2022-05-20 ENCOUNTER — PATIENT MESSAGE (OUTPATIENT)
Dept: SLEEP MEDICINE | Facility: OTHER | Age: 82
End: 2022-05-20
Payer: MEDICARE

## 2022-05-24 ENCOUNTER — TELEPHONE (OUTPATIENT)
Dept: SLEEP MEDICINE | Facility: OTHER | Age: 82
End: 2022-05-24
Payer: MEDICARE

## 2022-06-01 ENCOUNTER — TELEPHONE (OUTPATIENT)
Dept: SLEEP MEDICINE | Facility: OTHER | Age: 82
End: 2022-06-01
Payer: MEDICARE

## 2022-06-02 ENCOUNTER — HOSPITAL ENCOUNTER (OUTPATIENT)
Dept: SLEEP MEDICINE | Facility: OTHER | Age: 82
Discharge: HOME | End: 2022-06-02
Attending: FAMILY MEDICINE
Payer: MEDICARE

## 2022-06-02 DIAGNOSIS — G47.33 OSA (OBSTRUCTIVE SLEEP APNEA): Chronic | ICD-10-CM

## 2022-06-02 PROCEDURE — 95806 PR SLEEP STUDY, UNATTENDED, SIMUL RECORD HR/O2 SAT/RESP FLOW/RESP EFFT: ICD-10-PCS | Mod: 26,,, | Performed by: INTERNAL MEDICINE

## 2022-06-02 PROCEDURE — 95800 SLP STDY UNATTENDED: CPT

## 2022-06-02 PROCEDURE — 95806 SLEEP STUDY UNATT&RESP EFFT: CPT | Mod: 26,,, | Performed by: INTERNAL MEDICINE

## 2022-06-07 NOTE — PROCEDURES
"The sleep study that you ordered is complete.  You have ordered sleep LAB services to perform the sleep study for Natali Galeano.     Please find Sleep Study result in  the "Media tab" of Chart Review menu.         You can look  for the report in the  Media as a procedure document type.    As the ordering provider, you are responsible for reviewing the results and implementing a treatment plan with your patient.     If you need a Sleep Medicine provider to explain the sleep study findings and arrange treatment for the patient, please refer patient for consultation to our Sleep Clinic via [facility] with Ambulatory Consult Sleep.     To do that please place an order for an  "Ambulatory Consult Sleep" - it will go to our clinic work queue for our Medical Assistant to contact the patient for an appointment.     For any questions, please contact our clinic staff at [phone] to talk to clinical staff.     "

## 2022-06-08 ENCOUNTER — HOSPITAL ENCOUNTER (OUTPATIENT)
Dept: RADIOLOGY | Facility: OTHER | Age: 82
Discharge: HOME | End: 2022-06-08
Attending: FAMILY MEDICINE
Payer: MEDICARE

## 2022-06-08 DIAGNOSIS — Z78.0 ASYMPTOMATIC MENOPAUSE: ICD-10-CM

## 2022-06-08 PROCEDURE — 77080 DXA BONE DENSITY AXIAL: CPT | Mod: 26,,, | Performed by: RADIOLOGY

## 2022-06-08 PROCEDURE — 77080 DXA BONE DENSITY AXIAL: CPT | Mod: TC

## 2022-06-08 PROCEDURE — 77080 DEXA BONE DENSITY SPINE HIP: ICD-10-PCS | Mod: 26,,, | Performed by: RADIOLOGY

## 2022-06-12 ENCOUNTER — PATIENT MESSAGE (OUTPATIENT)
Dept: PRIMARY CARE CLINIC | Facility: CLINIC | Age: 82
End: 2022-06-12
Payer: MEDICARE

## 2022-06-14 ENCOUNTER — PATIENT MESSAGE (OUTPATIENT)
Dept: PRIMARY CARE CLINIC | Facility: CLINIC | Age: 82
End: 2022-06-14
Payer: MEDICARE

## 2022-06-23 ENCOUNTER — TELEPHONE (OUTPATIENT)
Dept: SLEEP MEDICINE | Facility: CLINIC | Age: 82
End: 2022-06-23
Payer: MEDICARE

## 2022-06-24 ENCOUNTER — OFFICE VISIT (OUTPATIENT)
Dept: SLEEP MEDICINE | Facility: CLINIC | Age: 82
End: 2022-06-24
Payer: MEDICARE

## 2022-06-24 VITALS
HEART RATE: 87 BPM | SYSTOLIC BLOOD PRESSURE: 117 MMHG | HEIGHT: 67 IN | WEIGHT: 286 LBS | DIASTOLIC BLOOD PRESSURE: 72 MMHG | BODY MASS INDEX: 44.89 KG/M2

## 2022-06-24 DIAGNOSIS — R35.1 NOCTURIA: Primary | ICD-10-CM

## 2022-06-24 DIAGNOSIS — G47.33 OSA (OBSTRUCTIVE SLEEP APNEA): Chronic | ICD-10-CM

## 2022-06-24 PROCEDURE — 1125F AMNT PAIN NOTED PAIN PRSNT: CPT | Mod: CPTII,S$GLB,, | Performed by: INTERNAL MEDICINE

## 2022-06-24 PROCEDURE — 1101F PT FALLS ASSESS-DOCD LE1/YR: CPT | Mod: CPTII,S$GLB,, | Performed by: INTERNAL MEDICINE

## 2022-06-24 PROCEDURE — 3078F DIAST BP <80 MM HG: CPT | Mod: CPTII,S$GLB,, | Performed by: INTERNAL MEDICINE

## 2022-06-24 PROCEDURE — 3078F PR MOST RECENT DIASTOLIC BLOOD PRESSURE < 80 MM HG: ICD-10-PCS | Mod: CPTII,S$GLB,, | Performed by: INTERNAL MEDICINE

## 2022-06-24 PROCEDURE — 99999 PR PBB SHADOW E&M-EST. PATIENT-LVL III: ICD-10-PCS | Mod: PBBFAC,,, | Performed by: INTERNAL MEDICINE

## 2022-06-24 PROCEDURE — 3288F PR FALLS RISK ASSESSMENT DOCUMENTED: ICD-10-PCS | Mod: CPTII,S$GLB,, | Performed by: INTERNAL MEDICINE

## 2022-06-24 PROCEDURE — 1159F PR MEDICATION LIST DOCUMENTED IN MEDICAL RECORD: ICD-10-PCS | Mod: CPTII,S$GLB,, | Performed by: INTERNAL MEDICINE

## 2022-06-24 PROCEDURE — 99203 OFFICE O/P NEW LOW 30 MIN: CPT | Mod: S$GLB,,, | Performed by: INTERNAL MEDICINE

## 2022-06-24 PROCEDURE — 99999 PR PBB SHADOW E&M-EST. PATIENT-LVL III: CPT | Mod: PBBFAC,,, | Performed by: INTERNAL MEDICINE

## 2022-06-24 PROCEDURE — 1101F PR PT FALLS ASSESS DOC 0-1 FALLS W/OUT INJ PAST YR: ICD-10-PCS | Mod: CPTII,S$GLB,, | Performed by: INTERNAL MEDICINE

## 2022-06-24 PROCEDURE — 3288F FALL RISK ASSESSMENT DOCD: CPT | Mod: CPTII,S$GLB,, | Performed by: INTERNAL MEDICINE

## 2022-06-24 PROCEDURE — 1159F MED LIST DOCD IN RCRD: CPT | Mod: CPTII,S$GLB,, | Performed by: INTERNAL MEDICINE

## 2022-06-24 PROCEDURE — 1125F PR PAIN SEVERITY QUANTIFIED, PAIN PRESENT: ICD-10-PCS | Mod: CPTII,S$GLB,, | Performed by: INTERNAL MEDICINE

## 2022-06-24 PROCEDURE — 99203 PR OFFICE/OUTPT VISIT, NEW, LEVL III, 30-44 MIN: ICD-10-PCS | Mod: S$GLB,,, | Performed by: INTERNAL MEDICINE

## 2022-06-24 PROCEDURE — 3074F PR MOST RECENT SYSTOLIC BLOOD PRESSURE < 130 MM HG: ICD-10-PCS | Mod: CPTII,S$GLB,, | Performed by: INTERNAL MEDICINE

## 2022-06-24 PROCEDURE — 3074F SYST BP LT 130 MM HG: CPT | Mod: CPTII,S$GLB,, | Performed by: INTERNAL MEDICINE

## 2022-06-24 NOTE — PROGRESS NOTES
Referred by Ashutosh Wolff MD     NEW PATIENT VISIT    Natali Galeano  is a pleasant 81 y.o. female  with PMH significant for allergic rhinitis, HTN, BMI 45+, suspected COPD who presents today for evaluation of ALYSE dx circa 2010.  Was doing well on CPAP until her machine was recalled.      HST 6.2.22: AHi 49, RDI 56      PAP history   Problems Did well until recall   Mask Nasal pillows   Pressure She thinks it was 12 (ramp 6cwp)   Benefit Slept better   DME [employer]   Machine age Circa 2010   Download n/a       SLEEP SCHEDULE   Environment    Bed Time 10-11p   Sleep Latency 15-20min   Arousals 2-3   Nocturia 2-3   Back to sleep Not long   Wake time 6:30-8A   Naps none   Work          Past Medical History:   Diagnosis Date    Colon cancer     Gout, chronic     Heart murmur     High cholesterol     Hypercholesteremia     Hypertension     Sleep apnea     Thyroid disease      Patient Active Problem List   Diagnosis    Class 3 severe obesity with serious comorbidity and body mass index (BMI) of 40.0 to 44.9 in adult    Essential hypertension    Adrenal nodule    Hyperlipidemia    PMB (postmenopausal bleeding)    ALYSE (obstructive sleep apnea)    Right knee pain    Thyroid disease    Normocytic anemia    Gout    Heart murmur    Rheumatic fever    BMI 45.0-49.9, adult    Cough    Nasal congestion       Current Outpatient Medications:     albuterol (PROVENTIL/VENTOLIN HFA) 90 mcg/actuation inhaler, Inhale 2 puffs into the lungs every 4 (four) hours as needed., Disp: , Rfl:     albuterol-ipratropium (DUO-NEB) 2.5 mg-0.5 mg/3 mL nebulizer solution, 3 ml as needed, Disp: , Rfl:     allopurinol (ZYLOPRIM) 300 MG tablet, Take 300 mg by mouth once daily., Disp: , Rfl:     amLODIPine (NORVASC) 10 MG tablet, Take 1 tablet (10 mg total) by mouth once daily., Disp: 90 tablet, Rfl: 0    ascorbic acid, vitamin C, (VITAMIN C) 1000 MG tablet, Take 1,000 mg by mouth once daily., Disp: , Rfl:     aspirin  "(ECOTRIN) 81 MG EC tablet, Take 81 mg by mouth once daily., Disp: , Rfl:     atorvastatin (LIPITOR) 10 MG tablet, Take 10 mg by mouth every evening. , Disp: , Rfl:     azelastine 205.5 mcg (0.15 %) Spry, 2 sprays by Each Nostril route 2 (two) times daily., Disp: , Rfl:     cholecalciferol, vitamin D3, (VITAMIN D3) 50 mcg (2,000 unit) Tab, Take 1 tablet by mouth once daily., Disp: , Rfl:     cyanocobalamin (VITAMIN B-12) 250 MCG tablet, Take 2,500 mcg by mouth once daily., Disp: , Rfl:     docusate sodium (COLACE) 100 MG capsule, Take 100 mg by mouth once daily., Disp: , Rfl:     fluticasone propionate (FLONASE) 50 mcg/actuation nasal spray, 2 sprays (100 mcg total) by Each Nostril route once daily. (Patient not taking: No sig reported), Disp: 15.8 mL, Rfl: 2    fluticasone-umeclidin-vilanter (TRELEGY ELLIPTA) 200-62.5-25 mcg inhaler, Inhale 1 puff into the lungs once daily., Disp: , Rfl:     hydroCHLOROthiazide (HYDRODIURIL) 25 MG tablet, Take 12.5 mg by mouth. Take 1/2 of the 25 mg tablet daily, Disp: , Rfl:     ipratropium (ATROVENT) 42 mcg (0.06 %) nasal spray, 2 sprays 2 (two) times daily., Disp: , Rfl:     levocetirizine (XYZAL) 5 MG tablet, 1 tablet in the evening, Disp: , Rfl:     vit B complex 100 combo no.2 (B-100 COMPLEX) 100 mg TbSR, , Disp: , Rfl:      Vitals:    06/24/22 0906   Weight: 129.7 kg (286 lb)   Height: 5' 7" (1.702 m)     Physical Exam:    GEN:   Well-appearing  Psych:  Appropriate affect, demonstrates insight  SKIN:  No rash on the face or bridge of the nose  HEENT: MP2, + crowded airway    LABS:   Lab Results   Component Value Date    CO2 28 02/01/2022       RECORDS REVIEWED PREVIOUSLY:        ASSESSMENT    PROBLEM DESCRIPTION/ Sx on Presentation  STATUS   severe ALYSE   Dx circa 2010.  Repeat HST with severe ALYSE    New   Daytime Sx   denies sleepiness when inactive (does doze watching TV occasionally)  denies sleepiness when driving   ESS 3/24 on intake  New   Nocturia   x 2-3 per " sleep period  New   Other issues:     PLAN     -will order new auto-CPAP 10-14cwp (based on her old pressures) with ramp 6cwp x auto  -the patient is using and benefiting from PAP therapy when she has a safe machine  -driving precautions were discussed with the patient    RTC          The patient was given open opportunity to ask questions and/or express concerns about treatment plan.   All questions/concerns were discussed.     Two patient identifiers used prior to evaluation.

## 2022-07-06 ENCOUNTER — PATIENT MESSAGE (OUTPATIENT)
Dept: SLEEP MEDICINE | Facility: CLINIC | Age: 82
End: 2022-07-06
Payer: MEDICARE

## 2022-07-18 ENCOUNTER — OFFICE VISIT (OUTPATIENT)
Dept: PRIMARY CARE CLINIC | Facility: CLINIC | Age: 82
End: 2022-07-18
Payer: MEDICARE

## 2022-07-18 ENCOUNTER — PATIENT OUTREACH (OUTPATIENT)
Dept: ADMINISTRATIVE | Facility: OTHER | Age: 82
End: 2022-07-18
Payer: MEDICARE

## 2022-07-18 VITALS
WEIGHT: 284.81 LBS | HEART RATE: 76 BPM | OXYGEN SATURATION: 98 % | SYSTOLIC BLOOD PRESSURE: 137 MMHG | DIASTOLIC BLOOD PRESSURE: 63 MMHG | HEIGHT: 67 IN | BODY MASS INDEX: 44.7 KG/M2

## 2022-07-18 DIAGNOSIS — M10.9 GOUT, UNSPECIFIED CAUSE, UNSPECIFIED CHRONICITY, UNSPECIFIED SITE: Chronic | ICD-10-CM

## 2022-07-18 DIAGNOSIS — Z23 NEED FOR VACCINATION AGAINST STREPTOCOCCUS PNEUMONIAE: ICD-10-CM

## 2022-07-18 DIAGNOSIS — I10 ESSENTIAL HYPERTENSION: ICD-10-CM

## 2022-07-18 DIAGNOSIS — Z11.59 NEED FOR HEPATITIS C SCREENING TEST: ICD-10-CM

## 2022-07-18 DIAGNOSIS — Z23 NEED FOR SHINGLES VACCINE: ICD-10-CM

## 2022-07-18 DIAGNOSIS — Z23 NEED FOR DIPHTHERIA-TETANUS-PERTUSSIS (TDAP) VACCINE: ICD-10-CM

## 2022-07-18 DIAGNOSIS — E78.49 OTHER HYPERLIPIDEMIA: ICD-10-CM

## 2022-07-18 DIAGNOSIS — M85.852 OSTEOPENIA OF NECKS OF BOTH FEMURS: Chronic | ICD-10-CM

## 2022-07-18 DIAGNOSIS — M85.851 OSTEOPENIA OF NECKS OF BOTH FEMURS: Chronic | ICD-10-CM

## 2022-07-18 DIAGNOSIS — L60.3 ONYCHORRHEXIS: ICD-10-CM

## 2022-07-18 DIAGNOSIS — M25.571 CHRONIC PAIN OF RIGHT ANKLE: Primary | ICD-10-CM

## 2022-07-18 DIAGNOSIS — G47.33 OSA (OBSTRUCTIVE SLEEP APNEA): ICD-10-CM

## 2022-07-18 DIAGNOSIS — E66.01 CLASS 3 SEVERE OBESITY WITH SERIOUS COMORBIDITY AND BODY MASS INDEX (BMI) OF 40.0 TO 44.9 IN ADULT, UNSPECIFIED OBESITY TYPE: Chronic | ICD-10-CM

## 2022-07-18 DIAGNOSIS — G89.29 CHRONIC PAIN OF RIGHT ANKLE: Primary | ICD-10-CM

## 2022-07-18 DIAGNOSIS — J30.89 ALLERGIC RHINITIS DUE TO OTHER ALLERGIC TRIGGER, UNSPECIFIED SEASONALITY: Chronic | ICD-10-CM

## 2022-07-18 DIAGNOSIS — Z79.899 MEDICATION MANAGEMENT: ICD-10-CM

## 2022-07-18 PROBLEM — J30.9 ALLERGIC RHINITIS: Chronic | Status: ACTIVE | Noted: 2022-07-18

## 2022-07-18 PROBLEM — N95.0 PMB (POSTMENOPAUSAL BLEEDING): Status: RESOLVED | Noted: 2017-10-24 | Resolved: 2022-07-18

## 2022-07-18 PROCEDURE — 3075F SYST BP GE 130 - 139MM HG: CPT | Mod: CPTII,S$GLB,, | Performed by: FAMILY MEDICINE

## 2022-07-18 PROCEDURE — G0009 PNEUMOCOCCAL CONJUGATE VACCINE 13-VALENT LESS THAN 5YO & GREATER THAN: ICD-10-PCS | Mod: S$GLB,,, | Performed by: FAMILY MEDICINE

## 2022-07-18 PROCEDURE — 1159F PR MEDICATION LIST DOCUMENTED IN MEDICAL RECORD: ICD-10-PCS | Mod: CPTII,S$GLB,, | Performed by: FAMILY MEDICINE

## 2022-07-18 PROCEDURE — 3078F PR MOST RECENT DIASTOLIC BLOOD PRESSURE < 80 MM HG: ICD-10-PCS | Mod: CPTII,S$GLB,, | Performed by: FAMILY MEDICINE

## 2022-07-18 PROCEDURE — 3075F PR MOST RECENT SYSTOLIC BLOOD PRESS GE 130-139MM HG: ICD-10-PCS | Mod: CPTII,S$GLB,, | Performed by: FAMILY MEDICINE

## 2022-07-18 PROCEDURE — 99214 OFFICE O/P EST MOD 30 MIN: CPT | Mod: S$GLB,,, | Performed by: FAMILY MEDICINE

## 2022-07-18 PROCEDURE — 1101F PR PT FALLS ASSESS DOC 0-1 FALLS W/OUT INJ PAST YR: ICD-10-PCS | Mod: CPTII,S$GLB,, | Performed by: FAMILY MEDICINE

## 2022-07-18 PROCEDURE — G0009 ADMIN PNEUMOCOCCAL VACCINE: HCPCS | Mod: S$GLB,,, | Performed by: FAMILY MEDICINE

## 2022-07-18 PROCEDURE — 99999 PR PBB SHADOW E&M-EST. PATIENT-LVL III: CPT | Mod: PBBFAC,,, | Performed by: FAMILY MEDICINE

## 2022-07-18 PROCEDURE — 99999 PR PBB SHADOW E&M-EST. PATIENT-LVL III: ICD-10-PCS | Mod: PBBFAC,,, | Performed by: FAMILY MEDICINE

## 2022-07-18 PROCEDURE — 1159F MED LIST DOCD IN RCRD: CPT | Mod: CPTII,S$GLB,, | Performed by: FAMILY MEDICINE

## 2022-07-18 PROCEDURE — 90670 PCV13 VACCINE IM: CPT | Mod: S$GLB,,, | Performed by: FAMILY MEDICINE

## 2022-07-18 PROCEDURE — 99214 PR OFFICE/OUTPT VISIT, EST, LEVL IV, 30-39 MIN: ICD-10-PCS | Mod: S$GLB,,, | Performed by: FAMILY MEDICINE

## 2022-07-18 PROCEDURE — 3288F FALL RISK ASSESSMENT DOCD: CPT | Mod: CPTII,S$GLB,, | Performed by: FAMILY MEDICINE

## 2022-07-18 PROCEDURE — 1101F PT FALLS ASSESS-DOCD LE1/YR: CPT | Mod: CPTII,S$GLB,, | Performed by: FAMILY MEDICINE

## 2022-07-18 PROCEDURE — 3288F PR FALLS RISK ASSESSMENT DOCUMENTED: ICD-10-PCS | Mod: CPTII,S$GLB,, | Performed by: FAMILY MEDICINE

## 2022-07-18 PROCEDURE — 3078F DIAST BP <80 MM HG: CPT | Mod: CPTII,S$GLB,, | Performed by: FAMILY MEDICINE

## 2022-07-18 PROCEDURE — 1126F AMNT PAIN NOTED NONE PRSNT: CPT | Mod: CPTII,S$GLB,, | Performed by: FAMILY MEDICINE

## 2022-07-18 PROCEDURE — 90670 PNEUMOCOCCAL CONJUGATE VACCINE 13-VALENT LESS THAN 5YO & GREATER THAN: ICD-10-PCS | Mod: S$GLB,,, | Performed by: FAMILY MEDICINE

## 2022-07-18 PROCEDURE — 1126F PR PAIN SEVERITY QUANTIFIED, NO PAIN PRESENT: ICD-10-PCS | Mod: CPTII,S$GLB,, | Performed by: FAMILY MEDICINE

## 2022-07-18 RX ORDER — FLUTICASONE PROPIONATE 50 MCG
2 SPRAY, SUSPENSION (ML) NASAL DAILY
Qty: 15.8 ML | Refills: 2 | Status: SHIPPED | OUTPATIENT
Start: 2022-07-18 | End: 2022-12-28

## 2022-07-18 RX ORDER — ZOSTER VACCINE RECOMBINANT, ADJUVANTED 50 MCG/0.5
0.5 KIT INTRAMUSCULAR ONCE
Qty: 1 EACH | Refills: 0 | Status: SHIPPED | OUTPATIENT
Start: 2022-07-18 | End: 2022-07-18

## 2022-07-18 NOTE — PROGRESS NOTES
Subjective:       Patient ID: Natali Galeano is a 81 y.o. female.    Chief Complaint: Follow-up Clinical Reassessment (Medication refill )    80 yo F, recently established with me (last visit 2022) significant for  with PMH significant for Colon Cancer  s/p partial colectomy,  HTN, HLD, ALYSE, Obesity, Rheumatic Fever with subsequent murmur, Normocytic anemia, and Gout. She present to f/u HTN. Details from previous HPI reviewed    --Right ankle pain at medial malleolus persistent. More of soreness than pain. Using biofreeze. Feels swollen.   --Has vertical grooves in fingernails. Nails are sometimes splitting at distal edges.   --HTN: /63 in office today. Now on amlodipine 10 + HCTZ 25 mg daily. Nifedipine switched to amlodipine last visit as pt experienced nxiety and upper extremity tremors on intention since starting medication.  --Requesting refill on flonase.      Past Medical History:   Diagnosis Date    Allergic rhinitis 2022    Colon cancer     Gout, chronic     Heart murmur     High cholesterol     Hypercholesteremia     Hypertension     Sleep apnea     Thyroid disease        Patient Active Problem List   Diagnosis    Class 3 severe obesity with serious comorbidity and body mass index (BMI) of 40.0 to 44.9 in adult    Essential hypertension    Adrenal nodule    Hyperlipidemia    PMB (postmenopausal bleeding)    ALYSE (obstructive sleep apnea)    Right knee pain    Thyroid disease    Normocytic anemia    Gout    Heart murmur    Rheumatic fever    BMI 45.0-49.9, adult    Cough    Nasal congestion    Chronic pain of right ankle    Onychorrhexis    Osteopenia of necks of both femurs    Allergic rhinitis       Past Surgical History:   Procedure Laterality Date    BTL       SECTION, CLASSIC      COLON SURGERY      goiter       HERNIA REPAIR      KIDNEY SURGERY      cyst removal    THYROID SURGERY         Family History   Problem Relation Age of Onset     "Heart disease Father     Diabetes Mother        Social History     Tobacco Use   Smoking Status Former Smoker    Packs/day: 0.10    Quit date: 1980    Years since quittin.2   Smokeless Tobacco Never Used       Social History     Social History Narrative    Not on file       Medications have been reviewed and reconciled.     Review of patient's allergies indicates:   Allergen Reactions    Ace inhibitors Other (See Comments)     cough    Arb-angiotensin receptor antagonist Other (See Comments)     Cough w/ valsartan        Review of Systems   Constitutional: Negative for fatigue.   Respiratory: Negative for shortness of breath and wheezing.    Cardiovascular: Negative for chest pain, palpitations and leg swelling.   Musculoskeletal: Positive for arthralgias and joint swelling.   Neurological: Negative for dizziness, syncope, weakness and headaches.         Objective:        /63 (BP Location: Right arm, Patient Position: Sitting, BP Method: Large (Automatic))   Pulse 76   Ht 5' 7" (1.702 m)   Wt 129.2 kg (284 lb 13.4 oz)   LMP  (LMP Unknown)   SpO2 98%   BMI 44.61 kg/m²      Physical Exam  Constitutional:       General: She is not in acute distress.     Appearance: She is well-developed. She is obese.   HENT:      Head: Normocephalic and atraumatic.      Right Ear: External ear normal.      Left Ear: External ear normal.   Eyes:      General: No scleral icterus.     Extraocular Movements: Extraocular movements intact.      Conjunctiva/sclera: Conjunctivae normal.   Cardiovascular:      Rate and Rhythm: Normal rate and regular rhythm.      Heart sounds: Normal heart sounds. No murmur heard.    No friction rub. No gallop.   Pulmonary:      Effort: Pulmonary effort is normal.      Breath sounds: Normal breath sounds. No wheezing or rales.   Musculoskeletal:         General: Normal range of motion.      Cervical back: Normal range of motion and neck supple.        Legs:    Skin:     General: " Skin is warm and dry.      Findings: No erythema or rash.      Comments: Longitudinal grooves to nails.   Neurological:      Mental Status: She is alert and oriented to person, place, and time.      Cranial Nerves: No cranial nerve deficit.   Psychiatric:         Mood and Affect: Mood normal.         Behavior: Behavior normal.         Assessment:       1. Chronic pain of right ankle    2. Onychorrhexis    3. Essential hypertension    4. Allergic rhinitis due to other allergic trigger, unspecified seasonality    5. Osteopenia of necks of both femurs    6. ALYSE (obstructive sleep apnea)    7. Other hyperlipidemia    8. Class 3 severe obesity with serious comorbidity and body mass index (BMI) of 40.0 to 44.9 in adult, unspecified obesity type    9. Gout, unspecified cause, unspecified chronicity, unspecified site    10. Need for hepatitis C screening test    11. Need for vaccination against Streptococcus pneumoniae    12. Need for diphtheria-tetanus-pertussis (Tdap) vaccine    13. Need for shingles vaccine    14. Medication management        Plan:       Natali was seen today for follow-up clinical reassessment.    Diagnoses and all orders for this visit:    Chronic pain of right ankle  Comments:  Localized to medial malleolus. (?) OA. Continue biofreeze. Add tylenol arthritis prn. Ankle brace prn. Consider plain film. Uric acid in 6 months.  Orders:  -     Uric Acid; Future  -     Comprehensive Metabolic Panel; Future    Onychorrhexis  Comments:  Advised this could be due to age.   Avoid prolonged moisture exposure to hands (ie gloves with washing dishes)  Trial of geriatric MVI (ie geritol)    Essential hypertension  Comments:  BP at target with amlodipine 10 mg daily + HCTZ 25 mg daily.  Orders:  -     CBC Auto Differential; Future  -     Comprehensive Metabolic Panel; Future    Allergic rhinitis due to other allergic trigger, unspecified seasonality  Comments:  Continue Flonase prn  Orders:  -     fluticasone  propionate (FLONASE) 50 mcg/actuation nasal spray; 2 sprays (100 mcg total) by Each Nostril route once daily.    Osteopenia of necks of both femurs  Comments:  DEXA 6/08/2022 showed Osteopenia of the femoral necks. Recommend adequate Vitamin D/Ca. Recommend 2.5 hrs exercise per week. Assess Vitamin D with next lab draw    ALYSE (obstructive sleep apnea)  Comments:  Will be starting CPAP    Other hyperlipidemia  Comments:  Continue atorvastatin 10 mg hs. , TG 64, HDL 51, LDL 72.2 2/2022    Class 3 severe obesity with serious comorbidity and body mass index (BMI) of 40.0 to 44.9 in adult, unspecified obesity type  Comments:  Discussed diet/lifestyle modifications. Interested in starting GoLo. No contraindications to this supplement.    Gout, unspecified cause, unspecified chronicity, unspecified site  Comments:  Cont allopurinol 300 mg daily    Need for hepatitis C screening test  -     Hepatitis C Antibody; Future    Need for vaccination against Streptococcus pneumoniae  -     (In Office Administered) Pneumococcal Conjugate Vaccine (13 Valent) (IM)    Need for diphtheria-tetanus-pertussis (Tdap) vaccine  -     DIPH,PERTUSS,ACEL,,TET VAC,PF, ADULT (ADACEL) 2 Lf-(2.5-5-3-5 mcg)-5Lf/0.5 mL Syrg; Inject 0.5 mLs into the muscle once. for 1 dose    Need for shingles vaccine  -     varicella-zoster [id number]-AS01B, PF, (SHINGRIX, PF,) 50 mcg/0.5 mL injection; Inject 0.5 mLs into the muscle once. for 1 dose    Medication management  -     Vitamin D; Future      I spent a total of 40 minutes on the day of the visit.This includes face to face time and non-face to face time preparing to see the patient (eg, review of tests), obtaining and/or reviewing separately obtained history, documenting clinical information in the electronic or other health record, independently interpreting results and communicating results to the patient/family/caregiver, or care coordinator.    Follow up in about 6 months (around 1/18/2023) for chronic  disease management.

## 2022-07-18 NOTE — PROGRESS NOTES
CHW - Initial Contact    This Community Health Worker completed the Social Determinant of Health questionnaire with [phone] in person on today.    Pt identified barriers of most importance are: Please see SDOH icons   Referrals to community agencies completed with patient/caregiver consent outside of [facility] include: NO outside referrals at this time.  Referrals were put through [facility] - 1 referral was placed  Support and Services: Utility assistance (electricity)  Other information discussed the patient needs / wants help with: The pt is seeking electricity bill payment assistance.  Follow up required:Yes   Follow-up Outreach - Due: 7/24/2022

## 2022-07-25 ENCOUNTER — PATIENT OUTREACH (OUTPATIENT)
Dept: ADMINISTRATIVE | Facility: OTHER | Age: 82
End: 2022-07-25
Payer: MEDICARE

## 2022-07-25 NOTE — PROGRESS NOTES
CHW - Follow Up    This Community Health Worker completed a follow up visit with [phone] via telephone on today.  Pt/Caregiver reported: The pt informed CHW that she has not heard from anyone concerning to help her pay her utility bill.   Community Health Worker provided: CHW informed the pt that she will look into it more and follow back with her  Follow up required: Yes   Follow-up Outreach - Due: 7/31/2022  CHW - Outreach Attempt    Community Health Worker left a voicemail message for first attempt to contact [phone] regarding: [place] referral   Community Health Worker to attempt to contact [phone] on: 7/25/22

## 2022-08-01 ENCOUNTER — PATIENT OUTREACH (OUTPATIENT)
Dept: ADMINISTRATIVE | Facility: OTHER | Age: 82
End: 2022-08-01
Payer: MEDICARE

## 2022-08-01 NOTE — PROGRESS NOTES
CHW - Follow Up    This Community Health Worker completed a follow up visit with [phone] via telephone on today.  Pt/Caregiver reported: The pt reported that she contacted [place] [facility] but lost connection. She will try to call again on today.   Community Health Worker provided: CHW informed the pt that she will keep in touch.  Follow up required: Yes  Follow-up Outreach - Due: 8/7/2022

## 2022-08-08 ENCOUNTER — PATIENT OUTREACH (OUTPATIENT)
Dept: ADMINISTRATIVE | Facility: OTHER | Age: 82
End: 2022-08-08
Payer: MEDICARE

## 2022-08-08 NOTE — PROGRESS NOTES
CHW - Follow Up    This Community Health Worker completed a follow up visit with [phone] via telephone on today.  Pt/Caregiver reported: The pt informed CHW that she was able to speak to [facility]. They informed her that it would take 3-5 months for them to assist her.   Community Health Worker provided: CHW informed the pt that she will continue to look for more resources that can help assist her with her bill.  Follow up required: Yes   Follow-up Outreach - Due: 8/14/2022

## 2022-08-19 ENCOUNTER — PATIENT OUTREACH (OUTPATIENT)
Dept: ADMINISTRATIVE | Facility: OTHER | Age: 82
End: 2022-08-19
Payer: MEDICARE

## 2022-08-19 NOTE — PROGRESS NOTES
CHW - Follow Up    This Community Health Worker completed a follow up visit with [phone] via telephone on today.  Pt/Caregiver reported: The pt reported that she is on the waitlist for someone to help assist her with her water bill.   Community Health Worker provided: CHW informed the pt that she will continue to look for resources.   Follow up required: Yes  Follow-up Outreach - Due: 8/25/2022

## 2022-08-26 ENCOUNTER — PATIENT OUTREACH (OUTPATIENT)
Dept: ADMINISTRATIVE | Facility: OTHER | Age: 82
End: 2022-08-26
Payer: MEDICARE

## 2022-08-26 NOTE — PROGRESS NOTES
CHW - Follow Up    This Community Health Worker completed a follow up visit with [phone] via telephone on today.  Pt/Caregiver reported: The pt reported that she still waiting for assistance with water bill.   Community Health Worker provided: CHW informed the pt that resources to help with water payments is limited but will continue to look  Follow up required: Yes   Follow-up Outreach - Due: 9/1/2022

## 2022-09-01 ENCOUNTER — PES CALL (OUTPATIENT)
Dept: ADMINISTRATIVE | Facility: CLINIC | Age: 82
End: 2022-09-01
Payer: MEDICARE

## 2022-09-02 ENCOUNTER — PATIENT OUTREACH (OUTPATIENT)
Dept: ADMINISTRATIVE | Facility: OTHER | Age: 82
End: 2022-09-02
Payer: MEDICARE

## 2022-09-02 NOTE — PROGRESS NOTES
CHW - Follow Up    This Community Health Worker completed a follow up visit with patient via telephone today.  Pt/Caregiver reported: The pt informed the CHW that she still has not paid her water bill.  Community Health Worker provided: CHW informed the pt that she is still locating resources.   Follow up required: Yes   Follow-up Outreach - Due: 9/8/2022

## 2022-09-12 ENCOUNTER — PATIENT OUTREACH (OUTPATIENT)
Dept: ADMINISTRATIVE | Facility: OTHER | Age: 82
End: 2022-09-12
Payer: MEDICARE

## 2022-09-12 NOTE — PROGRESS NOTES
CHW - Case Closure    This Community Health Worker spoke to patient via telephone today.   Pt/Caregiver reported: The pt reported that she was able to pay her water bill.   Pt/Caregiver denied any additional needs at this time and agrees with episode closure at this time.  Provided patient with Community Health Worker's contact information and encouraged him/her to contact this Community Health Worker if additional needs arise.

## 2022-09-22 ENCOUNTER — OFFICE VISIT (OUTPATIENT)
Dept: PODIATRY | Facility: CLINIC | Age: 82
End: 2022-09-22
Payer: MEDICARE

## 2022-09-22 VITALS
HEART RATE: 82 BPM | DIASTOLIC BLOOD PRESSURE: 81 MMHG | BODY MASS INDEX: 44.57 KG/M2 | WEIGHT: 284 LBS | SYSTOLIC BLOOD PRESSURE: 186 MMHG | HEIGHT: 67 IN

## 2022-09-22 DIAGNOSIS — M76.829 PTTD (POSTERIOR TIBIAL TENDON DYSFUNCTION): Primary | ICD-10-CM

## 2022-09-22 DIAGNOSIS — M79.672 FOOT PAIN, BILATERAL: ICD-10-CM

## 2022-09-22 DIAGNOSIS — M24.573 EQUINUS CONTRACTURE OF ANKLE: ICD-10-CM

## 2022-09-22 DIAGNOSIS — M77.9 TENDINITIS: ICD-10-CM

## 2022-09-22 DIAGNOSIS — M79.671 FOOT PAIN, BILATERAL: ICD-10-CM

## 2022-09-22 PROCEDURE — 1125F AMNT PAIN NOTED PAIN PRSNT: CPT | Mod: CPTII,S$GLB,, | Performed by: PODIATRIST

## 2022-09-22 PROCEDURE — 1159F MED LIST DOCD IN RCRD: CPT | Mod: CPTII,S$GLB,, | Performed by: PODIATRIST

## 2022-09-22 PROCEDURE — 1101F PT FALLS ASSESS-DOCD LE1/YR: CPT | Mod: CPTII,S$GLB,, | Performed by: PODIATRIST

## 2022-09-22 PROCEDURE — 3077F PR MOST RECENT SYSTOLIC BLOOD PRESSURE >= 140 MM HG: ICD-10-PCS | Mod: CPTII,S$GLB,, | Performed by: PODIATRIST

## 2022-09-22 PROCEDURE — 1125F PR PAIN SEVERITY QUANTIFIED, PAIN PRESENT: ICD-10-PCS | Mod: CPTII,S$GLB,, | Performed by: PODIATRIST

## 2022-09-22 PROCEDURE — 3077F SYST BP >= 140 MM HG: CPT | Mod: CPTII,S$GLB,, | Performed by: PODIATRIST

## 2022-09-22 PROCEDURE — 99999 PR PBB SHADOW E&M-EST. PATIENT-LVL IV: CPT | Mod: PBBFAC,,, | Performed by: PODIATRIST

## 2022-09-22 PROCEDURE — 1160F RVW MEDS BY RX/DR IN RCRD: CPT | Mod: CPTII,S$GLB,, | Performed by: PODIATRIST

## 2022-09-22 PROCEDURE — 3288F PR FALLS RISK ASSESSMENT DOCUMENTED: ICD-10-PCS | Mod: CPTII,S$GLB,, | Performed by: PODIATRIST

## 2022-09-22 PROCEDURE — 1159F PR MEDICATION LIST DOCUMENTED IN MEDICAL RECORD: ICD-10-PCS | Mod: CPTII,S$GLB,, | Performed by: PODIATRIST

## 2022-09-22 PROCEDURE — 99204 PR OFFICE/OUTPT VISIT, NEW, LEVL IV, 45-59 MIN: ICD-10-PCS | Mod: S$GLB,,, | Performed by: PODIATRIST

## 2022-09-22 PROCEDURE — 1160F PR REVIEW ALL MEDS BY PRESCRIBER/CLIN PHARMACIST DOCUMENTED: ICD-10-PCS | Mod: CPTII,S$GLB,, | Performed by: PODIATRIST

## 2022-09-22 PROCEDURE — 99999 PR PBB SHADOW E&M-EST. PATIENT-LVL IV: ICD-10-PCS | Mod: PBBFAC,,, | Performed by: PODIATRIST

## 2022-09-22 PROCEDURE — 3079F DIAST BP 80-89 MM HG: CPT | Mod: CPTII,S$GLB,, | Performed by: PODIATRIST

## 2022-09-22 PROCEDURE — 3288F FALL RISK ASSESSMENT DOCD: CPT | Mod: CPTII,S$GLB,, | Performed by: PODIATRIST

## 2022-09-22 PROCEDURE — 99204 OFFICE O/P NEW MOD 45 MIN: CPT | Mod: S$GLB,,, | Performed by: PODIATRIST

## 2022-09-22 PROCEDURE — 1101F PR PT FALLS ASSESS DOC 0-1 FALLS W/OUT INJ PAST YR: ICD-10-PCS | Mod: CPTII,S$GLB,, | Performed by: PODIATRIST

## 2022-09-22 PROCEDURE — 3079F PR MOST RECENT DIASTOLIC BLOOD PRESSURE 80-89 MM HG: ICD-10-PCS | Mod: CPTII,S$GLB,, | Performed by: PODIATRIST

## 2022-09-22 RX ORDER — BENAZEPRIL HYDROCHLORIDE 40 MG/1
40 TABLET ORAL DAILY
COMMUNITY
Start: 2022-04-05 | End: 2022-09-27

## 2022-09-22 RX ORDER — VALSARTAN 160 MG/1
TABLET ORAL
COMMUNITY
Start: 2022-06-24 | End: 2022-09-27

## 2022-09-22 RX ORDER — LIDOCAINE HYDROCHLORIDE 20 MG/ML
JELLY TOPICAL
Qty: 30 ML | Refills: 2 | Status: SHIPPED | OUTPATIENT
Start: 2022-09-22 | End: 2024-02-26

## 2022-09-22 NOTE — PROGRESS NOTES
Subjective:      Patient ID: Natali Galeano is a 81 y.o. female.    Chief Complaint: Ankle Pain (Swollen ankle on the inside of my foot )    Burning sharp stinging pain inside surface both ankles and arches.  Gradual onset, worsening over past s 2 months, aggravated by increased weight bearing, shoe gear, pressure.  No previous medical treatment.  OTC pain med not helping.     Review of Systems   Constitutional: Negative for chills, diaphoresis, fever, malaise/fatigue and night sweats.   Cardiovascular:  Negative for claudication, cyanosis, leg swelling and syncope.   Skin:  Negative for color change, dry skin, nail changes, rash, suspicious lesions and unusual hair distribution.   Musculoskeletal:  Negative for falls, joint pain, joint swelling, muscle cramps, muscle weakness and stiffness.   Gastrointestinal:  Negative for constipation, diarrhea, nausea and vomiting.   Neurological:  Negative for brief paralysis, disturbances in coordination, focal weakness, numbness, paresthesias, sensory change and tremors.         Objective:      Physical Exam  Constitutional:       General: She is not in acute distress.     Appearance: She is well-developed. She is not diaphoretic.   Cardiovascular:      Pulses:           Popliteal pulses are 2+ on the right side and 2+ on the left side.        Dorsalis pedis pulses are 2+ on the right side and 2+ on the left side.        Posterior tibial pulses are 2+ on the right side and 2+ on the left side.      Comments: Capillary refill 3 seconds all toes/distal feet, all toes/both feet warm to touch.      Negative lymphadenopathy bilateral popliteal fossa and tarsal tunnel.      Negavie lower extremity edema bilateral.    Musculoskeletal:      Right ankle: No swelling, deformity, ecchymosis or lacerations. Normal range of motion. Normal pulse.      Right Achilles Tendon: Normal. No defects. Pang's test negative.      Comments: Patient has pain to deep palpation over the distal 12 cm  of each posterior tibialis tendon between the medial malleolus the insertion without deformity, loss of function, signs of acute trauma.      rcsp is everted bilateral, no single leg toe raise Bilateral, negative lateral malleolar index and positive too many toe sign.  Crepitus absent to attempted reduction.  not reducible.   Medial arch collapse all loading and WB bilateral.    Ankle dorsiflexion decreased at <10 degrees bilateral with moderate increase with knee flexion bilateral.          Lymphadenopathy:      Lower Body: No right inguinal adenopathy. No left inguinal adenopathy.      Comments: Negative lymphadenopathy bilateral popliteal fossa and tarsal tunnel.    Negative lymphangitic streaking bilateral feet/ankles/legs.   Skin:     General: Skin is warm and dry.      Capillary Refill: Capillary refill takes 2 to 3 seconds.      Coloration: Skin is not pale.      Findings: No abrasion, bruising, burn, ecchymosis, erythema, laceration, lesion or rash.      Nails: There is no clubbing.      Comments:   .normdem  Skin is normal age and health appropriate color, turgor, texture, and temperature bilateral lower extremities without ulceration, hyperpigmentation, discoloration, masses nodules or cords palpated.  No ecchymosis, erythema, edema, or cardinal signs of infection bilateral lower extremities.     Neurological:      Mental Status: She is alert and oriented to person, place, and time.      Sensory: No sensory deficit.      Motor: No tremor, atrophy or abnormal muscle tone.      Gait: Gait normal.      Deep Tendon Reflexes:      Reflex Scores:       Patellar reflexes are 2+ on the right side and 2+ on the left side.       Achilles reflexes are 2+ on the right side and 2+ on the left side.     Comments: Negative tinel sign to percussion sural, superficial peroneal, deep peroneal, saphenous, and posterior tibial nerves right and left ankles and feet.    Negative allodynia both feet   Psychiatric:          Behavior: Behavior is cooperative.           Assessment:       Encounter Diagnoses   Name Primary?    PTTD (posterior tibial tendon dysfunction) Yes    Tendinitis     Foot pain, bilateral     Equinus contracture of ankle          Plan:       Natali was seen today for ankle pain.    Diagnoses and all orders for this visit:    PTTD (posterior tibial tendon dysfunction)    Tendinitis    Foot pain, bilateral    Equinus contracture of ankle    Other orders  -     LIDOcaine HCL 2% (XYLOCAINE) 2 % jelly; Apply topically as needed. Apply topically once nightly to affected part of foot/feet.      I counseled the patient on her conditions, their implications and medical management.    Patient will stretch the tendo achilles complex three times daily as demonstrated in the office.  Literature was dispensed illustrating proper stretching technique.    Patient will obtain over the counter arch supports and wear them in shoes whenever possible.  Athletic shoes intended for walking or running are usually best.    The patient was advised that NSAID-type medications have two very important potential side effects: gastrointestinal irritation including hemorrhage and renal injuries. She was asked to take the medication with food and to stop if she experiences any GI upset. I asked her to call for vomiting, abdominal pain or black/bloody stools. The patient expresses understanding of these issues and questions were answered.    Discussed conservative treatment with shoes of adequate dimensions, material, and style to alleviate symptoms and delay or prevent surgical intervention.    Lidocaine gel.          No follow-ups on file.

## 2022-09-23 ENCOUNTER — TELEPHONE (OUTPATIENT)
Dept: PODIATRY | Facility: CLINIC | Age: 82
End: 2022-09-23
Payer: MEDICARE

## 2022-09-23 NOTE — TELEPHONE ENCOUNTER
Spoke with Pharm tech at [facility] in regards to Lidocaine jelly being out of stock. Issued verbal order to switch to alternative (lidocaine 5% ointment). Pharm tech verbalized understanding.

## 2022-09-27 ENCOUNTER — OFFICE VISIT (OUTPATIENT)
Dept: CARDIOLOGY | Facility: CLINIC | Age: 82
End: 2022-09-27
Payer: MEDICARE

## 2022-09-27 VITALS
BODY MASS INDEX: 44.31 KG/M2 | WEIGHT: 282.31 LBS | SYSTOLIC BLOOD PRESSURE: 136 MMHG | HEART RATE: 69 BPM | OXYGEN SATURATION: 99 % | HEIGHT: 67 IN | DIASTOLIC BLOOD PRESSURE: 74 MMHG

## 2022-09-27 DIAGNOSIS — E78.49 OTHER HYPERLIPIDEMIA: Chronic | ICD-10-CM

## 2022-09-27 DIAGNOSIS — I10 ESSENTIAL HYPERTENSION: Primary | Chronic | ICD-10-CM

## 2022-09-27 DIAGNOSIS — R01.1 HEART MURMUR: ICD-10-CM

## 2022-09-27 PROCEDURE — 99214 OFFICE O/P EST MOD 30 MIN: CPT | Mod: S$GLB,,, | Performed by: INTERNAL MEDICINE

## 2022-09-27 PROCEDURE — 1159F MED LIST DOCD IN RCRD: CPT | Mod: CPTII,S$GLB,, | Performed by: INTERNAL MEDICINE

## 2022-09-27 PROCEDURE — 1101F PT FALLS ASSESS-DOCD LE1/YR: CPT | Mod: CPTII,S$GLB,, | Performed by: INTERNAL MEDICINE

## 2022-09-27 PROCEDURE — 1125F AMNT PAIN NOTED PAIN PRSNT: CPT | Mod: CPTII,S$GLB,, | Performed by: INTERNAL MEDICINE

## 2022-09-27 PROCEDURE — 1125F PR PAIN SEVERITY QUANTIFIED, PAIN PRESENT: ICD-10-PCS | Mod: CPTII,S$GLB,, | Performed by: INTERNAL MEDICINE

## 2022-09-27 PROCEDURE — 1101F PR PT FALLS ASSESS DOC 0-1 FALLS W/OUT INJ PAST YR: ICD-10-PCS | Mod: CPTII,S$GLB,, | Performed by: INTERNAL MEDICINE

## 2022-09-27 PROCEDURE — 99999 PR PBB SHADOW E&M-EST. PATIENT-LVL III: CPT | Mod: PBBFAC,,, | Performed by: INTERNAL MEDICINE

## 2022-09-27 PROCEDURE — 99999 PR PBB SHADOW E&M-EST. PATIENT-LVL III: ICD-10-PCS | Mod: PBBFAC,,, | Performed by: INTERNAL MEDICINE

## 2022-09-27 PROCEDURE — 1159F PR MEDICATION LIST DOCUMENTED IN MEDICAL RECORD: ICD-10-PCS | Mod: CPTII,S$GLB,, | Performed by: INTERNAL MEDICINE

## 2022-09-27 PROCEDURE — 3078F DIAST BP <80 MM HG: CPT | Mod: CPTII,S$GLB,, | Performed by: INTERNAL MEDICINE

## 2022-09-27 PROCEDURE — 3078F PR MOST RECENT DIASTOLIC BLOOD PRESSURE < 80 MM HG: ICD-10-PCS | Mod: CPTII,S$GLB,, | Performed by: INTERNAL MEDICINE

## 2022-09-27 PROCEDURE — 3075F PR MOST RECENT SYSTOLIC BLOOD PRESS GE 130-139MM HG: ICD-10-PCS | Mod: CPTII,S$GLB,, | Performed by: INTERNAL MEDICINE

## 2022-09-27 PROCEDURE — 3288F FALL RISK ASSESSMENT DOCD: CPT | Mod: CPTII,S$GLB,, | Performed by: INTERNAL MEDICINE

## 2022-09-27 PROCEDURE — 99214 PR OFFICE/OUTPT VISIT, EST, LEVL IV, 30-39 MIN: ICD-10-PCS | Mod: S$GLB,,, | Performed by: INTERNAL MEDICINE

## 2022-09-27 PROCEDURE — 93000 EKG 12-LEAD: ICD-10-PCS | Mod: S$GLB,,, | Performed by: INTERNAL MEDICINE

## 2022-09-27 PROCEDURE — 3075F SYST BP GE 130 - 139MM HG: CPT | Mod: CPTII,S$GLB,, | Performed by: INTERNAL MEDICINE

## 2022-09-27 PROCEDURE — 3288F PR FALLS RISK ASSESSMENT DOCUMENTED: ICD-10-PCS | Mod: CPTII,S$GLB,, | Performed by: INTERNAL MEDICINE

## 2022-09-27 PROCEDURE — 93000 ELECTROCARDIOGRAM COMPLETE: CPT | Mod: S$GLB,,, | Performed by: INTERNAL MEDICINE

## 2022-09-27 NOTE — PROGRESS NOTES
Cardiology    9/27/2022  10:04 AM    Problem list  Patient Active Problem List   Diagnosis    Class 3 severe obesity with serious comorbidity and body mass index (BMI) of 40.0 to 44.9 in adult    Essential hypertension    Adrenal nodule    Hyperlipidemia    ALYSE (obstructive sleep apnea)    Right knee pain    Thyroid disease    Normocytic anemia    Gout    Heart murmur    Rheumatic fever    BMI 45.0-49.9, adult    Cough    Nasal congestion    Chronic pain of right ankle    Onychorrhexis    Osteopenia of necks of both femurs    Allergic rhinitis       CC:  F/u    HPI:  Last April, valsartan was discussed due to coughing.  Her cough improved.  She is being monitored by hypertension discharge medicine program.  She has been under a lot of pain in the right ankle.  She is currently undergoing evaluation for right ankle pain.  She has no other swelling.  On HTN digital medicine, her systolic blood pressure range 130-140.    Medications  Current Outpatient Medications   Medication Sig Dispense Refill    albuterol (PROVENTIL/VENTOLIN HFA) 90 mcg/actuation inhaler Inhale 2 puffs into the lungs every 4 (four) hours as needed.      allopurinol (ZYLOPRIM) 300 MG tablet Take 300 mg by mouth once daily.      amLODIPine (NORVASC) 10 MG tablet TAKE 1 TABLET(10 MG) BY MOUTH EVERY DAY 90 tablet 3    ascorbic acid, vitamin C, (VITAMIN C) 1000 MG tablet Take 1,000 mg by mouth once daily.      aspirin (ECOTRIN) 81 MG EC tablet Take 81 mg by mouth once daily.      atorvastatin (LIPITOR) 10 MG tablet Take 10 mg by mouth every evening.       cyanocobalamin (VITAMIN B-12) 250 MCG tablet Take 2,500 mcg by mouth once daily.      docusate sodium (COLACE) 100 MG capsule Take 100 mg by mouth once daily.      fluticasone propionate (FLONASE) 50 mcg/actuation nasal spray 2 sprays (100 mcg total) by Each Nostril route once daily. 15.8 mL 2    hydroCHLOROthiazide (HYDRODIURIL) 25 MG tablet Take 12.5 mg by mouth. Take 1/2 of the 25 mg tablet  daily      levocetirizine (XYZAL) 5 MG tablet 1 tablet in the evening      azelastine 205.5 mcg (0.15 %) Spry 2 sprays by Each Nostril route 2 (two) times daily.      fluticasone-umeclidin-vilanter (TRELEGY ELLIPTA) 200-62.5-25 mcg inhaler Inhale 1 puff into the lungs once daily.      LIDOcaine HCL 2% (XYLOCAINE) 2 % jelly Apply topically as needed. Apply topically once nightly to affected part of foot/feet. 30 mL 2     No current facility-administered medications for this visit.      Prior to Admission medications    Medication Sig Start Date End Date Taking? Authorizing Provider   albuterol (PROVENTIL/VENTOLIN HFA) 90 mcg/actuation inhaler Inhale 2 puffs into the lungs every 4 (four) hours as needed. 12/9/21  Yes Historical Provider   allopurinol (ZYLOPRIM) 300 MG tablet Take 300 mg by mouth once daily.   Yes Historical Provider   amLODIPine (NORVASC) 10 MG tablet TAKE 1 TABLET(10 MG) BY MOUTH EVERY DAY 9/8/22  Yes Ashutosh Wolff MD   ascorbic acid, vitamin C, (VITAMIN C) 1000 MG tablet Take 1,000 mg by mouth once daily.   Yes Historical Provider   aspirin (ECOTRIN) 81 MG EC tablet Take 81 mg by mouth once daily.   Yes Historical Provider   atorvastatin (LIPITOR) 10 MG tablet Take 10 mg by mouth every evening.    Yes Historical Provider   cyanocobalamin (VITAMIN B-12) 250 MCG tablet Take 2,500 mcg by mouth once daily.   Yes Historical Provider   docusate sodium (COLACE) 100 MG capsule Take 100 mg by mouth once daily.   Yes Historical Provider   fluticasone propionate (FLONASE) 50 mcg/actuation nasal spray 2 sprays (100 mcg total) by Each Nostril route once daily. 7/18/22  Yes Ashutosh Wolff MD   hydroCHLOROthiazide (HYDRODIURIL) 25 MG tablet Take 12.5 mg by mouth. Take 1/2 of the 25 mg tablet daily 11/17/19  Yes Historical Provider   levocetirizine (XYZAL) 5 MG tablet 1 tablet in the evening   Yes Historical Provider   azelastine 205.5 mcg (0.15 %) Spry 2 sprays by Each Nostril route 2 (two) times daily.  21   Historical Provider   fluticasone-umeclidin-vilanter (TRELEGY ELLIPTA) 200-62.5-25 mcg inhaler Inhale 1 puff into the lungs once daily.    Historical Provider   LIDOcaine HCL 2% (XYLOCAINE) 2 % jelly Apply topically as needed. Apply topically once nightly to affected part of foot/feet. 22   Russell Reid DPM   benazepriL (LOTENSIN) 40 MG tablet Take 40 mg by mouth once daily. 22  Historical Provider   valsartan (DIOVAN) 160 MG tablet  22  Historical Provider         History  Past Medical History:   Diagnosis Date    Allergic rhinitis 2022    Colon cancer     Gout, chronic     Heart murmur     High cholesterol     Hypercholesteremia     Hypertension     PMB (postmenopausal bleeding) 10/24/2017    Sleep apnea     Thyroid disease      Past Surgical History:   Procedure Laterality Date    BTL       SECTION, CLASSIC      COLON SURGERY      goiter       HERNIA REPAIR      KIDNEY SURGERY      cyst removal    THYROID SURGERY       Social History     Socioeconomic History    Marital status: Single   Tobacco Use    Smoking status: Former     Packs/day: 0.10     Types: Cigarettes     Quit date: 1980     Years since quittin.4    Smokeless tobacco: Never   Substance and Sexual Activity    Alcohol use: No    Drug use: No    Sexual activity: Never     Social Determinants of Health     Financial Resource Strain: Medium Risk    Difficulty of Paying Living Expenses: Somewhat hard   Food Insecurity: No Food Insecurity    Worried About Running Out of Food in the Last Year: Never true    Ran Out of Food in the Last Year: Never true   Transportation Needs: No Transportation Needs    Lack of Transportation (Medical): No    Lack of Transportation (Non-Medical): No   Physical Activity: Unknown    Minutes of Exercise per Session: 0 min   Stress: No Stress Concern Present    Feeling of Stress : Not at all   Social Connections: Moderately Integrated    Frequency of Communication  with Friends and Family: More than three times a week    Frequency of Social Gatherings with Friends and Family: More than three times a week    Attends Mu-ism Services: 1 to 4 times per year    Active Member of Clubs or Organizations: Yes    Attends Club or Organization Meetings: 1 to 4 times per year    Marital Status: Never    Housing Stability: Low Risk     Unable to Pay for Housing in the Last Year: No    Number of Places Lived in the Last Year: 1    Unstable Housing in the Last Year: No         Allergies  Review of patient's allergies indicates:   Allergen Reactions    Ace inhibitors Other (See Comments)     cough    Arb-angiotensin receptor antagonist Other (See Comments)     Cough w/ valsartan         Review of Systems   Review of Systems   Constitutional: Negative for decreased appetite, fever and weight loss.   HENT:  Negative for congestion and nosebleeds.    Eyes:  Negative for double vision, vision loss in left eye, vision loss in right eye and visual disturbance.   Cardiovascular:  Negative for chest pain, claudication, cyanosis, dyspnea on exertion, irregular heartbeat, leg swelling, near-syncope, orthopnea, palpitations, paroxysmal nocturnal dyspnea and syncope.   Respiratory:  Negative for cough, hemoptysis, shortness of breath, sleep disturbances due to breathing, snoring, sputum production and wheezing.    Endocrine: Negative for cold intolerance and heat intolerance.   Skin:  Negative for nail changes and rash.   Musculoskeletal:  Negative for joint pain, muscle cramps, muscle weakness and myalgias.   Gastrointestinal:  Negative for change in bowel habit, heartburn, hematemesis, hematochezia, hemorrhoids and melena.   Neurological:  Negative for dizziness, focal weakness and headaches.       Physical Exam  Wt Readings from Last 1 Encounters:   09/27/22 128 kg (282 lb 4.8 oz)     BP Readings from Last 3 Encounters:   09/27/22 136/74   09/22/22 (!) 186/81   07/18/22 137/63     Pulse  Readings from Last 1 Encounters:   09/27/22 69     Body mass index is 44.21 kg/m².    Physical Exam  Vitals reviewed.   Constitutional:       Appearance: She is well-developed. She is obese.   HENT:      Head: Atraumatic.   Eyes:      General: No scleral icterus.  Neck:      Vascular: Normal carotid pulses. No carotid bruit, hepatojugular reflux or JVD.   Cardiovascular:      Rate and Rhythm: Normal rate and regular rhythm.      Chest Wall: PMI is not displaced.      Pulses: Intact distal pulses.           Carotid pulses are 2+ on the right side and 2+ on the left side.       Radial pulses are 2+ on the right side and 2+ on the left side.        Dorsalis pedis pulses are 2+ on the right side and 2+ on the left side.      Heart sounds: Normal heart sounds, S1 normal and S2 normal. No murmur heard.    No friction rub.   Pulmonary:      Effort: Pulmonary effort is normal. No respiratory distress.      Breath sounds: Normal breath sounds. No stridor. No wheezing or rales.   Chest:      Chest wall: No tenderness.   Abdominal:      General: Bowel sounds are normal.      Palpations: Abdomen is soft.   Musculoskeletal:      Cervical back: Neck supple. No edema.   Skin:     General: Skin is warm and dry.      Nails: There is no clubbing.   Neurological:      Mental Status: She is alert and oriented to person, place, and time.   Psychiatric:         Behavior: Behavior normal.         Thought Content: Thought content normal.       EKG:  sinus rate 68, LVH    Assessment  1. Essential hypertension  stable    2. Heart murmur  stable    3. Other hyperlipidemia  stable        Plan and Discussion  Continue current meds.      Follow Up  6 months      Cleve Barfield MD, F.A.C.C, F.S.C.A.I.

## 2022-10-07 ENCOUNTER — PATIENT MESSAGE (OUTPATIENT)
Dept: CARDIOLOGY | Facility: CLINIC | Age: 82
End: 2022-10-07
Payer: MEDICARE

## 2022-10-07 ENCOUNTER — OFFICE VISIT (OUTPATIENT)
Dept: OBSTETRICS AND GYNECOLOGY | Facility: CLINIC | Age: 82
End: 2022-10-07
Payer: MEDICARE

## 2022-10-07 VITALS
DIASTOLIC BLOOD PRESSURE: 70 MMHG | WEIGHT: 285.63 LBS | SYSTOLIC BLOOD PRESSURE: 126 MMHG | BODY MASS INDEX: 44.83 KG/M2 | HEIGHT: 67 IN

## 2022-10-07 DIAGNOSIS — Z12.31 VISIT FOR SCREENING MAMMOGRAM: ICD-10-CM

## 2022-10-07 DIAGNOSIS — Z01.419 ENCOUNTER FOR ANNUAL ROUTINE GYNECOLOGICAL EXAMINATION: Primary | ICD-10-CM

## 2022-10-07 PROCEDURE — 99999 PR PBB SHADOW E&M-EST. PATIENT-LVL IV: ICD-10-PCS | Mod: PBBFAC,,, | Performed by: OBSTETRICS & GYNECOLOGY

## 2022-10-07 PROCEDURE — 1159F MED LIST DOCD IN RCRD: CPT | Mod: CPTII,S$GLB,, | Performed by: OBSTETRICS & GYNECOLOGY

## 2022-10-07 PROCEDURE — 1160F RVW MEDS BY RX/DR IN RCRD: CPT | Mod: CPTII,S$GLB,, | Performed by: OBSTETRICS & GYNECOLOGY

## 2022-10-07 PROCEDURE — 1160F PR REVIEW ALL MEDS BY PRESCRIBER/CLIN PHARMACIST DOCUMENTED: ICD-10-PCS | Mod: CPTII,S$GLB,, | Performed by: OBSTETRICS & GYNECOLOGY

## 2022-10-07 PROCEDURE — 3078F DIAST BP <80 MM HG: CPT | Mod: CPTII,S$GLB,, | Performed by: OBSTETRICS & GYNECOLOGY

## 2022-10-07 PROCEDURE — 3288F FALL RISK ASSESSMENT DOCD: CPT | Mod: CPTII,S$GLB,, | Performed by: OBSTETRICS & GYNECOLOGY

## 2022-10-07 PROCEDURE — 3074F SYST BP LT 130 MM HG: CPT | Mod: CPTII,S$GLB,, | Performed by: OBSTETRICS & GYNECOLOGY

## 2022-10-07 PROCEDURE — 3078F PR MOST RECENT DIASTOLIC BLOOD PRESSURE < 80 MM HG: ICD-10-PCS | Mod: CPTII,S$GLB,, | Performed by: OBSTETRICS & GYNECOLOGY

## 2022-10-07 PROCEDURE — 1159F PR MEDICATION LIST DOCUMENTED IN MEDICAL RECORD: ICD-10-PCS | Mod: CPTII,S$GLB,, | Performed by: OBSTETRICS & GYNECOLOGY

## 2022-10-07 PROCEDURE — 1101F PT FALLS ASSESS-DOCD LE1/YR: CPT | Mod: CPTII,S$GLB,, | Performed by: OBSTETRICS & GYNECOLOGY

## 2022-10-07 PROCEDURE — 1126F AMNT PAIN NOTED NONE PRSNT: CPT | Mod: CPTII,S$GLB,, | Performed by: OBSTETRICS & GYNECOLOGY

## 2022-10-07 PROCEDURE — 99999 PR PBB SHADOW E&M-EST. PATIENT-LVL IV: CPT | Mod: PBBFAC,,, | Performed by: OBSTETRICS & GYNECOLOGY

## 2022-10-07 PROCEDURE — 3074F PR MOST RECENT SYSTOLIC BLOOD PRESSURE < 130 MM HG: ICD-10-PCS | Mod: CPTII,S$GLB,, | Performed by: OBSTETRICS & GYNECOLOGY

## 2022-10-07 PROCEDURE — G0101 CA SCREEN;PELVIC/BREAST EXAM: HCPCS | Mod: GZ,S$GLB,, | Performed by: OBSTETRICS & GYNECOLOGY

## 2022-10-07 PROCEDURE — 1126F PR PAIN SEVERITY QUANTIFIED, NO PAIN PRESENT: ICD-10-PCS | Mod: CPTII,S$GLB,, | Performed by: OBSTETRICS & GYNECOLOGY

## 2022-10-07 PROCEDURE — G0101 PR CA SCREEN;PELVIC/BREAST EXAM: ICD-10-PCS | Mod: GZ,S$GLB,, | Performed by: OBSTETRICS & GYNECOLOGY

## 2022-10-07 PROCEDURE — 1101F PR PT FALLS ASSESS DOC 0-1 FALLS W/OUT INJ PAST YR: ICD-10-PCS | Mod: CPTII,S$GLB,, | Performed by: OBSTETRICS & GYNECOLOGY

## 2022-10-07 PROCEDURE — 3288F PR FALLS RISK ASSESSMENT DOCUMENTED: ICD-10-PCS | Mod: CPTII,S$GLB,, | Performed by: OBSTETRICS & GYNECOLOGY

## 2022-10-07 NOTE — PROGRESS NOTES
"Chief Complaint: Well Woman Exam     HPI:      Natali Galeano is a 81 y.o.  who presents today for well woman exam.  LMP: No LMP recorded (lmp unknown). Patient is postmenopausal.  No issues, problems, or complaints. Specifically, patient denies abnormal vaginal bleeding, discharge, pelvic pain, urinary problems, or changes in appetite. Ms. Galeano is not currently sexually active.     Previous Pap:   NILM, HPV neg  (2018)  Previous Mammogram: Followed by PCP  Most Recent Dexa: Osteopenia ()  Colonoscopy: 2019 - advised to return in 3 years - due this year.     OB History    No obstetric history on file.       ROS:     GENERAL: Denies unintentional weight gain or weight loss. Feeling well overall. Having right ankle pain, has been seen by her PCP and a podiatrist. F/u with podiatrist this month and if no improvement pt instructed to go see ortho.  SKIN: Denies rash or lesions.   HEENT: Denies headaches, or vision changes.   CARDIOVASCULAR: Denies palpitations or chest pain.   RESPIRATORY: Denies shortness of breath or dyspnea on exertion.  BREASTS: Denies pain, lumps, or nipple discharge.   ABDOMEN: Denies abdominal pain, constipation, diarrhea, nausea, vomiting, change in appetite.  URINARY: Denies frequency, dysuria, hematuria.  NEUROLOGIC: Denies syncope or weakness.   PSYCHIATRIC: Denies depression, anxiety or mood swings.    Physical Exam:      PHYSICAL EXAM:  /70   Ht 5' 7" (1.702 m)   Wt 129.5 kg (285 lb 9.7 oz)   LMP  (LMP Unknown)   BMI 44.73 kg/m²   Body mass index is 44.73 kg/m².     APPEARANCE: Well nourished, well developed, in no acute distress.  PSYCH: Appropriate mood and affect.  SKIN: No acne or hirsutism  NECK: Neck symmetric without masses or thyromegaly  NODES: No inguinal, axillary, or supraclavicular lymph node enlargement  ABDOMEN: Soft.  No tenderness or masses.    CARDIOVASCULAR: No edema of peripheral extremities  BREASTS: Symmetrical, no skin changes or visible " lesions.  No palpable masses or nipple discharge bilaterally.  PELVIC: Normal external genitalia without lesions.  Normal hair distribution.  Adequate perineal body, normal urethral meatus.  Vagina moist and smooth  without lesions or discharge.  Cervix pink, without lesions, discharge or tenderness.  Grade 1 cystocele and rectocele.  Bimanual exam limited by body habitus.     Assessment/Plan:     Encounter for annual routine gynecological examination    Counseling:     Patient was counseled today on current ASCCP pap guidelines, the recommendation for yearly pelvic exams, healthy diet and exercise routines, breast self awareness and annual mammograms.She is to see her PCP for other health maintenance.     Use of the [employer] [employer] Patient Portal discussed and encouraged during today's visit.

## 2022-10-20 ENCOUNTER — APPOINTMENT (OUTPATIENT)
Dept: RADIOLOGY | Facility: OTHER | Age: 82
End: 2022-10-20
Attending: PODIATRIST
Payer: MEDICARE

## 2022-10-20 ENCOUNTER — OFFICE VISIT (OUTPATIENT)
Dept: PODIATRY | Facility: CLINIC | Age: 82
End: 2022-10-20
Payer: MEDICARE

## 2022-10-20 VITALS
BODY MASS INDEX: 44.73 KG/M2 | WEIGHT: 285 LBS | SYSTOLIC BLOOD PRESSURE: 166 MMHG | HEART RATE: 70 BPM | HEIGHT: 67 IN | DIASTOLIC BLOOD PRESSURE: 70 MMHG

## 2022-10-20 DIAGNOSIS — M79.671 FOOT PAIN, BILATERAL: ICD-10-CM

## 2022-10-20 DIAGNOSIS — M79.672 FOOT PAIN, BILATERAL: ICD-10-CM

## 2022-10-20 DIAGNOSIS — M77.9 TENDINITIS: ICD-10-CM

## 2022-10-20 DIAGNOSIS — M76.829 PTTD (POSTERIOR TIBIAL TENDON DYSFUNCTION): ICD-10-CM

## 2022-10-20 DIAGNOSIS — M24.573 EQUINUS CONTRACTURE OF ANKLE: ICD-10-CM

## 2022-10-20 DIAGNOSIS — M76.829 PTTD (POSTERIOR TIBIAL TENDON DYSFUNCTION): Primary | ICD-10-CM

## 2022-10-20 PROCEDURE — 99999 PR PBB SHADOW E&M-EST. PATIENT-LVL IV: CPT | Mod: PBBFAC,,, | Performed by: PODIATRIST

## 2022-10-20 PROCEDURE — 3078F PR MOST RECENT DIASTOLIC BLOOD PRESSURE < 80 MM HG: ICD-10-PCS | Mod: CPTII,S$GLB,, | Performed by: PODIATRIST

## 2022-10-20 PROCEDURE — 3077F PR MOST RECENT SYSTOLIC BLOOD PRESSURE >= 140 MM HG: ICD-10-PCS | Mod: CPTII,S$GLB,, | Performed by: PODIATRIST

## 2022-10-20 PROCEDURE — 73610 X-RAY EXAM OF ANKLE: CPT | Mod: TC,50

## 2022-10-20 PROCEDURE — 73610 XR ANKLE COMPLETE 3 VIEW BILATERAL: ICD-10-PCS | Mod: 26,50,, | Performed by: RADIOLOGY

## 2022-10-20 PROCEDURE — 73610 X-RAY EXAM OF ANKLE: CPT | Mod: 26,50,, | Performed by: RADIOLOGY

## 2022-10-20 PROCEDURE — 1101F PT FALLS ASSESS-DOCD LE1/YR: CPT | Mod: CPTII,S$GLB,, | Performed by: PODIATRIST

## 2022-10-20 PROCEDURE — 1125F PR PAIN SEVERITY QUANTIFIED, PAIN PRESENT: ICD-10-PCS | Mod: CPTII,S$GLB,, | Performed by: PODIATRIST

## 2022-10-20 PROCEDURE — 3077F SYST BP >= 140 MM HG: CPT | Mod: CPTII,S$GLB,, | Performed by: PODIATRIST

## 2022-10-20 PROCEDURE — 3078F DIAST BP <80 MM HG: CPT | Mod: CPTII,S$GLB,, | Performed by: PODIATRIST

## 2022-10-20 PROCEDURE — 1125F AMNT PAIN NOTED PAIN PRSNT: CPT | Mod: CPTII,S$GLB,, | Performed by: PODIATRIST

## 2022-10-20 PROCEDURE — 1160F PR REVIEW ALL MEDS BY PRESCRIBER/CLIN PHARMACIST DOCUMENTED: ICD-10-PCS | Mod: CPTII,S$GLB,, | Performed by: PODIATRIST

## 2022-10-20 PROCEDURE — 99213 OFFICE O/P EST LOW 20 MIN: CPT | Mod: S$GLB,,, | Performed by: PODIATRIST

## 2022-10-20 PROCEDURE — 3288F FALL RISK ASSESSMENT DOCD: CPT | Mod: CPTII,S$GLB,, | Performed by: PODIATRIST

## 2022-10-20 PROCEDURE — 99999 PR PBB SHADOW E&M-EST. PATIENT-LVL IV: ICD-10-PCS | Mod: PBBFAC,,, | Performed by: PODIATRIST

## 2022-10-20 PROCEDURE — 1101F PR PT FALLS ASSESS DOC 0-1 FALLS W/OUT INJ PAST YR: ICD-10-PCS | Mod: CPTII,S$GLB,, | Performed by: PODIATRIST

## 2022-10-20 PROCEDURE — 1159F MED LIST DOCD IN RCRD: CPT | Mod: CPTII,S$GLB,, | Performed by: PODIATRIST

## 2022-10-20 PROCEDURE — 1160F RVW MEDS BY RX/DR IN RCRD: CPT | Mod: CPTII,S$GLB,, | Performed by: PODIATRIST

## 2022-10-20 PROCEDURE — 1159F PR MEDICATION LIST DOCUMENTED IN MEDICAL RECORD: ICD-10-PCS | Mod: CPTII,S$GLB,, | Performed by: PODIATRIST

## 2022-10-20 PROCEDURE — 3288F PR FALLS RISK ASSESSMENT DOCUMENTED: ICD-10-PCS | Mod: CPTII,S$GLB,, | Performed by: PODIATRIST

## 2022-10-20 PROCEDURE — 99213 PR OFFICE/OUTPT VISIT, EST, LEVL III, 20-29 MIN: ICD-10-PCS | Mod: S$GLB,,, | Performed by: PODIATRIST

## 2022-10-20 NOTE — PROGRESS NOTES
Subjective:      Patient ID: Natali Galeano is a 81 y.o. female.    Chief Complaint: Follow-up (Swollen ankle on the inside of my foot)    HPI 10/20/22:   Patient notes 10% improvement of pain symptoms in medial aspect of foot/ankle and arches since last visit. She endorses stretching as directed, application of lidocaine gel as needed, and obtaining and wearing OTC orthotics in athletic shoe. Patient relays that taking tylenol occasionally does help the pain, but not significantly.     HPI 09/22/22:   Burning sharp stinging pain inside surface both ankles and arches.  Gradual onset, worsening over past s 2 months, aggravated by increased weight bearing, shoe gear, pressure.  No previous medical treatment.  OTC pain med not helping.     Review of Systems   Constitutional: Negative for chills, diaphoresis, fever, malaise/fatigue and night sweats.   Cardiovascular:  Negative for claudication, cyanosis, leg swelling and syncope.   Skin:  Negative for color change, dry skin, nail changes, rash, suspicious lesions and unusual hair distribution.   Musculoskeletal:  Positive for joint swelling. Negative for falls, muscle cramps, muscle weakness, myalgias and stiffness.   Gastrointestinal:  Negative for constipation, diarrhea, nausea and vomiting.   Neurological:  Negative for brief paralysis, disturbances in coordination, focal weakness, numbness, paresthesias, sensory change and tremors.         Objective:      Physical Exam  Constitutional:       General: She is not in acute distress.     Appearance: She is well-developed. She is not diaphoretic.   Cardiovascular:      Pulses:           Popliteal pulses are 2+ on the right side and 2+ on the left side.        Dorsalis pedis pulses are 2+ on the right side and 2+ on the left side.        Posterior tibial pulses are 2+ on the right side and 2+ on the left side.      Comments: Capillary refill 3 seconds all toes/distal feet, all toes/both feet warm to touch.      Negative  lymphadenopathy bilateral popliteal fossa and tarsal tunnel.      Negavie lower extremity edema bilateral.    Musculoskeletal:      Right ankle: No swelling, deformity, ecchymosis or lacerations. Normal range of motion. Normal pulse.      Right Achilles Tendon: Normal. No defects. Pang's test negative.      Comments: Patient has pain to deep palpation over the distal 12 cm of each posterior tibialis tendon between the medial malleolus the insertion without deformity, loss of function, signs of acute trauma.  R>>L    rcsp is everted bilateral, no single leg toe raise Bilateral, negative lateral malleolar index and positive too many toe sign.  Crepitus absent to attempted reduction.  not reducible.   Medial arch collapse all loading and WB bilateral.    Ankle dorsiflexion decreased at <10 degrees bilateral with moderate increase with knee flexion bilateral.          Lymphadenopathy:      Lower Body: No right inguinal adenopathy. No left inguinal adenopathy.      Comments: Negative lymphadenopathy bilateral popliteal fossa and tarsal tunnel.    Negative lymphangitic streaking bilateral feet/ankles/legs.   Skin:     General: Skin is warm and dry.      Capillary Refill: Capillary refill takes 2 to 3 seconds.      Coloration: Skin is not pale.      Findings: No abrasion, bruising, burn, ecchymosis, erythema, laceration, lesion or rash.      Nails: There is no clubbing.      Comments: Skin is normal age and health appropriate color, turgor, texture, and temperature bilateral lower extremities without ulceration, hyperpigmentation, discoloration, masses nodules or cords palpated.  No ecchymosis, erythema, edema, or cardinal signs of infection bilateral lower extremities.     Neurological:      Mental Status: She is alert and oriented to person, place, and time.      Sensory: No sensory deficit.      Motor: No tremor, atrophy or abnormal muscle tone.      Gait: Gait normal.      Deep Tendon Reflexes:      Reflex  Scores:       Patellar reflexes are 2+ on the right side and 2+ on the left side.       Achilles reflexes are 2+ on the right side and 2+ on the left side.     Comments: Negative tinel sign to percussion sural, superficial peroneal, deep peroneal, saphenous, and posterior tibial nerves right and left ankles and feet.    Negative allodynia both feet   Psychiatric:         Behavior: Behavior is cooperative.           Assessment:       Encounter Diagnoses   Name Primary?    PTTD (posterior tibial tendon dysfunction) Yes    Foot pain, bilateral     Tendinitis     Equinus contracture of ankle          Plan:       Natali was seen today for follow-up.    Diagnoses and all orders for this visit:    PTTD (posterior tibial tendon dysfunction)    Foot pain, bilateral    Tendinitis    Equinus contracture of ankle    I counseled the patient on her conditions, their implications and medical management.    The patient was advised that NSAID-type medications have two very important potential side effects: gastrointestinal irritation including hemorrhage and renal injuries. She was asked to take the medication with food and to stop if she experiences any GI upset. I asked her to call for vomiting, abdominal pain or black/bloody stools. The patient expresses understanding of these issues and questions were answered. She notes taking tylenol when necessary, which helps the symptoms     Discussed conservative treatment with shoes of adequate dimensions, material, and style to alleviate symptoms and delay or prevent surgical intervention.    Continue Lidocaine gel prn     Physical therapy Rx dispensed    Patient will continue to stretch the tendo achilles complex three times daily as demonstrated in the office.  Literature was dispensed illustrating proper stretching technique.    Xrays B/l foot & ankle     Posterior calf muscle group night splint dispensed          Follow up in about 6 weeks (around 12/1/2022).

## 2022-10-20 NOTE — PROGRESS NOTES
Reviewed resident note, exam and procedures were performed under my direct supervision.  Agree with note and care.  Discrepancies discussed.    X-rays foot and ankle bilateral, custom orthotics, physical therapy, night splint.      Possible fracture boot in the future pending improvement, would have to be with gait assist all times to prevent falls.    Six weeks

## 2022-10-26 ENCOUNTER — PATIENT MESSAGE (OUTPATIENT)
Dept: PODIATRY | Facility: CLINIC | Age: 82
End: 2022-10-26
Payer: MEDICARE

## 2022-10-31 ENCOUNTER — TELEPHONE (OUTPATIENT)
Dept: PODIATRY | Facility: CLINIC | Age: 82
End: 2022-10-31
Payer: MEDICARE

## 2022-10-31 NOTE — TELEPHONE ENCOUNTER
Spoke with Deepika with [employer] to inform her a copy of the Custom orthotic will be faxed over with notes.    Deepika with [employer] verbalized understanding.      ----- Message from Gabby Aparicio sent at 10/31/2022  9:32 AM CDT -----  .Type: Patient Call Back    Who called: Deepika with [employer]     What is the request in detail: has questions regarding the ref that was sent over,     Can the clinic reply by [facility]? No     Would the patient rather a call back or a response via [facility]? Call     Best call back number: [phone]

## 2022-11-30 ENCOUNTER — CLINICAL SUPPORT (OUTPATIENT)
Dept: REHABILITATION | Facility: HOSPITAL | Age: 82
End: 2022-11-30
Attending: PODIATRIST
Payer: MEDICARE

## 2022-11-30 DIAGNOSIS — R26.9 GAIT DIFFICULTY: ICD-10-CM

## 2022-11-30 DIAGNOSIS — M25.671 DECREASED RANGE OF MOTION OF RIGHT ANKLE: ICD-10-CM

## 2022-11-30 DIAGNOSIS — M76.829 PTTD (POSTERIOR TIBIAL TENDON DYSFUNCTION): ICD-10-CM

## 2022-11-30 DIAGNOSIS — M77.9 TENDINITIS: ICD-10-CM

## 2022-11-30 DIAGNOSIS — M24.573 EQUINUS CONTRACTURE OF ANKLE: ICD-10-CM

## 2022-11-30 PROCEDURE — 97162 PT EVAL MOD COMPLEX 30 MIN: CPT | Mod: PN

## 2022-11-30 PROCEDURE — 97110 THERAPEUTIC EXERCISES: CPT | Mod: PN

## 2022-11-30 NOTE — PROGRESS NOTES
[facility]  Physical Therapy Initial Evaluation - Ankle    Name: Natali Galeano  Clinic Number: [id number]    Therapy Diagnosis:   Encounter Diagnoses   Name Primary?    PTTD (posterior tibial tendon dysfunction)     Tendinitis     Equinus contracture of ankle     Gait difficulty     Decreased range of motion of right ankle      Physician: Russell Reid, [facility]    Physician Orders: PT Eval and Treat   Medical Diagnosis from Referral:   M76.829 (ICD-10-CM) - PTTD (posterior tibial tendon dysfunction)   M77.9 (ICD-10-CM) - Tendinitis   M24.573 (ICD-10-CM) - Equinus contracture of ankle     Evaluation Date: 11/30/2022  Plan of Care Expiration: 1/25*/23 or 16th Visit  Authorization Period Expiration: 12/31/22  Visit # / Visits authorized: 1/ 1  Remaining Visits Scheduled - 00      Please see Plan of Care notation section to view Natali Galeano Initial Evaluation.       Greyson Rodriguez, PT,DPT

## 2022-11-30 NOTE — PLAN OF CARE
[facility]  Physical Therapy Initial Evaluation - Ankle    Name: Natali LARSEN Minor  Clinic Number: [id number]    Therapy Diagnosis:   Encounter Diagnoses   Name Primary?    PTTD (posterior tibial tendon dysfunction)     Tendinitis     Equinus contracture of ankle     Gait difficulty     Decreased range of motion of right ankle      Physician: Russell Reid DPM    Physician Orders: PT Eval and Treat   Medical Diagnosis from Referral:   M76.829 (ICD-10-CM) - PTTD (posterior tibial tendon dysfunction)   M77.9 (ICD-10-CM) - Tendinitis   M24.573 (ICD-10-CM) - Equinus contracture of ankle     Evaluation Date: 11/30/2022  Plan of Care Expiration: 1/25*/23 or 16th Visit  Authorization Period Expiration: 12/31/22  Visit # / Visits authorized: 1/ 1  Remaining Visits Scheduled - 00    Eval Visit FOTO-  (Date/Score/Turn Green)   5th Visit FOTO   -  (Date/Score/Turn Green)   10th Visit FOTO  -  (Date/Score/Turn Green)   D/C FOTO          -  (Date/Score/Turn Green)     Time In: 0935  Time Out: 1015  Total Billable Time: 40 minutes    Precautions: Standard and Fall    Subjective     History of current condition - Natali is a 82 y.o. year old female who presents to the [facility] PT clinic  with complaint of pain and swelling of the right ankle that began insidiously that did not reduce pain. Patient reports difficulty with sleeping in night splint. Patient reports that she is in the process to ordering custom inserts.  Pt report onset of the symptoms occurred  5 months prior to evaluation. Precipitating event:Insidious Onset . Aggravating factors: walking and standing/shooting pain.  Evaluation to date interventions: DPM. Treatment to date: splint. Patients presently rates pain 8/10 on pain scale.    Mechanism of Injury: insidious onset   Next MD Visit: 11/31/22     Medical History:   Past Medical History:   Diagnosis Date    Allergic rhinitis 7/18/2022    Colon cancer     Gout, chronic     Heart murmur      High cholesterol     Hypercholesteremia     Hypertension     PMB (postmenopausal bleeding) 10/24/2017    Sleep apnea     Thyroid disease        Surgical History:   aNtali Galeano  has a past surgical history that includes  section, classic; Colon surgery; Hernia repair; BTL; goiter ; Kidney surgery; and Thyroid surgery.    Medications:   Natali has a current medication list which includes the following prescription(s): allopurinol, amlodipine, ascorbic acid (vitamin c), aspirin, atorvastatin, cyanocobalamin, docusate sodium, fluticasone propionate, hydrochlorothiazide, and lidocaine hcl 2%.    Allergies:   Review of patient's allergies indicates:   Allergen Reactions    Ace inhibitors Other (See Comments)     cough    Arb-angiotensin receptor antagonist Other (See Comments)     Cough w/ valsartan        Imaging: See Imaging Report    Prior Therapy: No prior therapy received for current condition   Social History: Natali lives in a single story home with 0 steps to enter and  lives alone  Assistive Devices Owned: none   Occupation: Retired   Hobbies/Exercise: none  Hand Dominance: right  Prior Level of Function: Independent  Current Level of Function: Modified Independent secondary to right ankle pain     Pain:  Current 8/10, worst 10/10 (walking  increases pain), best 2/10 (sitting with elevation reduces pain)  Location: right ankle   Description: Aching and Shooting  Aggravating Factors: Standing and Walking  Easing Factors: elevation    Pts goals: ambulation without pain    Objective     Posture: Fair  Palpation: mild generalized muscle tenderness  Sensation: intact to light touch  DTRs: 2+    Range of Motion/Strength:     Ankle  Left Right Pain/Dysfunction with Movement   AROM      Ankle Dorsiflexion with knee flexed (20)  20°   -5°     Ankle Plantarflexion (50)  10°    20°     Ankle Inversion (35)  15°   10°     Ankle Eversion  (25)  10°   0°     Great toe flexion (50)  30°   15°     Great toe extension  (60)  20°   20°           RLE 5/5 4+/5 4/5 4-/5 3+/5 3/5 3-/5 2+/5 2/5 2-/5 1/5 0   Hip Flexion    x                Hip Extension    x                Hip Abduction    x                Hip Adduction    x                Hip Internal Rotation    x                Hip External Rotation    x                Knee Flexion    x                Knee Extension    x                Ankle Dorsiflexion    x                Ankle Plantarflexion    x                Ankle Inversion    x           Ankle Eversion     x              LLE 5/5 4+/5 4/5 4-/5 3+/5 3/5 3-/5 2+/5 2/5 2-/5 1/5 0   Hip Flexion    x                     Hip Extension    x                     Hip Abduction    x                     Hip Adduction    x                     Hip Internal Rotation    x                     Hip External Rotation    x                     Knee Flexion    x                     Knee Extension    x                     Ankle Dorsiflexion    x                     Ankle Plantarflexion    x                     Ankle Inversion    x            Ankle Eversion    x                 Flexibility       90/90 Hamstrings  -35° -35°    Girth Measurements       Figure 8  55cm  57cm       Gait Analysis   Natali ambulated 120 feet with none device with independence assistance. Natali displays lack of stance and plantar flexion / toe off during terminal stance gait deviation.     Other:   Sit to Stand - 6 Reps. Completed in 30 sec.  TUG -  <14 Sec. To complete 10ft. (> 14sec. Considered a fall risk)       CMS Impairment/Limitation/Restriction for FOTO Ankle Survey    Therapist reviewed FOTO scores for Natali Galeano on 11/30/2022.   FOTO documents entered into [employer] - see Media section.    Limitation Score: See Media Section   Category: Mobility           TREATMENT   Treatment Time In: 1005  Treatment Time Out: 1015  Total Treatment time separate from Evaluation: 10 minutes    Natali received therapeutic exercises to develop strength, ROM, and flexibility for 10 minutes  "including:  Warm-up      Supine    Ankle circles - 30 reps  Ankle abc's - X1    Seated    Heel slides - 30 reps   DF/PF - 30 reps   Great toe flex/ext - 30 repititions   HSS - 3X30"    Standing        Home Exercises and Patient Education Provided    Education provided:   Patient was provided educational information regarding: role of Physical Therapy, short and long term goals, patient/therapist expectations, scheduling, and attendance policy.    Written Home Exercises Provided: yes  Exercises were reviewed and Natali was able to demonstrate them prior to the end of the session.  Natali demonstrated fair  understanding of the education provided.     See EMR under: Patient Instructions for exercises provided 11/30/2022.    Assessment     Natali is a 82 y.o. female referred to outpatient Physical Therapy with a medical diagnosis of Equinus contracture of ankle. Pt presents with displays of weakness, impaired functional mobility, gait instability, decreased lower extremity function, pain, decreased ROM, and impaired muscle length. Patient currently presents to therapy with displays of decreased gastrocnemius mobility with compensation ambulation methods as a result. Patient displays lack of ankle range of motion in addition to decrease toe mobility with objective testing. Patient will benefit from skilled therapy to address current deficits.     Pt prognosis is Fair.   Pt will benefit from skilled outpatient Physical Therapy to address the deficits stated above and in the chart below, provide pt/family education, and to maximize pt's level of independence.     Plan of care discussed with patient: Yes  Pt's spiritual, cultural and educational needs considered and patient is agreeable to the plan of care and goals as stated below:     Anticipated Barriers for therapy: None Identified during initial evaluation     Medical Necessity is demonstrated by the following  History  Co-morbidities and personal factors that may impact " the plan of care Co-morbidities:       Past Medical History:   Diagnosis Date    Allergic rhinitis 7/18/2022    Colon cancer     Gout, chronic     Heart murmur     High cholesterol     Hypercholesteremia     Hypertension     PMB (postmenopausal bleeding) 10/24/2017    Sleep apnea     Thyroid disease        Personal Factors:   no deficits     moderate   Examination  Body Structures and Functions, activity limitations and participation restrictions that may impact the plan of care Body Regions:   lower extremities    Body Systems:    ROM  strength  balance  gait    Participation Restrictions:   None     Activity limitations:   Learning and applying knowledge  no deficits    General Tasks and Commands  no deficits    Communication  no deficits    Mobility  no deficits    Self care  no deficits    Domestic Life  doing house work (cleaning house, washing dishes, laundry)    Interactions/Relationships  no deficits    Life Areas  no deficits    Community and Social Life  no deficits         moderate   Clinical Presentation stable and uncomplicated moderate   Decision Making/ Complexity Score: moderate     Goals:  Short Term Goals: 4 weeks      Goal Status    Pt. to report decreased right ankle pain </ = 6/10 at worst to increase tolerance for ambulation     Pt. to demonstrate increased right  ankle gastrocnemius flexibility to 0 degrees to improve ease with stair descent.    Pt. to demonstrate increased right ankle soleus flexibility to 0 degrees to improve ease with partial squatting    Pt to be Independent with HEP to improve ROM and independence with ADL's          Long Term Goals: 8 weeks      Goal   Status   Pt. to report decreased right ankle pain </ = 4/10 at worst to increase tolerance for ambulation     Pt. to demonstrate increased right  ankle gastrocnemius flexibility to 10 degrees to improve ease with stair descent.    Pt. to demonstrate increased right ankle soleus flexibility to 8 degrees to improve ease with  partial squatting    Pt to demonstrate increased MMT for right  ankle Inversion  and Eversion  to 4/5 to increase ankle stability during lateral motions.    Pt. demonstrate increased MMT for right  ankle plantarflexion to 4/5 to increase ease with toe clearance    Pt to be Independent with HEP to improve ROM and independence with ADL's        Plan   Plan of care Certification: 11/30/2022 to 1/25/2023 (8)     Outpatient Physical Therapy 2 times weekly for 8 weeks to include the following interventions: Gait Training, Manual Therapy, Neuromuscular Re-ed, Patient Education, Therapeutic Activities, Therapeutic Exercise, and Integrative Dry Needling .     Greyson Rodriguez, PT,DPT

## 2022-12-01 ENCOUNTER — OFFICE VISIT (OUTPATIENT)
Dept: PODIATRY | Facility: CLINIC | Age: 82
End: 2022-12-01
Payer: MEDICARE

## 2022-12-01 VITALS
DIASTOLIC BLOOD PRESSURE: 74 MMHG | SYSTOLIC BLOOD PRESSURE: 166 MMHG | HEART RATE: 71 BPM | HEIGHT: 67 IN | BODY MASS INDEX: 44.73 KG/M2 | WEIGHT: 285 LBS

## 2022-12-01 DIAGNOSIS — M24.573 EQUINUS CONTRACTURE OF ANKLE: ICD-10-CM

## 2022-12-01 DIAGNOSIS — M77.9 TENDINITIS: ICD-10-CM

## 2022-12-01 DIAGNOSIS — M79.672 FOOT PAIN, BILATERAL: ICD-10-CM

## 2022-12-01 DIAGNOSIS — M76.829 PTTD (POSTERIOR TIBIAL TENDON DYSFUNCTION): Primary | ICD-10-CM

## 2022-12-01 DIAGNOSIS — M79.671 FOOT PAIN, BILATERAL: ICD-10-CM

## 2022-12-01 PROCEDURE — 1125F PR PAIN SEVERITY QUANTIFIED, PAIN PRESENT: ICD-10-PCS | Mod: CPTII,S$GLB,, | Performed by: PODIATRIST

## 2022-12-01 PROCEDURE — 1159F MED LIST DOCD IN RCRD: CPT | Mod: CPTII,S$GLB,, | Performed by: PODIATRIST

## 2022-12-01 PROCEDURE — 99999 PR PBB SHADOW E&M-EST. PATIENT-LVL III: ICD-10-PCS | Mod: PBBFAC,,, | Performed by: PODIATRIST

## 2022-12-01 PROCEDURE — 99213 OFFICE O/P EST LOW 20 MIN: CPT | Mod: S$GLB,,, | Performed by: PODIATRIST

## 2022-12-01 PROCEDURE — 3288F FALL RISK ASSESSMENT DOCD: CPT | Mod: CPTII,S$GLB,, | Performed by: PODIATRIST

## 2022-12-01 PROCEDURE — 1125F AMNT PAIN NOTED PAIN PRSNT: CPT | Mod: CPTII,S$GLB,, | Performed by: PODIATRIST

## 2022-12-01 PROCEDURE — 3077F PR MOST RECENT SYSTOLIC BLOOD PRESSURE >= 140 MM HG: ICD-10-PCS | Mod: CPTII,S$GLB,, | Performed by: PODIATRIST

## 2022-12-01 PROCEDURE — 1101F PR PT FALLS ASSESS DOC 0-1 FALLS W/OUT INJ PAST YR: ICD-10-PCS | Mod: CPTII,S$GLB,, | Performed by: PODIATRIST

## 2022-12-01 PROCEDURE — 3077F SYST BP >= 140 MM HG: CPT | Mod: CPTII,S$GLB,, | Performed by: PODIATRIST

## 2022-12-01 PROCEDURE — 1101F PT FALLS ASSESS-DOCD LE1/YR: CPT | Mod: CPTII,S$GLB,, | Performed by: PODIATRIST

## 2022-12-01 PROCEDURE — 3078F DIAST BP <80 MM HG: CPT | Mod: CPTII,S$GLB,, | Performed by: PODIATRIST

## 2022-12-01 PROCEDURE — 99213 PR OFFICE/OUTPT VISIT, EST, LEVL III, 20-29 MIN: ICD-10-PCS | Mod: S$GLB,,, | Performed by: PODIATRIST

## 2022-12-01 PROCEDURE — 99999 PR PBB SHADOW E&M-EST. PATIENT-LVL III: CPT | Mod: PBBFAC,,, | Performed by: PODIATRIST

## 2022-12-01 PROCEDURE — 3288F PR FALLS RISK ASSESSMENT DOCUMENTED: ICD-10-PCS | Mod: CPTII,S$GLB,, | Performed by: PODIATRIST

## 2022-12-01 PROCEDURE — 3078F PR MOST RECENT DIASTOLIC BLOOD PRESSURE < 80 MM HG: ICD-10-PCS | Mod: CPTII,S$GLB,, | Performed by: PODIATRIST

## 2022-12-01 PROCEDURE — 1159F PR MEDICATION LIST DOCUMENTED IN MEDICAL RECORD: ICD-10-PCS | Mod: CPTII,S$GLB,, | Performed by: PODIATRIST

## 2022-12-01 RX ORDER — INFLUENZA A VIRUS A/VICTORIA/2570/2019 IVR-215 (H1N1) ANTIGEN (FORMALDEHYDE INACTIVATED), INFLUENZA A VIRUS A/DARWIN/9/2021 SAN-010 (H3N2) ANTIGEN (FORMALDEHYDE INACTIVATED), INFLUENZA B VIRUS B/PHUKET/3073/2013 ANTIGEN (FORMALDEHYDE INACTIVATED), AND INFLUENZA B VIRUS B/MICHIGAN/01/2021 ANTIGEN (FORMALDEHYDE INACTIVATED) 60; 60; 60; 60 UG/.7ML; UG/.7ML; UG/.7ML; UG/.7ML
INJECTION, SUSPENSION INTRAMUSCULAR
COMMUNITY
Start: 2022-09-08 | End: 2023-01-27 | Stop reason: ALTCHOICE

## 2022-12-01 RX ORDER — LIDOCAINE 50 MG/G
OINTMENT TOPICAL NIGHTLY PRN
COMMUNITY
Start: 2022-09-23 | End: 2023-12-12

## 2022-12-01 RX ORDER — LEVOCETIRIZINE DIHYDROCHLORIDE 5 MG/1
5 TABLET, FILM COATED ORAL
COMMUNITY
Start: 2022-11-05 | End: 2023-11-05

## 2022-12-01 RX ORDER — VALSARTAN 160 MG/1
TABLET ORAL
COMMUNITY
Start: 2022-11-04 | End: 2023-01-18

## 2022-12-01 RX ORDER — LIDOCAINE 50 MG/G
OINTMENT TOPICAL
COMMUNITY
Start: 2022-09-23

## 2022-12-01 RX ORDER — LEVOCETIRIZINE DIHYDROCHLORIDE 5 MG/1
5 TABLET, FILM COATED ORAL NIGHTLY
COMMUNITY
Start: 2022-11-04 | End: 2024-02-26 | Stop reason: SDUPTHER

## 2022-12-01 NOTE — PROGRESS NOTES
Subjective:      Patient ID: Natali Galeano is a 82 y.o. female.    Chief Complaint: Follow-up (Swollen Ankle)    Burning sharp stinging pain inside surface both ankles and arches.  Gradual onset, improving gradually over past 2 months, aggravated by increased weight bearing, shoe gear, pressure.  Stretches inserts athletic shoes and 1 visit to physical therapy have improved the condition.  Topical lidocaine works well for symptomatic relief for periods of time.  She is having custom orthotics fabricated and awaits dispense.  She is scheduled for 2 months of physical therapy  OTC pain med not helping.     Review of Systems   Constitutional: Negative for chills, diaphoresis, fever, malaise/fatigue and night sweats.   Cardiovascular:  Negative for claudication, cyanosis, leg swelling and syncope.   Skin:  Negative for color change, dry skin, nail changes, rash, suspicious lesions and unusual hair distribution.   Musculoskeletal:  Negative for falls, joint pain, joint swelling, muscle cramps, muscle weakness and stiffness.   Gastrointestinal:  Negative for constipation, diarrhea, nausea and vomiting.   Neurological:  Negative for brief paralysis, disturbances in coordination, focal weakness, numbness, paresthesias, sensory change and tremors.         Objective:      Physical Exam  Constitutional:       General: She is not in acute distress.     Appearance: She is well-developed. She is not diaphoretic.   Cardiovascular:      Pulses:           Popliteal pulses are 2+ on the right side and 2+ on the left side.        Dorsalis pedis pulses are 2+ on the right side and 2+ on the left side.        Posterior tibial pulses are 2+ on the right side and 2+ on the left side.      Comments: Capillary refill 3 seconds all toes/distal feet, all toes/both feet warm to touch.      Negative lymphadenopathy bilateral popliteal fossa and tarsal tunnel.      Negavie lower extremity edema bilateral.    Musculoskeletal:      Right ankle: No  swelling, deformity, ecchymosis or lacerations. Normal range of motion. Normal pulse.      Right Achilles Tendon: Normal. No defects. Pang's test negative.      Comments: Patient has minor residual pain to deep palpation over the distal 12 cm of each posterior tibialis tendon between the medial malleolus the insertion without deformity, loss of function, signs of acute trauma.      rcsp is everted bilateral, no single leg toe raise Bilateral, negative lateral malleolar index and positive too many toe sign.  Crepitus absent to attempted reduction.  not reducible.   Medial arch collapse all loading and WB bilateral.    Ankle dorsiflexion decreased at <10 degrees bilateral with moderate increase with knee flexion bilateral.          Lymphadenopathy:      Lower Body: No right inguinal adenopathy. No left inguinal adenopathy.      Comments: Negative lymphadenopathy bilateral popliteal fossa and tarsal tunnel.    Negative lymphangitic streaking bilateral feet/ankles/legs.   Skin:     General: Skin is warm and dry.      Capillary Refill: Capillary refill takes 2 to 3 seconds.      Coloration: Skin is not pale.      Findings: No abrasion, bruising, burn, ecchymosis, erythema, laceration, lesion or rash.      Nails: There is no clubbing.      Comments:     Skin is normal age and health appropriate color, turgor, texture, and temperature bilateral lower extremities without ulceration, hyperpigmentation, discoloration, masses nodules or cords palpated.  No ecchymosis, erythema, edema, or cardinal signs of infection bilateral lower extremities.     Neurological:      Mental Status: She is alert and oriented to person, place, and time.      Sensory: No sensory deficit.      Motor: No tremor, atrophy or abnormal muscle tone.      Gait: Gait normal.      Deep Tendon Reflexes:      Reflex Scores:       Patellar reflexes are 2+ on the right side and 2+ on the left side.       Achilles reflexes are 2+ on the right side and 2+ on  the left side.     Comments: Negative tinel sign to percussion sural, superficial peroneal, deep peroneal, saphenous, and posterior tibial nerves right and left ankles and feet.    Negative allodynia both feet   Psychiatric:         Behavior: Behavior is cooperative.           Assessment:       Encounter Diagnoses   Name Primary?    PTTD (posterior tibial tendon dysfunction) Yes    Foot pain, bilateral     Tendinitis     Equinus contracture of ankle          Plan:       Natali was seen today for follow-up.    Diagnoses and all orders for this visit:    PTTD (posterior tibial tendon dysfunction)    Foot pain, bilateral    Tendinitis    Equinus contracture of ankle    I counseled the patient on her conditions, their implications and medical management.    Continue stretches, inserts, athletic shoes, activity to tolerance, physical therapy, topical lidocaine p.r.n..      Await and accept dispense custom orthotics and employed these in her recovery plan.      Follow here 2 months for re-evaluation, sooner p.r.n.          Follow up in about 2 months (around 2/1/2023).

## 2022-12-06 ENCOUNTER — TELEPHONE (OUTPATIENT)
Dept: REHABILITATION | Facility: HOSPITAL | Age: 82
End: 2022-12-06
Payer: MEDICARE

## 2022-12-07 ENCOUNTER — CLINICAL SUPPORT (OUTPATIENT)
Dept: REHABILITATION | Facility: HOSPITAL | Age: 82
End: 2022-12-07
Payer: MEDICARE

## 2022-12-07 DIAGNOSIS — R26.9 GAIT DIFFICULTY: Primary | ICD-10-CM

## 2022-12-07 DIAGNOSIS — M25.671 DECREASED RANGE OF MOTION OF RIGHT ANKLE: ICD-10-CM

## 2022-12-07 PROCEDURE — 97140 MANUAL THERAPY 1/> REGIONS: CPT | Mod: PN

## 2022-12-07 PROCEDURE — 97110 THERAPEUTIC EXERCISES: CPT | Mod: PN

## 2022-12-07 PROCEDURE — 97112 NEUROMUSCULAR REEDUCATION: CPT | Mod: PN

## 2022-12-07 NOTE — PROGRESS NOTES
"  Physical Therapy Daily Treatment Note     Name: Natali Galeano  Clinic Number: [id number]    Therapy Diagnosis:   Encounter Diagnoses   Name Primary?    Gait difficulty Yes    Decreased range of motion of right ankle      Physician: Russell Reid DPM    Visit Date: 2022    Physician Orders: PT Eval and Treat   Medical Diagnosis from Referral:   M76.829 (ICD-10-CM) - PTTD (posterior tibial tendon dysfunction)   M77.9 (ICD-10-CM) - Tendinitis   M24.573 (ICD-10-CM) - Equinus contracture of ankle      Evaluation Date: 2022  Plan of Care Expiration: 23 or 16th Visit  Authorization Period Expiration: 22  Visit # / Visits authorized:   Remaining Visits Scheduled - 08  PTA Consecutive Visits - 0/6    Eval Visit FOTO- 57 (2022)   5th Visit FOTO   -  (Date/Score/Turn Green)   10th Visit FOTO  -  (Date/Score/Turn Green)   D/C FOTO          -  (Date/Score/Turn Green)      Time In: 8:50  Time Out: 9:30  Billable Time: 40 minutes  Non-Billable Time: 00 minutes    Precautions: Standard and Fall  Insurance: Payor: [employer] / Plan: [employer] / Product Type: Medicare Advantage /     Subjective     Pt reports: a decrease in pain since the initial evaluation.  She is compliant with home exercise program.  Response to previous treatment: 1st treatment    Pre-Treatment Pain Ratin/10  Post-Treatment Pain Ratin/10  Location: right ankles     Objective     Natali received therapeutic exercises to develop strength, endurance, ROM, and flexibility for 20 minutes including:    Warm-up      Supine    Ankle circles - 30 reps  Ankle abc's - X2  Ankle 3 Way with Red Theraband 2x10 (Plantarflexion/Dorsiflexion/Eversion)      Seated    Heel slides - 30 reps   DF/PF - 30 reps   Great toe flex/ext - 30 repititions   HSS - 3X30"     Standing    Static Lunges 3x8 with a 3' sec hold       *Bold exercise performed     Natali participated in neuromuscular re-education activities " to improve: Balance, Coordination, Sense, Proprioception, Posture, and Neuromuscular Recruitment for 10 minutes. The following activities were included:  Towel Srunches 3x1 min   Heel/Toe Raise Combo with Blue Medicine Ball 2x12    Natali received the following manual therapy techniques: Joint mobilizations, Manual traction, Myofacial release, and Soft tissue Mobilization were applied to the: right ankle for 10 minutes, including:    Visit Number:  1 out of 11   Manual Therapy  (amplitude and time)     Ankle passive range of motion with overpressure in all planes  Done    Distraction of the Ankle  Done    Manual Stretching of the Tibialis Anterior  2x30 sec              Natali participated in dynamic functional therapeutic activities to improve functional performance for 00  minutes, including:    Visit Number:  1 out of 11   Activities performed   (duration/resistance)                           Home Exercises Provided and Patient Education Provided     Education provided:   - Continue with HEP    Written Home Exercises Provided: Patient instructed to cont prior HEP.  Exercises were reviewed and Natali was able to demonstrate them prior to the end of the session.  Natali demonstrated good  understanding of the education provided.     See EMR under Patient Instructions for exercises provided prior visit.    Assessment     Patient completed the exercise program with a decrease in pain to the right ankle. Mobilization and distraction of the ankle helped decrease the discomfort and was followed up with strength and stabilization exercises to improve on knew gained mobility. Patient had trouble with the neuromuscular control during the ABCs and required visual and tactile cues to perform them correctly. No modifications were needed during today's treatment. Patient would continue to benefit from skilled Physical Therapy to decrease discomfort of the foot and increase strength/mobility.     Natali Is progressing well towards  Her goals.     Pt prognosis is Fair.     Pt will continue to benefit from skilled outpatient physical therapy to address the deficits listed in the problem list box on initial evaluation, provide pt/family education and to maximize pt's level of independence in the home and community environment.     Pt's spiritual, cultural and educational needs considered and pt agreeable to plan of care and goals.    Anticipated barriers to physical therapy: None.     Goals:  Short Term Goals: 4 weeks        Goal Status    Pt. to report decreased right ankle pain </ = 6/10 at worst to increase tolerance for ambulation  Progressing 12/7/2022    Pt. to demonstrate increased right  ankle gastrocnemius flexibility to 0 degrees to improve ease with stair descent. Progressing 12/7/2022    Pt. to demonstrate increased right ankle soleus flexibility to 0 degrees to improve ease with partial squatting Progressing 12/7/2022    Pt to be Independent with HEP to improve ROM and independence with ADL's Progressing 12/7/2022                                 Long Term Goals: 8 weeks        Goal   Status   Pt. to report decreased right ankle pain </ = 4/10 at worst to increase tolerance for ambulation   Progressing 12/7/2022    Pt. to demonstrate increased right  ankle gastrocnemius flexibility to 10 degrees to improve ease with stair descent. Progressing 12/7/2022    Pt. to demonstrate increased right ankle soleus flexibility to 8 degrees to improve ease with partial squatting Progressing 12/7/2022    Pt to demonstrate increased MMT for right  ankle Inversion  and Eversion  to 4/5 to increase ankle stability during lateral motions. Progressing 12/7/2022    Pt. demonstrate increased MMT for right  ankle plantarflexion to 4/5 to increase ease with toe clearance Progressing 12/7/2022    Pt to be Independent with HEP to improve ROM and independence with ADL's Progressing 12/7/2022        Plan     Progress duration, workload, and resistance levels of  the Therapeutic Activities and Exercises if the patient does not exhibit any pain, swelling, or other symptoms. Progress instruction in-home exercise progression and modification, including symptom management.    Simon Mccoy, PT ,DPT  12/7/2022

## 2022-12-13 ENCOUNTER — CLINICAL SUPPORT (OUTPATIENT)
Dept: REHABILITATION | Facility: HOSPITAL | Age: 82
End: 2022-12-13
Payer: MEDICARE

## 2022-12-13 DIAGNOSIS — R26.9 GAIT DIFFICULTY: Primary | ICD-10-CM

## 2022-12-13 DIAGNOSIS — M25.671 DECREASED RANGE OF MOTION OF RIGHT ANKLE: ICD-10-CM

## 2022-12-13 PROCEDURE — 97110 THERAPEUTIC EXERCISES: CPT | Mod: PN

## 2022-12-13 PROCEDURE — 97112 NEUROMUSCULAR REEDUCATION: CPT | Mod: PN

## 2022-12-13 PROCEDURE — 97140 MANUAL THERAPY 1/> REGIONS: CPT | Mod: PN

## 2022-12-13 NOTE — PROGRESS NOTES
"Physical Therapy Daily Treatment Note     Name: Natali Galeano  Clinic Number: [id number]    Therapy Diagnosis:   Encounter Diagnoses   Name Primary?    Gait difficulty Yes    Decreased range of motion of right ankle        Physician: Russell Reid DPM    Visit Date: 2022    Physician Orders: PT Eval and Treat   Medical Diagnosis from Referral:   M76.829 (ICD-10-CM) - PTTD (posterior tibial tendon dysfunction)   M77.9 (ICD-10-CM) - Tendinitis   M24.573 (ICD-10-CM) - Equinus contracture of ankle      Evaluation Date: 2022  Plan of Care Expiration: 23 or 16th Visit  Authorization Period Expiration: 22  Visit # / Visits authorized:   Remaining Visits Scheduled -   PTA Consecutive Visits - 0/6    Eval Visit FOTO- 57 (2022)   5th Visit FOTO   -  (Date/Score/Turn Green)   10th Visit FOTO  -  (Date/Score/Turn Green)   D/C FOTO          -  (Date/Score/Turn Green)      Time In: 8:40  Time Out: 9:30  Billable Time: 50 minutes  Non-Billable Time: 00 minutes    Precautions: Standard and Fall  Insurance: Payor: [employer] / Plan: [facility] 65 / Product Type: Medicare Advantage /     Subjective     Pt reports: a decrease in discomfort to the foot since the last visit. Increase in soreness was noted after last session.   She is compliant with home exercise program.  Response to previous treatment: Soreness of the Right foot    Pre-Treatment Pain Ratin/10  Post-Treatment Pain Ratin/10  Location: right ankles     Objective     Natali received therapeutic exercises to develop strength, endurance, ROM, and flexibility for 25 minutes including:    Warm-up      Supine    Ankle circles - 30 reps  Ankle abc's - X2  Ankle 3 Way with Red Theraband 2x10 (Plantarflexion/Dorsiflexion/Eversion)      Seated    Heel slides - 30 reps   DF/PF - 30 reps   Great toe flex/ext - 30 repititions   HSS - 3X30"       Standing    Static Lunges 3x8 with a 3' sec hold   Calf Stretch " on Doorway 2x30 sec      *Bold exercise performed     Natali participated in neuromuscular re-education activities to improve: Balance, Coordination, Sense, Proprioception, Posture, and Neuromuscular Recruitment for 10 minutes. The following activities were included:  Towel Srunches 3x1 min   Heel/Toe Raise Combo with Blue Medicine Ball 2x15  Seated Hip Flexion with Ankle Weight on Dorsal Aspect of Foot 3x8, #4    Natali received the following manual therapy techniques: Joint mobilizations, Manual traction, Myofacial release, and Soft tissue Mobilization were applied to the: right ankle for 15 minutes, including:    Visit Number:  1 out of 11   Manual Therapy  (amplitude and time)     Ankle passive range of motion with overpressure in all planes  Done    Distraction of the Ankle  Done    Manual Stretching of the Tibialis Anterior  2x30 sec    Soft-Tissue Mobilization to the Tibialis Anterior  Done          Natali participated in dynamic functional therapeutic activities to improve functional performance for 00  minutes, including:    Visit Number:  1 out of 11   Activities performed   (duration/resistance)                           Home Exercises Provided and Patient Education Provided     Education provided:   - Continue with HEP    Written Home Exercises Provided: Patient instructed to cont prior HEP.  Exercises were reviewed and Natali was able to demonstrate them prior to the end of the session.  Natali demonstrated good  understanding of the education provided.     See EMR under Patient Instructions for exercises provided prior visit.    Assessment     Patient completed the exercise program with a decrease in pain to the foot. Soft-Tissue Mobilization and stretching  helped decrease the discomfort and improve mobility. This was then followed up with stability/strength training of the ankle. Moderate visual and tactile cues were needed during treatment today to perform good neuromuscular control of the foot during  the ABCs and Red Theraband Sets. No modifications were needed. Patient would continue to benefit from skilled Physical Therapy to decrease discomfort and improve mobility of the right ankle.     Natali Is progressing well towards Her goals.     Pt prognosis is Fair.     Pt will continue to benefit from skilled outpatient physical therapy to address the deficits listed in the problem list box on initial evaluation, provide pt/family education and to maximize pt's level of independence in the home and community environment.     Pt's spiritual, cultural and educational needs considered and pt agreeable to plan of care and goals.    Anticipated barriers to physical therapy: None.     Goals:  Short Term Goals: 4 weeks        Goal Status    Pt. to report decreased right ankle pain </ = 6/10 at worst to increase tolerance for ambulation  Progressing 12/13/2022    Pt. to demonstrate increased right  ankle gastrocnemius flexibility to 0 degrees to improve ease with stair descent. Progressing 12/13/2022    Pt. to demonstrate increased right ankle soleus flexibility to 0 degrees to improve ease with partial squatting Progressing 12/13/2022    Pt to be Independent with HEP to improve ROM and independence with ADL's Progressing 12/13/2022                                 Long Term Goals: 8 weeks        Goal   Status   Pt. to report decreased right ankle pain </ = 4/10 at worst to increase tolerance for ambulation   Progressing 12/13/2022    Pt. to demonstrate increased right  ankle gastrocnemius flexibility to 10 degrees to improve ease with stair descent. Progressing 12/13/2022    Pt. to demonstrate increased right ankle soleus flexibility to 8 degrees to improve ease with partial squatting Progressing 12/13/2022    Pt to demonstrate increased MMT for right  ankle Inversion  and Eversion  to 4/5 to increase ankle stability during lateral motions. Progressing 12/13/2022    Pt. demonstrate increased MMT for right  ankle  plantarflexion to 4/5 to increase ease with toe clearance Progressing 12/13/2022    Pt to be Independent with HEP to improve ROM and independence with ADL's Progressing 12/13/2022        Plan     Progress duration, workload, and resistance levels of the Therapeutic Activities and Exercises if the patient does not exhibit any pain, swelling, or other symptoms. Progress instruction in-home exercise progression and modification, including symptom management.    Simon Mccoy, PT ,DPT  12/13/2022

## 2022-12-15 ENCOUNTER — PES CALL (OUTPATIENT)
Dept: ADMINISTRATIVE | Facility: CLINIC | Age: 82
End: 2022-12-15
Payer: MEDICARE

## 2022-12-20 ENCOUNTER — CLINICAL SUPPORT (OUTPATIENT)
Dept: REHABILITATION | Facility: HOSPITAL | Age: 82
End: 2022-12-20
Payer: MEDICARE

## 2022-12-20 DIAGNOSIS — R26.9 GAIT DIFFICULTY: Primary | ICD-10-CM

## 2022-12-20 DIAGNOSIS — M25.671 DECREASED RANGE OF MOTION OF RIGHT ANKLE: ICD-10-CM

## 2022-12-20 PROCEDURE — 97112 NEUROMUSCULAR REEDUCATION: CPT | Mod: PN

## 2022-12-20 PROCEDURE — 97110 THERAPEUTIC EXERCISES: CPT | Mod: PN

## 2022-12-20 PROCEDURE — 97140 MANUAL THERAPY 1/> REGIONS: CPT | Mod: PN

## 2022-12-20 NOTE — PROGRESS NOTES
"Physical Therapy Daily Treatment Note     Name: Natali Galeano  Clinic Number: [id number]    Therapy Diagnosis:   Encounter Diagnoses   Name Primary?    Gait difficulty Yes    Decreased range of motion of right ankle      Physician: Russell Reid DPM    Visit Date: 2022    Physician Orders: PT Eval and Treat   Medical Diagnosis from Referral:   M76.829 (ICD-10-CM) - PTTD (posterior tibial tendon dysfunction)   M77.9 (ICD-10-CM) - Tendinitis   M24.573 (ICD-10-CM) - Equinus contracture of ankle      Evaluation Date: 2022  Plan of Care Expiration: 23 or 16th Visit  Authorization Period Expiration: 22  Visit # / Visits authorized: 3/ 11  Remaining Visits Scheduled -   PTA Consecutive Visits - 0/6    Eval Visit FOTO- 57 (2022)   5th Visit FOTO   -  (Date/Score/Turn Green)   10th Visit FOTO  -  (Date/Score/Turn Green)   D/C FOTO          -  (Date/Score/Turn Green)      Time In: 8:40  Time Out: 9:30  Billable Time: 50 minutes  Non-Billable Time: 00 minutes    Precautions: Standard and Fall  Insurance: Payor: [employer] / Plan: [employer] / Product Type: Medicare Advantage /     Subjective     Pt reports: a decrease in discomfort to the foot since the last visit. Increase in soreness was noted after last session.   She is compliant with home exercise program.  Response to previous treatment: Soreness of the Right foot    Pre-Treatment Pain Ratin/10  Post-Treatment Pain Ratin/10  Location: right ankles     Objective     Natali received therapeutic exercises to develop strength, endurance, ROM, and flexibility for 15 minutes including:    Warm-up      Supine    Ankle circles - 30 reps  Ankle abc's - X2  Ankle 3 Way with Green Theraband 2x10 (Plantarflexion/Dorsiflexion/Eversion)      Seated    Heel slides - 30 reps   DF/PF - 30 reps   Great toe flex/ext - 30 repititions   HSS - 3X30"       Standing    Static Lunges 3x8 with a 3' sec hold   Calf Stretch " on Doorway 2x30 sec      *Bold exercise performed     Natali participated in neuromuscular re-education activities to improve: Balance, Coordination, Sense, Proprioception, Posture, and Neuromuscular Recruitment for 20 minutes. The following activities were included:  Towel Srunches 3x1 min   Heel/Toe Raise Combo with #8 2x15  Seated Hip Flexion with Ankle Weight on Dorsal Aspect of Foot 3x8, #4  DL Heel Raises 2x12  Tandem Balance 2x30 sec, bilaterally Eyes Open + headturns     Natali received the following manual therapy techniques: Joint mobilizations, Manual traction, Myofacial release, and Soft tissue Mobilization were applied to the: right ankle for 10 minutes, including:    Visit Number:  1 out of 11   Manual Therapy  (amplitude and time)     Ankle passive range of motion with overpressure in all planes  Done    Distraction of the Ankle  Done    Manual Stretching of the Tibialis Anterior  2x30 sec    Soft-Tissue Mobilization to the Tibialis Anterior  Done          Natali participated in dynamic functional therapeutic activities to improve functional performance for 00  minutes, including:    Visit Number:  1 out of 11   Activities performed   (duration/resistance)                           Home Exercises Provided and Patient Education Provided     Education provided:   - Continue with HEP    Written Home Exercises Provided: Patient instructed to cont prior HEP.  Exercises were reviewed and Natali was able to demonstrate them prior to the end of the session.  Natali demonstrated good  understanding of the education provided.     See EMR under Patient Instructions for exercises provided prior visit.    Assessment     Patient completed the exercise program with no increase in pain in the right ankle. New exercises were well tolerated with moderate levels of fatigue noted with DL heel raises. No modifications were needed. Additional balance activities were added to help with muscular intrinsics and lower extremity  activation to incorporate muscle recruitment. Patient would continue to benefit from skilled Physical Therapy to decrease pain to the right foot.     Natali Is progressing well towards Her goals.     Pt prognosis is Fair.     Pt will continue to benefit from skilled outpatient physical therapy to address the deficits listed in the problem list box on initial evaluation, provide pt/family education and to maximize pt's level of independence in the home and community environment.     Pt's spiritual, cultural and educational needs considered and pt agreeable to plan of care and goals.    Anticipated barriers to physical therapy: None.     Goals:  Short Term Goals: 4 weeks        Goal Status    Pt. to report decreased right ankle pain </ = 6/10 at worst to increase tolerance for ambulation  Progressing 12/20/2022    Pt. to demonstrate increased right  ankle gastrocnemius flexibility to 0 degrees to improve ease with stair descent. Progressing 12/20/2022    Pt. to demonstrate increased right ankle soleus flexibility to 0 degrees to improve ease with partial squatting Progressing 12/20/2022    Pt to be Independent with HEP to improve ROM and independence with ADL's Progressing 12/20/2022                                 Long Term Goals: 8 weeks        Goal   Status   Pt. to report decreased right ankle pain </ = 4/10 at worst to increase tolerance for ambulation   Progressing 12/20/2022    Pt. to demonstrate increased right  ankle gastrocnemius flexibility to 10 degrees to improve ease with stair descent. Progressing 12/20/2022    Pt. to demonstrate increased right ankle soleus flexibility to 8 degrees to improve ease with partial squatting Progressing 12/20/2022    Pt to demonstrate increased MMT for right  ankle Inversion  and Eversion  to 4/5 to increase ankle stability during lateral motions. Progressing 12/20/2022    Pt. demonstrate increased MMT for right  ankle plantarflexion to 4/5 to increase ease with toe  clearance Progressing 12/20/2022    Pt to be Independent with HEP to improve ROM and independence with ADL's Progressing 12/20/2022        Plan     Progress duration, workload, and resistance levels of the Therapeutic Activities and Exercises if the patient does not exhibit any pain, swelling, or other symptoms. Progress instruction in-home exercise progression and modification, including symptom management.    Simon Mccoy, PT ,DPT  12/20/2022

## 2023-01-10 ENCOUNTER — CLINICAL SUPPORT (OUTPATIENT)
Dept: REHABILITATION | Facility: HOSPITAL | Age: 83
End: 2023-01-10
Payer: MEDICARE

## 2023-01-10 DIAGNOSIS — M25.671 DECREASED RANGE OF MOTION OF RIGHT ANKLE: ICD-10-CM

## 2023-01-10 DIAGNOSIS — R26.9 GAIT DIFFICULTY: Primary | ICD-10-CM

## 2023-01-10 PROCEDURE — 97110 THERAPEUTIC EXERCISES: CPT | Mod: PN

## 2023-01-10 PROCEDURE — 97112 NEUROMUSCULAR REEDUCATION: CPT | Mod: PN

## 2023-01-10 PROCEDURE — 97530 THERAPEUTIC ACTIVITIES: CPT | Mod: PN

## 2023-01-10 NOTE — PROGRESS NOTES
Physical Therapy Daily Treatment Note     Name: Natali Galeano  Clinic Number: [id number]    Therapy Diagnosis:   Encounter Diagnoses   Name Primary?    Gait difficulty Yes    Decreased range of motion of right ankle      Physician: Russell Reid DPM    Visit Date: 1/10/2023    Physician Orders: PT Eval and Treat   Medical Diagnosis from Referral:   M76.829 (ICD-10-CM) - PTTD (posterior tibial tendon dysfunction)   M77.9 (ICD-10-CM) - Tendinitis   M24.573 (ICD-10-CM) - Equinus contracture of ankle      Evaluation Date: 2022  Plan of Care Expiration: 23 or 16th Visit  Authorization Period Expiration: 23  Visit # / Visits authorized:   Remaining Visits Scheduled - 06  PTA Consecutive Visits - 0/6    Eval Visit FOTO- 57 (2022)   5th Visit FOTO   -  53 (1/10/2023)   10th Visit FOTO  -  (Date/Score/Turn Green)   D/C FOTO          -  (Date/Score/Turn Green)      Time In: 8:50  Time Out: 9:30  Billable Time: 40 minutes  Non-Billable Time: 00 minutes    Precautions: Standard and Fall  Insurance: Payor: [employer] / Plan: [employer] 65 / Product Type: Medicare Advantage /     Subjective     Pt. reports 60% improvement since the beginning of PT.  Current pain levels to the right foot are 0/10  Limiting factors include:  After 1 hour, patient still feeling some burning to the medial aspect of the foot   Stair Negotiation     Improvements factors include:   Walking > 30 min     Standing   Sleep Capacity  Driving     She is compliant with home exercise program.  Response to previous treatment: Soreness of the Right foot    Pre-Treatment Pain Ratin/10  Post-Treatment Pain Ratin/10  Location: right ankles     Objective     Range of Motion/Strength:      Ankle  Left Right Pain/Dysfunction with Movement   AROM         Ankle Dorsiflexion with knee flexed (20)  20°   WNL     Ankle Plantarflexion (50)  10°    WNL      Ankle Inversion (35)  15°   30°      Ankle Eversion   "(25)  10°   10°      Great toe flexion (50)  30°   30°      Great toe extension (60)  20°   20°          RLE 5/5 4+/5 4/5 4-/5 3+/5 3/5 3-/5 2+/5 2/5 2-/5 1/5 0   Hip Flexion      x                    Hip Extension      x                    Hip Abduction      x                    Hip Adduction      x                    Hip Internal Rotation      x                    Hip External Rotation      x                    Knee Flexion    x                      Knee Extension    x                      Ankle Dorsiflexion    x                      Ankle Plantarflexion    x                      Ankle Inversion     x                     Ankle Eversion       x                       LLE 5/5 4+/5 4/5 4-/5 3+/5 3/5 3-/5 2+/5 2/5 2-/5 1/5 0   Hip Flexion      x                     Hip Extension      x                     Hip Abduction      x                     Hip Adduction      x                     Hip Internal Rotation      x                     Hip External Rotation      x                     Knee Flexion    x                      Knee Extension    x                      Ankle Dorsiflexion      x                     Ankle Plantarflexion      x                     Ankle Inversion      x                     Ankle Eversion      x                         Natali received therapeutic exercises to develop strength, endurance, ROM, and flexibility for 10 minutes including:    Warm-up      Supine    Ankle circles - 30 reps  Ankle abc's - X2  Ankle 3 Way with Green Theraband 2x10 (Plantarflexion/Dorsiflexion/Eversion)      Seated    Heel slides - 30 reps   DF/PF - 30 reps   Great toe flex/ext - 30 repititions   HSS - 3X30"       Standing    Static Lunges 3x8 with a 3' sec hold   Calf Stretch on Doorway 2x30 sec      *Bold exercise performed     Natali participated in neuromuscular re-education activities to improve: Balance, Coordination, Sense, Proprioception, Posture, and Neuromuscular Recruitment for 20 minutes. The following activities " "were included:  Towel Srunches 3x1 min   Heel/Toe Raise Combo with #8 2x15  Seated Hip Flexion with Ankle Weight on Dorsal Aspect of Foot 3x8, #4  DL Heel Raises 2x12  Tandem Balance 2x30 sec, bilaterally Eyes Open + headturns   Supination of the Foot 3x12  Seated Disc Stability 2x12 (Counter-Clockwise/Clockwise/Eversion-Inversion)     Natali received the following manual therapy techniques: Joint mobilizations, Manual traction, Myofacial release, and Soft tissue Mobilization were applied to the: right ankle for 00 minutes, including:    Visit Number:  1 out of 11   Manual Therapy  (amplitude and time)     Ankle passive range of motion with overpressure in all planes  Not Performed    Distraction of the Ankle  Not Performed    Manual Stretching of the Tibialis Anterior  Not Performed    Soft-Tissue Mobilization to the Tibialis Anterior  Not Performed          Natali participated in dynamic functional therapeutic activities to improve functional performance for 10  minutes, including:    Visit Number:  1 out of 11   Activities performed   (duration/resistance)     Step Up on 4" box  2x8   Step Down on 4" Box 2x8                 Home Exercises Provided and Patient Education Provided     Education provided:   - Continue with HEP    Written Home Exercises Provided: Patient instructed to cont prior HEP.  Exercises were reviewed and Natali was able to demonstrate them prior to the end of the session.  Natali demonstrated good  understanding of the education provided.     See EMR under Patient Instructions for exercises provided prior visit.    Assessment     Pt. has been to [facility] for 5 visits due to right foot pain. Improvements have been noted with range of motion, strength, stability, and pain levels. Current limitations noted are functional strength levels, Single Leg balance, neuromuscular recruitment of the intrinsics of the foot, and stair negotiation. New exercises progressed patient form " sitting to standing exercises and resistance to improve strength and stability. No modifications were needed with tactile cues done by Physical Therapist due to improper mechanics of the supination exercise. Patient would continue to benefit from skilled Physical Therapy to increase limitations noted above.     Natali Is progressing well towards Her goals.     Pt prognosis is Fair.     Pt will continue to benefit from skilled outpatient physical therapy to address the deficits listed in the problem list box on initial evaluation, provide pt/family education and to maximize pt's level of independence in the home and community environment.     Pt's spiritual, cultural and educational needs considered and pt agreeable to plan of care and goals.    Anticipated barriers to physical therapy: None.     Goals:  Short Term Goals: 4 weeks        Goal Status    Pt. to report decreased right ankle pain </ = 6/10 at worst to increase tolerance for ambulation  Goal Met 1/10/2023    Pt. to demonstrate increased right  ankle gastrocnemius flexibility to 0 degrees to improve ease with stair descent. Goal Met 1/10/2023    Pt. to demonstrate increased right ankle soleus flexibility to 0 degrees to improve ease with partial squatting Goal Met 1/10/2023    Pt to be Independent with HEP to improve ROM and independence with ADL's Goal Met 1/10/2023                                  Long Term Goals: 8 weeks        Goal   Status   Pt. to report decreased right ankle pain </ = 4/10 at worst to increase tolerance for ambulation  Goal Met 1/10/2023    Pt. to demonstrate increased right  ankle gastrocnemius flexibility to 10 degrees to improve ease with stair descent. Goal Met 1/10/2023     Pt. to demonstrate increased right ankle soleus flexibility to 8 degrees to improve ease with partial squatting Goal Met 1/10/2023    Pt to demonstrate increased MMT for right  ankle Inversion  and Eversion  to 4/5 to increase ankle stability during lateral  motions. Progressing 1/10/2023    Pt. demonstrate increased MMT for right  ankle plantarflexion to 4/5 to increase ease with toe clearance Progressing 1/10/2023    Pt to be Independent with HEP to improve ROM and independence with ADL's Goal Met 1/10/2023         Plan     Progress duration, workload, and resistance levels of the Therapeutic Activities and Exercises if the patient does not exhibit any pain, swelling, or other symptoms. Progress instruction in-home exercise progression and modification, including symptom management.    Simon Mccoy, PT ,DPT  1/10/2023

## 2023-01-17 ENCOUNTER — CLINICAL SUPPORT (OUTPATIENT)
Dept: REHABILITATION | Facility: HOSPITAL | Age: 83
End: 2023-01-17
Payer: MEDICARE

## 2023-01-17 DIAGNOSIS — R26.9 GAIT DIFFICULTY: Primary | ICD-10-CM

## 2023-01-17 DIAGNOSIS — M25.671 DECREASED RANGE OF MOTION OF RIGHT ANKLE: ICD-10-CM

## 2023-01-17 PROCEDURE — 97110 THERAPEUTIC EXERCISES: CPT | Mod: PN,CQ

## 2023-01-17 PROCEDURE — 97112 NEUROMUSCULAR REEDUCATION: CPT | Mod: PN,CQ

## 2023-01-17 NOTE — PROGRESS NOTES
"Physical Therapy Daily Treatment Note     Name: Natali Galeano  Clinic Number: [id number]    Therapy Diagnosis:   Encounter Diagnoses   Name Primary?    Gait difficulty Yes    Decreased range of motion of right ankle      Physician: Russell Reid DPM    Visit Date: 2023    Physician Orders: PT Eval and Treat   Medical Diagnosis from Referral:   M76.829 (ICD-10-CM) - PTTD (posterior tibial tendon dysfunction)   M77.9 (ICD-10-CM) - Tendinitis   M24.573 (ICD-10-CM) - Equinus contracture of ankle      Evaluation Date: 2022  Plan of Care Expiration: 23 or 16th Visit  Authorization Period Expiration: 23  Visit # / Visits authorized:   Remaining Visits Scheduled -   PTA Consecutive Visits -     Eval Visit FOTO- 57 (2022)   5th Visit FOTO   -  53 (1/10/2023)   10th Visit FOTO  -  (Date/Score/Turn Green)   D/C FOTO          -  (Date/Score/Turn Green)      Time In: 8:50  Time Out: 9:30  Billable Time: 40 minutes  Non-Billable Time: 00 minutes    Precautions: Standard and Fall  Insurance: Payor: [employer]ARE / Plan: [employer] 65 / Product Type: Medicare Advantage /     Subjective     Pt. Reports: Ankle is really feeling good    She is compliant with home exercise program.  Response to previous treatment: Soreness of the Right foot    Pre-Treatment Pain Ratin/10  Post-Treatment Pain Ratin/10  Location: right ankles     Objective     Natali received therapeutic exercises to develop strength, endurance, ROM, and flexibility for 10 minutes including:    Warm-up      Supine    Ankle circles - 30 reps  Ankle abc's - X2  Ankle 3 Way with Green Theraband 2x10 (Plantarflexion/Dorsiflexion/Eversion)      Seated    Heel slides -  4'   DF/PF - 30 reps   Great toe flex/ext - 30 repititions   HSS - 3X30"       Standing    Static Lunges 3x8 with a 3' sec hold   Calf Stretch on Doorway 2x30 sec      *Bold exercise performed     Natali participated in neuromuscular " "re-education activities to improve: Balance, Coordination, Sense, Proprioception, Posture, and Neuromuscular Recruitment for 25 minutes. The following activities were included:  Towel Srunches 3x1 min   Heel/Toe Raise Combo with #8 2x15  Seated Hip Flexion with Ankle Weight on Dorsal Aspect of Foot 3x10, #4  DL Heel Raises 2x12  Tandem Balance 2x30s sec, bilaterally Eyes Open + headturns   Tandem balance 1x30s bilaterally eyes closed  Supination of the Foot 3x12  Seated Disc Stability 2x12 (Counter-Clockwise/Clockwise/Eversion-Inversion)     Natali received the following manual therapy techniques: Joint mobilizations, Manual traction, Myofacial release, and Soft tissue Mobilization were applied to the: right ankle for 00 minutes, including:    Visit Number:  1 out of 11   Manual Therapy  (amplitude and time)     Ankle passive range of motion with overpressure in all planes  Not Performed    Distraction of the Ankle  Not Performed    Manual Stretching of the Tibialis Anterior  Not Performed    Soft-Tissue Mobilization to the Tibialis Anterior  Not Performed          Natali participated in dynamic functional therapeutic activities to improve functional performance for 5  minutes, including:    Visit Number:  1 out of 11   Activities performed   (duration/resistance)     Step Up on 4" box  2x8   Step Down on 4" Box 2x8 - NP   6 minute walk test Perform next session             Home Exercises Provided and Patient Education Provided     Education provided:   - Continue with HEP    Written Home Exercises Provided: Patient instructed to cont prior HEP.  Exercises were reviewed and Natali was able to demonstrate them prior to the end of the session.  Natali demonstrated good  understanding of the education provided.     See EMR under Patient Instructions for exercises provided prior visit.    Assessment     Pt. Tolerated session well. Some fatigue with seated hip flexion. Verbal cues given to reduce trunk extension with " exercise. Pt will benefit from 6 minute walk test to work towards her goal of ambulating for more than 20 minutes outside of therapy. Tandem stance balance with eyes closed added. Pt had one LOB corrected by herself. Natali had no adverse effects today and will continue to benefit from skilled therapy.    Natali Is progressing well towards Her goals.     Pt prognosis is Fair.     Pt will continue to benefit from skilled outpatient physical therapy to address the deficits listed in the problem list box on initial evaluation, provide pt/family education and to maximize pt's level of independence in the home and community environment.     Pt's spiritual, cultural and educational needs considered and pt agreeable to plan of care and goals.    Anticipated barriers to physical therapy: None.     Goals:  Short Term Goals: 4 weeks        Goal Status    Pt. to report decreased right ankle pain </ = 6/10 at worst to increase tolerance for ambulation  Goal Met 1/10/2023    Pt. to demonstrate increased right  ankle gastrocnemius flexibility to 0 degrees to improve ease with stair descent. Goal Met 1/10/2023    Pt. to demonstrate increased right ankle soleus flexibility to 0 degrees to improve ease with partial squatting Goal Met 1/10/2023    Pt to be Independent with HEP to improve ROM and independence with ADL's Goal Met 1/10/2023                                  Long Term Goals: 8 weeks        Goal   Status   Pt. to report decreased right ankle pain </ = 4/10 at worst to increase tolerance for ambulation  Goal Met 1/10/2023    Pt. to demonstrate increased right  ankle gastrocnemius flexibility to 10 degrees to improve ease with stair descent. Goal Met 1/10/2023     Pt. to demonstrate increased right ankle soleus flexibility to 8 degrees to improve ease with partial squatting Goal Met 1/10/2023    Pt to demonstrate increased MMT for right  ankle Inversion  and Eversion  to 4/5 to increase ankle stability during lateral motions.  Progressing 1/17/2023    Pt. demonstrate increased MMT for right  ankle plantarflexion to 4/5 to increase ease with toe clearance Progressing 1/17/2023    Pt to be Independent with HEP to improve ROM and independence with ADL's Goal Met 1/10/2023         Plan     Progress duration, workload, and resistance levels of the Therapeutic Activities and Exercises if the patient does not exhibit any pain, swelling, or other symptoms. Progress instruction in-home exercise progression and modification, including symptom management.    Lottie Moralez, PTA   1/17/2023

## 2023-01-17 NOTE — PROGRESS NOTES
"  Natali Galeano presented for a follow-up Medicare AWV and Health Risk Assessment  today. The following components were reviewed and updated:    Medical history  Family History  Social history  Allergies and Current Medications  Health Risk Assessment  Health Maintenance  Care Team    See Completed Assessments for Annual Wellness visit with in the encounter summary    The following assessments were completed:  Depression Screening  Cognitive function Screening  Timed Get Up Test  Whisper Test  Nutrition  Activities of Daily Living   PAQ       Vitals:    01/18/23 0907   BP: (!) 140/65   Pulse: 66   Temp: 98.1 °F (36.7 °C)   TempSrc: Oral   SpO2: 99%   Weight: 128.8 kg (283 lb 13.5 oz)   Height: 5' 7" (1.702 m)     Body mass index is 44.46 kg/m².   ]    Physical Exam  Constitutional:       Appearance: Normal appearance.   Cardiovascular:      Rate and Rhythm: Normal rate.      Pulses: Normal pulses.   Pulmonary:      Effort: Pulmonary effort is normal. No respiratory distress.   Musculoskeletal:         General: Normal range of motion.      Cervical back: Normal range of motion.   Skin:     General: Skin is warm and dry.      Capillary Refill: Capillary refill takes less than 2 seconds.   Neurological:      Mental Status: She is alert and oriented to person, place, and time.     Diagnoses and health risks identified today and associated recommendations/orders:    1. Encounter for preventive health examination  Screenings performed,  as noted above. Personal preventive testing needs reviewed.   - zoster vaccine live, PF, (ZOSTAVAX, PF,) 19,400 unit/0.65 mL injection; Inject 19,400 Units into the skin once. for 1 dose  Dispense: 1 each; Refill: 0  - DIPH,PERTUSS,ACEL,,TET VAC,PF, ADULT (ADACEL) 2 Lf-(2.5-5-3-5 mcg)-5Lf/0.5 mL Syrg; Inject 0.5 mLs into the muscle once. for 1 dose  Dispense: 0.5 mL; Refill: 0    2. Essential hypertension  Stable, followed by PCP.     3. Heart murmur  Stable, followed by PCP.     4. Class 3 " severe obesity due to excess calories with serious comorbidity and body mass index (BMI) of 40.0 to 44.9 in adult  Stable, followed by PCP.     5. Thyroid disease  Stable, followed by PCP.     6. Other reduced mobility  Stable, followed by PCP.     7. Allergic rhinitis due to other allergic trigger, unspecified seasonality  Stable, followed by PCP.     8. Nasal congestion  Stable, followed by PCP.     9. Onychorrhexis  Stable, followed by PCP.     10. Cough, unspecified type  Stable, followed by PCP.     11. Other hyperlipidemia  Stable, followed by PCP.     12. Rheumatic fever  Stable, followed by PCP.     13. Normocytic anemia  Stable, followed by PCP.     14. Adrenal nodule  Stable, followed by PCP.     15. BMI 45.0-49.9, adult  Stable, followed by PCP.     16. Gout, unspecified cause, unspecified chronicity, unspecified site  Stable, followed by PCP.     17. Chronic pain of right ankle  Stable, followed by PCP.     18. Osteopenia of necks of both femurs  Stable, followed by PCP.     19. Decreased range of motion of right ankle  Stable, followed by PCP.     20. ALYSE (obstructive sleep apnea)  Stable, followed by PCP.     21. Gait difficulty  Stable, followed by PCP.     I offered to discuss advanced care planning, including how to pick a person who would make decisions for you if you were unable to make them for yourself, called a health care power of , and what kind of decisions you might make such as use of life sustaining treatments such as ventilators and tube feeding when faced with a life limiting illness recorded on a living will that they will need to know. (How you want to be cared for as you near the end of your natural life)     X Patient is interested in learning more about how to make advanced directives.  I provided them paperwork and offered to discuss this with them.    Provided Natali with a 5-10 year written screening schedule and personal prevention plan. Recommendations were developed  using the USPSTF age appropriate recommendations. Education, counseling, and referrals were provided as needed.  After Visit Summary printed and given to patient which includes a list of additional screenings\tests needed.    Follow up in about 1 year (around 1/18/2024), or Annual Wellness Exam.      Ria Bull NP       10-May-2017 15:58

## 2023-01-18 ENCOUNTER — OFFICE VISIT (OUTPATIENT)
Dept: PRIMARY CARE CLINIC | Facility: CLINIC | Age: 83
End: 2023-01-18
Payer: MEDICARE

## 2023-01-18 VITALS
DIASTOLIC BLOOD PRESSURE: 65 MMHG | HEIGHT: 67 IN | OXYGEN SATURATION: 99 % | SYSTOLIC BLOOD PRESSURE: 140 MMHG | BODY MASS INDEX: 44.54 KG/M2 | TEMPERATURE: 98 F | HEART RATE: 66 BPM | WEIGHT: 283.81 LBS

## 2023-01-18 DIAGNOSIS — D64.9 NORMOCYTIC ANEMIA: Chronic | ICD-10-CM

## 2023-01-18 DIAGNOSIS — L60.3 ONYCHORRHEXIS: ICD-10-CM

## 2023-01-18 DIAGNOSIS — R09.81 NASAL CONGESTION: ICD-10-CM

## 2023-01-18 DIAGNOSIS — G47.33 OSA (OBSTRUCTIVE SLEEP APNEA): Chronic | ICD-10-CM

## 2023-01-18 DIAGNOSIS — M10.9 GOUT, UNSPECIFIED CAUSE, UNSPECIFIED CHRONICITY, UNSPECIFIED SITE: Chronic | ICD-10-CM

## 2023-01-18 DIAGNOSIS — M85.851 OSTEOPENIA OF NECKS OF BOTH FEMURS: ICD-10-CM

## 2023-01-18 DIAGNOSIS — E78.49 OTHER HYPERLIPIDEMIA: Chronic | ICD-10-CM

## 2023-01-18 DIAGNOSIS — I10 ESSENTIAL HYPERTENSION: Chronic | ICD-10-CM

## 2023-01-18 DIAGNOSIS — M25.671 DECREASED RANGE OF MOTION OF RIGHT ANKLE: ICD-10-CM

## 2023-01-18 DIAGNOSIS — R26.9 GAIT DIFFICULTY: ICD-10-CM

## 2023-01-18 DIAGNOSIS — Z74.09 OTHER REDUCED MOBILITY: ICD-10-CM

## 2023-01-18 DIAGNOSIS — M85.852 OSTEOPENIA OF NECKS OF BOTH FEMURS: ICD-10-CM

## 2023-01-18 DIAGNOSIS — R01.1 HEART MURMUR: ICD-10-CM

## 2023-01-18 DIAGNOSIS — Z00.00 ENCOUNTER FOR PREVENTIVE HEALTH EXAMINATION: Primary | ICD-10-CM

## 2023-01-18 DIAGNOSIS — E27.8 ADRENAL NODULE: ICD-10-CM

## 2023-01-18 DIAGNOSIS — G89.29 CHRONIC PAIN OF RIGHT ANKLE: ICD-10-CM

## 2023-01-18 DIAGNOSIS — R05.9 COUGH, UNSPECIFIED TYPE: ICD-10-CM

## 2023-01-18 DIAGNOSIS — I00 RHEUMATIC FEVER: ICD-10-CM

## 2023-01-18 DIAGNOSIS — E07.9 THYROID DISEASE: ICD-10-CM

## 2023-01-18 DIAGNOSIS — M25.571 CHRONIC PAIN OF RIGHT ANKLE: ICD-10-CM

## 2023-01-18 DIAGNOSIS — J30.89 ALLERGIC RHINITIS DUE TO OTHER ALLERGIC TRIGGER, UNSPECIFIED SEASONALITY: Chronic | ICD-10-CM

## 2023-01-18 DIAGNOSIS — E66.01 CLASS 3 SEVERE OBESITY DUE TO EXCESS CALORIES WITH SERIOUS COMORBIDITY AND BODY MASS INDEX (BMI) OF 40.0 TO 44.9 IN ADULT: ICD-10-CM

## 2023-01-18 PROBLEM — M25.561 RIGHT KNEE PAIN: Status: RESOLVED | Noted: 2019-02-23 | Resolved: 2023-01-18

## 2023-01-18 PROCEDURE — 1160F PR REVIEW ALL MEDS BY PRESCRIBER/CLIN PHARMACIST DOCUMENTED: ICD-10-PCS | Mod: CPTII,S$GLB,, | Performed by: NURSE PRACTITIONER

## 2023-01-18 PROCEDURE — 1126F AMNT PAIN NOTED NONE PRSNT: CPT | Mod: CPTII,S$GLB,, | Performed by: NURSE PRACTITIONER

## 2023-01-18 PROCEDURE — 1159F MED LIST DOCD IN RCRD: CPT | Mod: CPTII,S$GLB,, | Performed by: NURSE PRACTITIONER

## 2023-01-18 PROCEDURE — 99499 UNLISTED E&M SERVICE: CPT | Mod: S$GLB,,, | Performed by: NURSE PRACTITIONER

## 2023-01-18 PROCEDURE — 3077F SYST BP >= 140 MM HG: CPT | Mod: CPTII,S$GLB,, | Performed by: NURSE PRACTITIONER

## 2023-01-18 PROCEDURE — 99999 PR PBB SHADOW E&M-EST. PATIENT-LVL V: ICD-10-PCS | Mod: PBBFAC,,, | Performed by: NURSE PRACTITIONER

## 2023-01-18 PROCEDURE — 99499 RISK ADDL DX/OHS AUDIT: ICD-10-PCS | Mod: S$GLB,,, | Performed by: NURSE PRACTITIONER

## 2023-01-18 PROCEDURE — 3288F FALL RISK ASSESSMENT DOCD: CPT | Mod: CPTII,S$GLB,, | Performed by: NURSE PRACTITIONER

## 2023-01-18 PROCEDURE — 1160F RVW MEDS BY RX/DR IN RCRD: CPT | Mod: CPTII,S$GLB,, | Performed by: NURSE PRACTITIONER

## 2023-01-18 PROCEDURE — 3077F PR MOST RECENT SYSTOLIC BLOOD PRESSURE >= 140 MM HG: ICD-10-PCS | Mod: CPTII,S$GLB,, | Performed by: NURSE PRACTITIONER

## 2023-01-18 PROCEDURE — 1126F PR PAIN SEVERITY QUANTIFIED, NO PAIN PRESENT: ICD-10-PCS | Mod: CPTII,S$GLB,, | Performed by: NURSE PRACTITIONER

## 2023-01-18 PROCEDURE — 1170F FXNL STATUS ASSESSED: CPT | Mod: CPTII,S$GLB,, | Performed by: NURSE PRACTITIONER

## 2023-01-18 PROCEDURE — G0439 PR MEDICARE ANNUAL WELLNESS SUBSEQUENT VISIT: ICD-10-PCS | Mod: S$GLB,,, | Performed by: NURSE PRACTITIONER

## 2023-01-18 PROCEDURE — 1101F PT FALLS ASSESS-DOCD LE1/YR: CPT | Mod: CPTII,S$GLB,, | Performed by: NURSE PRACTITIONER

## 2023-01-18 PROCEDURE — 3078F PR MOST RECENT DIASTOLIC BLOOD PRESSURE < 80 MM HG: ICD-10-PCS | Mod: CPTII,S$GLB,, | Performed by: NURSE PRACTITIONER

## 2023-01-18 PROCEDURE — 99999 PR PBB SHADOW E&M-EST. PATIENT-LVL V: CPT | Mod: PBBFAC,,, | Performed by: NURSE PRACTITIONER

## 2023-01-18 PROCEDURE — 1159F PR MEDICATION LIST DOCUMENTED IN MEDICAL RECORD: ICD-10-PCS | Mod: CPTII,S$GLB,, | Performed by: NURSE PRACTITIONER

## 2023-01-18 PROCEDURE — G0439 PPPS, SUBSEQ VISIT: HCPCS | Mod: S$GLB,,, | Performed by: NURSE PRACTITIONER

## 2023-01-18 PROCEDURE — 3078F DIAST BP <80 MM HG: CPT | Mod: CPTII,S$GLB,, | Performed by: NURSE PRACTITIONER

## 2023-01-18 PROCEDURE — 1101F PR PT FALLS ASSESS DOC 0-1 FALLS W/OUT INJ PAST YR: ICD-10-PCS | Mod: CPTII,S$GLB,, | Performed by: NURSE PRACTITIONER

## 2023-01-18 PROCEDURE — 3288F PR FALLS RISK ASSESSMENT DOCUMENTED: ICD-10-PCS | Mod: CPTII,S$GLB,, | Performed by: NURSE PRACTITIONER

## 2023-01-18 PROCEDURE — 1170F PR FUNCTIONAL STATUS ASSESSED: ICD-10-PCS | Mod: CPTII,S$GLB,, | Performed by: NURSE PRACTITIONER

## 2023-01-18 NOTE — PATIENT INSTRUCTIONS
Counseling and Referral of Other Preventative  (Italic type indicates deductible and co-insurance are waived)    Patient Name: Natali Galeano  Today's Date: 1/18/2023    Health Maintenance       Date Due Completion Date    TETANUS VACCINE Never done ---    Shingles Vaccine (1 of 2) Never done ---    COVID-19 Vaccine (5 - Booster for Pfizer series) 05/31/2023 (Originally 9/9/2022) 7/15/2022    DEXA Scan 06/08/2025 6/8/2022    Lipid Panel 02/01/2027 2/1/2022        No orders of the defined types were placed in this encounter.    The following information is provided to all patients.  This information is to help you find resources for any of the problems found today that may be affecting your health:                Living healthy guide: www.[facility].[place].gov      Understanding Diabetes: www.diabetes.org      Eating healthy: www.cdc.gov/healthyweight      CDC home safety checklist: www.cdc.gov/steadi/patient.html      Agency on Aging: www.goea.[place].[facility]      Alcoholics anonymous (AA): www.aa.org      Physical Activity: www.ronald.nih.gov/vf2rjkx      Tobacco use: www.quitwithusla.org

## 2023-01-24 ENCOUNTER — CLINICAL SUPPORT (OUTPATIENT)
Dept: REHABILITATION | Facility: HOSPITAL | Age: 83
End: 2023-01-24
Payer: MEDICARE

## 2023-01-24 DIAGNOSIS — M25.671 DECREASED RANGE OF MOTION OF RIGHT ANKLE: ICD-10-CM

## 2023-01-24 DIAGNOSIS — R26.9 GAIT DIFFICULTY: Primary | ICD-10-CM

## 2023-01-24 PROCEDURE — 97530 THERAPEUTIC ACTIVITIES: CPT | Mod: PN,CQ

## 2023-01-24 PROCEDURE — 97112 NEUROMUSCULAR REEDUCATION: CPT | Mod: PN,CQ

## 2023-01-24 NOTE — PROGRESS NOTES
"Physical Therapy Daily Treatment Note     Name: Natali Galeano  Clinic Number: [id number]    Therapy Diagnosis:   Encounter Diagnoses   Name Primary?    Gait difficulty Yes    Decreased range of motion of right ankle      Physician: Russell Reid DPM    Visit Date: 2023    Physician Orders: PT Eval and Treat   Medical Diagnosis from Referral:   M76.829 (ICD-10-CM) - PTTD (posterior tibial tendon dysfunction)   M77.9 (ICD-10-CM) - Tendinitis   M24.573 (ICD-10-CM) - Equinus contracture of ankle      Evaluation Date: 2022  Plan of Care Expiration: 23 or 16th Visit  Authorization Period Expiration: 23  Visit # / Visits authorized: 3/20  Remaining Visits Scheduled -   PTA Consecutive Visits - 2/6    Eval Visit FOTO- 57 (2022)   5th Visit FOTO   -  53 (1/10/2023)   10th Visit FOTO  -  (Date/Score/Turn Green)   D/C FOTO          -  (Date/Score/Turn Green)      Time In: 8:48  Time Out: 9:30  Billable Time: 42 minutes  Non-Billable Time: 00 minutes    Precautions: Standard and Fall  Insurance: Payor: [employer]ARE / Plan: [employer] 65 / Product Type: Medicare Advantage /     Subjective     Pt. Reports: Just some soreness in the ankle. Pt has been walking for longer (her personal goal)    She is compliant with home exercise program.  Response to previous treatment: Soreness of the Right foot    Pre-Treatment Pain Ratin/10  Post-Treatment Pain Ratin/10  Location: right ankles     Objective     Natali received therapeutic exercises to develop strength, endurance, ROM, and flexibility for 7 minutes including:    Warm-up      Supine    Ankle circles - 30 reps  Ankle abc's - X2  Ankle 3 Way with Green Theraband 2x10 (Plantarflexion/Dorsiflexion/Eversion)      Seated    Heel slides -  4'   DF/PF - 30 reps   Great toe flex/ext - 30 repititions   HSS - 3X30"       Standing    Static Lunges 3x8 with a 3' sec hold   Calf Stretch on Doorway 2x30 sec      *Bold exercise " "performed     Natali participated in neuromuscular re-education activities to improve: Balance, Coordination, Sense, Proprioception, Posture, and Neuromuscular Recruitment for 25 minutes. The following activities were included:  Towel Srunches 3x1 min   Heel/Toe Raise Combo with #9 2x15  Seated Hip Flexion with Ankle Weight on Dorsal Aspect of Foot 3x10, #4  DL Heel Raises 2x12  Tandem Balance 2x30s sec, bilaterally Eyes Open + headturns   Tandem balance 1x30s bilaterally eyes closed  Supination of the Foot 3x12  Seated Disc Stability 2x12 (Counter-Clockwise/Clockwise/Eversion-Inversion)     Natali received the following manual therapy techniques: Joint mobilizations, Manual traction, Myofacial release, and Soft tissue Mobilization were applied to the: right ankle for 00 minutes, including:    Visit Number:  1 out of 11   Manual Therapy  (amplitude and time)     Ankle passive range of motion with overpressure in all planes  Not Performed    Distraction of the Ankle  Not Performed    Manual Stretching of the Tibialis Anterior  Not Performed    Soft-Tissue Mobilization to the Tibialis Anterior  Not Performed          Natali participated in dynamic functional therapeutic activities to improve functional performance for 10  minutes, including:    Visit Number:  1 out of 11   Activities performed   (duration/resistance)     Step Up on 4" box  2x10   Step Down on 4" Box 2x10                  Home Exercises Provided and Patient Education Provided     Education provided:   - Continue with HEP    Written Home Exercises Provided: Patient instructed to cont prior HEP.  Exercises were reviewed and Natali was able to demonstrate them prior to the end of the session.  Natali demonstrated good  understanding of the education provided.     See EMR under Patient Instructions for exercises provided prior visit.    Assessment     Pt tolerated session well. Weight increased for heel/toe raise combo. Reps increased for step ups/downs. Pt " shows improved balance with tandem stance variations. Gastroc flexibility limited during lunges. Natali stated she feels her ankle is doing much better and that it's no longer limiting her from ADLs. She had no adverse effects with exercise today.     Natali Is progressing well towards Her goals.     Pt prognosis is Fair.     Pt will continue to benefit from skilled outpatient physical therapy to address the deficits listed in the problem list box on initial evaluation, provide pt/family education and to maximize pt's level of independence in the home and community environment.     Pt's spiritual, cultural and educational needs considered and pt agreeable to plan of care and goals.    Anticipated barriers to physical therapy: None.     Goals:  Short Term Goals: 4 weeks        Goal Status    Pt. to report decreased right ankle pain </ = 6/10 at worst to increase tolerance for ambulation  Goal Met 1/10/2023    Pt. to demonstrate increased right  ankle gastrocnemius flexibility to 0 degrees to improve ease with stair descent. Goal Met 1/10/2023    Pt. to demonstrate increased right ankle soleus flexibility to 0 degrees to improve ease with partial squatting Goal Met 1/10/2023    Pt to be Independent with HEP to improve ROM and independence with ADL's Goal Met 1/10/2023                                  Long Term Goals: 8 weeks        Goal   Status   Pt. to report decreased right ankle pain </ = 4/10 at worst to increase tolerance for ambulation  Goal Met 1/10/2023    Pt. to demonstrate increased right  ankle gastrocnemius flexibility to 10 degrees to improve ease with stair descent. Goal Met 1/10/2023     Pt. to demonstrate increased right ankle soleus flexibility to 8 degrees to improve ease with partial squatting Goal Met 1/10/2023    Pt to demonstrate increased MMT for right  ankle Inversion  and Eversion  to 4/5 to increase ankle stability during lateral motions. Progressing 1/24/2023    Pt. demonstrate increased MMT  for right  ankle plantarflexion to 4/5 to increase ease with toe clearance Progressing 1/24/2023    Pt to be Independent with HEP to improve ROM and independence with ADL's Goal Met 1/10/2023         Plan     Progress duration, workload, and resistance levels of the Therapeutic Activities and Exercises if the patient does not exhibit any pain, swelling, or other symptoms. Progress instruction in-home exercise progression and modification, including symptom management.    Lottie Moralez, PTA   1/24/2023

## 2023-01-27 ENCOUNTER — LAB VISIT (OUTPATIENT)
Dept: PRIMARY CARE CLINIC | Facility: CLINIC | Age: 83
End: 2023-01-27
Payer: MEDICARE

## 2023-01-27 ENCOUNTER — OFFICE VISIT (OUTPATIENT)
Dept: PRIMARY CARE CLINIC | Facility: CLINIC | Age: 83
End: 2023-01-27
Payer: MEDICARE

## 2023-01-27 VITALS
HEIGHT: 67 IN | RESPIRATION RATE: 20 BRPM | SYSTOLIC BLOOD PRESSURE: 132 MMHG | BODY MASS INDEX: 43.53 KG/M2 | WEIGHT: 277.31 LBS | OXYGEN SATURATION: 98 % | HEART RATE: 72 BPM | TEMPERATURE: 98 F | DIASTOLIC BLOOD PRESSURE: 80 MMHG

## 2023-01-27 DIAGNOSIS — M10.9 GOUT, UNSPECIFIED CAUSE, UNSPECIFIED CHRONICITY, UNSPECIFIED SITE: Chronic | ICD-10-CM

## 2023-01-27 DIAGNOSIS — H60.543 ECZEMA OF BOTH EXTERNAL EARS: ICD-10-CM

## 2023-01-27 DIAGNOSIS — M85.851 OSTEOPENIA OF NECKS OF BOTH FEMURS: ICD-10-CM

## 2023-01-27 DIAGNOSIS — I10 ESSENTIAL HYPERTENSION: Chronic | ICD-10-CM

## 2023-01-27 DIAGNOSIS — Z11.59 ENCOUNTER FOR HEPATITIS C SCREENING TEST FOR LOW RISK PATIENT: Primary | ICD-10-CM

## 2023-01-27 DIAGNOSIS — Z11.59 ENCOUNTER FOR HEPATITIS C SCREENING TEST FOR LOW RISK PATIENT: ICD-10-CM

## 2023-01-27 DIAGNOSIS — M85.852 OSTEOPENIA OF NECKS OF BOTH FEMURS: ICD-10-CM

## 2023-01-27 DIAGNOSIS — E07.9 THYROID DISEASE: ICD-10-CM

## 2023-01-27 LAB
25(OH)D3+25(OH)D2 SERPL-MCNC: 53 NG/ML (ref 30–96)
ALBUMIN SERPL BCP-MCNC: 4.2 G/DL (ref 3.5–5.2)
ALP SERPL-CCNC: 102 U/L (ref 55–135)
ALT SERPL W/O P-5'-P-CCNC: 15 U/L (ref 10–44)
ANION GAP SERPL CALC-SCNC: 16 MMOL/L (ref 8–16)
AST SERPL-CCNC: 25 U/L (ref 10–40)
BASOPHILS # BLD AUTO: 0.04 K/UL (ref 0–0.2)
BASOPHILS NFR BLD: 0.5 % (ref 0–1.9)
BILIRUB SERPL-MCNC: 0.7 MG/DL (ref 0.1–1)
BUN SERPL-MCNC: 14 MG/DL (ref 8–23)
CALCIUM SERPL-MCNC: 10.9 MG/DL (ref 8.7–10.5)
CHLORIDE SERPL-SCNC: 100 MMOL/L (ref 95–110)
CO2 SERPL-SCNC: 23 MMOL/L (ref 23–29)
CREAT SERPL-MCNC: 0.9 MG/DL (ref 0.5–1.4)
DIFFERENTIAL METHOD: ABNORMAL
EOSINOPHIL # BLD AUTO: 0.1 K/UL (ref 0–0.5)
EOSINOPHIL NFR BLD: 1.5 % (ref 0–8)
ERYTHROCYTE [DISTWIDTH] IN BLOOD BY AUTOMATED COUNT: 16.2 % (ref 11.5–14.5)
EST. GFR  (NO RACE VARIABLE): >60 ML/MIN/1.73 M^2
GLUCOSE SERPL-MCNC: 84 MG/DL (ref 70–110)
HCT VFR BLD AUTO: 46.4 % (ref 37–48.5)
HCV AB SERPL QL IA: NORMAL
HGB BLD-MCNC: 13.8 G/DL (ref 12–16)
IMM GRANULOCYTES # BLD AUTO: 0.01 K/UL (ref 0–0.04)
IMM GRANULOCYTES NFR BLD AUTO: 0.1 % (ref 0–0.5)
LYMPHOCYTES # BLD AUTO: 2.8 K/UL (ref 1–4.8)
LYMPHOCYTES NFR BLD: 35.6 % (ref 18–48)
MCH RBC QN AUTO: 25.2 PG (ref 27–31)
MCHC RBC AUTO-ENTMCNC: 29.7 G/DL (ref 32–36)
MCV RBC AUTO: 85 FL (ref 82–98)
MONOCYTES # BLD AUTO: 0.6 K/UL (ref 0.3–1)
MONOCYTES NFR BLD: 7.6 % (ref 4–15)
NEUTROPHILS # BLD AUTO: 4.3 K/UL (ref 1.8–7.7)
NEUTROPHILS NFR BLD: 54.7 % (ref 38–73)
NRBC BLD-RTO: 0 /100 WBC
PLATELET # BLD AUTO: 253 K/UL (ref 150–450)
PMV BLD AUTO: 11.8 FL (ref 9.2–12.9)
POTASSIUM SERPL-SCNC: 3.9 MMOL/L (ref 3.5–5.1)
PROT SERPL-MCNC: 8.4 G/DL (ref 6–8.4)
RBC # BLD AUTO: 5.48 M/UL (ref 4–5.4)
SODIUM SERPL-SCNC: 139 MMOL/L (ref 136–145)
URATE SERPL-MCNC: 5.1 MG/DL (ref 2.4–5.7)
WBC # BLD AUTO: 7.79 K/UL (ref 3.9–12.7)

## 2023-01-27 PROCEDURE — 1159F MED LIST DOCD IN RCRD: CPT | Mod: CPTII,S$GLB,, | Performed by: STUDENT IN AN ORGANIZED HEALTH CARE EDUCATION/TRAINING PROGRAM

## 2023-01-27 PROCEDURE — 3079F PR MOST RECENT DIASTOLIC BLOOD PRESSURE 80-89 MM HG: ICD-10-PCS | Mod: CPTII,S$GLB,, | Performed by: STUDENT IN AN ORGANIZED HEALTH CARE EDUCATION/TRAINING PROGRAM

## 2023-01-27 PROCEDURE — 99999 PR PBB SHADOW E&M-EST. PATIENT-LVL IV: CPT | Mod: PBBFAC,,, | Performed by: STUDENT IN AN ORGANIZED HEALTH CARE EDUCATION/TRAINING PROGRAM

## 2023-01-27 PROCEDURE — 99999 PR PBB SHADOW E&M-EST. PATIENT-LVL IV: ICD-10-PCS | Mod: PBBFAC,,, | Performed by: STUDENT IN AN ORGANIZED HEALTH CARE EDUCATION/TRAINING PROGRAM

## 2023-01-27 PROCEDURE — 82306 VITAMIN D 25 HYDROXY: CPT | Performed by: STUDENT IN AN ORGANIZED HEALTH CARE EDUCATION/TRAINING PROGRAM

## 2023-01-27 PROCEDURE — 80053 COMPREHEN METABOLIC PANEL: CPT | Performed by: STUDENT IN AN ORGANIZED HEALTH CARE EDUCATION/TRAINING PROGRAM

## 2023-01-27 PROCEDURE — 84550 ASSAY OF BLOOD/URIC ACID: CPT | Performed by: STUDENT IN AN ORGANIZED HEALTH CARE EDUCATION/TRAINING PROGRAM

## 2023-01-27 PROCEDURE — 3075F PR MOST RECENT SYSTOLIC BLOOD PRESS GE 130-139MM HG: ICD-10-PCS | Mod: CPTII,S$GLB,, | Performed by: STUDENT IN AN ORGANIZED HEALTH CARE EDUCATION/TRAINING PROGRAM

## 2023-01-27 PROCEDURE — 1159F PR MEDICATION LIST DOCUMENTED IN MEDICAL RECORD: ICD-10-PCS | Mod: CPTII,S$GLB,, | Performed by: STUDENT IN AN ORGANIZED HEALTH CARE EDUCATION/TRAINING PROGRAM

## 2023-01-27 PROCEDURE — 86803 HEPATITIS C AB TEST: CPT | Performed by: STUDENT IN AN ORGANIZED HEALTH CARE EDUCATION/TRAINING PROGRAM

## 2023-01-27 PROCEDURE — 99214 PR OFFICE/OUTPT VISIT, EST, LEVL IV, 30-39 MIN: ICD-10-PCS | Mod: S$GLB,,, | Performed by: STUDENT IN AN ORGANIZED HEALTH CARE EDUCATION/TRAINING PROGRAM

## 2023-01-27 PROCEDURE — 1101F PT FALLS ASSESS-DOCD LE1/YR: CPT | Mod: CPTII,S$GLB,, | Performed by: STUDENT IN AN ORGANIZED HEALTH CARE EDUCATION/TRAINING PROGRAM

## 2023-01-27 PROCEDURE — 3288F PR FALLS RISK ASSESSMENT DOCUMENTED: ICD-10-PCS | Mod: CPTII,S$GLB,, | Performed by: STUDENT IN AN ORGANIZED HEALTH CARE EDUCATION/TRAINING PROGRAM

## 2023-01-27 PROCEDURE — 99214 OFFICE O/P EST MOD 30 MIN: CPT | Mod: S$GLB,,, | Performed by: STUDENT IN AN ORGANIZED HEALTH CARE EDUCATION/TRAINING PROGRAM

## 2023-01-27 PROCEDURE — 1126F AMNT PAIN NOTED NONE PRSNT: CPT | Mod: CPTII,S$GLB,, | Performed by: STUDENT IN AN ORGANIZED HEALTH CARE EDUCATION/TRAINING PROGRAM

## 2023-01-27 PROCEDURE — 1126F PR PAIN SEVERITY QUANTIFIED, NO PAIN PRESENT: ICD-10-PCS | Mod: CPTII,S$GLB,, | Performed by: STUDENT IN AN ORGANIZED HEALTH CARE EDUCATION/TRAINING PROGRAM

## 2023-01-27 PROCEDURE — 1101F PR PT FALLS ASSESS DOC 0-1 FALLS W/OUT INJ PAST YR: ICD-10-PCS | Mod: CPTII,S$GLB,, | Performed by: STUDENT IN AN ORGANIZED HEALTH CARE EDUCATION/TRAINING PROGRAM

## 2023-01-27 PROCEDURE — 36415 COLL VENOUS BLD VENIPUNCTURE: CPT | Performed by: STUDENT IN AN ORGANIZED HEALTH CARE EDUCATION/TRAINING PROGRAM

## 2023-01-27 PROCEDURE — 85025 COMPLETE CBC W/AUTO DIFF WBC: CPT | Performed by: STUDENT IN AN ORGANIZED HEALTH CARE EDUCATION/TRAINING PROGRAM

## 2023-01-27 PROCEDURE — 3075F SYST BP GE 130 - 139MM HG: CPT | Mod: CPTII,S$GLB,, | Performed by: STUDENT IN AN ORGANIZED HEALTH CARE EDUCATION/TRAINING PROGRAM

## 2023-01-27 PROCEDURE — 3288F FALL RISK ASSESSMENT DOCD: CPT | Mod: CPTII,S$GLB,, | Performed by: STUDENT IN AN ORGANIZED HEALTH CARE EDUCATION/TRAINING PROGRAM

## 2023-01-27 PROCEDURE — 3079F DIAST BP 80-89 MM HG: CPT | Mod: CPTII,S$GLB,, | Performed by: STUDENT IN AN ORGANIZED HEALTH CARE EDUCATION/TRAINING PROGRAM

## 2023-01-27 RX ORDER — VIT C/E/ZN/COPPR/LUTEIN/ZEAXAN 250MG-90MG
2000 CAPSULE ORAL DAILY
COMMUNITY

## 2023-01-27 RX ORDER — HYDROCORTISONE AND ACETIC ACID 20.75; 10.375 MG/ML; MG/ML
3 SOLUTION AURICULAR (OTIC) 2 TIMES DAILY PRN
Qty: 10 ML | Refills: 0 | Status: SHIPPED | OUTPATIENT
Start: 2023-01-27 | End: 2023-02-06

## 2023-01-27 NOTE — PROGRESS NOTES
2023    Natali Galeano  [id number]    Chief Complaint   Patient presents with    [facility]       [facility]    This patient is new to me and presents for routine care. She is also concerned about bilateral ankle swelling,  R>L    Right ankle swollen and sore but managed with podiatry; currently in PT and wearing custom inserts and shoes    Gym: rides stationary bike; twice weekly  Sleep; good; uses cpap nightly  Did have  visit for sinus    HTN well controlled   In digital medicine  Amlodipine 10 mg and hctz 25 mg  daily  Lipitor 10 mg daily    Negative 10 point ROS outside of HPI    Social History     Socioeconomic History    Marital status: Single   Tobacco Use    Smoking status: Former     Packs/day: 0.10     Types: Cigarettes     Quit date: 1980     Years since quittin.8    Smokeless tobacco: Never   Substance and Sexual Activity    Alcohol use: No    Drug use: No    Sexual activity: Never     Social Determinants of Health     Financial Resource Strain: Medium Risk    Difficulty of Paying Living Expenses: Somewhat hard   Food Insecurity: No Food Insecurity    Worried About Running Out of Food in the Last Year: Never true    Ran Out of Food in the Last Year: Never true   Transportation Needs: No Transportation Needs    Lack of Transportation (Medical): No    Lack of Transportation (Non-Medical): No   Physical Activity: Inactive    Days of Exercise per Week: 0 days    Minutes of Exercise per Session: 0 min   Stress: No Stress Concern Present    Feeling of Stress : Not at all   Social Connections: Moderately Integrated    Frequency of Communication with Friends and Family: More than three times a week    Frequency of Social Gatherings with Friends and Family: Twice a week    Attends Hindu Services: More than 4 times per year    Active Member of Clubs or Organizations: Yes    Attends Club or Organization Meetings: More than 4 times per year    Marital Status:    Housing Stability: High Risk     Unable to Pay for Housing in the Last Year: Yes    Number of Places Lived in the Last Year: 1    Unstable Housing in the Last Year: No           Current Outpatient Medications:     allopurinol (ZYLOPRIM) 300 MG tablet, Take 300 mg by mouth once daily., Disp: , Rfl:     amLODIPine (NORVASC) 10 MG tablet, TAKE 1 TABLET(10 MG) BY MOUTH EVERY DAY, Disp: 90 tablet, Rfl: 3    aspirin (ECOTRIN) 81 MG EC tablet, Take 81 mg by mouth once daily., Disp: , Rfl:     atorvastatin (LIPITOR) 10 MG tablet, Take 10 mg by mouth every evening. , Disp: , Rfl:     cyanocobalamin (VITAMIN B-12) 250 MCG tablet, Take 2,500 mcg by mouth once daily., Disp: , Rfl:     docusate sodium (COLACE) 100 MG capsule, Take 100 mg by mouth once daily., Disp: , Rfl:     fluticasone propionate (FLONASE) 50 mcg/actuation nasal spray, SHAKE LIQUID AND USE 2 SPRAYS(100 MCG) IN EACH NOSTRIL EVERY DAY, Disp: 16 g, Rfl: 3    hydroCHLOROthiazide (HYDRODIURIL) 25 MG tablet, Take 12.5 mg by mouth. Take 1/2 of the 25 mg tablet daily, Disp: , Rfl:     levocetirizine (XYZAL) 5 MG tablet, Take 5 mg by mouth every evening., Disp: , Rfl:     levocetirizine (XYZAL) 5 MG tablet, Take 5 mg by mouth., Disp: , Rfl:     LIDOcaine (XYLOCAINE) 5 % Oint ointment, Apply topically nightly as needed., Disp: , Rfl:     LIDOcaine (XYLOCAINE) 5 % Oint ointment, APPLY TOPICALLY TO AFFECTED PART OF FOOT/FEET ONCE NIGHTLY AS NEEDED., Disp: , Rfl:     LIDOcaine HCL 2% (XYLOCAINE) 2 % jelly, Apply topically as needed. Apply topically once nightly to affected part of foot/feet., Disp: 30 mL, Rfl: 2    ascorbic acid, vitamin C, (VITAMIN C) 1000 MG tablet, Take 1,000 mg by mouth once daily., Disp: , Rfl:     FLUZONE HIGHDOSE QUAD 22-23  mcg/0.7 mL Syrg, , Disp: , Rfl:       Physical Exam  Vitals:    01/27/23 1003   BP: 132/80   Pulse: 72   Resp: 20   Temp: 97.8 °F (36.6 °C)       GEN: NAD, AAox3, well nourished  HEENT: NCAT, EOMI, PEERL, no scleral injection, TM normal; canals with  dry flakey skin, moist mucous membranes, oropharynx clear, no erythema, no exudates  NECK: full rom, no cervical lymphadenopathy, no thyroidmegally  CV: RRR, no m/r/g, trace LE edema  LUNGS: CTAB, non-labored breathing, no wheezes, no crackles  ABD: soft, non-distended, no rebound/guarding, no organomegaly  EXT: n c/c/e, warm, 5/5 UE and LE strength  NEURO: CNII-XII intact no focal deficit  PSYCH: nl affect, no hallucinations, nl speech  Skin: intact, no rashes/lesions/erythema    1. Encounter for hepatitis C screening test for low risk patient  - HEPATITIS C ANTIBODY; Future    2. Osteopenia of necks of both femurs  - Vitamin D; Future    3. Gout, unspecified cause, unspecified chronicity, unspecified site  No recent flares  - URIC ACID; Future  - CBC Auto Differential; Future    4. Essential hypertension  Well controlled continue current regimen  - Comprehensive Metabolic Panel; Future    5. Body mass index (BMI) 40.0-44.9, adult  - Vitamin D; Future    6. Eczema of both external ears  - acetic acid-hydrocortisone (VOSOL-HC) otic solution; Place 3 drops into both ears 2 (two) times daily as needed (ear itching).  Dispense: 10 mL; Refill: 0    7. Thyroid disease  - TSH; Future        RTC in 4-6 months     Ilene Rocha MD  Family Medicine

## 2023-01-31 ENCOUNTER — CLINICAL SUPPORT (OUTPATIENT)
Dept: REHABILITATION | Facility: HOSPITAL | Age: 83
End: 2023-01-31
Payer: MEDICARE

## 2023-01-31 DIAGNOSIS — M25.671 DECREASED RANGE OF MOTION OF RIGHT ANKLE: ICD-10-CM

## 2023-01-31 DIAGNOSIS — R26.9 GAIT DIFFICULTY: Primary | ICD-10-CM

## 2023-01-31 PROCEDURE — 97530 THERAPEUTIC ACTIVITIES: CPT | Mod: PN,CQ

## 2023-01-31 PROCEDURE — 97110 THERAPEUTIC EXERCISES: CPT | Mod: PN,CQ

## 2023-01-31 NOTE — PROGRESS NOTES
"Physical Therapy Daily Treatment Note     Name: Natali Galeano  Clinic Number: [id number]    Therapy Diagnosis:   Encounter Diagnoses   Name Primary?    Gait difficulty Yes    Decreased range of motion of right ankle      Physician: Russell Reid DPM    Visit Date: 2023    Physician Orders: PT Eval and Treat   Medical Diagnosis from Referral:   M76.829 (ICD-10-CM) - PTTD (posterior tibial tendon dysfunction)   M77.9 (ICD-10-CM) - Tendinitis   M24.573 (ICD-10-CM) - Equinus contracture of ankle      Evaluation Date: 2022  Plan of Care Expiration: 23 or 16th Visit  Authorization Period Expiration: 23  Visit # / Visits authorized:   Remaining Visits Scheduled -   PTA Consecutive Visits - 3/6    Eval Visit FOTO- 57 (2022)   5th Visit FOTO   -  53 (1/10/2023)   10th Visit FOTO  -  (Date/Score/Turn Green)   D/C FOTO          -  (Date/Score/Turn Green)      Time In: 8:48  Time Out: 9:30  Billable Time: 42 minutes  Non-Billable Time: 00 minutes    Precautions: Standard and Fall  Insurance: Payor: [employer] / Plan: [place] / Product Type: Medicare Advantage /     Subjective     Pt. Reports: "therapy has been helping, when I first came I was in a lot of pain."    She is compliant with home exercise program.  Response to previous treatment: Soreness of the Right foot    Pre-Treatment Pain Ratin/10  Post-Treatment Pain Ratin/10  Location: right ankles     Objective     Natali received therapeutic exercises to develop strength, endurance, ROM, and flexibility for 7 minutes including:    Warm-up      Supine    Ankle circles - 30 reps  Ankle abc's - X2  Ankle 3 Way with Green Theraband 2x10 (Plantarflexion/Dorsiflexion/Eversion)      Seated    Heel slides -  4'   DF/PF - 30 reps   Great toe flex/ext - 30 repititions   HSS - 3X30"       Standing    Static Lunges 3x8 with a 3' sec hold   Calf Stretch on Doorway 2x30 sec      *Bold exercise performed " "    Natali participated in neuromuscular re-education activities to improve: Balance, Coordination, Sense, Proprioception, Posture, and Neuromuscular Recruitment for 25 minutes. The following activities were included:  Towel Srunches 3x1 min   Heel/Toe Raise Combo with #9 2x15  Seated Hip Flexion with Ankle Weight on Dorsal Aspect of Foot 3x10, #4  DL Heel Raises 3x10  Tandem Balance 2x30s sec, bilaterally Eyes Open + headturns   Tandem balance 1x30s bilaterally eyes closed  Supination of the Foot 3x12  Seated Disc Stability 2x12 (Counter-Clockwise/Clockwise/Eversion-Inversion)     Natali received the following manual therapy techniques: Joint mobilizations, Manual traction, Myofacial release, and Soft tissue Mobilization were applied to the: right ankle for 00 minutes, including:    Visit Number:  1 out of 11   Manual Therapy  (amplitude and time)     Ankle passive range of motion with overpressure in all planes  Not Performed    Distraction of the Ankle  Not Performed    Manual Stretching of the Tibialis Anterior  Not Performed    Soft-Tissue Mobilization to the Tibialis Anterior  Not Performed          Natali participated in dynamic functional therapeutic activities to improve functional performance for 10  minutes, including:    Visit Number:  1 out of 11   Activities performed   (duration/resistance)     Step Up on 6" box  2x10   Step Down on 6" Box 2x10                  Home Exercises Provided and Patient Education Provided     Education provided:   - Continue with HEP    Written Home Exercises Provided: Patient instructed to cont prior HEP.  Exercises were reviewed and Natali was able to demonstrate them prior to the end of the session.  Natali demonstrated good  understanding of the education provided.     See EMR under Patient Instructions for exercises provided prior visit.    Assessment     Pt tolerated session well. Sit to stands added to improve pts ability to transition and increase functional strength for " ADL. Upper Extremity support needed, pt completed all reps with noticeable fatigue. Step-ups/downs tolerated well but challenging due to low endurance. Pt had no adverse effects today.    Natali Is progressing well towards Her goals.     Pt prognosis is Fair.     Pt will continue to benefit from skilled outpatient physical therapy to address the deficits listed in the problem list box on initial evaluation, provide pt/family education and to maximize pt's level of independence in the home and community environment.     Pt's spiritual, cultural and educational needs considered and pt agreeable to plan of care and goals.    Anticipated barriers to physical therapy: None.     Goals:  Short Term Goals: 4 weeks        Goal Status    Pt. to report decreased right ankle pain </ = 6/10 at worst to increase tolerance for ambulation  Goal Met 1/10/2023    Pt. to demonstrate increased right  ankle gastrocnemius flexibility to 0 degrees to improve ease with stair descent. Goal Met 1/10/2023    Pt. to demonstrate increased right ankle soleus flexibility to 0 degrees to improve ease with partial squatting Goal Met 1/10/2023    Pt to be Independent with HEP to improve ROM and independence with ADL's Goal Met 1/10/2023                                  Long Term Goals: 8 weeks        Goal   Status   Pt. to report decreased right ankle pain </ = 4/10 at worst to increase tolerance for ambulation  Goal Met 1/10/2023    Pt. to demonstrate increased right  ankle gastrocnemius flexibility to 10 degrees to improve ease with stair descent. Goal Met 1/10/2023     Pt. to demonstrate increased right ankle soleus flexibility to 8 degrees to improve ease with partial squatting Goal Met 1/10/2023    Pt to demonstrate increased MMT for right  ankle Inversion  and Eversion  to 4/5 to increase ankle stability during lateral motions. Progressing 1/31/2023    Pt. demonstrate increased MMT for right  ankle plantarflexion to 4/5 to increase ease with  toe clearance Progressing 1/31/2023    Pt to be Independent with HEP to improve ROM and independence with ADL's Goal Met 1/10/2023         Plan     Progress duration, workload, and resistance levels of the Therapeutic Activities and Exercises if the patient does not exhibit any pain, swelling, or other symptoms. Progress instruction in-home exercise progression and modification, including symptom management.    Lottie Moralez, PTA   1/31/2023

## 2023-02-02 ENCOUNTER — DOCUMENTATION ONLY (OUTPATIENT)
Dept: REHABILITATION | Facility: HOSPITAL | Age: 83
End: 2023-02-02
Payer: MEDICARE

## 2023-02-02 NOTE — PROGRESS NOTES
30 day PT-PTA face-face discussion with Greyson Rodriguez DPT re: Name: Natali LARSEN Minor  Clinic Number: [id number] patient status, POC, and plan for progression done .

## 2023-02-07 ENCOUNTER — CLINICAL SUPPORT (OUTPATIENT)
Dept: REHABILITATION | Facility: HOSPITAL | Age: 83
End: 2023-02-07
Payer: MEDICARE

## 2023-02-07 DIAGNOSIS — M25.671 DECREASED RANGE OF MOTION OF RIGHT ANKLE: ICD-10-CM

## 2023-02-07 DIAGNOSIS — R26.9 GAIT DIFFICULTY: Primary | ICD-10-CM

## 2023-02-07 PROCEDURE — 97112 NEUROMUSCULAR REEDUCATION: CPT | Mod: PN,CQ

## 2023-02-07 PROCEDURE — 97530 THERAPEUTIC ACTIVITIES: CPT | Mod: PN,CQ

## 2023-02-07 NOTE — PROGRESS NOTES
"Physical Therapy Daily Treatment Note     Name: Natali Galeano  Clinic Number: [id number]    Therapy Diagnosis:   Encounter Diagnoses   Name Primary?    Gait difficulty Yes    Decreased range of motion of right ankle        Physician: Russell Reid DPM    Visit Date: 2023    Physician Orders: PT Eval and Treat   Medical Diagnosis from Referral:   M76.829 (ICD-10-CM) - PTTD (posterior tibial tendon dysfunction)   M77.9 (ICD-10-CM) - Tendinitis   M24.573 (ICD-10-CM) - Equinus contracture of ankle      Evaluation Date: 2022  Plan of Care Expiration: 23 or 16th Visit  Authorization Period Expiration: 23  Visit # / Visits authorized:   Remaining Visits Scheduled -   PTA Consecutive Visits - /6    Eval Visit FOTO- 57 (2022)   5th Visit FOTO   -  53 (1/10/2023)   8th Visit FOTO  -  72.3 (23)  D/C FOTO          -  (Date/Score/Turn Green)      Time In: 8:50  Time Out: 9:30  Billable Time: 40 minutes  Non-Billable Time: 00 minutes    Precautions: Standard and Fall  Insurance: Payor: [employer] / Plan: [place] 65 / Product Type: Medicare Advantage /     Subjective     Pt. Reports: Ankles are fine sometimes has knee pain but that goes away    She is compliant with home exercise program.  Response to previous treatment: Soreness of the Right foot    Pre-Treatment Pain Ratin/10  Post-Treatment Pain Ratin/10  Location: right ankles     Objective     Natali received therapeutic exercises to develop strength, endurance, ROM, and flexibility for 5 minutes including:    Warm-up      Supine    Ankle circles - 30 reps  Ankle abc's - X2  Ankle 3 Way with Green Theraband 2x10 (Plantarflexion/Dorsiflexion/Eversion)      Seated    Heel slides -  4'   DF/PF - 30 reps   Great toe flex/ext - 30 repititions   HSS - 3X30"       Standing    Static Lunges 3x8 with a 3' sec hold   Calf Stretch on Doorway 2x30 sec      *Bold exercise performed     Natali participated in " "neuromuscular re-education activities to improve: Balance, Coordination, Sense, Proprioception, Posture, and Neuromuscular Recruitment for 25 minutes. The following activities were included:  Towel Srunches 3x1 min   Heel/Toe Raise Combo with #10 2x15  Seated Hip Flexion with Ankle Weight on Dorsal Aspect of Foot 3x10, #4  DL Heel Raises 3x10  Tandem Balance 2x30s sec, bilaterally Eyes Open + headturns   Tandem balance 1x30s bilaterally eyes closed  Supination of the Foot 3x12  Seated Disc Stability 2x12 (Counter-Clockwise/Clockwise/Eversion-Inversion)     Natali received the following manual therapy techniques: Joint mobilizations, Manual traction, Myofacial release, and Soft tissue Mobilization were applied to the: right ankle for 00 minutes, including:    Visit Number:  1 out of 11   Manual Therapy  (amplitude and time)     Ankle passive range of motion with overpressure in all planes  Not Performed    Distraction of the Ankle  Not Performed    Manual Stretching of the Tibialis Anterior  Not Performed    Soft-Tissue Mobilization to the Tibialis Anterior  Not Performed          Natali participated in dynamic functional therapeutic activities to improve functional performance for 10  minutes, including:    Visit Number:  1 out of 11   Activities performed   (duration/resistance)     Step Up on 6" box  2x10   Step Down on 6" Box 2x10        Sit to stand 2 x10         Home Exercises Provided and Patient Education Provided     Education provided:   - Continue with HEP    Written Home Exercises Provided: Patient instructed to cont prior HEP.  Exercises were reviewed and Natali was able to demonstrate them prior to the end of the session.  Natali demonstrated good  understanding of the education provided.     See EMR under Patient Instructions for exercises provided prior visit.    Assessment     Pt tolerated session well. Sit to stand mechanics improved with verbal cues, high low mat used as Upper Extremity support. Pt " stated sit <>stands were easier today. More control shown with step-ups and step-downs. FOTO score improved since eval. Natali had no adverse effects with today's exercises.     Natali Is progressing well towards Her goals.     Pt prognosis is Fair.     Pt will continue to benefit from skilled outpatient physical therapy to address the deficits listed in the problem list box on initial evaluation, provide pt/family education and to maximize pt's level of independence in the home and community environment.     Pt's spiritual, cultural and educational needs considered and pt agreeable to plan of care and goals.    Anticipated barriers to physical therapy: None.     Goals:  Short Term Goals: 4 weeks        Goal Status    Pt. to report decreased right ankle pain </ = 6/10 at worst to increase tolerance for ambulation  Goal Met 1/10/2023    Pt. to demonstrate increased right  ankle gastrocnemius flexibility to 0 degrees to improve ease with stair descent. Goal Met 1/10/2023    Pt. to demonstrate increased right ankle soleus flexibility to 0 degrees to improve ease with partial squatting Goal Met 1/10/2023    Pt to be Independent with HEP to improve ROM and independence with ADL's Goal Met 1/10/2023                                  Long Term Goals: 8 weeks        Goal   Status   Pt. to report decreased right ankle pain </ = 4/10 at worst to increase tolerance for ambulation  Goal Met 1/10/2023    Pt. to demonstrate increased right  ankle gastrocnemius flexibility to 10 degrees to improve ease with stair descent. Goal Met 1/10/2023     Pt. to demonstrate increased right ankle soleus flexibility to 8 degrees to improve ease with partial squatting Goal Met 1/10/2023    Pt to demonstrate increased MMT for right  ankle Inversion  and Eversion  to 4/5 to increase ankle stability during lateral motions. Progressing 2/7/2023    Pt. demonstrate increased MMT for right  ankle plantarflexion to 4/5 to increase ease with toe clearance  Progressing 2/7/2023    Pt to be Independent with HEP to improve ROM and independence with ADL's Goal Met 1/10/2023         Plan     Progress duration, workload, and resistance levels of the Therapeutic Activities and Exercises if the patient does not exhibit any pain, swelling, or other symptoms. Progress instruction in-home exercise progression and modification, including symptom management.    Lottie Moralez, PTA   2/7/2023

## 2023-02-17 ENCOUNTER — PATIENT MESSAGE (OUTPATIENT)
Dept: PRIMARY CARE CLINIC | Facility: CLINIC | Age: 83
End: 2023-02-17
Payer: MEDICARE

## 2023-02-23 NOTE — TELEPHONE ENCOUNTER
Please let pt know that her labs are stable when compared to previous. We will repeat some in 6 months, but otherwise there is no change from her baseline.

## 2023-02-27 ENCOUNTER — PATIENT MESSAGE (OUTPATIENT)
Dept: ADMINISTRATIVE | Facility: OTHER | Age: 83
End: 2023-02-27
Payer: MEDICARE

## 2023-03-01 ENCOUNTER — DOCUMENTATION ONLY (OUTPATIENT)
Dept: REHABILITATION | Facility: HOSPITAL | Age: 83
End: 2023-03-01
Payer: MEDICARE

## 2023-03-01 NOTE — PROGRESS NOTES
30 day PT-PTA face-face discussion with Simon Mccoy DPT re:Name: Natali LARSEN Minor Clinic Number: [id number] patient status, POC, and plan for progression done

## 2023-03-08 ENCOUNTER — PATIENT OUTREACH (OUTPATIENT)
Dept: ADMINISTRATIVE | Facility: HOSPITAL | Age: 83
End: 2023-03-08
Payer: MEDICARE

## 2023-03-08 ENCOUNTER — PATIENT MESSAGE (OUTPATIENT)
Dept: ADMINISTRATIVE | Facility: HOSPITAL | Age: 83
End: 2023-03-08
Payer: MEDICARE

## 2023-05-02 ENCOUNTER — PATIENT MESSAGE (OUTPATIENT)
Dept: ADMINISTRATIVE | Facility: OTHER | Age: 83
End: 2023-05-02
Payer: MEDICARE

## 2023-06-07 ENCOUNTER — OFFICE VISIT (OUTPATIENT)
Dept: CARDIOLOGY | Facility: CLINIC | Age: 83
End: 2023-06-07
Payer: MEDICARE

## 2023-06-07 VITALS
HEART RATE: 71 BPM | DIASTOLIC BLOOD PRESSURE: 70 MMHG | WEIGHT: 282.88 LBS | SYSTOLIC BLOOD PRESSURE: 158 MMHG | OXYGEN SATURATION: 98 % | BODY MASS INDEX: 44.4 KG/M2 | HEIGHT: 67 IN

## 2023-06-07 DIAGNOSIS — E78.49 OTHER HYPERLIPIDEMIA: Chronic | ICD-10-CM

## 2023-06-07 DIAGNOSIS — I10 ESSENTIAL HYPERTENSION: Primary | Chronic | ICD-10-CM

## 2023-06-07 DIAGNOSIS — R01.1 HEART MURMUR: ICD-10-CM

## 2023-06-07 PROCEDURE — 99999 PR PBB SHADOW E&M-EST. PATIENT-LVL III: CPT | Mod: PBBFAC,,, | Performed by: INTERNAL MEDICINE

## 2023-06-07 PROCEDURE — 99214 PR OFFICE/OUTPT VISIT, EST, LEVL IV, 30-39 MIN: ICD-10-PCS | Mod: S$GLB,,, | Performed by: INTERNAL MEDICINE

## 2023-06-07 PROCEDURE — 99999 PR PBB SHADOW E&M-EST. PATIENT-LVL III: ICD-10-PCS | Mod: PBBFAC,,, | Performed by: INTERNAL MEDICINE

## 2023-06-07 PROCEDURE — 3288F FALL RISK ASSESSMENT DOCD: CPT | Mod: CPTII,S$GLB,, | Performed by: INTERNAL MEDICINE

## 2023-06-07 PROCEDURE — 1159F MED LIST DOCD IN RCRD: CPT | Mod: CPTII,S$GLB,, | Performed by: INTERNAL MEDICINE

## 2023-06-07 PROCEDURE — 3078F PR MOST RECENT DIASTOLIC BLOOD PRESSURE < 80 MM HG: ICD-10-PCS | Mod: CPTII,S$GLB,, | Performed by: INTERNAL MEDICINE

## 2023-06-07 PROCEDURE — 3077F SYST BP >= 140 MM HG: CPT | Mod: CPTII,S$GLB,, | Performed by: INTERNAL MEDICINE

## 2023-06-07 PROCEDURE — 3288F PR FALLS RISK ASSESSMENT DOCUMENTED: ICD-10-PCS | Mod: CPTII,S$GLB,, | Performed by: INTERNAL MEDICINE

## 2023-06-07 PROCEDURE — 99214 OFFICE O/P EST MOD 30 MIN: CPT | Mod: S$GLB,,, | Performed by: INTERNAL MEDICINE

## 2023-06-07 PROCEDURE — 1101F PR PT FALLS ASSESS DOC 0-1 FALLS W/OUT INJ PAST YR: ICD-10-PCS | Mod: CPTII,S$GLB,, | Performed by: INTERNAL MEDICINE

## 2023-06-07 PROCEDURE — 3078F DIAST BP <80 MM HG: CPT | Mod: CPTII,S$GLB,, | Performed by: INTERNAL MEDICINE

## 2023-06-07 PROCEDURE — 1101F PT FALLS ASSESS-DOCD LE1/YR: CPT | Mod: CPTII,S$GLB,, | Performed by: INTERNAL MEDICINE

## 2023-06-07 PROCEDURE — 93000 ELECTROCARDIOGRAM COMPLETE: CPT | Mod: S$GLB,,, | Performed by: INTERNAL MEDICINE

## 2023-06-07 PROCEDURE — 93000 EKG 12-LEAD: ICD-10-PCS | Mod: S$GLB,,, | Performed by: INTERNAL MEDICINE

## 2023-06-07 PROCEDURE — 3077F PR MOST RECENT SYSTOLIC BLOOD PRESSURE >= 140 MM HG: ICD-10-PCS | Mod: CPTII,S$GLB,, | Performed by: INTERNAL MEDICINE

## 2023-06-07 PROCEDURE — 1125F AMNT PAIN NOTED PAIN PRSNT: CPT | Mod: CPTII,S$GLB,, | Performed by: INTERNAL MEDICINE

## 2023-06-07 PROCEDURE — 1159F PR MEDICATION LIST DOCUMENTED IN MEDICAL RECORD: ICD-10-PCS | Mod: CPTII,S$GLB,, | Performed by: INTERNAL MEDICINE

## 2023-06-07 PROCEDURE — 1125F PR PAIN SEVERITY QUANTIFIED, PAIN PRESENT: ICD-10-PCS | Mod: CPTII,S$GLB,, | Performed by: INTERNAL MEDICINE

## 2023-06-07 NOTE — PROGRESS NOTES
Cardiology    6/7/2023  11:19 AM    Problem list  Patient Active Problem List   Diagnosis    Class 3 severe obesity with serious comorbidity and body mass index (BMI) of 40.0 to 44.9 in adult    Essential hypertension    Adrenal nodule    Hyperlipidemia    ALYSE (obstructive sleep apnea)    Thyroid disease    Normocytic anemia    Gout    Heart murmur    Rheumatic fever    BMI 45.0-49.9, adult    Cough    Nasal congestion    Chronic pain of right ankle    Onychorrhexis    Osteopenia of necks of both femurs    Allergic rhinitis    Gait difficulty    Decreased range of motion of right ankle       CC:  F/u    HPI:  She is here for six-month follow-up.  She is been doing well.  She denies any angina, dyspnea on exertion, palpitation or syncope.  She has been monitored on the digital medicine program and her systolic blood pressure has been 120s to 130s.  She is asking if she can go to [facility] this November.    Medications  Current Outpatient Medications   Medication Sig Dispense Refill    allopurinol (ZYLOPRIM) 300 MG tablet Take 300 mg by mouth once daily.      amLODIPine (NORVASC) 10 MG tablet TAKE 1 TABLET(10 MG) BY MOUTH EVERY DAY 90 tablet 3    ascorbic acid, vitamin C, (VITAMIN C) 1000 MG tablet Take 1,000 mg by mouth once daily.      aspirin (ECOTRIN) 81 MG EC tablet Take 81 mg by mouth once daily.      atorvastatin (LIPITOR) 10 MG tablet Take 10 mg by mouth every evening.       cholecalciferol, vitamin D3, (VITAMIN D3) 25 mcg (1,000 unit) capsule Take 2,000 Units by mouth once daily.      cyanocobalamin (VITAMIN B-12) 250 MCG tablet Take 2,500 mcg by mouth once daily.      docusate sodium (COLACE) 100 MG capsule Take 100 mg by mouth once daily.      fluticasone propionate (FLONASE) 50 mcg/actuation nasal spray SHAKE LIQUID AND USE 2 SPRAYS(100 MCG) IN EACH NOSTRIL EVERY DAY 16 g 3    hydroCHLOROthiazide (HYDRODIURIL) 25 MG tablet Take 12.5 mg by mouth. Take 1/2 of the 25 mg tablet daily      levocetirizine  (XYZAL) 5 MG tablet Take 5 mg by mouth every evening.      LIDOcaine (XYLOCAINE) 5 % Oint ointment APPLY TOPICALLY TO AFFECTED PART OF FOOT/FEET ONCE NIGHTLY AS NEEDED.      levocetirizine (XYZAL) 5 MG tablet Take 5 mg by mouth.      LIDOcaine (XYLOCAINE) 5 % Oint ointment Apply topically nightly as needed.      LIDOcaine HCL 2% (XYLOCAINE) 2 % jelly Apply topically as needed. Apply topically once nightly to affected part of foot/feet. 30 mL 2     No current facility-administered medications for this visit.      Prior to Admission medications    Medication Sig Start Date End Date Taking? Authorizing Provider   allopurinol (ZYLOPRIM) 300 MG tablet Take 300 mg by mouth once daily.   Yes Historical Provider   amLODIPine (NORVASC) 10 MG tablet TAKE 1 TABLET(10 MG) BY MOUTH EVERY DAY 9/8/22  Yes Ashutosh Wolff MD   ascorbic acid, vitamin C, (VITAMIN C) 1000 MG tablet Take 1,000 mg by mouth once daily.   Yes Historical Provider   aspirin (ECOTRIN) 81 MG EC tablet Take 81 mg by mouth once daily.   Yes Historical Provider   atorvastatin (LIPITOR) 10 MG tablet Take 10 mg by mouth every evening.    Yes Historical Provider   cholecalciferol, vitamin D3, (VITAMIN D3) 25 mcg (1,000 unit) capsule Take 2,000 Units by mouth once daily.   Yes Historical Provider   cyanocobalamin (VITAMIN B-12) 250 MCG tablet Take 2,500 mcg by mouth once daily.   Yes Historical Provider   docusate sodium (COLACE) 100 MG capsule Take 100 mg by mouth once daily.   Yes Historical Provider   fluticasone propionate (FLONASE) 50 mcg/actuation nasal spray SHAKE LIQUID AND USE 2 SPRAYS(100 MCG) IN EACH NOSTRIL EVERY DAY 12/28/22  Yes Talon Barfield MD   hydroCHLOROthiazide (HYDRODIURIL) 25 MG tablet Take 12.5 mg by mouth. Take 1/2 of the 25 mg tablet daily 11/17/19  Yes Historical Provider   levocetirizine (XYZAL) 5 MG tablet Take 5 mg by mouth every evening. 11/4/22  Yes Historical Provider   LIDOcaine (XYLOCAINE) 5 % Oint ointment APPLY TOPICALLY TO  AFFECTED PART OF FOOT/FEET ONCE NIGHTLY AS NEEDED. 22  Yes Historical Provider   levocetirizine (XYZAL) 5 MG tablet Take 5 mg by mouth. 22  Historical Provider   LIDOcaine (XYLOCAINE) 5 % Oint ointment Apply topically nightly as needed. 22   Historical Provider   LIDOcaine HCL 2% (XYLOCAINE) 2 % jelly Apply topically as needed. Apply topically once nightly to affected part of foot/feet. 22   Russell Reid DPM         History  Past Medical History:   Diagnosis Date    Allergic rhinitis 2022    Anemia     Arthritis     Colon cancer     Gout, chronic     Heart murmur     High cholesterol     Hypercholesteremia     Hypertension     Obesity     PMB (postmenopausal bleeding) 10/24/2017    Right knee pain 2019    Sleep apnea     Thyroid disease      Past Surgical History:   Procedure Laterality Date    BTL       SECTION, CLASSIC      COLON SURGERY      goiter       HERNIA REPAIR      KIDNEY SURGERY      cyst removal    THYROID SURGERY       Social History     Socioeconomic History    Marital status: Single   Tobacco Use    Smoking status: Former     Packs/day: 0.10     Types: Cigarettes     Quit date: 1980     Years since quittin.1    Smokeless tobacco: Never   Substance and Sexual Activity    Alcohol use: No    Drug use: No    Sexual activity: Never     Social Determinants of Health     Financial Resource Strain: Medium Risk    Difficulty of Paying Living Expenses: Somewhat hard   Food Insecurity: No Food Insecurity    Worried About Running Out of Food in the Last Year: Never true    Ran Out of Food in the Last Year: Never true   Transportation Needs: No Transportation Needs    Lack of Transportation (Medical): No    Lack of Transportation (Non-Medical): No   Physical Activity: Inactive    Days of Exercise per Week: 0 days    Minutes of Exercise per Session: 0 min   Stress: No Stress Concern Present    Feeling of Stress : Not at all   Social Connections:  Moderately Integrated    Frequency of Communication with Friends and Family: More than three times a week    Frequency of Social Gatherings with Friends and Family: Twice a week    Attends [employer] Services: More than 4 times per year    Active Member of Clubs or Organizations: Yes    Attends Club or Organization Meetings: More than 4 times per year    Marital Status:    Housing Stability: High Risk    Unable to Pay for Housing in the Last Year: Yes    Number of Places Lived in the Last Year: 1    Unstable Housing in the Last Year: No         Allergies  Review of patient's allergies indicates:   Allergen Reactions    Ace inhibitors Other (See Comments)     cough    Arb-angiotensin receptor antagonist Other (See Comments)     Cough w/ valsartan         Review of Systems   Review of Systems   Constitutional: Negative for decreased appetite, fever and weight loss.   HENT:  Negative for congestion and nosebleeds.    Eyes:  Negative for double vision, vision loss in left eye, vision loss in right eye and visual disturbance.   Cardiovascular:  Negative for chest pain, claudication, cyanosis, dyspnea on exertion, irregular heartbeat, leg swelling, near-syncope, orthopnea, palpitations, paroxysmal nocturnal dyspnea and syncope.   Respiratory:  Negative for cough, hemoptysis, shortness of breath, sleep disturbances due to breathing, snoring, sputum production and wheezing.    Endocrine: Negative for cold intolerance and heat intolerance.   Skin:  Negative for nail changes and rash.   Musculoskeletal:  Negative for joint pain, muscle cramps, muscle weakness and myalgias.   Gastrointestinal:  Negative for change in bowel habit, heartburn, hematemesis, hematochezia, hemorrhoids and melena.   Neurological:  Negative for dizziness, focal weakness and headaches.       Physical Exam  Wt Readings from Last 1 Encounters:   06/07/23 128.3 kg (282 lb 13.6 oz)     BP Readings from Last 3 Encounters:   06/07/23 (!) (P) 148/76    01/27/23 132/80   01/18/23 (!) 140/65     Pulse Readings from Last 1 Encounters:   06/07/23 71     Body mass index is 44.3 kg/m².    Physical Exam  Vitals reviewed.   Constitutional:       Appearance: She is well-developed. She is obese.   HENT:      Head: Atraumatic.   Eyes:      General: No scleral icterus.  Neck:      Vascular: Normal carotid pulses. No carotid bruit, hepatojugular reflux or JVD.   Cardiovascular:      Rate and Rhythm: Normal rate and regular rhythm.      Chest Wall: PMI is not displaced.      Pulses: Intact distal pulses.           Carotid pulses are 2+ on the right side and 2+ on the left side.       Radial pulses are 2+ on the right side and 2+ on the left side.        Dorsalis pedis pulses are 2+ on the right side and 2+ on the left side.      Heart sounds: Normal heart sounds, S1 normal and S2 normal. No murmur heard.    No friction rub.   Pulmonary:      Effort: Pulmonary effort is normal. No respiratory distress.      Breath sounds: Normal breath sounds. No stridor. No wheezing or rales.   Chest:      Chest wall: No tenderness.   Abdominal:      General: Bowel sounds are normal.      Palpations: Abdomen is soft.   Musculoskeletal:      Cervical back: Neck supple. No edema.   Skin:     General: Skin is warm and dry.      Nails: There is no clubbing.   Neurological:      Mental Status: She is alert and oriented to person, place, and time.   Psychiatric:         Behavior: Behavior normal.         Thought Content: Thought content normal.           Assessment  1. Essential hypertension  Controlled on meds, continue to monitor    2. Heart murmur  stable    3. Other hyperlipidemia  stable        Plan and Discussion  Discussed her EKG today showed normal sinus rhythm rate of 62 and voltage for LVH.  Reviewed and discussed her blood pressure readings from the digital medicine program which showed that her blood pressure is at goal 75% of the time.  Recommend to continue with current medications.   Okay to travel.     Follow Up  6 months      Cleve Barfield MD, F.A.C.C, F.S.C.A.I.        30 minutes were spent in chart review, documentation and review of results, and evaluation, treatment, and counseling of patient on the same day of service.    Disclaimer: This document was created using voice recognition software (Chatosity). Although it may be edited, this document may contain errors related to incorrect recognition of the spoken word. Please call the physician if clarification is needed.

## 2023-06-09 ENCOUNTER — PATIENT OUTREACH (OUTPATIENT)
Dept: ADMINISTRATIVE | Facility: HOSPITAL | Age: 83
End: 2023-06-09
Payer: MEDICARE

## 2023-06-15 ENCOUNTER — OFFICE VISIT (OUTPATIENT)
Dept: PRIMARY CARE CLINIC | Facility: CLINIC | Age: 83
End: 2023-06-15
Payer: MEDICARE

## 2023-06-15 VITALS
DIASTOLIC BLOOD PRESSURE: 61 MMHG | HEIGHT: 67 IN | HEART RATE: 70 BPM | BODY MASS INDEX: 44.17 KG/M2 | SYSTOLIC BLOOD PRESSURE: 134 MMHG | WEIGHT: 281.44 LBS | OXYGEN SATURATION: 100 %

## 2023-06-15 DIAGNOSIS — Z78.9 EDUCATED ABOUT MANAGEMENT OF WEIGHT: ICD-10-CM

## 2023-06-15 DIAGNOSIS — Z86.39 HX OF GOITER: ICD-10-CM

## 2023-06-15 DIAGNOSIS — Z00.00 PHYSICAL EXAM: ICD-10-CM

## 2023-06-15 DIAGNOSIS — E66.01 CLASS 3 SEVERE OBESITY DUE TO EXCESS CALORIES WITH SERIOUS COMORBIDITY AND BODY MASS INDEX (BMI) OF 40.0 TO 44.9 IN ADULT: ICD-10-CM

## 2023-06-15 DIAGNOSIS — I10 ESSENTIAL HYPERTENSION: Primary | Chronic | ICD-10-CM

## 2023-06-15 PROCEDURE — 99214 OFFICE O/P EST MOD 30 MIN: CPT | Mod: S$GLB,,, | Performed by: NURSE PRACTITIONER

## 2023-06-15 PROCEDURE — 3075F PR MOST RECENT SYSTOLIC BLOOD PRESS GE 130-139MM HG: ICD-10-PCS | Mod: CPTII,S$GLB,, | Performed by: NURSE PRACTITIONER

## 2023-06-15 PROCEDURE — 1101F PT FALLS ASSESS-DOCD LE1/YR: CPT | Mod: CPTII,S$GLB,, | Performed by: NURSE PRACTITIONER

## 2023-06-15 PROCEDURE — 1126F AMNT PAIN NOTED NONE PRSNT: CPT | Mod: CPTII,S$GLB,, | Performed by: NURSE PRACTITIONER

## 2023-06-15 PROCEDURE — 3078F DIAST BP <80 MM HG: CPT | Mod: CPTII,S$GLB,, | Performed by: NURSE PRACTITIONER

## 2023-06-15 PROCEDURE — 1159F PR MEDICATION LIST DOCUMENTED IN MEDICAL RECORD: ICD-10-PCS | Mod: CPTII,S$GLB,, | Performed by: NURSE PRACTITIONER

## 2023-06-15 PROCEDURE — 1101F PR PT FALLS ASSESS DOC 0-1 FALLS W/OUT INJ PAST YR: ICD-10-PCS | Mod: CPTII,S$GLB,, | Performed by: NURSE PRACTITIONER

## 2023-06-15 PROCEDURE — 99999 PR PBB SHADOW E&M-EST. PATIENT-LVL V: ICD-10-PCS | Mod: PBBFAC,,, | Performed by: NURSE PRACTITIONER

## 2023-06-15 PROCEDURE — 1159F MED LIST DOCD IN RCRD: CPT | Mod: CPTII,S$GLB,, | Performed by: NURSE PRACTITIONER

## 2023-06-15 PROCEDURE — 99999 PR PBB SHADOW E&M-EST. PATIENT-LVL V: CPT | Mod: PBBFAC,,, | Performed by: NURSE PRACTITIONER

## 2023-06-15 PROCEDURE — 99214 PR OFFICE/OUTPT VISIT, EST, LEVL IV, 30-39 MIN: ICD-10-PCS | Mod: S$GLB,,, | Performed by: NURSE PRACTITIONER

## 2023-06-15 PROCEDURE — 1126F PR PAIN SEVERITY QUANTIFIED, NO PAIN PRESENT: ICD-10-PCS | Mod: CPTII,S$GLB,, | Performed by: NURSE PRACTITIONER

## 2023-06-15 PROCEDURE — 3288F FALL RISK ASSESSMENT DOCD: CPT | Mod: CPTII,S$GLB,, | Performed by: NURSE PRACTITIONER

## 2023-06-15 PROCEDURE — 3078F PR MOST RECENT DIASTOLIC BLOOD PRESSURE < 80 MM HG: ICD-10-PCS | Mod: CPTII,S$GLB,, | Performed by: NURSE PRACTITIONER

## 2023-06-15 PROCEDURE — 3075F SYST BP GE 130 - 139MM HG: CPT | Mod: CPTII,S$GLB,, | Performed by: NURSE PRACTITIONER

## 2023-06-15 PROCEDURE — 3288F PR FALLS RISK ASSESSMENT DOCUMENTED: ICD-10-PCS | Mod: CPTII,S$GLB,, | Performed by: NURSE PRACTITIONER

## 2023-06-15 RX ORDER — TETANUS TOXOID, REDUCED DIPHTHERIA TOXOID AND ACELLULAR PERTUSSIS VACCINE, ADSORBED 5; 2.5; 8; 8; 2.5 [IU]/.5ML; [IU]/.5ML; UG/.5ML; UG/.5ML; UG/.5ML
SUSPENSION INTRAMUSCULAR
COMMUNITY
Start: 2023-01-24 | End: 2023-08-17 | Stop reason: ALTCHOICE

## 2023-06-15 NOTE — PROGRESS NOTES
Subjective:       Patient ID: Natali Galeano is a 82 y.o. female.    Chief Complaint: Weight concerns    Ms. Natali Galeano is a 82 year old female, new to me, presents to the clinic today with weigh concerns. PCP is Ilene Rocha. Medical and surgical history in addition to problem list reviewed as listed below.    Presents with weight concerns, and inquiring about thyroid status.      Past Medical History:   Diagnosis Date    Allergic rhinitis 2022    Anemia     Arthritis     Colon cancer     Gout, chronic     Heart murmur     High cholesterol     Hypercholesteremia     Hypertension     Obesity     PMB (postmenopausal bleeding) 10/24/2017    Right knee pain 2019    Sleep apnea     Thyroid disease         Past Surgical History:   Procedure Laterality Date    BTL       SECTION, CLASSIC      COLON SURGERY      goiter       HERNIA REPAIR      KIDNEY SURGERY      cyst removal    THYROID SURGERY          Family History   Problem Relation Age of Onset    Heart disease Father     Diabetes Mother        Social History     Tobacco Use   Smoking Status Former    Packs/day: 0.10    Types: Cigarettes    Quit date: 1980    Years since quittin.1   Smokeless Tobacco Never       Social History     Social History Narrative    Not on file       Review of patient's allergies indicates:   Allergen Reactions    Ace inhibitors Other (See Comments)     cough    Arb-angiotensin receptor antagonist Other (See Comments)     Cough w/ valsartan        Review of Systems   Constitutional:  Negative for fatigue and fever.   Respiratory:  Negative for shortness of breath.    Cardiovascular:  Negative for chest pain and palpitations.   Gastrointestinal:  Negative for nausea and vomiting.   Skin: Negative.    Neurological:  Negative for dizziness, light-headedness and headaches.       Objective:      Vitals:    06/15/23 1013   BP: 134/61   BP Location: Left arm   Patient Position: Sitting   BP Method: Large (Automatic)  "  Pulse: 70   SpO2: 100%   Weight: 127.7 kg (281 lb 6.7 oz)   Height: 5' 7" (1.702 m)           Physical Exam  Constitutional:       Appearance: She is obese.   HENT:      Right Ear: External ear normal. There is no impacted cerumen.      Left Ear: External ear normal. There is no impacted cerumen.      Nose: No congestion or rhinorrhea.   Eyes:      Conjunctiva/sclera: Conjunctivae normal.   Cardiovascular:      Pulses: Normal pulses.   Pulmonary:      Effort: Pulmonary effort is normal. No respiratory distress.      Breath sounds: No wheezing or rales.   Abdominal:      General: Bowel sounds are normal.      Tenderness: There is no abdominal tenderness. There is no right CVA tenderness or guarding.   Musculoskeletal:         General: Normal range of motion.   Neurological:      Mental Status: She is alert and oriented to person, place, and time.       Assessment:       1. Essential hypertension    2. Physical exam    3. Class 3 severe obesity due to excess calories with serious comorbidity and body mass index (BMI) of 40.0 to 44.9 in adult    4. Educated about management of weight    5. Hx of goiter        Plan:       Essential hypertension  Stable with amlodipine, and hydrochlorothiazide.  Denies any signs or symptoms  of hypertension.  Monitor blood pressure twice a day, one hour after medication and before bedtime     Physical exam  Head to to assesment completed.    Class 3 severe obesity due to excess calories with serious comorbidity and body mass index (BMI) of 40.0 to 44.9 in adult  Recommend heart healthy diet, exercise 3 times a week for 30 minute intervals, increase as tolerated.    Educated about management of weight  States she would like to lose weight, does not exercise and has poor eating habits, snacks frequently.    Hx of goiter  Comments:  surgical removal 2010 in [place]  Orders:  -     Ambulatory referral/consult to Endocrinology; Future; Expected date: 06/15/2023    Extensive teaching " /discussion on diet and lifestyle modifications for weight management, handouts provided.    Medication List with Changes/Refills   Current Medications    ALLOPURINOL (ZYLOPRIM) 300 MG TABLET    Take 300 mg by mouth once daily.    AMLODIPINE (NORVASC) 10 MG TABLET    TAKE 1 TABLET(10 MG) BY MOUTH EVERY DAY    ASCORBIC ACID, VITAMIN C, (VITAMIN C) 1000 MG TABLET    Take 1,000 mg by mouth once daily.    ASPIRIN (ECOTRIN) 81 MG EC TABLET    Take 81 mg by mouth once daily.    ATORVASTATIN (LIPITOR) 10 MG TABLET    Take 10 mg by mouth every evening.     BOOSTRIX TDAP 2.5-8-5 LF-MCG-LF/0.5ML SYRG INJECTION        CHOLECALCIFEROL, VITAMIN D3, (VITAMIN D3) 25 MCG (1,000 UNIT) CAPSULE    Take 2,000 Units by mouth once daily.    CYANOCOBALAMIN (VITAMIN B-12) 250 MCG TABLET    Take 2,500 mcg by mouth once daily.    DOCUSATE SODIUM (COLACE) 100 MG CAPSULE    Take 100 mg by mouth once daily.    FLUTICASONE PROPIONATE (FLONASE) 50 MCG/ACTUATION NASAL SPRAY    SHAKE LIQUID AND USE 2 SPRAYS(100 MCG) IN EACH NOSTRIL EVERY DAY    HYDROCHLOROTHIAZIDE (HYDRODIURIL) 25 MG TABLET    Take 12.5 mg by mouth. Take 1/2 of the 25 mg tablet daily    LEVOCETIRIZINE (XYZAL) 5 MG TABLET    Take 5 mg by mouth every evening.    LEVOCETIRIZINE (XYZAL) 5 MG TABLET    Take 5 mg by mouth.    LIDOCAINE (XYLOCAINE) 5 % OINT OINTMENT    Apply topically nightly as needed.    LIDOCAINE (XYLOCAINE) 5 % OINT OINTMENT    APPLY TOPICALLY TO AFFECTED PART OF FOOT/FEET ONCE NIGHTLY AS NEEDED.    LIDOCAINE HCL 2% (XYLOCAINE) 2 % JELLY    Apply topically as needed. Apply topically once nightly to affected part of foot/feet.            Follow up if symptoms worsen or fail to improve.    I spent a total of 30 minutes on the day of the visit.This includes face to face time and non-face to face time preparing to see the patient (eg, review of tests), obtaining and/or reviewing separately obtained history, documenting clinical information in the electronic or other  health record, independently interpreting results and communicating results to the patient/family/caregiver, or care coordinator.     Ria Bull APRN, MSN, FNP-C

## 2023-07-11 NOTE — PROGRESS NOTES
Subjective:      Patient ID: Natali Galeano is referred by Ria Bull NP     Chief Complaint:  History of goiter    History of Present Illness    Natail Galaeno is a 82 y.o. female who presents for evaluation of goiter.    Patient reports history of goiter and partial thyroidectomy at [facility] in [place], [place] in 2010, pathology was benign. She has not been on levothyroxine after the surgery.     Patient has chronic constipation  H/O colectomy in 2002 for colon Ca, no chemotherapy/XRT    Patient denies any dysphagia, dyspnea, neck swelling.       Osteopenia    DXA 6/2022  The L1 to L4 vertebral bone mineral density is equal to 1.218 g/cm squared with a T score of 0.2.  There has been a -2.1% non statistically significant change relative to the prior study.  The left femoral neck bone mineral density is equal to 0.775 g/cm squared with a T score of -1.9.  There has been  a -16.1% statistically significant change relative to the prior study.  The total hip bone mineral density is equal to 0.943 g/cm squared with a T score of -0.5.     There is a 5.9% risk of a major osteoporotic fracture and a 1.5% risk of hip fracture in the next 10 years (FRAX).    On vitamin D 2000 IU daily.   Denies fractures.       Lab Results   Component Value Date    PYYHPEVJ28WF 53 01/27/2023        ROS:   As above    Objective:     BP (!) 140/83   Pulse (!) 51   Wt 128 kg (282 lb 3 oz)   LMP  (LMP Unknown)   SpO2 98%   BMI 44.20 kg/m²     Body mass index is 44.2 kg/m².      Physical Exam  Constitutional:       General: She is not in acute distress.     Appearance: She is not ill-appearing.   HENT:      Head: Normocephalic.   Neck:      Comments: Right neck surgical scar seen  Cardiovascular:      Rate and Rhythm: Normal rate and regular rhythm.   Pulmonary:      Effort: Pulmonary effort is normal.   Abdominal:      General: Abdomen is flat.      Palpations: Abdomen is soft.   Musculoskeletal:      Cervical back: Neck supple.    Skin:     General: Skin is warm and dry.   Neurological:      Mental Status: She is alert and oriented to person, place, and time.         Lab Review:   Lab Results   Component Value Date    HGBA1C 5.9 (H) 02/23/2019     Lab Results   Component Value Date    CHOL 136 02/01/2022    HDL 51 02/01/2022    LDLCALC 72.2 02/01/2022    TRIG 64 02/01/2022    CHOLHDL 37.5 02/01/2022     Lab Results   Component Value Date     01/27/2023    K 3.9 01/27/2023     01/27/2023    CO2 23 01/27/2023    GLU 84 01/27/2023    BUN 14 01/27/2023    CREATININE 0.9 01/27/2023    CALCIUM 10.9 (H) 01/27/2023    PROT 8.4 01/27/2023    ALBUMIN 4.2 01/27/2023    BILITOT 0.7 01/27/2023    ALKPHOS 102 01/27/2023    AST 25 01/27/2023    ALT 15 01/27/2023    ANIONGAP 16 01/27/2023    ESTGFRAFRICA >60.0 02/01/2022    EGFRNONAA >60.0 02/01/2022     Vit D, 25-Hydroxy   Date Value Ref Range Status   01/27/2023 53 30 - 96 ng/mL Final     Comment:     Vitamin D deficiency.........<10 ng/mL                              Vitamin D insufficiency......10-29 ng/mL       Vitamin D sufficiency........> or equal to 30 ng/mL  Vitamin D toxicity............>100 ng/mL         Assessment and Plan     Problem List Items Addressed This Visit          Endocrine    S/P partial thyroidectomy       H/O goiter and partial thyroidectomy in 2010, pathology was benign.  She has not been on levothyroxine after the surgery.     No hypothyroid or hyperthyroid symptoms.  Denies neck symptoms.   Check thyroid function test.     Will order US thyroid for follow up.     Follow up after review of results.                 Orthopedic    Osteopenia of necks of both femurs       Her FRAX score show low risk of fracture and no prior history of fragility fracture  Instructed on Calcium and vitamin D   DXA scan due in 2024.           Other Visit Diagnoses       Hx of goiter        Relevant Orders    TSH    US Soft Tissue Head Neck Thyroid             Michelle ARZOLA Rai, MD

## 2023-07-12 ENCOUNTER — LAB VISIT (OUTPATIENT)
Dept: LAB | Facility: HOSPITAL | Age: 83
End: 2023-07-12
Attending: INTERNAL MEDICINE
Payer: MEDICARE

## 2023-07-12 ENCOUNTER — OFFICE VISIT (OUTPATIENT)
Dept: ENDOCRINOLOGY | Facility: CLINIC | Age: 83
End: 2023-07-12
Payer: MEDICARE

## 2023-07-12 VITALS
SYSTOLIC BLOOD PRESSURE: 140 MMHG | OXYGEN SATURATION: 98 % | BODY MASS INDEX: 44.2 KG/M2 | WEIGHT: 282.19 LBS | DIASTOLIC BLOOD PRESSURE: 83 MMHG | HEART RATE: 51 BPM

## 2023-07-12 DIAGNOSIS — M85.852 OSTEOPENIA OF NECKS OF BOTH FEMURS: ICD-10-CM

## 2023-07-12 DIAGNOSIS — E89.0 S/P PARTIAL THYROIDECTOMY: ICD-10-CM

## 2023-07-12 DIAGNOSIS — Z86.39 HX OF GOITER: ICD-10-CM

## 2023-07-12 DIAGNOSIS — M85.851 OSTEOPENIA OF NECKS OF BOTH FEMURS: ICD-10-CM

## 2023-07-12 LAB — TSH SERPL DL<=0.005 MIU/L-ACNC: 2.3 UIU/ML (ref 0.4–4)

## 2023-07-12 PROCEDURE — 1159F PR MEDICATION LIST DOCUMENTED IN MEDICAL RECORD: ICD-10-PCS | Mod: CPTII,S$GLB,, | Performed by: INTERNAL MEDICINE

## 2023-07-12 PROCEDURE — 3079F PR MOST RECENT DIASTOLIC BLOOD PRESSURE 80-89 MM HG: ICD-10-PCS | Mod: CPTII,S$GLB,, | Performed by: INTERNAL MEDICINE

## 2023-07-12 PROCEDURE — 36415 COLL VENOUS BLD VENIPUNCTURE: CPT | Performed by: INTERNAL MEDICINE

## 2023-07-12 PROCEDURE — 1160F RVW MEDS BY RX/DR IN RCRD: CPT | Mod: CPTII,S$GLB,, | Performed by: INTERNAL MEDICINE

## 2023-07-12 PROCEDURE — 99204 OFFICE O/P NEW MOD 45 MIN: CPT | Mod: S$GLB,,, | Performed by: INTERNAL MEDICINE

## 2023-07-12 PROCEDURE — 3077F SYST BP >= 140 MM HG: CPT | Mod: CPTII,S$GLB,, | Performed by: INTERNAL MEDICINE

## 2023-07-12 PROCEDURE — 3077F PR MOST RECENT SYSTOLIC BLOOD PRESSURE >= 140 MM HG: ICD-10-PCS | Mod: CPTII,S$GLB,, | Performed by: INTERNAL MEDICINE

## 2023-07-12 PROCEDURE — 1159F MED LIST DOCD IN RCRD: CPT | Mod: CPTII,S$GLB,, | Performed by: INTERNAL MEDICINE

## 2023-07-12 PROCEDURE — 1160F PR REVIEW ALL MEDS BY PRESCRIBER/CLIN PHARMACIST DOCUMENTED: ICD-10-PCS | Mod: CPTII,S$GLB,, | Performed by: INTERNAL MEDICINE

## 2023-07-12 PROCEDURE — 99999 PR PBB SHADOW E&M-EST. PATIENT-LVL IV: CPT | Mod: PBBFAC,,, | Performed by: INTERNAL MEDICINE

## 2023-07-12 PROCEDURE — 99999 PR PBB SHADOW E&M-EST. PATIENT-LVL IV: ICD-10-PCS | Mod: PBBFAC,,, | Performed by: INTERNAL MEDICINE

## 2023-07-12 PROCEDURE — 3079F DIAST BP 80-89 MM HG: CPT | Mod: CPTII,S$GLB,, | Performed by: INTERNAL MEDICINE

## 2023-07-12 PROCEDURE — 99204 PR OFFICE/OUTPT VISIT, NEW, LEVL IV, 45-59 MIN: ICD-10-PCS | Mod: S$GLB,,, | Performed by: INTERNAL MEDICINE

## 2023-07-12 PROCEDURE — 84443 ASSAY THYROID STIM HORMONE: CPT | Performed by: INTERNAL MEDICINE

## 2023-07-12 NOTE — ASSESSMENT & PLAN NOTE
Her FRAX score show low risk of fracture and no prior history of fragility fracture  Instructed on Calcium and vitamin D   DXA scan due in 2024.

## 2023-07-12 NOTE — ASSESSMENT & PLAN NOTE
H/O goiter and partial thyroidectomy in 2010, pathology was benign.  She has not been on levothyroxine after the surgery.     No hypothyroid or hyperthyroid symptoms.  Denies neck symptoms.   Check thyroid function test.     Will order US thyroid for follow up.     Follow up after review of results.

## 2023-07-13 DIAGNOSIS — M85.851 OSTEOPENIA OF NECKS OF BOTH FEMURS: ICD-10-CM

## 2023-07-13 DIAGNOSIS — M85.852 OSTEOPENIA OF NECKS OF BOTH FEMURS: ICD-10-CM

## 2023-07-13 DIAGNOSIS — R73.03 PREDIABETES: Primary | ICD-10-CM

## 2023-07-13 DIAGNOSIS — E89.0 S/P PARTIAL THYROIDECTOMY: Primary | ICD-10-CM

## 2023-07-24 ENCOUNTER — HOSPITAL ENCOUNTER (OUTPATIENT)
Dept: RADIOLOGY | Facility: OTHER | Age: 83
Discharge: HOME | End: 2023-07-24
Attending: INTERNAL MEDICINE
Payer: MEDICARE

## 2023-07-24 ENCOUNTER — PATIENT MESSAGE (OUTPATIENT)
Dept: OTHER | Facility: OTHER | Age: 83
End: 2023-07-24
Payer: MEDICARE

## 2023-07-24 DIAGNOSIS — E89.0 S/P PARTIAL THYROIDECTOMY: ICD-10-CM

## 2023-07-24 PROCEDURE — 76536 US EXAM OF HEAD AND NECK: CPT | Mod: 26,,, | Performed by: RADIOLOGY

## 2023-07-24 PROCEDURE — 76536 US SOFT TISSUE HEAD NECK THYROID: ICD-10-PCS | Mod: 26,,, | Performed by: RADIOLOGY

## 2023-07-24 PROCEDURE — 76536 US EXAM OF HEAD AND NECK: CPT | Mod: TC

## 2023-08-04 ENCOUNTER — PATIENT MESSAGE (OUTPATIENT)
Dept: ADMINISTRATIVE | Facility: OTHER | Age: 83
End: 2023-08-04
Payer: MEDICARE

## 2023-08-17 ENCOUNTER — PATIENT MESSAGE (OUTPATIENT)
Dept: PRIMARY CARE CLINIC | Facility: CLINIC | Age: 83
End: 2023-08-17
Payer: MEDICARE

## 2023-08-17 DIAGNOSIS — I10 ESSENTIAL HYPERTENSION: Chronic | ICD-10-CM

## 2023-08-17 RX ORDER — ALLOPURINOL 300 MG/1
300 TABLET ORAL DAILY
Qty: 90 TABLET | Refills: 3 | Status: SHIPPED | OUTPATIENT
Start: 2023-08-17 | End: 2023-08-24 | Stop reason: SDUPTHER

## 2023-08-17 RX ORDER — AMLODIPINE BESYLATE 10 MG/1
10 TABLET ORAL DAILY
Qty: 90 TABLET | Refills: 3 | Status: SHIPPED | OUTPATIENT
Start: 2023-08-17 | End: 2023-08-24 | Stop reason: SDUPTHER

## 2023-08-17 RX ORDER — HYDROCHLOROTHIAZIDE 25 MG/1
12.5 TABLET ORAL DAILY
Qty: 60 TABLET | Refills: 3 | Status: SHIPPED | OUTPATIENT
Start: 2023-08-17

## 2023-08-24 ENCOUNTER — OFFICE VISIT (OUTPATIENT)
Dept: PRIMARY CARE CLINIC | Facility: CLINIC | Age: 83
End: 2023-08-24
Payer: MEDICARE

## 2023-08-24 VITALS
SYSTOLIC BLOOD PRESSURE: 154 MMHG | HEART RATE: 75 BPM | DIASTOLIC BLOOD PRESSURE: 70 MMHG | BODY MASS INDEX: 43.78 KG/M2 | WEIGHT: 279.56 LBS

## 2023-08-24 DIAGNOSIS — E78.49 OTHER HYPERLIPIDEMIA: Chronic | ICD-10-CM

## 2023-08-24 DIAGNOSIS — J30.89 ALLERGIC RHINITIS DUE TO OTHER ALLERGIC TRIGGER, UNSPECIFIED SEASONALITY: Chronic | ICD-10-CM

## 2023-08-24 DIAGNOSIS — J32.9 BACTERIAL SINUSITIS: Primary | ICD-10-CM

## 2023-08-24 DIAGNOSIS — I10 ESSENTIAL HYPERTENSION: Chronic | ICD-10-CM

## 2023-08-24 DIAGNOSIS — M10.9 GOUT, UNSPECIFIED CAUSE, UNSPECIFIED CHRONICITY, UNSPECIFIED SITE: ICD-10-CM

## 2023-08-24 DIAGNOSIS — B96.89 BACTERIAL SINUSITIS: Primary | ICD-10-CM

## 2023-08-24 PROCEDURE — 1125F PR PAIN SEVERITY QUANTIFIED, PAIN PRESENT: ICD-10-PCS | Mod: CPTII,S$GLB,, | Performed by: FAMILY MEDICINE

## 2023-08-24 PROCEDURE — 99999 PR PBB SHADOW E&M-EST. PATIENT-LVL III: CPT | Mod: PBBFAC,,, | Performed by: FAMILY MEDICINE

## 2023-08-24 PROCEDURE — 99214 OFFICE O/P EST MOD 30 MIN: CPT | Mod: S$GLB,,, | Performed by: FAMILY MEDICINE

## 2023-08-24 PROCEDURE — 3077F SYST BP >= 140 MM HG: CPT | Mod: CPTII,S$GLB,, | Performed by: FAMILY MEDICINE

## 2023-08-24 PROCEDURE — 1159F PR MEDICATION LIST DOCUMENTED IN MEDICAL RECORD: ICD-10-PCS | Mod: CPTII,S$GLB,, | Performed by: FAMILY MEDICINE

## 2023-08-24 PROCEDURE — 3078F DIAST BP <80 MM HG: CPT | Mod: CPTII,S$GLB,, | Performed by: FAMILY MEDICINE

## 2023-08-24 PROCEDURE — 1125F AMNT PAIN NOTED PAIN PRSNT: CPT | Mod: CPTII,S$GLB,, | Performed by: FAMILY MEDICINE

## 2023-08-24 PROCEDURE — 3078F PR MOST RECENT DIASTOLIC BLOOD PRESSURE < 80 MM HG: ICD-10-PCS | Mod: CPTII,S$GLB,, | Performed by: FAMILY MEDICINE

## 2023-08-24 PROCEDURE — 3077F PR MOST RECENT SYSTOLIC BLOOD PRESSURE >= 140 MM HG: ICD-10-PCS | Mod: CPTII,S$GLB,, | Performed by: FAMILY MEDICINE

## 2023-08-24 PROCEDURE — 99214 PR OFFICE/OUTPT VISIT, EST, LEVL IV, 30-39 MIN: ICD-10-PCS | Mod: S$GLB,,, | Performed by: FAMILY MEDICINE

## 2023-08-24 PROCEDURE — 1159F MED LIST DOCD IN RCRD: CPT | Mod: CPTII,S$GLB,, | Performed by: FAMILY MEDICINE

## 2023-08-24 PROCEDURE — 99999 PR PBB SHADOW E&M-EST. PATIENT-LVL III: ICD-10-PCS | Mod: PBBFAC,,, | Performed by: FAMILY MEDICINE

## 2023-08-24 RX ORDER — ATORVASTATIN CALCIUM 10 MG/1
10 TABLET, FILM COATED ORAL NIGHTLY
Qty: 90 TABLET | Refills: 3 | Status: SHIPPED | OUTPATIENT
Start: 2023-08-24

## 2023-08-24 RX ORDER — AMLODIPINE BESYLATE 10 MG/1
10 TABLET ORAL DAILY
Qty: 90 TABLET | Refills: 3 | Status: SHIPPED | OUTPATIENT
Start: 2023-08-24 | End: 2023-10-26 | Stop reason: SDUPTHER

## 2023-08-24 RX ORDER — AMOXICILLIN AND CLAVULANATE POTASSIUM 875; 125 MG/1; MG/1
1 TABLET, FILM COATED ORAL EVERY 12 HOURS
Qty: 10 TABLET | Refills: 0 | Status: SHIPPED | OUTPATIENT
Start: 2023-08-24 | End: 2023-08-29

## 2023-08-24 RX ORDER — FLUTICASONE PROPIONATE 50 MCG
2 SPRAY, SUSPENSION (ML) NASAL 2 TIMES DAILY
Qty: 16 G | Refills: 3 | Status: SHIPPED | OUTPATIENT
Start: 2023-08-24

## 2023-08-24 RX ORDER — PROMETHAZINE HYDROCHLORIDE AND DEXTROMETHORPHAN HYDROBROMIDE 6.25; 15 MG/5ML; MG/5ML
5 SYRUP ORAL EVERY 4 HOURS PRN
Qty: 118 ML | Refills: 0 | Status: SHIPPED | OUTPATIENT
Start: 2023-08-24 | End: 2024-02-26

## 2023-08-24 RX ORDER — ALLOPURINOL 300 MG/1
300 TABLET ORAL DAILY
Qty: 90 TABLET | Refills: 3 | Status: SHIPPED | OUTPATIENT
Start: 2023-08-24 | End: 2023-10-26 | Stop reason: SDUPTHER

## 2023-08-24 NOTE — PROGRESS NOTES
Clinic Note  8/24/2023      Subjective:       Patient ID:  Natali is a 82 y.o. female being seen for an established visit.    Chief Complaint: Sinusitis, Sinus Problem, and Headache (C/o nasal  stuffy feeling )    Sinusitis-about 2 weeks ago patient started developing symptoms of sinusitis with nasal congestion, postnasal drip, sinus pressure, bilateral ear discomfort, coughing.  Symptoms improved, however has gotten worse again over the last 4-5 days.  Patient has been taking Xyzal daily along with Flonase once daily.  Denies chest pain, dyspnea, wheezing, nausea, vomiting, diarrhea, constipation, fever, chills.    Hypertension-requesting refills of amlodipine.  Taking hydrochlorothiazide 12.5 mg q.d..  Being followed by digital high blood pressure program, blood pressures have been controlled at home    Gout-requesting refills of allopurinol        Review of Systems   Constitutional:  Negative for chills, fever, malaise/fatigue and weight loss.   HENT:  Positive for congestion and sinus pain. Negative for sore throat.    Respiratory:  Positive for cough. Negative for hemoptysis, sputum production, shortness of breath, wheezing and stridor.    Cardiovascular:  Negative for chest pain and palpitations.   Gastrointestinal:  Negative for constipation, diarrhea, nausea and vomiting.   Genitourinary:  Negative for dysuria, frequency and urgency.   Musculoskeletal:  Negative for myalgias.   Skin:  Negative for rash.   Neurological:  Negative for headaches.       Medication List with Changes/Refills   New Medications    AMOXICILLIN-CLAVULANATE 875-125MG (AUGMENTIN) 875-125 MG PER TABLET    Take 1 tablet by mouth every 12 (twelve) hours. For bacterial sinusitis for 5 days    PROMETHAZINE-DEXTROMETHORPHAN (PROMETHAZINE-DM) 6.25-15 MG/5 ML SYRP    Take 5 mLs by mouth every 4 (four) hours as needed (cough).   Current Medications    ASCORBIC ACID, VITAMIN C, (VITAMIN C) 1000 MG TABLET    Take 1,000 mg by mouth once daily.     ASPIRIN (ECOTRIN) 81 MG EC TABLET    Take 81 mg by mouth once daily.    CHOLECALCIFEROL, VITAMIN D3, (VITAMIN D3) 25 MCG (1,000 UNIT) CAPSULE    Take 2,000 Units by mouth once daily.    CYANOCOBALAMIN (VITAMIN B-12) 250 MCG TABLET    Take 2,500 mcg by mouth once daily.    DOCUSATE SODIUM (COLACE) 100 MG CAPSULE    Take 100 mg by mouth once daily.    HYDROCHLOROTHIAZIDE (HYDRODIURIL) 25 MG TABLET    Take 0.5 tablets (12.5 mg total) by mouth once daily. Take 1/2 of the 25 mg tablet daily    LEVOCETIRIZINE (XYZAL) 5 MG TABLET    Take 5 mg by mouth every evening.    LEVOCETIRIZINE (XYZAL) 5 MG TABLET    Take 5 mg by mouth.    LIDOCAINE (XYLOCAINE) 5 % OINT OINTMENT    Apply topically nightly as needed.    LIDOCAINE (XYLOCAINE) 5 % OINT OINTMENT    APPLY TOPICALLY TO AFFECTED PART OF FOOT/FEET ONCE NIGHTLY AS NEEDED.    LIDOCAINE HCL 2% (XYLOCAINE) 2 % JELLY    Apply topically as needed. Apply topically once nightly to affected part of foot/feet.   Changed and/or Refilled Medications    Modified Medication Previous Medication    ALLOPURINOL (ZYLOPRIM) 300 MG TABLET allopurinoL (ZYLOPRIM) 300 MG tablet       Take 1 tablet (300 mg total) by mouth once daily. gout    Take 1 tablet (300 mg total) by mouth once daily.    AMLODIPINE (NORVASC) 10 MG TABLET amLODIPine (NORVASC) 10 MG tablet       Take 1 tablet (10 mg total) by mouth once daily. High blood pressure    Take 1 tablet (10 mg total) by mouth once daily.    ATORVASTATIN (LIPITOR) 10 MG TABLET atorvastatin (LIPITOR) 10 MG tablet       Take 1 tablet (10 mg total) by mouth every evening. High cholesterol    Take 10 mg by mouth every evening.     FLUTICASONE PROPIONATE (FLONASE) 50 MCG/ACTUATION NASAL SPRAY fluticasone propionate (FLONASE) 50 mcg/actuation nasal spray       2 sprays (100 mcg total) by Each Nostril route 2 (two) times a day.    SHAKE LIQUID AND USE 2 SPRAYS(100 MCG) IN EACH NOSTRIL EVERY DAY       Patient Active Problem List   Diagnosis    Class 3 severe  "obesity with serious comorbidity and body mass index (BMI) of 40.0 to 44.9 in adult    Essential hypertension    Adrenal nodule    Hyperlipidemia    ALYSE (obstructive sleep apnea)    Thyroid disease    Normocytic anemia    Gout    Heart murmur    Rheumatic fever    BMI 45.0-49.9, adult    Cough    Nasal congestion    Chronic pain of right ankle    Onychorrhexis    Osteopenia of necks of both femurs    Allergic rhinitis    Gait difficulty    Decreased range of motion of right ankle    S/P partial thyroidectomy           Objective:      BP (!) 154/70 (BP Location: Left arm, Patient Position: Sitting, BP Method: Large (Automatic))   Pulse 75   Wt 126.8 kg (279 lb 8.7 oz)   LMP  (LMP Unknown)   BMI 43.78 kg/m²   Estimated body mass index is 43.78 kg/m² as calculated from the following:    Height as of 6/15/23: 5' 7" (1.702 m).    Weight as of this encounter: 126.8 kg (279 lb 8.7 oz).  Physical Exam  Vitals reviewed.   Constitutional:       General: She is not in acute distress.     Appearance: She is not diaphoretic.   HENT:      Head: Normocephalic and atraumatic.      Right Ear: Tympanic membrane, ear canal and external ear normal. There is no impacted cerumen.      Left Ear: Ear canal and external ear normal. There is no impacted cerumen.      Nose: Congestion present. No rhinorrhea.      Mouth/Throat:      Mouth: Mucous membranes are moist.      Pharynx: Oropharynx is clear. No oropharyngeal exudate or posterior oropharyngeal erythema.   Eyes:      Conjunctiva/sclera: Conjunctivae normal.   Cardiovascular:      Rate and Rhythm: Normal rate and regular rhythm.      Heart sounds: Murmur heard.   Pulmonary:      Effort: Pulmonary effort is normal. No respiratory distress.      Breath sounds: Normal breath sounds. No wheezing.   Abdominal:      General: Bowel sounds are normal.      Palpations: Abdomen is soft.   Musculoskeletal:         General: Normal range of motion.      Cervical back: Normal range of motion. "   Skin:     General: Skin is warm and dry.      Findings: No erythema or rash.   Neurological:      Mental Status: She is alert and oriented to person, place, and time.   Psychiatric:         Mood and Affect: Mood and affect normal.         Behavior: Behavior normal.         Thought Content: Thought content normal.         Judgment: Judgment normal.           Assessment and Plan:     1. Bacterial sinusitis  - will treat for bacterial sinusitis given persistence of symptoms over last 2 weeks and now worsening.  Recommend increasing to Flonase 2 sprays twice daily, Augmentin, promethazine-DM for cough p.r.n., discussed saline irrigation  - amoxicillin-clavulanate 875-125mg (AUGMENTIN) 875-125 mg per tablet; Take 1 tablet by mouth every 12 (twelve) hours. For bacterial sinusitis for 5 days  Dispense: 10 tablet; Refill: 0  - promethazine-dextromethorphan (PROMETHAZINE-DM) 6.25-15 mg/5 mL Syrp; Take 5 mLs by mouth every 4 (four) hours as needed (cough).  Dispense: 118 mL; Refill: 0    2. Essential hypertension  - blood pressure elevated today but digital high blood pressure program with normal blood pressures  - amLODIPine (NORVASC) 10 MG tablet; Take 1 tablet (10 mg total) by mouth once daily. High blood pressure  Dispense: 90 tablet; Refill: 3    3. Allergic rhinitis due to other allergic trigger, unspecified seasonality  - fluticasone propionate (FLONASE) 50 mcg/actuation nasal spray; 2 sprays (100 mcg total) by Each Nostril route 2 (two) times a day.  Dispense: 16 g; Refill: 3    4. Gout, unspecified cause, unspecified chronicity, unspecified site  - allopurinoL (ZYLOPRIM) 300 MG tablet; Take 1 tablet (300 mg total) by mouth once daily. gout  Dispense: 90 tablet; Refill: 3    5. Other hyperlipidemia  - atorvastatin (LIPITOR) 10 MG tablet; Take 1 tablet (10 mg total) by mouth every evening. High cholesterol  Dispense: 90 tablet; Refill: 3        Follow Up:   No follow-ups on file.    Other Orders Placed This  Visit:  No orders of the defined types were placed in this encounter.        Talon Barfield MD        This note is dictated on M*Modal word recognition program.  There are word recognition mistakes that are occasionally missed on review.

## 2023-09-14 ENCOUNTER — HOSPITAL ENCOUNTER (OUTPATIENT)
Facility: OTHER | Age: 83
Discharge: HOME | End: 2023-09-15
Attending: EMERGENCY MEDICINE | Admitting: INTERNAL MEDICINE
Payer: MEDICARE

## 2023-09-14 DIAGNOSIS — K56.609 SBO (SMALL BOWEL OBSTRUCTION): ICD-10-CM

## 2023-09-14 DIAGNOSIS — R10.13 EPIGASTRIC PAIN: ICD-10-CM

## 2023-09-14 DIAGNOSIS — I10 HYPERTENSION, UNSPECIFIED TYPE: Primary | ICD-10-CM

## 2023-09-14 DIAGNOSIS — K56.600 SMALL BOWEL OBSTRUCTION, PARTIAL: ICD-10-CM

## 2023-09-14 LAB
ALBUMIN SERPL BCP-MCNC: 3.6 G/DL (ref 3.5–5.2)
ALP SERPL-CCNC: 97 U/L (ref 55–135)
ALT SERPL W/O P-5'-P-CCNC: 14 U/L (ref 10–44)
ANION GAP SERPL CALC-SCNC: 12 MMOL/L (ref 8–16)
AST SERPL-CCNC: 22 U/L (ref 10–40)
BASOPHILS # BLD AUTO: 0.03 K/UL (ref 0–0.2)
BASOPHILS NFR BLD: 0.3 % (ref 0–1.9)
BILIRUB SERPL-MCNC: 0.6 MG/DL (ref 0.1–1)
BILIRUB UR QL STRIP: NEGATIVE
BUN SERPL-MCNC: 10 MG/DL (ref 8–23)
CALCIUM SERPL-MCNC: 10.3 MG/DL (ref 8.7–10.5)
CHLORIDE SERPL-SCNC: 103 MMOL/L (ref 95–110)
CLARITY UR: CLEAR
CO2 SERPL-SCNC: 24 MMOL/L (ref 23–29)
COLOR UR: YELLOW
CREAT SERPL-MCNC: 0.7 MG/DL (ref 0.5–1.4)
CREAT SERPL-MCNC: 0.8 MG/DL (ref 0.5–1.4)
CTP QC/QA: YES
DIFFERENTIAL METHOD: ABNORMAL
EOSINOPHIL # BLD AUTO: 0 K/UL (ref 0–0.5)
EOSINOPHIL NFR BLD: 0 % (ref 0–8)
ERYTHROCYTE [DISTWIDTH] IN BLOOD BY AUTOMATED COUNT: 17 % (ref 11.5–14.5)
EST. GFR  (NO RACE VARIABLE): >60 ML/MIN/1.73 M^2
GLUCOSE SERPL-MCNC: 116 MG/DL (ref 70–110)
GLUCOSE UR QL STRIP: NEGATIVE
HCT VFR BLD AUTO: 46.8 % (ref 37–48.5)
HGB BLD-MCNC: 14.8 G/DL (ref 12–16)
HGB UR QL STRIP: NEGATIVE
IMM GRANULOCYTES # BLD AUTO: 0.06 K/UL (ref 0–0.04)
IMM GRANULOCYTES NFR BLD AUTO: 0.5 % (ref 0–0.5)
KETONES UR QL STRIP: NEGATIVE
LEUKOCYTE ESTERASE UR QL STRIP: NEGATIVE
LIPASE SERPL-CCNC: 10 U/L (ref 4–60)
LYMPHOCYTES # BLD AUTO: 2.4 K/UL (ref 1–4.8)
LYMPHOCYTES NFR BLD: 21.6 % (ref 18–48)
MCH RBC QN AUTO: 26 PG (ref 27–31)
MCHC RBC AUTO-ENTMCNC: 31.6 G/DL (ref 32–36)
MCV RBC AUTO: 82 FL (ref 82–98)
MONOCYTES # BLD AUTO: 0.5 K/UL (ref 0.3–1)
MONOCYTES NFR BLD: 4.8 % (ref 4–15)
NEUTROPHILS # BLD AUTO: 8.1 K/UL (ref 1.8–7.7)
NEUTROPHILS NFR BLD: 72.8 % (ref 38–73)
NITRITE UR QL STRIP: NEGATIVE
NRBC BLD-RTO: 0 /100 WBC
PH UR STRIP: 6 [PH] (ref 5–8)
PLATELET # BLD AUTO: 247 K/UL (ref 150–450)
PMV BLD AUTO: 10.7 FL (ref 9.2–12.9)
POTASSIUM SERPL-SCNC: 4 MMOL/L (ref 3.5–5.1)
PROT SERPL-MCNC: 7.6 G/DL (ref 6–8.4)
PROT UR QL STRIP: NEGATIVE
RBC # BLD AUTO: 5.7 M/UL (ref 4–5.4)
SAMPLE: NORMAL
SARS-COV-2 RDRP RESP QL NAA+PROBE: NEGATIVE
SODIUM SERPL-SCNC: 139 MMOL/L (ref 136–145)
SP GR UR STRIP: 1.02 (ref 1–1.03)
URN SPEC COLLECT METH UR: NORMAL
UROBILINOGEN UR STRIP-ACNC: NEGATIVE EU/DL
WBC # BLD AUTO: 11.14 K/UL (ref 3.9–12.7)

## 2023-09-14 PROCEDURE — 80053 COMPREHEN METABOLIC PANEL: CPT | Performed by: EMERGENCY MEDICINE

## 2023-09-14 PROCEDURE — G0378 HOSPITAL OBSERVATION PER HR: HCPCS

## 2023-09-14 PROCEDURE — 25000003 PHARM REV CODE 250

## 2023-09-14 PROCEDURE — 96372 THER/PROPH/DIAG INJ SC/IM: CPT

## 2023-09-14 PROCEDURE — 85025 COMPLETE CBC W/AUTO DIFF WBC: CPT | Performed by: NURSE PRACTITIONER

## 2023-09-14 PROCEDURE — 96375 TX/PRO/DX INJ NEW DRUG ADDON: CPT

## 2023-09-14 PROCEDURE — 87635 SARS-COV-2 COVID-19 AMP PRB: CPT | Performed by: NURSE PRACTITIONER

## 2023-09-14 PROCEDURE — 81003 URINALYSIS AUTO W/O SCOPE: CPT | Performed by: NURSE PRACTITIONER

## 2023-09-14 PROCEDURE — 63600175 PHARM REV CODE 636 W HCPCS: Performed by: EMERGENCY MEDICINE

## 2023-09-14 PROCEDURE — 63600175 PHARM REV CODE 636 W HCPCS

## 2023-09-14 PROCEDURE — 25500020 PHARM REV CODE 255: Performed by: EMERGENCY MEDICINE

## 2023-09-14 PROCEDURE — 96374 THER/PROPH/DIAG INJ IV PUSH: CPT

## 2023-09-14 PROCEDURE — 83690 ASSAY OF LIPASE: CPT | Performed by: EMERGENCY MEDICINE

## 2023-09-14 PROCEDURE — 99223 PR INITIAL HOSPITAL CARE,LEVL III: ICD-10-PCS | Mod: ,,,

## 2023-09-14 PROCEDURE — 99223 1ST HOSP IP/OBS HIGH 75: CPT | Mod: ,,,

## 2023-09-14 PROCEDURE — 99285 EMERGENCY DEPT VISIT HI MDM: CPT | Mod: 25

## 2023-09-14 PROCEDURE — 25000003 PHARM REV CODE 250: Performed by: EMERGENCY MEDICINE

## 2023-09-14 RX ORDER — KETOROLAC TROMETHAMINE 30 MG/ML
10 INJECTION, SOLUTION INTRAMUSCULAR; INTRAVENOUS
Status: COMPLETED | OUTPATIENT
Start: 2023-09-14 | End: 2023-09-14

## 2023-09-14 RX ORDER — ENOXAPARIN SODIUM 100 MG/ML
40 INJECTION SUBCUTANEOUS EVERY 24 HOURS
Status: DISCONTINUED | OUTPATIENT
Start: 2023-09-14 | End: 2023-09-15 | Stop reason: HOSPADM

## 2023-09-14 RX ORDER — ACETAMINOPHEN 500 MG
1000 TABLET ORAL EVERY 8 HOURS PRN
Status: DISCONTINUED | OUTPATIENT
Start: 2023-09-14 | End: 2023-09-15 | Stop reason: HOSPADM

## 2023-09-14 RX ORDER — SIMETHICONE 80 MG
1 TABLET,CHEWABLE ORAL 4 TIMES DAILY PRN
Status: DISCONTINUED | OUTPATIENT
Start: 2023-09-14 | End: 2023-09-15 | Stop reason: HOSPADM

## 2023-09-14 RX ORDER — ONDANSETRON 2 MG/ML
4 INJECTION INTRAMUSCULAR; INTRAVENOUS EVERY 8 HOURS
Status: DISCONTINUED | OUTPATIENT
Start: 2023-09-15 | End: 2023-09-15 | Stop reason: HOSPADM

## 2023-09-14 RX ORDER — SODIUM CHLORIDE, SODIUM LACTATE, POTASSIUM CHLORIDE, CALCIUM CHLORIDE 600; 310; 30; 20 MG/100ML; MG/100ML; MG/100ML; MG/100ML
INJECTION, SOLUTION INTRAVENOUS CONTINUOUS
Status: DISCONTINUED | OUTPATIENT
Start: 2023-09-14 | End: 2023-09-15 | Stop reason: HOSPADM

## 2023-09-14 RX ORDER — MAG HYDROX/ALUMINUM HYD/SIMETH 200-200-20
30 SUSPENSION, ORAL (FINAL DOSE FORM) ORAL 4 TIMES DAILY PRN
Status: DISCONTINUED | OUTPATIENT
Start: 2023-09-14 | End: 2023-09-15 | Stop reason: HOSPADM

## 2023-09-14 RX ORDER — TALC
6 POWDER (GRAM) TOPICAL NIGHTLY PRN
Status: DISCONTINUED | OUTPATIENT
Start: 2023-09-14 | End: 2023-09-15 | Stop reason: HOSPADM

## 2023-09-14 RX ORDER — KETOROLAC TROMETHAMINE 30 MG/ML
15 INJECTION, SOLUTION INTRAMUSCULAR; INTRAVENOUS EVERY 8 HOURS PRN
Status: DISCONTINUED | OUTPATIENT
Start: 2023-09-15 | End: 2023-09-15

## 2023-09-14 RX ORDER — ONDANSETRON 2 MG/ML
4 INJECTION INTRAMUSCULAR; INTRAVENOUS
Status: COMPLETED | OUTPATIENT
Start: 2023-09-14 | End: 2023-09-14

## 2023-09-14 RX ORDER — PROCHLORPERAZINE EDISYLATE 5 MG/ML
2.5 INJECTION INTRAMUSCULAR; INTRAVENOUS EVERY 8 HOURS PRN
Status: DISCONTINUED | OUTPATIENT
Start: 2023-09-14 | End: 2023-09-15 | Stop reason: HOSPADM

## 2023-09-14 RX ORDER — SODIUM CHLORIDE 0.9 % (FLUSH) 0.9 %
10 SYRINGE (ML) INJECTION EVERY 12 HOURS PRN
Status: DISCONTINUED | OUTPATIENT
Start: 2023-09-14 | End: 2023-09-15 | Stop reason: HOSPADM

## 2023-09-14 RX ADMIN — IOHEXOL 100 ML: 350 INJECTION, SOLUTION INTRAVENOUS at 05:09

## 2023-09-14 RX ADMIN — SODIUM CHLORIDE 500 ML: 9 INJECTION, SOLUTION INTRAVENOUS at 04:09

## 2023-09-14 RX ADMIN — KETOROLAC TROMETHAMINE 10 MG: 30 INJECTION, SOLUTION INTRAMUSCULAR; INTRAVENOUS at 05:09

## 2023-09-14 RX ADMIN — ONDANSETRON 4 MG: 2 INJECTION INTRAMUSCULAR; INTRAVENOUS at 05:09

## 2023-09-14 RX ADMIN — ENOXAPARIN SODIUM 40 MG: 40 INJECTION SUBCUTANEOUS at 09:09

## 2023-09-14 RX ADMIN — ACETAMINOPHEN 1000 MG: 500 TABLET ORAL at 09:09

## 2023-09-14 RX ADMIN — SODIUM CHLORIDE, POTASSIUM CHLORIDE, SODIUM LACTATE AND CALCIUM CHLORIDE: 600; 310; 30; 20 INJECTION, SOLUTION INTRAVENOUS at 07:09

## 2023-09-14 NOTE — ED TRIAGE NOTES
Reports N/V since yesterday - last emesis at 0830 today - states has been tolerating sips of water and broth. Last normal BM yesterday with onset of diarrhea last night after taking Miralax. H/o colon cancer in past; denies blood in stool. No fever reported. Appears uncomfortable. Denies sick contacts.

## 2023-09-14 NOTE — ED PROVIDER NOTES
"Encounter Date: 2023    SCRIBE #1 NOTE: I, Marie Shiraday, am scribing for, and in the presence of,  Elder Rojas MD. I have scribed the following portions of the note - Other sections scribed: HPI, ROS.       History     Chief Complaint   Patient presents with    Abdominal Pain     Reports lower abd pain, N/V, and diarrhea onset yesterday evening.      Patient is an 82 y.o. female complaining of "excruciating" abdominal pain starting yesterday evening. She endorses vomiting, belching, and frequent urination. Since the pain began, patient reports she has not had any bowel movements or passed gas. Pt has a hx of a bowel obstruction 2 years ago and reports that her symptoms were similar. She has a hx of a C section, colon surgery, and an abdominal hernia. She denies sick contacts. Pt denies smoking, drinking, and drug use. This is the extent of the patient's complaints at this time.        The history is provided by the patient and medical records. No  was used.     Review of patient's allergies indicates:   Allergen Reactions    Ace inhibitors Other (See Comments)     cough    Arb-angiotensin receptor antagonist Other (See Comments)     Cough w/ valsartan     Past Medical History:   Diagnosis Date    Allergic rhinitis 2022    Anemia     Arthritis     Colon cancer     Gout, chronic     Heart murmur     High cholesterol     Hypercholesteremia     Hypertension     Obesity     PMB (postmenopausal bleeding) 10/24/2017    Right knee pain 2019    Sleep apnea     Thyroid disease      Past Surgical History:   Procedure Laterality Date    BTL       SECTION, CLASSIC      COLON SURGERY      goiter       HERNIA REPAIR      KIDNEY SURGERY      cyst removal    THYROID SURGERY       Family History   Problem Relation Age of Onset    Heart disease Father     Diabetes Mother      Social History     Tobacco Use    Smoking status: Former     Current packs/day: 0.00     Types: Cigarettes "     Quit date: 1980     Years since quittin.4    Smokeless tobacco: Never   Substance Use Topics    Alcohol use: No    Drug use: No     Review of Systems  Constitutional-no fever  HEENT-no congestion  Eyes-no redness  Respiratory-no shortness of breath  Cardio-no chest pain  GI-Positive for abdominal pain, vomiting, and belching.   Endocrine-no cold intolerance  -no difficulty urinating. Positive for frequent urination.  MSK-no myalgias  Skin-no rashes  Allergy-no environmental allergy  Neurologic-, no headache  Hematology-no swollen nodes  Behavioral-no confusion    Physical Exam     Initial Vitals [23 1539]   BP Pulse Resp Temp SpO2   (!) 163/84 92 18 97.6 °F (36.4 °C) 98 %      MAP       --         Physical Exam  Constitutional:  Chronically ill-appearing 82-year-old female mild distress  Eyes: Conjunctivae normal.  ENT       Head: Normocephalic, atraumatic.       Nose: No congestion.       Mouth/Throat: Mucous membranes are moist.  Hematological/Lymphatic/Immunilogical: No cervical lymphadenopathy.  Cardiovascular: Normal rate, regular rhythm. Normal and symmetric distal pulses.  Respiratory: Normal respiratory effort. Breath sounds are normal.  Gastrointestinal:  Rounded, diffusely tender, voluntary guarding in the left lower quadrant, no rebound  Musculoskeletal: Normal range of motion in all extremities. No obvious deformities or swelling.  Neurologic: Alert, oriented. Normal speech and language. No gross focal neurologic deficits are appreciated.  Skin: Skin is warm, dry. No rash noted.  Psychiatric: Mood and affect are normal.     ED Course   Procedures  Labs Reviewed   CBC W/ AUTO DIFFERENTIAL - Abnormal; Notable for the following components:       Result Value    RBC 5.70 (*)     MCH 26.0 (*)     MCHC 31.6 (*)     RDW 17.0 (*)     Gran # (ANC) 8.1 (*)     Immature Grans (Abs) 0.06 (*)     All other components within normal limits   COMPREHENSIVE METABOLIC PANEL - Abnormal; Notable for  the following components:    Glucose 116 (*)     All other components within normal limits   URINALYSIS, REFLEX TO URINE CULTURE    Narrative:     Specimen Source->Urine   LIPASE   SARS-COV-2 RDRP GENE   ISTAT CREATININE          Imaging Results              CT Abdomen Pelvis With Contrast (Final result)  Result time 09/14/23 18:30:46      Final result by Grecia Johnson MD (09/14/23 18:30:46)                   Impression:      Mild small bowel obstruction.  As above described.    Small bilateral renal cysts.    Indeterminate low-attenuation stable left adrenal masses.  Findings may represent adenomas.  However, consider nonemergent outpatient MRI of the adrenal glands with in phase and out of phase sequencing.    Colonic diverticulosis.    Hyperdense material within the urinary bladder.      Electronically signed by: Grecia Johnson  Date:    09/14/2023  Time:    18:30               Narrative:    EXAMINATION:  CT OF ABDOMEN PELVIS WITH    CLINICAL HISTORY:  Lower abdominal pain, nausea, vomiting, diarrhea on set yesterday evening.    TECHNIQUE:  5 mm enhanced axial images were obtained from the lung bases through the greater trochanters.  One hundred mL of Omnipaque 350 was injected.    COMPARISON:  10/27/2020    FINDINGS:  There are mildly dilated fluid-filled small bowel loops.  A small bowel feces sign is seen at the ventral right paramedian upper pelvis, which may represent a transition point.  (Series 2 axial image 120).    The liver, spleen, pancreas, and right adrenal gland are unremarkable. The gallbladder contains calcified gallstones.    There are indeterminate low-attenuation left adrenal lesions, which have not significantly changed.    There are low-attenuation lesions seen in both kidneys.  Some are too small to characterize.  Others have imaging characteristics that of a simple cysts.  There is cortical defect at the upper pole of the left kidney, which may be due to previous resection or  biopsy.    There is a ventral abdominal mesh.    There is no definite evidence for abdominal adenopathy or ascites.    Hyperdensity fluid is within the urinary bladder, which is likely related to intravenous contrast rather than hemorrhage.  Consider correlation with urinalysis.  There are no pelvic masses or adenopathy.  There is colonic diverticulosis without evidence of acute diverticulitis.    There is no free fluid in the pelvis.    There is mild bibasilar atelectasis.    There are mild spondylitic changes.                                       Medications   lactated ringers infusion ( Intravenous New Bag 9/14/23 1906)   ondansetron injection 4 mg (has no administration in time range)   prochlorperazine injection Soln 2.5 mg (has no administration in time range)   ketorolac injection 15 mg (has no administration in time range)   sodium chloride 0.9% flush 10 mL (has no administration in time range)   enoxaparin injection 40 mg (has no administration in time range)   acetaminophen tablet 1,000 mg (has no administration in time range)   melatonin tablet 6 mg (has no administration in time range)   simethicone chewable tablet 80 mg (has no administration in time range)   aluminum-magnesium hydroxide-simethicone 200-200-20 mg/5 mL suspension 30 mL (has no administration in time range)   sodium chloride 0.9% bolus 500 mL 500 mL (0 mLs Intravenous Stopped 9/14/23 1727)   ondansetron injection 4 mg (4 mg Intravenous Given 9/14/23 1716)   ketorolac injection 9.999 mg (9.999 mg Intravenous Given 9/14/23 1716)   iohexoL (OMNIPAQUE 350) injection 100 mL (100 mLs Intravenous Given 9/14/23 1738)     Medical Decision Making  Differential diagnosis-bowel obstruction, pancreatitis, diverticulitis, gastroenteritis, constipation, colitis    82-year-old female with pain history of small-bowel obstruction in the past, multiple surgical interventions in the abdomen and pelvis.    Exam mildly concerning, no flatus or output over  last 24 hours.    Imaging concerning for a small-bowel obstruction, discussed with General surgery on-call, will plan for administration of IV fluids, monitoring, bowel rest and reassessment.  Discussed with LIBAN Roper will plan for ongoing monitoring    Problems Addressed:  Epigastric pain: complicated acute illness or injury  Hypertension, unspecified type: chronic illness or injury with exacerbation, progression, or side effects of treatment  SBO (small bowel obstruction): complicated acute illness or injury    Amount and/or Complexity of Data Reviewed  Independent Historian:      Details: Daughter at the bedside notes pain since yesterday  External Data Reviewed: labs, radiology, ECG and notes.     Details: History of a bowel obstruction in October of 2020.  Labs: ordered.  Radiology: ordered.  Discussion of management or test interpretation with external provider(s): Discussed with Dr. BILLINGSLEY on-call general surgeon, will defer surgical intervention at this time plan for symptomatic care monitoring IV fluid administration and reassessment.    Risk  OTC drugs.  Prescription drug management.  Decision regarding hospitalization.            Scribe Attestation:   Scribe #1: I performed the above scribed service and the documentation accurately describes the services I performed. I attest to the accuracy of the note.              Physician Attestation for Scribe: I, elder rojas, reviewed documentation as scribed in my presence, which is both accurate and complete.            Clinical Impression:   Final diagnoses:  [K56.609] SBO (small bowel obstruction)  [I10] Hypertension, unspecified type (Primary)  [R10.13] Epigastric pain        ED Disposition Condition    Observation Stable                lEder Rojas MD  09/14/23 0846

## 2023-09-14 NOTE — FIRST PROVIDER EVALUATION
Emergency Department TeleTriage Encounter Note      CHIEF COMPLAINT    Chief Complaint   Patient presents with    Abdominal Pain     Reports lower abd pain, N/V, and diarrhea onset yesterday evening.        VITAL SIGNS   Initial Vitals [09/14/23 1539]   BP Pulse Resp Temp SpO2   (!) 163/84 92 18 97.6 °F (36.4 °C) 98 %      MAP       --            ALLERGIES    Review of patient's allergies indicates:   Allergen Reactions    Ace inhibitors Other (See Comments)     cough    Arb-angiotensin receptor antagonist Other (See Comments)     Cough w/ valsartan       PROVIDER TRIAGE NOTE  Verbal consent for the teletriage evaluation was given by the patient at the start of the evaluation.  All efforts will be made to maintain patient's privacy during the evaluation.      This is a teletriage evaluation of a 82 y.o. female presenting to the ED with c/o lower abdominal pain, nausea, vomiting, and diarrhea that started yesterday.  H/O SBO.  Limited physical exam via telehealth: The patient is awake, alert, answering questions appropriately and is not in respiratory distress.  As the Teletriage provider, I performed an initial assessment and ordered appropriate labs and imaging studies, if any, to facilitate the patient's care once placed in the ED. Once a room is available, care and a full evaluation will be completed by an alternate ED provider.  Any additional orders and the final disposition will be determined by that provider.  All imaging and labs will not be followed-up by the Teletriage Team, including myself.          ORDERS  Labs Reviewed   CBC W/ AUTO DIFFERENTIAL   COMPREHENSIVE METABOLIC PANEL   LIPASE   URINALYSIS, REFLEX TO URINE CULTURE   SARS-COV-2 RDRP GENE       ED Orders (720h ago, onward)      Start Ordered     Status Ordering Provider    09/14/23 1550 09/14/23 1549  Vital signs  Every 2 hours         Ordered OMAYRA DIAZ    09/14/23 1550 09/14/23 1549  POCT COVID-19 Rapid Screening  Once         Ordered  OMAYRA DIAZ    09/14/23 1549 09/14/23 1549  Diet NPO  Diet effective now         Ordered OMAYRA DIAZ    09/14/23 1549 09/14/23 1549  Insert peripheral IV  Once         Ordered OMAYRA DIAZ    09/14/23 1549 09/14/23 1549  CBC W/ AUTO DIFFERENTIAL  STAT         Ordered OMAYRA DIAZ    09/14/23 1549 09/14/23 1549  Comp. Metabolic Panel  STAT         Ordered OMAYRA DIAZ    09/14/23 1549 09/14/23 1549  Lipase  STAT         Ordered OMAYRA DIAZ    09/14/23 1549 09/14/23 1549  Urinalysis, Reflex to Urine Culture Urine, Clean Catch  STAT         Ordered OMAYRA DIAZ              Virtual Visit Note: The provider triage portion of this emergency department evaluation and documentation was performed via [email or web address], a HIPAA-compliant telemedicine application, in concert with a tele-presenter in the room. A face to face patient evaluation with one of my colleagues will occur once the patient is placed in an emergency department room.      DISCLAIMER: This note was prepared with [employer] voice recognition transcription software. Garbled syntax, mangled pronouns, and other bizarre constructions may be attributed to that software system.

## 2023-09-14 NOTE — Clinical Note
Diagnosis: SBO (small bowel obstruction) [005611]   Future Attending Provider: TONE SPENCER [[id number]]   Admitting Provider:: TONE SPENCER [[id number]]

## 2023-09-15 VITALS
DIASTOLIC BLOOD PRESSURE: 64 MMHG | HEIGHT: 67 IN | HEART RATE: 68 BPM | SYSTOLIC BLOOD PRESSURE: 133 MMHG | BODY MASS INDEX: 43.95 KG/M2 | TEMPERATURE: 98 F | RESPIRATION RATE: 18 BRPM | OXYGEN SATURATION: 98 % | WEIGHT: 280 LBS

## 2023-09-15 LAB
ANION GAP SERPL CALC-SCNC: 10 MMOL/L (ref 8–16)
BUN SERPL-MCNC: 10 MG/DL (ref 8–23)
CALCIUM SERPL-MCNC: 9.5 MG/DL (ref 8.7–10.5)
CHLORIDE SERPL-SCNC: 108 MMOL/L (ref 95–110)
CO2 SERPL-SCNC: 22 MMOL/L (ref 23–29)
CREAT SERPL-MCNC: 0.7 MG/DL (ref 0.5–1.4)
ERYTHROCYTE [DISTWIDTH] IN BLOOD BY AUTOMATED COUNT: 16.3 % (ref 11.5–14.5)
EST. GFR  (NO RACE VARIABLE): >60 ML/MIN/1.73 M^2
GLUCOSE SERPL-MCNC: 95 MG/DL (ref 70–110)
HCT VFR BLD AUTO: 39.2 % (ref 37–48.5)
HGB BLD-MCNC: 12 G/DL (ref 12–16)
MCH RBC QN AUTO: 25.3 PG (ref 27–31)
MCHC RBC AUTO-ENTMCNC: 30.6 G/DL (ref 32–36)
MCV RBC AUTO: 83 FL (ref 82–98)
PLATELET # BLD AUTO: 216 K/UL (ref 150–450)
PMV BLD AUTO: 11 FL (ref 9.2–12.9)
POTASSIUM SERPL-SCNC: 4.5 MMOL/L (ref 3.5–5.1)
RBC # BLD AUTO: 4.75 M/UL (ref 4–5.4)
SODIUM SERPL-SCNC: 140 MMOL/L (ref 136–145)
WBC # BLD AUTO: 8.45 K/UL (ref 3.9–12.7)

## 2023-09-15 PROCEDURE — 96376 TX/PRO/DX INJ SAME DRUG ADON: CPT

## 2023-09-15 PROCEDURE — 36415 COLL VENOUS BLD VENIPUNCTURE: CPT

## 2023-09-15 PROCEDURE — 25000242 PHARM REV CODE 250 ALT 637 W/ HCPCS

## 2023-09-15 PROCEDURE — 85027 COMPLETE CBC AUTOMATED: CPT

## 2023-09-15 PROCEDURE — 94761 N-INVAS EAR/PLS OXIMETRY MLT: CPT

## 2023-09-15 PROCEDURE — 80048 BASIC METABOLIC PNL TOTAL CA: CPT

## 2023-09-15 PROCEDURE — 99239 PR HOSPITAL DISCHARGE DAY,>30 MIN: ICD-10-PCS | Mod: ,,, | Performed by: PHYSICIAN ASSISTANT

## 2023-09-15 PROCEDURE — G0378 HOSPITAL OBSERVATION PER HR: HCPCS

## 2023-09-15 PROCEDURE — 25000003 PHARM REV CODE 250

## 2023-09-15 PROCEDURE — 99203 PR OFFICE/OUTPT VISIT, NEW, LEVL III, 30-44 MIN: ICD-10-PCS | Mod: ,,, | Performed by: SURGERY

## 2023-09-15 PROCEDURE — 63600175 PHARM REV CODE 636 W HCPCS

## 2023-09-15 PROCEDURE — 99203 OFFICE O/P NEW LOW 30 MIN: CPT | Mod: ,,, | Performed by: SURGERY

## 2023-09-15 PROCEDURE — 96374 THER/PROPH/DIAG INJ IV PUSH: CPT | Mod: 59

## 2023-09-15 PROCEDURE — 99239 HOSP IP/OBS DSCHRG MGMT >30: CPT | Mod: ,,, | Performed by: PHYSICIAN ASSISTANT

## 2023-09-15 RX ORDER — HYDROCHLOROTHIAZIDE 12.5 MG/1
12.5 TABLET ORAL DAILY
Status: DISCONTINUED | OUTPATIENT
Start: 2023-09-15 | End: 2023-09-15 | Stop reason: HOSPADM

## 2023-09-15 RX ORDER — FLUTICASONE PROPIONATE 50 MCG
2 SPRAY, SUSPENSION (ML) NASAL NIGHTLY
Status: DISCONTINUED | OUTPATIENT
Start: 2023-09-15 | End: 2023-09-15 | Stop reason: HOSPADM

## 2023-09-15 RX ORDER — KETOROLAC TROMETHAMINE 30 MG/ML
15 INJECTION, SOLUTION INTRAMUSCULAR; INTRAVENOUS EVERY 6 HOURS PRN
Status: DISCONTINUED | OUTPATIENT
Start: 2023-09-15 | End: 2023-09-15 | Stop reason: HOSPADM

## 2023-09-15 RX ORDER — ASCORBIC ACID 500 MG
1000 TABLET ORAL DAILY
Status: DISCONTINUED | OUTPATIENT
Start: 2023-09-15 | End: 2023-09-15 | Stop reason: HOSPADM

## 2023-09-15 RX ORDER — ASPIRIN 81 MG/1
81 TABLET ORAL DAILY
Status: DISCONTINUED | OUTPATIENT
Start: 2023-09-15 | End: 2023-09-15 | Stop reason: HOSPADM

## 2023-09-15 RX ORDER — ALLOPURINOL 300 MG/1
300 TABLET ORAL DAILY
Status: DISCONTINUED | OUTPATIENT
Start: 2023-09-15 | End: 2023-09-15 | Stop reason: HOSPADM

## 2023-09-15 RX ORDER — AMLODIPINE BESYLATE 5 MG/1
10 TABLET ORAL DAILY
Status: DISCONTINUED | OUTPATIENT
Start: 2023-09-15 | End: 2023-09-15 | Stop reason: HOSPADM

## 2023-09-15 RX ORDER — ATORVASTATIN CALCIUM 10 MG/1
10 TABLET, FILM COATED ORAL NIGHTLY
Status: DISCONTINUED | OUTPATIENT
Start: 2023-09-15 | End: 2023-09-15 | Stop reason: HOSPADM

## 2023-09-15 RX ORDER — CETIRIZINE HYDROCHLORIDE 5 MG/1
5 TABLET ORAL NIGHTLY
Status: DISCONTINUED | OUTPATIENT
Start: 2023-09-15 | End: 2023-09-15 | Stop reason: HOSPADM

## 2023-09-15 RX ADMIN — CETIRIZINE HYDROCHLORIDE 5 MG: 5 TABLET, FILM COATED ORAL at 02:09

## 2023-09-15 RX ADMIN — AMLODIPINE BESYLATE 10 MG: 5 TABLET ORAL at 10:09

## 2023-09-15 RX ADMIN — ASPIRIN 81 MG: 81 TABLET, COATED ORAL at 10:09

## 2023-09-15 RX ADMIN — ONDANSETRON 4 MG: 2 INJECTION INTRAMUSCULAR; INTRAVENOUS at 12:09

## 2023-09-15 RX ADMIN — KETOROLAC TROMETHAMINE 15 MG: 30 INJECTION, SOLUTION INTRAMUSCULAR; INTRAVENOUS at 12:09

## 2023-09-15 RX ADMIN — ONDANSETRON 4 MG: 2 INJECTION INTRAMUSCULAR; INTRAVENOUS at 05:09

## 2023-09-15 RX ADMIN — HYDROCHLOROTHIAZIDE 12.5 MG: 12.5 TABLET ORAL at 10:09

## 2023-09-15 RX ADMIN — OXYCODONE HYDROCHLORIDE AND ACETAMINOPHEN 1000 MG: 500 TABLET ORAL at 10:09

## 2023-09-15 RX ADMIN — ATORVASTATIN CALCIUM 10 MG: 10 TABLET, FILM COATED ORAL at 02:09

## 2023-09-15 RX ADMIN — FLUTICASONE PROPIONATE 100 MCG: 50 SPRAY, METERED NASAL at 02:09

## 2023-09-15 RX ADMIN — ALLOPURINOL 300 MG: 300 TABLET ORAL at 10:09

## 2023-09-15 NOTE — HOSPITAL COURSE
Natali Galeano was admitted for SBO as seen on CT. Surgery consulted, patient passing flatus and tolerating PO challenge. No acute surgical intervention indicated. Tolerating diet, pain resolved. Stable for discharge with return precautions discussed, no further questions

## 2023-09-15 NOTE — ASSESSMENT & PLAN NOTE
Hx of HTN and HLD  Hypertensive    - Resume home dose amlodipine 10mg PO daily, hctz 12.5mg PO daily, aspirin 81mg PO daily, atorvastatin 10mg PO qhs

## 2023-09-15 NOTE — ASSESSMENT & PLAN NOTE
Chronic, controlled.     - Resume home dose flonase 2 spray each nostril qhs and cetirizine 5mg PO qhs

## 2023-09-15 NOTE — PLAN OF CARE
[facility] ([address])  Initial Discharge Assessment       Primary Care Provider: Ilene Rocha MD    Admission Diagnosis: SBO (small bowel obstruction) [K56.609]    Admission Date: 9/14/2023  Expected Discharge Date:     Transition of Care Barriers: None    Payor: [employer] / Plan: [employer]ICES 65 / Product Type: Medicare Advantage /     Extended Emergency Contact Information  Primary Emergency Contact: Izabel Galeano   [place]  Home Phone: [phone]  Mobile Phone: [phone]  Relation: Daughter    Discharge Plan A: Home         [id number] - [place], [place] - [address] AT [facility] [address]  [address]  [facility] [address]  Phone: [phone] Fax: [phone]      Initial Assessment (most recent)       Adult Discharge Assessment - 09/15/23 0843          Discharge Assessment    Assessment Type Discharge Planning Assessment     Confirmed/corrected address, phone number and insurance Yes     Confirmed Demographics Correct on Facesheet     Source of Information patient;family     Does patient/caregiver understand observation status Yes     People in Home alone     Do you expect to return to your current living situation? Yes     Do you have help at home or someone to help you manage your care at home? Yes     Prior to hospitilization cognitive status: Alert/Oriented     Current cognitive status: Alert/Oriented     Equipment Currently Used at Home CPAP;blood pressure machine     Patient currently being followed by outpatient case management? No     Do you currently have service(s) that help you manage your care at home? No     Do you take prescription medications? Yes     Do you have prescription coverage? Yes     Do you have any problems affording any of your prescribed medications? No     Is the patient taking medications as prescribed? yes     Who is going to help you get home at discharge? daughter     How do you  get to doctors appointments? car, drives self;family or friend will provide     Are you on dialysis? No     DME Needed Upon Discharge  none     Discharge Plan discussed with: Patient;Adult children     Transition of Care Barriers None     Discharge Plan A Home        Physical Activity    On average, how many days per week do you engage in moderate to strenuous exercise (like a brisk walk)? 3 days     On average, how many minutes do you engage in exercise at this level? 20 min        Financial Resource Strain    How hard is it for you to pay for the very basics like food, housing, medical care, and heating? Not hard at all        Housing Stability    In the last 12 months, was there a time when you were not able to pay the mortgage or rent on time? No     In the last 12 months, how many places have you lived? 1     In the last 12 months, was there a time when you did not have a steady place to sleep or slept in a shelter (including now)? No        Transportation Needs    In the past 12 months, has lack of transportation kept you from medical appointments or from getting medications? No     In the past 12 months, has lack of transportation kept you from meetings, work, or from getting things needed for daily living? No        Food Insecurity    Within the past 12 months, you worried that your food would run out before you got the money to buy more. Never true     Within the past 12 months, the food you bought just didn't last and you didn't have money to get more. Never true        Stress    Do you feel stress - tense, restless, nervous, or anxious, or unable to sleep at night because your mind is troubled all the time - these days? Not at all        Social Connections    In a typical week, how many times do you talk on the phone with family, friends, or neighbors? More than three times a week     How often do you get together with friends or relatives? Once a week     How often do you attend Pentecostalism or Islam  services? More than 4 times per year     Do you belong to any clubs or organizations such as Orthodox groups, unions, fraternal or athletic groups, or school groups? No     How often do you attend meetings of the clubs or organizations you belong to? Never     Are you , , , , never , or living with a partner?         Alcohol Use    Q1: How often do you have a drink containing alcohol? Never     Q2: How many drinks containing alcohol do you have on a typical day when you are drinking? Patient does not drink     Q3: How often do you have six or more drinks on one occasion? Never

## 2023-09-15 NOTE — SUBJECTIVE & OBJECTIVE
Past Medical History:   Diagnosis Date    Allergic rhinitis 2022    Anemia     Arthritis     Colon cancer     Gout, chronic     Heart murmur     High cholesterol     Hypercholesteremia     Hypertension     Obesity     PMB (postmenopausal bleeding) 10/24/2017    Right knee pain 2019    Sleep apnea     Thyroid disease        Past Surgical History:   Procedure Laterality Date    BTL       SECTION, CLASSIC      COLON SURGERY      goiter       HERNIA REPAIR      KIDNEY SURGERY      cyst removal    THYROID SURGERY         Review of patient's allergies indicates:   Allergen Reactions    Ace inhibitors Other (See Comments)     cough    Arb-angiotensin receptor antagonist Other (See Comments)     Cough w/ valsartan       No current facility-administered medications on file prior to encounter.     Current Outpatient Medications on File Prior to Encounter   Medication Sig    allopurinoL (ZYLOPRIM) 300 MG tablet Take 1 tablet (300 mg total) by mouth once daily. gout    amLODIPine (NORVASC) 10 MG tablet Take 1 tablet (10 mg total) by mouth once daily. High blood pressure    ascorbic acid, vitamin C, (VITAMIN C) 1000 MG tablet Take 1,000 mg by mouth once daily.    aspirin (ECOTRIN) 81 MG EC tablet Take 81 mg by mouth once daily.    atorvastatin (LIPITOR) 10 MG tablet Take 1 tablet (10 mg total) by mouth every evening. High cholesterol    cholecalciferol, vitamin D3, (VITAMIN D3) 25 mcg (1,000 unit) capsule Take 2,000 Units by mouth once daily.    cyanocobalamin (VITAMIN B-12) 250 MCG tablet Take 2,500 mcg by mouth once daily.    fluticasone propionate (FLONASE) 50 mcg/actuation nasal spray 2 sprays (100 mcg total) by Each Nostril route 2 (two) times a day.    hydroCHLOROthiazide (HYDRODIURIL) 25 MG tablet Take 0.5 tablets (12.5 mg total) by mouth once daily. Take 1/2 of the 25 mg tablet daily    levocetirizine (XYZAL) 5 MG tablet Take 5 mg by mouth every evening.    docusate sodium (COLACE) 100 MG capsule Take  100 mg by mouth once daily.    levocetirizine (XYZAL) 5 MG tablet Take 5 mg by mouth.    LIDOcaine (XYLOCAINE) 5 % Oint ointment Apply topically nightly as needed.    LIDOcaine (XYLOCAINE) 5 % Oint ointment APPLY TOPICALLY TO AFFECTED PART OF FOOT/FEET ONCE NIGHTLY AS NEEDED.    LIDOcaine HCL 2% (XYLOCAINE) 2 % jelly Apply topically as needed. Apply topically once nightly to affected part of foot/feet.    promethazine-dextromethorphan (PROMETHAZINE-DM) 6.25-15 mg/5 mL Syrp Take 5 mLs by mouth every 4 (four) hours as needed (cough).     Family History       Problem Relation (Age of Onset)    Diabetes Mother    Heart disease Father          Tobacco Use    Smoking status: Former     Current packs/day: 0.00     Types: Cigarettes     Quit date: 1980     Years since quittin.4    Smokeless tobacco: Never   Substance and Sexual Activity    Alcohol use: No    Drug use: No    Sexual activity: Never     Review of Systems   Constitutional:  Negative for chills and fever.   HENT:  Negative for congestion and sore throat.    Eyes:  Negative for pain and redness.   Respiratory:  Negative for cough and shortness of breath.    Cardiovascular:  Negative for chest pain and leg swelling.   Gastrointestinal:  Positive for abdominal pain, diarrhea, nausea and vomiting. Negative for abdominal distention, blood in stool and constipation.   Genitourinary:  Negative for difficulty urinating and dysuria.   Musculoskeletal:  Negative for gait problem and joint swelling.   Skin:  Negative for rash.   Neurological:  Negative for light-headedness and headaches.   Psychiatric/Behavioral:  Negative for agitation and behavioral problems.      Objective:     Vital Signs (Most Recent):  Temp: 98 °F (36.7 °C) (23)  Pulse: 81 (23)  Resp: 18 (23)  BP: (!) 154/67 (23)  SpO2: 98 % (23) Vital Signs (24h Range):  Temp:  [97.6 °F (36.4 °C)-98 °F (36.7 °C)] 98 °F (36.7 °C)  Pulse:  [81-92]  "81  Resp:  [18] 18  SpO2:  [98 %] 98 %  BP: (154-163)/(67-84) 154/67     Weight: 127 kg (280 lb)  Body mass index is 43.85 kg/m².     Physical Exam  Vitals and nursing note reviewed.   Constitutional:       General: She is not in acute distress.     Appearance: She is not ill-appearing.   HENT:      Head: Normocephalic and atraumatic.      Nose: No congestion or rhinorrhea.   Eyes:      General: No scleral icterus.        Right eye: No discharge.         Left eye: No discharge.   Cardiovascular:      Pulses: Normal pulses.      Comments: Distant heart sounds  Pulmonary:      Effort: Pulmonary effort is normal. No respiratory distress.      Comments: Unable to auscultate clearly from habitus  Speaking in full sentences  Abdominal:      Palpations: Abdomen is soft.      Tenderness: There is abdominal tenderness (suprapubic region).   Musculoskeletal:      Right lower leg: No edema.      Left lower leg: No edema.   Skin:     General: Skin is warm and dry.   Neurological:      Mental Status: She is alert and oriented to person, place, and time. Mental status is at baseline.   Psychiatric:         Mood and Affect: Mood normal.         Behavior: Behavior normal.                Significant Labs: All pertinent labs within the past 24 hours have been reviewed.  Bilirubin:   Recent Labs   Lab 09/14/23  1724   BILITOT 0.6     BMP:   Recent Labs   Lab 09/14/23  1724   *      K 4.0      CO2 24   BUN 10   CREATININE 0.8   CALCIUM 10.3     CBC:   Recent Labs   Lab 09/14/23  1643   WBC 11.14   HGB 14.8   HCT 46.8        CMP:   Recent Labs   Lab 09/14/23  1724      K 4.0      CO2 24   *   BUN 10   CREATININE 0.8   CALCIUM 10.3   PROT 7.6   ALBUMIN 3.6   BILITOT 0.6   ALKPHOS 97   AST 22   ALT 14   ANIONGAP 12     Lactic Acid: No results for input(s): "LACTATE" in the last 48 hours.  Lipase:   Recent Labs   Lab 09/14/23  1724   LIPASE 10     Magnesium: No results for input(s): "MG" in the " "last 48 hours.  Urine Culture: No results for input(s): "LABURIN" in the last 48 hours.  Urine Studies:   Recent Labs   Lab 09/14/23  1757   COLORU Yellow   APPEARANCEUA Clear   PHUR 6.0   SPECGRAV 1.020   PROTEINUA Negative   GLUCUA Negative   KETONESU Negative   BILIRUBINUA Negative   OCCULTUA Negative   NITRITE Negative   UROBILINOGEN Negative   LEUKOCYTESUR Negative       Significant Imaging: I have reviewed and interpreted all pertinent imaging results/findings within the past 24 hours.  "

## 2023-09-15 NOTE — ASSESSMENT & PLAN NOTE
Nausea and vomiting   Lower abdominal pain   Diarrhea   Hx of colon CA s/p colectomy (), , prior small bowel obstruction   - complains of lower abdominal pain that started after sudden onset of diarrhea and vomiting.   - Lower abdominal pain was constant, 8/10 severity, mix of sharp and cramping pain.   - Reports previous episodes like this with SBO, but no diarrhea previously.   - Denies fever, chills, dysuria  - Labs on arrival with Lipase, transaminases, T.Bili WNL. Cr 0.8. Covid negative. UA normal.   - CT Abdomen Pelvis with contrast with mild small bowel obstruction. Colonic diverticulosis. - ED provider spoke to general surgeon on call, Dr. Sheth, recommended for conservative management and observe overnight.   - In ED, patient given NaCl 0.9% 500ccs fluid bolus, ondansetron 4mg IV and ketorolac 10mg IV. - Patient appears comfortable upon visit, reports diarrhea and vomiting had stopped. Patient reports that she had been having urinary output. Tenderness in lower abdominal region upon palpation.     Plan:  - General Surgery consult  - IVF LR 100ml/hr  - Scheduled ondansetron 4mg IV Q8, ketorolac 15mg IV Q6 PRN  - ADAT to clear liquid

## 2023-09-15 NOTE — NURSING
09/15/2023      Pt verbalized understanding D/C instructions and education provided pertinent to D/C needs. IV cath removed fully intact. No distress noted. Pt transferred per wheelchair per transport to awaiting vehicle.             Jaleesa Thomas RN

## 2023-09-15 NOTE — PLAN OF CARE
Spoke with patient regarding discharge - follow up scheduled with PCP 9/25/23 4pm - appt info added to AVS - family will provide transportation home     [facility] ([address])  Discharge Final Note    Primary Care Provider: Ilene Rocha MD    Expected Discharge Date: 9/15/2023    Final Discharge Note (most recent)       Final Note - 09/15/23 1330          Final Note    Assessment Type Final Discharge Note     Anticipated Discharge Disposition Home or Self Care     What phone number can be called within the next 1-3 days to see how you are doing after discharge? [phone]     Hospital Resources/Appts/Education Provided Provided patient/caregiver with written discharge plan information;Appointments scheduled and added to AVS        Post-Acute Status    Discharge Delays None known at this time

## 2023-09-15 NOTE — PLAN OF CARE
09/15/23 0841   CARSON Message   Medicare Outpatient and Observation Notification regarding financial responsibility Given to patient/caregiver;Explained to patient/caregiver;Signed/date by patient/caregiver   Date CARSON was signed 09/15/23   Time CARSON was signed 0800

## 2023-09-15 NOTE — CONSULTS
"Surgery Consult Note      SUBJECTIVE:     Chief Complaint: abdominal pain     History of Present Illness:  Natali Galeano is a 82 y.o. female presents with abdominal pain, nausea and vomiting.  She presented to the ED where she was found to have dilated small bowel on CT consistent with SBO.  She has had prior SBO that has resolved with bowel rest, most recent episode was .  Since admission she reports that she is feeling better.  Passing flatus overnight.       Review of patient's allergies indicates:   Allergen Reactions    Ace inhibitors Other (See Comments)     cough    Arb-angiotensin receptor antagonist Other (See Comments)     Cough w/ valsartan       Past Medical History:   Diagnosis Date    Allergic rhinitis 2022    Anemia     Arthritis     Colon cancer     Gout, chronic     Heart murmur     High cholesterol     Hypercholesteremia     Hypertension     Obesity     PMB (postmenopausal bleeding) 10/24/2017    Right knee pain 2019    Sleep apnea     Thyroid disease      Past Surgical History:   Procedure Laterality Date    BTL       SECTION, CLASSIC      COLON SURGERY      goiter       HERNIA REPAIR      KIDNEY SURGERY      cyst removal    THYROID SURGERY       Family History   Problem Relation Age of Onset    Heart disease Father     Diabetes Mother      Social History     Tobacco Use    Smoking status: Former     Current packs/day: 0.00     Types: Cigarettes     Quit date: 1980     Years since quittin.4    Smokeless tobacco: Never   Substance Use Topics    Alcohol use: No    Drug use: No        Review of Systems:  Review of Systems   All other systems reviewed and are negative.      OBJECTIVE:     Vital Signs (Most Recent)  BP (!) 154/67 (BP Location: Left arm, Patient Position: Lying)   Pulse 78   Temp 98.1 °F (36.7 °C) (Oral)   Resp 18   Ht 5' 7" (1.702 m)   Wt 127 kg (280 lb)   LMP  (LMP Unknown)   SpO2 97%   BMI 43.85 kg/m²     Physical Exam:  General: 82 y.o. " female in no distress   Neuro: alert and oriented x 4.  Moves all extremities.     HEENT: normocephalic, atraumatic, PERRL, EOMI   Respiratory: respirations are even and unlabored  Cardiac: regular rate and rhythm  Abdomen: obese, soft, NTND, no obvious hernias   Extremities: Warm dry and intact  Skin: no rashes    Labs:   Recent Results (from the past 336 hour(s))   CBC W/ AUTO DIFFERENTIAL    Collection Time: 09/14/23  4:43 PM   Result Value Ref Range    WBC 11.14 3.90 - 12.70 K/uL    Hemoglobin 14.8 12.0 - 16.0 g/dL    Hematocrit 46.8 37.0 - 48.5 %    Platelets 247 150 - 450 K/uL        Recent Results (from the past 336 hour(s))   Basic Metabolic Panel (BMP)    Collection Time: 09/15/23  5:36 AM   Result Value Ref Range    Sodium 140 136 - 145 mmol/L    Potassium 4.5 3.5 - 5.1 mmol/L    Chloride 108 95 - 110 mmol/L    CO2 22 (L) 23 - 29 mmol/L    BUN 10 8 - 23 mg/dL    Creatinine 0.7 0.5 - 1.4 mg/dL    Calcium 9.5 8.7 - 10.5 mg/dL    Anion Gap 10 8 - 16 mmol/L        Imaging:      Impression:     Mild small bowel obstruction.  As above described.     Small bilateral renal cysts.     Indeterminate low-attenuation stable left adrenal masses.  Findings may represent adenomas.  However, consider nonemergent outpatient MRI of the adrenal glands with in phase and out of phase sequencing.     Colonic diverticulosis.     Hyperdense material within the urinary bladder.        Electronically signed by: Grecia Johnson  Date:                                            09/14/2023  Time:                                           18:30    ASSESSMENT/PLAN:       82 y.o. female with recurrent bowel obstruction, resolving    - patient's labs and imaging personally reviewed and consistent with SBO, likely secondary to adhesive disease  - since admission, she has started passing flatus.   - clear liquids ordered, can advance diet as tolerated  - encouraged ambulation and OOB   - no urgent surgical intervention    Rowan Sheth,  MD  Staff Surgeon  Colon & Rectal Surgery

## 2023-09-15 NOTE — ASSESSMENT & PLAN NOTE
Nausea and vomiting   Lower abdominal pain   Diarrhea   Hx of colon CA s/p colectomy (), , prior small bowel obstruction   - complains of lower abdominal pain that started after sudden onset of diarrhea and vomiting.   - Lower abdominal pain was constant, 8/10 severity, mix of sharp and cramping pain.   - Reports previous episodes like this with SBO, but no diarrhea previously.   - Denies fever, chills, dysuria  - Labs on arrival with Lipase, transaminases, T.Bili WNL. Cr 0.8. Covid negative. UA normal.   - CT Abdomen Pelvis with contrast with mild small bowel obstruction. Colonic diverticulosis. - ED provider spoke to general surgeon on call, Dr. Sheth, recommended for conservative management and observe overnight.   - In ED, patient given NaCl 0.9% 500ccs fluid bolus, ondansetron 4mg IV and ketorolac 10mg IV. - Patient appears comfortable upon visit, reports diarrhea and vomiting had stopped. Patient reports that she had been having urinary output. Tenderness in lower abdominal region upon palpation.     Plan:  - General Surgery consult- advance diet as tolerated, no surgical intervention  - IVF LR 100ml/hr  - Scheduled ondansetron 4mg IV Q8, ketorolac 15mg IV Q6 PRN  - ADAT to clear liquid  - tolerating diet, passing flatus and with + bowel sounds  Stable for discharge

## 2023-09-15 NOTE — HPI
"Natali Galeano is a 82 year old lady with hx of gout, allergic rhinitis, HTN, HLD, rheumatic fever, heart murmur, colon CA s/p colectomy (), , prior small bowel obstruction and ALYSE presenting with complains of lower abdominal pain that started after sudden onset of diarrhea and vomiting. Patient reports about 4 episodes of non-bloody diarrhea "there were more coming from the diarrhea that from my urine" and about 4 episodes of vomiting. Patient reports lower abdominal pain was constant, 8/10 severity, mix of sharp and cramping pain. Reports previous episodes like this with SBO, but no diarrhea previously. Patient denies fever, chills, cough with purulence, dysuria, chest pain, SOB, pedal edema.     Afebrile, HR 92, RR 18, /84, Pox 98% on RA. No leukocytosis. H/H stable. Lipase, transaminases, T.Bili WNL. Cr 0.8. Covid negative. UA normal. CT Abdomen Pelvis with contrast with mild small bowel obstruction. Colonic diverticulosis. ED provider spoke to general surgeon on call, Dr. Sheth, recommended for conservative management and observe overnight. In ED, patient given NaCl 0.9% 500ccs fluid bolus, ondansetron 4mg IV and ketorolac 10mg IV. Patient appears comfortable upon visit, reports diarrhea and vomiting had stopped. Patient reports that she had been having urinary output. Tenderness in lower abdominal region upon palpation.     Patient is admitted to Obs service with Hospital Medicine for management of mild SBO with a consult to general surgery.   "

## 2023-09-15 NOTE — H&P
"[facility] [address] Medicine  History & Physical    Patient Name: Natali Galeano  MRN: [id number]  Patient Class: OP- Observation  Admission Date: 2023  Attending Physician: Zachary Hinton MD   Primary Care Provider: Ilene Rocha MD    Patient information was obtained from patient, past medical records and ER records.     Subjective:     Principal Problem:Small bowel obstruction, partial    Chief Complaint:   Chief Complaint   Patient presents with    Abdominal Pain     Reports lower abd pain, N/V, and diarrhea onset yesterday evening.         HPI: Natali Galeano is a 82 year old lady with hx of gout, allergic rhinitis, HTN, HLD, rheumatic fever, heart murmur, colon CA s/p colectomy (), , prior small bowel obstruction and ALYSE presenting with complains of lower abdominal pain that started after sudden onset of diarrhea and vomiting. Patient reports about 4 episodes of non-bloody diarrhea "there were more coming from the diarrhea that from my urine" and about 4 episodes of vomiting. Patient reports lower abdominal pain was constant, 8/10 severity, mix of sharp and cramping pain. Reports previous episodes like this with SBO, but no diarrhea previously. Patient denies fever, chills, cough with purulence, dysuria, chest pain, SOB, pedal edema.     Afebrile, HR 92, RR 18, /84, Pox 98% on RA. No leukocytosis. H/H stable. Lipase, transaminases, T.Bili WNL. Cr 0.8. Covid negative. UA normal. CT Abdomen Pelvis with contrast with mild small bowel obstruction. Colonic diverticulosis. ED provider spoke to general surgeon on call, Dr. Sheth, recommended for conservative management and observe overnight. In ED, patient given NaCl 0.9% 500ccs fluid bolus, ondansetron 4mg IV and ketorolac 10mg IV. Patient appears comfortable upon visit, reports diarrhea and vomiting had stopped. Patient reports that she had been having urinary output. Tenderness in lower abdominal region upon " palpation.     Patient is admitted to Obs service with Hospital Medicine for management of mild SBO with a consult to general surgery.       Past Medical History:   Diagnosis Date    Allergic rhinitis 2022    Anemia     Arthritis     Colon cancer     Gout, chronic     Heart murmur     High cholesterol     Hypercholesteremia     Hypertension     Obesity     PMB (postmenopausal bleeding) 10/24/2017    Right knee pain 2019    Sleep apnea     Thyroid disease        Past Surgical History:   Procedure Laterality Date    BTL       SECTION, CLASSIC      COLON SURGERY      goiter       HERNIA REPAIR      KIDNEY SURGERY      cyst removal    THYROID SURGERY         Review of patient's allergies indicates:   Allergen Reactions    Ace inhibitors Other (See Comments)     cough    Arb-angiotensin receptor antagonist Other (See Comments)     Cough w/ valsartan       No current facility-administered medications on file prior to encounter.     Current Outpatient Medications on File Prior to Encounter   Medication Sig    allopurinoL (ZYLOPRIM) 300 MG tablet Take 1 tablet (300 mg total) by mouth once daily. gout    amLODIPine (NORVASC) 10 MG tablet Take 1 tablet (10 mg total) by mouth once daily. High blood pressure    ascorbic acid, vitamin C, (VITAMIN C) 1000 MG tablet Take 1,000 mg by mouth once daily.    aspirin (ECOTRIN) 81 MG EC tablet Take 81 mg by mouth once daily.    atorvastatin (LIPITOR) 10 MG tablet Take 1 tablet (10 mg total) by mouth every evening. High cholesterol    cholecalciferol, vitamin D3, (VITAMIN D3) 25 mcg (1,000 unit) capsule Take 2,000 Units by mouth once daily.    cyanocobalamin (VITAMIN B-12) 250 MCG tablet Take 2,500 mcg by mouth once daily.    fluticasone propionate (FLONASE) 50 mcg/actuation nasal spray 2 sprays (100 mcg total) by Each Nostril route 2 (two) times a day.    hydroCHLOROthiazide (HYDRODIURIL) 25 MG tablet Take 0.5 tablets (12.5 mg total) by  mouth once daily. Take 1/2 of the 25 mg tablet daily    levocetirizine (XYZAL) 5 MG tablet Take 5 mg by mouth every evening.    docusate sodium (COLACE) 100 MG capsule Take 100 mg by mouth once daily.    levocetirizine (XYZAL) 5 MG tablet Take 5 mg by mouth.    LIDOcaine (XYLOCAINE) 5 % Oint ointment Apply topically nightly as needed.    LIDOcaine (XYLOCAINE) 5 % Oint ointment APPLY TOPICALLY TO AFFECTED PART OF FOOT/FEET ONCE NIGHTLY AS NEEDED.    LIDOcaine HCL 2% (XYLOCAINE) 2 % jelly Apply topically as needed. Apply topically once nightly to affected part of foot/feet.    promethazine-dextromethorphan (PROMETHAZINE-DM) 6.25-15 mg/5 mL Syrp Take 5 mLs by mouth every 4 (four) hours as needed (cough).     Family History       Problem Relation (Age of Onset)    Diabetes Mother    Heart disease Father          Tobacco Use    Smoking status: Former     Current packs/day: 0.00     Types: Cigarettes     Quit date: 1980     Years since quittin.4    Smokeless tobacco: Never   Substance and Sexual Activity    Alcohol use: No    Drug use: No    Sexual activity: Never     Review of Systems   Constitutional:  Negative for chills and fever.   HENT:  Negative for congestion and sore throat.    Eyes:  Negative for pain and redness.   Respiratory:  Negative for cough and shortness of breath.    Cardiovascular:  Negative for chest pain and leg swelling.   Gastrointestinal:  Positive for abdominal pain, diarrhea, nausea and vomiting. Negative for abdominal distention, blood in stool and constipation.   Genitourinary:  Negative for difficulty urinating and dysuria.   Musculoskeletal:  Negative for gait problem and joint swelling.   Skin:  Negative for rash.   Neurological:  Negative for light-headedness and headaches.   Psychiatric/Behavioral:  Negative for agitation and behavioral problems.      Objective:     Vital Signs (Most Recent):  Temp: 98 °F (36.7 °C) (23)  Pulse: 81 (23)  Resp: 18  (09/14/23 2119)  BP: (!) 154/67 (09/14/23 2119)  SpO2: 98 % (09/14/23 2119) Vital Signs (24h Range):  Temp:  [97.6 °F (36.4 °C)-98 °F (36.7 °C)] 98 °F (36.7 °C)  Pulse:  [81-92] 81  Resp:  [18] 18  SpO2:  [98 %] 98 %  BP: (154-163)/(67-84) 154/67     Weight: 127 kg (280 lb)  Body mass index is 43.85 kg/m².     Physical Exam  Vitals and nursing note reviewed.   Constitutional:       General: She is not in acute distress.     Appearance: She is not ill-appearing.   HENT:      Head: Normocephalic and atraumatic.      Nose: No congestion or rhinorrhea.   Eyes:      General: No scleral icterus.        Right eye: No discharge.         Left eye: No discharge.   Cardiovascular:      Pulses: Normal pulses.      Comments: Distant heart sounds  Pulmonary:      Effort: Pulmonary effort is normal. No respiratory distress.      Comments: Unable to auscultate clearly from habitus  Speaking in full sentences  Abdominal:      Palpations: Abdomen is soft.      Tenderness: There is abdominal tenderness (suprapubic region).   Musculoskeletal:      Right lower leg: No edema.      Left lower leg: No edema.   Skin:     General: Skin is warm and dry.   Neurological:      Mental Status: She is alert and oriented to person, place, and time. Mental status is at baseline.   Psychiatric:         Mood and Affect: Mood normal.         Behavior: Behavior normal.                Significant Labs: All pertinent labs within the past 24 hours have been reviewed.  Bilirubin:   Recent Labs   Lab 09/14/23  1724   BILITOT 0.6     BMP:   Recent Labs   Lab 09/14/23  1724   *      K 4.0      CO2 24   BUN 10   CREATININE 0.8   CALCIUM 10.3     CBC:   Recent Labs   Lab 09/14/23  1643   WBC 11.14   HGB 14.8   HCT 46.8        CMP:   Recent Labs   Lab 09/14/23  1724      K 4.0      CO2 24   *   BUN 10   CREATININE 0.8   CALCIUM 10.3   PROT 7.6   ALBUMIN 3.6   BILITOT 0.6   ALKPHOS 97   AST 22   ALT 14   ANIONGAP 12  "    Lactic Acid: No results for input(s): "LACTATE" in the last 48 hours.  Lipase:   Recent Labs   Lab 23  1724   LIPASE 10     Magnesium: No results for input(s): "MG" in the last 48 hours.  Urine Culture: No results for input(s): "LABURIN" in the last 48 hours.  Urine Studies:   Recent Labs   Lab 23  1757   COLORU Yellow   APPEARANCEUA Clear   PHUR 6.0   SPECGRAV 1.020   PROTEINUA Negative   GLUCUA Negative   KETONESU Negative   BILIRUBINUA Negative   OCCULTUA Negative   NITRITE Negative   UROBILINOGEN Negative   LEUKOCYTESUR Negative       Significant Imaging: I have reviewed and interpreted all pertinent imaging results/findings within the past 24 hours.    Assessment/Plan:     * Small bowel obstruction, partial   Nausea and vomiting   Lower abdominal pain   Diarrhea   Hx of colon CA s/p colectomy (), , prior small bowel obstruction   - complains of lower abdominal pain that started after sudden onset of diarrhea and vomiting.   - Lower abdominal pain was constant, 8/10 severity, mix of sharp and cramping pain.   - Reports previous episodes like this with SBO, but no diarrhea previously.   - Denies fever, chills, dysuria  - Labs on arrival with Lipase, transaminases, T.Bili WNL. Cr 0.8. Covid negative. UA normal.   - CT Abdomen Pelvis with contrast with mild small bowel obstruction. Colonic diverticulosis. - ED provider spoke to general surgeon on call, Dr. Sheth, recommended for conservative management and observe overnight.   - In ED, patient given NaCl 0.9% 500ccs fluid bolus, ondansetron 4mg IV and ketorolac 10mg IV. - Patient appears comfortable upon visit, reports diarrhea and vomiting had stopped. Patient reports that she had been having urinary output. Tenderness in lower abdominal region upon palpation.     Plan:  - General Surgery consult  - IVF LR 100ml/hr  - Scheduled ondansetron 4mg IV Q8, ketorolac 15mg IV Q6 PRN  - ADAT to clear liquid    Essential hypertension  Hx of HTN " and HLD  Hypertensive    - Resume home dose amlodipine 10mg PO daily, hctz 12.5mg PO daily, aspirin 81mg PO daily, atorvastatin 10mg PO qhs    Gout  Resume home dose allopurinol 100mg PO daily      ALYSE (obstructive sleep apnea)  CPAP qhs       Allergic rhinitis  Chronic, controlled.     - Resume home dose flonase 2 spray each nostril qhs and cetirizine 5mg PO qhs        VTE Risk Mitigation (From admission, onward)         Ordered     enoxaparin injection 40 mg  Daily         09/14/23 1948     IP VTE HIGH RISK PATIENT  Once         09/14/23 1948     Place sequential compression device  Until discontinued         09/14/23 1948              Chris Roper NP  Department of Hospital Medicine  [facility] ([address])

## 2023-09-15 NOTE — DISCHARGE SUMMARY
"[facility] ([address] Medicine  Discharge Summary      Patient Name: Natali Galeano  MRN: [id number]  ANIKET: [id number]  Patient Class: OP- Observation  Admission Date: 2023  Hospital Length of Stay: 0 days  Discharge Date and Time: 9/15/2023  1:48 PM  Attending Physician: No att. providers found   Discharging Provider: Keena Joyce PA-C  Primary Care Provider: Ilene Rocha MD    Primary Care Team: Networked reference to record PCT     HPI:   Natali Galeano is a 82 year old lady with hx of gout, allergic rhinitis, HTN, HLD, rheumatic fever, heart murmur, colon CA s/p colectomy (), , prior small bowel obstruction and ALYSE presenting with complains of lower abdominal pain that started after sudden onset of diarrhea and vomiting. Patient reports about 4 episodes of non-bloody diarrhea "there were more coming from the diarrhea that from my urine" and about 4 episodes of vomiting. Patient reports lower abdominal pain was constant, 8/10 severity, mix of sharp and cramping pain. Reports previous episodes like this with SBO, but no diarrhea previously. Patient denies fever, chills, cough with purulence, dysuria, chest pain, SOB, pedal edema.     Afebrile, HR 92, RR 18, /84, Pox 98% on RA. No leukocytosis. H/H stable. Lipase, transaminases, T.Bili WNL. Cr 0.8. Covid negative. UA normal. CT Abdomen Pelvis with contrast with mild small bowel obstruction. Colonic diverticulosis. ED provider spoke to general surgeon on call, Dr. Sheth, recommended for conservative management and observe overnight. In ED, patient given NaCl 0.9% 500ccs fluid bolus, ondansetron 4mg IV and ketorolac 10mg IV. Patient appears comfortable upon visit, reports diarrhea and vomiting had stopped. Patient reports that she had been having urinary output. Tenderness in lower abdominal region upon palpation.     Patient is admitted to Obs service with Hospital Medicine for management of mild SBO with a consult to " general surgery.       * No surgery found *      Hospital Course:   Natali Galeano was admitted for SBO as seen on CT. Surgery consulted, patient passing flatus and tolerating PO challenge. No acute surgical intervention indicated. Tolerating diet, pain resolved. Stable for discharge with return precautions discussed, no further questions        Goals of Care Treatment Preferences:  Code Status: Full Code      Consults:   Consults (From admission, onward)        Status Ordering Provider     Inpatient consult to General Surgery  Once        Provider:  Elder Jesus MD    Completed DAVIAN NAIDU          GI  * Small bowel obstruction, partial   Nausea and vomiting   Lower abdominal pain   Diarrhea   Hx of colon CA s/p colectomy (), , prior small bowel obstruction   - complains of lower abdominal pain that started after sudden onset of diarrhea and vomiting.   - Lower abdominal pain was constant, 8/10 severity, mix of sharp and cramping pain.   - Reports previous episodes like this with SBO, but no diarrhea previously.   - Denies fever, chills, dysuria  - Labs on arrival with Lipase, transaminases, T.Bili WNL. Cr 0.8. Covid negative. UA normal.   - CT Abdomen Pelvis with contrast with mild small bowel obstruction. Colonic diverticulosis. - ED provider spoke to general surgeon on call, Dr. Sheth, recommended for conservative management and observe overnight.   - In ED, patient given NaCl 0.9% 500ccs fluid bolus, ondansetron 4mg IV and ketorolac 10mg IV. - Patient appears comfortable upon visit, reports diarrhea and vomiting had stopped. Patient reports that she had been having urinary output. Tenderness in lower abdominal region upon palpation.     Plan:  - General Surgery consult- advance diet as tolerated, no surgical intervention  - IVF LR 100ml/hr  - Scheduled ondansetron 4mg IV Q8, ketorolac 15mg IV Q6 PRN  - ADAT to clear liquid  - tolerating diet, passing flatus and with + bowel  sounds  Stable for discharge      Final Active Diagnoses:    Diagnosis Date Noted POA    PRINCIPAL PROBLEM:  Small bowel obstruction, partial [K56.600] 02/23/2019 Yes    Allergic rhinitis [J30.9] 07/18/2022 Yes     Chronic    Gout [M10.9] 10/27/2020 Yes     Chronic    ALYSE (obstructive sleep apnea) [G47.33] 02/23/2019 Yes     Chronic    Hyperlipidemia [E78.5] 06/21/2017 Yes     Chronic    Essential hypertension [I10]  Yes     Chronic      Problems Resolved During this Admission:       Discharged Condition: stable    Disposition: Home or Self Care    Follow Up:    Patient Instructions:      Notify your health care provider if you experience any of the following:  temperature >100.4     Notify your health care provider if you experience any of the following:  severe uncontrolled pain     Notify your health care provider if you experience any of the following:  redness, tenderness, or signs of infection (pain, swelling, redness, odor or green/yellow discharge around incision site)     Notify your health care provider if you experience any of the following:  worsening rash     Notify your health care provider if you experience any of the following:  persistent dizziness, light-headedness, or visual disturbances     Notify your health care provider if you experience any of the following:  increased confusion or weakness     Notify your health care provider if you experience any of the following:  persistent nausea and vomiting or diarrhea     Activity as tolerated       Significant Diagnostic Studies: Radiology: CT scan: CT ABDOMEN PELVIS WITH CONTRAST:   Results for orders placed or performed during the hospital encounter of 09/14/23   CT Abdomen Pelvis With Contrast    Narrative    EXAMINATION:  CT OF ABDOMEN PELVIS WITH    CLINICAL HISTORY:  Lower abdominal pain, nausea, vomiting, diarrhea on set yesterday evening.    TECHNIQUE:  5 mm enhanced axial images were obtained from the lung bases through the greater  trochanters.  One hundred mL of Omnipaque 350 was injected.    COMPARISON:  10/27/2020    FINDINGS:  There are mildly dilated fluid-filled small bowel loops.  A small bowel feces sign is seen at the ventral right paramedian upper pelvis, which may represent a transition point.  (Series 2 axial image 120).    The liver, spleen, pancreas, and right adrenal gland are unremarkable. The gallbladder contains calcified gallstones.    There are indeterminate low-attenuation left adrenal lesions, which have not significantly changed.    There are low-attenuation lesions seen in both kidneys.  Some are too small to characterize.  Others have imaging characteristics that of a simple cysts.  There is cortical defect at the upper pole of the left kidney, which may be due to previous resection or biopsy.    There is a ventral abdominal mesh.    There is no definite evidence for abdominal adenopathy or ascites.    Hyperdensity fluid is within the urinary bladder, which is likely related to intravenous contrast rather than hemorrhage.  Consider correlation with urinalysis.  There are no pelvic masses or adenopathy.  There is colonic diverticulosis without evidence of acute diverticulitis.    There is no free fluid in the pelvis.    There is mild bibasilar atelectasis.    There are mild spondylitic changes.      Impression    Mild small bowel obstruction.  As above described.    Small bilateral renal cysts.    Indeterminate low-attenuation stable left adrenal masses.  Findings may represent adenomas.  However, consider nonemergent outpatient MRI of the adrenal glands with in phase and out of phase sequencing.    Colonic diverticulosis.    Hyperdense material within the urinary bladder.      Electronically signed by: Grecia Johnson  Date:    09/14/2023  Time:    18:30       Pending Diagnostic Studies:     None         Medications:  Reconciled Home Medications:      Medication List      CONTINUE taking these medications     allopurinoL 300 MG tablet  Commonly known as: ZYLOPRIM  Take 1 tablet (300 mg total) by mouth once daily. gout     amLODIPine 10 MG tablet  Commonly known as: NORVASC  Take 1 tablet (10 mg total) by mouth once daily. High blood pressure     ascorbic acid (vitamin C) 1000 MG tablet  Commonly known as: VITAMIN C  Take 1,000 mg by mouth once daily.     aspirin 81 MG EC tablet  Commonly known as: ECOTRIN  Take 81 mg by mouth once daily.     atorvastatin 10 MG tablet  Commonly known as: LIPITOR  Take 1 tablet (10 mg total) by mouth every evening. High cholesterol     cholecalciferol (vitamin D3) 25 mcg (1,000 unit) capsule  Commonly known as: VITAMIN D3  Take 2,000 Units by mouth once daily.     cyanocobalamin 250 MCG tablet  Commonly known as: VITAMIN B-12  Take 2,500 mcg by mouth once daily.     docusate sodium 100 MG capsule  Commonly known as: COLACE  Take 100 mg by mouth once daily.     fluticasone propionate 50 mcg/actuation nasal spray  Commonly known as: FLONASE  2 sprays (100 mcg total) by Each Nostril route 2 (two) times a day.     hydroCHLOROthiazide 25 MG tablet  Commonly known as: HYDRODIURIL  Take 0.5 tablets (12.5 mg total) by mouth once daily. Take 1/2 of the 25 mg tablet daily     * levocetirizine 5 MG tablet  Commonly known as: XYZAL  Take 5 mg by mouth every evening.     * levocetirizine 5 MG tablet  Commonly known as: XYZAL  Take 5 mg by mouth.     * LIDOcaine 5 % Oint ointment  Commonly known as: XYLOCAINE  Apply topically nightly as needed.     * LIDOcaine 5 % Oint ointment  Commonly known as: XYLOCAINE  APPLY TOPICALLY TO AFFECTED PART OF FOOT/FEET ONCE NIGHTLY AS NEEDED.     LIDOcaine HCL 2% 2 % jelly  Commonly known as: XYLOCAINE  Apply topically as needed. Apply topically once nightly to affected part of foot/feet.     promethazine-dextromethorphan 6.25-15 mg/5 mL Syrp  Commonly known as: PROMETHAZINE-DM  Take 5 mLs by mouth every 4 (four) hours as needed (cough).         * This list has 4  medication(s) that are the same as other medications prescribed for you. Read the directions carefully, and ask your doctor or other care provider to review them with you.                Indwelling Lines/Drains at time of discharge:   Lines/Drains/Airways     None                 Time spent on the discharge of patient: >35 minutes         Keena Joyce PA-C  Department of Hospital Medicine  [facility] ([address])

## 2023-09-20 ENCOUNTER — PATIENT MESSAGE (OUTPATIENT)
Dept: PRIMARY CARE CLINIC | Facility: CLINIC | Age: 83
End: 2023-09-20
Payer: MEDICARE

## 2023-09-25 ENCOUNTER — OFFICE VISIT (OUTPATIENT)
Dept: PRIMARY CARE CLINIC | Facility: CLINIC | Age: 83
End: 2023-09-25
Payer: MEDICARE

## 2023-09-25 DIAGNOSIS — R73.03 PREDIABETES: ICD-10-CM

## 2023-09-25 DIAGNOSIS — Z09 HOSPITAL DISCHARGE FOLLOW-UP: ICD-10-CM

## 2023-09-25 DIAGNOSIS — I70.0 ATHEROSCLEROSIS OF AORTA: ICD-10-CM

## 2023-09-25 DIAGNOSIS — I10 ESSENTIAL HYPERTENSION: Chronic | ICD-10-CM

## 2023-09-25 DIAGNOSIS — K31.89 STOMACH SPASM: Primary | ICD-10-CM

## 2023-09-25 PROCEDURE — 3077F PR MOST RECENT SYSTOLIC BLOOD PRESSURE >= 140 MM HG: ICD-10-PCS | Mod: CPTII,S$GLB,, | Performed by: STUDENT IN AN ORGANIZED HEALTH CARE EDUCATION/TRAINING PROGRAM

## 2023-09-25 PROCEDURE — 3078F PR MOST RECENT DIASTOLIC BLOOD PRESSURE < 80 MM HG: ICD-10-PCS | Mod: CPTII,S$GLB,, | Performed by: STUDENT IN AN ORGANIZED HEALTH CARE EDUCATION/TRAINING PROGRAM

## 2023-09-25 PROCEDURE — 1126F PR PAIN SEVERITY QUANTIFIED, NO PAIN PRESENT: ICD-10-PCS | Mod: CPTII,S$GLB,, | Performed by: STUDENT IN AN ORGANIZED HEALTH CARE EDUCATION/TRAINING PROGRAM

## 2023-09-25 PROCEDURE — 3077F SYST BP >= 140 MM HG: CPT | Mod: CPTII,S$GLB,, | Performed by: STUDENT IN AN ORGANIZED HEALTH CARE EDUCATION/TRAINING PROGRAM

## 2023-09-25 PROCEDURE — 1101F PT FALLS ASSESS-DOCD LE1/YR: CPT | Mod: CPTII,S$GLB,, | Performed by: STUDENT IN AN ORGANIZED HEALTH CARE EDUCATION/TRAINING PROGRAM

## 2023-09-25 PROCEDURE — 1159F PR MEDICATION LIST DOCUMENTED IN MEDICAL RECORD: ICD-10-PCS | Mod: CPTII,S$GLB,, | Performed by: STUDENT IN AN ORGANIZED HEALTH CARE EDUCATION/TRAINING PROGRAM

## 2023-09-25 PROCEDURE — 99214 OFFICE O/P EST MOD 30 MIN: CPT | Mod: S$GLB,,, | Performed by: STUDENT IN AN ORGANIZED HEALTH CARE EDUCATION/TRAINING PROGRAM

## 2023-09-25 PROCEDURE — 3078F DIAST BP <80 MM HG: CPT | Mod: CPTII,S$GLB,, | Performed by: STUDENT IN AN ORGANIZED HEALTH CARE EDUCATION/TRAINING PROGRAM

## 2023-09-25 PROCEDURE — 1101F PR PT FALLS ASSESS DOC 0-1 FALLS W/OUT INJ PAST YR: ICD-10-PCS | Mod: CPTII,S$GLB,, | Performed by: STUDENT IN AN ORGANIZED HEALTH CARE EDUCATION/TRAINING PROGRAM

## 2023-09-25 PROCEDURE — 1126F AMNT PAIN NOTED NONE PRSNT: CPT | Mod: CPTII,S$GLB,, | Performed by: STUDENT IN AN ORGANIZED HEALTH CARE EDUCATION/TRAINING PROGRAM

## 2023-09-25 PROCEDURE — 1159F MED LIST DOCD IN RCRD: CPT | Mod: CPTII,S$GLB,, | Performed by: STUDENT IN AN ORGANIZED HEALTH CARE EDUCATION/TRAINING PROGRAM

## 2023-09-25 PROCEDURE — 99999 PR PBB SHADOW E&M-EST. PATIENT-LVL III: CPT | Mod: PBBFAC,,, | Performed by: STUDENT IN AN ORGANIZED HEALTH CARE EDUCATION/TRAINING PROGRAM

## 2023-09-25 PROCEDURE — 99214 PR OFFICE/OUTPT VISIT, EST, LEVL IV, 30-39 MIN: ICD-10-PCS | Mod: S$GLB,,, | Performed by: STUDENT IN AN ORGANIZED HEALTH CARE EDUCATION/TRAINING PROGRAM

## 2023-09-25 PROCEDURE — 3288F PR FALLS RISK ASSESSMENT DOCUMENTED: ICD-10-PCS | Mod: CPTII,S$GLB,, | Performed by: STUDENT IN AN ORGANIZED HEALTH CARE EDUCATION/TRAINING PROGRAM

## 2023-09-25 PROCEDURE — 99999 PR PBB SHADOW E&M-EST. PATIENT-LVL III: ICD-10-PCS | Mod: PBBFAC,,, | Performed by: STUDENT IN AN ORGANIZED HEALTH CARE EDUCATION/TRAINING PROGRAM

## 2023-09-25 PROCEDURE — 3288F FALL RISK ASSESSMENT DOCD: CPT | Mod: CPTII,S$GLB,, | Performed by: STUDENT IN AN ORGANIZED HEALTH CARE EDUCATION/TRAINING PROGRAM

## 2023-09-25 RX ORDER — DICYCLOMINE HYDROCHLORIDE 20 MG/1
20 TABLET ORAL EVERY 6 HOURS PRN
Qty: 30 TABLET | Refills: 0 | Status: SHIPPED | OUTPATIENT
Start: 2023-09-25 | End: 2024-02-26

## 2023-09-25 NOTE — PROGRESS NOTES
2023    Natali Galeano  [id number]    Chief Complaint   Patient presents with    Hospital Follow Up     For bowel obstruction       HPI    This pt is known to me and presents for hospital follow up. PMH sig for HTN, and obesity.    Hospital course admitted -9/15  Natali Galeano was admitted for SBO as seen on CT. Surgery consulted, patient passing flatus and tolerating PO challenge. No acute surgical intervention indicated. Tolerating diet, pain resolved. Stable for discharge with return precautions discussed, no further questions       Since discharge  Mild small SBO: still tolerating soup. Did have a whole piece of baked chicken yesterday w/o stomach pain, nausea or vomiting.    BM every other day with miralax; not straining not hard  HTN: has taken bp meds today; following with digital medicine        Negative 10 point ROS outside of HPI    Social History     Socioeconomic History    Marital status: Single   Tobacco Use    Smoking status: Former     Current packs/day: 0.00     Types: Cigarettes     Quit date: 1980     Years since quittin.4    Smokeless tobacco: Never   Substance and Sexual Activity    Alcohol use: No    Drug use: No    Sexual activity: Never     Social Determinants of Health     Financial Resource Strain: Low Risk  (9/15/2023)    Overall Financial Resource Strain (CARDIA)     Difficulty of Paying Living Expenses: Not hard at all   Food Insecurity: No Food Insecurity (9/15/2023)    Hunger Vital Sign     Worried About Running Out of Food in the Last Year: Never true     Ran Out of Food in the Last Year: Never true   Transportation Needs: No Transportation Needs (9/15/2023)    PRAPARE - Transportation     Lack of Transportation (Medical): No     Lack of Transportation (Non-Medical): No   Physical Activity: Insufficiently Active (9/15/2023)    Exercise Vital Sign     Days of Exercise per Week: 3 days     Minutes of Exercise per Session: 20 min   Stress: No Stress Concern Present  (9/15/2023)    [employer] [place] [employer] - Occupational Stress Questionnaire     Feeling of Stress : Not at all   Social Connections: Moderately Isolated (9/15/2023)    Social Connection and Isolation Panel [NHANES]     Frequency of Communication with Friends and Family: More than three times a week     Frequency of Social Gatherings with Friends and Family: Once a week     Attends Nondenominational Services: More than 4 times per year     Active Member of Clubs or Organizations: No     Attends Club or Organization Meetings: Never     Marital Status:    Housing Stability: Low Risk  (9/15/2023)    Housing Stability Vital Sign     Unable to Pay for Housing in the Last Year: No     Number of Places Lived in the Last Year: 1     Unstable Housing in the Last Year: No           Current Outpatient Medications:     allopurinoL (ZYLOPRIM) 300 MG tablet, Take 1 tablet (300 mg total) by mouth once daily. gout, Disp: 90 tablet, Rfl: 3    amLODIPine (NORVASC) 10 MG tablet, Take 1 tablet (10 mg total) by mouth once daily. High blood pressure, Disp: 90 tablet, Rfl: 3    ascorbic acid, vitamin C, (VITAMIN C) 1000 MG tablet, Take 1,000 mg by mouth once daily., Disp: , Rfl:     aspirin (ECOTRIN) 81 MG EC tablet, Take 81 mg by mouth once daily., Disp: , Rfl:     atorvastatin (LIPITOR) 10 MG tablet, Take 1 tablet (10 mg total) by mouth every evening. High cholesterol, Disp: 90 tablet, Rfl: 3    cholecalciferol, vitamin D3, (VITAMIN D3) 25 mcg (1,000 unit) capsule, Take 2,000 Units by mouth once daily., Disp: , Rfl:     cyanocobalamin (VITAMIN B-12) 250 MCG tablet, Take 2,500 mcg by mouth once daily., Disp: , Rfl:     docusate sodium (COLACE) 100 MG capsule, Take 100 mg by mouth once daily., Disp: , Rfl:     fluticasone propionate (FLONASE) 50 mcg/actuation nasal spray, 2 sprays (100 mcg total) by Each Nostril route 2 (two) times a day., Disp: 16 g, Rfl: 3    hydroCHLOROthiazide (HYDRODIURIL) 25 MG tablet, Take 0.5  tablets (12.5 mg total) by mouth once daily. Take 1/2 of the 25 mg tablet daily, Disp: 60 tablet, Rfl: 3    levocetirizine (XYZAL) 5 MG tablet, Take 5 mg by mouth every evening., Disp: , Rfl:     levocetirizine (XYZAL) 5 MG tablet, Take 5 mg by mouth., Disp: , Rfl:     LIDOcaine (XYLOCAINE) 5 % Oint ointment, Apply topically nightly as needed., Disp: , Rfl:     LIDOcaine (XYLOCAINE) 5 % Oint ointment, APPLY TOPICALLY TO AFFECTED PART OF FOOT/FEET ONCE NIGHTLY AS NEEDED., Disp: , Rfl:     LIDOcaine HCL 2% (XYLOCAINE) 2 % jelly, Apply topically as needed. Apply topically once nightly to affected part of foot/feet., Disp: 30 mL, Rfl: 2    promethazine-dextromethorphan (PROMETHAZINE-DM) 6.25-15 mg/5 mL Syrp, Take 5 mLs by mouth every 4 (four) hours as needed (cough)., Disp: 118 mL, Rfl: 0      Physical Exam  Vitals:    09/25/23 1601   BP: (!) 146/67   Pulse: 96         Gen: well appearing, NAD  Resp: non labored breathing, no crackles, no wheezes, CTAB  CV: RRR no murmur, gallops, rubs, no LE edema  Abd: soft nontender BS present no organomegaly    1. Stomach spasm  -     dicyclomine (BENTYL) 20 mg tablet; Take 1 tablet (20 mg total) by mouth every 6 (six) hours as needed (stomach spasm).  Dispense: 30 tablet; Refill: 0    2. Essential hypertension  Well controlled continue current regimen    3. Hospital discharge follow-up    4. Prediabetes  Comments:  last documented a1c 5.9 in 2019; repeat with next labs    5. Atherosclerosis of aorta  Comments:  Seen on CT abd since 2017:Calcified atherosclerosis is present in the abdominal aorta.  continue statin          RTC in as needed for routine care    Ilene Rocha MD  Family Medicine

## 2023-09-26 VITALS
WEIGHT: 271.69 LBS | HEART RATE: 96 BPM | SYSTOLIC BLOOD PRESSURE: 146 MMHG | BODY MASS INDEX: 42.56 KG/M2 | DIASTOLIC BLOOD PRESSURE: 67 MMHG

## 2023-09-26 PROBLEM — R73.03 PREDIABETES: Status: ACTIVE | Noted: 2023-09-26

## 2023-09-26 PROBLEM — I70.0 ATHEROSCLEROSIS OF AORTA: Status: ACTIVE | Noted: 2023-09-26

## 2023-10-26 DIAGNOSIS — M10.9 GOUT, UNSPECIFIED CAUSE, UNSPECIFIED CHRONICITY, UNSPECIFIED SITE: ICD-10-CM

## 2023-10-26 DIAGNOSIS — I10 ESSENTIAL HYPERTENSION: Chronic | ICD-10-CM

## 2023-10-26 NOTE — TELEPHONE ENCOUNTER
Care Due:                  Date            Visit Type   Department     Provider  --------------------------------------------------------------------------------                                HOSPITAL     [facility] PRIMARY  Last Visit: 09-      FOLLOW UP    CARE           Ilene Rocha  Next Visit: None Scheduled  None         None Found                                                            Last  Test          Frequency    Reason                     Performed    Due Date  --------------------------------------------------------------------------------    Lipid Panel.  12 months..  atorvastatin.............  02- 01-    Uric Acid...  12 months..  allopurinoL..............  01- 01-    [employer] Embedded Care Due Messages. Reference number: [id number].   10/26/2023 2:56:40 PM CDT

## 2023-10-30 RX ORDER — ALLOPURINOL 300 MG/1
300 TABLET ORAL DAILY
Qty: 90 TABLET | Refills: 3 | Status: SHIPPED | OUTPATIENT
Start: 2023-10-30

## 2023-10-30 RX ORDER — AMLODIPINE BESYLATE 10 MG/1
10 TABLET ORAL DAILY
Qty: 90 TABLET | Refills: 3 | Status: SHIPPED | OUTPATIENT
Start: 2023-10-30

## 2023-12-12 ENCOUNTER — OFFICE VISIT (OUTPATIENT)
Dept: CARDIOLOGY | Facility: CLINIC | Age: 83
End: 2023-12-12
Payer: MEDICARE

## 2023-12-12 VITALS
HEART RATE: 70 BPM | OXYGEN SATURATION: 98 % | DIASTOLIC BLOOD PRESSURE: 76 MMHG | WEIGHT: 273.56 LBS | SYSTOLIC BLOOD PRESSURE: 136 MMHG | BODY MASS INDEX: 42.85 KG/M2

## 2023-12-12 DIAGNOSIS — E78.49 OTHER HYPERLIPIDEMIA: Chronic | ICD-10-CM

## 2023-12-12 DIAGNOSIS — I10 ESSENTIAL HYPERTENSION: Primary | Chronic | ICD-10-CM

## 2023-12-12 DIAGNOSIS — I70.0 ATHEROSCLEROSIS OF AORTA: ICD-10-CM

## 2023-12-12 PROCEDURE — 99999 PR PBB SHADOW E&M-EST. PATIENT-LVL III: ICD-10-PCS | Mod: PBBFAC,,, | Performed by: INTERNAL MEDICINE

## 2023-12-12 PROCEDURE — 99214 OFFICE O/P EST MOD 30 MIN: CPT | Mod: S$GLB,,, | Performed by: INTERNAL MEDICINE

## 2023-12-12 PROCEDURE — 3078F DIAST BP <80 MM HG: CPT | Mod: CPTII,S$GLB,, | Performed by: INTERNAL MEDICINE

## 2023-12-12 PROCEDURE — 3288F FALL RISK ASSESSMENT DOCD: CPT | Mod: CPTII,S$GLB,, | Performed by: INTERNAL MEDICINE

## 2023-12-12 PROCEDURE — 3075F PR MOST RECENT SYSTOLIC BLOOD PRESS GE 130-139MM HG: ICD-10-PCS | Mod: CPTII,S$GLB,, | Performed by: INTERNAL MEDICINE

## 2023-12-12 PROCEDURE — 1101F PR PT FALLS ASSESS DOC 0-1 FALLS W/OUT INJ PAST YR: ICD-10-PCS | Mod: CPTII,S$GLB,, | Performed by: INTERNAL MEDICINE

## 2023-12-12 PROCEDURE — 1101F PT FALLS ASSESS-DOCD LE1/YR: CPT | Mod: CPTII,S$GLB,, | Performed by: INTERNAL MEDICINE

## 2023-12-12 PROCEDURE — 3288F PR FALLS RISK ASSESSMENT DOCUMENTED: ICD-10-PCS | Mod: CPTII,S$GLB,, | Performed by: INTERNAL MEDICINE

## 2023-12-12 PROCEDURE — 3075F SYST BP GE 130 - 139MM HG: CPT | Mod: CPTII,S$GLB,, | Performed by: INTERNAL MEDICINE

## 2023-12-12 PROCEDURE — 3078F PR MOST RECENT DIASTOLIC BLOOD PRESSURE < 80 MM HG: ICD-10-PCS | Mod: CPTII,S$GLB,, | Performed by: INTERNAL MEDICINE

## 2023-12-12 PROCEDURE — 1159F MED LIST DOCD IN RCRD: CPT | Mod: CPTII,S$GLB,, | Performed by: INTERNAL MEDICINE

## 2023-12-12 PROCEDURE — 1159F PR MEDICATION LIST DOCUMENTED IN MEDICAL RECORD: ICD-10-PCS | Mod: CPTII,S$GLB,, | Performed by: INTERNAL MEDICINE

## 2023-12-12 PROCEDURE — 1125F PR PAIN SEVERITY QUANTIFIED, PAIN PRESENT: ICD-10-PCS | Mod: CPTII,S$GLB,, | Performed by: INTERNAL MEDICINE

## 2023-12-12 PROCEDURE — 99999 PR PBB SHADOW E&M-EST. PATIENT-LVL III: CPT | Mod: PBBFAC,,, | Performed by: INTERNAL MEDICINE

## 2023-12-12 PROCEDURE — 99214 PR OFFICE/OUTPT VISIT, EST, LEVL IV, 30-39 MIN: ICD-10-PCS | Mod: S$GLB,,, | Performed by: INTERNAL MEDICINE

## 2023-12-12 PROCEDURE — 1125F AMNT PAIN NOTED PAIN PRSNT: CPT | Mod: CPTII,S$GLB,, | Performed by: INTERNAL MEDICINE

## 2023-12-12 NOTE — PROGRESS NOTES
Cardiology    12/12/2023  11:24 AM    Problem list  Patient Active Problem List   Diagnosis    Class 3 severe obesity with serious comorbidity and body mass index (BMI) of 40.0 to 44.9 in adult    Essential hypertension    Adrenal nodule    Hyperlipidemia    Small bowel obstruction, partial    ALYSE (obstructive sleep apnea)    Thyroid disease    Normocytic anemia    Gout    Heart murmur    Rheumatic fever    BMI 45.0-49.9, adult    Cough    Nasal congestion    Chronic pain of right ankle    Onychorrhexis    Osteopenia of necks of both femurs    Allergic rhinitis    Gait difficulty    Decreased range of motion of right ankle    S/P partial thyroidectomy    Prediabetes    Atherosclerosis of aorta       CC:  Follow-up    HPI:  She has been doing well.  She denies any chest pain or shortness of breath.  She is tolerating her medications.  Her blood pressure is well controlled at home.    Medications  Current Outpatient Medications   Medication Sig Dispense Refill    allopurinoL (ZYLOPRIM) 300 MG tablet Take 1 tablet (300 mg total) by mouth once daily. gout 90 tablet 3    amLODIPine (NORVASC) 10 MG tablet Take 1 tablet (10 mg total) by mouth once daily. High blood pressure 90 tablet 3    ascorbic acid, vitamin C, (VITAMIN C) 1000 MG tablet Take 1,000 mg by mouth once daily.      aspirin (ECOTRIN) 81 MG EC tablet Take 81 mg by mouth once daily.      atorvastatin (LIPITOR) 10 MG tablet Take 1 tablet (10 mg total) by mouth every evening. High cholesterol 90 tablet 3    cholecalciferol, vitamin D3, (VITAMIN D3) 25 mcg (1,000 unit) capsule Take 2,000 Units by mouth once daily.      cyanocobalamin (VITAMIN B-12) 250 MCG tablet Take 2,500 mcg by mouth once daily.      docusate sodium (COLACE) 100 MG capsule Take 100 mg by mouth once daily.      fluticasone propionate (FLONASE) 50 mcg/actuation nasal spray 2 sprays (100 mcg total) by Each Nostril route 2 (two) times a day. 16 g 3    hydroCHLOROthiazide (HYDRODIURIL) 25 MG  tablet Take 0.5 tablets (12.5 mg total) by mouth once daily. Take 1/2 of the 25 mg tablet daily 60 tablet 3    levocetirizine (XYZAL) 5 MG tablet Take 5 mg by mouth every evening.      LIDOcaine (XYLOCAINE) 5 % Oint ointment APPLY TOPICALLY TO AFFECTED PART OF FOOT/FEET ONCE NIGHTLY AS NEEDED.      LIDOcaine HCL 2% (XYLOCAINE) 2 % jelly Apply topically as needed. Apply topically once nightly to affected part of foot/feet. 30 mL 2    dicyclomine (BENTYL) 20 mg tablet Take 1 tablet (20 mg total) by mouth every 6 (six) hours as needed (stomach spasm). (Patient not taking: Reported on 12/12/2023) 30 tablet 0    promethazine-dextromethorphan (PROMETHAZINE-DM) 6.25-15 mg/5 mL Syrp Take 5 mLs by mouth every 4 (four) hours as needed (cough). (Patient not taking: Reported on 12/12/2023) 118 mL 0     No current facility-administered medications for this visit.      Prior to Admission medications    Medication Sig Start Date End Date Taking? Authorizing Provider   allopurinoL (ZYLOPRIM) 300 MG tablet Take 1 tablet (300 mg total) by mouth once daily. gout 10/30/23  Yes Ilene Rocha MD   amLODIPine (NORVASC) 10 MG tablet Take 1 tablet (10 mg total) by mouth once daily. High blood pressure 10/30/23  Yes Ilene Rocha MD   ascorbic acid, vitamin C, (VITAMIN C) 1000 MG tablet Take 1,000 mg by mouth once daily.   Yes Provider, Historical   aspirin (ECOTRIN) 81 MG EC tablet Take 81 mg by mouth once daily.   Yes Provider, Historical   atorvastatin (LIPITOR) 10 MG tablet Take 1 tablet (10 mg total) by mouth every evening. High cholesterol 8/24/23  Yes Talon Barifeld MD   cholecalciferol, vitamin D3, (VITAMIN D3) 25 mcg (1,000 unit) capsule Take 2,000 Units by mouth once daily.   Yes Provider, Historical   cyanocobalamin (VITAMIN B-12) 250 MCG tablet Take 2,500 mcg by mouth once daily.   Yes Provider, Historical   docusate sodium (COLACE) 100 MG capsule Take 100 mg by mouth once daily.   Yes Provider, Historical   fluticasone  propionate (FLONASE) 50 mcg/actuation nasal spray 2 sprays (100 mcg total) by Each Nostril route 2 (two) times a day. 23  Yes Talon Barfield MD   hydroCHLOROthiazide (HYDRODIURIL) 25 MG tablet Take 0.5 tablets (12.5 mg total) by mouth once daily. Take 1/2 of the 25 mg tablet daily 23  Yes Ilene Rocha MD   levocetirizine (XYZAL) 5 MG tablet Take 5 mg by mouth every evening. 22  Yes Provider, Historical   LIDOcaine (XYLOCAINE) 5 % Oint ointment APPLY TOPICALLY TO AFFECTED PART OF FOOT/FEET ONCE NIGHTLY AS NEEDED. 22  Yes Provider, Historical   LIDOcaine HCL 2% (XYLOCAINE) 2 % jelly Apply topically as needed. Apply topically once nightly to affected part of foot/feet. 22  Yes Russell Reid DPM   dicyclomine (BENTYL) 20 mg tablet Take 1 tablet (20 mg total) by mouth every 6 (six) hours as needed (stomach spasm).  Patient not taking: Reported on 2023   Ilene Rocha MD   promethazine-dextromethorphan (PROMETHAZINE-DM) 6.25-15 mg/5 mL Syrp Take 5 mLs by mouth every 4 (four) hours as needed (cough).  Patient not taking: Reported on 2023   Talon Barfield MD   LIDOcaine (XYLOCAINE) 5 % Oint ointment Apply topically nightly as needed. 22  Provider, Historical         History  Past Medical History:   Diagnosis Date    Allergic rhinitis 2022    Anemia     Arthritis     Colon cancer     Gout, chronic     Heart murmur     High cholesterol     Hypercholesteremia     Hypertension     Obesity     PMB (postmenopausal bleeding) 10/24/2017    Right knee pain 2019    Sleep apnea     Thyroid disease      Past Surgical History:   Procedure Laterality Date    BTL       SECTION, CLASSIC      COLON SURGERY      goiter       HERNIA REPAIR      KIDNEY SURGERY      cyst removal    THYROID SURGERY       Social History     Socioeconomic History    Marital status: Single   Tobacco Use    Smoking status: Former     Current packs/day: 0.00     Types:  Cigarettes     Quit date: 1980     Years since quittin.6    Smokeless tobacco: Never   Substance and Sexual Activity    Alcohol use: No    Drug use: No    Sexual activity: Never     Social Determinants of Health     Financial Resource Strain: Low Risk  (9/15/2023)    Overall Financial Resource Strain (CARDIA)     Difficulty of Paying Living Expenses: Not hard at all   Food Insecurity: No Food Insecurity (9/15/2023)    Hunger Vital Sign     Worried About Running Out of Food in the Last Year: Never true     Ran Out of Food in the Last Year: Never true   Transportation Needs: No Transportation Needs (9/15/2023)    PRAPARE - Transportation     Lack of Transportation (Medical): No     Lack of Transportation (Non-Medical): No   Physical Activity: Insufficiently Active (9/15/2023)    Exercise Vital Sign     Days of Exercise per Week: 3 days     Minutes of Exercise per Session: 20 min   Stress: No Stress Concern Present (9/15/2023)    [employer] [place] [employer] - Occupational Stress Questionnaire     Feeling of Stress : Not at all   Social Connections: Moderately Isolated (9/15/2023)    Social Connection and Isolation Panel [NHANES]     Frequency of Communication with Friends and Family: More than three times a week     Frequency of Social Gatherings with Friends and Family: Once a week     Attends [employer] Services: More than 4 times per year     Active Member of Clubs or Organizations: No     Attends Club or Organization Meetings: Never     Marital Status:    Housing Stability: Low Risk  (9/15/2023)    Housing Stability Vital Sign     Unable to Pay for Housing in the Last Year: No     Number of Places Lived in the Last Year: 1     Unstable Housing in the Last Year: No         Allergies  Review of patient's allergies indicates:   Allergen Reactions    Ace inhibitors Other (See Comments)     cough    Arb-angiotensin receptor antagonist Other (See Comments)     Cough w/ valsartan          Review of Systems   Review of Systems   Constitutional: Negative for decreased appetite, fever and weight loss.   HENT:  Negative for congestion and nosebleeds.    Eyes:  Negative for double vision, vision loss in left eye, vision loss in right eye and visual disturbance.   Cardiovascular:  Negative for chest pain, claudication, cyanosis, dyspnea on exertion, irregular heartbeat, leg swelling, near-syncope, orthopnea, palpitations, paroxysmal nocturnal dyspnea and syncope.   Respiratory:  Negative for cough, hemoptysis, shortness of breath, sleep disturbances due to breathing, snoring, sputum production and wheezing.    Endocrine: Negative for cold intolerance and heat intolerance.   Skin:  Negative for nail changes and rash.   Musculoskeletal:  Negative for joint pain, muscle cramps, muscle weakness and myalgias.   Gastrointestinal:  Negative for change in bowel habit, heartburn, hematemesis, hematochezia, hemorrhoids and melena.   Neurological:  Negative for dizziness, focal weakness and headaches.         Physical Exam  Wt Readings from Last 1 Encounters:   12/12/23 124.1 kg (273 lb 9.5 oz)     BP Readings from Last 3 Encounters:   12/12/23 136/76   09/25/23 (!) 146/67   09/15/23 133/64     Pulse Readings from Last 1 Encounters:   12/12/23 70     Body mass index is 42.85 kg/m².    Physical Exam  Vitals reviewed.   Constitutional:       Appearance: She is well-developed. She is obese.   HENT:      Head: Atraumatic.   Eyes:      General: No scleral icterus.  Neck:      Vascular: Normal carotid pulses. No carotid bruit, hepatojugular reflux or JVD.   Cardiovascular:      Rate and Rhythm: Normal rate and regular rhythm.      Chest Wall: PMI is not displaced.      Pulses: Intact distal pulses.           Carotid pulses are 2+ on the right side and 2+ on the left side.       Radial pulses are 2+ on the right side and 2+ on the left side.        Dorsalis pedis pulses are 2+ on the right side and 2+ on the left  side.      Heart sounds: Normal heart sounds, S1 normal and S2 normal. No murmur heard.     No friction rub.   Pulmonary:      Effort: Pulmonary effort is normal. No respiratory distress.      Breath sounds: Normal breath sounds. No stridor. No wheezing or rales.   Chest:      Chest wall: No tenderness.   Abdominal:      General: Bowel sounds are normal.      Palpations: Abdomen is soft.   Musculoskeletal:      Cervical back: Neck supple. No edema.   Skin:     General: Skin is warm and dry.      Nails: There is no clubbing.   Neurological:      Mental Status: She is alert and oriented to person, place, and time.   Psychiatric:         Behavior: Behavior normal.         Thought Content: Thought content normal.             Assessment  1. Essential hypertension  Controlled on current medications, continue medications and monitor    2. Other hyperlipidemia  Stable  - Lipid Panel; Future  - Comprehensive Metabolic Panel; Future    3. Atherosclerosis of aorta  Stable        Plan and Discussion  Continue current medications.  Will check her labs in 6 months    Follow Up  6 months      Cleve Barfield MD, F.A.C.C, F.S.C.A.I.        30 minutes were spent in chart review, documentation and review of results, and evaluation, treatment, and counseling of patient on the same day of service.    Disclaimer: This document was created using voice recognition software ([employer]). Although it may be edited, this document may contain errors related to incorrect recognition of the spoken word. Please call the physician if clarification is needed.

## 2023-12-26 ENCOUNTER — PATIENT MESSAGE (OUTPATIENT)
Dept: ADMINISTRATIVE | Facility: OTHER | Age: 83
End: 2023-12-26
Payer: MEDICARE

## 2024-01-30 DIAGNOSIS — Z00.00 ENCOUNTER FOR MEDICARE ANNUAL WELLNESS EXAM: ICD-10-CM

## 2024-02-26 ENCOUNTER — OFFICE VISIT (OUTPATIENT)
Dept: PRIMARY CARE CLINIC | Facility: CLINIC | Age: 84
End: 2024-02-26
Payer: MEDICARE

## 2024-02-26 VITALS
SYSTOLIC BLOOD PRESSURE: 135 MMHG | DIASTOLIC BLOOD PRESSURE: 70 MMHG | WEIGHT: 279.88 LBS | TEMPERATURE: 98 F | HEART RATE: 62 BPM | BODY MASS INDEX: 43.93 KG/M2 | HEIGHT: 67 IN | OXYGEN SATURATION: 99 %

## 2024-02-26 DIAGNOSIS — E66.01 CLASS 3 SEVERE OBESITY DUE TO EXCESS CALORIES WITH SERIOUS COMORBIDITY AND BODY MASS INDEX (BMI) OF 40.0 TO 44.9 IN ADULT: ICD-10-CM

## 2024-02-26 DIAGNOSIS — R73.03 PREDIABETES: ICD-10-CM

## 2024-02-26 DIAGNOSIS — M1A.20X0 CHRONIC GOUT DUE TO DRUG WITHOUT TOPHUS, UNSPECIFIED SITE: Chronic | ICD-10-CM

## 2024-02-26 DIAGNOSIS — G47.33 OSA (OBSTRUCTIVE SLEEP APNEA): Chronic | ICD-10-CM

## 2024-02-26 DIAGNOSIS — S02.5XXB OPEN FRACTURE OF TOOTH, INITIAL ENCOUNTER: ICD-10-CM

## 2024-02-26 DIAGNOSIS — E78.49 OTHER HYPERLIPIDEMIA: Chronic | ICD-10-CM

## 2024-02-26 DIAGNOSIS — J30.89 ALLERGIC RHINITIS DUE TO OTHER ALLERGIC TRIGGER, UNSPECIFIED SEASONALITY: Primary | Chronic | ICD-10-CM

## 2024-02-26 DIAGNOSIS — L60.3 NAIL DYSTROPHY: ICD-10-CM

## 2024-02-26 DIAGNOSIS — I10 ESSENTIAL HYPERTENSION: Chronic | ICD-10-CM

## 2024-02-26 PROBLEM — S02.5XXA BROKEN TOOTH: Status: ACTIVE | Noted: 2024-02-26

## 2024-02-26 PROCEDURE — 99203 OFFICE O/P NEW LOW 30 MIN: CPT | Mod: S$GLB,,, | Performed by: HOSPITALIST

## 2024-02-26 PROCEDURE — 99999 PR PBB SHADOW E&M-EST. PATIENT-LVL III: CPT | Mod: PBBFAC,,, | Performed by: HOSPITALIST

## 2024-02-26 RX ORDER — LEVOCETIRIZINE DIHYDROCHLORIDE 5 MG/1
5 TABLET, FILM COATED ORAL NIGHTLY
Qty: 30 TABLET | Refills: 11 | Status: SHIPPED | OUTPATIENT
Start: 2024-02-26 | End: 2024-06-10

## 2024-02-26 NOTE — PROGRESS NOTES
Primary Care Provider Appointment - [facility] & Anatoly Nettles MD  Initial Visit    Subjective:      Patient ID: Natali Galeano is a 83 y.o. female with   Past Medical History:   Diagnosis Date    Allergic rhinitis 2022    Anemia     Arthritis     Colon cancer     Gout, chronic     Heart murmur     High cholesterol     Hypercholesteremia     Hypertension     Obesity     PMB (postmenopausal bleeding) 10/24/2017    Right knee pain 2019    Sleep apnea     Thyroid disease            Chief Complaint: Establish Care    Prior to this visit, patient's last encounter with PCP was 2023.    Pt reports trouble losing weight.  She loves bread and eats a lot of it.  Also enjoys spinach, squash, other vegetables.  Saw a nutritionist a few years ago but didn't implement changes discussed.    She has history of partial thyroidectomy and sees an endocrinologist, but does not require thyroid supplementation.  She reports having had normal colonoscopies in  and  at [facility] and wants to know if she needs another one.  She similarly wants to know about mammography screening, which is very uncomfortable and difficult to do with her large breasts.  She has osteoarthritis of the knees and had an ED visit in December for knee pain, following which she was referred to an orthopedic surgeon who she saw earlier this month who wanted to give her an injection into the knees, but she declined.  She was Rx'd meloxicam, which she decided not to take after reading about side effects.      4Ms for Medical Decision-Making in Older Adults    Last Completed EAWV: 2023    MOBILITY:  Get Up and Go:      2023     9:26 AM   Get Up and Go   Trial 1 12 seconds     Activities of Daily Livin/18/2023     9:23 AM   Activities of Daily Living   Ambulation Independent   Dressing Independent   Transfers Independent   Toileting Continent of bladder;Continent of bowel   Feeding Independent   Cleaning home/Chores  Independent   Telephone use Independent   Shopping Independent   Paying bills Independent   Taking meds Independent     Whisper Test:      2023     9:25 AM   Whisper Test   Whisper Test Normal     Disability Status:      2023     9:24 AM   Disability Status   Are you deaf or do you have serious difficulty hearing? N   Are you blind or do you have serious difficulty seeing, even when wearing glasses? N   Because of a physical, mental, or emotional condition, do you have serious difficulty concentrating, remembering, or making decisions? N   Do you have serious difficulty walking or climbing stairs? N   Do you have difficulty dressing or bathing? N   Because of a physical, mental, or emotional condition, do you have difficulty doing errands alone such as visiting a doctor's office or shopping? N     Nutrition Screenin/18/2023     9:23 AM   Nutrition Screening   Has food intake declined over the past three months due to loss of appetite, digestive problems, chewing or swallowing difficulties? No decrease in food intake   Involuntary weight loss during the last 3 months? No weight loss   Mobility? Goes out   Has the patient suffered psychological stress or acute disease in the past three months? No   Neuropsychological problems? No psychological problems   Body Mass Index (BMI)?  BMI 23 or greater   Screening Score 14   Interpretation Normal nutritional status    Screening Score: 0-7 Malnourished, 8-11 At Risk, 12-14 Normal    MENTATION:   Depression Patient Health Questionnaire:      2023     9:25 AM   Depression Patient Health Questionnaire   Over the last two weeks how often have you been bothered by little interest or pleasure in doing things Not at all   Over the last two weeks how often have you been bothered by feeling down, depressed or hopeless Not at all   PHQ-2 Total Score 0     Has Dementia Dx: No    Cognitive Function Screenin/18/2023     9:27 AM   Cognitive Function  Screening   Clock Drawing Test 0   Mini-Cog 3 Minute Recall 2   Cognitive Function Screening 2     Cognitive Function Screening Total - Less than 4 = Abnormal,  Greater than or equal to 4 = Normal    MEDICATIONS:  High Risk Medications:  Total Active Medications: 0  This patient does not have an active medication from one of the medication groupers.    WHAT MATTERS MOST:  Advance Care Planning   ACP Status:   Patient does not have an ACP conversation on file  Living Will: No  Power of : No  LaPOST: No      Social History     Socioeconomic History    Marital status: Single   Tobacco Use    Smoking status: Former     Current packs/day: 0.00     Types: Cigarettes     Quit date: 1980     Years since quittin.8    Smokeless tobacco: Never   Substance and Sexual Activity    Alcohol use: No    Drug use: No    Sexual activity: Never     Social Determinants of Health     Financial Resource Strain: Low Risk  (9/15/2023)    Overall Financial Resource Strain (CARDIA)     Difficulty of Paying Living Expenses: Not hard at all   Food Insecurity: No Food Insecurity (9/15/2023)    Hunger Vital Sign     Worried About Running Out of Food in the Last Year: Never true     Ran Out of Food in the Last Year: Never true   Transportation Needs: No Transportation Needs (9/15/2023)    PRAPARE - Transportation     Lack of Transportation (Medical): No     Lack of Transportation (Non-Medical): No   Physical Activity: Insufficiently Active (9/15/2023)    Exercise Vital Sign     Days of Exercise per Week: 3 days     Minutes of Exercise per Session: 20 min   Stress: No Stress Concern Present (9/15/2023)    [employer] - Occupational Stress Questionnaire     Feeling of Stress : Not at all   Social Connections: Moderately Isolated (9/15/2023)    Social Connection and Isolation Panel [NHANES]     Frequency of Communication with Friends and Family: More than three times a week     Frequency of Social  "Gatherings with Friends and Family: Once a week     Attends Judaism Services: More than 4 times per year     Active Member of Clubs or Organizations: No     Attends Club or Organization Meetings: Never     Marital Status:    Housing Stability: Low Risk  (9/15/2023)    Housing Stability Vital Sign     Unable to Pay for Housing in the Last Year: No     Number of Places Lived in the Last Year: 1     Unstable Housing in the Last Year: No       Review of Systems   Constitutional:  Negative for activity change, appetite change, chills and fever.   HENT:  Positive for dental problem. Negative for nasal congestion and sore throat.    Eyes:  Negative for photophobia and visual disturbance.   Respiratory:  Negative for cough and shortness of breath.    Cardiovascular:  Negative for chest pain and leg swelling.   Gastrointestinal:  Negative for abdominal pain, constipation, diarrhea, nausea and vomiting.   Genitourinary:  Negative for dysuria and hematuria.   Musculoskeletal:  Negative for arthralgias and myalgias.   Integumentary:  Negative for rash and wound.   Neurological:  Negative for dizziness and weakness.        Objective:   /70 (BP Location: Left arm, Patient Position: Sitting, BP Method: Large (Automatic))   Pulse 62   Temp 98.3 °F (36.8 °C)   Ht 5' 7" (1.702 m)   Wt 126.9 kg (279 lb 14 oz)   LMP  (LMP Unknown)   SpO2 99%   BMI 43.83 kg/m²     Physical Exam  Vitals reviewed.   Constitutional:       General: She is not in acute distress.     Appearance: She is obese.   HENT:      Head: Normocephalic and atraumatic.      Right Ear: Tympanic membrane, ear canal and external ear normal. There is no impacted cerumen.      Left Ear: Tympanic membrane, ear canal and external ear normal. There is no impacted cerumen.      Nose: Nose normal.      Mouth/Throat:      Mouth: Mucous membranes are moist.      Pharynx: Oropharynx is clear.      Comments: Broken molar on left mandible with majority of crown " missing; pulp visible.  Multiple fillings noted.  Eyes:      General: No scleral icterus.     Conjunctiva/sclera: Conjunctivae normal.   Cardiovascular:      Rate and Rhythm: Normal rate and regular rhythm.      Heart sounds: Normal heart sounds.   Pulmonary:      Effort: Pulmonary effort is normal.      Breath sounds: Normal breath sounds.   Abdominal:      General: Bowel sounds are normal. There is no distension.      Tenderness: There is no abdominal tenderness. There is no guarding.   Musculoskeletal:      Cervical back: Normal range of motion and neck supple. No rigidity.      Right lower leg: Edema (trace pitting) present.      Left lower leg: Edema (trace pitting) present.   Lymphadenopathy:      Cervical: No cervical adenopathy.   Skin:     General: Skin is warm and dry.   Neurological:      General: No focal deficit present.      Mental Status: She is alert and oriented to person, place, and time.              Lab Results   Component Value Date    WBC 8.45 09/15/2023    HGB 12.0 09/15/2023    HCT 39.2 09/15/2023     09/15/2023    CHOL 136 02/01/2022    TRIG 64 02/01/2022    HDL 51 02/01/2022    ALT 14 09/14/2023    AST 22 09/14/2023     09/15/2023    K 4.5 09/15/2023     09/15/2023    CREATININE 0.7 09/15/2023    BUN 10 09/15/2023    CO2 22 (L) 09/15/2023    TSH 2.298 07/12/2023    INR 1.0 06/22/2015    HGBA1C 5.9 (H) 02/23/2019       Current Outpatient Medications on File Prior to Visit   Medication Sig Dispense Refill    allopurinoL (ZYLOPRIM) 300 MG tablet Take 1 tablet (300 mg total) by mouth once daily. gout 90 tablet 3    amLODIPine (NORVASC) 10 MG tablet Take 1 tablet (10 mg total) by mouth once daily. High blood pressure 90 tablet 3    ascorbic acid, vitamin C, (VITAMIN C) 1000 MG tablet Take 1,000 mg by mouth once daily.      aspirin (ECOTRIN) 81 MG EC tablet Take 81 mg by mouth once daily.      atorvastatin (LIPITOR) 10 MG tablet Take 1 tablet (10 mg total) by mouth every  evening. High cholesterol 90 tablet 3    cholecalciferol, vitamin D3, (VITAMIN D3) 25 mcg (1,000 unit) capsule Take 2,000 Units by mouth once daily.      cyanocobalamin (VITAMIN B-12) 250 MCG tablet Take 2,500 mcg by mouth once daily.      dicyclomine (BENTYL) 20 mg tablet Take 1 tablet (20 mg total) by mouth every 6 (six) hours as needed (stomach spasm). (Patient not taking: Reported on 12/12/2023) 30 tablet 0    docusate sodium (COLACE) 100 MG capsule Take 100 mg by mouth once daily.      fluticasone propionate (FLONASE) 50 mcg/actuation nasal spray 2 sprays (100 mcg total) by Each Nostril route 2 (two) times a day. 16 g 3    hydroCHLOROthiazide (HYDRODIURIL) 25 MG tablet Take 0.5 tablets (12.5 mg total) by mouth once daily. Take 1/2 of the 25 mg tablet daily 60 tablet 3    levocetirizine (XYZAL) 5 MG tablet Take 5 mg by mouth every evening.      LIDOcaine (XYLOCAINE) 5 % Oint ointment APPLY TOPICALLY TO AFFECTED PART OF FOOT/FEET ONCE NIGHTLY AS NEEDED.      LIDOcaine HCL 2% (XYLOCAINE) 2 % jelly Apply topically as needed. Apply topically once nightly to affected part of foot/feet. 30 mL 2    promethazine-dextromethorphan (PROMETHAZINE-DM) 6.25-15 mg/5 mL Syrp Take 5 mLs by mouth every 4 (four) hours as needed (cough). (Patient not taking: Reported on 12/12/2023) 118 mL 0     No current facility-administered medications on file prior to visit.         Assessment:   83 y.o. female with multiple co-morbid illnesses here to establish care.    Plan:     Problem List Items Addressed This Visit       Essential hypertension (Chronic)     At goal on amlodipine and hydrochlorothiazide.         Hyperlipidemia (Chronic)     Lipids at goal on atorvastatin 10 mg.  She is already scheduled for recheck later this year.         ALYSE (obstructive sleep apnea) (Chronic)     Patient compliant with CPAP.         Gout (Chronic)     On allopurinol 300 mg daily; patient does not recall last time she had a flare.  Discussed with her that  the hydrochlorothiazide she takes for hypertension is a known precipitator of gout, so in the event she does have another flare will consider changing the hydrochlorothiazide do another agent.         Allergic rhinitis - Primary (Chronic)     Refilling patient's Xyzal.  She has fluticasone p.r.n..         Relevant Medications    levocetirizine (XYZAL) 5 MG tablet    Class 3 severe obesity with serious comorbidity and body mass index (BMI) of 40.0 to 44.9 in adult     Discussed dietary intervention strategies with patient.  She still has the materials provided to her from her prior nutrition visit and will review those for implementation.  Discussed option of referral to bariatric, which she does not want to do at this time.         Prediabetes     Last A1c 5.9 in 2020.  She has a recheck already ordered.  Depending on results and impact of dietary interventions may consider initiation of metformin at follow-up.         Broken tooth     Patient endorses having trouble with this tooth recently.  Based on appearance likely requires extraction.  Advised patient to get to the dentist to address this.          Other Visit Diagnoses       Nail dystrophy        Relevant Orders    IRON AND TIBC            Health Maintenance         Date Due Completion Date    RSV Vaccine (Age 60+ and Pregnant patients) (1 - 1-dose 60+ series) Never done ---    Hemoglobin A1c (Prediabetes) 03/22/2023 3/22/2022    COVID-19 Vaccine (5 - 2023-24 season) 09/01/2023 7/15/2022    DEXA Scan 06/08/2025 6/8/2022    Lipid Panel 02/01/2027 2/1/2022    TETANUS VACCINE 01/24/2033 1/24/2023            Future Appointments   Date Time Provider Department Center   4/16/2024  1:00 PM Regina Hanley NP [address]   5/27/2024  2:00 PM Marin Nettles MD [facility]   6/14/2024  9:00 AM [facility], [facility]   6/18/2024 11:40 AM Cleve Barfield MD [facility]         Follow up in about 3 months  (around 5/26/2024). Total clinical care time was 60 min.    Marin Nettles MD  [facility]

## 2024-04-15 ENCOUNTER — PATIENT MESSAGE (OUTPATIENT)
Dept: ADMINISTRATIVE | Facility: OTHER | Age: 84
End: 2024-04-15
Payer: MEDICARE

## 2024-04-16 ENCOUNTER — OFFICE VISIT (OUTPATIENT)
Dept: HOME HEALTH SERVICES | Facility: CLINIC | Age: 84
End: 2024-04-16
Payer: MEDICARE

## 2024-04-16 VITALS
HEART RATE: 70 BPM | WEIGHT: 277 LBS | SYSTOLIC BLOOD PRESSURE: 146 MMHG | DIASTOLIC BLOOD PRESSURE: 65 MMHG | HEIGHT: 67 IN | BODY MASS INDEX: 43.47 KG/M2

## 2024-04-16 DIAGNOSIS — M85.852 OSTEOPENIA OF NECKS OF BOTH FEMURS: ICD-10-CM

## 2024-04-16 DIAGNOSIS — Z00.00 ENCOUNTER FOR PREVENTIVE HEALTH EXAMINATION: ICD-10-CM

## 2024-04-16 DIAGNOSIS — I10 ESSENTIAL HYPERTENSION: Chronic | ICD-10-CM

## 2024-04-16 DIAGNOSIS — M10.9 GOUT, UNSPECIFIED CAUSE, UNSPECIFIED CHRONICITY, UNSPECIFIED SITE: Chronic | ICD-10-CM

## 2024-04-16 DIAGNOSIS — M85.851 OSTEOPENIA OF NECKS OF BOTH FEMURS: ICD-10-CM

## 2024-04-16 DIAGNOSIS — Z00.00 ENCOUNTER FOR MEDICARE ANNUAL WELLNESS EXAM: Primary | ICD-10-CM

## 2024-04-16 DIAGNOSIS — G47.33 OSA (OBSTRUCTIVE SLEEP APNEA): Chronic | ICD-10-CM

## 2024-04-16 DIAGNOSIS — E27.8 ADRENAL NODULE: ICD-10-CM

## 2024-04-16 DIAGNOSIS — E66.01 CLASS 3 SEVERE OBESITY DUE TO EXCESS CALORIES WITH SERIOUS COMORBIDITY AND BODY MASS INDEX (BMI) OF 40.0 TO 44.9 IN ADULT: ICD-10-CM

## 2024-04-16 DIAGNOSIS — I70.0 ATHEROSCLEROSIS OF AORTA: ICD-10-CM

## 2024-04-16 PROCEDURE — G0439 PPPS, SUBSEQ VISIT: HCPCS | Mod: S$GLB,,, | Performed by: NURSE PRACTITIONER

## 2024-04-16 PROCEDURE — 1126F AMNT PAIN NOTED NONE PRSNT: CPT | Mod: CPTII,S$GLB,, | Performed by: NURSE PRACTITIONER

## 2024-04-16 PROCEDURE — 1159F MED LIST DOCD IN RCRD: CPT | Mod: CPTII,S$GLB,, | Performed by: NURSE PRACTITIONER

## 2024-04-16 PROCEDURE — 1101F PT FALLS ASSESS-DOCD LE1/YR: CPT | Mod: CPTII,S$GLB,, | Performed by: NURSE PRACTITIONER

## 2024-04-16 PROCEDURE — 3077F SYST BP >= 140 MM HG: CPT | Mod: CPTII,S$GLB,, | Performed by: NURSE PRACTITIONER

## 2024-04-16 PROCEDURE — 3288F FALL RISK ASSESSMENT DOCD: CPT | Mod: CPTII,S$GLB,, | Performed by: NURSE PRACTITIONER

## 2024-04-16 PROCEDURE — 1158F ADVNC CARE PLAN TLK DOCD: CPT | Mod: CPTII,S$GLB,, | Performed by: NURSE PRACTITIONER

## 2024-04-16 PROCEDURE — 1160F RVW MEDS BY RX/DR IN RCRD: CPT | Mod: CPTII,S$GLB,, | Performed by: NURSE PRACTITIONER

## 2024-04-16 PROCEDURE — 3078F DIAST BP <80 MM HG: CPT | Mod: CPTII,S$GLB,, | Performed by: NURSE PRACTITIONER

## 2024-04-16 NOTE — PROGRESS NOTES
"  Natali Galeano presented for a follow-up Medicare AWV today. The following components were reviewed and updated:    Medical history  Family History  Social history  Allergies and Current Medications  Health Risk Assessment  Health Maintenance  Care Team    **See Completed Assessments for Annual Wellness visit with in the encounter summary    The following assessments were completed:  Depression Screening  Cognitive function Screening    Timed Get Up Test  Whisper Test      Opioid documentation:      Patient does not have a current opioid prescription.          Vitals:    04/16/24 1135   BP: (!) 146/65   Pulse: 70   Weight: 125.6 kg (277 lb)   Height: 5' 7" (1.702 m)     Body mass index is 43.38 kg/m².       Physical Exam  Constitutional:       Appearance: She is obese.   HENT:      Head: Normocephalic and atraumatic.      Nose: Nose normal.      Mouth/Throat:      Mouth: Mucous membranes are moist.   Eyes:      Extraocular Movements: Extraocular movements intact.   Cardiovascular:      Rate and Rhythm: Normal rate and regular rhythm.      Heart sounds: Normal heart sounds.   Pulmonary:      Effort: Pulmonary effort is normal. No respiratory distress.      Breath sounds: Normal breath sounds.   Abdominal:      General: Bowel sounds are normal. There is no distension.      Palpations: Abdomen is soft.   Musculoskeletal:         General: No swelling. Normal range of motion.      Cervical back: Normal range of motion.   Skin:     General: Skin is warm and dry.   Neurological:      General: No focal deficit present.      Mental Status: She is alert and oriented to person, place, and time.   Psychiatric:         Mood and Affect: Mood normal.         Behavior: Behavior normal.           Diagnoses and health risks identified today and associated recommendations/orders:  1. Encounter for Medicare annual wellness exam  - Ambulatory Referral/Consult to Enhanced Annual Wellness Visit (eAWV)    2. Encounter for preventive health " examination  Assessments completed. Preventive measures, health maintenance, and immunizations reviewed with patient.    3. Class 3 severe obesity due to excess calories with serious comorbidity and body mass index (BMI) of 40.0 to 44.9 in adult  Stable, followed by PCP. Healthy diet and tolerable exercises encouraged.    4. Atherosclerosis of aorta  Stable, patient on Aspirin. Followed by Cardiology.    5. Adrenal nodule  Stable, followed by PCP.    6. Essential hypertension  Stable, patient on Amlodipine and Hydrochlorothiazide. Followed by PCP.    7. Gout, unspecified cause, unspecified chronicity, unspecified site  Stable, patient on Allopurinol. Followed by PCP.    8. Osteopenia of necks of both femurs  Stable, patient on Vitamin D3. Followed by PCP.    9. LAYSE (obstructive sleep apnea)  Stable, followed by PCP.      Provided Natali with a 5-10 year written screening schedule and personal prevention plan. Recommendations were developed using the USPSTF age appropriate recommendations. Education, counseling, and referrals were provided as needed.  After Visit Summary printed and given to patient which includes a list of additional screenings\tests needed.    Follow up in about 1 year (around 4/16/2025) for your next annual wellness visit.      Regina Hanley, RHETT  I offered to discuss advanced care planning, including how to pick a person who would make decisions for you if you were unable to make them for yourself, called a health care power of , and what kind of decisions you might make such as use of life sustaining treatments such as ventilators and tube feeding when faced with a life limiting illness recorded on a living will that they will need to know. (How you want to be cared for as you near the end of your natural life)     X Patient is interested in learning more about how to make advanced directives.  I provided them paperwork and offered to discuss this with them.

## 2024-04-16 NOTE — PATIENT INSTRUCTIONS
Counseling and Referral of Other Preventative  (Italic type indicates deductible and co-insurance are waived)    Patient Name: Natali Galeano  Today's Date: 4/16/2024    Health Maintenance       Date Due Completion Date    RSV Vaccine (Age 60+ and Pregnant patients) (1 - 1-dose 60+ series) Never done ---    Hemoglobin A1c (Prediabetes) 02/23/2020 2/23/2019    COVID-19 Vaccine (5 - 2023-24 season) 09/01/2023 7/15/2022    DEXA Scan 06/08/2025 6/8/2022    Lipid Panel 02/01/2027 2/1/2022    TETANUS VACCINE 01/24/2033 1/24/2023        No orders of the defined types were placed in this encounter.    The following information is provided to all patients.  This information is to help you find resources for any of the problems found today that may be affecting your health:                  Living healthy guide: www.[facility].[place].[facility]      Understanding Diabetes: www.diabetes.org      Eating healthy: www.cdc.gov/healthyweight      [facility] home safety checklist: www.cdc.gov/steadi/patient.html      Agency on Aging: www.goea.[place].[facility]      Alcoholics anonymous (AA): www.aa.org      Physical Activity: www.ronald.nih.gov/bw8pruv      Tobacco use: www.quitwithusla.org

## 2024-06-10 ENCOUNTER — OFFICE VISIT (OUTPATIENT)
Facility: CLINIC | Age: 84
End: 2024-06-10
Payer: MEDICARE

## 2024-06-10 VITALS
BODY MASS INDEX: 43.58 KG/M2 | DIASTOLIC BLOOD PRESSURE: 68 MMHG | OXYGEN SATURATION: 99 % | HEART RATE: 74 BPM | WEIGHT: 277.69 LBS | SYSTOLIC BLOOD PRESSURE: 136 MMHG | HEIGHT: 67 IN | TEMPERATURE: 98 F

## 2024-06-10 DIAGNOSIS — E66.01 CLASS 3 SEVERE OBESITY DUE TO EXCESS CALORIES WITH SERIOUS COMORBIDITY AND BODY MASS INDEX (BMI) OF 40.0 TO 44.9 IN ADULT: Primary | ICD-10-CM

## 2024-06-10 DIAGNOSIS — M25.472 ANKLE EDEMA, BILATERAL: ICD-10-CM

## 2024-06-10 DIAGNOSIS — M25.471 ANKLE EDEMA, BILATERAL: ICD-10-CM

## 2024-06-10 PROBLEM — R09.81 NASAL CONGESTION: Status: RESOLVED | Noted: 2022-02-24 | Resolved: 2024-06-10

## 2024-06-10 PROBLEM — R05.9 COUGH: Status: RESOLVED | Noted: 2022-02-24 | Resolved: 2024-06-10

## 2024-06-10 PROBLEM — K56.600 SMALL BOWEL OBSTRUCTION, PARTIAL: Status: RESOLVED | Noted: 2019-02-23 | Resolved: 2024-06-10

## 2024-06-10 PROCEDURE — 3078F DIAST BP <80 MM HG: CPT | Mod: CPTII,S$GLB,, | Performed by: HOSPITALIST

## 2024-06-10 PROCEDURE — 1126F AMNT PAIN NOTED NONE PRSNT: CPT | Mod: CPTII,S$GLB,, | Performed by: HOSPITALIST

## 2024-06-10 PROCEDURE — 99999 PR PBB SHADOW E&M-EST. PATIENT-LVL IV: CPT | Mod: PBBFAC,,, | Performed by: HOSPITALIST

## 2024-06-10 PROCEDURE — 3288F FALL RISK ASSESSMENT DOCD: CPT | Mod: CPTII,S$GLB,, | Performed by: HOSPITALIST

## 2024-06-10 PROCEDURE — 1101F PT FALLS ASSESS-DOCD LE1/YR: CPT | Mod: CPTII,S$GLB,, | Performed by: HOSPITALIST

## 2024-06-10 PROCEDURE — 99215 OFFICE O/P EST HI 40 MIN: CPT | Mod: S$GLB,,, | Performed by: HOSPITALIST

## 2024-06-10 PROCEDURE — 1159F MED LIST DOCD IN RCRD: CPT | Mod: CPTII,S$GLB,, | Performed by: HOSPITALIST

## 2024-06-10 PROCEDURE — 3075F SYST BP GE 130 - 139MM HG: CPT | Mod: CPTII,S$GLB,, | Performed by: HOSPITALIST

## 2024-06-10 RX ORDER — MELOXICAM 7.5 MG/1
7.5 TABLET ORAL
COMMUNITY
Start: 2024-02-05

## 2024-06-10 NOTE — ASSESSMENT & PLAN NOTE
Discussed with patient the role of dependent edema and it is emergence in sedentary status.  She was advised to keep her legs in motion as much as possible to facilitate venous return from the feet.

## 2024-06-10 NOTE — PATIENT INSTRUCTIONS
Ask [employer] about the Silver Sneakers program and if it is covered under your policy, and if so, if transportation can be set up.

## 2024-06-10 NOTE — PROGRESS NOTES
Primary Care Provider Appointment - [facility] & Anatoly Nettles MD      Subjective:      Patient ID: Natali Galeano is a 83 y.o. female with   Past Medical History:   Diagnosis Date    Allergic rhinitis 07/18/2022    Anemia     Arthritis     Colon cancer     Gout, chronic     Heart murmur     High cholesterol     Hypercholesteremia     Hypertension     Obesity     PMB (postmenopausal bleeding) 10/24/2017    Right knee pain 2/23/2019    Sleep apnea     Thyroid disease            Chief Complaint: Follow-up (3 month. /Pt reports having chest pains. Not consistent. Not sure if its food related. Pt does have Cardiologist appt coming up. Pt felt this pain twice. ) and Edema (Ankle edema. Primarily on Right but can also be presented on the Left. )    Prior to this visit, patient's last encounter with PCP was 2/26/2023.    Had 2 episodes of CP lasting a few seconds when at rest.  Might have happened after eating.  Was sitting in front of computer twisted obliquely when it happened.    Notes more sagging of skin on arms.  Not getting much exercise due to transportation falling through.  Trying to apply for RTA assistance.    Also reports ankle edema, inc on R.  Progressive through day while sitting and improves overnight when laying down.      2/26: Pt reports trouble losing weight.  She loves bread and eats a lot of it.  Also enjoys spinach, squash, other vegetables.  Saw a nutritionist a few years ago but didn't implement changes discussed.    She has history of partial thyroidectomy and sees an endocrinologist, but does not require thyroid supplementation.  She reports having had normal colonoscopies in 2014 and 2019 at [facility] and wants to know if she needs another one.  She similarly wants to know about mammography screening, which is very uncomfortable and difficult to do with her large breasts.  She has osteoarthritis of the knees and had an ED visit in December for knee pain, following which she was  referred to an orthopedic surgeon who she saw earlier this month who wanted to give her an injection into the knees, but she declined.  She was Rx'd meloxicam, which she decided not to take after reading about side effects.      4Ms for Medical Decision-Making in Older Adults    Last Completed EAWV: 2024    MOBILITY:  Get Up and Go:      2024    11:45 AM 2023     9:26 AM   Get Up and Go   Trial 1 12 seconds 12 seconds     Activities of Daily Livin/16/2024    11:41 AM   Activities of Daily Living   Ambulation Independent   Dressing Independent   Transfers Independent   Toileting Continent of bladder;Continent of bowel   Feeding Independent   Cleaning home/Chores Independent   Telephone use Independent   Shopping Assistance Required   Paying bills Independent   Taking meds Independent   If required, who assists the patient with ADLs? daughter     Whisper Test:      2024    11:45 AM   Whisper Test   Whisper Test Normal     Disability Status:      2024    11:44 AM   Disability Status   Are you deaf or do you have serious difficulty hearing? N   Are you blind or do you have serious difficulty seeing, even when wearing glasses? N   Because of a physical, mental, or emotional condition, do you have serious difficulty concentrating, remembering, or making decisions? N   Do you have serious difficulty walking or climbing stairs? N   Do you have difficulty dressing or bathing? N   Because of a physical, mental, or emotional condition, do you have difficulty doing errands alone such as visiting a doctor's office or shopping? N     Nutrition Screenin/16/2024    11:40 AM   Nutrition Screening   Has food intake declined over the past three months due to loss of appetite, digestive problems, chewing or swallowing difficulties? No decrease in food intake   Involuntary weight loss during the last 3 months? No weight loss   Mobility? Goes out   Has the patient suffered psychological stress  or acute disease in the past three months? No   Neuropsychological problems? No psychological problems   Body Mass Index (BMI)?  BMI 23 or greater   Screening Score 14   Interpretation Normal nutritional status    Screening Score: 0-7 Malnourished, 8-11 At Risk, 12-14 Normal    MENTATION:   Depression Patient Health Questionnaire:      2024    11:45 AM   Depression Patient Health Questionnaire   Over the last two weeks how often have you been bothered by little interest or pleasure in doing things Not at all   Over the last two weeks how often have you been bothered by feeling down, depressed or hopeless Not at all   PHQ-2 Total Score 0     Has Dementia Dx: No    Cognitive Function Screenin/16/2024    11:45 AM   Cognitive Function Screening   Clock Drawing Test 1   Mini-Cog 3 Minute Recall 3   Cognitive Function Screening 4     Cognitive Function Screening Total - Less than 4 = Abnormal,  Greater than or equal to 4 = Normal    MEDICATIONS:  High Risk Medications:  Total Active Medications: 0  This patient does not have an active medication from one of the medication groupers.    WHAT MATTERS MOST:  Advance Care Planning   ACP Status:   Patient has had an ACP conversation  Living Will: No  Power of : No  [place]: No      Social History     Socioeconomic History    Marital status: Single   Tobacco Use    Smoking status: Former     Current packs/day: 0.00     Types: Cigarettes     Quit date: 1980     Years since quittin.1    Smokeless tobacco: Never   Substance and Sexual Activity    Alcohol use: No    Drug use: No    Sexual activity: Not Currently     Social Determinants of Health     Financial Resource Strain: Low Risk  (2024)    Overall Financial Resource Strain (CARDIA)     Difficulty of Paying Living Expenses: Not very hard   Food Insecurity: Food Insecurity Present (2024)    Hunger Vital Sign     Worried About Running Out of Food in the Last Year: Sometimes true     Ran Out  "of Food in the Last Year: Sometimes true   Transportation Needs: No Transportation Needs (4/16/2024)    PRAPARE - Transportation     Lack of Transportation (Medical): No     Lack of Transportation (Non-Medical): No   Physical Activity: Insufficiently Active (6/7/2024)    Exercise Vital Sign     Days of Exercise per Week: 2 days     Minutes of Exercise per Session: 10 min   Stress: No Stress Concern Present (6/7/2024)    [employer] - Occupational Stress Questionnaire     Feeling of Stress : Not at all   Housing Stability: Low Risk  (6/7/2024)    Housing Stability Vital Sign     Unable to Pay for Housing in the Last Year: No     Homeless in the Last Year: No       Review of Systems   Constitutional:  Negative for activity change, appetite change, chills and fever.   HENT:  Positive for dental problem. Negative for nasal congestion and sore throat.    Eyes:  Negative for photophobia and visual disturbance.   Respiratory:  Negative for cough and shortness of breath.    Cardiovascular:  Negative for chest pain and leg swelling.   Gastrointestinal:  Negative for abdominal pain, constipation, diarrhea, nausea and vomiting.   Genitourinary:  Negative for dysuria and hematuria.   Musculoskeletal:  Negative for arthralgias and myalgias.   Integumentary:  Negative for rash and wound.   Neurological:  Negative for dizziness and weakness.        Objective:   /68 (BP Location: Left arm, Patient Position: Sitting, BP Method: Medium (Automatic))   Pulse 74   Temp 98 °F (36.7 °C) (Oral)   Ht 5' 7" (1.702 m)   Wt 126 kg (277 lb 10.7 oz)   LMP  (LMP Unknown)   SpO2 99%   BMI 43.49 kg/m²     Physical Exam  Vitals reviewed.   Constitutional:       General: She is not in acute distress.     Appearance: She is obese.   HENT:      Head: Normocephalic and atraumatic.      Right Ear: Tympanic membrane, ear canal and external ear normal. There is no impacted cerumen.      Left Ear: Tympanic membrane, " ear canal and external ear normal. There is no impacted cerumen.      Nose: Nose normal.      Mouth/Throat:      Mouth: Mucous membranes are moist.      Pharynx: Oropharynx is clear.      Comments: Broken molar on left mandible with majority of crown missing; pulp visible.  Multiple fillings noted.  Eyes:      General: No scleral icterus.     Conjunctiva/sclera: Conjunctivae normal.   Cardiovascular:      Rate and Rhythm: Normal rate and regular rhythm.      Heart sounds: Normal heart sounds.   Pulmonary:      Effort: Pulmonary effort is normal.      Breath sounds: Normal breath sounds.   Abdominal:      General: Bowel sounds are normal. There is no distension.      Tenderness: There is no abdominal tenderness. There is no guarding.   Musculoskeletal:      Cervical back: Normal range of motion and neck supple. No rigidity.      Right lower leg: Edema (trace pitting) present.      Left lower leg: Edema (trace pitting) present.   Lymphadenopathy:      Cervical: No cervical adenopathy.   Skin:     General: Skin is warm and dry.   Neurological:      General: No focal deficit present.      Mental Status: She is alert and oriented to person, place, and time.              Lab Results   Component Value Date    WBC 8.45 09/15/2023    HGB 12.0 09/15/2023    HCT 39.2 09/15/2023     09/15/2023    CHOL 136 02/01/2022    TRIG 64 02/01/2022    HDL 51 02/01/2022    ALT 14 09/14/2023    AST 22 09/14/2023     09/15/2023    K 4.5 09/15/2023     09/15/2023    CREATININE 0.7 09/15/2023    BUN 10 09/15/2023    CO2 22 (L) 09/15/2023    TSH 2.298 07/12/2023    INR 1.0 06/22/2015    HGBA1C 5.9 (H) 02/23/2019       Current Outpatient Medications on File Prior to Visit   Medication Sig Dispense Refill    allopurinoL (ZYLOPRIM) 300 MG tablet Take 1 tablet (300 mg total) by mouth once daily. gout 90 tablet 3    amLODIPine (NORVASC) 10 MG tablet Take 1 tablet (10 mg total) by mouth once daily. High blood pressure 90 tablet 3     ascorbic acid, vitamin C, (VITAMIN C) 1000 MG tablet Take 1,000 mg by mouth once daily.      aspirin (ECOTRIN) 81 MG EC tablet Take 81 mg by mouth once daily.      atorvastatin (LIPITOR) 10 MG tablet Take 1 tablet (10 mg total) by mouth every evening. High cholesterol 90 tablet 3    cholecalciferol, vitamin D3, (VITAMIN D3) 25 mcg (1,000 unit) capsule Take 2,000 Units by mouth once daily.      cyanocobalamin (VITAMIN B-12) 250 MCG tablet Take 2,500 mcg by mouth once daily.      docusate sodium (COLACE) 100 MG capsule Take 100 mg by mouth once daily.      fluticasone propionate (FLONASE) 50 mcg/actuation nasal spray 2 sprays (100 mcg total) by Each Nostril route 2 (two) times a day. 16 g 3    hydroCHLOROthiazide (HYDRODIURIL) 25 MG tablet Take 0.5 tablets (12.5 mg total) by mouth once daily. Take 1/2 of the 25 mg tablet daily 60 tablet 3    levocetirizine (XYZAL) 5 MG tablet Take 1 tablet (5 mg total) by mouth every evening. (Patient not taking: Reported on 6/10/2024) 30 tablet 11    LIDOcaine (XYLOCAINE) 5 % Oint ointment APPLY TOPICALLY TO AFFECTED PART OF FOOT/FEET ONCE NIGHTLY AS NEEDED. (Patient not taking: Reported on 4/16/2024)      meloxicam (MOBIC) 7.5 MG tablet Take 7.5 mg by mouth. (Patient not taking: Reported on 6/10/2024)       No current facility-administered medications on file prior to visit.         Assessment:   83 y.o. female with multiple co-morbid illnesses here to follow-up for ongoing chronic disease management and preventive health maintenance.    Plan:     Problem List Items Addressed This Visit    None        Health Maintenance         Date Due Completion Date    RSV Vaccine (Age 60+ and Pregnant patients) (1 - 1-dose 60+ series) Never done ---    Hemoglobin A1c (Prediabetes) 02/23/2020 2/23/2019    COVID-19 Vaccine (5 - 2023-24 season) 09/01/2023 7/15/2022    DEXA Scan 06/08/2025 6/8/2022    Lipid Panel 02/01/2027 2/1/2022    TETANUS VACCINE 01/24/2033 1/24/2023            Future  Appointments   Date Time Provider Department Center   6/14/2024  9:00 AM LAB, [facility]   6/18/2024 11:40 AM Cleve Barfield MD [facility]         No follow-ups on file. Total clinical care time was 60 min.    Marin Nettles MD  [facility], [facility] and [facility]

## 2024-06-10 NOTE — ASSESSMENT & PLAN NOTE
Offered referral to Bariatrics for assistance with weight loss, which she would prefer to think about for now.  Discuss roles of various medications in weight loss therapy, as well as the importance of adjustment of activity levels and eating habits.  Recommended she check with her insurance regarding coverage for the [employer] program, and whether transportation can be arranged to and from participating facility.

## 2024-06-14 ENCOUNTER — LAB VISIT (OUTPATIENT)
Dept: PRIMARY CARE CLINIC | Facility: CLINIC | Age: 84
End: 2024-06-14
Payer: MEDICARE

## 2024-06-14 DIAGNOSIS — L60.3 NAIL DYSTROPHY: ICD-10-CM

## 2024-06-14 DIAGNOSIS — E78.49 OTHER HYPERLIPIDEMIA: Chronic | ICD-10-CM

## 2024-06-14 LAB
ALBUMIN SERPL BCP-MCNC: 3.8 G/DL (ref 3.5–5.2)
ALP SERPL-CCNC: 108 U/L (ref 55–135)
ALT SERPL W/O P-5'-P-CCNC: 12 U/L (ref 10–44)
ANION GAP SERPL CALC-SCNC: 13 MMOL/L (ref 8–16)
AST SERPL-CCNC: 20 U/L (ref 10–40)
BILIRUB SERPL-MCNC: 0.8 MG/DL (ref 0.1–1)
BUN SERPL-MCNC: 15 MG/DL (ref 8–23)
CALCIUM SERPL-MCNC: 10.6 MG/DL (ref 8.7–10.5)
CHLORIDE SERPL-SCNC: 104 MMOL/L (ref 95–110)
CHOLEST SERPL-MCNC: 148 MG/DL (ref 120–199)
CHOLEST/HDLC SERPL: 3.1 {RATIO} (ref 2–5)
CO2 SERPL-SCNC: 24 MMOL/L (ref 23–29)
CREAT SERPL-MCNC: 0.9 MG/DL (ref 0.5–1.4)
EST. GFR  (NO RACE VARIABLE): >60 ML/MIN/1.73 M^2
GLUCOSE SERPL-MCNC: 90 MG/DL (ref 70–110)
HDLC SERPL-MCNC: 48 MG/DL (ref 40–75)
HDLC SERPL: 32.4 % (ref 20–50)
IRON SERPL-MCNC: 81 UG/DL (ref 30–160)
LDLC SERPL CALC-MCNC: 84.6 MG/DL (ref 63–159)
NONHDLC SERPL-MCNC: 100 MG/DL
POTASSIUM SERPL-SCNC: 4 MMOL/L (ref 3.5–5.1)
PROT SERPL-MCNC: 7.7 G/DL (ref 6–8.4)
SATURATED IRON: 22 % (ref 20–50)
SODIUM SERPL-SCNC: 141 MMOL/L (ref 136–145)
TOTAL IRON BINDING CAPACITY: 370 UG/DL (ref 250–450)
TRANSFERRIN SERPL-MCNC: 250 MG/DL (ref 200–375)
TRIGL SERPL-MCNC: 77 MG/DL (ref 30–150)

## 2024-06-14 PROCEDURE — 80053 COMPREHEN METABOLIC PANEL: CPT | Performed by: INTERNAL MEDICINE

## 2024-06-14 PROCEDURE — 36415 COLL VENOUS BLD VENIPUNCTURE: CPT | Performed by: HOSPITALIST

## 2024-06-14 PROCEDURE — 80061 LIPID PANEL: CPT | Performed by: INTERNAL MEDICINE

## 2024-06-14 PROCEDURE — 83540 ASSAY OF IRON: CPT | Performed by: HOSPITALIST

## 2024-06-18 ENCOUNTER — OFFICE VISIT (OUTPATIENT)
Dept: CARDIOLOGY | Facility: CLINIC | Age: 84
End: 2024-06-18
Payer: MEDICARE

## 2024-06-18 VITALS
BODY MASS INDEX: 43.33 KG/M2 | OXYGEN SATURATION: 96 % | HEART RATE: 58 BPM | DIASTOLIC BLOOD PRESSURE: 78 MMHG | SYSTOLIC BLOOD PRESSURE: 122 MMHG | WEIGHT: 276.69 LBS

## 2024-06-18 DIAGNOSIS — R01.1 HEART MURMUR: ICD-10-CM

## 2024-06-18 DIAGNOSIS — I10 ESSENTIAL HYPERTENSION: Primary | Chronic | ICD-10-CM

## 2024-06-18 DIAGNOSIS — E78.49 OTHER HYPERLIPIDEMIA: Chronic | ICD-10-CM

## 2024-06-18 PROCEDURE — 3078F DIAST BP <80 MM HG: CPT | Mod: CPTII,S$GLB,, | Performed by: INTERNAL MEDICINE

## 2024-06-18 PROCEDURE — 99999 PR PBB SHADOW E&M-EST. PATIENT-LVL III: CPT | Mod: PBBFAC,,, | Performed by: INTERNAL MEDICINE

## 2024-06-18 PROCEDURE — 1126F AMNT PAIN NOTED NONE PRSNT: CPT | Mod: CPTII,S$GLB,, | Performed by: INTERNAL MEDICINE

## 2024-06-18 PROCEDURE — 3074F SYST BP LT 130 MM HG: CPT | Mod: CPTII,S$GLB,, | Performed by: INTERNAL MEDICINE

## 2024-06-18 PROCEDURE — 99214 OFFICE O/P EST MOD 30 MIN: CPT | Mod: S$GLB,,, | Performed by: INTERNAL MEDICINE

## 2024-06-18 PROCEDURE — 3288F FALL RISK ASSESSMENT DOCD: CPT | Mod: CPTII,S$GLB,, | Performed by: INTERNAL MEDICINE

## 2024-06-18 PROCEDURE — 1159F MED LIST DOCD IN RCRD: CPT | Mod: CPTII,S$GLB,, | Performed by: INTERNAL MEDICINE

## 2024-06-18 PROCEDURE — 1101F PT FALLS ASSESS-DOCD LE1/YR: CPT | Mod: CPTII,S$GLB,, | Performed by: INTERNAL MEDICINE

## 2024-06-18 NOTE — PROGRESS NOTES
Cardiology    6/18/2024  11:30 AM    Problem list  Patient Active Problem List   Diagnosis    Class 3 severe obesity with serious comorbidity and body mass index (BMI) of 40.0 to 44.9 in adult    Essential hypertension    Adrenal nodule    Hyperlipidemia    ALYSE (obstructive sleep apnea)    Thyroid disease    Normocytic anemia    Gout    Heart murmur    History of rheumatic fever    Chronic pain of right ankle    Onychorrhexis    Osteopenia of necks of both femurs    Allergic rhinitis    Gait difficulty    Decreased range of motion of right ankle    S/P partial thyroidectomy    Prediabetes    Atherosclerosis of aorta    Broken tooth    Ankle edema, bilateral       CC:  F/u    HPI:  She is doing well.  She denies any angina, dyspnea on exertion, palpitation or syncope.  She is doing well with the medications.    Medications  Current Outpatient Medications   Medication Sig Dispense Refill    allopurinoL (ZYLOPRIM) 300 MG tablet Take 1 tablet (300 mg total) by mouth once daily. gout 90 tablet 3    amLODIPine (NORVASC) 10 MG tablet Take 1 tablet (10 mg total) by mouth once daily. High blood pressure 90 tablet 3    ascorbic acid, vitamin C, (VITAMIN C) 1000 MG tablet Take 1,000 mg by mouth once daily.      aspirin (ECOTRIN) 81 MG EC tablet Take 81 mg by mouth once daily.      atorvastatin (LIPITOR) 10 MG tablet Take 1 tablet (10 mg total) by mouth every evening. High cholesterol 90 tablet 3    cholecalciferol, vitamin D3, (VITAMIN D3) 25 mcg (1,000 unit) capsule Take 2,000 Units by mouth once daily.      cyanocobalamin (VITAMIN B-12) 250 MCG tablet Take 2,500 mcg by mouth once daily.      docusate sodium (COLACE) 100 MG capsule Take 100 mg by mouth once daily.      fluticasone propionate (FLONASE) 50 mcg/actuation nasal spray 2 sprays (100 mcg total) by Each Nostril route 2 (two) times a day. 16 g 3    hydroCHLOROthiazide (HYDRODIURIL) 25 MG tablet Take 0.5 tablets (12.5 mg total) by mouth once daily. Take 1/2 of  the 25 mg tablet daily 60 tablet 3    meloxicam (MOBIC) 7.5 MG tablet Take 7.5 mg by mouth. (Patient not taking: Reported on 6/10/2024)       No current facility-administered medications for this visit.      Prior to Admission medications    Medication Sig Start Date End Date Taking? Authorizing Provider   allopurinoL (ZYLOPRIM) 300 MG tablet Take 1 tablet (300 mg total) by mouth once daily. gout 10/30/23  Yes Ilene Rocha MD   amLODIPine (NORVASC) 10 MG tablet Take 1 tablet (10 mg total) by mouth once daily. High blood pressure 10/30/23  Yes Ilene Rocha MD   ascorbic acid, vitamin C, (VITAMIN C) 1000 MG tablet Take 1,000 mg by mouth once daily.   Yes Provider, Historical   aspirin (ECOTRIN) 81 MG EC tablet Take 81 mg by mouth once daily.   Yes Provider, Historical   atorvastatin (LIPITOR) 10 MG tablet Take 1 tablet (10 mg total) by mouth every evening. High cholesterol 8/24/23  Yes Talon Barfield MD   cholecalciferol, vitamin D3, (VITAMIN D3) 25 mcg (1,000 unit) capsule Take 2,000 Units by mouth once daily.   Yes Provider, Historical   cyanocobalamin (VITAMIN B-12) 250 MCG tablet Take 2,500 mcg by mouth once daily.   Yes Provider, Historical   docusate sodium (COLACE) 100 MG capsule Take 100 mg by mouth once daily.   Yes Provider, Historical   fluticasone propionate (FLONASE) 50 mcg/actuation nasal spray 2 sprays (100 mcg total) by Each Nostril route 2 (two) times a day. 8/24/23  Yes Talon Barfield MD   hydroCHLOROthiazide (HYDRODIURIL) 25 MG tablet Take 0.5 tablets (12.5 mg total) by mouth once daily. Take 1/2 of the 25 mg tablet daily 8/17/23  Yes Ilene Rocha MD   meloxicam (MOBIC) 7.5 MG tablet Take 7.5 mg by mouth.  Patient not taking: Reported on 6/10/2024 2/5/24   Provider, Historical         History  Past Medical History:   Diagnosis Date    Allergic rhinitis 07/18/2022    Anemia     Arthritis     Colon cancer     Gout, chronic     Heart murmur     High cholesterol     Hypercholesteremia      Hypertension     Obesity     PMB (postmenopausal bleeding) 10/24/2017    Right knee pain 2019    Sleep apnea     Small bowel obstruction, partial 2019    Thyroid disease      Past Surgical History:   Procedure Laterality Date    BTL       SECTION, CLASSIC      COLON SURGERY      goiter       HERNIA REPAIR      KIDNEY SURGERY      cyst removal    THYROID SURGERY       Social History     Socioeconomic History    Marital status: Single   Tobacco Use    Smoking status: Former     Current packs/day: 0.00     Types: Cigarettes     Quit date: 1980     Years since quittin.2    Smokeless tobacco: Never   Substance and Sexual Activity    Alcohol use: No    Drug use: No    Sexual activity: Not Currently     Social Determinants of Health     Financial Resource Strain: Low Risk  (2024)    Overall Financial Resource Strain (CARDIA)     Difficulty of Paying Living Expenses: Not very hard   Food Insecurity: Food Insecurity Present (2024)    Hunger Vital Sign     Worried About Running Out of Food in the Last Year: Sometimes true     Ran Out of Food in the Last Year: Sometimes true   Transportation Needs: No Transportation Needs (2024)    PRAPARE - Transportation     Lack of Transportation (Medical): No     Lack of Transportation (Non-Medical): No   Physical Activity: Insufficiently Active (2024)    Exercise Vital Sign     Days of Exercise per Week: 2 days     Minutes of Exercise per Session: 10 min   Stress: No Stress Concern Present (2024)    [employer] - Occupational Stress Questionnaire     Feeling of Stress : Not at all   Housing Stability: Low Risk  (2024)    Housing Stability Vital Sign     Unable to Pay for Housing in the Last Year: No     Homeless in the Last Year: No         Allergies  Review of patient's allergies indicates:   Allergen Reactions    Ace inhibitors Other (See Comments)     cough    Arb-angiotensin receptor antagonist Other  (See Comments)     Cough w/ valsartan         Review of Systems   Review of Systems   Constitutional: Negative for decreased appetite, fever and weight loss.   HENT:  Negative for congestion and nosebleeds.    Eyes:  Negative for double vision, vision loss in left eye, vision loss in right eye and visual disturbance.   Cardiovascular:  Negative for chest pain, claudication, cyanosis, dyspnea on exertion, irregular heartbeat, leg swelling, near-syncope, orthopnea, palpitations, paroxysmal nocturnal dyspnea and syncope.   Respiratory:  Negative for cough, hemoptysis, shortness of breath, sleep disturbances due to breathing, snoring, sputum production and wheezing.    Endocrine: Negative for cold intolerance and heat intolerance.   Skin:  Negative for nail changes and rash.   Musculoskeletal:  Negative for joint pain, muscle cramps, muscle weakness and myalgias.   Gastrointestinal:  Negative for change in bowel habit, heartburn, hematemesis, hematochezia, hemorrhoids and melena.   Neurological:  Negative for dizziness, focal weakness and headaches.         Physical Exam  Wt Readings from Last 1 Encounters:   06/18/24 125.5 kg (276 lb 10.8 oz)     BP Readings from Last 3 Encounters:   06/18/24 122/78   06/10/24 136/68   04/16/24 (!) 146/65     Pulse Readings from Last 1 Encounters:   06/18/24 (!) 58     Body mass index is 43.33 kg/m².    Physical Exam  Vitals reviewed.   Constitutional:       Appearance: She is well-developed. She is obese.   HENT:      Head: Atraumatic.   Eyes:      General: No scleral icterus.  Neck:      Vascular: Normal carotid pulses. No carotid bruit, hepatojugular reflux or JVD.   Cardiovascular:      Rate and Rhythm: Normal rate and regular rhythm.      Chest Wall: PMI is not displaced.      Pulses: Intact distal pulses.           Carotid pulses are 2+ on the right side and 2+ on the left side.       Radial pulses are 2+ on the right side and 2+ on the left side.        Dorsalis pedis pulses are  2+ on the right side and 2+ on the left side.      Heart sounds: Normal heart sounds, S1 normal and S2 normal. No murmur heard.     No friction rub.   Pulmonary:      Effort: Pulmonary effort is normal. No respiratory distress.      Breath sounds: Normal breath sounds. No stridor. No wheezing or rales.   Chest:      Chest wall: No tenderness.   Abdominal:      General: Bowel sounds are normal.      Palpations: Abdomen is soft.   Musculoskeletal:      Cervical back: Neck supple. No edema.   Skin:     General: Skin is warm and dry.      Nails: There is no clubbing.   Neurological:      Mental Status: She is alert and oriented to person, place, and time.   Psychiatric:         Behavior: Behavior normal.         Thought Content: Thought content normal.             Assessment  1. Essential hypertension  Controlled.  Continue current medications and monitor.    2. Other hyperlipidemia  Stable.  Continue current medications and monitor  - EKG 12-lead  - Comprehensive Metabolic Panel; Future  - Lipid Panel; Future    3. Heart murmur  Stable        Plan and Discussion  Discussed her EKG today which shows sinus rhythm rate of 60 with PAC.  There is LVH on his EKG.  Discussed her blood pressure is well controlled.  Recommend to continue current medications.  Encourage to exercise at least 30 minutes per day, 5 days per week.      Follow Up  6 months with labs      Cleve Barfield MD, F.A.C.C, F.S.C.A.I.      Total professional time spent for the encounter: 30 minutes  Time was spent preparing to see the patient, reviewing results of prior testing, obtaining and/or reviewing separately obtained history, performing a medically appropriate examination and interview, counseling and educating the patient/family, ordering medications/tests/procedures, referring and communicating with other health care professionals, documenting clinical information in the electronic health record, and independently interpreting  results.    Disclaimer: This document was created using voice recognition software ([employer]). Although it may be edited, this document may contain errors related to incorrect recognition of the spoken word. Please call the physician if clarification is needed.

## 2024-07-07 NOTE — ASSESSMENT & PLAN NOTE
At goal on amlodipine and hydrochlorothiazide.  
Discussed dietary intervention strategies with patient.  She still has the materials provided to her from her prior nutrition visit and will review those for implementation.  Discussed option of referral to bariatric, which she does not want to do at this time.  
Last A1c 5.9 in 2020.  She has a recheck already ordered.  Depending on results and impact of dietary interventions may consider initiation of metformin at follow-up.  
Lipids at goal on atorvastatin 10 mg.  She is already scheduled for recheck later this year.  
On allopurinol 300 mg daily; patient does not recall last time she had a flare.  Discussed with her that the hydrochlorothiazide she takes for hypertension is a known precipitator of gout, so in the event she does have another flare will consider changing the hydrochlorothiazide do another agent.  
Patient compliant with CPAP.  
Patient endorses having trouble with this tooth recently.  Based on appearance likely requires extraction.  Advised patient to get to the dentist to address this.  
Refilling patient's Xyzal.  She has fluticasone p.r.n..  
07-Jul-2024 14:30

## 2024-07-26 ENCOUNTER — OFFICE VISIT (OUTPATIENT)
Facility: CLINIC | Age: 84
End: 2024-07-26
Payer: MEDICARE

## 2024-07-26 VITALS
OXYGEN SATURATION: 98 % | SYSTOLIC BLOOD PRESSURE: 144 MMHG | DIASTOLIC BLOOD PRESSURE: 67 MMHG | WEIGHT: 279 LBS | BODY MASS INDEX: 43.7 KG/M2 | HEART RATE: 68 BPM | TEMPERATURE: 98 F

## 2024-07-26 DIAGNOSIS — E66.01 CLASS 3 SEVERE OBESITY DUE TO EXCESS CALORIES WITH SERIOUS COMORBIDITY AND BODY MASS INDEX (BMI) OF 40.0 TO 44.9 IN ADULT: ICD-10-CM

## 2024-07-26 DIAGNOSIS — I10 ESSENTIAL HYPERTENSION: Chronic | ICD-10-CM

## 2024-07-26 DIAGNOSIS — M25.472 ANKLE EDEMA, BILATERAL: ICD-10-CM

## 2024-07-26 DIAGNOSIS — M25.471 ANKLE EDEMA, BILATERAL: ICD-10-CM

## 2024-07-26 DIAGNOSIS — R73.03 PREDIABETES: Primary | ICD-10-CM

## 2024-07-26 PROCEDURE — 99999 PR PBB SHADOW E&M-EST. PATIENT-LVL V: CPT | Mod: PBBFAC,,, | Performed by: HOSPITALIST

## 2024-07-26 NOTE — ASSESSMENT & PLAN NOTE
Blood pressure elevated today, but compared to home readings appears to be an outlier.  Digital medicine hypertension program data reviewed showing blood pressure largely at goal on amlodipine 10 mg and HCTZ 12.5 mg daily.  She is not on an ACE inhibitor or ARB due to intolerance.

## 2024-07-26 NOTE — PROGRESS NOTES
Primary Care Provider Appointment - [facility] & Anatoly Nettles MD      Subjective:      Patient ID: Natali Galeano is a 83 y.o. female with   Past Medical History:   Diagnosis Date    Allergic rhinitis 07/18/2022    Anemia     Arthritis     Colon cancer     Gout, chronic     Heart murmur     High cholesterol     Hypercholesteremia     Hypertension     Obesity     PMB (postmenopausal bleeding) 10/24/2017    Right knee pain 02/23/2019    Sleep apnea     Small bowel obstruction, partial 02/23/2019    Thyroid disease            Chief Complaint: Follow-up and Leg Injury    Prior to this visit, patient's last encounter with PCP was 6/10/2023.    Noted some redness of both shins which lasted about a week and a half; no pain or other symptoms.  Is continuing to have swelling but has started going back to the gym with her niece.  Walks treadmill about 20 min, stationary bike x 20 min; some resistance training.    Drinking prune juice lately which has been helping have a BM daily, even better than the colace.  Checking BP at home regularly w/ Digital Med device; largely looking good.      6/10: Had 2 episodes of CP lasting a few seconds when at rest.  Might have happened after eating.  Was sitting in front of computer twisted obliquely when it happened.    Notes more sagging of skin on arms.  Not getting much exercise due to transportation falling through.  Trying to apply for RTA assistance.    Also reports ankle edema, inc on R.  Progressive through day while sitting and improves overnight when laying down.      2/26: Pt reports trouble losing weight.  She loves bread and eats a lot of it.  Also enjoys spinach, squash, other vegetables.  Saw a nutritionist a few years ago but didn't implement changes discussed.    She has history of partial thyroidectomy and sees an endocrinologist, but does not require thyroid supplementation.  She reports having had normal colonoscopies in 2014 and 2019 at [facility] and wants  to know if she needs another one.  She similarly wants to know about mammography screening, which is very uncomfortable and difficult to do with her large breasts.  She has osteoarthritis of the knees and had an ED visit in December for knee pain, following which she was referred to an orthopedic surgeon who she saw earlier this month who wanted to give her an injection into the knees, but she declined.  She was Rx'd meloxicam, which she decided not to take after reading about side effects.      4Ms for Medical Decision-Making in Older Adults    Last Completed EAWV: 2024    MOBILITY:  Get Up and Go:      2024    11:45 AM 2023     9:26 AM   Get Up and Go   Trial 1 12 seconds 12 seconds     Activities of Daily Livin/16/2024    11:41 AM   Activities of Daily Living   Ambulation Independent   Dressing Independent   Transfers Independent   Toileting Continent of bladder;Continent of bowel   Feeding Independent   Cleaning home/Chores Independent   Telephone use Independent   Shopping Assistance Required   Paying bills Independent   Taking meds Independent   If required, who assists the patient with ADLs? daughter     Whisper Test:      2024    11:45 AM   Whisper Test   Whisper Test Normal     Disability Status:      2024    11:44 AM   Disability Status   Are you deaf or do you have serious difficulty hearing? N   Are you blind or do you have serious difficulty seeing, even when wearing glasses? N   Because of a physical, mental, or emotional condition, do you have serious difficulty concentrating, remembering, or making decisions? N   Do you have serious difficulty walking or climbing stairs? N   Do you have difficulty dressing or bathing? N   Because of a physical, mental, or emotional condition, do you have difficulty doing errands alone such as visiting a doctor's office or shopping? N     Nutrition Screenin/16/2024    11:40 AM   Nutrition Screening   Has food intake declined  over the past three months due to loss of appetite, digestive problems, chewing or swallowing difficulties? No decrease in food intake   Involuntary weight loss during the last 3 months? No weight loss   Mobility? Goes out   Has the patient suffered psychological stress or acute disease in the past three months? No   Neuropsychological problems? No psychological problems   Body Mass Index (BMI)?  BMI 23 or greater   Screening Score 14   Interpretation Normal nutritional status    Screening Score: 0-7 Malnourished, 8-11 At Risk, 12-14 Normal    MENTATION:   Depression Patient Health Questionnaire:      2024    11:45 AM   Depression Patient Health Questionnaire   Over the last two weeks how often have you been bothered by little interest or pleasure in doing things Not at all   Over the last two weeks how often have you been bothered by feeling down, depressed or hopeless Not at all   PHQ-2 Total Score 0     Has Dementia Dx: No    Cognitive Function Screenin/16/2024    11:45 AM   Cognitive Function Screening   Clock Drawing Test 1   Mini-Cog 3 Minute Recall 3   Cognitive Function Screening 4     Cognitive Function Screening Total - Less than 4 = Abnormal,  Greater than or equal to 4 = Normal    MEDICATIONS:  High Risk Medications:  Total Active Medications: 0  This patient does not have an active medication from one of the medication groupers.    WHAT MATTERS MOST:  Advance Care Planning   ACP Status:   Patient has had an ACP conversation  Living Will: No  Power of : No  [place]: No    Advance Care Planning     Date: 2024  Patient did not wish or was not able to name a surrogate decision maker or provide an Advance Care Plan.  Patient provided with ACP packet to review with family and bring back to next appointment.       Social History     Socioeconomic History    Marital status: Single   Tobacco Use    Smoking status: Former     Current packs/day: 0.00     Types: Cigarettes     Quit date:  1980     Years since quittin.3    Smokeless tobacco: Never   Substance and Sexual Activity    Alcohol use: No    Drug use: No    Sexual activity: Not Currently     Social Determinants of Health     Financial Resource Strain: Low Risk  (2024)    Overall Financial Resource Strain (CARDIA)     Difficulty of Paying Living Expenses: Not very hard   Food Insecurity: Food Insecurity Present (2024)    Hunger Vital Sign     Worried About Running Out of Food in the Last Year: Sometimes true     Ran Out of Food in the Last Year: Sometimes true   Transportation Needs: No Transportation Needs (2024)    PRAPARE - Transportation     Lack of Transportation (Medical): No     Lack of Transportation (Non-Medical): No   Physical Activity: Insufficiently Active (2024)    Exercise Vital Sign     Days of Exercise per Week: 2 days     Minutes of Exercise per Session: 10 min   Stress: No Stress Concern Present (2024)    [employer] [place] [employer] - Occupational Stress Questionnaire     Feeling of Stress : Not at all   Housing Stability: Low Risk  (2024)    Housing Stability Vital Sign     Unable to Pay for Housing in the Last Year: No     Homeless in the Last Year: No       Review of Systems   Constitutional:  Negative for activity change, appetite change, chills and fever.   HENT:  Positive for dental problem. Negative for nasal congestion, hearing loss, sore throat and trouble swallowing.    Eyes:  Negative for photophobia, discharge and visual disturbance.   Respiratory:  Negative for cough, chest tightness, shortness of breath and wheezing.    Cardiovascular:  Negative for chest pain, palpitations and leg swelling.   Gastrointestinal:  Negative for abdominal pain, constipation, diarrhea, nausea and vomiting.   Genitourinary:  Negative for difficulty urinating, dysuria and hematuria.   Musculoskeletal:  Negative for arthralgias and myalgias.   Integumentary:  Negative for rash and wound.    Neurological:  Negative for dizziness, weakness and headaches.   Psychiatric/Behavioral:  Negative for dysphoric mood.         Objective:   BP (!) 144/67   Pulse 68   Temp 97.9 °F (36.6 °C) (Oral)   Wt 126.5 kg (278 lb 15.9 oz)   LMP  (LMP Unknown)   SpO2 98%   BMI 43.70 kg/m²     Physical Exam  Vitals reviewed.   Constitutional:       General: She is not in acute distress.     Appearance: She is obese.   HENT:      Head: Normocephalic and atraumatic.      Right Ear: Tympanic membrane, ear canal and external ear normal. There is no impacted cerumen.      Left Ear: Tympanic membrane, ear canal and external ear normal. There is no impacted cerumen.      Nose: Nose normal.      Mouth/Throat:      Mouth: Mucous membranes are moist.      Pharynx: Oropharynx is clear.      Comments: Broken molar on left mandible with majority of crown missing; pulp visible.  Multiple fillings noted.  Eyes:      General: No scleral icterus.     Conjunctiva/sclera: Conjunctivae normal.   Cardiovascular:      Rate and Rhythm: Normal rate and regular rhythm.      Heart sounds: Normal heart sounds.   Pulmonary:      Effort: Pulmonary effort is normal.      Breath sounds: Normal breath sounds.   Abdominal:      General: Bowel sounds are normal. There is no distension.      Tenderness: There is no abdominal tenderness. There is no guarding.   Musculoskeletal:      Cervical back: Normal range of motion and neck supple. No rigidity.      Right lower leg: Edema (trace pitting) present.      Left lower leg: Edema (trace pitting) present.   Lymphadenopathy:      Cervical: No cervical adenopathy.   Skin:     General: Skin is warm and dry.   Neurological:      General: No focal deficit present.      Mental Status: She is alert and oriented to person, place, and time.              Lab Results   Component Value Date    WBC 8.45 09/15/2023    HGB 12.0 09/15/2023    HCT 39.2 09/15/2023     09/15/2023    CHOL 148 06/14/2024    TRIG 77  06/14/2024    HDL 48 06/14/2024    ALT 12 06/14/2024    AST 20 06/14/2024     06/14/2024    K 4.0 06/14/2024     06/14/2024    CREATININE 0.9 06/14/2024    BUN 15 06/14/2024    CO2 24 06/14/2024    TSH 2.298 07/12/2023    INR 1.0 06/22/2015    HGBA1C 5.9 (H) 02/23/2019       Current Outpatient Medications on File Prior to Visit   Medication Sig Dispense Refill    allopurinoL (ZYLOPRIM) 300 MG tablet Take 1 tablet (300 mg total) by mouth once daily. gout 90 tablet 3    amLODIPine (NORVASC) 10 MG tablet Take 1 tablet (10 mg total) by mouth once daily. High blood pressure 90 tablet 3    ascorbic acid, vitamin C, (VITAMIN C) 1000 MG tablet Take 1,000 mg by mouth once daily.      aspirin (ECOTRIN) 81 MG EC tablet Take 81 mg by mouth once daily.      atorvastatin (LIPITOR) 10 MG tablet Take 1 tablet (10 mg total) by mouth every evening. High cholesterol 90 tablet 3    cholecalciferol, vitamin D3, (VITAMIN D3) 25 mcg (1,000 unit) capsule Take 2,000 Units by mouth once daily.      cyanocobalamin (VITAMIN B-12) 250 MCG tablet Take 2,500 mcg by mouth once daily.      docusate sodium (COLACE) 100 MG capsule Take 100 mg by mouth once daily.      fluticasone propionate (FLONASE) 50 mcg/actuation nasal spray 2 sprays (100 mcg total) by Each Nostril route 2 (two) times a day. 16 g 3    hydroCHLOROthiazide (HYDRODIURIL) 25 MG tablet Take 0.5 tablets (12.5 mg total) by mouth once daily. Take 1/2 of the 25 mg tablet daily 60 tablet 3    meloxicam (MOBIC) 7.5 MG tablet Take 7.5 mg by mouth.       No current facility-administered medications on file prior to visit.         Assessment:   83 y.o. female with multiple co-morbid illnesses here to follow-up for ongoing chronic disease management and preventive health maintenance.    Plan:     Problem List Items Addressed This Visit       Essential hypertension (Chronic)     Blood pressure elevated today, but compared to home readings appears to be an outlier.  Digital medicine  hypertension program data reviewed showing blood pressure largely at goal on amlodipine 10 mg and HCTZ 12.5 mg daily.  She is not on an ACE inhibitor or ARB due to intolerance.         Class 3 severe obesity with serious comorbidity and body mass index (BMI) of 40.0 to 44.9 in adult     Weight so far has remained relatively unchanged.  She has only recently starting to increase exercise at the gym; reviewed her routine and discussed gradual escalation.  Considering comorbid prediabetes, may discuss initiating metformin for both weight loss as well as risk reduction of progression to diabetes.         Prediabetes - Primary     Hemoglobin A1C   Date Value Ref Range Status   02/23/2019 5.9 (H) 4.0 - 5.6 % Final     Comment:     ADA Screening Guidelines:  5.7-6.4%  Consistent with prediabetes  >or=6.5%  Consistent with diabetes  High levels of fetal hemoglobin interfere with the HbA1C  assay. Heterozygous hemoglobin variants (HbS, HgC, etc)do  not significantly interfere with this assay.   However, presence of multiple variants may affect accuracy.       Due to update A1c.  Discussed increasing regular exercise with goal of weight loss as means to reduce risk of progression to diabetes.         Relevant Orders    Hemoglobin A1C    Ankle edema, bilateral     Discussed with patient that the redness in her legs is likely due to venous engorgement in context of venous incompetence due to senescent changes as well as obesity.  She was counseled that this could put her at risk of developing venous stasis dermatitis which can then progress to venous stasis ulcers.  Presently she does not exhibit any skin changes consistent with venous stasis dermatitis, but should they develop can prescribe ammonium lactate lotion.  She was encouraged to maintain as much activity with her legs as possible to facilitate venous return as well as propping up her feet when recumbent to reduce edema, and if these measures should be insufficient  that she can try compression stockings to reduce the accumulation of edema over the course of the day.              Health Maintenance         Date Due Completion Date    RSV Vaccine (Age 60+ and Pregnant patients) (1 - 1-dose 60+ series) Never done ---    Hemoglobin A1c (Prediabetes) 02/23/2020 2/23/2019    COVID-19 Vaccine (5 - 2023-24 season) 09/01/2023 7/15/2022    Influenza Vaccine (1) 09/01/2024 9/13/2023    DEXA Scan 06/08/2025 6/8/2022    Lipid Panel 06/14/2029 6/14/2024    TETANUS VACCINE 01/24/2033 1/24/2023        She was counseled on RSV vaccine.    Future Appointments   Date Time Provider Department Center   9/7/2024 10:00 AM LAB, SBP SBP LAB [facility]   9/10/2024  9:40 AM Marin Nettles MD [facility]   12/10/2024 10:00 AM Cleve Barfield MD [facility]         Follow up for 9/10 as already scheduled. Total clinical care time was 40 min.    Marin Nettles MD  [facility], [facility]

## 2024-07-26 NOTE — ASSESSMENT & PLAN NOTE
Hemoglobin A1C   Date Value Ref Range Status   02/23/2019 5.9 (H) 4.0 - 5.6 % Final     Comment:     ADA Screening Guidelines:  5.7-6.4%  Consistent with prediabetes  >or=6.5%  Consistent with diabetes  High levels of fetal hemoglobin interfere with the HbA1C  assay. Heterozygous hemoglobin variants (HbS, HgC, etc)do  not significantly interfere with this assay.   However, presence of multiple variants may affect accuracy.       Due to update A1c.  Discussed increasing regular exercise with goal of weight loss as means to reduce risk of progression to diabetes.

## 2024-07-26 NOTE — ASSESSMENT & PLAN NOTE
Discussed with patient that the redness in her legs is likely due to venous engorgement in context of venous incompetence due to senescent changes as well as obesity.  She was counseled that this could put her at risk of developing venous stasis dermatitis which can then progress to venous stasis ulcers.  Presently she does not exhibit any skin changes consistent with venous stasis dermatitis, but should they develop can prescribe ammonium lactate lotion.  She was encouraged to maintain as much activity with her legs as possible to facilitate venous return as well as propping up her feet when recumbent to reduce edema, and if these measures should be insufficient that she can try compression stockings to reduce the accumulation of edema over the course of the day.

## 2024-07-26 NOTE — ASSESSMENT & PLAN NOTE
Weight so far has remained relatively unchanged.  She has only recently starting to increase exercise at the gym; reviewed her routine and discussed gradual escalation.  Considering comorbid prediabetes, may discuss initiating metformin for both weight loss as well as risk reduction of progression to diabetes.

## 2024-07-27 ENCOUNTER — PATIENT MESSAGE (OUTPATIENT)
Dept: ADMINISTRATIVE | Facility: OTHER | Age: 84
End: 2024-07-27
Payer: MEDICARE

## 2024-08-22 ENCOUNTER — HOSPITAL ENCOUNTER (INPATIENT)
Facility: OTHER | Age: 84
LOS: 4 days | Discharge: HOME | DRG: 389 | End: 2024-08-27
Attending: EMERGENCY MEDICINE | Admitting: HOSPITALIST
Payer: MEDICARE

## 2024-08-22 DIAGNOSIS — I10 ESSENTIAL HYPERTENSION: Chronic | ICD-10-CM

## 2024-08-22 DIAGNOSIS — R10.30 LOWER ABDOMINAL PAIN: Primary | ICD-10-CM

## 2024-08-22 DIAGNOSIS — K56.7 ILEUS: ICD-10-CM

## 2024-08-22 DIAGNOSIS — R10.9 ABDOMINAL PAIN: ICD-10-CM

## 2024-08-22 DIAGNOSIS — R11.2 NAUSEA AND VOMITING, UNSPECIFIED VOMITING TYPE: ICD-10-CM

## 2024-08-22 DIAGNOSIS — K56.609 SMALL BOWEL OBSTRUCTION: ICD-10-CM

## 2024-08-22 LAB
ALBUMIN SERPL BCP-MCNC: 4.1 G/DL (ref 3.5–5.2)
ALP SERPL-CCNC: 126 U/L (ref 55–135)
ALT SERPL W/O P-5'-P-CCNC: 18 U/L (ref 10–44)
ANION GAP SERPL CALC-SCNC: 13 MMOL/L (ref 8–16)
AST SERPL-CCNC: 22 U/L (ref 10–40)
BACTERIA #/AREA URNS HPF: ABNORMAL /HPF
BASOPHILS # BLD AUTO: 0.03 K/UL (ref 0–0.2)
BASOPHILS NFR BLD: 0.2 % (ref 0–1.9)
BILIRUB SERPL-MCNC: 0.6 MG/DL (ref 0.1–1)
BILIRUB UR QL STRIP: NEGATIVE
BUN SERPL-MCNC: 8 MG/DL (ref 8–23)
CALCIUM SERPL-MCNC: 10.7 MG/DL (ref 8.7–10.5)
CHLORIDE SERPL-SCNC: 100 MMOL/L (ref 95–110)
CLARITY UR: CLEAR
CO2 SERPL-SCNC: 27 MMOL/L (ref 23–29)
COLOR UR: YELLOW
CREAT SERPL-MCNC: 0.7 MG/DL (ref 0.5–1.4)
CREAT SERPL-MCNC: 0.8 MG/DL (ref 0.5–1.4)
DIFFERENTIAL METHOD BLD: ABNORMAL
EOSINOPHIL # BLD AUTO: 0 K/UL (ref 0–0.5)
EOSINOPHIL NFR BLD: 0 % (ref 0–8)
ERYTHROCYTE [DISTWIDTH] IN BLOOD BY AUTOMATED COUNT: 16 % (ref 11.5–14.5)
EST. GFR  (NO RACE VARIABLE): >60 ML/MIN/1.73 M^2
GLUCOSE SERPL-MCNC: 132 MG/DL (ref 70–110)
GLUCOSE UR QL STRIP: NEGATIVE
HCT VFR BLD AUTO: 47.7 % (ref 37–48.5)
HGB BLD-MCNC: 15.1 G/DL (ref 12–16)
HGB UR QL STRIP: NEGATIVE
HYALINE CASTS #/AREA URNS LPF: 4 /LPF
IMM GRANULOCYTES # BLD AUTO: 0.04 K/UL (ref 0–0.04)
IMM GRANULOCYTES NFR BLD AUTO: 0.3 % (ref 0–0.5)
KETONES UR QL STRIP: NEGATIVE
LEUKOCYTE ESTERASE UR QL STRIP: NEGATIVE
LIPASE SERPL-CCNC: 47 U/L (ref 4–60)
LYMPHOCYTES # BLD AUTO: 2.3 K/UL (ref 1–4.8)
LYMPHOCYTES NFR BLD: 18.5 % (ref 18–48)
MCH RBC QN AUTO: 26.1 PG (ref 27–31)
MCHC RBC AUTO-ENTMCNC: 31.7 G/DL (ref 32–36)
MCV RBC AUTO: 82 FL (ref 82–98)
MICROSCOPIC COMMENT: ABNORMAL
MONOCYTES # BLD AUTO: 0.5 K/UL (ref 0.3–1)
MONOCYTES NFR BLD: 4.3 % (ref 4–15)
NEUTROPHILS # BLD AUTO: 9.5 K/UL (ref 1.8–7.7)
NEUTROPHILS NFR BLD: 76.7 % (ref 38–73)
NITRITE UR QL STRIP: NEGATIVE
NRBC BLD-RTO: 0 /100 WBC
PH UR STRIP: 7 [PH] (ref 5–8)
PLATELET # BLD AUTO: 246 K/UL (ref 150–450)
PMV BLD AUTO: 10.6 FL (ref 9.2–12.9)
POTASSIUM SERPL-SCNC: 3.3 MMOL/L (ref 3.5–5.1)
PROT SERPL-MCNC: 8.5 G/DL (ref 6–8.4)
PROT UR QL STRIP: ABNORMAL
RBC # BLD AUTO: 5.79 M/UL (ref 4–5.4)
RBC #/AREA URNS HPF: 1 /HPF (ref 0–4)
SAMPLE: NORMAL
SODIUM SERPL-SCNC: 140 MMOL/L (ref 136–145)
SP GR UR STRIP: 1.01 (ref 1–1.03)
SQUAMOUS #/AREA URNS HPF: 4 /HPF
URN SPEC COLLECT METH UR: ABNORMAL
UROBILINOGEN UR STRIP-ACNC: NEGATIVE EU/DL
WBC # BLD AUTO: 12.38 K/UL (ref 3.9–12.7)
WBC #/AREA URNS HPF: 3 /HPF (ref 0–5)

## 2024-08-22 PROCEDURE — G0378 HOSPITAL OBSERVATION PER HR: HCPCS

## 2024-08-22 PROCEDURE — 99285 EMERGENCY DEPT VISIT HI MDM: CPT | Mod: 25

## 2024-08-22 PROCEDURE — 63600175 PHARM REV CODE 636 W HCPCS: Performed by: NURSE PRACTITIONER

## 2024-08-22 PROCEDURE — 96372 THER/PROPH/DIAG INJ SC/IM: CPT | Performed by: NURSE PRACTITIONER

## 2024-08-22 PROCEDURE — 25000003 PHARM REV CODE 250: Performed by: NURSE PRACTITIONER

## 2024-08-22 PROCEDURE — 81000 URINALYSIS NONAUTO W/SCOPE: CPT | Performed by: PHYSICIAN ASSISTANT

## 2024-08-22 PROCEDURE — 25500020 PHARM REV CODE 255: Performed by: NURSE PRACTITIONER

## 2024-08-22 PROCEDURE — 83690 ASSAY OF LIPASE: CPT | Performed by: PHYSICIAN ASSISTANT

## 2024-08-22 PROCEDURE — 96374 THER/PROPH/DIAG INJ IV PUSH: CPT

## 2024-08-22 PROCEDURE — 85025 COMPLETE CBC W/AUTO DIFF WBC: CPT | Performed by: PHYSICIAN ASSISTANT

## 2024-08-22 PROCEDURE — 96361 HYDRATE IV INFUSION ADD-ON: CPT

## 2024-08-22 PROCEDURE — 80053 COMPREHEN METABOLIC PANEL: CPT | Performed by: PHYSICIAN ASSISTANT

## 2024-08-22 RX ORDER — ONDANSETRON HYDROCHLORIDE 2 MG/ML
4 INJECTION, SOLUTION INTRAVENOUS ONCE
Status: COMPLETED | OUTPATIENT
Start: 2024-08-22 | End: 2024-08-22

## 2024-08-22 RX ORDER — CHOLECALCIFEROL (VITAMIN D3) 25 MCG
2000 TABLET ORAL DAILY
Status: DISCONTINUED | OUTPATIENT
Start: 2024-08-23 | End: 2024-08-27 | Stop reason: HOSPADM

## 2024-08-22 RX ORDER — POLYETHYLENE GLYCOL 3350 17 G/17G
17 POWDER, FOR SOLUTION ORAL 2 TIMES DAILY
Status: DISCONTINUED | OUTPATIENT
Start: 2024-08-22 | End: 2024-08-27 | Stop reason: HOSPADM

## 2024-08-22 RX ORDER — SODIUM CHLORIDE 9 MG/ML
INJECTION, SOLUTION INTRAVENOUS CONTINUOUS
Status: DISCONTINUED | OUTPATIENT
Start: 2024-08-22 | End: 2024-08-25

## 2024-08-22 RX ORDER — HYDROCHLOROTHIAZIDE 12.5 MG/1
12.5 TABLET ORAL DAILY
Status: DISCONTINUED | OUTPATIENT
Start: 2024-08-22 | End: 2024-08-27 | Stop reason: HOSPADM

## 2024-08-22 RX ORDER — AMLODIPINE BESYLATE 5 MG/1
10 TABLET ORAL DAILY
Status: DISCONTINUED | OUTPATIENT
Start: 2024-08-22 | End: 2024-08-27 | Stop reason: HOSPADM

## 2024-08-22 RX ORDER — ONDANSETRON HYDROCHLORIDE 2 MG/ML
4 INJECTION, SOLUTION INTRAVENOUS EVERY 6 HOURS PRN
Status: DISCONTINUED | OUTPATIENT
Start: 2024-08-22 | End: 2024-08-27 | Stop reason: HOSPADM

## 2024-08-22 RX ORDER — ATORVASTATIN CALCIUM 10 MG/1
10 TABLET, FILM COATED ORAL NIGHTLY
Status: DISCONTINUED | OUTPATIENT
Start: 2024-08-22 | End: 2024-08-27 | Stop reason: HOSPADM

## 2024-08-22 RX ORDER — SODIUM CHLORIDE 0.9 % (FLUSH) 0.9 %
10 SYRINGE (ML) INJECTION
Status: DISCONTINUED | OUTPATIENT
Start: 2024-08-22 | End: 2024-08-23

## 2024-08-22 RX ORDER — DICYCLOMINE HYDROCHLORIDE 10 MG/1
20 CAPSULE ORAL EVERY 6 HOURS PRN
Status: DISCONTINUED | OUTPATIENT
Start: 2024-08-22 | End: 2024-08-22

## 2024-08-22 RX ORDER — TALC
6 POWDER (GRAM) TOPICAL NIGHTLY PRN
Status: DISCONTINUED | OUTPATIENT
Start: 2024-08-22 | End: 2024-08-27 | Stop reason: HOSPADM

## 2024-08-22 RX ORDER — DOCUSATE SODIUM 100 MG/1
100 CAPSULE, LIQUID FILLED ORAL DAILY
Status: DISCONTINUED | OUTPATIENT
Start: 2024-08-22 | End: 2024-08-27 | Stop reason: HOSPADM

## 2024-08-22 RX ORDER — MORPHINE SULFATE 4 MG/ML
4 INJECTION, SOLUTION INTRAMUSCULAR; INTRAVENOUS ONCE
Status: COMPLETED | OUTPATIENT
Start: 2024-08-22 | End: 2024-08-22

## 2024-08-22 RX ORDER — DICYCLOMINE HYDROCHLORIDE 10 MG/ML
20 INJECTION INTRAMUSCULAR EVERY 6 HOURS PRN
Status: DISCONTINUED | OUTPATIENT
Start: 2024-08-22 | End: 2024-08-27 | Stop reason: HOSPADM

## 2024-08-22 RX ADMIN — ATORVASTATIN CALCIUM 10 MG: 10 TABLET, FILM COATED ORAL at 08:08

## 2024-08-22 RX ADMIN — IOHEXOL 100 ML: 350 INJECTION, SOLUTION INTRAVENOUS at 12:08

## 2024-08-22 RX ADMIN — MORPHINE SULFATE 4 MG: 4 INJECTION, SOLUTION INTRAMUSCULAR; INTRAVENOUS at 02:08

## 2024-08-22 RX ADMIN — SODIUM CHLORIDE, POTASSIUM CHLORIDE, SODIUM LACTATE AND CALCIUM CHLORIDE 500 ML: 600; 310; 30; 20 INJECTION, SOLUTION INTRAVENOUS at 12:08

## 2024-08-22 RX ADMIN — ONDANSETRON 4 MG: 2 INJECTION INTRAMUSCULAR; INTRAVENOUS at 01:08

## 2024-08-22 RX ADMIN — AMLODIPINE BESYLATE 10 MG: 5 TABLET ORAL at 04:08

## 2024-08-22 RX ADMIN — DICYCLOMINE HYDROCHLORIDE 20 MG: 10 INJECTION, SOLUTION INTRAMUSCULAR at 07:08

## 2024-08-22 RX ADMIN — HYDROCHLOROTHIAZIDE 12.5 MG: 12.5 TABLET ORAL at 04:08

## 2024-08-22 RX ADMIN — SODIUM CHLORIDE: 9 INJECTION, SOLUTION INTRAVENOUS at 03:08

## 2024-08-22 RX ADMIN — POLYETHYLENE GLYCOL 3350 17 G: 17 POWDER, FOR SOLUTION ORAL at 04:08

## 2024-08-22 RX ADMIN — DOCUSATE SODIUM 100 MG: 100 CAPSULE, LIQUID FILLED ORAL at 04:08

## 2024-08-22 RX ADMIN — ONDANSETRON 4 MG: 2 INJECTION INTRAMUSCULAR; INTRAVENOUS at 07:08

## 2024-08-22 NOTE — H&P
[facility] ED Observation Unit  History and Physical      I assumed care of this patient from the Emergency Department at onset of observation time, 2:08 PM on 2024.       History of Present Illness:  Natali Galeano is a 83 y.o. female presenting to the emergency department reporting or abdominal pain with nausea and vomiting that began last night.  She reports a history of small-bowel obstruction reports pain is similar in nature to her previous episodes.  Reports last bowel movement was yesterday morning.  She denies any diarrhea.  Past medical history includes colon cancer, high cholesterol, hypertension and thyroid disease.  Patient was also had previous colon cancer resection, .  She denies any fever/chills.      ED Course:  On exam there was some lower abdominal tenderness without distention or guarding.  Labs reassuring with no leukocytosis, stable H&H, mild hypokalemia, no DORYS.  CT obtained reveals fecalization of the small bowel without leela obstruction.  However due to previous history of small-bowel obstructions plan for admission to the EDOU for clear liquid diet and evaluation by Gastroenterology.  Patient was agreeable this plan.    I reviewed the ED Provider Note dated 2024 prior to my evaluation of this patient.  I reviewed all labs and imaging performed in the Main ED, prior to patient being placed in Observation. Patient was placed in the ED Observation Unit for Abd Pain.      PMHx   Past Medical History:   Diagnosis Date    Allergic rhinitis 2022    Anemia     Arthritis     Colon cancer     Gout, chronic     Heart murmur     High cholesterol     Hypercholesteremia     Hypertension     Obesity     PMB (postmenopausal bleeding) 10/24/2017    Right knee pain 2019    Sleep apnea     Small bowel obstruction, partial 2019    Thyroid disease       Past Surgical History:   Procedure Laterality Date    BTL       SECTION, CLASSIC      COLON SURGERY      goiter  "      HERNIA REPAIR      KIDNEY SURGERY      cyst removal    THYROID SURGERY          Family Hx   Family History   Problem Relation Name Age of Onset    Diabetes Mother      Heart disease Father          Social Hx   Social History     Socioeconomic History    Marital status: Single   Tobacco Use    Smoking status: Former     Current packs/day: 0.00     Types: Cigarettes     Quit date: 1980     Years since quittin.3    Smokeless tobacco: Never   Substance and Sexual Activity    Alcohol use: No    Drug use: No    Sexual activity: Not Currently     Social Determinants of Health     Financial Resource Strain: Low Risk  (2024)    Overall Financial Resource Strain (CARDIA)     Difficulty of Paying Living Expenses: Not very hard   Food Insecurity: Food Insecurity Present (2024)    Hunger Vital Sign     Worried About Running Out of Food in the Last Year: Sometimes true     Ran Out of Food in the Last Year: Sometimes true   Transportation Needs: No Transportation Needs (2024)    PRAPARE - Transportation     Lack of Transportation (Medical): No     Lack of Transportation (Non-Medical): No   Physical Activity: Insufficiently Active (2024)    Exercise Vital Sign     Days of Exercise per Week: 2 days     Minutes of Exercise per Session: 10 min   Stress: No Stress Concern Present (2024)    [employer] [place] [employer] - Occupational Stress Questionnaire     Feeling of Stress : Not at all   Housing Stability: Low Risk  (2024)    Housing Stability Vital Sign     Unable to Pay for Housing in the Last Year: No     Homeless in the Last Year: No        Vital Signs   Vitals:    24 1108 24 1422 24 1439 24 1548   BP: (!) 198/108 (!) 156/68  (!) 150/67   Pulse: 94 84  81   Resp: 20 16 20    Temp: 97.7 °F (36.5 °C) 98.3 °F (36.8 °C)     TempSrc: Oral Oral     SpO2: 99% 97%  98%   Weight: 126.1 kg (278 lb)      Height: 5' 7" (1.702 m)       24 1606   BP: 131/63 "   Pulse:    Resp:    Temp:    TempSrc:    SpO2:    Weight:    Height:        Review of Systems  Review of Systems   Constitutional:  Negative for chills and fever.   Respiratory:  Negative for cough and shortness of breath.    Cardiovascular:  Negative for chest pain.   Gastrointestinal:  Positive for abdominal pain. Negative for nausea and vomiting.   Genitourinary:  Negative for dysuria.   Musculoskeletal:  Negative for joint pain and neck pain.   Neurological:  Negative for weakness and headaches.   All other systems reviewed and are negative.      Brief Physical Exam/Reassessment   Physical Exam    Nursing note and vitals reviewed.  Constitutional: Vital signs are normal. She appears well-developed and well-nourished. She does not appear ill. No distress.   Neck: Neck supple.   Cardiovascular:  Normal rate, regular rhythm, S1 normal, S2 normal and normal heart sounds.           Pulmonary/Chest: Effort normal and breath sounds normal. No tachypnea. She has no decreased breath sounds. She has no wheezes.   Abdominal: Abdomen is soft and flat. There is abdominal tenderness in the right lower quadrant and left lower quadrant.   No right CVA tenderness.  No left CVA tenderness. There is no rebound and no guarding.   Musculoskeletal:      Cervical back: Neck supple.     Neurological: She is alert and oriented to person, place, and time. GCS eye subscore is 4. GCS verbal subscore is 5. GCS motor subscore is 6.   Skin: Skin is warm, dry and intact.   Psychiatric: She has a normal mood and affect. Her behavior is normal.         Abnormal Labs Reviewed   CBC W/ AUTO DIFFERENTIAL - Abnormal; Notable for the following components:       Result Value    RBC 5.79 (*)     MCH 26.1 (*)     MCHC 31.7 (*)     RDW 16.0 (*)     Gran # (ANC) 9.5 (*)     Gran % 76.7 (*)     All other components within normal limits   COMPREHENSIVE METABOLIC PANEL - Abnormal; Notable for the following components:    Potassium 3.3 (*)     Glucose 132  (*)     Calcium 10.7 (*)     Total Protein 8.5 (*)     All other components within normal limits   URINALYSIS, REFLEX TO URINE CULTURE - Abnormal; Notable for the following components:    Protein, UA 1+ (*)     All other components within normal limits    Narrative:     Specimen Source->Urine   URINALYSIS MICROSCOPIC - Abnormal; Notable for the following components:    Hyaline Casts, UA 4 (*)     All other components within normal limits    Narrative:     Specimen Source->Urine       CT Abdomen Pelvis With IV Contrast NO Oral Contrast   Final Result      No acute intra-abdominal process.      There is some prominence of the small bowel in the left hemiabdomen with fecalization of some of the small bowel contents in the jejunum without leela obstruction.      There is sigmoid diverticulosis without acute diverticulitis.         Electronically signed by: Nitish Bermeo MD   Date:    08/22/2024   Time:    13:07            Medications:   Scheduled Meds:   amLODIPine  10 mg Oral Daily    atorvastatin  10 mg Oral QHS    [START ON 8/23/2024] cholecalciferol (vitamin D3)  2,000 Units Oral Daily    docusate sodium  100 mg Oral Daily    hydroCHLOROthiazide  12.5 mg Oral Daily    polyethylene glycol  17 g Oral BID     Continuous Infusions:   0.9% NaCl   Intravenous Continuous 75 mL/hr at 08/22/24 1530 New Bag at 08/22/24 1530     PRN Meds:.  Current Facility-Administered Medications:     dicyclomine, 20 mg, Oral, Q6H PRN    melatonin, 6 mg, Oral, Nightly PRN    sodium chloride 0.9%, 10 mL, Intravenous, PRN      Assessment/Plan:    1. Lower abdominal pain     - clear liquid diet, labs in the a.m., evaluation by GI.   2. Nausea and vomiting, unspecified vomiting type    3. Abdominal pain        Case was discussed with the ED provider, Jeevan Garcia MD

## 2024-08-22 NOTE — ED NOTES
Pt assisted to bed from wheelchair w/o difficulty. IV fluids restarted. Pt oriented to room and use of call light. Bed locked and in lowest position, side rails up x2. Pt denies any needs or concerns at this time.

## 2024-08-22 NOTE — CONSULTS
Gastroenterology Consult    2024 2:37 PM    Patient Name: Natali Galeano  MRN: [id number]  Admission Date: 2024  Hospital Length of Stay: 0 days  Code Status: Full Code   Primary Care Physician: Marin Nettles MD  Principal Problem:<principal problem not specified>  Consulting Physician: Inpatient consult to Gastroenterology  Consult performed by: Asuncion Gonzalez MD  Consult ordered by: Lisa Yoo FNP        Reason for consultation: abdominal pain, SBO    HPI:  Natali Galeano is a 83 y.o. female with a history of colon cancer in  s/p resection, HTN, HLD, obesity, ALYSE and prior pSBO who presented with lower abdominal pain similar to prior episodes.  She also had vomiting yesterday and this morning, but this has slowed down.  Bowel movements have been a little irregular.  No blood in the stool.  No F/C.  She is still having pain but it has improved slightly.  She has a h/o of partial small bowel obstruction and has required NG tube placement in the past.  She has abdominal wall mesh from a prior hernia repair.  Her last colon was in 2019 with a health anastomosis and sigmoid diverticulosis.    Past Medical History:   Diagnosis Date    Allergic rhinitis 2022    Anemia     Arthritis     Colon cancer     Gout, chronic     Heart murmur     High cholesterol     Hypercholesteremia     Hypertension     Obesity     PMB (postmenopausal bleeding) 10/24/2017    Right knee pain 2019    Sleep apnea     Small bowel obstruction, partial 2019    Thyroid disease        Past Surgical History:   Procedure Laterality Date    BTL       SECTION, CLASSIC      COLON SURGERY      goiter       HERNIA REPAIR      KIDNEY SURGERY      cyst removal    THYROID SURGERY         Social History     Tobacco Use    Smoking status: Former     Current packs/day: 0.00     Types: Cigarettes     Quit date: 1980     Years since quittin.3    Smokeless tobacco: Never   Substance Use Topics    Alcohol use:  No    Drug use: No        Family History   Problem Relation Name Age of Onset    Diabetes Mother      Heart disease Father           Review of patient's allergies indicates:   Allergen Reactions    Ace inhibitors Other (See Comments)     cough    Arb-angiotensin receptor antagonist Other (See Comments)     Cough w/ valsartan         Current Facility-Administered Medications:     0.9%  NaCl infusion, , Intravenous, Continuous, Lisa Yoo, NAMRATAP    melatonin tablet 6 mg, 6 mg, Oral, Nightly PRN, Lisa Yoo, NAMRATAP    morphine injection 4 mg, 4 mg, Intravenous, Once, Lisa Yoo, NAMRATAP    sodium chloride 0.9% flush 10 mL, 10 mL, Intravenous, PRN, Lisa Yoo, FNP    Current Outpatient Medications:     allopurinoL (ZYLOPRIM) 300 MG tablet, Take 1 tablet (300 mg total) by mouth once daily. gout, Disp: 90 tablet, Rfl: 3    amLODIPine (NORVASC) 10 MG tablet, Take 1 tablet (10 mg total) by mouth once daily. High blood pressure, Disp: 90 tablet, Rfl: 3    ascorbic acid, vitamin C, (VITAMIN C) 1000 MG tablet, Take 1,000 mg by mouth once daily., Disp: , Rfl:     aspirin (ECOTRIN) 81 MG EC tablet, Take 81 mg by mouth once daily., Disp: , Rfl:     atorvastatin (LIPITOR) 10 MG tablet, Take 1 tablet (10 mg total) by mouth every evening. High cholesterol, Disp: 90 tablet, Rfl: 3    cholecalciferol, vitamin D3, (VITAMIN D3) 25 mcg (1,000 unit) capsule, Take 2,000 Units by mouth once daily., Disp: , Rfl:     cyanocobalamin (VITAMIN B-12) 250 MCG tablet, Take 2,500 mcg by mouth once daily., Disp: , Rfl:     fluticasone propionate (FLONASE) 50 mcg/actuation nasal spray, 2 sprays (100 mcg total) by Each Nostril route 2 (two) times a day., Disp: 16 g, Rfl: 3    hydroCHLOROthiazide (HYDRODIURIL) 25 MG tablet, Take 0.5 tablets (12.5 mg total) by mouth once daily. Take 1/2 of the 25 mg tablet daily, Disp: 60 tablet, Rfl: 3    docusate sodium (COLACE) 100 MG capsule, Take 100 mg by mouth once daily., Disp: , Rfl:     meloxicam  "(MOBIC) 7.5 MG tablet, Take 7.5 mg by mouth., Disp: , Rfl:     Review of Systems   Gastrointestinal:  Positive for abdominal pain, constipation, nausea and vomiting.   All other systems reviewed and are negative.      BP (!) 156/68 (BP Location: Right arm, Patient Position: Lying)   Pulse 84   Temp 98.3 °F (36.8 °C) (Oral)   Resp 16   Ht 5' 7" (1.702 m)   Wt 126.1 kg (278 lb)   LMP  (LMP Unknown)   SpO2 97%   Breastfeeding No   BMI 43.54 kg/m²   Physical Exam  Constitutional:       General: She is not in acute distress.     Appearance: Normal appearance. She is obese.   HENT:      Head: Normocephalic and atraumatic.      Right Ear: External ear normal.      Left Ear: External ear normal.      Nose: Nose normal.      Mouth/Throat:      Mouth: Mucous membranes are moist.      Pharynx: Oropharynx is clear.   Eyes:      General: No scleral icterus.     Extraocular Movements: Extraocular movements intact.      Conjunctiva/sclera: Conjunctivae normal.   Cardiovascular:      Rate and Rhythm: Normal rate and regular rhythm.      Heart sounds: Normal heart sounds. No murmur heard.  Pulmonary:      Effort: Pulmonary effort is normal. No respiratory distress.      Breath sounds: Normal breath sounds. No wheezing.   Abdominal:      General: Bowel sounds are normal. There is no distension.      Palpations: Abdomen is soft.      Tenderness: There is abdominal tenderness. There is no guarding or rebound.   Musculoskeletal:         General: No deformity.      Right lower leg: No edema.      Left lower leg: No edema.   Skin:     General: Skin is warm and dry.      Findings: No rash.   Neurological:      Mental Status: She is alert and oriented to person, place, and time. Mental status is at baseline.      Sensory: No sensory deficit.   Psychiatric:         Mood and Affect: Mood normal.         Thought Content: Thought content normal.         Labs:  Lab Results   Component Value Date/Time    WBC 12.38 08/22/2024 11:46 AM    " HGB 15.1 08/22/2024 11:46 AM    HCT 47.7 08/22/2024 11:46 AM     08/22/2024 11:46 AM    MCV 82 08/22/2024 11:46 AM     08/22/2024 11:46 AM    K 3.3 (L) 08/22/2024 11:46 AM     08/22/2024 11:46 AM    CO2 27 08/22/2024 11:46 AM    BUN 8 08/22/2024 11:46 AM    CREATININE 0.8 08/22/2024 11:46 AM     (H) 08/22/2024 11:46 AM    CALCIUM 10.7 (H) 08/22/2024 11:46 AM    MG 1.6 11/05/2020 04:34 AM    PHOS 3.1 11/05/2020 04:34 AM    INR 1.0 06/22/2015 09:19 PM   ]  Lab Results   Component Value Date/Time    PROT 8.5 (H) 08/22/2024 11:46 AM    ALBUMIN 4.1 08/22/2024 11:46 AM    BILITOT 0.6 08/22/2024 11:46 AM    AST 22 08/22/2024 11:46 AM    ALT 18 08/22/2024 11:46 AM    ALKPHOS 126 08/22/2024 11:46 AM   ]    Imaging and Procedures:  I personally reviewed the imaging/procedures below.  CT A/P 8/22/24:  No acute intra-abdominal process.  There is some prominence of the small bowel in the left hemiabdomen with fecalization of some of the small bowel contents in the jejunum without leela obstruction.  There is sigmoid diverticulosis without acute diverticulitis.    A Records reviewed  Colon in 2019 with Hernandez - diverticulosis, anastomosis  Colon in 2014 w/ Venturatos - same as above  Colon in 2011 w/ Venturatos - same as above    Assessment:  Natali Galeano is a 83 y.o. female with a history of colon cancer, HTN, HLD, obesity, ALYSE and prior pSBO who presented with lower abdominal pain similar to prior episodes.  CT w/ fecalization of small bowel contents - similar to prior episodes of pSBO.    Plan:  - colace 100 mg daily and miralax 17g BID  - dicyclomine PRN  - NG tube if she develops N/V  - KUB tomorrow  - limit narcotics  - ok w/ clears for now and advance to low residue diet if she continues to improve    Asuncion Gonzalez MD

## 2024-08-22 NOTE — PLAN OF CARE
MOON Message     Medicare Outpatient and Observation Notification regarding financial responsibility Explained to patient/caregiver; Signed/date by patient/caregiver   Date CARSON was signed 8/22/2024   Time CARSON was signed [id number]

## 2024-08-22 NOTE — Clinical Note
Diagnosis: Lower abdominal pain [898740]   Future Attending Provider: ANALISA HYDE [[id number]]   Is the patient being sent to ED Observation?: Yes

## 2024-08-22 NOTE — ED PROVIDER NOTES
Source of History:  Patient     Chief complaint:  Abdominal Pain (Pt reporting constant generalized abdominal pain with n/v onset last night)      HPI:  Natali Galeano is a 83 y.o. female presenting to the emergency department reporting or abdominal pain with nausea and vomiting that began last night.  She reports a history of small-bowel obstruction reports pain is similar in nature to her previous episodes.  Reports last bowel movement was yesterday morning.  She denies any diarrhea.  Past medical history includes colon cancer, high cholesterol, hypertension and thyroid disease.  Patient was also had previous colon cancer resection, .  She denies any fever/chills.    This is the extent to the patients complaints today here in the emergency department.    PMH:  As per HPI and below:  Past Medical History:   Diagnosis Date    Allergic rhinitis 2022    Anemia     Arthritis     Colon cancer     Gout, chronic     Heart murmur     High cholesterol     Hypercholesteremia     Hypertension     Obesity     PMB (postmenopausal bleeding) 10/24/2017    Right knee pain 2019    Sleep apnea     Small bowel obstruction, partial 2019    Thyroid disease      Past Surgical History:   Procedure Laterality Date    BTL       SECTION, CLASSIC      COLON SURGERY      goiter       HERNIA REPAIR      KIDNEY SURGERY      cyst removal    THYROID SURGERY         Social History     Tobacco Use    Smoking status: Former     Current packs/day: 0.00     Types: Cigarettes     Quit date: 1980     Years since quittin.3    Smokeless tobacco: Never   Substance Use Topics    Alcohol use: No    Drug use: No       Review of patient's allergies indicates:   Allergen Reactions    Ace inhibitors Other (See Comments)     cough    Arb-angiotensin receptor antagonist Other (See Comments)     Cough w/ valsartan       ROS: As per HPI and below:  General: No  fever.  No chills.    ENT: No sore throat. No ear pain  Chest:  "No shortness of breath. No cough.    Cardiovascular: No chest pain.   Abdomen:  Abdominal pain, nausea vomiting  Genito-Urinary: No abnormal urination.    Neurologic: No focal weakness.  No numbness.  No headache  MSK: no back pain.   Integument: No rashes or lesions.    Physical Exam:    /64 (BP Location: Right arm, Patient Position: Lying)   Pulse 74   Temp 97.9 °F (36.6 °C) (Oral)   Resp 18   Ht 5' 7" (1.702 m)   Wt 126.1 kg (278 lb)   LMP  (LMP Unknown)   SpO2 (!) 93%   Breastfeeding No   BMI 43.54 kg/m²   Vitals:    08/25/24 0319 08/25/24 0504 08/25/24 0733 08/25/24 0752   BP:  (!) 109/53  126/64   Pulse: 102 74 77 76   Resp:  18  18   Temp:  97.9 °F (36.6 °C)  97.9 °F (36.6 °C)   TempSrc:  Oral  Oral   SpO2:  (!) 92%  (!) 93%   Weight:       Height:        08/25/24 1101   BP:    Pulse: 74   Resp:    Temp:    TempSrc:    SpO2:    Weight:    Height:        Nursing note and vital signs reviewed.  Appearance: No acute distress. Well-appearing. Non-toxic.    Eyes: No conjunctival injection.  Extraocular muscles are intact.  ENT:  Mucous membranes are pink and moist.  Chest/ Respiratory: Clear to auscultation bilaterally.  Good air movement.  No wheezes.  No rhonchi. No rales. No accessory muscle use.  Cardiovascular: Regular rate and rhythm.  No murmurs. No gallops. No rubs.  Abdomen: Soft.  Generalized lower abdominal tenderness without distention or guarding.  Bowel sounds are decreased.  Back:  No CVA tenderness.  Musculoskeletal: Good range of motion all joints.  No deformities.  Neck supple.  No meningismus.  Skin: No rashes seen.  Good turgor.  No abrasions.  No ecchymoses.  Neuro: alert and oriented x3,  no focal neurological deficits.  Psych: Appropriate, conversant      Abnormal Labs Reviewed   CBC W/ AUTO DIFFERENTIAL - Abnormal; Notable for the following components:       Result Value    RBC 5.79 (*)     MCH 26.1 (*)     MCHC 31.7 (*)     RDW 16.0 (*)     Gran # (ANC) 9.5 (*)     Gran % " 76.7 (*)     All other components within normal limits   COMPREHENSIVE METABOLIC PANEL - Abnormal; Notable for the following components:    Potassium 3.3 (*)     Glucose 132 (*)     Calcium 10.7 (*)     Total Protein 8.5 (*)     All other components within normal limits   URINALYSIS, REFLEX TO URINE CULTURE - Abnormal; Notable for the following components:    Protein, UA 1+ (*)     All other components within normal limits    Narrative:     Specimen Source->Urine   URINALYSIS MICROSCOPIC - Abnormal; Notable for the following components:    Hyaline Casts, UA 4 (*)     All other components within normal limits    Narrative:     Specimen Source->Urine   CBC W/ AUTO DIFFERENTIAL - Abnormal; Notable for the following components:    MCH 25.9 (*)     MCHC 31.5 (*)     RDW 16.2 (*)     Gran % 74.0 (*)     All other components within normal limits   COMPREHENSIVE METABOLIC PANEL - Abnormal; Notable for the following components:    Glucose 138 (*)     All other components within normal limits   CBC WITHOUT DIFFERENTIAL - Abnormal; Notable for the following components:    MCH 25.9 (*)     MCHC 31.4 (*)     RDW 15.8 (*)     All other components within normal limits   BASIC METABOLIC PANEL - Abnormal; Notable for the following components:    Potassium 3.3 (*)     Glucose 130 (*)     All other components within normal limits   CBC WITHOUT DIFFERENTIAL - Abnormal; Notable for the following components:    MCH 25.6 (*)     MCHC 30.8 (*)     RDW 15.7 (*)     All other components within normal limits   POCT GLUCOSE - Abnormal; Notable for the following components:    POCT Glucose 133 (*)     All other components within normal limits   POCT GLUCOSE - Abnormal; Notable for the following components:    POCT Glucose 129 (*)     All other components within normal limits   POCT GLUCOSE - Abnormal; Notable for the following components:    POCT Glucose 127 (*)     All other components within normal limits       X-Ray Abdomen AP 1 View   Final  Result      As above.         Electronically signed by: Elder Campbell   Date:    08/25/2024   Time:    09:07      XR NG/OG tube placement check, non-radiologist performed   Final Result      Please see above.         Electronically signed by: Giselle Parr   Date:    08/23/2024   Time:    11:33      X-Ray Abdomen AP 1 View (KUB)   Final Result      CT Abdomen Pelvis With IV Contrast NO Oral Contrast   Final Result      No acute intra-abdominal process.      There is some prominence of the small bowel in the left hemiabdomen with fecalization of some of the small bowel contents in the jejunum without leela obstruction.      There is sigmoid diverticulosis without acute diverticulitis.         Electronically signed by: Nitish Bermeo MD   Date:    08/22/2024   Time:    13:07            Initial Impression/ Differential Dx:  Differential diagnosis includes but not limited to: GERD, intestinal spasm, gastroenteritis, gastritis, ulcer, cholecystitis, cholelithiasis, gallstones, pancreatitis, ileus, small bowel obstruction, appendicitis, diverticulitis, colitis, constipation, intestinal gas pain      MDM:    Medical Decision Making  83-year-old female with a history of colon cancer small-bowel obstructions with a previous history of multiple abdominal surgeries presenting for evaluation of lower abdominal pain along with nausea and vomiting.  States pain consistent with her previous small-bowel obstruction.  Last bowel movement was yesterday morning.  On exam there was some lower abdominal tenderness without distention or guarding.  Labs reassuring with no leukocytosis, stable H&H, mild hypokalemia, no DORYS.  CT obtained reveals fecalization of the small bowel without leela obstruction.  However due to previous history of small-bowel obstructions plan for admission to the EDOU for clear liquid diet and evaluation by Gastroenterology.  Patient was agreeable this plan.    Amount and/or Complexity of Data Reviewed  Labs:  ordered. Decision-making details documented in ED Course.  Radiology: ordered.    Risk  OTC drugs.  Prescription drug management.        ED Course as of 08/25/24 1126   Thu Aug 22, 2024   1210 WBC: 12.38 [AS]   1210 Hemoglobin: 15.1 [AS]   1210 Hematocrit: 47.7 [AS]   1210 Platelet Count: 246 [AS]   1236 Potassium(!): 3.3 [AS]   1236 CO2: 27 [AS]   1236 BUN: 8 [AS]   1236 Creatinine: 0.8 [AS]   1236 Calcium(!): 10.7 [AS]   Fri Aug 23, 2024   0200 Called to the room by nursing staff to evaluate patient was she was having abdominal pain and nausea.  She was given scheduled pain and nausea medication.  On my exam she has no ongoing complaints.  She denies ongoing nausea and vomiting.  She was offered an NG-tube but declined.  Will continue to monitor [HM]      ED Course User Index  [AS] Lisa Yoo, FNP  [] Bayron Sanchez MD       Diagnostic Impression:    1. Lower abdominal pain    2. Nausea and vomiting, unspecified vomiting type    3. Abdominal pain    4. Ileus         ED Disposition Condition    Observation Stable                    Lisa Yoo, NAMRATAP  08/22/24 1612       Lisa Yoo, FNP  08/25/24 1126

## 2024-08-22 NOTE — FIRST PROVIDER EVALUATION
Emergency Department TeleTriage Encounter Note      CHIEF COMPLAINT    Chief Complaint   Patient presents with    Abdominal Pain     Pt reporting constant generalized abdominal pain with n/v onset last night       VITAL SIGNS   Initial Vitals [08/22/24 1108]   BP Pulse Resp Temp SpO2   (!) 198/108 94 20 97.7 °F (36.5 °C) 99 %      MAP       --            ALLERGIES    Review of patient's allergies indicates:   Allergen Reactions    Ace inhibitors Other (See Comments)     cough    Arb-angiotensin receptor antagonist Other (See Comments)     Cough w/ valsartan       PROVIDER TRIAGE NOTE  This is a teletriage evaluation of a 83 y.o. female presenting to the ED complaining of abdominal pain. Pain is generalized and constant since last night. She denies urinary complaints, diarrhea, and fever. She has had associated vomiting and nausea.    Patient is alert and oriented. She speaks in complete sentences. She is sitting upright in the chair in no distress.     Initial orders will be placed and care will be transferred to an alternate provider when patient is roomed for a full evaluation. Any additional orders and the final disposition will be determined by that provider.         ORDERS  Labs Reviewed   CBC W/ AUTO DIFFERENTIAL   COMPREHENSIVE METABOLIC PANEL   LIPASE   URINALYSIS, REFLEX TO URINE CULTURE       ED Orders (720h ago, onward)      Start Ordered     Status Ordering Provider    08/22/24 1114 08/22/24 1113  Vital signs  Every 2 hours         Ordered JULIO CÉSAR, MAGGIE G.    08/22/24 1114 08/22/24 1113  Diet NPO  Diet effective now         Ordered JULIO CÉSAR, MAGGIE G.    08/22/24 1114 08/22/24 1113  Insert peripheral IV  Once         Ordered JULIO CÉSAR, MAGGIE G.    08/22/24 1114 08/22/24 1113  CBC W/ AUTO DIFFERENTIAL  STAT         Ordered JULIO CÉSAR, MAGGIE G.    08/22/24 1114 08/22/24 1113  Comp. Metabolic Panel  STAT         Ordered JULIO CÉSAR, MAGGIE G.    08/22/24 1114 08/22/24 1113  POCT Venous Blood Gas (Creatinine Only)  Once         Comments: This test should be used for VBGs.  If using this order for other tests (K, creatinine, HCT, PT/INR, lactate etc)  ONLY do so in the case of an emergency or rapid response.Notify Physician if: see parameters below.      Ordered JULIO CÉSAR, MAGGIE G.    08/22/24 1114 08/22/24 1113  Lipase  STAT         Ordered JULIO CÉSAR, MAGGIE G.    08/22/24 1114 08/22/24 1113  Urinalysis, Reflex to Urine Culture Urine, Clean Catch  STAT         Ordered JULIO CÉSAR, MAGGIE G.              Virtual Visit Note: The provider triage portion of this emergency department evaluation and documentation was performed via [employer], a HIPAA-compliant telemedicine application, in concert with a tele-presenter in the room. A face to face patient evaluation with one of my colleagues will occur once the patient is placed in an emergency department room.      DISCLAIMER: This note was prepared with [employer] voice recognition transcription software. Garbled syntax, mangled pronouns, and other bizarre constructions may be attributed to that software system.

## 2024-08-22 NOTE — ED TRIAGE NOTES
"84 yo female reports to ed with co generalized abd pain and NV onset yesterday. Denies other complaints. Unable to describe her pain, states "it just hurts". Unable to keep anything down including her daily medications. Reports generalized weakness.  [id number].   "

## 2024-08-23 PROBLEM — K56.609 SMALL BOWEL OBSTRUCTION: Status: ACTIVE | Noted: 2024-08-23

## 2024-08-23 LAB
ALBUMIN SERPL BCP-MCNC: 3.6 G/DL (ref 3.5–5.2)
ALP SERPL-CCNC: 110 U/L (ref 55–135)
ALT SERPL W/O P-5'-P-CCNC: 13 U/L (ref 10–44)
ANION GAP SERPL CALC-SCNC: 11 MMOL/L (ref 8–16)
AST SERPL-CCNC: 20 U/L (ref 10–40)
BASOPHILS # BLD AUTO: 0.02 K/UL (ref 0–0.2)
BASOPHILS NFR BLD: 0.2 % (ref 0–1.9)
BILIRUB SERPL-MCNC: 0.6 MG/DL (ref 0.1–1)
BUN SERPL-MCNC: 8 MG/DL (ref 8–23)
CALCIUM SERPL-MCNC: 10.1 MG/DL (ref 8.7–10.5)
CHLORIDE SERPL-SCNC: 103 MMOL/L (ref 95–110)
CO2 SERPL-SCNC: 26 MMOL/L (ref 23–29)
CREAT SERPL-MCNC: 0.8 MG/DL (ref 0.5–1.4)
DIFFERENTIAL METHOD BLD: ABNORMAL
EOSINOPHIL # BLD AUTO: 0 K/UL (ref 0–0.5)
EOSINOPHIL NFR BLD: 0 % (ref 0–8)
ERYTHROCYTE [DISTWIDTH] IN BLOOD BY AUTOMATED COUNT: 16.2 % (ref 11.5–14.5)
EST. GFR  (NO RACE VARIABLE): >60 ML/MIN/1.73 M^2
GLUCOSE SERPL-MCNC: 138 MG/DL (ref 70–110)
HCT VFR BLD AUTO: 43.5 % (ref 37–48.5)
HGB BLD-MCNC: 13.7 G/DL (ref 12–16)
IMM GRANULOCYTES # BLD AUTO: 0.03 K/UL (ref 0–0.04)
IMM GRANULOCYTES NFR BLD AUTO: 0.3 % (ref 0–0.5)
LYMPHOCYTES # BLD AUTO: 1.8 K/UL (ref 1–4.8)
LYMPHOCYTES NFR BLD: 19.3 % (ref 18–48)
MCH RBC QN AUTO: 25.9 PG (ref 27–31)
MCHC RBC AUTO-ENTMCNC: 31.5 G/DL (ref 32–36)
MCV RBC AUTO: 82 FL (ref 82–98)
MONOCYTES # BLD AUTO: 0.6 K/UL (ref 0.3–1)
MONOCYTES NFR BLD: 6.2 % (ref 4–15)
NEUTROPHILS # BLD AUTO: 6.7 K/UL (ref 1.8–7.7)
NEUTROPHILS NFR BLD: 74 % (ref 38–73)
NRBC BLD-RTO: 0 /100 WBC
PLATELET # BLD AUTO: 233 K/UL (ref 150–450)
PMV BLD AUTO: 10.6 FL (ref 9.2–12.9)
POCT GLUCOSE: 129 MG/DL (ref 70–110)
POCT GLUCOSE: 133 MG/DL (ref 70–110)
POTASSIUM SERPL-SCNC: 3.7 MMOL/L (ref 3.5–5.1)
PROT SERPL-MCNC: 7.3 G/DL (ref 6–8.4)
RBC # BLD AUTO: 5.29 M/UL (ref 4–5.4)
SODIUM SERPL-SCNC: 140 MMOL/L (ref 136–145)
WBC # BLD AUTO: 9.05 K/UL (ref 3.9–12.7)

## 2024-08-23 PROCEDURE — 96372 THER/PROPH/DIAG INJ SC/IM: CPT | Performed by: NURSE PRACTITIONER

## 2024-08-23 PROCEDURE — 63600175 PHARM REV CODE 636 W HCPCS: Performed by: NURSE PRACTITIONER

## 2024-08-23 PROCEDURE — 99900035 HC TECH TIME PER 15 MIN (STAT)

## 2024-08-23 PROCEDURE — 21400001 HC TELEMETRY ROOM

## 2024-08-23 PROCEDURE — 36415 COLL VENOUS BLD VENIPUNCTURE: CPT | Performed by: NURSE PRACTITIONER

## 2024-08-23 PROCEDURE — 27000190 HC CPAP FULL FACE MASK W/VALVE

## 2024-08-23 PROCEDURE — 25000003 PHARM REV CODE 250: Performed by: NURSE PRACTITIONER

## 2024-08-23 PROCEDURE — 85025 COMPLETE CBC W/AUTO DIFF WBC: CPT | Performed by: NURSE PRACTITIONER

## 2024-08-23 PROCEDURE — 94660 CPAP INITIATION&MGMT: CPT

## 2024-08-23 PROCEDURE — 0D9670Z DRAINAGE OF STOMACH WITH DRAINAGE DEVICE, VIA NATURAL OR ARTIFICIAL OPENING: ICD-10-PCS | Performed by: HOSPITALIST

## 2024-08-23 PROCEDURE — 63600175 PHARM REV CODE 636 W HCPCS: Performed by: EMERGENCY MEDICINE

## 2024-08-23 PROCEDURE — 80053 COMPREHEN METABOLIC PANEL: CPT | Performed by: NURSE PRACTITIONER

## 2024-08-23 PROCEDURE — 25000003 PHARM REV CODE 250: Performed by: INTERNAL MEDICINE

## 2024-08-23 PROCEDURE — 94761 N-INVAS EAR/PLS OXIMETRY MLT: CPT

## 2024-08-23 RX ORDER — MORPHINE SULFATE 2 MG/ML
2 INJECTION, SOLUTION INTRAMUSCULAR; INTRAVENOUS EVERY 6 HOURS PRN
Status: DISCONTINUED | OUTPATIENT
Start: 2024-08-23 | End: 2024-08-23

## 2024-08-23 RX ORDER — MORPHINE SULFATE 4 MG/ML
4 INJECTION, SOLUTION INTRAMUSCULAR; INTRAVENOUS
Status: COMPLETED | OUTPATIENT
Start: 2024-08-23 | End: 2024-08-23

## 2024-08-23 RX ORDER — MORPHINE SULFATE 2 MG/ML
2 INJECTION, SOLUTION INTRAMUSCULAR; INTRAVENOUS EVERY 8 HOURS PRN
Status: DISCONTINUED | OUTPATIENT
Start: 2024-08-23 | End: 2024-08-27 | Stop reason: HOSPADM

## 2024-08-23 RX ORDER — ACETAMINOPHEN 325 MG/1
650 TABLET ORAL EVERY 6 HOURS PRN
Status: DISCONTINUED | OUTPATIENT
Start: 2024-08-23 | End: 2024-08-27 | Stop reason: HOSPADM

## 2024-08-23 RX ORDER — SYRING-NEEDL,DISP,INSUL,0.3 ML 29 G X1/2"
296 SYRINGE, EMPTY DISPOSABLE MISCELLANEOUS ONCE
Status: COMPLETED | OUTPATIENT
Start: 2024-08-23 | End: 2024-08-23

## 2024-08-23 RX ADMIN — MORPHINE SULFATE 4 MG: 4 INJECTION, SOLUTION INTRAMUSCULAR; INTRAVENOUS at 10:08

## 2024-08-23 RX ADMIN — DICYCLOMINE HYDROCHLORIDE 20 MG: 10 INJECTION, SOLUTION INTRAMUSCULAR at 12:08

## 2024-08-23 RX ADMIN — DICYCLOMINE HYDROCHLORIDE 20 MG: 10 INJECTION, SOLUTION INTRAMUSCULAR at 01:08

## 2024-08-23 RX ADMIN — POLYETHYLENE GLYCOL 3350 17 G: 17 POWDER, FOR SOLUTION ORAL at 09:08

## 2024-08-23 RX ADMIN — ONDANSETRON 4 MG: 2 INJECTION INTRAMUSCULAR; INTRAVENOUS at 03:08

## 2024-08-23 RX ADMIN — ONDANSETRON 4 MG: 2 INJECTION INTRAMUSCULAR; INTRAVENOUS at 12:08

## 2024-08-23 RX ADMIN — SODIUM CHLORIDE: 9 INJECTION, SOLUTION INTRAVENOUS at 08:08

## 2024-08-23 RX ADMIN — SODIUM CHLORIDE: 9 INJECTION, SOLUTION INTRAVENOUS at 07:08

## 2024-08-23 RX ADMIN — MAGNESIUM CITRATE 296 ML: 1.75 LIQUID ORAL at 08:08

## 2024-08-23 RX ADMIN — ONDANSETRON 4 MG: 2 INJECTION INTRAMUSCULAR; INTRAVENOUS at 08:08

## 2024-08-23 RX ADMIN — ATORVASTATIN CALCIUM 10 MG: 10 TABLET, FILM COATED ORAL at 09:08

## 2024-08-23 NOTE — PROGRESS NOTES
"Gastroenterology Progress Note    Reason for Consult: SBO, constipation    Subjective:  No issues overnight.  Still having lower abdominal cramping pain.  No vomiting, but did feel like she was going to.  Only clear mucus came up.  She has otherwise tolerated clear liquids.      ROS:  Gen: no F/C  CV: no CP/palpitations  Resp: no SOB/wheezing    Physical Exam  BP (!) 147/68 (BP Location: Left arm, Patient Position: Lying)   Pulse 78   Temp 98.3 °F (36.8 °C) (Oral)   Resp 16   Ht 5' 7" (1.702 m)   Wt 126.1 kg (278 lb)   LMP  (LMP Unknown)   SpO2 (!) 94%   Breastfeeding No   BMI 43.54 kg/m²   General: Awake, alert female in no apparent distress, obese  HEENT: NC/AT, PERRL, EOMI, MMM  CV: RRR, without murmur, gallop, or rubs  Pulm: CTAB, no wheezing, rales, or rhonchi  GI: soft, TTP in lower abdomen, non-distended, +BS, no guarding or rebound tenderness  MSK: no edema and normal ROM  Neuro: A&Ox3, moves all extremities equally, sensation grossly intact  Skin: no rashes or lesions  Psych: normal mood and affect    Medications/Infusions:  Current Facility-Administered Medications   Medication Dose Route Frequency Provider Last Rate Last Admin    0.9%  NaCl infusion   Intravenous Continuous Lisa Yoo FNP 75 mL/hr at 08/22/24 1530 New Bag at 08/22/24 1530    amLODIPine tablet 10 mg  10 mg Oral Daily Lisa Yoo FNP   10 mg at 08/22/24 1606    atorvastatin tablet 10 mg  10 mg Oral QHS Lisa Yoo FNP   10 mg at 08/22/24 2057    cholecalciferol (vitamin D3) capsule/tablet 2,000 Units  2,000 Units Oral Daily Lisa Yoo FNP        dicyclomine injection 20 mg  20 mg Intramuscular Q6H PRN Lisa Yoo FNP   20 mg at 08/23/24 0054    docusate sodium capsule 100 mg  100 mg Oral Daily Lisa Yoo FNP   100 mg at 08/22/24 1622    hydroCHLOROthiazide tablet 12.5 mg  12.5 mg Oral Daily Yoo, Lisa B., FNP   12.5 mg at 08/22/24 1606    magnesium citrate solution 296 mL  296 mL Oral Once Guider, " MD Asuncion        melatonin tablet 6 mg  6 mg Oral Nightly PRN Lisa Yoo FNP        ondansetron injection 4 mg  4 mg Intravenous Q6H PRN Lisa Yoo FNP   4 mg at 08/23/24 0054    polyethylene glycol packet 17 g  17 g Oral BID Lisa Yoo FNP   17 g at 08/22/24 1622    sodium chloride 0.9% flush 10 mL  10 mL Intravenous PRN Lisa Yoo FNP         Current Outpatient Medications   Medication Sig Dispense Refill    allopurinoL (ZYLOPRIM) 300 MG tablet Take 1 tablet (300 mg total) by mouth once daily. gout 90 tablet 3    amLODIPine (NORVASC) 10 MG tablet Take 1 tablet (10 mg total) by mouth once daily. High blood pressure 90 tablet 3    ascorbic acid, vitamin C, (VITAMIN C) 1000 MG tablet Take 1,000 mg by mouth once daily.      aspirin (ECOTRIN) 81 MG EC tablet Take 81 mg by mouth once daily.      atorvastatin (LIPITOR) 10 MG tablet Take 1 tablet (10 mg total) by mouth every evening. High cholesterol 90 tablet 3    cholecalciferol, vitamin D3, (VITAMIN D3) 25 mcg (1,000 unit) capsule Take 2,000 Units by mouth once daily.      cyanocobalamin (VITAMIN B-12) 250 MCG tablet Take 2,500 mcg by mouth once daily.      fluticasone propionate (FLONASE) 50 mcg/actuation nasal spray 2 sprays (100 mcg total) by Each Nostril route 2 (two) times a day. 16 g 3    hydroCHLOROthiazide (HYDRODIURIL) 25 MG tablet Take 0.5 tablets (12.5 mg total) by mouth once daily. Take 1/2 of the 25 mg tablet daily 60 tablet 3    docusate sodium (COLACE) 100 MG capsule Take 100 mg by mouth once daily.      meloxicam (MOBIC) 7.5 MG tablet Take 7.5 mg by mouth.         Intake and Output:    Intake/Output Summary (Last 24 hours) at 8/23/2024 0746  Last data filed at 8/22/2024 1313  Gross per 24 hour   Intake 500 ml   Output --   Net 500 ml       Labs:  Lab Results   Component Value Date/Time    WBC 9.05 08/23/2024 04:57 AM    HGB 13.7 08/23/2024 04:57 AM    HCT 43.5 08/23/2024 04:57 AM     08/23/2024 04:57 AM    MCV 82  08/23/2024 04:57 AM     08/23/2024 04:57 AM    K 3.7 08/23/2024 04:57 AM     08/23/2024 04:57 AM    CO2 26 08/23/2024 04:57 AM    BUN 8 08/23/2024 04:57 AM    CREATININE 0.8 08/23/2024 04:57 AM     (H) 08/23/2024 04:57 AM    CALCIUM 10.1 08/23/2024 04:57 AM    MG 1.6 11/05/2020 04:34 AM    PHOS 3.1 11/05/2020 04:34 AM    INR 1.0 06/22/2015 09:19 PM   ]  Lab Results   Component Value Date/Time    PROT 7.3 08/23/2024 04:57 AM    ALBUMIN 3.6 08/23/2024 04:57 AM    BILITOT 0.6 08/23/2024 04:57 AM    AST 20 08/23/2024 04:57 AM    ALT 13 08/23/2024 04:57 AM    ALKPHOS 110 08/23/2024 04:57 AM   ]    Imaging and Procedures:  Labs and imaging results were reviewed by me.  AXR 8/23/24:  Nonspecific, nonobstructive bowel gas pattern.  Moderate volume colonic stool.    Assessment:  Natali Galeano is a 83 y.o. female with a history of  colon cancer in 2002 s/p resection, HTN, HLD, obesity, ALYSE and prior pSBO who presented with lower abdominal pain similar to prior episodes.     Plan:  - colace 100 mg daily, miralax 17g BID  - will do a bottle of Mg citrate today  - continue clears for now - once she has a bowel movement, if feeling less nauseous and wants to advance to low residue diet she can  - NG tube if vomiting worsens  - PRN dicyclomine  - limit narcotics    Asuncion Gonzalez MD

## 2024-08-23 NOTE — PLAN OF CARE
LMSW met with the patient at the bedside. Patient is alert and oriented with no communication barriers. Prior to admission, the patient is independent. Patient denies the use of HH or DME. Patients PCP is correct on the face sheet. Patient choice pharmacy is bedside. Patients' family will transport the patient home at discharge.     [facility] ([address])  Initial Discharge Assessment       Primary Care Provider: Marin Nettles MD    Admission Diagnosis: Lower abdominal pain [R10.30]  Abdominal pain [R10.9]  Nausea and vomiting, unspecified vomiting type [R11.2]  Ileus [K56.7]    Admission Date: 8/22/2024  Expected Discharge Date:     Transition of Care Barriers: (P) None    Payor: [employer] [facility] / Plan: [employer] CHOICES / Product Type: Medicare Advantage /     Extended Emergency Contact Information  Primary Emergency Contact: Izabel Galeano  Address: [address] [place] [id number] [facility]  Home Phone: [phone]  Mobile Phone: [phone]  Relation: Daughter    Discharge Plan A: (P) Home with family, Home  Discharge Plan B: (P) Home Health      [id number] [place], [place] - [address] AT [facility] [address]  [address]  [facility] [address]  Phone: [phone] Fax: [phone]      Initial Assessment (most recent)       Adult Discharge Assessment - 08/23/24 1427          Discharge Assessment    Assessment Type Discharge Planning Assessment (P)      Confirmed/corrected address, phone number and insurance Yes (P)      Confirmed Demographics Correct on Facesheet (P)      Source of Information patient;health record (P)      People in Home alone (P)      Do you expect to return to your current living situation? Yes (P)      Do you have help at home or someone to help you manage your care at home? Yes (P)      Prior to hospitilization cognitive status: Alert/Oriented;No Deficits (P)      Current cognitive  status: Alert/Oriented;No Deficits (P)      Equipment Currently Used at Home none (P)      Readmission within 30 days? No (P)      Patient currently being followed by outpatient case management? No (P)      Do you currently have service(s) that help you manage your care at home? No (P)      Do you take prescription medications? Yes (P)      Do you have prescription coverage? Yes (P)      Do you have any problems affording any of your prescribed medications? No (P)      Is the patient taking medications as prescribed? yes (P)      How do you get to doctors appointments? family or friend will provide;car, drives self (P)      Are you on dialysis? No (P)      Do you take coumadin? No (P)      Discharge Plan A Home with family;Home (P)      Discharge Plan B Home Health (P)      Discharge Plan discussed with: Patient (P)      Transition of Care Barriers None (P)

## 2024-08-23 NOTE — HPI
Mrs Hurst is an 83 year old female with a PMH that includes colon cancer with resection 2002, SBO, HTN, ALYSE, and Gout. She came to the hospital for lower abdominal pain, n/v that began Wednesday night. In the ED she reported lower ab pain but did not vomit. She has no leukocytosis and is afebrile. On CT A/P, there was noted to be the small bowel feces sign.  Gastroenterology was consulted. Patient upgraded from EDOU.

## 2024-08-23 NOTE — H&P
[facility] [address] Medicine  History & Physical    Patient Name: Natali Galeano  MRN: [id number]  Patient Class: IP- Inpatient  Admission Date: 2024  Attending Physician: Gretel Smith MD   Primary Care Provider: Marin Nettles MD         Patient information was obtained from patient, past medical records, and ER records.     Subjective:     Principal Problem:Small bowel obstruction    Chief Complaint:   Chief Complaint   Patient presents with    Abdominal Pain     Pt reporting constant generalized abdominal pain with n/v onset last night        HPI: Mrs Hurst is an 83 year old female with a PMH that includes colon cancer with resection , SBO, HTN, ALYSE, and Gout. She came to the hospital for lower abdominal pain, n/v that began Wednesday night. In the ED she reported lower ab pain but did not vomit. She has no leukocytosis and is afebrile. On CT A/P, there was noted to be the small bowel feces sign.  Gastroenterology was consulted. Patient upgraded from EDOU.       Past Medical History:   Diagnosis Date    Allergic rhinitis 2022    Anemia     Arthritis     Colon cancer     Gout, chronic     Heart murmur     High cholesterol     Hypercholesteremia     Hypertension     Obesity     PMB (postmenopausal bleeding) 10/24/2017    Right knee pain 2019    Sleep apnea     Small bowel obstruction, partial 2019    Thyroid disease        Past Surgical History:   Procedure Laterality Date    BTL       SECTION, CLASSIC      COLON SURGERY      goiter       HERNIA REPAIR      KIDNEY SURGERY      cyst removal    THYROID SURGERY         Review of patient's allergies indicates:   Allergen Reactions    Ace inhibitors Other (See Comments)     cough    Arb-angiotensin receptor antagonist Other (See Comments)     Cough w/ valsartan       No current facility-administered medications on file prior to encounter.     Current Outpatient Medications on File Prior to  Encounter   Medication Sig    allopurinoL (ZYLOPRIM) 300 MG tablet Take 1 tablet (300 mg total) by mouth once daily. gout    amLODIPine (NORVASC) 10 MG tablet Take 1 tablet (10 mg total) by mouth once daily. High blood pressure    ascorbic acid, vitamin C, (VITAMIN C) 1000 MG tablet Take 1,000 mg by mouth once daily.    aspirin (ECOTRIN) 81 MG EC tablet Take 81 mg by mouth once daily.    atorvastatin (LIPITOR) 10 MG tablet Take 1 tablet (10 mg total) by mouth every evening. High cholesterol    cholecalciferol, vitamin D3, (VITAMIN D3) 25 mcg (1,000 unit) capsule Take 2,000 Units by mouth once daily.    cyanocobalamin (VITAMIN B-12) 250 MCG tablet Take 2,500 mcg by mouth once daily.    fluticasone propionate (FLONASE) 50 mcg/actuation nasal spray 2 sprays (100 mcg total) by Each Nostril route 2 (two) times a day.    hydroCHLOROthiazide (HYDRODIURIL) 25 MG tablet Take 0.5 tablets (12.5 mg total) by mouth once daily. Take 1/2 of the 25 mg tablet daily    docusate sodium (COLACE) 100 MG capsule Take 100 mg by mouth once daily.    [DISCONTINUED] meloxicam (MOBIC) 7.5 MG tablet Take 7.5 mg by mouth.     Family History       Problem Relation (Age of Onset)    Diabetes Mother    Heart disease Father          Tobacco Use    Smoking status: Former     Current packs/day: 0.00     Types: Cigarettes     Quit date: 1980     Years since quittin.3    Smokeless tobacco: Never   Substance and Sexual Activity    Alcohol use: No    Drug use: No    Sexual activity: Not Currently     Review of Systems   Constitutional:  Negative for activity change, appetite change and fever.   HENT:  Negative for congestion and sore throat.    Eyes:  Negative for visual disturbance.   Respiratory:  Negative for shortness of breath.    Cardiovascular:  Negative for chest pain and leg swelling.   Gastrointestinal:  Positive for abdominal pain, constipation and nausea. Negative for abdominal distention and vomiting.        Last BM Monday    Genitourinary:  Negative for difficulty urinating and flank pain.   Musculoskeletal:  Negative for arthralgias and joint swelling.   Skin: Negative.    Neurological:  Negative for dizziness, syncope and weakness.   Psychiatric/Behavioral:  Negative for agitation and behavioral problems.      Objective:     Vital Signs (Most Recent):  Temp: 98.2 °F (36.8 °C) (08/23/24 1232)  Pulse: 82 (08/23/24 1232)  Resp: 20 (08/23/24 1232)  BP: (!) 148/70 (08/23/24 1232)  SpO2: 95 % (08/23/24 1132) Vital Signs (24h Range):  Temp:  [97.9 °F (36.6 °C)-98.3 °F (36.8 °C)] 98.2 °F (36.8 °C)  Pulse:  [72-84] 82  Resp:  [16-20] 20  SpO2:  [92 %-98 %] 95 %  BP: (131-161)/(62-71) 148/70     Weight: 126.1 kg (278 lb)  Body mass index is 43.54 kg/m².     Physical Exam  Vitals and nursing note reviewed.   HENT:      Head: Normocephalic.      Nose:      Comments: NG to intermittent suction right nare     Mouth/Throat:      Mouth: Mucous membranes are moist.   Eyes:      Extraocular Movements: Extraocular movements intact.      Pupils: Pupils are equal, round, and reactive to light.   Pulmonary:      Effort: Pulmonary effort is normal. No respiratory distress.      Breath sounds: No wheezing or rales.   Abdominal:      General: Bowel sounds are normal. There is no distension.      Palpations: Abdomen is soft.      Tenderness: There is abdominal tenderness. There is no guarding.   Musculoskeletal:         General: Normal range of motion.      Right lower leg: No edema.      Left lower leg: No edema.   Skin:     General: Skin is warm.   Neurological:      General: No focal deficit present.      Mental Status: She is alert and oriented to person, place, and time.   Psychiatric:         Mood and Affect: Mood normal.         Behavior: Behavior normal.              CRANIAL NERVES     CN III, IV, VI   Pupils are equal, round, and reactive to light.       Significant Labs: All pertinent labs within the past 24 hours have been reviewed.  BMP:   Recent  Labs   Lab 08/23/24  0457   *      K 3.7      CO2 26   BUN 8   CREATININE 0.8   CALCIUM 10.1     CBC:   Recent Labs   Lab 08/22/24  1146 08/23/24  0457   WBC 12.38 9.05   HGB 15.1 13.7   HCT 47.7 43.5    233     CMP:   Recent Labs   Lab 08/22/24  1146 08/23/24  0457    140   K 3.3* 3.7    103   CO2 27 26   * 138*   BUN 8 8   CREATININE 0.8 0.8   CALCIUM 10.7* 10.1   PROT 8.5* 7.3   ALBUMIN 4.1 3.6   BILITOT 0.6 0.6   ALKPHOS 126 110   AST 22 20   ALT 18 13   ANIONGAP 13 11       Significant Imaging: I have reviewed all pertinent imaging results/findings within the past 24 hours.  XR NG/OG tube placement check, non-radiologist performed  Narrative: EXAMINATION:  XR NG/OG TUBE PLACEMENT CHECK, NON-RADIOLOGIST PERFORMED    CLINICAL HISTORY:  NGT placement confirmation;    TECHNIQUE:  Supine radiograph of the upper abdomen.    COMPARISON:  None    FINDINGS:  Exam is overpenetrated.  Placement of an NG tube which projects over the left upper quadrant of the expected location of the stomach.  No bowel dilatation in the upper abdomen.  Impression: Please see above.    Electronically signed by: Giselle Parr  Date:    08/23/2024  Time:    11:33  X-Ray Abdomen AP 1 View (KUB)  EXAMINATION:  XR ABDOMEN AP 1 VIEW    CLINICAL HISTORY:  Unspecified abdominal pain    TECHNIQUE:  AP View(s) of the abdomen was performed.    COMPARISON:  Abdominal radiograph 11/03/2020, 12:57 hours.  No definite dilated gas-filled loops small bowel or colon appreciated.  Moderate to large volume stool noted within colonic loops in the right hemiabdomen, proximal transverse colon, and distal descending colon/sigmoid colon region.    Air appears to be present in the distal colon and rectum.    No acute findings in the visualized chest.  Degenerative changes of the spine, hips, sacroiliac joints, and pubic symphysis.  Contrast material appears to be present within the urinary bladder.    Phleboliths.    No  definite rib foreign body.    FINDINGS:  Nonspecific, nonobstructive bowel gas pattern.    Moderate volume colonic stool.    Electronically signed by: Lg Dorantes  Date:    08/23/2024  Time:    06:35      Assessment/Plan:     * Small bowel obstruction  H/O Colon resection and small bowel obstruction  CT A/P with fecalization of small bowel  NG placed in ED  Continue clear liquid diet  GI consulted: Appreciate recommendations which include: colace 100 mg daily, miralax 17g BID, clears for now, PRN bentyl, limit narcotics        Essential hypertension    Hypertension Medications               amLODIPine (NORVASC) 10 MG tablet Take 1 tablet (10 mg total) by mouth once daily. High blood pressure    hydroCHLOROthiazide (HYDRODIURIL) 25 MG tablet Take 0.5 tablets (12.5 mg total) by mouth once daily. Take 1/2 of the 25 mg tablet daily          Continue the above home regimen    Gout  Continue 300 mg allopurinol daily        VTE Risk Mitigation (From admission, onward)           Ordered     IP VTE HIGH RISK PATIENT  Once         08/22/24 1351     Place sequential compression device  Until discontinued         08/22/24 1351                             Jenni Mancuso DNP  Department of Hospital Medicine  [facility] ([address])

## 2024-08-23 NOTE — ED NOTES
"Assisted pt to restroom, informed pt to use call light in restroom when ready to return. Upon entering the room pt sit upright and verbalizes "I have to vomit." Informed pt of on-going care and plan for reevaluation for NG tube placement. Pt complains of nausea and abd pain returning on a scale 9/10, informed pt of the duration of PRN medication, and that she will be medicated properly, pt verbalizes understanding. Md. made aware   "

## 2024-08-23 NOTE — ASSESSMENT & PLAN NOTE
Hypertension Medications               amLODIPine (NORVASC) 10 MG tablet Take 1 tablet (10 mg total) by mouth once daily. High blood pressure    hydroCHLOROthiazide (HYDRODIURIL) 25 MG tablet Take 0.5 tablets (12.5 mg total) by mouth once daily. Take 1/2 of the 25 mg tablet daily          Continue the above home regimen

## 2024-08-23 NOTE — ASSESSMENT & PLAN NOTE
H/O Colon resection and small bowel obstruction  CT A/P with fecalization of small bowel  NG placed in ED  Continue clear liquid diet  GI consulted: Appreciate recommendations which include: colace 100 mg daily, miralax 17g BID, clears for now, PRN bentyl, limit narcotics

## 2024-08-23 NOTE — ED NOTES
Introduction given, re-orient to room, hospital policy and procedures. Aaxo4, resp even and unlabored, and noted, pt is sitting upright in bed watching tv. Personal belongings at bedside.Informed pt of on-going care, instructed to use call light for all questions and concerns. Pt verbalizes understanding.

## 2024-08-23 NOTE — SUBJECTIVE & OBJECTIVE
Past Medical History:   Diagnosis Date    Allergic rhinitis 2022    Anemia     Arthritis     Colon cancer     Gout, chronic     Heart murmur     High cholesterol     Hypercholesteremia     Hypertension     Obesity     PMB (postmenopausal bleeding) 10/24/2017    Right knee pain 2019    Sleep apnea     Small bowel obstruction, partial 2019    Thyroid disease        Past Surgical History:   Procedure Laterality Date    BTL       SECTION, CLASSIC      COLON SURGERY      goiter       HERNIA REPAIR      KIDNEY SURGERY      cyst removal    THYROID SURGERY         Review of patient's allergies indicates:   Allergen Reactions    Ace inhibitors Other (See Comments)     cough    Arb-angiotensin receptor antagonist Other (See Comments)     Cough w/ valsartan       No current facility-administered medications on file prior to encounter.     Current Outpatient Medications on File Prior to Encounter   Medication Sig    allopurinoL (ZYLOPRIM) 300 MG tablet Take 1 tablet (300 mg total) by mouth once daily. gout    amLODIPine (NORVASC) 10 MG tablet Take 1 tablet (10 mg total) by mouth once daily. High blood pressure    ascorbic acid, vitamin C, (VITAMIN C) 1000 MG tablet Take 1,000 mg by mouth once daily.    aspirin (ECOTRIN) 81 MG EC tablet Take 81 mg by mouth once daily.    atorvastatin (LIPITOR) 10 MG tablet Take 1 tablet (10 mg total) by mouth every evening. High cholesterol    cholecalciferol, vitamin D3, (VITAMIN D3) 25 mcg (1,000 unit) capsule Take 2,000 Units by mouth once daily.    cyanocobalamin (VITAMIN B-12) 250 MCG tablet Take 2,500 mcg by mouth once daily.    fluticasone propionate (FLONASE) 50 mcg/actuation nasal spray 2 sprays (100 mcg total) by Each Nostril route 2 (two) times a day.    hydroCHLOROthiazide (HYDRODIURIL) 25 MG tablet Take 0.5 tablets (12.5 mg total) by mouth once daily. Take 1/2 of the 25 mg tablet daily    docusate sodium (COLACE) 100 MG capsule Take 100 mg by mouth once  daily.    [DISCONTINUED] meloxicam (MOBIC) 7.5 MG tablet Take 7.5 mg by mouth.     Family History       Problem Relation (Age of Onset)    Diabetes Mother    Heart disease Father          Tobacco Use    Smoking status: Former     Current packs/day: 0.00     Types: Cigarettes     Quit date: 1980     Years since quittin.3    Smokeless tobacco: Never   Substance and Sexual Activity    Alcohol use: No    Drug use: No    Sexual activity: Not Currently     Review of Systems   Constitutional:  Negative for activity change, appetite change and fever.   HENT:  Negative for congestion and sore throat.    Eyes:  Negative for visual disturbance.   Respiratory:  Negative for shortness of breath.    Cardiovascular:  Negative for chest pain and leg swelling.   Gastrointestinal:  Positive for abdominal pain, constipation and nausea. Negative for abdominal distention and vomiting.        Last BM Monday   Genitourinary:  Negative for difficulty urinating and flank pain.   Musculoskeletal:  Negative for arthralgias and joint swelling.   Skin: Negative.    Neurological:  Negative for dizziness, syncope and weakness.   Psychiatric/Behavioral:  Negative for agitation and behavioral problems.      Objective:     Vital Signs (Most Recent):  Temp: 98.2 °F (36.8 °C) (24 1232)  Pulse: 82 (24 1232)  Resp: 20 (24 1232)  BP: (!) 148/70 (24 1232)  SpO2: 95 % (24 1132) Vital Signs (24h Range):  Temp:  [97.9 °F (36.6 °C)-98.3 °F (36.8 °C)] 98.2 °F (36.8 °C)  Pulse:  [72-84] 82  Resp:  [16-20] 20  SpO2:  [92 %-98 %] 95 %  BP: (131-161)/(62-71) 148/70     Weight: 126.1 kg (278 lb)  Body mass index is 43.54 kg/m².     Physical Exam  Vitals and nursing note reviewed.   HENT:      Head: Normocephalic.      Nose:      Comments: NG to intermittent suction right nare     Mouth/Throat:      Mouth: Mucous membranes are moist.   Eyes:      Extraocular Movements: Extraocular movements intact.      Pupils: Pupils are  equal, round, and reactive to light.   Pulmonary:      Effort: Pulmonary effort is normal. No respiratory distress.      Breath sounds: No wheezing or rales.   Abdominal:      General: Bowel sounds are normal. There is no distension.      Palpations: Abdomen is soft.      Tenderness: There is abdominal tenderness. There is no guarding.   Musculoskeletal:         General: Normal range of motion.      Right lower leg: No edema.      Left lower leg: No edema.   Skin:     General: Skin is warm.   Neurological:      General: No focal deficit present.      Mental Status: She is alert and oriented to person, place, and time.   Psychiatric:         Mood and Affect: Mood normal.         Behavior: Behavior normal.              CRANIAL NERVES     CN III, IV, VI   Pupils are equal, round, and reactive to light.       Significant Labs: All pertinent labs within the past 24 hours have been reviewed.  BMP:   Recent Labs   Lab 08/23/24  0457   *      K 3.7      CO2 26   BUN 8   CREATININE 0.8   CALCIUM 10.1     CBC:   Recent Labs   Lab 08/22/24  1146 08/23/24  0457   WBC 12.38 9.05   HGB 15.1 13.7   HCT 47.7 43.5    233     CMP:   Recent Labs   Lab 08/22/24  1146 08/23/24  0457    140   K 3.3* 3.7    103   CO2 27 26   * 138*   BUN 8 8   CREATININE 0.8 0.8   CALCIUM 10.7* 10.1   PROT 8.5* 7.3   ALBUMIN 4.1 3.6   BILITOT 0.6 0.6   ALKPHOS 126 110   AST 22 20   ALT 18 13   ANIONGAP 13 11       Significant Imaging: I have reviewed all pertinent imaging results/findings within the past 24 hours.  XR NG/OG tube placement check, non-radiologist performed  Narrative: EXAMINATION:  XR NG/OG TUBE PLACEMENT CHECK, NON-RADIOLOGIST PERFORMED    CLINICAL HISTORY:  NGT placement confirmation;    TECHNIQUE:  Supine radiograph of the upper abdomen.    COMPARISON:  None    FINDINGS:  Exam is overpenetrated.  Placement of an NG tube which projects over the left upper quadrant of the expected location of  the stomach.  No bowel dilatation in the upper abdomen.  Impression: Please see above.    Electronically signed by: Giselle Parr  Date:    08/23/2024  Time:    11:33  X-Ray Abdomen AP 1 View (KUB)  EXAMINATION:  XR ABDOMEN AP 1 VIEW    CLINICAL HISTORY:  Unspecified abdominal pain    TECHNIQUE:  AP View(s) of the abdomen was performed.    COMPARISON:  Abdominal radiograph 11/03/2020, 12:57 hours.  No definite dilated gas-filled loops small bowel or colon appreciated.  Moderate to large volume stool noted within colonic loops in the right hemiabdomen, proximal transverse colon, and distal descending colon/sigmoid colon region.    Air appears to be present in the distal colon and rectum.    No acute findings in the visualized chest.  Degenerative changes of the spine, hips, sacroiliac joints, and pubic symphysis.  Contrast material appears to be present within the urinary bladder.    Phleboliths.    No definite rib foreign body.    FINDINGS:  Nonspecific, nonobstructive bowel gas pattern.    Moderate volume colonic stool.    Electronically signed by: Lg Dorantes  Date:    08/23/2024  Time:    06:35

## 2024-08-23 NOTE — PLAN OF CARE
Inpatient Upgrade Note    Natali Galeano has warranted treatment spanning two or more midnights of hospital level care for the management of  83 y.o. female presenting to the emergency department reporting or abdominal pain with nausea and vomiting that began last night.  She reports a history of small-bowel obstruction reports pain is similar in nature to her previous episodes.  Reports last bowel movement was yesterday morning.  On exam there was some lower abdominal tenderness without distention or guarding.  Labs reassuring with no leukocytosis, stable H&H, mild hypokalemia, no DORYS.  CT obtained reveals fecalization of the small bowel without leela obstruction.  However due to previous history of small-bowel obstructions plan for admission to the [facility] for clear liquid diet and evaluation by Gastroenterology.  PIV fluids, daily labs, monitoring of vital signs, advancing diet, and further evaluation by consultants. Her condition is also complicated by the following comorbidities: Hypertension and Obesity.

## 2024-08-24 LAB
ANION GAP SERPL CALC-SCNC: 9 MMOL/L (ref 8–16)
BUN SERPL-MCNC: 11 MG/DL (ref 8–23)
CALCIUM SERPL-MCNC: 9.9 MG/DL (ref 8.7–10.5)
CHLORIDE SERPL-SCNC: 101 MMOL/L (ref 95–110)
CO2 SERPL-SCNC: 28 MMOL/L (ref 23–29)
CREAT SERPL-MCNC: 0.8 MG/DL (ref 0.5–1.4)
ERYTHROCYTE [DISTWIDTH] IN BLOOD BY AUTOMATED COUNT: 15.8 % (ref 11.5–14.5)
EST. GFR  (NO RACE VARIABLE): >60 ML/MIN/1.73 M^2
GLUCOSE SERPL-MCNC: 130 MG/DL (ref 70–110)
HCT VFR BLD AUTO: 43.7 % (ref 37–48.5)
HGB BLD-MCNC: 13.7 G/DL (ref 12–16)
MAGNESIUM SERPL-MCNC: 2 MG/DL (ref 1.6–2.6)
MCH RBC QN AUTO: 25.9 PG (ref 27–31)
MCHC RBC AUTO-ENTMCNC: 31.4 G/DL (ref 32–36)
MCV RBC AUTO: 83 FL (ref 82–98)
PHOSPHATE SERPL-MCNC: 3.2 MG/DL (ref 2.7–4.5)
PLATELET # BLD AUTO: 223 K/UL (ref 150–450)
PMV BLD AUTO: 10 FL (ref 9.2–12.9)
POCT GLUCOSE: 102 MG/DL (ref 70–110)
POCT GLUCOSE: 103 MG/DL (ref 70–110)
POCT GLUCOSE: 110 MG/DL (ref 70–110)
POCT GLUCOSE: 127 MG/DL (ref 70–110)
POTASSIUM SERPL-SCNC: 3.3 MMOL/L (ref 3.5–5.1)
RBC # BLD AUTO: 5.29 M/UL (ref 4–5.4)
SODIUM SERPL-SCNC: 138 MMOL/L (ref 136–145)
WBC # BLD AUTO: 8.54 K/UL (ref 3.9–12.7)

## 2024-08-24 PROCEDURE — 83735 ASSAY OF MAGNESIUM: CPT | Performed by: NURSE PRACTITIONER

## 2024-08-24 PROCEDURE — 21400001 HC TELEMETRY ROOM

## 2024-08-24 PROCEDURE — 63600175 PHARM REV CODE 636 W HCPCS: Performed by: NURSE PRACTITIONER

## 2024-08-24 PROCEDURE — 36415 COLL VENOUS BLD VENIPUNCTURE: CPT | Performed by: NURSE PRACTITIONER

## 2024-08-24 PROCEDURE — 84100 ASSAY OF PHOSPHORUS: CPT | Performed by: NURSE PRACTITIONER

## 2024-08-24 PROCEDURE — 25000003 PHARM REV CODE 250: Performed by: NURSE PRACTITIONER

## 2024-08-24 PROCEDURE — 80048 BASIC METABOLIC PNL TOTAL CA: CPT | Performed by: NURSE PRACTITIONER

## 2024-08-24 PROCEDURE — 85027 COMPLETE CBC AUTOMATED: CPT | Performed by: NURSE PRACTITIONER

## 2024-08-24 PROCEDURE — 94761 N-INVAS EAR/PLS OXIMETRY MLT: CPT

## 2024-08-24 PROCEDURE — 99900035 HC TECH TIME PER 15 MIN (STAT)

## 2024-08-24 RX ORDER — POTASSIUM CHLORIDE 7.45 MG/ML
10 INJECTION INTRAVENOUS
Status: COMPLETED | OUTPATIENT
Start: 2024-08-24 | End: 2024-08-24

## 2024-08-24 RX ADMIN — MORPHINE SULFATE 2 MG: 2 INJECTION, SOLUTION INTRAMUSCULAR; INTRAVENOUS at 09:08

## 2024-08-24 RX ADMIN — HYDROCHLOROTHIAZIDE 12.5 MG: 12.5 TABLET ORAL at 10:08

## 2024-08-24 RX ADMIN — Medication 2000 UNITS: at 10:08

## 2024-08-24 RX ADMIN — SODIUM CHLORIDE: 9 INJECTION, SOLUTION INTRAVENOUS at 07:08

## 2024-08-24 RX ADMIN — ATORVASTATIN CALCIUM 10 MG: 10 TABLET, FILM COATED ORAL at 08:08

## 2024-08-24 RX ADMIN — MORPHINE SULFATE 2 MG: 2 INJECTION, SOLUTION INTRAMUSCULAR; INTRAVENOUS at 07:08

## 2024-08-24 RX ADMIN — POLYETHYLENE GLYCOL 3350 17 G: 17 POWDER, FOR SOLUTION ORAL at 10:08

## 2024-08-24 RX ADMIN — AMLODIPINE BESYLATE 10 MG: 5 TABLET ORAL at 10:08

## 2024-08-24 RX ADMIN — POTASSIUM CHLORIDE 10 MEQ: 7.46 INJECTION, SOLUTION INTRAVENOUS at 09:08

## 2024-08-24 RX ADMIN — ACETAMINOPHEN 650 MG: 325 TABLET, FILM COATED ORAL at 12:08

## 2024-08-24 RX ADMIN — POLYETHYLENE GLYCOL 3350 17 G: 17 POWDER, FOR SOLUTION ORAL at 08:08

## 2024-08-24 RX ADMIN — POTASSIUM CHLORIDE 10 MEQ: 7.46 INJECTION, SOLUTION INTRAVENOUS at 10:08

## 2024-08-24 RX ADMIN — DOCUSATE SODIUM 100 MG: 100 CAPSULE, LIQUID FILLED ORAL at 10:08

## 2024-08-24 NOTE — HPI
Natali Galeano is a 76 y.o. female who is an established patient who was self-referred for evaluation of kidney cyst.       She was admitted to hospital in 1/17 with SBO. At that time a CT scan with contrast only and TOI showed atypical L renal cysts - 3.3cm LP and 1.9cm. TOI states that cysts cannot be classified as simple and may have solid component.      Denies hematuria, UTIs, kidney issues. Denies  malignancy. She has had previous colon surgery (2002 for colon ca) and ventral hernia repair (2004 in epigastric area).      Cr 0.8 (1/17)     CT urogram was done that showed Bosniak III cysts in L UP and Bosniak 2F cyst in L MP.     She is s/p RAL converted to open L partial nephrectomy on 6/21/17.   
Patient with a known history of HTN, HLD, and colon cancer post resection was admitted for a planned robotic assisted laparoscopic left partial nephrectomy.   The procedure was converted to an open procedure due to adhesions.    
DISPLAY PLAN FREE TEXT

## 2024-08-24 NOTE — PROGRESS NOTES
[facility] [address])  Gastroenterology  Progress Note    Patient Name: Natali Galeano  MRN: [id number]  Admission Date: 8/22/2024  Hospital Length of Stay: 1 days  Code Status: Full Code   Attending Provider: Gretel Smith MD  Consulting Provider: Elver Sylvester MD  Primary Care Physician: Marin Nettles MD  Principal Problem: Small bowel obstruction    Subjective:     Interval History: This lady is feeling better with NG suction. She has no acute visceral signs or that of blood loss. She has not had a bowel movement though passes flatus. She remains with a distended abdomen with tympany behaving as a partial obstruction. No signs of cholangitis or visceral ischemia.    Review of Systems   Constitutional:  Positive for activity change and fatigue.   HENT:  Positive for congestion. Negative for trouble swallowing.    Eyes:  Negative for discharge and itching.   Respiratory:  Negative for apnea, choking, chest tightness and shortness of breath.    Cardiovascular:  Negative for chest pain, palpitations and leg swelling.   Gastrointestinal:  Positive for abdominal distention, abdominal pain and nausea. Negative for anal bleeding, blood in stool, constipation, diarrhea, rectal pain and vomiting.   Endocrine: Negative for cold intolerance and heat intolerance.   Genitourinary:  Negative for difficulty urinating and flank pain.   Musculoskeletal:  Positive for arthralgias, back pain and myalgias.   Allergic/Immunologic: Negative.    Neurological: Negative.    Hematological: Negative.    Psychiatric/Behavioral: Negative.       Objective:     Vital Signs (Most Recent):  Temp: 98.5 °F (36.9 °C) (08/24/24 0814)  Pulse: 75 (08/24/24 0814)  Resp: 18 (08/24/24 0814)  BP: (!) 154/75 (08/24/24 0814)  SpO2: (!) 94 % (08/24/24 0814) Vital Signs (24h Range):  Temp:  [97.9 °F (36.6 °C)-98.8 °F (37.1 °C)] 98.5 °F (36.9 °C)  Pulse:  [72-95] 75  Resp:  [14-20] 18  SpO2:  [92 %-97 %] 94 %  BP: (137-170)/(62-80)  154/75     Weight: 126.1 kg (278 lb) (08/23/24 1232)  Body mass index is 43.54 kg/m².      Intake/Output Summary (Last 24 hours) at 8/24/2024 0901  Last data filed at 8/24/2024 0513  Gross per 24 hour   Intake 1236.23 ml   Output 600 ml   Net 636.23 ml       Lines/Drains/Airways       Drain  Duration                  NG/OG Tube 08/23/24 1100 12 Fr. Left nostril <1 day              Peripheral Intravenous Line  Duration                  Peripheral IV - Single Lumen 08/23/24 1932 20 G Anterior;Left;Proximal Forearm <1 day                    Physical Exam  Constitutional:       Appearance: She is obese. She is ill-appearing.   HENT:      Head: Normocephalic and atraumatic.      Nose: Nose normal. No congestion.      Comments: NG in place     Mouth/Throat:      Mouth: Mucous membranes are moist.      Pharynx: No oropharyngeal exudate.   Eyes:      General: No scleral icterus.     Extraocular Movements: Extraocular movements intact.      Pupils: Pupils are equal, round, and reactive to light.   Cardiovascular:      Rate and Rhythm: Normal rate and regular rhythm.   Pulmonary:      Effort: Pulmonary effort is normal.      Breath sounds: Normal breath sounds.   Abdominal:      General: Bowel sounds are normal. There is distension.      Palpations: Abdomen is soft. There is no mass.      Tenderness: There is abdominal tenderness. There is no guarding.      Hernia: No hernia is present.   Musculoskeletal:         General: Swelling present. No tenderness.      Cervical back: Normal range of motion and neck supple.   Skin:     General: Skin is warm and dry.      Coloration: Skin is not jaundiced.   Neurological:      General: No focal deficit present.      Mental Status: She is alert. Mental status is at baseline.   Psychiatric:         Mood and Affect: Mood normal.         Thought Content: Thought content normal.         Judgment: Judgment normal.         Significant Labs:  Low potassium, mildly elevated glucose, no anemia       Significant Imaging:  CT reviewed and dilated small bowel loops noted    Assessment/Plan: This lady has clinical signs of a partial obstruction and her NG is producing a large volume of bilious non bloody material. Continue NG suction for now and check KUB tomorrow. Continue hydration and supportive measures.     Active Diagnoses:    Diagnosis Date Noted POA    PRINCIPAL PROBLEM:  Small bowel obstruction [K56.609] 08/23/2024 Yes    Gout [M10.9] 10/27/2020 Yes     Chronic    Essential hypertension [I10]  Yes     Chronic      Problems Resolved During this Admission:           Thank you for your consult.     Elver Sylvester MD  Gastroenterology  [facility] ([address])

## 2024-08-24 NOTE — SUBJECTIVE & OBJECTIVE
Interval History: Ms Hurst reports 10/10 abdominal pain.  Bentyl helps for one hour.  She reports passing gas but no BM yet.  NG tube to INT suction with clear output. Pt tolerating clear liquid diet.    Review of Systems   Constitutional:  Negative for activity change, appetite change and fever.   HENT:  Negative for congestion and sore throat.    Eyes:  Negative for visual disturbance.   Respiratory:  Negative for shortness of breath.    Cardiovascular:  Negative for chest pain and leg swelling.   Gastrointestinal:  Positive for abdominal pain and constipation. Negative for abdominal distention, nausea and vomiting.        Last BM Monday   Genitourinary:  Negative for difficulty urinating and flank pain.   Musculoskeletal:  Negative for arthralgias and joint swelling.   Skin: Negative.    Neurological:  Negative for dizziness, syncope and weakness.   Psychiatric/Behavioral:  Negative for agitation and behavioral problems.      Objective:     Vital Signs (Most Recent):  Temp: 98.5 °F (36.9 °C) (08/24/24 0814)  Pulse: 75 (08/24/24 0814)  Resp: 18 (08/24/24 0911)  BP: (!) 154/75 (08/24/24 0814)  SpO2: (!) 94 % (08/24/24 0814) Vital Signs (24h Range):  Temp:  [97.9 °F (36.6 °C)-98.8 °F (37.1 °C)] 98.5 °F (36.9 °C)  Pulse:  [72-95] 75  Resp:  [14-20] 18  SpO2:  [92 %-97 %] 94 %  BP: (137-170)/(62-80) 154/75     Weight: 126.1 kg (278 lb)  Body mass index is 43.54 kg/m².    Intake/Output Summary (Last 24 hours) at 8/24/2024 1110  Last data filed at 8/24/2024 0513  Gross per 24 hour   Intake 1236.23 ml   Output 600 ml   Net 636.23 ml         Physical Exam  Vitals and nursing note reviewed.   HENT:      Head: Normocephalic.      Nose:      Comments: NG to intermittent suction right nare     Mouth/Throat:      Mouth: Mucous membranes are moist.   Eyes:      Extraocular Movements: Extraocular movements intact.      Pupils: Pupils are equal, round, and reactive to light.   Pulmonary:      Effort: Pulmonary effort is normal.  No respiratory distress.      Breath sounds: No wheezing or rales.   Abdominal:      General: Bowel sounds are normal. There is no distension.      Palpations: Abdomen is soft.      Tenderness: There is abdominal tenderness. There is no guarding.   Musculoskeletal:         General: Normal range of motion.      Right lower leg: No edema.      Left lower leg: No edema.   Skin:     General: Skin is warm.   Neurological:      General: No focal deficit present.      Mental Status: She is alert and oriented to person, place, and time.   Psychiatric:         Mood and Affect: Mood normal.         Behavior: Behavior normal.             Significant Labs: All pertinent labs within the past 24 hours have been reviewed.  BMP:   Recent Labs   Lab 08/24/24  0513   *      K 3.3*      CO2 28   BUN 11   CREATININE 0.8   CALCIUM 9.9   MG 2.0     CBC:   Recent Labs   Lab 08/22/24  1146 08/23/24  0457 08/24/24  0514   WBC 12.38 9.05 8.54   HGB 15.1 13.7 13.7   HCT 47.7 43.5 43.7    233 223     CMP:   Recent Labs   Lab 08/22/24  1146 08/23/24  0457 08/24/24  0513    140 138   K 3.3* 3.7 3.3*    103 101   CO2 27 26 28   * 138* 130*   BUN 8 8 11   CREATININE 0.8 0.8 0.8   CALCIUM 10.7* 10.1 9.9   PROT 8.5* 7.3  --    ALBUMIN 4.1 3.6  --    BILITOT 0.6 0.6  --    ALKPHOS 126 110  --    AST 22 20  --    ALT 18 13  --    ANIONGAP 13 11 9       Significant Imaging: I have reviewed all pertinent imaging results/findings within the past 24 hours.  XR NG/OG tube placement check, non-radiologist performed  Narrative: EXAMINATION:  XR NG/OG TUBE PLACEMENT CHECK, NON-RADIOLOGIST PERFORMED    CLINICAL HISTORY:  NGT placement confirmation;    TECHNIQUE:  Supine radiograph of the upper abdomen.    COMPARISON:  None    FINDINGS:  Exam is overpenetrated.  Placement of an NG tube which projects over the left upper quadrant of the expected location of the stomach.  No bowel dilatation in the upper  abdomen.  Impression: Please see above.    Electronically signed by: Giselle Parr  Date:    08/23/2024  Time:    11:33  X-Ray Abdomen AP 1 View (KUB)  EXAMINATION:  XR ABDOMEN AP 1 VIEW    CLINICAL HISTORY:  Unspecified abdominal pain    TECHNIQUE:  AP View(s) of the abdomen was performed.    COMPARISON:  Abdominal radiograph 11/03/2020, 12:57 hours.  No definite dilated gas-filled loops small bowel or colon appreciated.  Moderate to large volume stool noted within colonic loops in the right hemiabdomen, proximal transverse colon, and distal descending colon/sigmoid colon region.    Air appears to be present in the distal colon and rectum.    No acute findings in the visualized chest.  Degenerative changes of the spine, hips, sacroiliac joints, and pubic symphysis.  Contrast material appears to be present within the urinary bladder.    Phleboliths.    No definite rib foreign body.    FINDINGS:  Nonspecific, nonobstructive bowel gas pattern.    Moderate volume colonic stool.    Electronically signed by: Lg Dorantes  Date:    08/23/2024  Time:    06:35

## 2024-08-24 NOTE — PLAN OF CARE
Problem: Adult Inpatient Plan of Care  Goal: Plan of Care Review  Outcome: Progressing  Goal: Patient-Specific Goal (Individualized)  Outcome: Progressing  Goal: Absence of Hospital-Acquired Illness or Injury  Outcome: Progressing  Goal: Optimal Comfort and Wellbeing  Outcome: Progressing     Problem: Fall Injury Risk  Goal: Absence of Fall and Fall-Related Injury  Outcome: Progressing     POC reviewed with patient. All questions and concerns addressed. Fall/safety precautions implemented and maintained. HIEN choudhary NGT to LIWS. IVF continued. Blood glucose monitored. Pain management provided. No acute events noted this shift. Please see flowsheet for full assessment and vitals. Bed locked in lowest position. Side rails up x2. Call bell within reach.

## 2024-08-24 NOTE — PROGRESS NOTES
[facility] Surg [address] Medicine  Progress Note    Patient Name: Natali Galeano  MRN: [id number]  Patient Class: IP- Inpatient   Admission Date: 8/22/2024  Length of Stay: 1 days  Attending Physician: Gretel Smith MD  Primary Care Provider: Marin Nettles MD        Subjective:     Principal Problem:Small bowel obstruction        HPI:  Mrs Hurst is an 83 year old female with a PMH that includes colon cancer with resection 2002, SBO, HTN, ALYSE, and Gout. She came to the hospital for lower abdominal pain, n/v that began Wednesday night. In the ED she reported lower ab pain but did not vomit. She has no leukocytosis and is afebrile. On CT A/P, there was noted to be the small bowel feces sign.  Gastroenterology was consulted. Patient upgraded from EDOU.       Overview/Hospital Course:  No notes on file    Interval History: Ms Hurst reports 10/10 abdominal pain.  Bentyl helps for one hour.  She reports passing gas but no BM yet.  NG tube to INT suction with clear output. Pt tolerating clear liquid diet.    Review of Systems   Constitutional:  Negative for activity change, appetite change and fever.   HENT:  Negative for congestion and sore throat.    Eyes:  Negative for visual disturbance.   Respiratory:  Negative for shortness of breath.    Cardiovascular:  Negative for chest pain and leg swelling.   Gastrointestinal:  Positive for abdominal pain and constipation. Negative for abdominal distention, nausea and vomiting.        Last BM Monday   Genitourinary:  Negative for difficulty urinating and flank pain.   Musculoskeletal:  Negative for arthralgias and joint swelling.   Skin: Negative.    Neurological:  Negative for dizziness, syncope and weakness.   Psychiatric/Behavioral:  Negative for agitation and behavioral problems.      Objective:     Vital Signs (Most Recent):  Temp: 98.5 °F (36.9 °C) (08/24/24 0814)  Pulse: 75 (08/24/24 0814)  Resp: 18 (08/24/24 0911)  BP: (!) 154/75 (08/24/24  0814)  SpO2: (!) 94 % (08/24/24 0814) Vital Signs (24h Range):  Temp:  [97.9 °F (36.6 °C)-98.8 °F (37.1 °C)] 98.5 °F (36.9 °C)  Pulse:  [72-95] 75  Resp:  [14-20] 18  SpO2:  [92 %-97 %] 94 %  BP: (137-170)/(62-80) 154/75     Weight: 126.1 kg (278 lb)  Body mass index is 43.54 kg/m².    Intake/Output Summary (Last 24 hours) at 8/24/2024 1110  Last data filed at 8/24/2024 0513  Gross per 24 hour   Intake 1236.23 ml   Output 600 ml   Net 636.23 ml         Physical Exam  Vitals and nursing note reviewed.   HENT:      Head: Normocephalic.      Nose:      Comments: NG to intermittent suction right nare     Mouth/Throat:      Mouth: Mucous membranes are moist.   Eyes:      Extraocular Movements: Extraocular movements intact.      Pupils: Pupils are equal, round, and reactive to light.   Pulmonary:      Effort: Pulmonary effort is normal. No respiratory distress.      Breath sounds: No wheezing or rales.   Abdominal:      General: Bowel sounds are normal. There is no distension.      Palpations: Abdomen is soft.      Tenderness: There is abdominal tenderness. There is no guarding.   Musculoskeletal:         General: Normal range of motion.      Right lower leg: No edema.      Left lower leg: No edema.   Skin:     General: Skin is warm.   Neurological:      General: No focal deficit present.      Mental Status: She is alert and oriented to person, place, and time.   Psychiatric:         Mood and Affect: Mood normal.         Behavior: Behavior normal.             Significant Labs: All pertinent labs within the past 24 hours have been reviewed.  BMP:   Recent Labs   Lab 08/24/24  0513   *      K 3.3*      CO2 28   BUN 11   CREATININE 0.8   CALCIUM 9.9   MG 2.0     CBC:   Recent Labs   Lab 08/22/24  1146 08/23/24  0457 08/24/24  0514   WBC 12.38 9.05 8.54   HGB 15.1 13.7 13.7   HCT 47.7 43.5 43.7    233 223     CMP:   Recent Labs   Lab 08/22/24  1146 08/23/24  0457 08/24/24  0513    140 138   K  3.3* 3.7 3.3*    103 101   CO2 27 26 28   * 138* 130*   BUN 8 8 11   CREATININE 0.8 0.8 0.8   CALCIUM 10.7* 10.1 9.9   PROT 8.5* 7.3  --    ALBUMIN 4.1 3.6  --    BILITOT 0.6 0.6  --    ALKPHOS 126 110  --    AST 22 20  --    ALT 18 13  --    ANIONGAP 13 11 9       Significant Imaging: I have reviewed all pertinent imaging results/findings within the past 24 hours.  XR NG/OG tube placement check, non-radiologist performed  Narrative: EXAMINATION:  XR NG/OG TUBE PLACEMENT CHECK, NON-RADIOLOGIST PERFORMED    CLINICAL HISTORY:  NGT placement confirmation;    TECHNIQUE:  Supine radiograph of the upper abdomen.    COMPARISON:  None    FINDINGS:  Exam is overpenetrated.  Placement of an NG tube which projects over the left upper quadrant of the expected location of the stomach.  No bowel dilatation in the upper abdomen.  Impression: Please see above.    Electronically signed by: Giselle Parr  Date:    08/23/2024  Time:    11:33  X-Ray Abdomen AP 1 View (KUB)  EXAMINATION:  XR ABDOMEN AP 1 VIEW    CLINICAL HISTORY:  Unspecified abdominal pain    TECHNIQUE:  AP View(s) of the abdomen was performed.    COMPARISON:  Abdominal radiograph 11/03/2020, 12:57 hours.  No definite dilated gas-filled loops small bowel or colon appreciated.  Moderate to large volume stool noted within colonic loops in the right hemiabdomen, proximal transverse colon, and distal descending colon/sigmoid colon region.    Air appears to be present in the distal colon and rectum.    No acute findings in the visualized chest.  Degenerative changes of the spine, hips, sacroiliac joints, and pubic symphysis.  Contrast material appears to be present within the urinary bladder.    Phleboliths.    No definite rib foreign body.    FINDINGS:  Nonspecific, nonobstructive bowel gas pattern.    Moderate volume colonic stool.    Electronically signed by: Lg Dorantes  Date:    08/23/2024  Time:    06:35        Assessment/Plan:      * Small bowel  obstruction  H/O Colon resection and small bowel obstruction  CT A/P with fecalization of small bowel  NG placed in ED  Continue clear liquid diet  GI consulted: Appreciate recommendations which include: colace 100 mg daily, miralax 17g BID, clears for now, PRN bentyl, limit narcotics  KUB in the morning        Essential hypertension    Hypertension Medications               amLODIPine (NORVASC) 10 MG tablet Take 1 tablet (10 mg total) by mouth once daily. High blood pressure    hydroCHLOROthiazide (HYDRODIURIL) 25 MG tablet Take 0.5 tablets (12.5 mg total) by mouth once daily. Take 1/2 of the 25 mg tablet daily          Continue the above home regimen    Gout  Continue 300 mg allopurinol daily        VTE Risk Mitigation (From admission, onward)           Ordered     IP VTE HIGH RISK PATIENT  Once         08/22/24 1351     Place sequential compression device  Until discontinued         08/22/24 1351                    Discharge Planning   YOMI:      Code Status: Full Code   Is the patient medically ready for discharge?:     Reason for patient still in hospital (select all that apply): Patient trending condition and Consult recommendations  Discharge Plan A: Home with family, Home                  Jenni Mancuso DNP  Department of Hospital Medicine   [facility] - [facility] Surg ([address])

## 2024-08-24 NOTE — ASSESSMENT & PLAN NOTE
H/O Colon resection and small bowel obstruction  CT A/P with fecalization of small bowel  NG placed in ED  Continue clear liquid diet  GI consulted: Appreciate recommendations which include: colace 100 mg daily, miralax 17g BID, clears for now, PRN bentyl, limit narcotics  KUB in the morning

## 2024-08-25 DIAGNOSIS — E78.49 OTHER HYPERLIPIDEMIA: Chronic | ICD-10-CM

## 2024-08-25 LAB
ANION GAP SERPL CALC-SCNC: 8 MMOL/L (ref 8–16)
BUN SERPL-MCNC: 15 MG/DL (ref 8–23)
CALCIUM SERPL-MCNC: 9.3 MG/DL (ref 8.7–10.5)
CHLORIDE SERPL-SCNC: 105 MMOL/L (ref 95–110)
CO2 SERPL-SCNC: 26 MMOL/L (ref 23–29)
CREAT SERPL-MCNC: 0.8 MG/DL (ref 0.5–1.4)
ERYTHROCYTE [DISTWIDTH] IN BLOOD BY AUTOMATED COUNT: 15.7 % (ref 11.5–14.5)
EST. GFR  (NO RACE VARIABLE): >60 ML/MIN/1.73 M^2
GLUCOSE SERPL-MCNC: 96 MG/DL (ref 70–110)
HCT VFR BLD AUTO: 42.6 % (ref 37–48.5)
HGB BLD-MCNC: 13.1 G/DL (ref 12–16)
MAGNESIUM SERPL-MCNC: 2 MG/DL (ref 1.6–2.6)
MCH RBC QN AUTO: 25.6 PG (ref 27–31)
MCHC RBC AUTO-ENTMCNC: 30.8 G/DL (ref 32–36)
MCV RBC AUTO: 83 FL (ref 82–98)
PHOSPHATE SERPL-MCNC: 3 MG/DL (ref 2.7–4.5)
PLATELET # BLD AUTO: 204 K/UL (ref 150–450)
PMV BLD AUTO: 10 FL (ref 9.2–12.9)
POCT GLUCOSE: 107 MG/DL (ref 70–110)
POCT GLUCOSE: 97 MG/DL (ref 70–110)
POCT GLUCOSE: 99 MG/DL (ref 70–110)
POCT GLUCOSE: 99 MG/DL (ref 70–110)
POTASSIUM SERPL-SCNC: 3.5 MMOL/L (ref 3.5–5.1)
RBC # BLD AUTO: 5.11 M/UL (ref 4–5.4)
SODIUM SERPL-SCNC: 139 MMOL/L (ref 136–145)
WBC # BLD AUTO: 8.28 K/UL (ref 3.9–12.7)

## 2024-08-25 PROCEDURE — 84100 ASSAY OF PHOSPHORUS: CPT | Performed by: NURSE PRACTITIONER

## 2024-08-25 PROCEDURE — 99900035 HC TECH TIME PER 15 MIN (STAT)

## 2024-08-25 PROCEDURE — 94761 N-INVAS EAR/PLS OXIMETRY MLT: CPT

## 2024-08-25 PROCEDURE — 36415 COLL VENOUS BLD VENIPUNCTURE: CPT | Performed by: NURSE PRACTITIONER

## 2024-08-25 PROCEDURE — 83735 ASSAY OF MAGNESIUM: CPT | Performed by: NURSE PRACTITIONER

## 2024-08-25 PROCEDURE — 21400001 HC TELEMETRY ROOM

## 2024-08-25 PROCEDURE — 85027 COMPLETE CBC AUTOMATED: CPT | Performed by: NURSE PRACTITIONER

## 2024-08-25 PROCEDURE — 25000003 PHARM REV CODE 250: Performed by: NURSE PRACTITIONER

## 2024-08-25 PROCEDURE — 80048 BASIC METABOLIC PNL TOTAL CA: CPT | Performed by: NURSE PRACTITIONER

## 2024-08-25 RX ADMIN — POLYETHYLENE GLYCOL 3350 17 G: 17 POWDER, FOR SOLUTION ORAL at 08:08

## 2024-08-25 RX ADMIN — Medication 2000 UNITS: at 08:08

## 2024-08-25 RX ADMIN — ATORVASTATIN CALCIUM 10 MG: 10 TABLET, FILM COATED ORAL at 08:08

## 2024-08-25 RX ADMIN — HYDROCHLOROTHIAZIDE 12.5 MG: 12.5 TABLET ORAL at 08:08

## 2024-08-25 RX ADMIN — AMLODIPINE BESYLATE 10 MG: 5 TABLET ORAL at 08:08

## 2024-08-25 RX ADMIN — DOCUSATE SODIUM 100 MG: 100 CAPSULE, LIQUID FILLED ORAL at 08:08

## 2024-08-25 NOTE — PLAN OF CARE
Problem: Adult Inpatient Plan of Care  Goal: Plan of Care Review  Outcome: Progressing  Goal: Patient-Specific Goal (Individualized)  Outcome: Progressing  Goal: Absence of Hospital-Acquired Illness or Injury  Outcome: Progressing  Goal: Optimal Comfort and Wellbeing  Outcome: Progressing  Goal: Readiness for Transition of Care  Outcome: Progressing     Problem: Bariatric Environmental Safety  Goal: Safety Maintained with Care  Outcome: Progressing     Problem: Fall Injury Risk  Goal: Absence of Fall and Fall-Related Injury  Outcome: Progressing    Diet advanced. NGT clamped. IV fluids D/C'd. Bowel movement today. Pt denies pain or nausea. OK for no IV per GINACIO Mancuso NP. Safety maintained.

## 2024-08-25 NOTE — HOSPITAL COURSE
Mrs Hurst is being treated for an ileus and fecalization of the small bowel. She has an NG tube with low, clear colored outptut. She is tolerating a clear liquid diet and her abdominal pain is controlled. Her diet was advanced today and she is tolerating it. NGT removed 8/26.  Tolerating PO, symptoms resolved. Stable for discharge with GI and PCP follow up, return precautions discussed, no further questions at discharge

## 2024-08-25 NOTE — PLAN OF CARE
POC reviewed with patient this shift.  A/O x4.  Respirations unlabored on RA.  Skin w/d.  Continent of b/b.  NGT continues to LIWS as ordered.  Tolerates meds whole with sips of water without difficulty.  No c/o N/V this shift.  No episodes of emesis observed or reported.  Passing flatus but still no BM this shift.  VSS.  No c/o pain or discomfort thus far in shift.  See flowsheet for full assessment.  Able to verbalize wants/needs.  No s/s of distress.  Fall/safety precautions maintained.      Problem: Adult Inpatient Plan of Care  Goal: Plan of Care Review  Outcome: Progressing     Problem: Fall Injury Risk  Goal: Absence of Fall and Fall-Related Injury  Outcome: Progressing

## 2024-08-25 NOTE — PROGRESS NOTES
[facility] [place] [place] Surg [address] Medicine  Progress Note    Patient Name: Natali Galeano  MRN: [id number]  Patient Class: IP- Inpatient   Admission Date: 8/22/2024  Length of Stay: 2 days  Attending Physician: Gretel Smith MD  Primary Care Provider: Marin Nettles MD        Subjective:     Principal Problem:Small bowel obstruction        HPI:  Mrs Hurst is an 83 year old female with a PMH that includes colon cancer with resection 2002, SBO, HTN, ALYSE, and Gout. She came to the hospital for lower abdominal pain, n/v that began Wednesday night. In the ED she reported lower ab pain but did not vomit. She has no leukocytosis and is afebrile. On CT A/P, there was noted to be the small bowel feces sign.  Gastroenterology was consulted. Patient upgraded from EDOU.       Overview/Hospital Course:  Mrs Hurst is being treated for an ileus and fecalization of the small bowel. She has an NG tube with low, clear colored outptut. She is tolerating a clear liquid diet and her abdominal pain is controlled.     Interval History: Pain improving. No nausea. Patient ambulating and passing gas. She has not had a bowel movement yet. Clear output to NG tube.  Encouraged ambulation.  KUB pending.     Review of Systems   Constitutional:  Negative for activity change, appetite change and fever.   HENT:  Negative for congestion and sore throat.    Eyes:  Negative for visual disturbance.   Respiratory:  Negative for shortness of breath.    Cardiovascular:  Negative for chest pain and leg swelling.   Gastrointestinal:  Positive for abdominal pain and constipation. Negative for abdominal distention, nausea and vomiting.        Last BM Monday   Genitourinary:  Negative for difficulty urinating and flank pain.   Musculoskeletal:  Negative for arthralgias and joint swelling.   Skin: Negative.    Neurological:  Negative for dizziness, syncope and weakness.   Psychiatric/Behavioral:  Negative for agitation and behavioral  problems.      Objective:     Vital Signs (Most Recent):  Temp: 97.9 °F (36.6 °C) (08/25/24 0752)  Pulse: 76 (08/25/24 0752)  Resp: 18 (08/25/24 0752)  BP: 126/64 (08/25/24 0752)  SpO2: (!) 93 % (08/25/24 0752) Vital Signs (24h Range):  Temp:  [97.5 °F (36.4 °C)-99 °F (37.2 °C)] 97.9 °F (36.6 °C)  Pulse:  [] 76  Resp:  [13-18] 18  SpO2:  [92 %-99 %] 93 %  BP: (109-174)/(53-76) 126/64     Weight: 126.1 kg (278 lb)  Body mass index is 43.54 kg/m².    Intake/Output Summary (Last 24 hours) at 8/25/2024 0847  Last data filed at 8/25/2024 0527  Gross per 24 hour   Intake 2261.53 ml   Output 1550 ml   Net 711.53 ml         Physical Exam  Vitals and nursing note reviewed.   HENT:      Head: Normocephalic.      Nose:      Comments: NG to intermittent suction right nare     Mouth/Throat:      Mouth: Mucous membranes are moist.   Eyes:      Extraocular Movements: Extraocular movements intact.      Pupils: Pupils are equal, round, and reactive to light.   Pulmonary:      Effort: Pulmonary effort is normal. No respiratory distress.      Breath sounds: No wheezing or rales.   Abdominal:      General: Bowel sounds are normal. There is no distension.      Palpations: Abdomen is soft.      Tenderness: There is abdominal tenderness. There is no guarding.   Musculoskeletal:         General: Normal range of motion.      Right lower leg: No edema.      Left lower leg: No edema.   Skin:     General: Skin is warm.   Neurological:      General: No focal deficit present.      Mental Status: She is alert and oriented to person, place, and time.   Psychiatric:         Mood and Affect: Mood normal.         Behavior: Behavior normal.             Significant Labs: All pertinent labs within the past 24 hours have been reviewed.  BMP:   Recent Labs   Lab 08/25/24  0449   GLU 96      K 3.5      CO2 26   BUN 15   CREATININE 0.8   CALCIUM 9.3   MG 2.0     CBC:   Recent Labs   Lab 08/24/24  0514 08/25/24  0449   WBC 8.54 8.28   HGB  13.7 13.1   HCT 43.7 42.6    204       Significant Imaging: I have reviewed all pertinent imaging results/findings within the past 24 hours.  XR NG/OG tube placement check, non-radiologist performed  Narrative: EXAMINATION:  XR NG/OG TUBE PLACEMENT CHECK, NON-RADIOLOGIST PERFORMED    CLINICAL HISTORY:  NGT placement confirmation;    TECHNIQUE:  Supine radiograph of the upper abdomen.    COMPARISON:  None    FINDINGS:  Exam is overpenetrated.  Placement of an NG tube which projects over the left upper quadrant of the expected location of the stomach.  No bowel dilatation in the upper abdomen.  Impression: Please see above.    Electronically signed by: Giselle Parr  Date:    08/23/2024  Time:    11:33  X-Ray Abdomen AP 1 View (KUB)  EXAMINATION:  XR ABDOMEN AP 1 VIEW    CLINICAL HISTORY:  Unspecified abdominal pain    TECHNIQUE:  AP View(s) of the abdomen was performed.    COMPARISON:  Abdominal radiograph 11/03/2020, 12:57 hours.  No definite dilated gas-filled loops small bowel or colon appreciated.  Moderate to large volume stool noted within colonic loops in the right hemiabdomen, proximal transverse colon, and distal descending colon/sigmoid colon region.    Air appears to be present in the distal colon and rectum.    No acute findings in the visualized chest.  Degenerative changes of the spine, hips, sacroiliac joints, and pubic symphysis.  Contrast material appears to be present within the urinary bladder.    Phleboliths.    No definite rib foreign body.    FINDINGS:  Nonspecific, nonobstructive bowel gas pattern.    Moderate volume colonic stool.    Electronically signed by: Lg Dorantes  Date:    08/23/2024  Time:    06:35        Assessment/Plan:      * Small bowel obstruction  H/O Colon resection and small bowel obstruction  CT A/P with fecalization of small bowel  NG placed in ED  Continue clear liquid diet  GI consulted: Appreciate recommendations which include: colace 100 mg daily, miralax 17g  BID, clears for now, PRN bentyl, limit narcotics  KUB pending        Essential hypertension    Hypertension Medications               amLODIPine (NORVASC) 10 MG tablet Take 1 tablet (10 mg total) by mouth once daily. High blood pressure    hydroCHLOROthiazide (HYDRODIURIL) 25 MG tablet Take 0.5 tablets (12.5 mg total) by mouth once daily. Take 1/2 of the 25 mg tablet daily          Continue the above home regimen    Gout  Continue 300 mg allopurinol daily        VTE Risk Mitigation (From admission, onward)           Ordered     IP VTE HIGH RISK PATIENT  Once         08/22/24 1351     Place sequential compression device  Until discontinued         08/22/24 1351                    Discharge Planning   YOMI:      Code Status: Full Code   Is the patient medically ready for discharge?:     Reason for patient still in hospital (select all that apply): Patient trending condition and Consult recommendations  Discharge Plan A: Home with family, Home                  Jenni Mancuso DNP  Department of Hospital Medicine   [facility] - [facility] Surg ([address])

## 2024-08-25 NOTE — PROGRESS NOTES
[facility] Surg [address])  Gastroenterology  Progress Note    Patient Name: Natali Galeano  MRN: [id number]  Admission Date: 8/22/2024  Hospital Length of Stay: 2 days  Code Status: Full Code   Attending Provider: Gretel Smith MD  Consulting Provider: Elver Sylvester MD  Primary Care Physician: Marin Nettles MD  Principal Problem: Small bowel obstruction    Subjective:     Interval History: This lady is feeling better and her NG output overnight was ~50 cc. No nausea or emesis. She is getting an abdominal film this morning and upon review may possibly clamp and observe.    Review of Systems   Constitutional:  Positive for activity change and fatigue.   HENT: Negative.     Eyes: Negative.    Respiratory: Negative.     Cardiovascular: Negative.    Gastrointestinal:  Positive for abdominal distention, abdominal pain, nausea and vomiting.   Endocrine: Negative.    Genitourinary:  Negative for difficulty urinating and flank pain.   Musculoskeletal:  Positive for arthralgias, gait problem and myalgias.   Skin: Negative.    Allergic/Immunologic: Negative.    Neurological:  Positive for weakness. Negative for dizziness.   Hematological: Negative.    Psychiatric/Behavioral: Negative.       Objective:     Vital Signs (Most Recent):  Temp: 97.9 °F (36.6 °C) (08/25/24 0752)  Pulse: 76 (08/25/24 0752)  Resp: 18 (08/25/24 0752)  BP: 126/64 (08/25/24 0752)  SpO2: (!) 93 % (08/25/24 0752) Vital Signs (24h Range):  Temp:  [97.5 °F (36.4 °C)-99 °F (37.2 °C)] 97.9 °F (36.6 °C)  Pulse:  [] 76  Resp:  [13-18] 18  SpO2:  [92 %-99 %] 93 %  BP: (109-174)/(53-76) 126/64     Weight: 126.1 kg (278 lb) (08/23/24 1232)  Body mass index is 43.54 kg/m².      Intake/Output Summary (Last 24 hours) at 8/25/2024 0831  Last data filed at 8/25/2024 0559  Gross per 24 hour   Intake 2261.53 ml   Output 1550 ml   Net 711.53 ml       Lines/Drains/Airways       Drain  Duration                  NG/OG Tube 08/23/24 1100 12 Fr. Left  nostril 1 day              Peripheral Intravenous Line  Duration                  Peripheral IV - Single Lumen 08/23/24 1932 20 G Anterior;Left;Proximal Forearm 1 day                    Physical Exam  Vitals and nursing note reviewed.   Constitutional:       General: She is not in acute distress.     Appearance: She is obese. She is not ill-appearing.   HENT:      Head: Normocephalic and atraumatic.      Nose: Nose normal. No congestion.      Mouth/Throat:      Mouth: Mucous membranes are moist.      Pharynx: No oropharyngeal exudate.   Eyes:      General: No scleral icterus.     Extraocular Movements: Extraocular movements intact.      Pupils: Pupils are equal, round, and reactive to light.   Cardiovascular:      Rate and Rhythm: Normal rate and regular rhythm.   Pulmonary:      Effort: Pulmonary effort is normal.      Breath sounds: Normal breath sounds.   Abdominal:      General: There is distension.      Palpations: There is no mass.      Tenderness: There is abdominal tenderness. There is no guarding or rebound.      Hernia: No hernia is present.   Musculoskeletal:         General: Normal range of motion.      Cervical back: Normal range of motion and neck supple.   Skin:     General: Skin is warm and dry.   Neurological:      General: No focal deficit present.      Mental Status: She is alert and oriented to person, place, and time.   Psychiatric:         Mood and Affect: Mood normal.         Thought Content: Thought content normal.         Judgment: Judgment normal.         Significant Labs:  No leukocytosis or anemia  Normal BMP    Significant Imaging:  Await abdominal film. Prior imaging reviewed    Assessment/Plan: Hopefully with resolving partial small bowel obstruction managed with NG and bowel rest. Will review films when available     Active Diagnoses:    Diagnosis Date Noted POA    PRINCIPAL PROBLEM:  Small bowel obstruction [K56.609] 08/23/2024 Yes    Gout [M10.9] 10/27/2020 Yes     Chronic     Essential hypertension [I10]  Yes     Chronic      Problems Resolved During this Admission:           Thank you for your consult.     Elver Sylvester MD  Gastroenterology  [facility] Surg ([address])

## 2024-08-25 NOTE — SUBJECTIVE & OBJECTIVE
Interval History: Pain improving. No nausea. Patient ambulating and passing gas. She has not had a bowel movement yet. Clear output to NG tube.  Encouraged ambulation.  KUB pending.     Review of Systems   Constitutional:  Negative for activity change, appetite change and fever.   HENT:  Negative for congestion and sore throat.    Eyes:  Negative for visual disturbance.   Respiratory:  Negative for shortness of breath.    Cardiovascular:  Negative for chest pain and leg swelling.   Gastrointestinal:  Positive for abdominal pain and constipation. Negative for abdominal distention, nausea and vomiting.        Last BM Monday   Genitourinary:  Negative for difficulty urinating and flank pain.   Musculoskeletal:  Negative for arthralgias and joint swelling.   Skin: Negative.    Neurological:  Negative for dizziness, syncope and weakness.   Psychiatric/Behavioral:  Negative for agitation and behavioral problems.      Objective:     Vital Signs (Most Recent):  Temp: 97.9 °F (36.6 °C) (08/25/24 0752)  Pulse: 76 (08/25/24 0752)  Resp: 18 (08/25/24 0752)  BP: 126/64 (08/25/24 0752)  SpO2: (!) 93 % (08/25/24 0752) Vital Signs (24h Range):  Temp:  [97.5 °F (36.4 °C)-99 °F (37.2 °C)] 97.9 °F (36.6 °C)  Pulse:  [] 76  Resp:  [13-18] 18  SpO2:  [92 %-99 %] 93 %  BP: (109-174)/(53-76) 126/64     Weight: 126.1 kg (278 lb)  Body mass index is 43.54 kg/m².    Intake/Output Summary (Last 24 hours) at 8/25/2024 0885  Last data filed at 8/25/2024 0527  Gross per 24 hour   Intake 2261.53 ml   Output 1550 ml   Net 711.53 ml         Physical Exam  Vitals and nursing note reviewed.   HENT:      Head: Normocephalic.      Nose:      Comments: NG to intermittent suction right nare     Mouth/Throat:      Mouth: Mucous membranes are moist.   Eyes:      Extraocular Movements: Extraocular movements intact.      Pupils: Pupils are equal, round, and reactive to light.   Pulmonary:      Effort: Pulmonary effort is normal. No respiratory  distress.      Breath sounds: No wheezing or rales.   Abdominal:      General: Bowel sounds are normal. There is no distension.      Palpations: Abdomen is soft.      Tenderness: There is abdominal tenderness. There is no guarding.   Musculoskeletal:         General: Normal range of motion.      Right lower leg: No edema.      Left lower leg: No edema.   Skin:     General: Skin is warm.   Neurological:      General: No focal deficit present.      Mental Status: She is alert and oriented to person, place, and time.   Psychiatric:         Mood and Affect: Mood normal.         Behavior: Behavior normal.             Significant Labs: All pertinent labs within the past 24 hours have been reviewed.  BMP:   Recent Labs   Lab 08/25/24  0449   GLU 96      K 3.5      CO2 26   BUN 15   CREATININE 0.8   CALCIUM 9.3   MG 2.0     CBC:   Recent Labs   Lab 08/24/24  0514 08/25/24  0449   WBC 8.54 8.28   HGB 13.7 13.1   HCT 43.7 42.6    204       Significant Imaging: I have reviewed all pertinent imaging results/findings within the past 24 hours.  XR NG/OG tube placement check, non-radiologist performed  Narrative: EXAMINATION:  XR NG/OG TUBE PLACEMENT CHECK, NON-RADIOLOGIST PERFORMED    CLINICAL HISTORY:  NGT placement confirmation;    TECHNIQUE:  Supine radiograph of the upper abdomen.    COMPARISON:  None    FINDINGS:  Exam is overpenetrated.  Placement of an NG tube which projects over the left upper quadrant of the expected location of the stomach.  No bowel dilatation in the upper abdomen.  Impression: Please see above.    Electronically signed by: Giselle Parr  Date:    08/23/2024  Time:    11:33  X-Ray Abdomen AP 1 View (KUB)  EXAMINATION:  XR ABDOMEN AP 1 VIEW    CLINICAL HISTORY:  Unspecified abdominal pain    TECHNIQUE:  AP View(s) of the abdomen was performed.    COMPARISON:  Abdominal radiograph 11/03/2020, 12:57 hours.  No definite dilated gas-filled loops small bowel or colon appreciated.   Moderate to large volume stool noted within colonic loops in the right hemiabdomen, proximal transverse colon, and distal descending colon/sigmoid colon region.    Air appears to be present in the distal colon and rectum.    No acute findings in the visualized chest.  Degenerative changes of the spine, hips, sacroiliac joints, and pubic symphysis.  Contrast material appears to be present within the urinary bladder.    Phleboliths.    No definite rib foreign body.    FINDINGS:  Nonspecific, nonobstructive bowel gas pattern.    Moderate volume colonic stool.    Electronically signed by: Lg Dorantes  Date:    08/23/2024  Time:    06:35

## 2024-08-25 NOTE — ASSESSMENT & PLAN NOTE
H/O Colon resection and small bowel obstruction  CT A/P with fecalization of small bowel  NG placed in ED  Continue clear liquid diet  GI consulted: Appreciate recommendations which include: colace 100 mg daily, miralax 17g BID, clears for now, PRN bentyl, limit narcotics  KUB pending

## 2024-08-26 LAB
ANION GAP SERPL CALC-SCNC: 11 MMOL/L (ref 8–16)
BUN SERPL-MCNC: 14 MG/DL (ref 8–23)
CALCIUM SERPL-MCNC: 9.3 MG/DL (ref 8.7–10.5)
CHLORIDE SERPL-SCNC: 103 MMOL/L (ref 95–110)
CO2 SERPL-SCNC: 26 MMOL/L (ref 23–29)
CREAT SERPL-MCNC: 0.8 MG/DL (ref 0.5–1.4)
EST. GFR  (NO RACE VARIABLE): >60 ML/MIN/1.73 M^2
GLUCOSE SERPL-MCNC: 91 MG/DL (ref 70–110)
POTASSIUM SERPL-SCNC: 3.3 MMOL/L (ref 3.5–5.1)
SODIUM SERPL-SCNC: 140 MMOL/L (ref 136–145)

## 2024-08-26 PROCEDURE — 36415 COLL VENOUS BLD VENIPUNCTURE: CPT | Performed by: NURSE PRACTITIONER

## 2024-08-26 PROCEDURE — 99900035 HC TECH TIME PER 15 MIN (STAT)

## 2024-08-26 PROCEDURE — 25000003 PHARM REV CODE 250: Performed by: NURSE PRACTITIONER

## 2024-08-26 PROCEDURE — 63600175 PHARM REV CODE 636 W HCPCS: Performed by: NURSE PRACTITIONER

## 2024-08-26 PROCEDURE — 94761 N-INVAS EAR/PLS OXIMETRY MLT: CPT

## 2024-08-26 PROCEDURE — 80048 BASIC METABOLIC PNL TOTAL CA: CPT | Performed by: NURSE PRACTITIONER

## 2024-08-26 PROCEDURE — 21400001 HC TELEMETRY ROOM

## 2024-08-26 RX ORDER — SIMETHICONE 80 MG
1 TABLET,CHEWABLE ORAL 4 TIMES DAILY PRN
Status: DISCONTINUED | OUTPATIENT
Start: 2024-08-26 | End: 2024-08-27 | Stop reason: HOSPADM

## 2024-08-26 RX ORDER — POTASSIUM CHLORIDE 20 MEQ/1
40 TABLET, EXTENDED RELEASE ORAL ONCE
Status: COMPLETED | OUTPATIENT
Start: 2024-08-26 | End: 2024-08-26

## 2024-08-26 RX ORDER — ATORVASTATIN CALCIUM 10 MG/1
TABLET, FILM COATED ORAL
Qty: 90 TABLET | Refills: 3 | Status: SHIPPED | OUTPATIENT
Start: 2024-08-26

## 2024-08-26 RX ADMIN — DICYCLOMINE HYDROCHLORIDE 20 MG: 10 INJECTION, SOLUTION INTRAMUSCULAR at 01:08

## 2024-08-26 RX ADMIN — AMLODIPINE BESYLATE 10 MG: 5 TABLET ORAL at 08:08

## 2024-08-26 RX ADMIN — POLYETHYLENE GLYCOL 3350 17 G: 17 POWDER, FOR SOLUTION ORAL at 09:08

## 2024-08-26 RX ADMIN — Medication 2000 UNITS: at 08:08

## 2024-08-26 RX ADMIN — POTASSIUM CHLORIDE 40 MEQ: 1500 TABLET, EXTENDED RELEASE ORAL at 09:08

## 2024-08-26 RX ADMIN — ATORVASTATIN CALCIUM 10 MG: 10 TABLET, FILM COATED ORAL at 09:08

## 2024-08-26 RX ADMIN — POLYETHYLENE GLYCOL 3350 17 G: 17 POWDER, FOR SOLUTION ORAL at 08:08

## 2024-08-26 RX ADMIN — MELATONIN TAB 3 MG 6 MG: 3 TAB at 09:08

## 2024-08-26 RX ADMIN — HYDROCHLOROTHIAZIDE 12.5 MG: 12.5 TABLET ORAL at 08:08

## 2024-08-26 RX ADMIN — SIMETHICONE 80 MG: 80 TABLET, CHEWABLE ORAL at 12:08

## 2024-08-26 RX ADMIN — DOCUSATE SODIUM 100 MG: 100 CAPSULE, LIQUID FILLED ORAL at 08:08

## 2024-08-26 NOTE — PROGRESS NOTES
"Gastroenterology Progress Note    Reason for Consult: SBO    Subjective:  Pain a little worse this morning than it was yesterday.  She is passing gas and had a bowel movement last night.  No nausea or vomiting.  The NG tube remains in place.      ROS:  Gen: no F/C  CV: no CP/palpitations  Resp: no SOB/wheezing    Physical Exam  BP (!) 152/70   Pulse 71   Temp 97.7 °F (36.5 °C)   Resp 13   Ht 5' 7" (1.702 m)   Wt 126.1 kg (278 lb)   LMP  (LMP Unknown)   SpO2 97%   Breastfeeding No   BMI 43.54 kg/m²   General: Awake, alert female in no apparent distress, obese  HEENT: NC/AT, PERRL, EOMI, MMM  CV: RRR, without murmur, gallop, or rubs  Pulm: CTAB, no wheezing, rales, or rhonchi  GI: soft, nontender, nondistended, hypoactive but +BS, no guarding or rebound tenderness  Neuro: A&Ox3      Medications/Infusions:  Current Facility-Administered Medications   Medication Dose Route Frequency Provider Last Rate Last Admin    acetaminophen tablet 650 mg  650 mg Oral Q6H PRN Jenni Mancuso DNP   650 mg at 08/24/24 0028    amLODIPine tablet 10 mg  10 mg Oral Daily Lisa Yoo, FNP   10 mg at 08/25/24 0855    atorvastatin tablet 10 mg  10 mg Oral QHS Lisa Yoo, FNP   10 mg at 08/25/24 2016    dicyclomine injection 20 mg  20 mg Intramuscular Q6H PRN Lisa Yoo, FNP   20 mg at 08/26/24 0140    docusate sodium capsule 100 mg  100 mg Oral Daily Lisa Yoo, FNP   100 mg at 08/25/24 0854    hydroCHLOROthiazide tablet 12.5 mg  12.5 mg Oral Daily Lisa Yoo FNP   12.5 mg at 08/25/24 0854    melatonin tablet 6 mg  6 mg Oral Nightly PRN Lisa Yoo, FNP        morphine injection 2 mg  2 mg Intravenous Q8H PRN Jenni Mancuso, DNP   2 mg at 08/24/24 1918    ondansetron injection 4 mg  4 mg Intravenous Q6H PRN Lisa Yoo, FNP   4 mg at 08/23/24 1506    polyethylene glycol packet 17 g  17 g Oral BID Lisa Yoo, FNP   17 g at 08/25/24 2018    vitamin D 1000 units tablet 2,000 Units  2,000 " Units Oral Daily Lisa Yoo, FNP   2,000 Units at 08/25/24 0854       Intake and Output:    Intake/Output Summary (Last 24 hours) at 8/26/2024 0750  Last data filed at 8/25/2024 2018  Gross per 24 hour   Intake 623.13 ml   Output --   Net 623.13 ml       Labs:  Lab Results   Component Value Date/Time    WBC 8.28 08/25/2024 04:49 AM    HGB 13.1 08/25/2024 04:49 AM    HCT 42.6 08/25/2024 04:49 AM     08/25/2024 04:49 AM    MCV 83 08/25/2024 04:49 AM     08/26/2024 04:27 AM    K 3.3 (L) 08/26/2024 04:27 AM     08/26/2024 04:27 AM    CO2 26 08/26/2024 04:27 AM    BUN 14 08/26/2024 04:27 AM    CREATININE 0.8 08/26/2024 04:27 AM    GLU 91 08/26/2024 04:27 AM    CALCIUM 9.3 08/26/2024 04:27 AM    MG 2.0 08/25/2024 04:49 AM    PHOS 3.0 08/25/2024 04:49 AM    INR 1.0 06/22/2015 09:19 PM   ]  Lab Results   Component Value Date/Time    PROT 7.3 08/23/2024 04:57 AM    ALBUMIN 3.6 08/23/2024 04:57 AM    BILITOT 0.6 08/23/2024 04:57 AM    AST 20 08/23/2024 04:57 AM    ALT 13 08/23/2024 04:57 AM    ALKPHOS 110 08/23/2024 04:57 AM   ]    Imaging and Procedures:  AXR 8/25/24:  FINDINGS:  Scattered aerated small large bowel throughout abdomen with overall nonspecific pattern.  Few prominent loops small bowel left hemiabdomen.  Recommend continued follow-up.  NG tube in appropriate position with tip and side port overlying stomach.  No free intraperitoneal air.  Surgical change in right lower quadrant.  Stable appearance of osseous structures.      AXR 8/23/24:  Nonspecific, nonobstructive bowel gas pattern.  Moderate volume colonic stool.    Assessment:  Natali Galeano is a 83 y.o. female with a history of  colon cancer in 2002 s/p resection, HTN, HLD, obesity, ALYSE and prior pSBO who presented with lower abdominal pain similar to prior episodes.  She was feeling better yesterday and a little more pain today.  However, she is passing flatus and had a bowel movement.  No NG output reported.    Plan:  1. Await KUB  from today  2. If no worsening of the KUB, May resume diet.   3. May remove NG tube if diet is tolerated        Kostas Duarte MD

## 2024-08-26 NOTE — PLAN OF CARE
Problem: Adult Inpatient Plan of Care  Goal: Plan of Care Review  Outcome: Progressing  Goal: Patient-Specific Goal (Individualized)  Outcome: Progressing  Goal: Absence of Hospital-Acquired Illness or Injury  Outcome: Progressing  Goal: Optimal Comfort and Wellbeing  Outcome: Progressing  Goal: Readiness for Transition of Care  Outcome: Progressing     Problem: Fall Injury Risk  Goal: Absence of Fall and Fall-Related Injury  Outcome: Progressing     Pt tolerating diet. Denies nausea and vomiting. Continued bowel movements today x 3. NG tube removed. Safety maintained.

## 2024-08-26 NOTE — PROGRESS NOTES
[facility] [address] Medicine  Progress Note    Patient Name: Natali Galeano  MRN: [id number]  Patient Class: IP- Inpatient   Admission Date: 8/22/2024  Length of Stay: 3 days  Attending Physician: Gretel Smith MD  Primary Care Provider: Marin Nettles MD        Subjective:     Principal Problem:Small bowel obstruction        HPI:  Mrs Hurst is an 83 year old female with a PMH that includes colon cancer with resection 2002, SBO, HTN, ALYSE, and Gout. She came to the hospital for lower abdominal pain, n/v that began Wednesday night. In the ED she reported lower ab pain but did not vomit. She has no leukocytosis and is afebrile. On CT A/P, there was noted to be the small bowel feces sign.  Gastroenterology was consulted. Patient upgraded from ED.       Overview/Hospital Course:  Mrs Hurst is being treated for an ileus and fecalization of the small bowel. She has an NG tube with low, clear colored outptut. She is tolerating a clear liquid diet and her abdominal pain is controlled. Her diet was advanced today and she is tolerating it. NGT removed 8/26.  Patient will dc home tomorrow if she continues to progress.     Interval History: Ms Hurst had a small BM yesterday. NGT removed today and diet advanced. KUB pending. Monitor for ab pain/n/v.    Review of Systems   Constitutional:  Negative for activity change, appetite change and fever.   HENT:  Negative for congestion and sore throat.    Eyes:  Negative for visual disturbance.   Respiratory:  Negative for shortness of breath.    Cardiovascular:  Negative for chest pain and leg swelling.   Gastrointestinal:  Positive for abdominal pain. Negative for abdominal distention, constipation, nausea and vomiting.        Last BM Monday   Genitourinary:  Negative for difficulty urinating and flank pain.   Musculoskeletal:  Negative for arthralgias and joint swelling.   Skin: Negative.    Neurological:  Negative for dizziness, syncope and  weakness.   Psychiatric/Behavioral:  Negative for agitation and behavioral problems.      Objective:     Vital Signs (Most Recent):  Temp: 97.3 °F (36.3 °C) (08/26/24 1550)  Pulse: 66 (08/26/24 1550)  Resp: 18 (08/26/24 1550)  BP: (!) 146/68 (08/26/24 1550)  SpO2: 99 % (08/26/24 1550) Vital Signs (24h Range):  Temp:  [97.2 °F (36.2 °C)-97.8 °F (36.6 °C)] 97.3 °F (36.3 °C)  Pulse:  [65-83] 66  Resp:  [13-18] 18  SpO2:  [93 %-99 %] 99 %  BP: (146-156)/(68-71) 146/68     Weight: 126.1 kg (278 lb)  Body mass index is 43.54 kg/m².    Intake/Output Summary (Last 24 hours) at 8/26/2024 1639  Last data filed at 8/25/2024 2018  Gross per 24 hour   Intake 623.13 ml   Output --   Net 623.13 ml         Physical Exam  Vitals and nursing note reviewed.   HENT:      Head: Normocephalic.      Nose:      Comments: NG to intermittent suction right nare     Mouth/Throat:      Mouth: Mucous membranes are moist.   Eyes:      Extraocular Movements: Extraocular movements intact.      Pupils: Pupils are equal, round, and reactive to light.   Pulmonary:      Effort: Pulmonary effort is normal. No respiratory distress.      Breath sounds: No wheezing or rales.   Abdominal:      General: Bowel sounds are normal. There is no distension.      Palpations: Abdomen is soft.      Tenderness: There is no abdominal tenderness. There is no guarding.   Musculoskeletal:         General: Normal range of motion.      Right lower leg: No edema.      Left lower leg: No edema.   Skin:     General: Skin is warm.   Neurological:      General: No focal deficit present.      Mental Status: She is alert and oriented to person, place, and time.   Psychiatric:         Mood and Affect: Mood normal.         Behavior: Behavior normal.             Significant Labs: All pertinent labs within the past 24 hours have been reviewed.  BMP:   Recent Labs   Lab 08/25/24  0449 08/26/24  0427   GLU 96 91    140   K 3.5 3.3*    103   CO2 26 26   BUN 15 14   CREATININE  0.8 0.8   CALCIUM 9.3 9.3   MG 2.0  --      CBC:   Recent Labs   Lab 08/25/24  0449   WBC 8.28   HGB 13.1   HCT 42.6          Significant Imaging: I have reviewed all pertinent imaging results/findings within the past 24 hours.  X-Ray Abdomen AP 1 View  Narrative: EXAMINATION:  XR ABDOMEN AP 1 VIEW    CLINICAL HISTORY:  SBO;    TECHNIQUE:  AP View(s) of the abdomen was performed.    COMPARISON:  08/25/2024    FINDINGS:  Tip of the enteric tube projects over the stomach.  No significant dilatation of bowel loops.  No significant stool burden.    No suspicious calcifications.    Regional skeleton shows degenerative change.  Impression: Tip of the enteric tube projects over the stomach.  Bowel gas pattern is nonobstructive.    Electronically signed by: Giselle Parr  Date:    08/26/2024  Time:    08:25        Assessment/Plan:      * Small bowel obstruction  H/O Colon resection and small bowel obstruction  CT A/P with fecalization of small bowel  NG placed in ED  Continue clear liquid diet  GI consulted: Appreciate recommendations which include: colace 100 mg daily, miralax 17g BID, clears for now, PRN bentyl, limit narcotics  KUB shows stool burden.  Diet advanced after patient has BM. Will remove ngt, repeat KUB, and monitor for tolerance of diet.         Essential hypertension    Hypertension Medications               amLODIPine (NORVASC) 10 MG tablet Take 1 tablet (10 mg total) by mouth once daily. High blood pressure    hydroCHLOROthiazide (HYDRODIURIL) 25 MG tablet Take 0.5 tablets (12.5 mg total) by mouth once daily. Take 1/2 of the 25 mg tablet daily          Continue the above home regimen    Gout  Continue 300 mg allopurinol daily        VTE Risk Mitigation (From admission, onward)           Ordered     IP VTE HIGH RISK PATIENT  Once         08/22/24 1351     Place sequential compression device  Until discontinued         08/22/24 1351                    Discharge Planning   YOMI: 8/27/2024     Code  Status: Full Code   Is the patient medically ready for discharge?:     Reason for patient still in hospital (select all that apply): Patient trending condition and Consult recommendations  Discharge Plan A: Home with family, Home                  Jenni Mancuso DNP  Department of Hospital Medicine   [facility] ([address])

## 2024-08-26 NOTE — SUBJECTIVE & OBJECTIVE
Interval History: Ms Hurst had a small BM yesterday. NGT removed today and diet advanced. KUB pending. Monitor for ab pain/n/v.    Review of Systems   Constitutional:  Negative for activity change, appetite change and fever.   HENT:  Negative for congestion and sore throat.    Eyes:  Negative for visual disturbance.   Respiratory:  Negative for shortness of breath.    Cardiovascular:  Negative for chest pain and leg swelling.   Gastrointestinal:  Positive for abdominal pain. Negative for abdominal distention, constipation, nausea and vomiting.        Last BM Monday   Genitourinary:  Negative for difficulty urinating and flank pain.   Musculoskeletal:  Negative for arthralgias and joint swelling.   Skin: Negative.    Neurological:  Negative for dizziness, syncope and weakness.   Psychiatric/Behavioral:  Negative for agitation and behavioral problems.      Objective:     Vital Signs (Most Recent):  Temp: 97.3 °F (36.3 °C) (08/26/24 1550)  Pulse: 66 (08/26/24 1550)  Resp: 18 (08/26/24 1550)  BP: (!) 146/68 (08/26/24 1550)  SpO2: 99 % (08/26/24 1550) Vital Signs (24h Range):  Temp:  [97.2 °F (36.2 °C)-97.8 °F (36.6 °C)] 97.3 °F (36.3 °C)  Pulse:  [65-83] 66  Resp:  [13-18] 18  SpO2:  [93 %-99 %] 99 %  BP: (146-156)/(68-71) 146/68     Weight: 126.1 kg (278 lb)  Body mass index is 43.54 kg/m².    Intake/Output Summary (Last 24 hours) at 8/26/2024 1639  Last data filed at 8/25/2024 2018  Gross per 24 hour   Intake 623.13 ml   Output --   Net 623.13 ml         Physical Exam  Vitals and nursing note reviewed.   HENT:      Head: Normocephalic.      Nose:      Comments: NG to intermittent suction right nare     Mouth/Throat:      Mouth: Mucous membranes are moist.   Eyes:      Extraocular Movements: Extraocular movements intact.      Pupils: Pupils are equal, round, and reactive to light.   Pulmonary:      Effort: Pulmonary effort is normal. No respiratory distress.      Breath sounds: No wheezing or rales.   Abdominal:       General: Bowel sounds are normal. There is no distension.      Palpations: Abdomen is soft.      Tenderness: There is no abdominal tenderness. There is no guarding.   Musculoskeletal:         General: Normal range of motion.      Right lower leg: No edema.      Left lower leg: No edema.   Skin:     General: Skin is warm.   Neurological:      General: No focal deficit present.      Mental Status: She is alert and oriented to person, place, and time.   Psychiatric:         Mood and Affect: Mood normal.         Behavior: Behavior normal.             Significant Labs: All pertinent labs within the past 24 hours have been reviewed.  BMP:   Recent Labs   Lab 08/25/24  0449 08/26/24  0427   GLU 96 91    140   K 3.5 3.3*    103   CO2 26 26   BUN 15 14   CREATININE 0.8 0.8   CALCIUM 9.3 9.3   MG 2.0  --      CBC:   Recent Labs   Lab 08/25/24  0449   WBC 8.28   HGB 13.1   HCT 42.6          Significant Imaging: I have reviewed all pertinent imaging results/findings within the past 24 hours.  X-Ray Abdomen AP 1 View  Narrative: EXAMINATION:  XR ABDOMEN AP 1 VIEW    CLINICAL HISTORY:  SBO;    TECHNIQUE:  AP View(s) of the abdomen was performed.    COMPARISON:  08/25/2024    FINDINGS:  Tip of the enteric tube projects over the stomach.  No significant dilatation of bowel loops.  No significant stool burden.    No suspicious calcifications.    Regional skeleton shows degenerative change.  Impression: Tip of the enteric tube projects over the stomach.  Bowel gas pattern is nonobstructive.    Electronically signed by: Giselle Parr  Date:    08/26/2024  Time:    08:25

## 2024-08-26 NOTE — PLAN OF CARE
POC reviewed with patient this shift.  Remains A/O x4.  Respirations unlabored on RA.  Skin w/d.  Continent of b/b.  Ambulates to restroom without difficulty.  No BM this shift.  Pt c/o ABD pain x1 this shift but no nausea associated.  BS active.  PRN Bentyl given x1 this shift with relief noted.  Tolerates meds whole with water without difficulty.  VSS.  See flowsheet for full assessment.  Able to verbalize wants/needs.  No s/s of distress.  Fall/safety precautions maintained.      Problem: Adult Inpatient Plan of Care  Goal: Plan of Care Review  Outcome: Progressing     Problem: Fall Injury Risk  Goal: Absence of Fall and Fall-Related Injury  Outcome: Progressing

## 2024-08-26 NOTE — ASSESSMENT & PLAN NOTE
H/O Colon resection and small bowel obstruction  CT A/P with fecalization of small bowel  NG placed in ED  Continue clear liquid diet  GI consulted: Appreciate recommendations which include: colace 100 mg daily, miralax 17g BID, clears for now, PRN bentyl, limit narcotics  KUB shows stool burden.  Diet advanced after patient has BM. Will remove ngt, repeat KUB, and monitor for tolerance of diet.

## 2024-08-27 VITALS
HEIGHT: 67 IN | DIASTOLIC BLOOD PRESSURE: 72 MMHG | OXYGEN SATURATION: 97 % | WEIGHT: 278 LBS | RESPIRATION RATE: 20 BRPM | BODY MASS INDEX: 43.63 KG/M2 | HEART RATE: 64 BPM | SYSTOLIC BLOOD PRESSURE: 159 MMHG | TEMPERATURE: 98 F

## 2024-08-27 LAB
ANION GAP SERPL CALC-SCNC: 9 MMOL/L (ref 8–16)
BUN SERPL-MCNC: 17 MG/DL (ref 8–23)
CALCIUM SERPL-MCNC: 9.9 MG/DL (ref 8.7–10.5)
CHLORIDE SERPL-SCNC: 103 MMOL/L (ref 95–110)
CO2 SERPL-SCNC: 28 MMOL/L (ref 23–29)
CREAT SERPL-MCNC: 1 MG/DL (ref 0.5–1.4)
EST. GFR  (NO RACE VARIABLE): 57 ML/MIN/1.73 M^2
GLUCOSE SERPL-MCNC: 151 MG/DL (ref 70–110)
POTASSIUM SERPL-SCNC: 3.5 MMOL/L (ref 3.5–5.1)
SODIUM SERPL-SCNC: 140 MMOL/L (ref 136–145)

## 2024-08-27 PROCEDURE — 25000003 PHARM REV CODE 250: Performed by: NURSE PRACTITIONER

## 2024-08-27 PROCEDURE — 94761 N-INVAS EAR/PLS OXIMETRY MLT: CPT

## 2024-08-27 PROCEDURE — 80048 BASIC METABOLIC PNL TOTAL CA: CPT | Performed by: PHYSICIAN ASSISTANT

## 2024-08-27 PROCEDURE — 36415 COLL VENOUS BLD VENIPUNCTURE: CPT | Performed by: PHYSICIAN ASSISTANT

## 2024-08-27 RX ORDER — HYDROCHLOROTHIAZIDE 25 MG/1
12.5 TABLET ORAL DAILY
Qty: 60 TABLET | Refills: 3 | Status: SHIPPED | OUTPATIENT
Start: 2024-08-27

## 2024-08-27 RX ORDER — POLYETHYLENE GLYCOL 3350 17 G/17G
17 POWDER, FOR SOLUTION ORAL DAILY PRN
Qty: 30 EACH | Refills: 0 | Status: SHIPPED | OUTPATIENT
Start: 2024-08-27

## 2024-08-27 RX ADMIN — Medication 2000 UNITS: at 08:08

## 2024-08-27 RX ADMIN — HYDROCHLOROTHIAZIDE 12.5 MG: 12.5 TABLET ORAL at 08:08

## 2024-08-27 RX ADMIN — AMLODIPINE BESYLATE 10 MG: 5 TABLET ORAL at 08:08

## 2024-08-27 RX ADMIN — POLYETHYLENE GLYCOL 3350 17 G: 17 POWDER, FOR SOLUTION ORAL at 08:08

## 2024-08-27 RX ADMIN — DOCUSATE SODIUM 100 MG: 100 CAPSULE, LIQUID FILLED ORAL at 08:08

## 2024-08-27 NOTE — DISCHARGE SUMMARY
[facility] Surg [address] Medicine  Discharge Summary      Patient Name: Natali Galeano  MRN: [id number]  ANIKET: [id number]  Patient Class: IP- Inpatient  Admission Date: 8/22/2024  Hospital Length of Stay: 4 days  Discharge Date and Time: 8/27/2024 12:42 PM  Attending Physician: No att. providers found   Discharging Provider: Keena Joyce PA-C  Primary Care Provider: Marin Nettles MD    Primary Care Team: Networked reference to record PCT     HPI:   Mrs Hurst is an 83 year old female with a PMH that includes colon cancer with resection 2002, SBO, HTN, ALYSE, and Gout. She came to the hospital for lower abdominal pain, n/v that began Wednesday night. In the ED she reported lower ab pain but did not vomit. She has no leukocytosis and is afebrile. On CT A/P, there was noted to be the small bowel feces sign.  Gastroenterology was consulted. Patient upgraded from EDOU.       * No surgery found *      Hospital Course:   Mrs Hurst is being treated for an ileus and fecalization of the small bowel. She has an NG tube with low, clear colored outptut. She is tolerating a clear liquid diet and her abdominal pain is controlled. Her diet was advanced today and she is tolerating it. NGT removed 8/26.  Tolerating PO, symptoms resolved. Stable for discharge with GI and PCP follow up, return precautions discussed, no further questions at discharge     Goals of Care Treatment Preferences:  Code Status: Full Code         Consults:   Consults (From admission, onward)          Status Ordering Provider     Inpatient consult to Gastroenterology  Once        Provider:  (Not yet assigned)    Completed FLORENCIO VÁSQUEZ            GI  * Small bowel obstruction  H/O Colon resection and small bowel obstruction  CT A/P with fecalization of small bowel  NG placed in ED  Continue clear liquid diet  GI consulted: Appreciate recommendations which include: colace 100 mg daily, miralax 17g BID, clears for now, PRN bentyl,  limit narcotics  KUB shows stool burden.  Diet advanced after patient has BM. Will remove ngt, repeat KUB, and monitor for tolerance of diet: KUB: bowel gas pattern nonobstructive  Continued to improve, tolerated PO  Stable for dc with GI fu, return precautions discussed, no further questions at dc          Final Active Diagnoses:    Diagnosis Date Noted POA    PRINCIPAL PROBLEM:  Small bowel obstruction [K56.609] 08/23/2024 Yes    Gout [M10.9] 10/27/2020 Yes     Chronic    Essential hypertension [I10]  Yes     Chronic      Problems Resolved During this Admission:       Discharged Condition: stable    Disposition: Home or Self Care    Follow Up:   Follow-up Information       Kostas Duarte MD Follow up on 8/29/2024.    Specialty: Gastroenterology  Why: Hopsital follow up at 10:45 AM. Bring your insurance card and updated medication list please.  Contact information:  [address]  [address]  [facility] GASTROENTEROLOGY  [facility] [address]  [phone]                           Patient Instructions:      Notify your health care provider if you experience any of the following:  temperature >100.4     Notify your health care provider if you experience any of the following:  severe uncontrolled pain     Notify your health care provider if you experience any of the following:  redness, tenderness, or signs of infection (pain, swelling, redness, odor or green/yellow discharge around incision site)     Notify your health care provider if you experience any of the following:  persistent nausea and vomiting or diarrhea     Notify your health care provider if you experience any of the following:  worsening rash     Notify your health care provider if you experience any of the following:  increased confusion or weakness     Activity as tolerated       Significant Diagnostic Studies: Radiology: CT APThere is some prominence of the small bowel in the left hemiabdomen with fecalization of some of the small bowel  contents in the jejunum without leela obstruction.     There is sigmoid diverticulosis without acute diverticulitis.    Pending Diagnostic Studies:       None           Medications:  Reconciled Home Medications:      Medication List        START taking these medications      polyethylene glycol 17 gram Pwpk  Commonly known as: GLYCOLAX  Take 17 g by mouth daily as needed for Constipation.            CHANGE how you take these medications      atorvastatin 10 MG tablet  Commonly known as: LIPITOR  TAKE 1 TABLET(10 MG) BY MOUTH EVERY EVENING FOR HIGH CHOLESTEROL  What changed: See the new instructions.            CONTINUE taking these medications      allopurinoL 300 MG tablet  Commonly known as: ZYLOPRIM  Take 1 tablet (300 mg total) by mouth once daily. gout     amLODIPine 10 MG tablet  Commonly known as: NORVASC  Take 1 tablet (10 mg total) by mouth once daily. High blood pressure     ascorbic acid (vitamin C) 1000 MG tablet  Commonly known as: VITAMIN C  Take 1,000 mg by mouth once daily.     aspirin 81 MG EC tablet  Commonly known as: ECOTRIN  Take 81 mg by mouth once daily.     cholecalciferol (vitamin D3) 25 mcg (1,000 unit) capsule  Commonly known as: VITAMIN D3  Take 2,000 Units by mouth once daily.     cyanocobalamin 250 MCG tablet  Commonly known as: VITAMIN B-12  Take 2,500 mcg by mouth once daily.     docusate sodium 100 MG capsule  Commonly known as: COLACE  Take 100 mg by mouth once daily.     fluticasone propionate 50 mcg/actuation nasal spray  Commonly known as: FLONASE  2 sprays (100 mcg total) by Each Nostril route 2 (two) times a day.     hydroCHLOROthiazide 25 MG tablet  Commonly known as: HYDRODIURIL  Take 0.5 tablets (12.5 mg total) by mouth once daily. Take 1/2 of the 25 mg tablet daily              Indwelling Lines/Drains at time of discharge:   Lines/Drains/Airways       None                   Time spent on the discharge of patient: >45 minutes         Keena Joyce PA-C  Department of  [facility] - Med Surg ([address])

## 2024-08-27 NOTE — PROGRESS NOTES
"Gastroenterology Progress Note    Reason for Consult: nausea, lower abdominal pain    Subjective:  Tolerating PO.  Had several Bms yesterday.  NG tube is out.  She thinks she might go home today.    ROS:  Gen: no F/C  CV: no CP/palpitations  Resp: no SOB/wheezing    Physical Exam  BP (!) 159/72   Pulse 72   Temp 97.7 °F (36.5 °C)   Resp 20   Ht 5' 7" (1.702 m)   Wt 126.1 kg (278 lb)   LMP  (LMP Unknown)   SpO2 95%   Breastfeeding No   BMI 43.54 kg/m²   General: Awake, alert female in no apparent distress, obese  HEENT: NC/AT, PERRL, EOMI, MMM  CV: RRR, without murmur, gallop, or rubs  Pulm: CTAB, no wheezing, rales, or rhonchi  GI: soft, NT/ND, +BS  MSK: no edema and normal ROM  Neuro: A&Ox3, moves all extremities equally, sensation grossly intact  Skin: no rashes or lesions  Psych: normal mood and affect    Medications/Infusions:  Current Facility-Administered Medications   Medication Dose Route Frequency Provider Last Rate Last Admin    acetaminophen tablet 650 mg  650 mg Oral Q6H PRN Jenni Mancuso DNP   650 mg at 08/24/24 0028    amLODIPine tablet 10 mg  10 mg Oral Daily Lisa Yoo, FNP   10 mg at 08/26/24 0810    atorvastatin tablet 10 mg  10 mg Oral QHS Lisa Yoo, FNP   10 mg at 08/26/24 2125    dicyclomine injection 20 mg  20 mg Intramuscular Q6H PRN Lisa Yoo, FNP   20 mg at 08/26/24 0140    docusate sodium capsule 100 mg  100 mg Oral Daily Lisa Yoo, FNP   100 mg at 08/26/24 0810    hydroCHLOROthiazide tablet 12.5 mg  12.5 mg Oral Daily Lisa Yoo, FNP   12.5 mg at 08/26/24 0810    melatonin tablet 6 mg  6 mg Oral Nightly PRN Lisa Yoo, FNP   6 mg at 08/26/24 2125    morphine injection 2 mg  2 mg Intravenous Q8H PRN Jenni Mancuso, DNP   2 mg at 08/24/24 1918    ondansetron injection 4 mg  4 mg Intravenous Q6H PRN Lisa Yoo, NAMRATAP   4 mg at 08/23/24 1506    polyethylene glycol packet 17 g  17 g Oral BID Lisa Yoo, NAMRATAP   17 g at 08/26/24 4471    " simethicone chewable tablet 80 mg  1 tablet Oral QID PRN Jenni Mancuso, DNP   80 mg at 08/26/24 1244    vitamin D 1000 units tablet 2,000 Units  2,000 Units Oral Daily Lisa Yoo, FNP   2,000 Units at 08/26/24 0810       Intake and Output:    Intake/Output Summary (Last 24 hours) at 8/27/2024 0803  Last data filed at 8/27/2024 0635  Gross per 24 hour   Intake --   Output 900 ml   Net -900 ml       Labs:  Lab Results   Component Value Date/Time    WBC 8.28 08/25/2024 04:49 AM    HGB 13.1 08/25/2024 04:49 AM    HCT 42.6 08/25/2024 04:49 AM     08/25/2024 04:49 AM    MCV 83 08/25/2024 04:49 AM     08/26/2024 04:27 AM    K 3.3 (L) 08/26/2024 04:27 AM     08/26/2024 04:27 AM    CO2 26 08/26/2024 04:27 AM    BUN 14 08/26/2024 04:27 AM    CREATININE 0.8 08/26/2024 04:27 AM    GLU 91 08/26/2024 04:27 AM    CALCIUM 9.3 08/26/2024 04:27 AM    MG 2.0 08/25/2024 04:49 AM    PHOS 3.0 08/25/2024 04:49 AM    INR 1.0 06/22/2015 09:19 PM   ]  Lab Results   Component Value Date/Time    PROT 7.3 08/23/2024 04:57 AM    ALBUMIN 3.6 08/23/2024 04:57 AM    BILITOT 0.6 08/23/2024 04:57 AM    AST 20 08/23/2024 04:57 AM    ALT 13 08/23/2024 04:57 AM    ALKPHOS 110 08/23/2024 04:57 AM   ]    Imaging and Procedures:  Labs and imaging results were reviewed.  KUB 8/26/24:  Tip of the enteric tube projects over the stomach. Bowel gas pattern is nonobstructive.     Assessment:  Natali Galeano is a 83 y.o. female with a history of colon cancer in 2002 s/p resection, HTN, HLD, obesity, ALYSE and prior pSBO who presented with lower abdominal pain similar to prior episodes.     Plan:  - diet as tolerated  - anti-emetics PRN  - hopefully home soon  - can follow up in GI clinic after discharge   - call back with questions/concerns    Asuncion Gonzalez MD

## 2024-08-27 NOTE — PLAN OF CARE
Medicare Message     Important Message from Medicare regarding Discharge Appeal Rights -- Explained to patient/caregiver; Signed/date by patient/caregiver   Date IMM was signed -- 8/27/2024   Time IMM was signed -- 0923

## 2024-08-27 NOTE — NURSING
Pt given discharge orders and verbalizes understanding. Pt is A&Ox4 with no complaints at this time. Pt shows no obvious signs of distress.

## 2024-08-27 NOTE — PLAN OF CARE
LMSW met with patient via bedside. Sw scheduled patient's follow up appointments. Patient's preferred pharmacy is bedside. Patient's niece will provide transportation home up discharge.     [facility] - Med Surg ([address])  Discharge Final Note    Primary Care Provider: Marin Nettles MD    Expected Discharge Date: 8/27/2024    Final Discharge Note (most recent)       Final Note - 08/27/24 1006          Final Note    Assessment Type Final Discharge Note (P)      Anticipated Discharge Disposition Home or Self Care (P)      Hospital Resources/Appts/Education Provided Provided patient/caregiver with written discharge plan information;Appointments scheduled and added to AVS (P)         Post-Acute Status    Post-Acute Authorization Other (P)      Other Status No Post-Acute Service Needs (P)      Discharge Delays None known at this time (P)                    Contact Info       Kostas Duarte MD   Specialty: Gastroenterology    [facility]  [address]  [facility] GASTROENTEROLOGY  [facility] [address]   Phone: [phone]       Next Steps: Follow up on 8/29/2024    Instructions: Hopsital follow up at 10:45 AM. Bring your insurance card and updated medication list please.

## 2024-08-27 NOTE — ASSESSMENT & PLAN NOTE
H/O Colon resection and small bowel obstruction  CT A/P with fecalization of small bowel  NG placed in ED  Continue clear liquid diet  GI consulted: Appreciate recommendations which include: colace 100 mg daily, miralax 17g BID, clears for now, PRN bentyl, limit narcotics  KUB shows stool burden.  Diet advanced after patient has BM. Will remove ngt, repeat KUB, and monitor for tolerance of diet: KUB: bowel gas pattern nonobstructive  Continued to improve, tolerated PO  Stable for dc with GI fu, return precautions discussed, no further questions at dc

## 2024-08-27 NOTE — NURSING
At this time respiratory therapy informs nurse that patient refusing CPAP treatment as pt states does not wear at home.

## 2024-08-28 ENCOUNTER — PATIENT OUTREACH (OUTPATIENT)
Dept: ADMINISTRATIVE | Facility: CLINIC | Age: 84
End: 2024-08-28
Payer: MEDICARE

## 2024-08-28 NOTE — PROGRESS NOTES
C3 nurse spoke with Natali Galeano  for a [facility]CC post hospital discharge follow up call. The patient has a scheduled appointment with Marin Nettles MD  on 9/10/24 @ 8949.I will route the provider staff to include the [facility]OSFU at the time of this appointment.

## 2024-08-30 ENCOUNTER — TELEPHONE (OUTPATIENT)
Facility: CLINIC | Age: 84
End: 2024-08-30
Payer: MEDICARE

## 2024-08-30 NOTE — TELEPHONE ENCOUNTER
----- Message from Bharati Win sent at 8/30/2024  8:20 AM CDT -----  Contact: [phone]  Pt is inquiring about appt on 9/6. Stating she doesn't understand why she would have blood work after appt with provider    Please call pt and advise

## 2024-08-30 NOTE — TELEPHONE ENCOUNTER
----- Message from Bharati Win sent at 8/30/2024  8:20 AM CDT -----  Contact: [phone]  Pt is inquiring about appt on 9/6. Stating she doesn't understand why she would have blood work after appt with provider    Please call pt and advise

## 2024-09-03 ENCOUNTER — HOSPITAL ENCOUNTER (EMERGENCY)
Facility: OTHER | Age: 84
Discharge: HOME | End: 2024-09-03
Attending: EMERGENCY MEDICINE
Payer: MEDICARE

## 2024-09-03 VITALS
TEMPERATURE: 97 F | DIASTOLIC BLOOD PRESSURE: 71 MMHG | RESPIRATION RATE: 18 BRPM | WEIGHT: 275 LBS | HEART RATE: 84 BPM | BODY MASS INDEX: 43.16 KG/M2 | OXYGEN SATURATION: 97 % | SYSTOLIC BLOOD PRESSURE: 158 MMHG | HEIGHT: 67 IN

## 2024-09-03 DIAGNOSIS — Z87.19 HISTORY OF SMALL BOWEL OBSTRUCTION: ICD-10-CM

## 2024-09-03 DIAGNOSIS — R93.5 ABNORMAL CT OF THE ABDOMEN: ICD-10-CM

## 2024-09-03 DIAGNOSIS — R10.30 LOWER ABDOMINAL PAIN: Primary | ICD-10-CM

## 2024-09-03 LAB
ALBUMIN SERPL BCP-MCNC: 3.3 G/DL (ref 3.5–5.2)
ALP SERPL-CCNC: 95 U/L (ref 55–135)
ALT SERPL W/O P-5'-P-CCNC: 14 U/L (ref 10–44)
ANION GAP SERPL CALC-SCNC: 11 MMOL/L (ref 8–16)
AST SERPL-CCNC: 16 U/L (ref 10–40)
BASOPHILS # BLD AUTO: 0.04 K/UL (ref 0–0.2)
BASOPHILS NFR BLD: 0.4 % (ref 0–1.9)
BILIRUB SERPL-MCNC: 0.4 MG/DL (ref 0.1–1)
BILIRUB UR QL STRIP: NEGATIVE
BUN SERPL-MCNC: 11 MG/DL (ref 8–23)
CALCIUM SERPL-MCNC: 9.8 MG/DL (ref 8.7–10.5)
CHLORIDE SERPL-SCNC: 103 MMOL/L (ref 95–110)
CLARITY UR: CLEAR
CO2 SERPL-SCNC: 25 MMOL/L (ref 23–29)
COLOR UR: YELLOW
CREAT SERPL-MCNC: 0.8 MG/DL (ref 0.5–1.4)
DIFFERENTIAL METHOD BLD: ABNORMAL
EOSINOPHIL # BLD AUTO: 0 K/UL (ref 0–0.5)
EOSINOPHIL NFR BLD: 0.2 % (ref 0–8)
ERYTHROCYTE [DISTWIDTH] IN BLOOD BY AUTOMATED COUNT: 15.5 % (ref 11.5–14.5)
EST. GFR  (NO RACE VARIABLE): >60 ML/MIN/1.73 M^2
GLUCOSE SERPL-MCNC: 117 MG/DL (ref 70–110)
GLUCOSE UR QL STRIP: NEGATIVE
HCT VFR BLD AUTO: 44.9 % (ref 37–48.5)
HCV AB SERPL QL IA: NEGATIVE
HGB BLD-MCNC: 13.9 G/DL (ref 12–16)
HGB UR QL STRIP: NEGATIVE
IMM GRANULOCYTES # BLD AUTO: 0.06 K/UL (ref 0–0.04)
IMM GRANULOCYTES NFR BLD AUTO: 0.6 % (ref 0–0.5)
KETONES UR QL STRIP: NEGATIVE
LDH SERPL L TO P-CCNC: 1.99 MMOL/L (ref 0.5–2.2)
LEUKOCYTE ESTERASE UR QL STRIP: NEGATIVE
LIPASE SERPL-CCNC: 32 U/L (ref 4–60)
LYMPHOCYTES # BLD AUTO: 2 K/UL (ref 1–4.8)
LYMPHOCYTES NFR BLD: 18.6 % (ref 18–48)
MCH RBC QN AUTO: 26 PG (ref 27–31)
MCHC RBC AUTO-ENTMCNC: 31 G/DL (ref 32–36)
MCV RBC AUTO: 84 FL (ref 82–98)
MONOCYTES # BLD AUTO: 0.8 K/UL (ref 0.3–1)
MONOCYTES NFR BLD: 7.6 % (ref 4–15)
NEUTROPHILS # BLD AUTO: 7.8 K/UL (ref 1.8–7.7)
NEUTROPHILS NFR BLD: 72.6 % (ref 38–73)
NITRITE UR QL STRIP: NEGATIVE
NRBC BLD-RTO: 0 /100 WBC
PH UR STRIP: 7 [PH] (ref 5–8)
PLATELET # BLD AUTO: 260 K/UL (ref 150–450)
PMV BLD AUTO: 10.8 FL (ref 9.2–12.9)
POTASSIUM SERPL-SCNC: 3.8 MMOL/L (ref 3.5–5.1)
PROT SERPL-MCNC: 6.9 G/DL (ref 6–8.4)
PROT UR QL STRIP: NEGATIVE
RBC # BLD AUTO: 5.34 M/UL (ref 4–5.4)
SAMPLE: NORMAL
SODIUM SERPL-SCNC: 139 MMOL/L (ref 136–145)
SP GR UR STRIP: 1.01 (ref 1–1.03)
URN SPEC COLLECT METH UR: NORMAL
UROBILINOGEN UR STRIP-ACNC: NEGATIVE EU/DL
WBC # BLD AUTO: 10.66 K/UL (ref 3.9–12.7)

## 2024-09-03 PROCEDURE — 81003 URINALYSIS AUTO W/O SCOPE: CPT

## 2024-09-03 PROCEDURE — 25500020 PHARM REV CODE 255: Performed by: EMERGENCY MEDICINE

## 2024-09-03 PROCEDURE — 86803 HEPATITIS C AB TEST: CPT | Performed by: EMERGENCY MEDICINE

## 2024-09-03 PROCEDURE — 99285 EMERGENCY DEPT VISIT HI MDM: CPT | Mod: 25

## 2024-09-03 PROCEDURE — 63600175 PHARM REV CODE 636 W HCPCS

## 2024-09-03 PROCEDURE — 80053 COMPREHEN METABOLIC PANEL: CPT

## 2024-09-03 PROCEDURE — 96372 THER/PROPH/DIAG INJ SC/IM: CPT

## 2024-09-03 PROCEDURE — 96361 HYDRATE IV INFUSION ADD-ON: CPT

## 2024-09-03 PROCEDURE — 85025 COMPLETE CBC W/AUTO DIFF WBC: CPT

## 2024-09-03 PROCEDURE — 83690 ASSAY OF LIPASE: CPT

## 2024-09-03 PROCEDURE — 36415 COLL VENOUS BLD VENIPUNCTURE: CPT

## 2024-09-03 PROCEDURE — 96374 THER/PROPH/DIAG INJ IV PUSH: CPT

## 2024-09-03 PROCEDURE — 25000003 PHARM REV CODE 250

## 2024-09-03 RX ORDER — POLYETHYLENE GLYCOL 3350 17 G/17G
17 POWDER, FOR SOLUTION ORAL DAILY
Qty: 30 EACH | Refills: 0 | Status: SHIPPED | OUTPATIENT
Start: 2024-09-03 | End: 2024-09-06

## 2024-09-03 RX ORDER — ONDANSETRON HYDROCHLORIDE 2 MG/ML
4 INJECTION, SOLUTION INTRAVENOUS
Status: COMPLETED | OUTPATIENT
Start: 2024-09-03 | End: 2024-09-03

## 2024-09-03 RX ORDER — SODIUM CHLORIDE 9 MG/ML
1000 INJECTION, SOLUTION INTRAVENOUS
Status: COMPLETED | OUTPATIENT
Start: 2024-09-03 | End: 2024-09-03

## 2024-09-03 RX ORDER — DICYCLOMINE HYDROCHLORIDE 10 MG/ML
20 INJECTION INTRAMUSCULAR
Status: COMPLETED | OUTPATIENT
Start: 2024-09-03 | End: 2024-09-03

## 2024-09-03 RX ORDER — HYDROCHLOROTHIAZIDE 12.5 MG/1
12.5 TABLET ORAL
Status: COMPLETED | OUTPATIENT
Start: 2024-09-03 | End: 2024-09-03

## 2024-09-03 RX ORDER — DICYCLOMINE HYDROCHLORIDE 20 MG/1
20 TABLET ORAL 2 TIMES DAILY
Qty: 10 TABLET | Refills: 0 | Status: SHIPPED | OUTPATIENT
Start: 2024-09-03 | End: 2024-09-06 | Stop reason: SINTOL

## 2024-09-03 RX ORDER — DOCUSATE SODIUM 100 MG/1
100 CAPSULE, LIQUID FILLED ORAL 2 TIMES DAILY
Qty: 14 CAPSULE | Refills: 0 | Status: SHIPPED | OUTPATIENT
Start: 2024-09-03 | End: 2024-09-06 | Stop reason: ALTCHOICE

## 2024-09-03 RX ORDER — AMLODIPINE BESYLATE 5 MG/1
10 TABLET ORAL
Status: COMPLETED | OUTPATIENT
Start: 2024-09-03 | End: 2024-09-03

## 2024-09-03 RX ADMIN — ONDANSETRON 4 MG: 2 INJECTION INTRAMUSCULAR; INTRAVENOUS at 11:09

## 2024-09-03 RX ADMIN — HYDROCHLOROTHIAZIDE 12.5 MG: 12.5 TABLET ORAL at 11:09

## 2024-09-03 RX ADMIN — AMLODIPINE BESYLATE 10 MG: 5 TABLET ORAL at 11:09

## 2024-09-03 RX ADMIN — SODIUM CHLORIDE 1000 ML: 9 INJECTION, SOLUTION INTRAVENOUS at 11:09

## 2024-09-03 RX ADMIN — DICYCLOMINE HYDROCHLORIDE 20 MG: 10 INJECTION, SOLUTION INTRAMUSCULAR at 11:09

## 2024-09-03 RX ADMIN — IOHEXOL 100 ML: 350 INJECTION, SOLUTION INTRAVENOUS at 01:09

## 2024-09-03 NOTE — DISCHARGE INSTRUCTIONS
You were seen in the emergency department today for abdominal pain.  Please take all medications as prescribed and as we discussed.  Follow-up with specialist if instructed to do so.  It is important to remember that some problems are difficult to diagnose and may not be found during your Emergency Department visit. Be sure to follow up with your primary care doctor and review all labs/imaging/tests that were performed during this visit with them. Some labs/tests may be outside of the normal range and require non-emergent follow-up and further investigation to help diagnose/exclude/prevent complications or other medical conditions. Return to the emergency department for any new or worsening symptoms. Thank you for allowing me to care for you today, it was my pleasure. I hope you get to feeling better soon!

## 2024-09-03 NOTE — ED TRIAGE NOTES
Pt presents to ED with bilateral lower abdominal pain x1 day. Pt was here about 1 week ago for same issue and states that she was better until yesterday. She endorses watery stools x2 days and vomiting since this morning. Pt reports abdominal pain 9/10

## 2024-09-03 NOTE — ED PROVIDER NOTES
Encounter Date: 9/3/2024       History     Chief Complaint   Patient presents with    Abdominal Pain     Pt reporting bilateral LQ abdominal pain with 2 episodes of vomiting last night. Denies diarrhea.      Patient is an 83-year-old female with a past medical history of hypertension, hyperlipidemia, thyroid disease, anemia, small-bowel obstruction who presents to the emergency department for evaluation of constant lower abdominal pain that began 1 day prior to arrival.  Reports had very small bowel movement last night that she describes as a few davide.  No blood in the stool.  Reports passing gas up until her last BM. She reports concerns for bowel obstruction.  She reports nausea, 2 episodes of vomiting this morning.  No fever.  No history of gallstones, pancreatitis, heavy NSAID use.  No urinary symptoms.  No chest pain or shortness of breath. She does report being in the hospital recently for SBO.    The history is provided by the patient.     Review of patient's allergies indicates:   Allergen Reactions    Ace inhibitors Other (See Comments)     cough    Arb-angiotensin receptor antagonist Other (See Comments)     Cough w/ valsartan     Past Medical History:   Diagnosis Date    Allergic rhinitis 2022    Anemia     Arthritis     Colon cancer     Gout, chronic     Heart murmur     High cholesterol     Hypercholesteremia     Hypertension     Obesity     PMB (postmenopausal bleeding) 10/24/2017    Right knee pain 2019    Sleep apnea     Small bowel obstruction, partial 2019    Thyroid disease      Past Surgical History:   Procedure Laterality Date    BTL       SECTION, CLASSIC      COLON SURGERY      goiter       HERNIA REPAIR      KIDNEY SURGERY      cyst removal    THYROID SURGERY       Family History   Problem Relation Name Age of Onset    Diabetes Mother      Heart disease Father       Social History     Tobacco Use    Smoking status: Former     Current packs/day: 0.00     Types:  Cigarettes     Quit date: 1980     Years since quittin.4    Smokeless tobacco: Never   Substance Use Topics    Alcohol use: No    Drug use: No     Review of Systems   Constitutional:  Negative for chills and fever.   Respiratory:  Negative for shortness of breath.    Cardiovascular:  Negative for chest pain.   Gastrointestinal:  Positive for abdominal pain, nausea and vomiting. Negative for blood in stool.   Genitourinary:  Negative for dysuria, flank pain, frequency, hematuria, vaginal bleeding and vaginal discharge.   Musculoskeletal:  Negative for back pain, neck pain and neck stiffness.   Neurological:  Negative for dizziness, light-headedness and headaches.       Physical Exam     Initial Vitals [24 1014]   BP Pulse Resp Temp SpO2   (!) 184/88 89 19 97.8 °F (36.6 °C) 98 %      MAP       --         Physical Exam    Nursing note and vitals reviewed.  Constitutional: She appears well-developed and well-nourished.   HENT:   Head: Normocephalic and atraumatic.   Right Ear: External ear normal.   Left Ear: External ear normal.   Neck: Carotid bruit is not present.   Normal range of motion.  Cardiovascular:  Normal rate, regular rhythm, normal heart sounds and intact distal pulses.     Exam reveals no gallop and no friction rub.       No murmur heard.  Pulmonary/Chest: Breath sounds normal. No respiratory distress. She has no wheezes. She has no rhonchi. She has no rales.   Abdominal: Abdomen is soft. Bowel sounds are normal. She exhibits no distension. There is no abdominal tenderness. There is no rebound and no guarding.   Musculoskeletal:         General: Normal range of motion.      Cervical back: Normal range of motion.     Neurological: She is alert and oriented to person, place, and time. GCS score is 15. GCS eye subscore is 4. GCS verbal subscore is 5. GCS motor subscore is 6.   Psychiatric: She has a normal mood and affect.         ED Course   Procedures  Labs Reviewed   CBC W/ AUTO  DIFFERENTIAL - Abnormal       Result Value    WBC 10.66      RBC 5.34      Hemoglobin 13.9      Hematocrit 44.9      MCV 84      MCH 26.0 (*)     MCHC 31.0 (*)     RDW 15.5 (*)     Platelets 260      MPV 10.8      Immature Granulocytes 0.6 (*)     Gran # (ANC) 7.8 (*)     Immature Grans (Abs) 0.06 (*)     Lymph # 2.0      Mono # 0.8      Eos # 0.0      Baso # 0.04      nRBC 0      Gran % 72.6      Lymph % 18.6      Mono % 7.6      Eosinophil % 0.2      Basophil % 0.4      Differential Method Automated     COMPREHENSIVE METABOLIC PANEL - Abnormal    Sodium 139      Potassium 3.8      Chloride 103      CO2 25      Glucose 117 (*)     BUN 11      Creatinine 0.8      Calcium 9.8      Total Protein 6.9      Albumin 3.3 (*)     Total Bilirubin 0.4      Alkaline Phosphatase 95      AST 16      ALT 14      eGFR >60      Anion Gap 11     URINALYSIS, REFLEX TO URINE CULTURE    Specimen UA Urine, Clean Catch      Color, UA Yellow      Appearance, UA Clear      pH, UA 7.0      Specific Gravity, UA 1.010      Protein, UA Negative      Glucose, UA Negative      Ketones, UA Negative      Bilirubin (UA) Negative      Occult Blood UA Negative      Nitrite, UA Negative      Urobilinogen, UA Negative      Leukocytes, UA Negative      Narrative:     Specimen Source->Urine   HEPATITIS C ANTIBODY    Hepatitis C Ab Negative      Narrative:     Release to patient->Immediate   LIPASE    Lipase 32     ISTAT LACTATE    POC Lactate 1.99      Sample VENOUS            Imaging Results              CT Abdomen Pelvis With IV Contrast NO Oral Contrast (Final result)  Result time 09/03/24 13:58:14      Final result by Nitish Bermeo MD (09/03/24 13:58:14)                   Impression:      No evidence of leela obstruction or other acute intra-abdominal process.  There are a few prominent loops of small bowel in the left lower quadrant, slightly less impressive in comparison to the 08/22/2024 exam      Electronically signed by: Nitish Bermeo  MD  Date:    09/03/2024  Time:    13:58               Narrative:    EXAMINATION:  CT ABDOMEN PELVIS WITH IV CONTRAST    CLINICAL HISTORY:  lower abdominal pain, r/o SBO;    TECHNIQUE:  Low dose axial images, sagittal and coronal reformations were obtained from the lung bases to the pubic symphysis following the IV administration of 100 mL of Omnipaque 350 .  Oral contrast was not given.    COMPARISON:  08/22/2024    FINDINGS:  Limited evaluation of the lung bases show mild atelectatic change.  No concerning masses or nodules.    The liver is normal in size.  No solid mass or biliary ductal dilatation.  Gallbladder is unremarkable.    Spleen, adrenal glands, and pancreas show no focal abnormalities.    Bilateral kidneys are normal in size and position.  There are renal cysts present bilaterally.  No hydronephrosis or nephrolithiasis.  The urinary bladder is relatively decompressed.  Uterus is grossly unremarkable.    Gastrointestinal tract shows no evidence of leela obstruction.  There is diverticulosis without evidence of acute diverticulitis.  Prior sigmoid resection.  Again noted are a few prominent loops of small bowel in the left lower quadrant slightly less impressive in comparison to the recent prior CT.  Prior ventral hernia repair.  No evidence of a recurrent hernia.  No free fluid or free gas.  No significant lymphadenopathy.    Vascular structures show atherosclerosis without aneurysm.  No evidence of an acute fracture or destructive osseous lesion.                                       Medications   0.9%  NaCl infusion (1,000 mLs Intravenous New Bag 9/3/24 1131)   ondansetron injection 4 mg (4 mg Intravenous Given 9/3/24 1133)   amLODIPine tablet 10 mg (10 mg Oral Given 9/3/24 1135)   hydroCHLOROthiazide tablet 12.5 mg (12.5 mg Oral Given 9/3/24 1136)   dicyclomine injection 20 mg (20 mg Intramuscular Given 9/3/24 1151)   iohexoL (OMNIPAQUE 350) injection 100 mL (100 mLs Intravenous Given 9/3/24 1323)      Medical Decision Making  This is an emergent evaluation of a  83-year-old female with a past medical history of hypertension, hyperlipidemia, thyroid disease, anemia, small-bowel obstruction who presents to the emergency department for evaluation of constant lower abdominal pain that began 1 day prior to arrival.    Patient looks well clinically. Regular rate rhythm without murmurs.  No carotid bruits appreciated on exam. Lungs are clear to auscultation bilaterally.  Abdomen is soft, nontender, non distended, with normal bowel sounds.     Differential diagnosis includes but is not limited to small-bowel obstruction, appendicitis, cholecystitis, pancreatitis, dehydration, electrolyte derangement, UTI.    Workup initiated with basic labs, lipase, lactic acid, urinalysis, CT abdomen and pelvis with IV contrast.  Ordered fluids, Zofran.  Vital signs, chart, labs, and/or imaging were all reviewed.  See ED course below and interpretations above. My overall impression is abdominal pain. Will discharge home with MiraLax, Colace, Bentyl with GI follow-up. Patient is very well appearing, and in no acute distress. Vital signs are reassuring here in the emergency department, patient is afebrile, breathing comfortable, satting 98 % on room air. Patient/Caregiver is stable for discharge at this time.  Patient/Caregiver was informed of results and plan of care. Patient/Caregiver verbalized understanding of care plan. All questions and concerns were addressed. Discussed strict return precautions with the patient/caregiver. Instructed follow up with primary care provider within 1 week.      José Antonio Johnson PA-C    DISCLAIMER: This note was prepared with [employer] voice recognition transcription software. Garbled syntax, mangled pronouns, and other bizarre constructions may be attributed to that software system.       Amount and/or Complexity of Data Reviewed  Labs: ordered. Decision-making details documented in ED  Course.  Radiology: ordered. Decision-making details documented in ED Course.    Risk  OTC drugs.  Prescription drug management.               ED Course as of 09/03/24 1411   Tue Sep 03, 2024   1020 BP(!): 184/88  Patient did not take her blood pressure medication this morning.  Ordered home doses of amlodipine, hydrochlorothiazide. [TM]   1020 Temp: 97.8 °F (36.6 °C) [TM]   1020 Pulse: 89 [TM]   1020 Resp: 19 [TM]   1020 SpO2: 98 % [TM]   1108 Upon chart review, patient was seen on 8/22/24 here in the ED for similar symptoms. An NG tube was placed. No surgican intervention was done. She was placed in EDOU and was evaluated by GI who recommended Colace, MiraLax, Bentyl.  Recommended to limit use of narcotics. [TM]   1118 Ordered Bentyl. [TM]   1130 CBC auto differential(!)  CBC is without leukocytosis or anemia.  Normal platelets. [TM]   1136 ISTAT Lactate  Lactate within normal limits. [TM]   1211 Urinalysis, Reflex to Urine Culture Urine, Clean Catch  Urinalysis shows no signs of infection, negative for nitrites or leukocytes. [TM]   1329 Comprehensive metabolic panel(!)  CMP with normal renal function, glucose of 117, albumin of 3.3, no other acute abnormalities. [TM]   1401 CT Abdomen Pelvis With IV Contrast NO Oral Contrast  No evidence of leela obstruction or other acute intra-abdominal process.  There are a few prominent loops of small bowel in the left lower quadrant, slightly less impressive in comparison to the 08/22/2024 exam [TM]   1405 Informed patient of results.  Reports feeling improved after ED course. She would like to go home and not be admitted. CT scan stable, actually looks improved from previous.  Normal lactic.  Patient reports GI told her to take Colace and MiraLax daily.  She does report missing this yesterday.  Will discharge home with Colace, MiraLax, Bentyl PRN with GI and PCP follow-up.  No emergent pathology today. [TM]      ED Course User Index  [TM] José Antonio Johnson PA-C                            Clinical Impression:  Final diagnoses:  [R10.30] Lower abdominal pain (Primary)  [Z87.19] History of small bowel obstruction  [R93.5] Abnormal CT of the abdomen          ED Disposition Condition    Discharge Stable          ED Prescriptions       Medication Sig Dispense Start Date End Date Auth. Provider    docusate sodium (COLACE) 100 MG capsule Take 1 capsule (100 mg total) by mouth 2 (two) times daily. for 7 days 14 capsule 9/3/2024 9/10/2024 José Antonio Johnson PA-C    polyethylene glycol (GLYCOLAX) 17 gram PwPk Take 17 g by mouth once daily. 30 each 9/3/2024 -- José Antonio Johnson PA-C    dicyclomine (BENTYL) 20 mg tablet Take 1 tablet (20 mg total) by mouth 2 (two) times daily. for 5 days 10 tablet 9/3/2024 9/8/2024 José Antonio Johnson PA-C          Follow-up Information       Follow up With Specialties Details Why Contact Info    [facility] Dept Emergency Medicine Go to  As needed, If symptoms worsen, or new symptoms develop [id number] [facility] [id number] [phone]    Primary care doctor  Schedule an appointment as soon as possible for a visit in 3 days      Kostas Duarte MD Gastroenterology   [address]  [address]  [facility] GASTRO[facility]OLOG[facility]  [facility] [address]  [phone]                   José Antonio Johnson PA-C  09/03/24 [id number]

## 2024-09-06 ENCOUNTER — OFFICE VISIT (OUTPATIENT)
Facility: CLINIC | Age: 84
End: 2024-09-06
Payer: MEDICARE

## 2024-09-06 VITALS
WEIGHT: 273.25 LBS | HEART RATE: 65 BPM | HEIGHT: 67 IN | OXYGEN SATURATION: 98 % | DIASTOLIC BLOOD PRESSURE: 71 MMHG | SYSTOLIC BLOOD PRESSURE: 150 MMHG | BODY MASS INDEX: 42.89 KG/M2 | TEMPERATURE: 99 F

## 2024-09-06 DIAGNOSIS — K59.01 SLOW TRANSIT CONSTIPATION: Primary | ICD-10-CM

## 2024-09-06 DIAGNOSIS — I10 ESSENTIAL HYPERTENSION: Chronic | ICD-10-CM

## 2024-09-06 DIAGNOSIS — Z23 NEED FOR IMMUNIZATION AGAINST INFLUENZA: ICD-10-CM

## 2024-09-06 PROBLEM — K56.609 SMALL BOWEL OBSTRUCTION: Status: RESOLVED | Noted: 2024-08-23 | Resolved: 2024-09-06

## 2024-09-06 PROCEDURE — 99999 PR PBB SHADOW E&M-EST. PATIENT-LVL IV: CPT | Mod: PBBFAC,,, | Performed by: HOSPITALIST

## 2024-09-06 RX ORDER — AMOXICILLIN 250 MG
1 CAPSULE ORAL DAILY
Qty: 90 TABLET | Refills: 3 | Status: SHIPPED | OUTPATIENT
Start: 2024-09-06

## 2024-09-06 RX ORDER — POLYETHYLENE GLYCOL 3350 17 G/17G
17 POWDER, FOR SOLUTION ORAL 3 TIMES DAILY PRN
Qty: 30 EACH | Refills: 5 | Status: SHIPPED | OUTPATIENT
Start: 2024-09-06

## 2024-09-06 NOTE — ASSESSMENT & PLAN NOTE
Recent admission likely secondary to fecal impaction, with return ED visit due to ongoing symptoms.  CT scan from hospital admission reviewed personally showing extensive stool burden throughout the colon including fecalization of distal small bowel.  Reviewed pathophysiology of slow transit constipation and fecal impaction with patient.  She reports confusion regarding symptoms prior to hospitalization of watery stool output, which I explained to her is consistent with overflow diarrhea in context of fecal impaction.  She was prescribed dicyclomine at recent ED visit which she was advised to stop taking as the anticholinergic properties will likely only worsen constipation.  We will change docusate to senna-docusate daily, with instructions to increase to 2 daily if inadequate bowel movement daily on one.  She was also advised she can use the polyethylene glycol multiple times a day if needed if she has not had a bowel movement that day.  She was counseled on objective of bowel regimen in having complete or near complete evacuation each day to prevent progressive accumulation of stool burden.

## 2024-09-06 NOTE — PROGRESS NOTES
Primary Care Provider Appointment - [facility] & Anatoly Nettles MD      Subjective:      Patient ID: Natali Galeano is a 83 y.o. female with   Past Medical History:   Diagnosis Date    Allergic rhinitis 07/18/2022    Anemia     Arthritis     Colon cancer     Gout, chronic     Heart murmur     High cholesterol     Hypercholesteremia     Hypertension     Obesity     PMB (postmenopausal bleeding) 10/24/2017    Right knee pain 02/23/2019    Sleep apnea     Small bowel obstruction, partial 02/23/2019    Thyroid disease            Chief Complaint: Follow-up    Prior to this visit, patient's last encounter with PCP was 7/26/2023.    Was hospitalized 8/23-8/27 for pSBO 2/2 fecal impaction.  ED visit 9/3 for similar sx but CT did not show any obstruction so sent home with dicyclomine, which she has been taking but still hasn't had a BM.  Was using prune juice to help with constipation but it eventually stopped working.  Was having watery stool output prior to sx getting really bad.    7/26: Noted some redness of both shins which lasted about a week and a half; no pain or other symptoms.  Is continuing to have swelling but has started going back to the gym with her niece.  Walks treadmill about 20 min, stationary bike x 20 min; some resistance training.    Drinking prune juice lately which has been helping have a BM daily, even better than the colace.  Checking BP at home regularly w/ Digital Med device; largely looking good.      6/10: Had 2 episodes of CP lasting a few seconds when at rest.  Might have happened after eating.  Was sitting in front of computer twisted obliquely when it happened.    Notes more sagging of skin on arms.  Not getting much exercise due to transportation falling through.  Trying to apply for RTA assistance.    Also reports ankle edema, inc on R.  Progressive through day while sitting and improves overnight when laying down.      2/26: Pt reports trouble losing weight.  She loves bread  and eats a lot of it.  Also enjoys spinach, squash, other vegetables.  Saw a nutritionist a few years ago but didn't implement changes discussed.    She has history of partial thyroidectomy and sees an endocrinologist, but does not require thyroid supplementation.  She reports having had normal colonoscopies in  and  at [facility] and wants to know if she needs another one.  She similarly wants to know about mammography screening, which is very uncomfortable and difficult to do with her large breasts.  She has osteoarthritis of the knees and had an ED visit in December for knee pain, following which she was referred to an orthopedic surgeon who she saw earlier this month who wanted to give her an injection into the knees, but she declined.  She was Rx'd meloxicam, which she decided not to take after reading about side effects.      4Ms for Medical Decision-Making in Older Adults    Last Completed EAWV: 2024    MOBILITY:  Get Up and Go:      2024    11:45 AM 2023     9:26 AM   Get Up and Go   Trial 1 12 seconds 12 seconds     Activities of Daily Livin/16/2024    11:41 AM   Activities of Daily Living   Ambulation Independent   Dressing Independent   Transfers Independent   Toileting Continent of bladder;Continent of bowel   Feeding Independent   Cleaning home/Chores Independent   Telephone use Independent   Shopping Assistance Required   Paying bills Independent   Taking meds Independent   If required, who assists the patient with ADLs? daughter     Whisper Test:      2024    11:45 AM   Whisper Test   Whisper Test Normal     Disability Status:      2024    11:44 AM   Disability Status   Are you deaf or do you have serious difficulty hearing? N   Are you blind or do you have serious difficulty seeing, even when wearing glasses? N   Because of a physical, mental, or emotional condition, do you have serious difficulty concentrating, remembering, or making decisions? N   Do you have  serious difficulty walking or climbing stairs? N   Do you have difficulty dressing or bathing? N   Because of a physical, mental, or emotional condition, do you have difficulty doing errands alone such as visiting a doctor's office or shopping? N     Nutrition Screenin/16/2024    11:40 AM   Nutrition Screening   Has food intake declined over the past three months due to loss of appetite, digestive problems, chewing or swallowing difficulties? No decrease in food intake   Involuntary weight loss during the last 3 months? No weight loss   Mobility? Goes out   Has the patient suffered psychological stress or acute disease in the past three months? No   Neuropsychological problems? No psychological problems   Body Mass Index (BMI)?  BMI 23 or greater   Screening Score 14   Interpretation Normal nutritional status    Screening Score: 0-7 Malnourished, 8-11 At Risk, 12-14 Normal    MENTATION:   Depression Patient Health Questionnaire:      2024    11:45 AM   Depression Patient Health Questionnaire   Over the last two weeks how often have you been bothered by little interest or pleasure in doing things Not at all   Over the last two weeks how often have you been bothered by feeling down, depressed or hopeless Not at all   PHQ-2 Total Score 0     Has Dementia Dx: No    Cognitive Function Screenin/16/2024    11:45 AM   Cognitive Function Screening   Clock Drawing Test 1   Mini-Cog 3 Minute Recall 3   Cognitive Function Screening 4     Cognitive Function Screening Total - Less than 4 = Abnormal,  Greater than or equal to 4 = Normal    MEDICATIONS:  High Risk Medications:  Total Active Medications: 0  This patient does not have an active medication from one of the medication groupers.    WHAT MATTERS MOST:  Advance Care Planning   ACP Status:   Patient has had an ACP conversation  Living Will: No  Power of : No  [place]: No    Advance Care Planning     Date: 2024  Patient did not wish or was  not able to name a surrogate decision maker or provide an Advance Care Plan.  Patient provided with ACP packet to review with family and bring back to next appointment.       Social History     Socioeconomic History    Marital status: Single   Tobacco Use    Smoking status: Former     Current packs/day: 0.00     Types: Cigarettes     Quit date: 1980     Years since quittin.4    Smokeless tobacco: Never   Substance and Sexual Activity    Alcohol use: No    Drug use: No    Sexual activity: Not Currently     Social Determinants of Health     Financial Resource Strain: Low Risk  (2024)    Overall Financial Resource Strain (CARDIA)     Difficulty of Paying Living Expenses: Not very hard   Food Insecurity: Food Insecurity Present (2024)    Hunger Vital Sign     Worried About Running Out of Food in the Last Year: Sometimes true     Ran Out of Food in the Last Year: Sometimes true   Transportation Needs: No Transportation Needs (2024)    PRAPARE - Transportation     Lack of Transportation (Medical): No     Lack of Transportation (Non-Medical): No   Physical Activity: Insufficiently Active (2024)    Exercise Vital Sign     Days of Exercise per Week: 2 days     Minutes of Exercise per Session: 10 min   Stress: No Stress Concern Present (2024)    [employer] [employer] - Occupational Stress Questionnaire     Feeling of Stress : Not at all   Housing Stability: Low Risk  (2024)    Housing Stability Vital Sign     Unable to Pay for Housing in the Last Year: No     Homeless in the Last Year: No       Review of Systems   Constitutional:  Negative for activity change, appetite change, chills, fever and unexpected weight change.   HENT:  Positive for dental problem. Negative for nasal congestion, hearing loss, rhinorrhea, sore throat and trouble swallowing.    Eyes:  Negative for photophobia, discharge and visual disturbance.   Respiratory:  Negative for cough, chest tightness,  "shortness of breath and wheezing.    Cardiovascular:  Negative for chest pain, palpitations and leg swelling.   Gastrointestinal:  Negative for abdominal pain, blood in stool, constipation, diarrhea, nausea and vomiting.   Endocrine: Negative for polydipsia and polyuria.   Genitourinary:  Negative for difficulty urinating, dysuria, hematuria and menstrual problem.   Musculoskeletal:  Positive for joint swelling. Negative for arthralgias, myalgias and neck pain.   Integumentary:  Negative for rash and wound.   Neurological:  Negative for dizziness, weakness and headaches.   Psychiatric/Behavioral:  Negative for confusion and dysphoric mood.         Objective:   BP (!) 150/71   Pulse 65   Temp 98.5 °F (36.9 °C) (Oral)   Ht 5' 7" (1.702 m)   Wt 123.9 kg (273 lb 4.2 oz)   LMP  (LMP Unknown)   SpO2 98%   BMI 42.80 kg/m²     Physical Exam  Vitals reviewed.   Constitutional:       General: She is not in acute distress.     Appearance: She is obese.   HENT:      Head: Normocephalic and atraumatic.      Right Ear: Tympanic membrane, ear canal and external ear normal. There is no impacted cerumen.      Left Ear: Tympanic membrane, ear canal and external ear normal. There is no impacted cerumen.      Nose: Nose normal.      Mouth/Throat:      Mouth: Mucous membranes are moist.      Pharynx: Oropharynx is clear.      Comments: Broken molar on left mandible with majority of crown missing; pulp visible.  Multiple fillings noted.  Eyes:      General: No scleral icterus.     Conjunctiva/sclera: Conjunctivae normal.   Cardiovascular:      Rate and Rhythm: Normal rate and regular rhythm.      Heart sounds: Normal heart sounds.   Pulmonary:      Effort: Pulmonary effort is normal.      Breath sounds: Normal breath sounds.   Abdominal:      General: Bowel sounds are normal. There is no distension.      Tenderness: There is no abdominal tenderness. There is no guarding.   Musculoskeletal:      Cervical back: Normal range of " motion and neck supple. No rigidity.      Right lower leg: Edema (trace pitting) present.      Left lower leg: Edema (trace pitting) present.   Lymphadenopathy:      Cervical: No cervical adenopathy.   Skin:     General: Skin is warm and dry.   Neurological:      General: No focal deficit present.      Mental Status: She is alert and oriented to person, place, and time.              Lab Results   Component Value Date    WBC 10.66 09/03/2024    HGB 13.9 09/03/2024    HCT 44.9 09/03/2024     09/03/2024    CHOL 148 06/14/2024    TRIG 77 06/14/2024    HDL 48 06/14/2024    ALT 14 09/03/2024    AST 16 09/03/2024     09/03/2024    K 3.8 09/03/2024     09/03/2024    CREATININE 0.8 09/03/2024    BUN 11 09/03/2024    CO2 25 09/03/2024    TSH 2.298 07/12/2023    INR 1.0 06/22/2015    HGBA1C 5.9 (H) 02/23/2019       Current Outpatient Medications on File Prior to Visit   Medication Sig Dispense Refill    allopurinoL (ZYLOPRIM) 300 MG tablet Take 1 tablet (300 mg total) by mouth once daily. gout 90 tablet 3    amLODIPine (NORVASC) 10 MG tablet Take 1 tablet (10 mg total) by mouth once daily. High blood pressure 90 tablet 3    ascorbic acid, vitamin C, (VITAMIN C) 1000 MG tablet Take 1,000 mg by mouth once daily.      aspirin (ECOTRIN) 81 MG EC tablet Take 81 mg by mouth once daily.      atorvastatin (LIPITOR) 10 MG tablet TAKE 1 TABLET(10 MG) BY MOUTH EVERY EVENING FOR HIGH CHOLESTEROL 90 tablet 3    cholecalciferol, vitamin D3, (VITAMIN D3) 25 mcg (1,000 unit) capsule Take 2,000 Units by mouth once daily.      cyanocobalamin (VITAMIN B-12) 250 MCG tablet Take 2,500 mcg by mouth once daily.      dicyclomine (BENTYL) 20 mg tablet Take 1 tablet (20 mg total) by mouth 2 (two) times daily. for 5 days 10 tablet 0    docusate sodium (COLACE) 100 MG capsule Take 100 mg by mouth once daily.      docusate sodium (COLACE) 100 MG capsule Take 1 capsule (100 mg total) by mouth 2 (two) times daily. for 7 days 14 capsule 0     fluticasone propionate (FLONASE) 50 mcg/actuation nasal spray 2 sprays (100 mcg total) by Each Nostril route 2 (two) times a day. 16 g 3    hydroCHLOROthiazide (HYDRODIURIL) 25 MG tablet Take 0.5 tablets (12.5 mg total) by mouth once daily. Take 1/2 of the 25 mg tablet daily 60 tablet 3    polyethylene glycol (GLYCOLAX) 17 gram PwPk Take 17 g by mouth daily as needed for Constipation. 30 each 0    polyethylene glycol (GLYCOLAX) 17 gram PwPk Take 17 g by mouth once daily. 30 each 0     No current facility-administered medications on file prior to visit.         Assessment:   83 y.o. female with multiple co-morbid illnesses here to follow-up for ongoing chronic disease management and preventive health maintenance.    Plan:     Problem List Items Addressed This Visit       Essential hypertension (Chronic)     Blood pressure elevated at visit, but review of digital medicine home measurements over past several days all at goal, suggesting measurements done today likely represent outliers.  We will continue to monitor for now.         Slow transit constipation - Primary     Recent admission likely secondary to fecal impaction, with return ED visit due to ongoing symptoms.  CT scan from hospital admission reviewed personally showing extensive stool burden throughout the colon including fecalization of distal small bowel.  Reviewed pathophysiology of slow transit constipation and fecal impaction with patient.  She reports confusion regarding symptoms prior to hospitalization of watery stool output, which I explained to her is consistent with overflow diarrhea in context of fecal impaction.  She was prescribed dicyclomine at recent ED visit which she was advised to stop taking as the anticholinergic properties will likely only worsen constipation.  We will change docusate to senna-docusate daily, with instructions to increase to 2 daily if inadequate bowel movement daily on one.  She was also advised she can use the  polyethylene glycol multiple times a day if needed if she has not had a bowel movement that day.  She was counseled on objective of bowel regimen in having complete or near complete evacuation each day to prevent progressive accumulation of stool burden.         Relevant Medications    senna-docusate 8.6-50 mg (SENNA WITH DOCUSATE SODIUM) 8.6-50 mg per tablet    polyethylene glycol (GLYCOLAX) 17 gram PwPk     Other Visit Diagnoses       Need for immunization against influenza        Relevant Medications    influenza (Fluad) 45 mcg/0.5 mL vaccine 0.5 mL (Completed)                Health Maintenance         Date Due Completion Date    Hemoglobin A1c (Prediabetes) 02/23/2020 2/23/2019    COVID-19 Vaccine (5 - 2023-24 season) 09/01/2024 7/15/2022    DEXA Scan 06/08/2025 6/8/2022    Lipid Panel 06/14/2029 6/14/2024    TETANUS VACCINE 01/24/2033 1/24/2023        Flu shot administered today.    Future Appointments   Date Time Provider Department Center   9/7/2024 10:00 AM LAB, SBP SBP LAB [facility]   9/20/2024  8:20 AM Marin Nettles MD [facility]   12/10/2024 10:00 AM Cleve Barfield MD [facility] CARDIO [facility]   12/11/2024  2:30 PM Enrique Cartagena MD [facility] GASTR[facility] [address]         Follow up in about 2 weeks (around 9/20/2024) for Virtual Visit. Total clinical care time was 40 min.    Marin Nettles MD  [facility]

## 2024-09-06 NOTE — ASSESSMENT & PLAN NOTE
Blood pressure elevated at visit, but review of digital medicine home measurements over past several days all at goal, suggesting measurements done today likely represent outliers.  We will continue to monitor for now.

## 2024-09-20 ENCOUNTER — OFFICE VISIT (OUTPATIENT)
Facility: CLINIC | Age: 84
End: 2024-09-20
Payer: MEDICARE

## 2024-09-20 DIAGNOSIS — M79.605 ACUTE LEG PAIN, LEFT: ICD-10-CM

## 2024-09-20 DIAGNOSIS — K59.01 SLOW TRANSIT CONSTIPATION: Primary | ICD-10-CM

## 2024-09-20 NOTE — PROGRESS NOTES
[facility] Physician Telemedicine Appointment  Marin Nettles MD      The patient location is:  Patient Home   The chief complaint leading to consultation is: f/u constipation  Total time spent with patient: 14 min    Visit type: Virtual visit with synchronous audio only and video  Each patient to whom he or she provides medical services by telemedicine is:  (1) informed of the relationship between the physician and patient and the respective role of any other health care provider with respect to management of the patient; and (2) notified that he or she may decline to receive medical services by telemedicine and may withdraw from such care at any time.      Subjective:      Patient ID: Natali Galeano is a 83 y.o. female.    Chief Complaint: No chief complaint on file.    Prior to this visit, patient's last encounter with PCP was 2024.    Pt reports taking the senna-docusate daily as well as the polyethylene glycol daily and has been having loose bowel movements approximately once a day about 2 hours after taking the medications.  Having some gas pain but relieved with Gas-X.  Denies cramping or bloating.  She also incidentally mentions some cramping in her left calf upon awakening for the past 2 mornings, which resolves with Biofreeze.  She denies any swelling in the leg.  She sleeps on her left side sometimes but also primarily on her back due to wearing CPAP.        4Ms for Medical Decision-Making in Older Adults    Last Completed EAWV: 2024    MOBILITY:  Get Up and Go:      2024    11:45 AM 2023     9:26 AM   Get Up and Go   Trial 1 12 seconds 12 seconds     Activities of Daily Livin/16/2024    11:41 AM   Activities of Daily Living   Ambulation Independent   Dressing Independent   Transfers Independent   Toileting Continent of bladder;Continent of bowel   Feeding Independent   Cleaning home/Chores Independent   Telephone use Independent   Shopping Assistance Required    Paying bills Independent   Taking meds Independent   If required, who assists the patient with ADLs? daughter     Whisper Test:      2024    11:45 AM   Whisper Test   Whisper Test Normal     Disability Status:      2024    11:44 AM   Disability Status   Are you deaf or do you have serious difficulty hearing? N   Are you blind or do you have serious difficulty seeing, even when wearing glasses? N   Because of a physical, mental, or emotional condition, do you have serious difficulty concentrating, remembering, or making decisions? N   Do you have serious difficulty walking or climbing stairs? N   Do you have difficulty dressing or bathing? N   Because of a physical, mental, or emotional condition, do you have difficulty doing errands alone such as visiting a doctor's office or shopping? N     Nutrition Screenin/16/2024    11:40 AM   Nutrition Screening   Has food intake declined over the past three months due to loss of appetite, digestive problems, chewing or swallowing difficulties? No decrease in food intake   Involuntary weight loss during the last 3 months? No weight loss   Mobility? Goes out   Has the patient suffered psychological stress or acute disease in the past three months? No   Neuropsychological problems? No psychological problems   Body Mass Index (BMI)?  BMI 23 or greater   Screening Score 14   Interpretation Normal nutritional status    Screening Score: 0-7 Malnourished, 8-11 At Risk, 12-14 Normal  Fall Risk:      2024    11:40 AM 6/10/2024     9:40 AM 2024     1:00 PM   Fall Risk Assessment - Outpatient   Mobility Status Ambulatory Ambulatory Ambulatory   Number of falls 0 0 0   Identified as fall risk False False False           MENTATION:   Depression Patient Health Questionnaire:      2024    11:45 AM   Depression Patient Health Questionnaire   Over the last two weeks how often have you been bothered by little interest or pleasure in doing things Not at all    Over the last two weeks how often have you been bothered by feeling down, depressed or hopeless Not at all   PHQ-2 Total Score 0     Has Dementia Dx: No  Has Anxiety Dx: No    Cognitive Function Screenin/16/2024    11:45 AM   Cognitive Function Screening   Clock Drawing Test 1   Mini-Cog 3 Minute Recall 3   Cognitive Function Screening 4     Cognitive Function Screening Total - Less than 4 = Abnormal,  Greater than or equal to 4 = Normal        MEDICATIONS:  High Risk Medications:  Total Active Medications: 0  This patient does not have an active medication from one of the medication groupers.    WHAT MATTERS MOST:  Advance Care Planning   ACP Status:   Patient has had an ACP conversation  Living Will: No  Power of : No  LaPOST: No    What is most important right now is to focus on spending time at home, avoiding the hospital, remaining as independent as possible, and symptom/pain control    Accordingly, we have decided that the best plan to meet the patient's goals includes continuing with treatment      What matters most to patient today is: getting BMs normal                 Social History     Socioeconomic History    Marital status: Single   Tobacco Use    Smoking status: Former     Current packs/day: 0.00     Types: Cigarettes     Quit date: 1980     Years since quittin.4    Smokeless tobacco: Never   Substance and Sexual Activity    Alcohol use: No    Drug use: No    Sexual activity: Not Currently     Social Determinants of Health     Financial Resource Strain: Low Risk  (2024)    Overall Financial Resource Strain (CARDIA)     Difficulty of Paying Living Expenses: Not very hard   Food Insecurity: Food Insecurity Present (2024)    Hunger Vital Sign     Worried About Running Out of Food in the Last Year: Sometimes true     Ran Out of Food in the Last Year: Sometimes true   Transportation Needs: No Transportation Needs (2024)    PRAPARE - Transportation     Lack of  Transportation (Medical): No     Lack of Transportation (Non-Medical): No   Physical Activity: Insufficiently Active (2024)    Exercise Vital Sign     Days of Exercise per Week: 2 days     Minutes of Exercise per Session: 10 min   Stress: No Stress Concern Present (2024)    [place] [employer] - Occupational Stress Questionnaire     Feeling of Stress : Not at all   Housing Stability: Low Risk  (2024)    Housing Stability Vital Sign     Unable to Pay for Housing in the Last Year: No     Homeless in the Last Year: No       Past Surgical History:   Procedure Laterality Date    BTL       SECTION, CLASSIC      COLON SURGERY      goiter       HERNIA REPAIR      KIDNEY SURGERY      cyst removal    THYROID SURGERY         Past Medical History:   Diagnosis Date    Allergic rhinitis 2022    Anemia     Arthritis     Colon cancer     Gout, chronic     Heart murmur     High cholesterol     Hypercholesteremia     Hypertension     Obesity     PMB (postmenopausal bleeding) 10/24/2017    Right knee pain 2019    Sleep apnea     Small bowel obstruction, partial 2019    Thyroid disease        Review of Systems   Constitutional:  Negative for activity change.   HENT:  Negative for hearing loss, rhinorrhea and trouble swallowing.    Eyes:  Negative for discharge.   Respiratory:  Negative for chest tightness and wheezing.    Cardiovascular:  Negative for chest pain and palpitations.   Gastrointestinal:  Negative for constipation, diarrhea and vomiting.   Genitourinary:  Negative for difficulty urinating and hematuria.   Neurological:  Negative for headaches.   Psychiatric/Behavioral:  Negative for dysphoric mood.        Objective:   LMP  (LMP Unknown)     Physical Exam  Constitutional:       General: She is not in acute distress.     Appearance: Normal appearance.      Comments: Pt seen via virtual visit.   Neurological:      Mental Status: She is alert.         Lab Results    Component Value Date    WBC 10.66 09/03/2024    HGB 13.9 09/03/2024    HCT 44.9 09/03/2024     09/03/2024    CHOL 148 06/14/2024    TRIG 77 06/14/2024    HDL 48 06/14/2024    ALT 14 09/03/2024    AST 16 09/03/2024     09/03/2024    K 3.8 09/03/2024     09/03/2024    CREATININE 0.8 09/03/2024    BUN 11 09/03/2024    CO2 25 09/03/2024    TSH 2.298 07/12/2023    INR 1.0 06/22/2015    HGBA1C 5.8 (H) 09/07/2024         Assessment:   83 y.o. female with multiple co-morbid illnesses seen virtually for follow-up.     Plan:     Problem List Items Addressed This Visit       Slow transit constipation - Primary     She has been taking the senna-docusate daily as well as the polyethylene glycol, which appears to be working a little too well.  She was reminded to only take the polyethylene glycol if needed for constipation and just take the senna-docusate daily.         Acute leg pain, left     Etiology unclear without being able to examine the extremity.  Does not appear to be related to any dangerous pathology at this time.  She will let us know if it persists and we will consider further evaluation.            Health Maintenance         Date Due Completion Date    COVID-19 Vaccine (5 - 2023-24 season) 09/01/2024 7/15/2022    DEXA Scan 06/08/2025 6/8/2022    Hemoglobin A1c (Prediabetes) 09/07/2025 9/7/2024    Lipid Panel 06/14/2029 6/14/2024    TETANUS VACCINE 01/24/2033 1/24/2023        She was advised to get her COVID booster at her next convenience, which she will do at her local pharmacy.    Follow up in about 3 months (around 12/20/2024). .    Marin Nettles MD   [facility]    This note was partially dictated using voice recognition software and although actively proofread during transcription, it is possible some errors in transcription may have been overlooked.

## 2024-09-20 NOTE — ASSESSMENT & PLAN NOTE
Etiology unclear without being able to examine the extremity.  Does not appear to be related to any dangerous pathology at this time.  She will let us know if it persists and we will consider further evaluation.

## 2024-09-20 NOTE — ASSESSMENT & PLAN NOTE
She has been taking the senna-docusate daily as well as the polyethylene glycol, which appears to be working a little too well.  She was reminded to only take the polyethylene glycol if needed for constipation and just take the senna-docusate daily.

## 2024-09-23 ENCOUNTER — PATIENT MESSAGE (OUTPATIENT)
Dept: ADMINISTRATIVE | Facility: OTHER | Age: 84
End: 2024-09-23
Payer: MEDICARE

## 2024-09-23 DIAGNOSIS — J30.89 ALLERGIC RHINITIS DUE TO OTHER ALLERGIC TRIGGER, UNSPECIFIED SEASONALITY: Chronic | ICD-10-CM

## 2024-09-24 RX ORDER — FLUTICASONE PROPIONATE 50 MCG
2 SPRAY, SUSPENSION (ML) NASAL 2 TIMES DAILY
Qty: 16 G | Refills: 3 | Status: SHIPPED | OUTPATIENT
Start: 2024-09-24

## 2024-10-23 NOTE — ASSESSMENT & PLAN NOTE
- Continue amlodipine 5mg PO daily, benazepril 40mg PO daily; hold HCTZ for time being.   Render Post-Care Instructions In Note?: no Show Aperture Variable?: Yes Consent: The patient's consent was obtained including but not limited to risks of crusting, scabbing, blistering, scarring, darker or lighter pigmentary change, recurrence, incomplete removal and infection. Detail Level: Detailed Duration Of Freeze Thaw-Cycle (Seconds): 0 Post-Care Instructions: I reviewed with the patient in detail post-care instructions. Patient is to wear sunprotection, and avoid picking at any of the treated lesions. Pt may apply Vaseline to crusted or scabbing areas.

## 2024-10-25 ENCOUNTER — PATIENT MESSAGE (OUTPATIENT)
Dept: GASTROENTEROLOGY | Facility: CLINIC | Age: 84
End: 2024-10-25
Payer: MEDICARE

## 2024-11-09 ENCOUNTER — PATIENT MESSAGE (OUTPATIENT)
Dept: ADMINISTRATIVE | Facility: OTHER | Age: 84
End: 2024-11-09
Payer: MEDICARE

## 2024-11-20 DIAGNOSIS — I10 ESSENTIAL HYPERTENSION: Chronic | ICD-10-CM

## 2024-11-20 RX ORDER — AMLODIPINE BESYLATE 10 MG/1
TABLET ORAL
Qty: 90 TABLET | Refills: 3 | Status: SHIPPED | OUTPATIENT
Start: 2024-11-20

## 2024-11-20 NOTE — TELEPHONE ENCOUNTER
Refill Routing Note   Medication(s) are not appropriate for processing by [facility] for the following reason(s):        Non-participating provider    [facility] action(s):  Route               Appointments  past 12m or future 3m with PCP    Date Provider   Last Visit   9/20/2024 Marin Nettles MD   Next Visit   12/20/2024 Marin Nettles MD   ED visits in past 90 days: 1        Note composed:12:50 PM 11/20/2024

## 2024-12-10 ENCOUNTER — OFFICE VISIT (OUTPATIENT)
Dept: CARDIOLOGY | Facility: CLINIC | Age: 84
End: 2024-12-10
Payer: MEDICARE

## 2024-12-10 VITALS
OXYGEN SATURATION: 98 % | DIASTOLIC BLOOD PRESSURE: 76 MMHG | HEART RATE: 58 BPM | WEIGHT: 278.69 LBS | SYSTOLIC BLOOD PRESSURE: 158 MMHG | BODY MASS INDEX: 43.64 KG/M2

## 2024-12-10 DIAGNOSIS — Z86.79 HISTORY OF RHEUMATIC FEVER: ICD-10-CM

## 2024-12-10 DIAGNOSIS — I10 ESSENTIAL HYPERTENSION: Primary | Chronic | ICD-10-CM

## 2024-12-10 DIAGNOSIS — E78.49 OTHER HYPERLIPIDEMIA: Chronic | ICD-10-CM

## 2024-12-10 DIAGNOSIS — I70.0 ATHEROSCLEROSIS OF AORTA: ICD-10-CM

## 2024-12-10 PROCEDURE — 3077F SYST BP >= 140 MM HG: CPT | Mod: CPTII,S$GLB,, | Performed by: INTERNAL MEDICINE

## 2024-12-10 PROCEDURE — 1101F PT FALLS ASSESS-DOCD LE1/YR: CPT | Mod: CPTII,S$GLB,, | Performed by: INTERNAL MEDICINE

## 2024-12-10 PROCEDURE — 99214 OFFICE O/P EST MOD 30 MIN: CPT | Mod: S$GLB,,, | Performed by: INTERNAL MEDICINE

## 2024-12-10 PROCEDURE — 3078F DIAST BP <80 MM HG: CPT | Mod: CPTII,S$GLB,, | Performed by: INTERNAL MEDICINE

## 2024-12-10 PROCEDURE — 3288F FALL RISK ASSESSMENT DOCD: CPT | Mod: CPTII,S$GLB,, | Performed by: INTERNAL MEDICINE

## 2024-12-10 PROCEDURE — 99999 PR PBB SHADOW E&M-EST. PATIENT-LVL III: CPT | Mod: PBBFAC,,, | Performed by: INTERNAL MEDICINE

## 2024-12-10 PROCEDURE — 1125F AMNT PAIN NOTED PAIN PRSNT: CPT | Mod: CPTII,S$GLB,, | Performed by: INTERNAL MEDICINE

## 2024-12-10 PROCEDURE — 1159F MED LIST DOCD IN RCRD: CPT | Mod: CPTII,S$GLB,, | Performed by: INTERNAL MEDICINE

## 2024-12-10 RX ORDER — HYDRALAZINE HYDROCHLORIDE 50 MG/1
50 TABLET, FILM COATED ORAL EVERY 12 HOURS
Qty: 180 TABLET | Refills: 3 | Status: SHIPPED | OUTPATIENT
Start: 2024-12-10 | End: 2025-12-10

## 2024-12-10 NOTE — PROGRESS NOTES
Cardiology    12/10/2024  10:09 AM    Problem list  Patient Active Problem List   Diagnosis    Class 3 severe obesity with serious comorbidity and body mass index (BMI) of 40.0 to 44.9 in adult    Essential hypertension    Slow transit constipation    Adrenal nodule    Hyperlipidemia    ALYSE (obstructive sleep apnea)    Thyroid disease    Normocytic anemia    Gout    Heart murmur    History of rheumatic fever    Chronic pain of right ankle    Onychorrhexis    Osteopenia of necks of both femurs    Allergic rhinitis    Gait difficulty    Decreased range of motion of right ankle    S/P partial thyroidectomy    Prediabetes    Atherosclerosis of aorta    Broken tooth    Ankle edema, bilateral    Acute leg pain, left       CC:  Swelling in both ankles    HPI:  Patient complains of having swelling in both ankles.  She denies any exertional chest pain, dyspnea on exertion, palpitation or syncope.    Medications  Current Outpatient Medications   Medication Sig Dispense Refill    allopurinoL (ZYLOPRIM) 300 MG tablet Take 1 tablet (300 mg total) by mouth once daily. gout 90 tablet 3    ascorbic acid, vitamin C, (VITAMIN C) 1000 MG tablet Take 1,000 mg by mouth once daily.      aspirin (ECOTRIN) 81 MG EC tablet Take 81 mg by mouth once daily.      atorvastatin (LIPITOR) 10 MG tablet TAKE 1 TABLET(10 MG) BY MOUTH EVERY EVENING FOR HIGH CHOLESTEROL 90 tablet 3    cholecalciferol, vitamin D3, (VITAMIN D3) 25 mcg (1,000 unit) capsule Take 2,000 Units by mouth once daily.      cyanocobalamin (VITAMIN B-12) 250 MCG tablet Take 2,500 mcg by mouth once daily.      fluticasone propionate (FLONASE) 50 mcg/actuation nasal spray 2 sprays (100 mcg total) by Each Nostril route 2 (two) times a day. 16 g 3    hydroCHLOROthiazide (HYDRODIURIL) 25 MG tablet Take 0.5 tablets (12.5 mg total) by mouth once daily. Take 1/2 of the 25 mg tablet daily 60 tablet 3    levocetirizine (XYZAL) 5 MG tablet Take 1 tablet (5 mg total) by mouth every  evening. 90 tablet 3    polyethylene glycol (GLYCOLAX) 17 gram PwPk Take 17 g by mouth 3 (three) times daily as needed for Constipation (no BM in 24 hours). 30 each 5    senna-docusate 8.6-50 mg (SENNA WITH DOCUSATE SODIUM) 8.6-50 mg per tablet Take 1 tablet by mouth once daily. If one tablet not effective in causing BM daily then increase to 2 tablets daily. 90 tablet 3    hydrALAZINE (APRESOLINE) 50 MG tablet Take 1 tablet (50 mg total) by mouth every 12 (twelve) hours. 180 tablet 3     No current facility-administered medications for this visit.      Prior to Admission medications    Medication Sig Start Date End Date Taking? Authorizing Provider   allopurinoL (ZYLOPRIM) 300 MG tablet Take 1 tablet (300 mg total) by mouth once daily. gout 10/30/23  Yes Ilene Rocha MD   ascorbic acid, vitamin C, (VITAMIN C) 1000 MG tablet Take 1,000 mg by mouth once daily.   Yes Provider, Historical   aspirin (ECOTRIN) 81 MG EC tablet Take 81 mg by mouth once daily.   Yes Provider, Historical   atorvastatin (LIPITOR) 10 MG tablet TAKE 1 TABLET(10 MG) BY MOUTH EVERY EVENING FOR HIGH CHOLESTEROL 8/26/24  Yes Marin Nettles MD   cholecalciferol, vitamin D3, (VITAMIN D3) 25 mcg (1,000 unit) capsule Take 2,000 Units by mouth once daily.   Yes Provider, Historical   cyanocobalamin (VITAMIN B-12) 250 MCG tablet Take 2,500 mcg by mouth once daily.   Yes Provider, Historical   fluticasone propionate (FLONASE) 50 mcg/actuation nasal spray 2 sprays (100 mcg total) by Each Nostril route 2 (two) times a day. 9/24/24  Yes Marin Nettles MD   hydroCHLOROthiazide (HYDRODIURIL) 25 MG tablet Take 0.5 tablets (12.5 mg total) by mouth once daily. Take 1/2 of the 25 mg tablet daily 8/27/24  Yes Keena Joyce PA-C   levocetirizine (XYZAL) 5 MG tablet Take 1 tablet (5 mg total) by mouth every evening. 9/24/24 9/24/25 Yes Marin Nettles MD   polyethylene glycol (GLYCOLAX) 17 gram PwPk Take 17 g by mouth 3 (three) times  daily as needed for Constipation (no BM in 24 hours). 24  Yes Marin Nettles MD   senna-docusate 8.6-50 mg (SENNA WITH DOCUSATE SODIUM) 8.6-50 mg per tablet Take 1 tablet by mouth once daily. If one tablet not effective in causing BM daily then increase to 2 tablets daily. 24  Yes Marin Nettles MD   amLODIPine (NORVASC) 10 MG tablet TAKE 1 TABLET(10 MG) BY MOUTH EVERY DAY FOR HIGH BLOOD PRESSURE 11/20/24 12/10/24 Yes Marin Nettles MD   hydrALAZINE (APRESOLINE) 50 MG tablet Take 1 tablet (50 mg total) by mouth every 12 (twelve) hours. 12/10/24 12/10/25  Cleve Barfield MD         History  Past Medical History:   Diagnosis Date    Allergic rhinitis 2022    Anemia     Arthritis     Colon cancer     Gout, chronic     Heart murmur     High cholesterol     Hypercholesteremia     Hypertension     Obesity     PMB (postmenopausal bleeding) 10/24/2017    Right knee pain 2019    Sleep apnea     Small bowel obstruction, partial 2019    Thyroid disease      Past Surgical History:   Procedure Laterality Date    BTL       SECTION, CLASSIC      COLON SURGERY      goiter       HERNIA REPAIR      KIDNEY SURGERY      cyst removal    THYROID SURGERY       Social History     Socioeconomic History    Marital status: Single   Tobacco Use    Smoking status: Former     Current packs/day: 0.00     Types: Cigarettes     Quit date: 1980     Years since quittin.6    Smokeless tobacco: Never   Substance and Sexual Activity    Alcohol use: No    Drug use: No    Sexual activity: Not Currently     Social Drivers of Health     Financial Resource Strain: Low Risk  (2024)    Overall Financial Resource Strain (CARDIA)     Difficulty of Paying Living Expenses: Not very hard   Food Insecurity: Food Insecurity Present (2024)    Hunger Vital Sign     Worried About Running Out of Food in the Last Year: Sometimes true     Ran Out of Food in the Last Year: Sometimes true    Transportation Needs: No Transportation Needs (4/16/2024)    PRAPARE - Transportation     Lack of Transportation (Medical): No     Lack of Transportation (Non-Medical): No   Physical Activity: Insufficiently Active (6/7/2024)    Exercise Vital Sign     Days of Exercise per Week: 2 days     Minutes of Exercise per Session: 10 min   Stress: No Stress Concern Present (6/7/2024)    [place] [employer] - Occupational Stress Questionnaire     Feeling of Stress : Not at all   Housing Stability: Low Risk  (6/7/2024)    Housing Stability Vital Sign     Unable to Pay for Housing in the Last Year: No     Homeless in the Last Year: No         Allergies  Review of patient's allergies indicates:   Allergen Reactions    Ace inhibitors Other (See Comments)     cough    Amlodipine Edema    Arb-angiotensin receptor antagonist Other (See Comments)     Cough w/ valsartan         Review of Systems   Review of Systems   Constitutional: Negative for decreased appetite, fever and weight loss.   HENT:  Negative for congestion and nosebleeds.    Eyes:  Negative for double vision, vision loss in left eye, vision loss in right eye and visual disturbance.   Cardiovascular:  Negative for chest pain, claudication, cyanosis, dyspnea on exertion, irregular heartbeat, leg swelling, near-syncope, orthopnea, palpitations, paroxysmal nocturnal dyspnea and syncope.   Respiratory:  Negative for cough, hemoptysis, shortness of breath, sleep disturbances due to breathing, snoring, sputum production and wheezing.    Endocrine: Negative for cold intolerance and heat intolerance.   Skin:  Negative for nail changes and rash.   Musculoskeletal:  Negative for joint pain, muscle cramps, muscle weakness and myalgias.   Gastrointestinal:  Negative for change in bowel habit, heartburn, hematemesis, hematochezia, hemorrhoids and melena.   Neurological:  Negative for dizziness, focal weakness and headaches.         Physical Exam  Wt Readings  from Last 1 Encounters:   12/10/24 126.4 kg (278 lb 10.6 oz)     BP Readings from Last 3 Encounters:   12/10/24 (!) 158/76   09/06/24 (!) 150/71   09/03/24 (!) 158/71     Pulse Readings from Last 1 Encounters:   12/10/24 (!) 58     Body mass index is 43.64 kg/m².    Physical Exam  Vitals reviewed.   Constitutional:       Appearance: She is well-developed. She is obese.   HENT:      Head: Atraumatic.   Eyes:      General: No scleral icterus.  Neck:      Vascular: Normal carotid pulses. No carotid bruit, hepatojugular reflux or JVD.   Cardiovascular:      Rate and Rhythm: Normal rate and regular rhythm.      Chest Wall: PMI is not displaced.      Pulses: Intact distal pulses.           Carotid pulses are 2+ on the right side and 2+ on the left side.       Radial pulses are 2+ on the right side and 2+ on the left side.        Dorsalis pedis pulses are 2+ on the right side and 2+ on the left side.      Heart sounds: Normal heart sounds, S1 normal and S2 normal. No murmur heard.     No friction rub.   Pulmonary:      Effort: Pulmonary effort is normal. No respiratory distress.      Breath sounds: Normal breath sounds. No stridor. No wheezing or rales.   Chest:      Chest wall: No tenderness.   Abdominal:      General: Bowel sounds are normal.      Palpations: Abdomen is soft.   Musculoskeletal:      Cervical back: Neck supple. No edema.   Skin:     General: Skin is warm and dry.      Nails: There is no clubbing.   Neurological:      Mental Status: She is alert and oriented to person, place, and time.   Psychiatric:         Behavior: Behavior normal.         Thought Content: Thought content normal.             Assessment  1. Essential hypertension (Primary)  Not at goal.    2. History of rheumatic fever  Stable    3. Other hyperlipidemia  Controlled.  Continue medications and monitor    4. Atherosclerosis of aorta  Unchanged        Plan and Discussion  Discussed her blood pressure is not well controlled.  Discussed that  her trace ankle edema may be from amlodipine.  Will discontinue amlodipine.  Will start hydralazine 50 mg twice daily.  Patient was sent us a message next week regarding her blood pressure.  Patient is unable to tolerate ACE inhibitor or ARB.    Follow Up  2-3 months      Cleve Barfield MD, F.A.C.C, F.S.C.A.I.      Total professional time spent for the encounter: 30 minutes  Time was spent preparing to see the patient, reviewing results of prior testing, obtaining and/or reviewing separately obtained history, performing a medically appropriate examination and interview, counseling and educating the patient/family, ordering medications/tests/procedures, referring and communicating with other health care professionals, documenting clinical information in the electronic health record, and independently interpreting results.    Disclaimer: This document was created using voice recognition software ([employer]). Although it may be edited, this document may contain errors related to incorrect recognition of the spoken word. Please call the physician if clarification is needed.

## 2024-12-18 ENCOUNTER — PATIENT MESSAGE (OUTPATIENT)
Dept: OTHER | Facility: OTHER | Age: 84
End: 2024-12-18
Payer: MEDICARE

## 2024-12-20 ENCOUNTER — OFFICE VISIT (OUTPATIENT)
Facility: CLINIC | Age: 84
End: 2024-12-20
Payer: MEDICARE

## 2024-12-20 VITALS — SYSTOLIC BLOOD PRESSURE: 134 MMHG | HEART RATE: 100 BPM | DIASTOLIC BLOOD PRESSURE: 53 MMHG

## 2024-12-20 DIAGNOSIS — M25.471 ANKLE EDEMA, BILATERAL: ICD-10-CM

## 2024-12-20 DIAGNOSIS — I10 ESSENTIAL HYPERTENSION: Chronic | ICD-10-CM

## 2024-12-20 DIAGNOSIS — J30.89 NON-SEASONAL ALLERGIC RHINITIS DUE TO OTHER ALLERGIC TRIGGER: Primary | Chronic | ICD-10-CM

## 2024-12-20 DIAGNOSIS — M25.472 ANKLE EDEMA, BILATERAL: ICD-10-CM

## 2024-12-20 RX ORDER — PREDNISONE 20 MG/1
20 TABLET ORAL DAILY
Qty: 3 TABLET | Refills: 0 | Status: SHIPPED | OUTPATIENT
Start: 2024-12-20 | End: 2024-12-23

## 2024-12-20 RX ORDER — IPRATROPIUM BROMIDE 21 UG/1
2 SPRAY, METERED NASAL 2 TIMES DAILY PRN
Qty: 30 ML | Refills: 5 | Status: SHIPPED | OUTPATIENT
Start: 2024-12-20

## 2024-12-20 RX ORDER — AMLODIPINE BESYLATE 10 MG/1
10 TABLET ORAL DAILY
Qty: 90 TABLET | Refills: 3 | Status: SHIPPED | OUTPATIENT
Start: 2024-12-20

## 2024-12-20 NOTE — ASSESSMENT & PLAN NOTE
Patient's blood pressure medications were changed during her 12/10 visit with her cardiologist; amlodipine was discontinued due to complaint of lower extremity edema and replaced with hydralazine 50 mg b.i.d. she endorses experiencing a few side effects since the medication change, which predate the onset of her sinus symptoms.  She would like to change back to the amlodipine she was taking previously, especially since she has not noticed any improvement in lower extremity edema since stopping it.  Review of her blood pressures at home through digital medicine going back a few months are largely at goal, but she does notably tend to have higher blood pressures at appointments, which may represent white coat hypertension.  I will represcribe her amlodipine since it seems to have been working for her and have her stop the hydralazine.  She was advised to avoid sitting still for too long to reduce accumulation of dependent edema.

## 2024-12-20 NOTE — Clinical Note
Had virtual appt with pt today; since her amlodipine was changed to hydralazine she has been feeling lightheaded and generally unwell; and no change in her leg edema.  She wanted to go back to amlodipine which I re-prescribed.  Her home BPs through [facility] have largely been at goal but she does tend to run higher at appointments; possibly white coat hypertension.  We discussed strategies to reduce dependent edema and if those aren't helping enough will discuss compression stockings.  Just wanted to keep you abreast of the medication switch.  Thank you!

## 2024-12-20 NOTE — ASSESSMENT & PLAN NOTE
Counseled patient on importance of avoiding prolonged sitting or standing; interrupting sedentary periods with brief leg activity to help reduce accumulation of dependent edema.  In the event this is insufficient to manage symptoms we can discuss compression stockings.  The amlodipine she takes may be exacerbating the condition, but she reports no improvement in swelling while being off of it.  We could alternatively try nifedipine as well in the event this continues to be a problem.

## 2024-12-20 NOTE — ASSESSMENT & PLAN NOTE
Her present symptoms sound more consistent with mild hay fever due to exposure to allergy trigger.  Although she reports sinus pain, her lack of any constitutional symptoms suggestive of infection do not seem to warrant antibiotic use at this time.  Because of the severity of her symptoms and impending departure for a vacation we will prescribe prednisone 20 mg daily for 3 days.  She was counseled that this medication may increase blood pressure, blood glucose, or contribute to insomnia while she is taking it.  I will additionally prescribe ipratropium nasal spray to help with diminishing rhinorrhea.  She was advised to try Mucinex or Robitussin as a mucolytic.

## 2024-12-20 NOTE — PROGRESS NOTES
[facility] Telemedicine Appointment  Marin Nettles MD      The patient location is:  Patient Home   The chief complaint leading to consultation is: sinus issues  Total time spent with patient: 20 min    Visit type: Virtual visit with synchronous audio only and video  Each patient to whom he or she provides medical services by telemedicine is:  (1) informed of the relationship between the physician and patient and the respective role of any other health care provider with respect to management of the patient; and (2) notified that he or she may decline to receive medical services by telemedicine and may withdraw from such care at any time.      Subjective:      Patient ID: Natali Galeano is a 84 y.o. female.    Chief Complaint: No chief complaint on file.    Prior to this visit, patient's last encounter with PCP was 9/20/2024.    Onset of sinus congestion and pain, runny nose, and cough productive of clear sputum that started Monday.  The previous day she visited a friend who had a dog, which she is allergic to.  She has been using her fluticasone spray and levocetirizine without significant relief, as well as taking Coricidin HBP cough syrup.  She has not experienced any fevers, chills, sweats, or arthralgias/myalgias.  She also notes that her cardiologist recently changed her blood pressure medications and also notes feeling generally unwell since that time.  Her amlodipine was stopped due to her complaint of lower extremity edema and replaced with hydralazine 50 mg b.i.d..  The lower extremity edema has not improved at all in the interim, and she reports feeling generally lightheaded and dizzy.  She also notes experiencing significant insomnia and is having to lay in bed for a few hours prior to falling asleep since her medication change.  She has not been exercising this week due to feeling poorly, but normally she is walking most days and riding her stationary bike 2 to 3 times a  week.  She is leaving for a cruise tomorrow and is requesting any assistance or advice in helping her feel better for her trip.Answers submitted by the patient for this visit:  Review of Systems Questionnaire (Submitted on 2024)  activity change: No  unexpected weight change: No  neck pain: No  hearing loss: No  rhinorrhea: Yes  trouble swallowing: No  eye discharge: No  visual disturbance: No  chest tightness: No  wheezing: No  chest pain: No  palpitations: No  blood in stool: No  constipation: No  vomiting: No  diarrhea: No  polydipsia: No  polyuria: No  difficulty urinating: No  hematuria: No  menstrual problem: No  dysuria: No  joint swelling: Yes  arthralgias: Yes  headaches: No  weakness: No  confusion: No  dysphoric mood: No      : Pt reports taking the senna-docusate daily as well as the polyethylene glycol daily and has been having loose bowel movements approximately once a day about 2 hours after taking the medications.  Having some gas pain but relieved with Gas-X.  Denies cramping or bloating.  She also incidentally mentions some cramping in her left calf upon awakening for the past 2 mornings, which resolves with Biofreeze.  She denies any swelling in the leg.  She sleeps on her left side sometimes but also primarily on her back due to wearing CPAP.        4Ms for Medical Decision-Making in Older Adults    Last Completed EAWV: 2024    MOBILITY:  Get Up and Go:      2024    11:45 AM 2023     9:26 AM   Get Up and Go   Trial 1 12 seconds 12 seconds     Activities of Daily Livin/16/2024    11:41 AM   Activities of Daily Living   Ambulation Independent   Dressing Independent   Transfers Independent   Toileting Continent of bladder;Continent of bowel   Feeding Independent   Cleaning home/Chores Independent   Telephone use Independent   Shopping Assistance Required   Paying bills Independent   Taking meds Independent   If required, who assists the patient with ADLs? daughter      Whisper Test:      2024    11:45 AM   Whisper Test   Whisper Test Normal     Disability Status:      2024    11:44 AM   Disability Status   Are you deaf or do you have serious difficulty hearing? N   Are you blind or do you have serious difficulty seeing, even when wearing glasses? N   Because of a physical, mental, or emotional condition, do you have serious difficulty concentrating, remembering, or making decisions? N   Do you have serious difficulty walking or climbing stairs? N   Do you have difficulty dressing or bathing? N   Because of a physical, mental, or emotional condition, do you have difficulty doing errands alone such as visiting a doctor's office or shopping? N     Nutrition Screenin/16/2024    11:40 AM   Nutrition Screening   Has food intake declined over the past three months due to loss of appetite, digestive problems, chewing or swallowing difficulties? No decrease in food intake   Involuntary weight loss during the last 3 months? No weight loss   Mobility? Goes out   Has the patient suffered psychological stress or acute disease in the past three months? No   Neuropsychological problems? No psychological problems   Body Mass Index (BMI)?  BMI 23 or greater   Screening Score 14   Interpretation Normal nutritional status    Screening Score: 0-7 Malnourished, 8-11 At Risk, 12-14 Normal  Fall Risk:      12/10/2024    10:00 AM 2024    11:40 AM 6/10/2024     9:40 AM   Fall Risk Assessment - Outpatient   Mobility Status Ambulatory Ambulatory Ambulatory   Number of falls 0 0 0   Identified as fall risk False False False           MENTATION:   Depression Patient Health Questionnaire:      2024    11:45 AM   Depression Patient Health Questionnaire   Over the last two weeks how often have you been bothered by little interest or pleasure in doing things Not at all   Over the last two weeks how often have you been bothered by feeling down, depressed or hopeless Not at all    PHQ-2 Total Score 0     Has Dementia Dx: No  Has Anxiety Dx: No    Cognitive Function Screenin/16/2024    11:45 AM   Cognitive Function Screening   Clock Drawing Test 1   Mini-Cog 3 Minute Recall 3   Cognitive Function Screening 4     Cognitive Function Screening Total - Less than 4 = Abnormal,  Greater than or equal to 4 = Normal        MEDICATIONS:  High Risk Medications:  Total Active Medications: 0  This patient does not have an active medication from one of the medication groupers.    WHAT MATTERS MOST:  Advance Care Planning   ACP Status:   Patient has had an ACP conversation  Living Will: No  Power of : No  [place]: No    What is most important right now is to focus on spending time at home, avoiding the hospital, remaining as independent as possible, and symptom/pain control    Accordingly, we have decided that the best plan to meet the patient's goals includes continuing with treatment      What matters most to patient today is:  Feeling better for her cruise                 Social History     Socioeconomic History    Marital status: Single   Tobacco Use    Smoking status: Former     Current packs/day: 0.00     Types: Cigarettes     Quit date: 1980     Years since quittin.7    Smokeless tobacco: Never   Substance and Sexual Activity    Alcohol use: No    Drug use: No    Sexual activity: Not Currently     Social Drivers of Health     Financial Resource Strain: Low Risk  (2024)    Overall Financial Resource Strain (CARDIA)     Difficulty of Paying Living Expenses: Not very hard   Food Insecurity: Food Insecurity Present (2024)    Hunger Vital Sign     Worried About Running Out of Food in the Last Year: Sometimes true     Ran Out of Food in the Last Year: Sometimes true   Transportation Needs: No Transportation Needs (2024)    PRAPARE - Transportation     Lack of Transportation (Medical): No     Lack of Transportation (Non-Medical): No   Physical Activity:  Insufficiently Active (2024)    Exercise Vital Sign     Days of Exercise per Week: 2 days     Minutes of Exercise per Session: 10 min   Stress: No Stress Concern Present (2024)    [employer] - Occupational Stress Questionnaire     Feeling of Stress : Not at all   Housing Stability: Low Risk  (2024)    Housing Stability Vital Sign     Unable to Pay for Housing in the Last Year: No     Homeless in the Last Year: No       Past Surgical History:   Procedure Laterality Date    BTL       SECTION, CLASSIC      COLON SURGERY      goiter       HERNIA REPAIR      KIDNEY SURGERY      cyst removal    THYROID SURGERY         Past Medical History:   Diagnosis Date    Allergic rhinitis 2022    Anemia     Arthritis     Colon cancer     Gout, chronic     Heart murmur     High cholesterol     Hypercholesteremia     Hypertension     Obesity     PMB (postmenopausal bleeding) 10/24/2017    Right knee pain 2019    Sleep apnea     Small bowel obstruction, partial 2019    Thyroid disease        Review of Systems   Constitutional:  Negative for activity change.   HENT:  Negative for hearing loss, rhinorrhea and trouble swallowing.    Eyes:  Negative for discharge.   Respiratory:  Negative for chest tightness and wheezing.    Cardiovascular:  Negative for chest pain and palpitations.   Gastrointestinal:  Negative for constipation, diarrhea and vomiting.   Genitourinary:  Negative for difficulty urinating and hematuria.   Neurological:  Negative for headaches.   Psychiatric/Behavioral:  Negative for dysphoric mood.        Objective:   BP (!) 134/53 (BP Location: Left arm, Patient Position: Sitting)   Pulse 100   LMP  (LMP Unknown) home blood pressure measurement gathered from [facility] data applied    Physical Exam  Constitutional:       General: She is not in acute distress.     Appearance: Normal appearance.      Comments: Pt seen via virtual visit.   Neurological:       Mental Status: She is alert.         Lab Results   Component Value Date    WBC 10.66 09/03/2024    HGB 13.9 09/03/2024    HCT 44.9 09/03/2024     09/03/2024    CHOL 148 06/14/2024    TRIG 77 06/14/2024    HDL 48 06/14/2024    ALT 14 09/03/2024    AST 16 09/03/2024     09/03/2024    K 3.8 09/03/2024     09/03/2024    CREATININE 0.8 09/03/2024    BUN 11 09/03/2024    CO2 25 09/03/2024    TSH 2.298 07/12/2023    INR 1.0 06/22/2015    HGBA1C 5.8 (H) 09/07/2024         Assessment:   84 y.o. female with multiple co-morbid illnesses seen virtually for follow-up.     Plan:     Problem List Items Addressed This Visit       Essential hypertension (Chronic)     Patient's blood pressure medications were changed during her 12/10 visit with her cardiologist; amlodipine was discontinued due to complaint of lower extremity edema and replaced with hydralazine 50 mg b.i.d. she endorses experiencing a few side effects since the medication change, which predate the onset of her sinus symptoms.  She would like to change back to the amlodipine she was taking previously, especially since she has not noticed any improvement in lower extremity edema since stopping it.  Review of her blood pressures at home through digital medicine going back a few months are largely at goal, but she does notably tend to have higher blood pressures at appointments, which may represent white coat hypertension.  I will represcribe her amlodipine since it seems to have been working for her and have her stop the hydralazine.  She was advised to avoid sitting still for too long to reduce accumulation of dependent edema.         Relevant Medications    amLODIPine (NORVASC) 10 MG tablet    Allergic rhinitis - Primary (Chronic)     Her present symptoms sound more consistent with mild hay fever due to exposure to allergy trigger.  Although she reports sinus pain, her lack of any constitutional symptoms suggestive of infection do not seem to  warrant antibiotic use at this time.  Because of the severity of her symptoms and impending departure for a vacation we will prescribe prednisone 20 mg daily for 3 days.  She was counseled that this medication may increase blood pressure, blood glucose, or contribute to insomnia while she is taking it.  I will additionally prescribe ipratropium nasal spray to help with diminishing rhinorrhea.  She was advised to try Mucinex or Robitussin as a mucolytic.         Relevant Medications    predniSONE (DELTASONE) 20 MG tablet    ipratropium (ATROVENT) 21 mcg (0.03 %) nasal spray    Ankle edema, bilateral     Counseled patient on importance of avoiding prolonged sitting or standing; interrupting sedentary periods with brief leg activity to help reduce accumulation of dependent edema.  In the event this is insufficient to manage symptoms we can discuss compression stockings.  The amlodipine she takes may be exacerbating the condition, but she reports no improvement in swelling while being off of it.  We could alternatively try nifedipine as well in the event this continues to be a problem.            Health Maintenance         Date Due Completion Date    COVID-19 Vaccine (5 - 2024-25 season) 09/01/2024 7/15/2022    DEXA Scan 06/08/2025 6/8/2022    Hemoglobin A1c (Prediabetes) 09/07/2025 9/7/2024    Lipid Panel 06/14/2029 6/14/2024    TETANUS VACCINE 01/24/2033 1/24/2023          No follow-ups on file. .    Marin Nettles MD   [facility], [employer], and [employer]    This note was partially dictated using voice recognition software and although actively proofread during transcription, it is possible some errors in transcription may have been overlooked.

## 2025-01-17 ENCOUNTER — PATIENT MESSAGE (OUTPATIENT)
Dept: ADMINISTRATIVE | Facility: OTHER | Age: 85
End: 2025-01-17
Payer: MEDICARE

## 2025-01-20 DIAGNOSIS — M10.9 GOUT, UNSPECIFIED CAUSE, UNSPECIFIED CHRONICITY, UNSPECIFIED SITE: ICD-10-CM

## 2025-01-22 RX ORDER — ALLOPURINOL 300 MG/1
TABLET ORAL
Qty: 90 TABLET | Refills: 3 | Status: SHIPPED | OUTPATIENT
Start: 2025-01-22

## 2025-01-24 DIAGNOSIS — M10.9 GOUT, UNSPECIFIED CAUSE, UNSPECIFIED CHRONICITY, UNSPECIFIED SITE: ICD-10-CM

## 2025-01-24 RX ORDER — ALLOPURINOL 300 MG/1
TABLET ORAL
Qty: 90 TABLET | Refills: 3 | OUTPATIENT
Start: 2025-01-24

## 2025-03-19 ENCOUNTER — OFFICE VISIT (OUTPATIENT)
Facility: CLINIC | Age: 85
End: 2025-03-19
Payer: MEDICARE

## 2025-03-19 VITALS
TEMPERATURE: 98 F | HEIGHT: 67 IN | OXYGEN SATURATION: 98 % | DIASTOLIC BLOOD PRESSURE: 78 MMHG | HEART RATE: 73 BPM | WEIGHT: 280.56 LBS | BODY MASS INDEX: 44.03 KG/M2 | SYSTOLIC BLOOD PRESSURE: 132 MMHG

## 2025-03-19 DIAGNOSIS — I10 ESSENTIAL HYPERTENSION: Primary | Chronic | ICD-10-CM

## 2025-03-19 DIAGNOSIS — M79.605 ACUTE LEG PAIN, LEFT: ICD-10-CM

## 2025-03-19 DIAGNOSIS — M85.852 OSTEOPENIA OF NECKS OF BOTH FEMURS: ICD-10-CM

## 2025-03-19 DIAGNOSIS — E78.2 MIXED HYPERLIPIDEMIA: Chronic | ICD-10-CM

## 2025-03-19 DIAGNOSIS — B35.1 DERMATOPHYTOSIS OF NAIL: ICD-10-CM

## 2025-03-19 DIAGNOSIS — M85.851 OSTEOPENIA OF NECKS OF BOTH FEMURS: ICD-10-CM

## 2025-03-19 DIAGNOSIS — E66.813 CLASS 3 SEVERE OBESITY DUE TO EXCESS CALORIES WITH SERIOUS COMORBIDITY AND BODY MASS INDEX (BMI) OF 40.0 TO 44.9 IN ADULT: ICD-10-CM

## 2025-03-19 DIAGNOSIS — E66.01 CLASS 3 SEVERE OBESITY DUE TO EXCESS CALORIES WITH SERIOUS COMORBIDITY AND BODY MASS INDEX (BMI) OF 40.0 TO 44.9 IN ADULT: ICD-10-CM

## 2025-03-19 PROBLEM — D64.9 NORMOCYTIC ANEMIA: Chronic | Status: RESOLVED | Noted: 2020-01-12 | Resolved: 2025-03-19

## 2025-03-19 PROCEDURE — 1125F AMNT PAIN NOTED PAIN PRSNT: CPT | Mod: CPTII,S$GLB,, | Performed by: HOSPITALIST

## 2025-03-19 PROCEDURE — 1159F MED LIST DOCD IN RCRD: CPT | Mod: CPTII,S$GLB,, | Performed by: HOSPITALIST

## 2025-03-19 PROCEDURE — 3078F DIAST BP <80 MM HG: CPT | Mod: CPTII,S$GLB,, | Performed by: HOSPITALIST

## 2025-03-19 PROCEDURE — 1101F PT FALLS ASSESS-DOCD LE1/YR: CPT | Mod: CPTII,S$GLB,, | Performed by: HOSPITALIST

## 2025-03-19 PROCEDURE — 3288F FALL RISK ASSESSMENT DOCD: CPT | Mod: CPTII,S$GLB,, | Performed by: HOSPITALIST

## 2025-03-19 PROCEDURE — 99999 PR PBB SHADOW E&M-EST. PATIENT-LVL V: CPT | Mod: PBBFAC,,, | Performed by: HOSPITALIST

## 2025-03-19 PROCEDURE — 3075F SYST BP GE 130 - 139MM HG: CPT | Mod: CPTII,S$GLB,, | Performed by: HOSPITALIST

## 2025-03-19 PROCEDURE — 99215 OFFICE O/P EST HI 40 MIN: CPT | Mod: S$GLB,,, | Performed by: HOSPITALIST

## 2025-03-19 RX ORDER — CICLOPIROX 80 MG/ML
SOLUTION TOPICAL NIGHTLY
Qty: 6.6 ML | Refills: 1 | Status: SHIPPED | OUTPATIENT
Start: 2025-03-19

## 2025-03-19 NOTE — ASSESSMENT & PLAN NOTE
Weight so far has remained relatively unchanged.  Reviewed her routine and discussed gradual escalation to goal of at least moderate intensity exercise for at least 30 minutes at least 5 times a week.  Considering comorbid prediabetes, may discuss initiating metformin for both weight loss as well as risk reduction of progression to diabetes.  We also discussed how increasing activity to maintain a more healthy weight will also reduce risk of developing feebleness and dependence on others for ADLs, and will also likely improve blood pressure, prediabetes, and chronic joint pain such as she experiences in her knees.

## 2025-03-19 NOTE — ASSESSMENT & PLAN NOTE
Prescription for Penlac solution sent.  She was counseled to continue using the medication until the diseased nail has completely grown out and been trimmed off.

## 2025-03-19 NOTE — ASSESSMENT & PLAN NOTE
She is on daily vitamin-D supplementation and is working on increasing exercise.  She will be due to update DEXA in 3 months; we will schedule.

## 2025-03-19 NOTE — ASSESSMENT & PLAN NOTE
Symptoms appear to resemble a tendinitis of 1 of the lateral foreleg muscles such as gastrocnemius or soleus.  Discussed relationship between tendinitis and inactivity and provided information on chair exercises she can do to prevent prolonged immobility which will exacerbate symptoms.  She was advised that she may use topical therapies as she has been for symptoms, but if any particular activity hurts to not do that until this resolves.

## 2025-03-19 NOTE — PROGRESS NOTES
Primary Care Provider Appointment - [facility] & Anatoly Nettles MD      Subjective:      Patient ID: Natali Galeano is a 84 y.o. female with   Past Medical History:   Diagnosis Date    Allergic rhinitis 07/18/2022    Anemia     Arthritis     Colon cancer     Gout, chronic     Heart murmur     High cholesterol     Hypercholesteremia     Hypertension     Obesity     PMB (postmenopausal bleeding) 10/24/2017    Right knee pain 02/23/2019    Sleep apnea     Small bowel obstruction, partial 02/23/2019    Thyroid disease            Chief Complaint: Knee Pain and Leg Pain    Prior to this visit, patient's last encounter with PCP was 12/20/2024.    Pt reports pain down side of LLE and intermittent knee pain as well.  Leg pain started Sunday night.  Pain over lateral foreleg; pain woke her up from sleep about 4am and has been intermittent since.  Has been putting biofreeze and taking tylenol; helps some for a little while.  Standing up from chair hurts.  Does seem to bother her more if sitting for too long.  Riding bike at gym 2x/wk for about 40 min.  Dependent on niece for transportation to gym.  Does get [employer] lifts for appointments.      12/20: Onset of sinus congestion and pain, runny nose, and cough productive of clear sputum that started Monday.  The previous day she visited a friend who had a dog, which she is allergic to.  She has been using her fluticasone spray and levocetirizine without significant relief, as well as taking Coricidin HBP cough syrup.  She has not experienced any fevers, chills, sweats, or arthralgias/myalgias.  She also notes that her cardiologist recently changed her blood pressure medications and also notes feeling generally unwell since that time.  Her amlodipine was stopped due to her complaint of lower extremity edema and replaced with hydralazine 50 mg b.i.d..  The lower extremity edema has not improved at all in the interim, and she reports feeling generally lightheaded and  dizzy.  She also notes experiencing significant insomnia and is having to lay in bed for a few hours prior to falling asleep since her medication change.  She has not been exercising this week due to feeling poorly, but normally she is walking most days and riding her stationary bike 2 to 3 times a week.  She is leaving for a cruise tomorrow and is requesting any assistance or advice in helping her feel better for her trip.      4Ms for Medical Decision-Making in Older Adults    Last Completed EAWV: 2024    MOBILITY:  Get Up and Go:      2024    11:45 AM 2023     9:26 AM   Get Up and Go   Trial 1 12 seconds 12 seconds     Activities of Daily Livin/16/2024    11:41 AM   Activities of Daily Living   Ambulation Independent   Dressing Independent   Transfers Independent   Toileting Continent of bladder;Continent of bowel   Feeding Independent   Cleaning home/Chores Independent   Telephone use Independent   Shopping Assistance Required   Paying bills Independent   Taking meds Independent   If required, who assists the patient with ADLs? daughter     Whisper Test:      2024    11:45 AM   Whisper Test   Whisper Test Normal     Disability Status:      2024    11:44 AM   Disability Status   Are you deaf or do you have serious difficulty hearing? N   Are you blind or do you have serious difficulty seeing, even when wearing glasses? N   Because of a physical, mental, or emotional condition, do you have serious difficulty concentrating, remembering, or making decisions? N   Do you have serious difficulty walking or climbing stairs? N   Do you have difficulty dressing or bathing? N   Because of a physical, mental, or emotional condition, do you have difficulty doing errands alone such as visiting a doctor's office or shopping? N     Nutrition Screenin/16/2024    11:40 AM   Nutrition Screening   Has food intake declined over the past three months due to loss of appetite, digestive  problems, chewing or swallowing difficulties? No decrease in food intake   Involuntary weight loss during the last 3 months? No weight loss   Mobility? Goes out   Has the patient suffered psychological stress or acute disease in the past three months? No   Neuropsychological problems? No psychological problems   Body Mass Index (BMI)?  BMI 23 or greater   Screening Score 14   Interpretation Normal nutritional status    Screening Score: 0-7 Malnourished, 8-11 At Risk, 12-14 Normal    MENTATION:   Depression Patient Health Questionnaire:      2024    11:45 AM   Depression Patient Health Questionnaire   Over the last two weeks how often have you been bothered by little interest or pleasure in doing things Not at all   Over the last two weeks how often have you been bothered by feeling down, depressed or hopeless Not at all   PHQ-2 Total Score 0     Has Dementia Dx: No    Cognitive Function Screenin/16/2024    11:45 AM   Cognitive Function Screening   Clock Drawing Test 1    Mini-Cog 3 Minute Recall 3   Cognitive Function Screening 4       Data saved with a previous flowsheet row definition     Cognitive Function Screening Total - Less than 4 = Abnormal,  Greater than or equal to 4 = Normal    MEDICATIONS:  High Risk Medications:  Total Active Medications: 0  This patient does not have an active medication from one of the medication groupers.    WHAT MATTERS MOST:  Advance Care Planning   ACP Status:   Patient has had an ACP conversation  Living Will: No  Power of : No  [place]: No    Advance Care Planning     Date: 2024  Patient did not wish or was not able to name a surrogate decision maker or provide an Advance Care Plan.  Patient provided with ACP packet to review with family and bring back to next appointment.       Social History     Socioeconomic History    Marital status: Single   Tobacco Use    Smoking status: Former     Current packs/day: 0.00     Types: Cigarettes     Quit date:  1980     Years since quittin.9    Smokeless tobacco: Never   Substance and Sexual Activity    Alcohol use: No    Drug use: No    Sexual activity: Not Currently     Social Drivers of Health     Financial Resource Strain: Low Risk  (2024)    Overall Financial Resource Strain (CARDIA)     Difficulty of Paying Living Expenses: Not very hard   Food Insecurity: Food Insecurity Present (2024)    Hunger Vital Sign     Worried About Running Out of Food in the Last Year: Sometimes true     Ran Out of Food in the Last Year: Sometimes true   Transportation Needs: No Transportation Needs (2024)    PRAPARE - Transportation     Lack of Transportation (Medical): No     Lack of Transportation (Non-Medical): No   Physical Activity: Insufficiently Active (2024)    Exercise Vital Sign     Days of Exercise per Week: 2 days     Minutes of Exercise per Session: 10 min   Stress: No Stress Concern Present (2024)    [employer] - Occupational Stress Questionnaire     Feeling of Stress : Not at all   Housing Stability: Low Risk  (2024)    Housing Stability Vital Sign     Unable to Pay for Housing in the Last Year: No     Number of Times Moved in the Last Year: 1     Homeless in the Last Year: No       Review of Systems   Constitutional:  Negative for activity change, appetite change, chills, fever and unexpected weight change.   HENT:  Positive for dental problem. Negative for nasal congestion, hearing loss, rhinorrhea, sore throat and trouble swallowing.    Eyes:  Negative for photophobia, discharge and visual disturbance.   Respiratory:  Negative for cough, chest tightness, shortness of breath and wheezing.    Cardiovascular:  Negative for chest pain, palpitations and leg swelling.   Gastrointestinal:  Negative for abdominal pain, blood in stool, constipation, diarrhea, nausea and vomiting.   Endocrine: Negative for polydipsia and polyuria.   Genitourinary:  Negative for  "difficulty urinating, dysuria, hematuria and menstrual problem.   Musculoskeletal:  Positive for joint swelling and leg pain. Negative for arthralgias, myalgias and neck pain.   Integumentary:  Negative for rash and wound.   Neurological:  Negative for dizziness, weakness and headaches.   Psychiatric/Behavioral:  Negative for confusion and dysphoric mood.         Objective:   BP (!) 151/67 (BP Location: Left arm, Patient Position: Sitting)   Pulse 73   Temp 98 °F (36.7 °C) (Oral)   Ht 5' 7" (1.702 m)   Wt 127.3 kg (280 lb 8.6 oz)   LMP  (LMP Unknown)   SpO2 98%   BMI 43.94 kg/m²   BP recheck personally 132/78.    Physical Exam  Vitals reviewed.   Constitutional:       General: She is not in acute distress.     Appearance: She is obese.   HENT:      Head: Normocephalic and atraumatic.      Right Ear: Tympanic membrane, ear canal and external ear normal. There is no impacted cerumen.      Left Ear: Tympanic membrane, ear canal and external ear normal. There is no impacted cerumen.      Nose: Nose normal.      Mouth/Throat:      Mouth: Mucous membranes are moist.      Pharynx: Oropharynx is clear.      Comments: Broken molar on left mandible with majority of crown missing; pulp visible.  Multiple fillings noted.  Eyes:      General: No scleral icterus.     Conjunctiva/sclera: Conjunctivae normal.   Cardiovascular:      Rate and Rhythm: Normal rate and regular rhythm.      Heart sounds: Normal heart sounds.   Pulmonary:      Effort: Pulmonary effort is normal.      Breath sounds: Normal breath sounds.   Abdominal:      General: Bowel sounds are normal. There is no distension.      Tenderness: There is no abdominal tenderness. There is no guarding.   Musculoskeletal:         General: No tenderness.      Cervical back: Normal range of motion and neck supple. No rigidity.      Right lower leg: Edema (trace pitting) present.      Left lower leg: Edema (trace pitting) present.   Feet:      Right foot:      Skin " integrity: Skin integrity normal.      Toenail Condition: Right toenails are ingrown. Fungal disease present.     Left foot:      Skin integrity: Skin integrity normal.      Toenail Condition: Left toenails are ingrown. Fungal disease present.     Comments: Some black nail polish residue on several toenails, but there is also hyperpigmentation of some of the nails c/w fungal disease.  Lymphadenopathy:      Cervical: No cervical adenopathy.   Skin:     General: Skin is warm and dry.   Neurological:      General: No focal deficit present.      Mental Status: She is alert and oriented to person, place, and time.              Lab Results   Component Value Date    WBC 10.66 09/03/2024    HGB 13.9 09/03/2024    HCT 44.9 09/03/2024     09/03/2024    CHOL 148 06/14/2024    TRIG 77 06/14/2024    HDL 48 06/14/2024    ALT 14 09/03/2024    AST 16 09/03/2024     09/03/2024    K 3.8 09/03/2024     09/03/2024    CREATININE 0.8 09/03/2024    BUN 11 09/03/2024    CO2 25 09/03/2024    TSH 2.298 07/12/2023    INR 1.0 06/22/2015    HGBA1C 5.8 (H) 09/07/2024       Current Outpatient Medications on File Prior to Visit   Medication Sig Dispense Refill    allopurinoL (ZYLOPRIM) 300 MG tablet TAKE 1 TABLET(300 MG) BY MOUTH EVERY DAY FOR GOUT 90 tablet 3    amLODIPine (NORVASC) 10 MG tablet Take 1 tablet (10 mg total) by mouth once daily. 90 tablet 3    ascorbic acid, vitamin C, (VITAMIN C) 1000 MG tablet Take 1,000 mg by mouth once daily.      aspirin (ECOTRIN) 81 MG EC tablet Take 81 mg by mouth once daily.      atorvastatin (LIPITOR) 10 MG tablet TAKE 1 TABLET(10 MG) BY MOUTH EVERY EVENING FOR HIGH CHOLESTEROL 90 tablet 3    cholecalciferol, vitamin D3, (VITAMIN D3) 25 mcg (1,000 unit) capsule Take 2,000 Units by mouth once daily.      cyanocobalamin (VITAMIN B-12) 250 MCG tablet Take 2,500 mcg by mouth once daily.      fluticasone propionate (FLONASE) 50 mcg/actuation nasal spray 2 sprays (100 mcg total) by Each  Nostril route 2 (two) times a day. 16 g 3    hydroCHLOROthiazide (HYDRODIURIL) 25 MG tablet Take 1 tablet (25 mg total) by mouth once daily. 90 tablet 1    ipratropium (ATROVENT) 21 mcg (0.03 %) nasal spray 2 sprays by Each Nostril route 2 (two) times daily as needed for Rhinitis. 30 mL 5    levocetirizine (XYZAL) 5 MG tablet Take 1 tablet (5 mg total) by mouth every evening. 90 tablet 3    polyethylene glycol (GLYCOLAX) 17 gram PwPk Take 17 g by mouth 3 (three) times daily as needed for Constipation (no BM in 24 hours). 30 each 5    senna-docusate 8.6-50 mg (SENNA WITH DOCUSATE SODIUM) 8.6-50 mg per tablet Take 1 tablet by mouth once daily. If one tablet not effective in causing BM daily then increase to 2 tablets daily. 90 tablet 3     No current facility-administered medications on file prior to visit.         Assessment:   84 y.o. female with multiple co-morbid illnesses here to follow-up for ongoing chronic disease management and preventive health maintenance.    Plan:     1. Essential hypertension  Assessment & Plan:  Blood pressure at goal on amlodipine 10 mg and HCTZ 25 mg.  Digital Medicine data reviewed.    Orders:  -     TSH; Future; Expected date: 06/25/2025  -     Comprehensive Metabolic Panel; Future; Expected date: 06/25/2025  -     CBC Auto Differential; Future; Expected date: 06/25/2025    2. Dermatophytosis of nail  Assessment & Plan:  Prescription for Penlac solution sent.  She was counseled to continue using the medication until the diseased nail has completely grown out and been trimmed off.    Orders:  -     ciclopirox (PENLAC) 8 % Soln; Apply topically nightly.  Dispense: 6.6 mL; Refill: 1    3. Mixed hyperlipidemia  Assessment & Plan:  Lipids at goal on atorvastatin 10 mg.  We will plan on updating lipids in 3 months.    Orders:  -     Lipid Panel; Future; Expected date: 06/25/2025    4. Osteopenia of necks of both femurs  Overview:  DEXA 6/08/2022 showed Osteopenia of the femoral necks.  Recommend adequate Vitamin D/Ca. Recommend 2.5 hrs exercise per week. Assess Vitamin D with next lab draw    Assessment & Plan:  She is on daily vitamin-D supplementation and is working on increasing exercise.  She will be due to update DEXA in 3 months; we will schedule.    Orders:  -     DXA Bone Density Axial Skeleton 1 or more sites; Future; Expected date: 03/19/2025    5. Acute leg pain, left  Assessment & Plan:  Symptoms appear to resemble a tendinitis of 1 of the lateral foreleg muscles such as gastrocnemius or soleus.  Discussed relationship between tendinitis and inactivity and provided information on chair exercises she can do to prevent prolonged immobility which will exacerbate symptoms.  She was advised that she may use topical therapies as she has been for symptoms, but if any particular activity hurts to not do that until this resolves.      6. Class 3 severe obesity due to excess calories with serious comorbidity and body mass index (BMI) of 40.0 to 44.9 in adult  Assessment & Plan:  Weight so far has remained relatively unchanged.  Reviewed her routine and discussed gradual escalation to goal of at least moderate intensity exercise for at least 30 minutes at least 5 times a week.  Considering comorbid prediabetes, may discuss initiating metformin for both weight loss as well as risk reduction of progression to diabetes.  We also discussed how increasing activity to maintain a more healthy weight will also reduce risk of developing feebleness and dependence on others for ADLs, and will also likely improve blood pressure, prediabetes, and chronic joint pain such as she experiences in her knees.                  Health Maintenance         Date Due Completion Date    COVID-19 Vaccine (5 - 2024-25 season) 09/01/2024 7/15/2022    DEXA Scan 06/08/2025 6/8/2022    Hemoglobin A1c (Prediabetes) 09/07/2025 9/7/2024    Lipid Panel 06/14/2029 6/14/2024    TETANUS VACCINE 01/24/2033 1/24/2023            Future  Appointments   Date Time Provider Department Center   3/25/2025  9:40 AM Cleve Barfield MD [facility]   5/9/2025  9:30 AM [facility]   5/9/2025 10:00 AM [facility] DEXA1 [facility]   6/19/2025 10:20 AM Marin Nettles MD [facility]         Follow up in about 3 months (around 6/19/2025). Total clinical care time was 40 min.    Marin Nettles MD  [facility] [employer]                          Answers submitted by the patient for this visit:  High Blood Pressure Questionnaire (Submitted on 3/17/2025)  Chief Complaint: Hypertension  Onset: more than 1 year ago  Progression since onset: waxing and waning  Condition status: resistant  peripheral edema: Yes  Agents associated with hypertension: no associated agents  CAD risks: dyslipidemia, obesity  Compliance problems: diet, exercise  Past treatments: nothing

## 2025-03-24 DIAGNOSIS — Z00.00 ENCOUNTER FOR MEDICARE ANNUAL WELLNESS EXAM: ICD-10-CM

## 2025-03-25 ENCOUNTER — OFFICE VISIT (OUTPATIENT)
Dept: CARDIOLOGY | Facility: CLINIC | Age: 85
End: 2025-03-25
Payer: MEDICARE

## 2025-03-25 VITALS
HEART RATE: 71 BPM | WEIGHT: 281 LBS | SYSTOLIC BLOOD PRESSURE: 140 MMHG | BODY MASS INDEX: 44.01 KG/M2 | DIASTOLIC BLOOD PRESSURE: 76 MMHG | OXYGEN SATURATION: 98 %

## 2025-03-25 DIAGNOSIS — I10 ESSENTIAL HYPERTENSION: Primary | Chronic | ICD-10-CM

## 2025-03-25 DIAGNOSIS — E78.49 OTHER HYPERLIPIDEMIA: Chronic | ICD-10-CM

## 2025-03-25 DIAGNOSIS — I70.0 ATHEROSCLEROSIS OF AORTA: ICD-10-CM

## 2025-03-25 PROCEDURE — 1101F PT FALLS ASSESS-DOCD LE1/YR: CPT | Mod: CPTII,S$GLB,, | Performed by: INTERNAL MEDICINE

## 2025-03-25 PROCEDURE — 1126F AMNT PAIN NOTED NONE PRSNT: CPT | Mod: CPTII,S$GLB,, | Performed by: INTERNAL MEDICINE

## 2025-03-25 PROCEDURE — 3078F DIAST BP <80 MM HG: CPT | Mod: CPTII,S$GLB,, | Performed by: INTERNAL MEDICINE

## 2025-03-25 PROCEDURE — 99999 PR PBB SHADOW E&M-EST. PATIENT-LVL III: CPT | Mod: PBBFAC,,, | Performed by: INTERNAL MEDICINE

## 2025-03-25 PROCEDURE — 3077F SYST BP >= 140 MM HG: CPT | Mod: CPTII,S$GLB,, | Performed by: INTERNAL MEDICINE

## 2025-03-25 PROCEDURE — 3288F FALL RISK ASSESSMENT DOCD: CPT | Mod: CPTII,S$GLB,, | Performed by: INTERNAL MEDICINE

## 2025-03-25 PROCEDURE — 99213 OFFICE O/P EST LOW 20 MIN: CPT | Mod: S$GLB,,, | Performed by: INTERNAL MEDICINE

## 2025-03-25 PROCEDURE — 1159F MED LIST DOCD IN RCRD: CPT | Mod: CPTII,S$GLB,, | Performed by: INTERNAL MEDICINE

## 2025-03-25 RX ORDER — NIFEDIPINE 60 MG/1
60 TABLET, EXTENDED RELEASE ORAL DAILY
Qty: 90 TABLET | Refills: 3 | Status: SHIPPED | OUTPATIENT
Start: 2025-03-25 | End: 2026-03-25

## 2025-03-25 NOTE — PROGRESS NOTES
Cardiology    3/25/2025  9:37 AM    Problem list  Problem List[1]    CC:  F/u    HPI:  She was last seen in December.  Amlodipine was discontinued at that visit due to ankle swelling and hydralazine was started.  However she is not taking hydralazine.  She was restarted on amlodipine.  Her blood pressure is not well controlled according to digital medicine.  Her systolic ranges 140s.  She denies any chest pain or shortness of breath.    Medications  Current Medications[2]   Prior to Admission medications    Medication Sig Start Date End Date Taking? Authorizing Provider   allopurinoL (ZYLOPRIM) 300 MG tablet TAKE 1 TABLET(300 MG) BY MOUTH EVERY DAY FOR GOUT 1/22/25  Yes Marin Nettles MD   ascorbic acid, vitamin C, (VITAMIN C) 1000 MG tablet Take 1,000 mg by mouth once daily.   Yes Provider, Historical   aspirin (ECOTRIN) 81 MG EC tablet Take 81 mg by mouth once daily.   Yes Provider, Historical   atorvastatin (LIPITOR) 10 MG tablet TAKE 1 TABLET(10 MG) BY MOUTH EVERY EVENING FOR HIGH CHOLESTEROL 8/26/24  Yes Marin Nettels MD   cholecalciferol, vitamin D3, (VITAMIN D3) 25 mcg (1,000 unit) capsule Take 2,000 Units by mouth once daily.   Yes Provider, Historical   ciclopirox (PENLAC) 8 % Soln Apply topically nightly. 3/19/25  Yes Marin Nettles MD   cyanocobalamin (VITAMIN B-12) 250 MCG tablet Take 2,500 mcg by mouth once daily.   Yes Provider, Historical   fluticasone propionate (FLONASE) 50 mcg/actuation nasal spray 2 sprays (100 mcg total) by Each Nostril route 2 (two) times a day. 9/24/24  Yes Marin Nettles MD   hydroCHLOROthiazide (HYDRODIURIL) 25 MG tablet Take 1 tablet (25 mg total) by mouth once daily. 2/11/25  Yes Cleve Barfield MD   ipratropium (ATROVENT) 21 mcg (0.03 %) nasal spray 2 sprays by Each Nostril route 2 (two) times daily as needed for Rhinitis. 12/20/24  Yes Marin Nettles MD   levocetirizine (XYZAL) 5 MG tablet Take 1 tablet (5 mg total) by  mouth every evening. 24 Yes Marin Nettles MD   polyethylene glycol (GLYCOLAX) 17 gram PwPk Take 17 g by mouth 3 (three) times daily as needed for Constipation (no BM in 24 hours). 24  Yes Marin Nettles MD   senna-docusate 8.6-50 mg (SENNA WITH DOCUSATE SODIUM) 8.6-50 mg per tablet Take 1 tablet by mouth once daily. If one tablet not effective in causing BM daily then increase to 2 tablets daily. 24  Yes Marin Nettles MD   amLODIPine (NORVASC) 10 MG tablet Take 1 tablet (10 mg total) by mouth once daily. 12/20/24 3/25/25 Yes Marin Nettles MD   NIFEdipine (PROCARDIA-XL) 60 MG (OSM) 24 hr tablet Take 1 tablet (60 mg total) by mouth once daily. 3/25/25 3/25/26  Cleve Barfield MD         History  Past Medical History:   Diagnosis Date    Allergic rhinitis 2022    Anemia     Arthritis     Colon cancer     Gout, chronic     Heart murmur     High cholesterol     Hypercholesteremia     Hypertension     Obesity     PMB (postmenopausal bleeding) 10/24/2017    Right knee pain 2019    Sleep apnea     Small bowel obstruction, partial 2019    Thyroid disease      Past Surgical History:   Procedure Laterality Date    BTL       SECTION, CLASSIC      COLON SURGERY      goiter       HERNIA REPAIR      KIDNEY SURGERY      cyst removal    THYROID SURGERY       Social History[3]      Allergies  Review of patient's allergies indicates:   Allergen Reactions    Ace inhibitors Other (See Comments)     cough    Amlodipine Edema    Arb-angiotensin receptor antagonist Other (See Comments)     Cough w/ valsartan         Review of Systems   Review of Systems   Constitutional: Negative for decreased appetite, fever and weight loss.   HENT:  Negative for congestion and nosebleeds.    Eyes:  Negative for double vision, vision loss in left eye, vision loss in right eye and visual disturbance.   Cardiovascular:  Negative for chest pain, claudication, cyanosis,  dyspnea on exertion, irregular heartbeat, leg swelling, near-syncope, orthopnea, palpitations, paroxysmal nocturnal dyspnea and syncope.   Respiratory:  Negative for cough, hemoptysis, shortness of breath, sleep disturbances due to breathing, snoring, sputum production and wheezing.    Endocrine: Negative for cold intolerance and heat intolerance.   Skin:  Negative for nail changes and rash.   Musculoskeletal:  Negative for joint pain, muscle cramps, muscle weakness and myalgias.   Gastrointestinal:  Negative for change in bowel habit, heartburn, hematemesis, hematochezia, hemorrhoids and melena.   Neurological:  Negative for dizziness, focal weakness and headaches.         Physical Exam  Wt Readings from Last 1 Encounters:   03/25/25 127.4 kg (280 lb 15.6 oz)     BP Readings from Last 3 Encounters:   03/25/25 (!) 140/76   03/19/25 132/78   12/20/24 (!) 134/53     Pulse Readings from Last 1 Encounters:   03/25/25 71     Body mass index is 44.01 kg/m².    Physical Exam  Vitals reviewed.   Constitutional:       Appearance: She is well-developed. She is obese.   HENT:      Head: Atraumatic.   Eyes:      General: No scleral icterus.  Neck:      Vascular: Normal carotid pulses. No carotid bruit, hepatojugular reflux or JVD.   Cardiovascular:      Rate and Rhythm: Normal rate and regular rhythm.      Chest Wall: PMI is not displaced.      Pulses: Intact distal pulses.           Carotid pulses are 2+ on the right side and 2+ on the left side.       Radial pulses are 2+ on the right side and 2+ on the left side.        Dorsalis pedis pulses are 2+ on the right side and 2+ on the left side.      Heart sounds: Normal heart sounds, S1 normal and S2 normal. No murmur heard.     No friction rub.   Pulmonary:      Effort: Pulmonary effort is normal. No respiratory distress.      Breath sounds: Normal breath sounds. No stridor. No wheezing or rales.   Chest:      Chest wall: No tenderness.   Abdominal:      General: Bowel sounds  are normal.      Palpations: Abdomen is soft.   Musculoskeletal:      Cervical back: Neck supple. No edema.   Skin:     General: Skin is warm and dry.      Nails: There is no clubbing.   Neurological:      Mental Status: She is alert and oriented to person, place, and time.   Psychiatric:         Behavior: Behavior normal.         Thought Content: Thought content normal.             Assessment  1. Essential hypertension (Primary)  Not at goal  - EKG 12-lead    2. Other hyperlipidemia  Stable    3. Atherosclerosis of aorta  Unchanged        Plan and Discussion  Will change amlodipine to nifedipine 60 mg once daily.  Continue monitoring with digital medicine program.    Follow Up  Three months      Cleve Barfield MD, F.A.C.C, F.S.C.A.I.      Total professional time spent for the encounter: 20 minutes  Time was spent preparing to see the patient, reviewing results of prior testing, obtaining and/or reviewing separately obtained history, performing a medically appropriate examination and interview, counseling and educating the patient/family, ordering medications/tests/procedures, referring and communicating with other health care professionals, documenting clinical information in the electronic health record, and independently interpreting results.    Disclaimer: This document was created using voice recognition software (M*Modal Fluency Direct). Although it may be edited, this document may contain errors related to incorrect recognition of the spoken word. Please call the physician if clarification is needed.          [1]   Patient Active Problem List  Diagnosis    Class 3 severe obesity due to excess calories with serious comorbidity and body mass index (BMI) of 40.0 to 44.9 in adult    Essential hypertension    Slow transit constipation    Adrenal nodule    Hyperlipidemia    ALYSE (obstructive sleep apnea)    Thyroid disease    Gout    Heart murmur    History of rheumatic fever    Chronic pain of right ankle     Onychorrhexis    Osteopenia of necks of both femurs    Allergic rhinitis    Gait difficulty    Decreased range of motion of right ankle    S/P partial thyroidectomy    Prediabetes    Atherosclerosis of aorta    Broken tooth    Ankle edema, bilateral    Acute leg pain, left    Dermatophytosis of nail   [2]   Current Outpatient Medications   Medication Sig Dispense Refill    allopurinoL (ZYLOPRIM) 300 MG tablet TAKE 1 TABLET(300 MG) BY MOUTH EVERY DAY FOR GOUT 90 tablet 3    ascorbic acid, vitamin C, (VITAMIN C) 1000 MG tablet Take 1,000 mg by mouth once daily.      aspirin (ECOTRIN) 81 MG EC tablet Take 81 mg by mouth once daily.      atorvastatin (LIPITOR) 10 MG tablet TAKE 1 TABLET(10 MG) BY MOUTH EVERY EVENING FOR HIGH CHOLESTEROL 90 tablet 3    cholecalciferol, vitamin D3, (VITAMIN D3) 25 mcg (1,000 unit) capsule Take 2,000 Units by mouth once daily.      ciclopirox (PENLAC) 8 % Soln Apply topically nightly. 6.6 mL 1    cyanocobalamin (VITAMIN B-12) 250 MCG tablet Take 2,500 mcg by mouth once daily.      fluticasone propionate (FLONASE) 50 mcg/actuation nasal spray 2 sprays (100 mcg total) by Each Nostril route 2 (two) times a day. 16 g 3    hydroCHLOROthiazide (HYDRODIURIL) 25 MG tablet Take 1 tablet (25 mg total) by mouth once daily. 90 tablet 1    ipratropium (ATROVENT) 21 mcg (0.03 %) nasal spray 2 sprays by Each Nostril route 2 (two) times daily as needed for Rhinitis. 30 mL 5    levocetirizine (XYZAL) 5 MG tablet Take 1 tablet (5 mg total) by mouth every evening. 90 tablet 3    polyethylene glycol (GLYCOLAX) 17 gram PwPk Take 17 g by mouth 3 (three) times daily as needed for Constipation (no BM in 24 hours). 30 each 5    senna-docusate 8.6-50 mg (SENNA WITH DOCUSATE SODIUM) 8.6-50 mg per tablet Take 1 tablet by mouth once daily. If one tablet not effective in causing BM daily then increase to 2 tablets daily. 90 tablet 3    NIFEdipine (PROCARDIA-XL) 60 MG (OSM) 24 hr tablet Take 1 tablet (60 mg total) by  mouth once daily. 90 tablet 3     No current facility-administered medications for this visit.   [3]   Social History  Socioeconomic History    Marital status: Single   Tobacco Use    Smoking status: Former     Current packs/day: 0.00     Types: Cigarettes     Quit date: 1980     Years since quittin.9    Smokeless tobacco: Never   Substance and Sexual Activity    Alcohol use: No    Drug use: No    Sexual activity: Not Currently     Social Drivers of Health     Financial Resource Strain: Low Risk  (2024)    Overall Financial Resource Strain (CARDIA)     Difficulty of Paying Living Expenses: Not very hard   Food Insecurity: Food Insecurity Present (2024)    Hunger Vital Sign     Worried About Running Out of Food in the Last Year: Sometimes true     Ran Out of Food in the Last Year: Sometimes true   Transportation Needs: No Transportation Needs (2024)    PRAPARE - Transportation     Lack of Transportation (Medical): No     Lack of Transportation (Non-Medical): No   Physical Activity: Insufficiently Active (2024)    Exercise Vital Sign     Days of Exercise per Week: 2 days     Minutes of Exercise per Session: 10 min   Stress: No Stress Concern Present (2024)    [employer] - Occupational Stress Questionnaire     Feeling of Stress : Not at all   Housing Stability: Low Risk  (2024)    Housing Stability Vital Sign     Unable to Pay for Housing in the Last Year: No     Number of Times Moved in the Last Year: 1     Homeless in the Last Year: No

## 2025-04-24 ENCOUNTER — PATIENT MESSAGE (OUTPATIENT)
Facility: CLINIC | Age: 85
End: 2025-04-24
Payer: MEDICARE

## 2025-05-12 ENCOUNTER — RESULTS FOLLOW-UP (OUTPATIENT)
Dept: PRIMARY CARE CLINIC | Facility: CLINIC | Age: 85
End: 2025-05-12

## 2025-06-16 ENCOUNTER — PATIENT MESSAGE (OUTPATIENT)
Dept: ADMINISTRATIVE | Facility: OTHER | Age: 85
End: 2025-06-16
Payer: MEDICARE

## 2025-07-23 ENCOUNTER — OFFICE VISIT (OUTPATIENT)
Facility: CLINIC | Age: 85
End: 2025-07-23
Payer: MEDICARE

## 2025-07-23 VITALS
SYSTOLIC BLOOD PRESSURE: 138 MMHG | DIASTOLIC BLOOD PRESSURE: 63 MMHG | OXYGEN SATURATION: 100 % | HEIGHT: 67 IN | HEART RATE: 63 BPM | WEIGHT: 284.31 LBS | BODY MASS INDEX: 44.62 KG/M2

## 2025-07-23 DIAGNOSIS — M25.471 ANKLE EDEMA, BILATERAL: ICD-10-CM

## 2025-07-23 DIAGNOSIS — I10 ESSENTIAL HYPERTENSION: Chronic | ICD-10-CM

## 2025-07-23 DIAGNOSIS — G47.33 OSA (OBSTRUCTIVE SLEEP APNEA): Primary | Chronic | ICD-10-CM

## 2025-07-23 DIAGNOSIS — M25.472 ANKLE EDEMA, BILATERAL: ICD-10-CM

## 2025-07-23 DIAGNOSIS — E66.813 CLASS 3 SEVERE OBESITY DUE TO EXCESS CALORIES WITH SERIOUS COMORBIDITY AND BODY MASS INDEX (BMI) OF 40.0 TO 44.9 IN ADULT: ICD-10-CM

## 2025-07-23 PROCEDURE — 3288F FALL RISK ASSESSMENT DOCD: CPT | Mod: CPTII,S$GLB,, | Performed by: HOSPITALIST

## 2025-07-23 PROCEDURE — 1101F PT FALLS ASSESS-DOCD LE1/YR: CPT | Mod: CPTII,S$GLB,, | Performed by: HOSPITALIST

## 2025-07-23 PROCEDURE — 3078F DIAST BP <80 MM HG: CPT | Mod: CPTII,S$GLB,, | Performed by: HOSPITALIST

## 2025-07-23 PROCEDURE — 1125F AMNT PAIN NOTED PAIN PRSNT: CPT | Mod: CPTII,S$GLB,, | Performed by: HOSPITALIST

## 2025-07-23 PROCEDURE — 3075F SYST BP GE 130 - 139MM HG: CPT | Mod: CPTII,S$GLB,, | Performed by: HOSPITALIST

## 2025-07-23 PROCEDURE — 99999 PR PBB SHADOW E&M-EST. PATIENT-LVL IV: CPT | Mod: PBBFAC,,, | Performed by: HOSPITALIST

## 2025-07-23 PROCEDURE — 1159F MED LIST DOCD IN RCRD: CPT | Mod: CPTII,S$GLB,, | Performed by: HOSPITALIST

## 2025-07-23 PROCEDURE — 99215 OFFICE O/P EST HI 40 MIN: CPT | Mod: S$GLB,,, | Performed by: HOSPITALIST

## 2025-07-23 RX ORDER — TIRZEPATIDE 2.5 MG/.5ML
2.5 INJECTION, SOLUTION SUBCUTANEOUS
Qty: 4 PEN | Refills: 1 | Status: SHIPPED | OUTPATIENT
Start: 2025-07-23

## 2025-07-23 NOTE — PROGRESS NOTES
Primary Care Provider Appointment - [facility] & Anatoly Nettles MD      Subjective:      Patient ID: Natali Galeano is a 84 y.o. female with   Past Medical History:   Diagnosis Date    Allergic rhinitis 07/18/2022    Anemia     Arthritis     Colon cancer     Gout, chronic     Heart murmur     High cholesterol     Hypercholesteremia     Hypertension     Obesity     PMB (postmenopausal bleeding) 10/24/2017    Right knee pain 02/23/2019    Sleep apnea     Small bowel obstruction, partial 02/23/2019    Thyroid disease            Chief Complaint: Foot Pain    Prior to this visit, patient's last encounter with PCP was 3/19/2025.    Went to an outside podiatrist a few days ago who did some sort of fancy assessment where she was hooked up to a machine that did a bunch of measurements about her heart and nerve function and wanted to refer her to see someone else but pt wasn't sure about that; she brought the printed report to review with me.  Still trying to keep up w/ exercise, but had some issues with leg pain for a bit that made that more difficult and she was having to borrow a walker from a friend for a while but it got better.  Still having difficulty with weight.      3/19: Pt reports pain down side of LLE and intermittent knee pain as well.  Leg pain started Sunday night.  Pain over lateral foreleg; pain woke her up from sleep about 4am and has been intermittent since.  Has been putting biofreeze and taking tylenol; helps some for a little while.  Standing up from chair hurts.  Does seem to bother her more if sitting for too long.  Riding bike at gym 2x/wk for about 40 min.  Dependent on niece for transportation to gym.  Does get [employer] lifts for appointments.      12/20: Onset of sinus congestion and pain, runny nose, and cough productive of clear sputum that started Monday.  The previous day she visited a friend who had a dog, which she is allergic to.  She has been using her fluticasone spray and  levocetirizine without significant relief, as well as taking Coricidin HBP cough syrup.  She has not experienced any fevers, chills, sweats, or arthralgias/myalgias.  She also notes that her cardiologist recently changed her blood pressure medications and also notes feeling generally unwell since that time.  Her amlodipine was stopped due to her complaint of lower extremity edema and replaced with hydralazine 50 mg b.i.d..  The lower extremity edema has not improved at all in the interim, and she reports feeling generally lightheaded and dizzy.  She also notes experiencing significant insomnia and is having to lay in bed for a few hours prior to falling asleep since her medication change.  She has not been exercising this week due to feeling poorly, but normally she is walking most days and riding her stationary bike 2 to 3 times a week.  She is leaving for a cruise tomorrow and is requesting any assistance or advice in helping her feel better for her trip.      4Ms for Medical Decision-Making in Older Adults    Last Completed EAWV: 2024    MOBILITY:  Get Up and Go:      2024    11:45 AM 2023     9:26 AM   Get Up and Go   Trial 1 12 seconds 12 seconds     Activities of Daily Livin/16/2024    11:41 AM   Activities of Daily Living   Ambulation Independent   Dressing Independent   Transfers Independent   Toileting Continent of bladder;Continent of bowel   Feeding Independent   Cleaning home/Chores Independent   Telephone use Independent   Shopping Assistance Required   Paying bills Independent   Taking meds Independent   If required, who assists the patient with ADLs? daughter     Whisper Test:      2024    11:45 AM   Whisper Test   Whisper Test Normal     Disability Status:      2024    11:44 AM   Disability Status   Are you deaf or do you have serious difficulty hearing? N   Are you blind or do you have serious difficulty seeing, even when wearing glasses? N   Because of a physical,  mental, or emotional condition, do you have serious difficulty concentrating, remembering, or making decisions? N   Do you have serious difficulty walking or climbing stairs? N   Do you have difficulty dressing or bathing? N   Because of a physical, mental, or emotional condition, do you have difficulty doing errands alone such as visiting a doctor's office or shopping? N     Nutrition Screenin/16/2024    11:40 AM   Nutrition Screening   Has food intake declined over the past three months due to loss of appetite, digestive problems, chewing or swallowing difficulties? No decrease in food intake   Involuntary weight loss during the last 3 months? No weight loss   Mobility? Goes out   Has the patient suffered psychological stress or acute disease in the past three months? No   Neuropsychological problems? No psychological problems   Body Mass Index (BMI)?  BMI 23 or greater   Screening Score 14   Interpretation Normal nutritional status    Screening Score: 0-7 Malnourished, 8-11 At Risk, 12-14 Normal    MENTATION:   Depression Patient Health Questionnaire:      3/19/2025     9:15 AM   Depression Patient Health Questionnaire   Over the last two weeks how often have you been bothered by little interest or pleasure in doing things Not at all   Over the last two weeks how often have you been bothered by feeling down, depressed or hopeless Not at all   PHQ-2 Total Score 0     Has Dementia Dx: No    Cognitive Function Screenin/16/2024    11:45 AM   Cognitive Function Screening   Clock Drawing Test 1    Mini-Cog 3 Minute Recall 3   Cognitive Function Screening 4       Data saved with a previous flowsheet row definition     Cognitive Function Screening Total - Less than 4 = Abnormal,  Greater than or equal to 4 = Normal    MEDICATIONS:  High Risk Medications:  Total Active Medications: 0  This patient does not have an active medication from one of the medication groupers.    WHAT MATTERS MOST:  Advance Care  Planning   ACP Status:   Patient has had an ACP conversation  Living Will: No  Power of : No  [place]: No    Advance Care Planning     Date: 2024  Patient did not wish or was not able to name a surrogate decision maker or provide an Advance Care Plan.  Patient provided with ACP packet to review with family and bring back to next appointment.       Social History     Socioeconomic History    Marital status: Single   Tobacco Use    Smoking status: Former     Current packs/day: 0.00     Types: Cigarettes     Quit date: 1980     Years since quittin.2    Smokeless tobacco: Never   Substance and Sexual Activity    Alcohol use: No    Drug use: No    Sexual activity: Not Currently     Social Drivers of Health     Financial Resource Strain: Low Risk  (2025)    Overall Financial Resource Strain (CARDIA)     Difficulty of Paying Living Expenses: Not very hard   Food Insecurity: No Food Insecurity (2025)    Hunger Vital Sign     Worried About Running Out of Food in the Last Year: Never true     Ran Out of Food in the Last Year: Never true   Transportation Needs: No Transportation Needs (2025)    PRAPARE - Transportation     Lack of Transportation (Medical): No     Lack of Transportation (Non-Medical): No   Physical Activity: Insufficiently Active (2025)    Exercise Vital Sign     Days of Exercise per Week: 1 day     Minutes of Exercise per Session: 20 min   Stress: No Stress Concern Present (2025)    [employer] [place] [employer] - Occupational Stress Questionnaire     Feeling of Stress : Not at all   Housing Stability: Low Risk  (2025)    Housing Stability Vital Sign     Unable to Pay for Housing in the Last Year: No     Number of Times Moved in the Last Year: 0     Homeless in the Last Year: No       Review of Systems   Constitutional:  Negative for activity change, appetite change, chills, fever and unexpected weight change.   HENT:  Positive for dental problem.  "Negative for nasal congestion, hearing loss, rhinorrhea, sore throat and trouble swallowing.    Eyes:  Negative for photophobia, discharge and visual disturbance.   Respiratory:  Negative for cough, chest tightness, shortness of breath and wheezing.    Cardiovascular:  Negative for chest pain, palpitations and leg swelling.   Gastrointestinal:  Negative for abdominal pain, blood in stool, constipation, diarrhea, nausea and vomiting.   Endocrine: Negative for polydipsia and polyuria.   Genitourinary:  Negative for difficulty urinating, dysuria, hematuria and menstrual problem.   Musculoskeletal:  Positive for joint swelling and leg pain. Negative for arthralgias, myalgias and neck pain.   Integumentary:  Negative for rash and wound.   Neurological:  Negative for dizziness, weakness and headaches.   Psychiatric/Behavioral:  Negative for confusion and dysphoric mood.         Objective:   /63 (BP Location: Left forearm, Patient Position: Sitting)   Pulse 63   Ht 5' 7" (1.702 m)   Wt 128.9 kg (284 lb 4.5 oz)   LMP  (LMP Unknown)   SpO2 100%   BMI 44.53 kg/m²       Physical Exam  Vitals reviewed.   Constitutional:       General: She is not in acute distress.     Appearance: She is obese.   HENT:      Head: Normocephalic and atraumatic.      Right Ear: Tympanic membrane, ear canal and external ear normal. There is no impacted cerumen.      Left Ear: Tympanic membrane, ear canal and external ear normal. There is no impacted cerumen.      Nose: Nose normal.      Mouth/Throat:      Mouth: Mucous membranes are moist.      Pharynx: Oropharynx is clear.      Comments: Broken molar on left mandible with majority of crown missing; pulp visible.  Multiple fillings noted.  Eyes:      General: No scleral icterus.     Conjunctiva/sclera: Conjunctivae normal.   Cardiovascular:      Rate and Rhythm: Normal rate and regular rhythm.      Heart sounds: Normal heart sounds.   Pulmonary:      Effort: Pulmonary effort is normal. "      Breath sounds: Normal breath sounds.   Abdominal:      General: Bowel sounds are normal. There is no distension.      Tenderness: There is no abdominal tenderness. There is no guarding.   Musculoskeletal:         General: No tenderness.      Cervical back: Normal range of motion and neck supple. No rigidity.      Right lower leg: Edema (trace pitting) present.      Left lower leg: Edema (trace pitting) present.   Feet:      Right foot:      Skin integrity: Skin integrity normal.      Toenail Condition: Right toenails are ingrown. Fungal disease present.     Left foot:      Skin integrity: Skin integrity normal.      Toenail Condition: Left toenails are ingrown. Fungal disease present.     Comments: Some black nail polish residue on several toenails, but there is also hyperpigmentation of some of the nails c/w fungal disease.  Lymphadenopathy:      Cervical: No cervical adenopathy.   Skin:     General: Skin is warm and dry.   Neurological:      General: No focal deficit present.      Mental Status: She is alert and oriented to person, place, and time.              Lab Results   Component Value Date    WBC 7.06 05/09/2025    HGB 13.3 05/09/2025    HCT 43.8 05/09/2025     05/09/2025    CHOL 149 05/09/2025    TRIG 84 05/09/2025    HDL 51 05/09/2025    ALT 16 05/09/2025    AST 20 05/09/2025     05/09/2025    K 3.7 05/09/2025     05/09/2025    CREATININE 0.8 05/09/2025    BUN 12 05/09/2025    CO2 27 05/09/2025    TSH 2.859 05/09/2025    INR 1.0 06/22/2015    HGBA1C 5.8 (H) 09/07/2024       Current Outpatient Medications on File Prior to Visit   Medication Sig Dispense Refill    allopurinoL (ZYLOPRIM) 300 MG tablet TAKE 1 TABLET(300 MG) BY MOUTH EVERY DAY FOR GOUT 90 tablet 3    amLODIPine (NORVASC) 10 MG tablet Take 1 tablet (10 mg total) by mouth once daily. 90 tablet 1    ascorbic acid, vitamin C, (VITAMIN C) 1000 MG tablet Take 1,000 mg by mouth once daily.      aspirin (ECOTRIN) 81 MG EC tablet  "Take 81 mg by mouth once daily.      atorvastatin (LIPITOR) 10 MG tablet TAKE 1 TABLET(10 MG) BY MOUTH EVERY EVENING FOR HIGH CHOLESTEROL 90 tablet 3    cholecalciferol, vitamin D3, (VITAMIN D3) 25 mcg (1,000 unit) capsule Take 2,000 Units by mouth once daily.      ciclopirox (PENLAC) 8 % Soln Apply topically nightly. 6.6 mL 1    cyanocobalamin (VITAMIN B-12) 250 MCG tablet Take 2,500 mcg by mouth once daily.      fluticasone propionate (FLONASE) 50 mcg/actuation nasal spray 2 sprays (100 mcg total) by Each Nostril route 2 (two) times a day. 16 g 3    hydroCHLOROthiazide (HYDRODIURIL) 25 MG tablet TAKE 1 TABLET(25 MG) BY MOUTH DAILY 90 tablet 2    ipratropium (ATROVENT) 21 mcg (0.03 %) nasal spray 2 sprays by Each Nostril route 2 (two) times daily as needed for Rhinitis. 30 mL 5    levocetirizine (XYZAL) 5 MG tablet Take 1 tablet (5 mg total) by mouth every evening. 90 tablet 3    olmesartan (BENICAR) 5 MG Tab Take 1 tablet (5 mg total) by mouth once daily. 30 tablet 5    polyethylene glycol (GLYCOLAX) 17 gram PwPk Take 17 g by mouth 3 (three) times daily as needed for Constipation (no BM in 24 hours). 30 each 5    senna-docusate 8.6-50 mg (SENNA WITH DOCUSATE SODIUM) 8.6-50 mg per tablet Take 1 tablet by mouth once daily. If one tablet not effective in causing BM daily then increase to 2 tablets daily. 90 tablet 3     No current facility-administered medications on file prior to visit.         Assessment:   84 y.o. female with multiple co-morbid illnesses here to follow-up for ongoing chronic disease management and preventive health maintenance.    Plan:     1. ALYSE (obstructive sleep apnea)  Assessment & Plan:  Will Rx Zepbound as described under "obesity" to help facilitate weight loss.    Orders:  -     tirzepatide, weight loss, (ZEPBOUND) 2.5 mg/0.5 mL PnIj; Inject 2.5 mg into the skin every 7 days.  Dispense: 4 Pen; Refill: 1    2. Essential hypertension  Assessment & Plan:  Blood pressure at goal on amlodipine " 10 mg, olmesartan 5mg, and HCTZ 25 mg.  Digital Medicine data reviewed.      3. Ankle edema, bilateral  Assessment & Plan:  Reviewed pathophysiology of dependent edema which can be mitigated by increasing activity while on her feet, elevating feet when off them, compression, and potentially weight loss as well.      4. Class 3 severe obesity due to excess calories with serious comorbidity and body mass index (BMI) of 40.0 to 44.9 in adult  Assessment & Plan:  Weight so far has remained relatively unchanged.  Reviewed her routine and discussed gradual escalation to goal of at least moderate intensity exercise for at least 30 minutes at least 5 times a week.  Considering comorbid prediabetes, may discuss initiating metformin for both weight loss as well as risk reduction of progression to diabetes.  We also discussed how increasing activity to maintain a more healthy weight will also reduce risk of developing feebleness and dependence on others for ADLs, and will also likely improve blood pressure, prediabetes, and chronic joint pain such as she experiences in her knees.  Counseled pt on dietary interventions such Mediterranean diet as well as portion control in addition to activity modifications.  With her ALYSE dx she should qualify for Zepbound to assist in weight loss; she was counseled on how the medication works, potential side effects, and how to modify food intake to account for decreased appetite and early satiety.            Health Maintenance         Date Due Completion Date    COVID-19 Vaccine (5 - 2024-25 season) 03/19/2026 (Originally 9/1/2024) 7/15/2022    Influenza Vaccine (1) 09/01/2025 9/6/2024    Hemoglobin A1c (Prediabetes) 09/07/2025 9/7/2024    DEXA Scan 05/09/2027 5/9/2025    Lipid Panel 05/09/2030 5/9/2025    TETANUS VACCINE 01/24/2033 1/24/2023            Future Appointments   Date Time Provider Department Center   8/27/2025  3:20 PM Marin Nettles MD [facility]    12/2/2025  9:00 AM Cleve Barfield MD [facility]         Follow up in about 4 weeks (around 8/20/2025) for Virtual Visit. Total clinical care time was 40 min.    Marin Nettles MD  [facility], [facility]

## 2025-07-25 NOTE — ASSESSMENT & PLAN NOTE
Weight so far has remained relatively unchanged.  Reviewed her routine and discussed gradual escalation to goal of at least moderate intensity exercise for at least 30 minutes at least 5 times a week.  Considering comorbid prediabetes, may discuss initiating metformin for both weight loss as well as risk reduction of progression to diabetes.  We also discussed how increasing activity to maintain a more healthy weight will also reduce risk of developing feebleness and dependence on others for ADLs, and will also likely improve blood pressure, prediabetes, and chronic joint pain such as she experiences in her knees.  Counseled pt on dietary interventions such Mediterranean diet as well as portion control in addition to activity modifications.  With her ALYSE dx she should qualify for Zepbound to assist in weight loss; she was counseled on how the medication works, potential side effects, and how to modify food intake to account for decreased appetite and early satiety.

## 2025-07-25 NOTE — ASSESSMENT & PLAN NOTE
Reviewed pathophysiology of dependent edema which can be mitigated by increasing activity while on her feet, elevating feet when off them, compression, and potentially weight loss as well.

## 2025-07-25 NOTE — ASSESSMENT & PLAN NOTE
Blood pressure at goal on amlodipine 10 mg, olmesartan 5mg, and HCTZ 25 mg.  Digital Medicine data reviewed.

## 2025-08-01 ENCOUNTER — PATIENT MESSAGE (OUTPATIENT)
Facility: CLINIC | Age: 85
End: 2025-08-01
Payer: MEDICARE

## 2025-08-01 DIAGNOSIS — G47.33 OSA (OBSTRUCTIVE SLEEP APNEA): Primary | Chronic | ICD-10-CM

## 2025-08-08 DIAGNOSIS — J30.89 ALLERGIC RHINITIS DUE TO OTHER ALLERGIC TRIGGER, UNSPECIFIED SEASONALITY: Chronic | ICD-10-CM

## 2025-08-08 RX ORDER — FLUTICASONE PROPIONATE 50 MCG
2 SPRAY, SUSPENSION (ML) NASAL 2 TIMES DAILY
Qty: 16 G | Refills: 3 | Status: SHIPPED | OUTPATIENT
Start: 2025-08-08

## 2025-08-18 ENCOUNTER — TELEPHONE (OUTPATIENT)
Facility: CLINIC | Age: 85
End: 2025-08-18
Payer: MEDICARE

## 2025-08-22 DIAGNOSIS — E78.49 OTHER HYPERLIPIDEMIA: Chronic | ICD-10-CM

## 2025-08-22 RX ORDER — ATORVASTATIN CALCIUM 10 MG/1
10 TABLET, FILM COATED ORAL DAILY
Qty: 90 TABLET | Refills: 3 | Status: SHIPPED | OUTPATIENT
Start: 2025-08-22

## (undated) DEVICE — CONTAINER SPECIMEN STRL 4OZ

## (undated) DEVICE — SEE MEDLINE ITEM 146313

## (undated) DEVICE — SOL PVP-I SCRUB 7.5% 4OZ

## (undated) DEVICE — SPONGE KITTNER 1/4X 5/8 L STRL

## (undated) DEVICE — SEE MEDLINE ITEM 156923

## (undated) DEVICE — SOL BETADINE 5%

## (undated) DEVICE — SET TRI-LUMEN FILTERED TUBE

## (undated) DEVICE — PROBE ULTRASOUND ROBOTIC PRO

## (undated) DEVICE — TROCAR ENDOPATH XCEL 12X100MM

## (undated) DEVICE — COVER TIP CURVED SCISSORS XI

## (undated) DEVICE — STAPLER SKIN PROXIMATE WIDE

## (undated) DEVICE — TROCAR ENDOPATH XCEL 5X100MM

## (undated) DEVICE — SEE MEDLINE ITEM 153151

## (undated) DEVICE — SCISSOR 5MMX35CM DIRECT DRIVE

## (undated) DEVICE — ELECTRODE REM PLYHSV RETURN 9

## (undated) DEVICE — GOWN SURGICAL X-LARGE

## (undated) DEVICE — SPONGE GELFOAM 12-7MM

## (undated) DEVICE — DEVICE TRUECLEAR INCISOR 2.9

## (undated) DEVICE — SUT STRATAFIX SPIRAL 15CM

## (undated) DEVICE — Device

## (undated) DEVICE — SUT 0 54IN COATED VICRYL U

## (undated) DEVICE — SUT CTD VICRYL 0 UND BR SUT

## (undated) DEVICE — TRAY DRY SKIN SCRUB PREP

## (undated) DEVICE — DRAPE COLUMN DAVINCI XI

## (undated) DEVICE — BAG URINARY DRN 2000ML

## (undated) DEVICE — NDL INSUF ULTRA VERESS 120MM

## (undated) DEVICE — SUT VICRYL PLUS 0 CT1 36IN

## (undated) DEVICE — NDL HYPO SAFETY 22 X 1 1/2

## (undated) DEVICE — SOL 9P NACL IRR PIC IL

## (undated) DEVICE — TROCAR ENDOPATH XCEL 12MM 15CM

## (undated) DEVICE — LOOP VESSEL BLUE MAXI

## (undated) DEVICE — DRESSING TRANS 2X2 TEGADERM

## (undated) DEVICE — PAD PERINEAL SUPINE

## (undated) DEVICE — SPONGE LAP 18X18 PREWASHED

## (undated) DEVICE — SUT SILK 2-0 STRANDS 30IN

## (undated) DEVICE — SEE MEDLINE ITEM 157131

## (undated) DEVICE — SOL IRR NACL .9% 3000ML

## (undated) DEVICE — DRESSING AQUACEL FOAM 4 X 12

## (undated) DEVICE — SOL WATER STRL IRR 1000ML

## (undated) DEVICE — DRAPE ARM DAVINCI XI

## (undated) DEVICE — SUT VICRYL+ 27 UR-6 VIOL

## (undated) DEVICE — CAUTERY TIP POLISHER

## (undated) DEVICE — TROCAR SPACEMAKER BLUNT 12MM

## (undated) DEVICE — SUTURE CV6 36IN ON TTC-13 NDL

## (undated) DEVICE — CLIP HEMO-LOK MLX LARGE LF

## (undated) DEVICE — BOOT SUTURE AID

## (undated) DEVICE — DEV-O-LOOPS MAXI RED

## (undated) DEVICE — SEE MEDLINE ITEM 146347

## (undated) DEVICE — SEE MEDLINE ITEM 152622

## (undated) DEVICE — SUT VICRYL CTD 2-0 GI 27 SH

## (undated) DEVICE — ELECTRODE BLADE TEFLON 6

## (undated) DEVICE — SEAL UNIVERSAL 5MM-8MM XI

## (undated) DEVICE — SOL ELECTROLUBE ANTI-STIC

## (undated) DEVICE — SUT ABS CLIP LAPRA-TY CTD

## (undated) DEVICE — SET INFLOW TUBE HYSTER

## (undated) DEVICE — SUT 0 18IN SILK BLK BRAIDE

## (undated) DEVICE — HEMOSTAT SURGICEL 4X8IN

## (undated) DEVICE — OBTURATOR BLADELESS 8MM XI

## (undated) DEVICE — GLOVE BIOGEL SKINSENSE PI 6.0

## (undated) DEVICE — SET BASIN 48X48IN 6000ML RING

## (undated) DEVICE — SUT SILK 3-0 STRANDS 30IN

## (undated) DEVICE — PORT ACCESS 5MM W/120MM

## (undated) DEVICE — SPONGE GAUZE 16PLY 4X4

## (undated) DEVICE — LOOP VESSEL YELLOW MAXI

## (undated) DEVICE — KIT SURGIFLO HEMOSTATIC MATRIX

## (undated) DEVICE — DRESSING TELFA N ADH 3X8

## (undated) DEVICE — SOL CLEARIFY VISUALIZATION LAP

## (undated) DEVICE — IRRIGATOR ENDOSCOPY DISP.

## (undated) DEVICE — ULTRASOUND RENTAL